# Patient Record
Sex: MALE | Race: WHITE | NOT HISPANIC OR LATINO | URBAN - METROPOLITAN AREA
[De-identification: names, ages, dates, MRNs, and addresses within clinical notes are randomized per-mention and may not be internally consistent; named-entity substitution may affect disease eponyms.]

---

## 2017-01-05 ENCOUNTER — INPATIENT (INPATIENT)
Facility: HOSPITAL | Age: 67
LOS: 7 days | Discharge: HOME | DRG: 246 | End: 2017-01-13
Attending: HOSPITALIST | Admitting: STUDENT IN AN ORGANIZED HEALTH CARE EDUCATION/TRAINING PROGRAM
Payer: COMMERCIAL

## 2017-01-05 VITALS
OXYGEN SATURATION: 94 % | HEART RATE: 88 BPM | RESPIRATION RATE: 22 BRPM | DIASTOLIC BLOOD PRESSURE: 88 MMHG | TEMPERATURE: 98 F | SYSTOLIC BLOOD PRESSURE: 134 MMHG

## 2017-01-05 DIAGNOSIS — I50.23 ACUTE ON CHRONIC SYSTOLIC (CONGESTIVE) HEART FAILURE: ICD-10-CM

## 2017-01-05 DIAGNOSIS — Z41.8 ENCOUNTER FOR OTHER PROCEDURES FOR PURPOSES OTHER THAN REMEDYING HEALTH STATE: ICD-10-CM

## 2017-01-05 DIAGNOSIS — E11.9 TYPE 2 DIABETES MELLITUS WITHOUT COMPLICATIONS: ICD-10-CM

## 2017-01-05 DIAGNOSIS — I21.4 NON-ST ELEVATION (NSTEMI) MYOCARDIAL INFARCTION: ICD-10-CM

## 2017-01-05 DIAGNOSIS — I63.9 CEREBRAL INFARCTION, UNSPECIFIED: ICD-10-CM

## 2017-01-05 DIAGNOSIS — Z98.89 OTHER SPECIFIED POSTPROCEDURAL STATES: Chronic | ICD-10-CM

## 2017-01-05 DIAGNOSIS — I50.21 ACUTE SYSTOLIC (CONGESTIVE) HEART FAILURE: ICD-10-CM

## 2017-01-05 LAB
ALBUMIN SERPL ELPH-MCNC: 2.7 G/DL — LOW (ref 3.3–5)
ALBUMIN SERPL ELPH-MCNC: 3.4 G/DL — SIGNIFICANT CHANGE UP (ref 3.3–5)
ALP SERPL-CCNC: 65 U/L — SIGNIFICANT CHANGE UP (ref 40–120)
ALP SERPL-CCNC: 76 U/L — SIGNIFICANT CHANGE UP (ref 40–120)
ALT FLD-CCNC: 51 U/L RC — HIGH (ref 10–45)
ALT FLD-CCNC: 52 U/L RC — HIGH (ref 10–45)
ANION GAP SERPL CALC-SCNC: 17 MMOL/L — SIGNIFICANT CHANGE UP (ref 5–17)
ANION GAP SERPL CALC-SCNC: 19 MMOL/L — HIGH (ref 5–17)
APPEARANCE UR: CLEAR — SIGNIFICANT CHANGE UP
APTT BLD: 26.9 SEC — LOW (ref 27.5–37.4)
AST SERPL-CCNC: 82 U/L — HIGH (ref 10–40)
AST SERPL-CCNC: 95 U/L — HIGH (ref 10–40)
BASOPHILS # BLD AUTO: 0 K/UL — SIGNIFICANT CHANGE UP (ref 0–0.2)
BASOPHILS # BLD AUTO: 0 K/UL — SIGNIFICANT CHANGE UP (ref 0–0.2)
BASOPHILS NFR BLD AUTO: 0.1 % — SIGNIFICANT CHANGE UP (ref 0–2)
BASOPHILS NFR BLD AUTO: 0.1 % — SIGNIFICANT CHANGE UP (ref 0–2)
BILIRUB SERPL-MCNC: 0.9 MG/DL — SIGNIFICANT CHANGE UP (ref 0.2–1.2)
BILIRUB SERPL-MCNC: 1 MG/DL — SIGNIFICANT CHANGE UP (ref 0.2–1.2)
BILIRUB UR-MCNC: NEGATIVE — SIGNIFICANT CHANGE UP
BLD GP AB SCN SERPL QL: NEGATIVE — SIGNIFICANT CHANGE UP
BUN SERPL-MCNC: 20 MG/DL — SIGNIFICANT CHANGE UP (ref 7–23)
BUN SERPL-MCNC: 20 MG/DL — SIGNIFICANT CHANGE UP (ref 7–23)
CALCIUM SERPL-MCNC: 8.2 MG/DL — LOW (ref 8.4–10.5)
CALCIUM SERPL-MCNC: 8.8 MG/DL — SIGNIFICANT CHANGE UP (ref 8.4–10.5)
CHLORIDE SERPL-SCNC: 102 MMOL/L — SIGNIFICANT CHANGE UP (ref 96–108)
CHLORIDE SERPL-SCNC: 102 MMOL/L — SIGNIFICANT CHANGE UP (ref 96–108)
CK MB BLD-MCNC: 3.5 % — SIGNIFICANT CHANGE UP (ref 0–3.5)
CK MB BLD-MCNC: 4.7 % — HIGH (ref 0–3.5)
CK MB CFR SERPL CALC: 30.2 NG/ML — HIGH (ref 0–6.7)
CK MB CFR SERPL CALC: 48.3 NG/ML — HIGH (ref 0–6.7)
CK SERPL-CCNC: 1025 U/L — HIGH (ref 30–200)
CK SERPL-CCNC: 853 U/L — HIGH (ref 30–200)
CO2 SERPL-SCNC: 21 MMOL/L — LOW (ref 22–31)
CO2 SERPL-SCNC: 23 MMOL/L — SIGNIFICANT CHANGE UP (ref 22–31)
COLOR SPEC: SIGNIFICANT CHANGE UP
CREAT SERPL-MCNC: 1.07 MG/DL — SIGNIFICANT CHANGE UP (ref 0.5–1.3)
CREAT SERPL-MCNC: 1.2 MG/DL — SIGNIFICANT CHANGE UP (ref 0.5–1.3)
DIFF PNL FLD: NEGATIVE — SIGNIFICANT CHANGE UP
EOSINOPHIL # BLD AUTO: 0 K/UL — SIGNIFICANT CHANGE UP (ref 0–0.5)
EOSINOPHIL # BLD AUTO: 0 K/UL — SIGNIFICANT CHANGE UP (ref 0–0.5)
EOSINOPHIL NFR BLD AUTO: 0.1 % — SIGNIFICANT CHANGE UP (ref 0–6)
EOSINOPHIL NFR BLD AUTO: 0.2 % — SIGNIFICANT CHANGE UP (ref 0–6)
GAS PNL BLDV: SIGNIFICANT CHANGE UP
GLUCOSE SERPL-MCNC: 184 MG/DL — HIGH (ref 70–99)
GLUCOSE SERPL-MCNC: 189 MG/DL — HIGH (ref 70–99)
GLUCOSE UR QL: NEGATIVE — SIGNIFICANT CHANGE UP
HCT VFR BLD CALC: 31 % — LOW (ref 39–50)
HCT VFR BLD CALC: 34 % — LOW (ref 39–50)
HGB BLD-MCNC: 10.5 G/DL — LOW (ref 13–17)
HGB BLD-MCNC: 11.7 G/DL — LOW (ref 13–17)
INR BLD: 1.48 RATIO — HIGH (ref 0.88–1.16)
KETONES UR-MCNC: NEGATIVE — SIGNIFICANT CHANGE UP
LEUKOCYTE ESTERASE UR-ACNC: NEGATIVE — SIGNIFICANT CHANGE UP
LYMPHOCYTES # BLD AUTO: 1.2 K/UL — SIGNIFICANT CHANGE UP (ref 1–3.3)
LYMPHOCYTES # BLD AUTO: 1.5 K/UL — SIGNIFICANT CHANGE UP (ref 1–3.3)
LYMPHOCYTES # BLD AUTO: 10.4 % — LOW (ref 13–44)
LYMPHOCYTES # BLD AUTO: 9.1 % — LOW (ref 13–44)
MAGNESIUM SERPL-MCNC: 1.7 MG/DL — SIGNIFICANT CHANGE UP (ref 1.6–2.6)
MCHC RBC-ENTMCNC: 29 PG — SIGNIFICANT CHANGE UP (ref 27–34)
MCHC RBC-ENTMCNC: 29.3 PG — SIGNIFICANT CHANGE UP (ref 27–34)
MCHC RBC-ENTMCNC: 33.9 GM/DL — SIGNIFICANT CHANGE UP (ref 32–36)
MCHC RBC-ENTMCNC: 34.3 GM/DL — SIGNIFICANT CHANGE UP (ref 32–36)
MCV RBC AUTO: 85.5 FL — SIGNIFICANT CHANGE UP (ref 80–100)
MCV RBC AUTO: 85.6 FL — SIGNIFICANT CHANGE UP (ref 80–100)
MONOCYTES # BLD AUTO: 1.3 K/UL — HIGH (ref 0–0.9)
MONOCYTES # BLD AUTO: 1.4 K/UL — HIGH (ref 0–0.9)
MONOCYTES NFR BLD AUTO: 10.8 % — SIGNIFICANT CHANGE UP (ref 2–14)
MONOCYTES NFR BLD AUTO: 8.4 % — SIGNIFICANT CHANGE UP (ref 2–14)
NEUTROPHILS # BLD AUTO: 13.4 K/UL — HIGH (ref 1.8–7.4)
NEUTROPHILS # BLD AUTO: 9.4 K/UL — HIGH (ref 1.8–7.4)
NEUTROPHILS NFR BLD AUTO: 78.4 % — HIGH (ref 43–77)
NEUTROPHILS NFR BLD AUTO: 82.3 % — HIGH (ref 43–77)
NITRITE UR-MCNC: NEGATIVE — SIGNIFICANT CHANGE UP
NT-PROBNP SERPL-SCNC: HIGH PG/ML (ref 0–300)
NT-PROBNP SERPL-SCNC: HIGH PG/ML (ref 0–300)
PH UR: 5 — SIGNIFICANT CHANGE UP (ref 4.8–8)
PHOSPHATE SERPL-MCNC: 3 MG/DL — SIGNIFICANT CHANGE UP (ref 2.5–4.5)
PLATELET # BLD AUTO: 396 K/UL — SIGNIFICANT CHANGE UP (ref 150–400)
PLATELET # BLD AUTO: 461 K/UL — HIGH (ref 150–400)
POTASSIUM SERPL-MCNC: 3.4 MMOL/L — LOW (ref 3.5–5.3)
POTASSIUM SERPL-MCNC: 3.7 MMOL/L — SIGNIFICANT CHANGE UP (ref 3.5–5.3)
POTASSIUM SERPL-SCNC: 3.4 MMOL/L — LOW (ref 3.5–5.3)
POTASSIUM SERPL-SCNC: 3.7 MMOL/L — SIGNIFICANT CHANGE UP (ref 3.5–5.3)
PROT SERPL-MCNC: 5.7 G/DL — LOW (ref 6–8.3)
PROT SERPL-MCNC: 6.5 G/DL — SIGNIFICANT CHANGE UP (ref 6–8.3)
PROT UR-MCNC: NEGATIVE — SIGNIFICANT CHANGE UP
PROTHROM AB SERPL-ACNC: 16.1 SEC — HIGH (ref 10–13.1)
RAPID RVP RESULT: SIGNIFICANT CHANGE UP
RBC # BLD: 3.62 M/UL — LOW (ref 4.2–5.8)
RBC # BLD: 3.98 M/UL — LOW (ref 4.2–5.8)
RBC # FLD: 13.2 % — SIGNIFICANT CHANGE UP (ref 10.3–14.5)
RBC # FLD: 13.2 % — SIGNIFICANT CHANGE UP (ref 10.3–14.5)
RH IG SCN BLD-IMP: NEGATIVE — SIGNIFICANT CHANGE UP
SODIUM SERPL-SCNC: 142 MMOL/L — SIGNIFICANT CHANGE UP (ref 135–145)
SODIUM SERPL-SCNC: 142 MMOL/L — SIGNIFICANT CHANGE UP (ref 135–145)
SP GR SPEC: 1.01 — LOW (ref 1.01–1.02)
TROPONIN T SERPL-MCNC: 0.81 NG/ML — HIGH (ref 0–0.06)
TROPONIN T SERPL-MCNC: 1.91 NG/ML — HIGH (ref 0–0.06)
UROBILINOGEN FLD QL: NEGATIVE — SIGNIFICANT CHANGE UP
WBC # BLD: 11.9 K/UL — HIGH (ref 3.8–10.5)
WBC # BLD: 16.2 K/UL — HIGH (ref 3.8–10.5)
WBC # FLD AUTO: 11.9 K/UL — HIGH (ref 3.8–10.5)
WBC # FLD AUTO: 16.2 K/UL — HIGH (ref 3.8–10.5)

## 2017-01-05 PROCEDURE — 93010 ELECTROCARDIOGRAM REPORT: CPT

## 2017-01-05 PROCEDURE — 93308 TTE F-UP OR LMTD: CPT | Mod: 26

## 2017-01-05 PROCEDURE — 99291 CRITICAL CARE FIRST HOUR: CPT | Mod: 25

## 2017-01-05 PROCEDURE — 71010: CPT | Mod: 26

## 2017-01-05 PROCEDURE — 70450 CT HEAD/BRAIN W/O DYE: CPT | Mod: 26

## 2017-01-05 RX ORDER — HEPARIN SODIUM 5000 [USP'U]/ML
INJECTION INTRAVENOUS; SUBCUTANEOUS
Qty: 25000 | Refills: 0 | Status: DISCONTINUED | OUTPATIENT
Start: 2017-01-05 | End: 2017-01-06

## 2017-01-05 RX ORDER — FUROSEMIDE 40 MG
40 TABLET ORAL ONCE
Qty: 0 | Refills: 0 | Status: COMPLETED | OUTPATIENT
Start: 2017-01-05 | End: 2017-01-05

## 2017-01-05 RX ORDER — PIPERACILLIN AND TAZOBACTAM 4; .5 G/20ML; G/20ML
3.38 INJECTION, POWDER, LYOPHILIZED, FOR SOLUTION INTRAVENOUS ONCE
Qty: 0 | Refills: 0 | Status: COMPLETED | OUTPATIENT
Start: 2017-01-05 | End: 2017-01-05

## 2017-01-05 RX ORDER — METOPROLOL TARTRATE 50 MG
12.5 TABLET ORAL
Qty: 0 | Refills: 0 | Status: DISCONTINUED | OUTPATIENT
Start: 2017-01-05 | End: 2017-01-10

## 2017-01-05 RX ORDER — HEPARIN SODIUM 5000 [USP'U]/ML
5000 INJECTION INTRAVENOUS; SUBCUTANEOUS EVERY 6 HOURS
Qty: 0 | Refills: 0 | Status: DISCONTINUED | OUTPATIENT
Start: 2017-01-05 | End: 2017-01-06

## 2017-01-05 RX ORDER — NITROGLYCERIN 6.5 MG
50 CAPSULE, EXTENDED RELEASE ORAL
Qty: 50 | Refills: 0 | Status: DISCONTINUED | OUTPATIENT
Start: 2017-01-05 | End: 2017-01-05

## 2017-01-05 RX ORDER — CLOPIDOGREL BISULFATE 75 MG/1
75 TABLET, FILM COATED ORAL DAILY
Qty: 0 | Refills: 0 | Status: DISCONTINUED | OUTPATIENT
Start: 2017-01-05 | End: 2017-01-13

## 2017-01-05 RX ORDER — ATORVASTATIN CALCIUM 80 MG/1
80 TABLET, FILM COATED ORAL AT BEDTIME
Qty: 0 | Refills: 0 | Status: DISCONTINUED | OUTPATIENT
Start: 2017-01-05 | End: 2017-01-13

## 2017-01-05 RX ORDER — QUETIAPINE FUMARATE 200 MG/1
25 TABLET, FILM COATED ORAL ONCE
Qty: 0 | Refills: 0 | Status: COMPLETED | OUTPATIENT
Start: 2017-01-05 | End: 2017-01-05

## 2017-01-05 RX ORDER — AZITHROMYCIN 500 MG/1
500 TABLET, FILM COATED ORAL ONCE
Qty: 0 | Refills: 0 | Status: DISCONTINUED | OUTPATIENT
Start: 2017-01-05 | End: 2017-01-05

## 2017-01-05 RX ORDER — FUROSEMIDE 40 MG
40 TABLET ORAL ONCE
Qty: 0 | Refills: 0 | Status: DISCONTINUED | OUTPATIENT
Start: 2017-01-05 | End: 2017-01-05

## 2017-01-05 RX ORDER — VANCOMYCIN HCL 1 G
1000 VIAL (EA) INTRAVENOUS ONCE
Qty: 0 | Refills: 0 | Status: DISCONTINUED | OUTPATIENT
Start: 2017-01-05 | End: 2017-01-05

## 2017-01-05 RX ORDER — INSULIN LISPRO 100/ML
VIAL (ML) SUBCUTANEOUS EVERY 6 HOURS
Qty: 0 | Refills: 0 | Status: DISCONTINUED | OUTPATIENT
Start: 2017-01-05 | End: 2017-01-11

## 2017-01-05 RX ORDER — CLOPIDOGREL BISULFATE 75 MG/1
600 TABLET, FILM COATED ORAL ONCE
Qty: 0 | Refills: 0 | Status: COMPLETED | OUTPATIENT
Start: 2017-01-05 | End: 2017-01-05

## 2017-01-05 RX ORDER — ASPIRIN/CALCIUM CARB/MAGNESIUM 324 MG
324 TABLET ORAL ONCE
Qty: 0 | Refills: 0 | Status: COMPLETED | OUTPATIENT
Start: 2017-01-05 | End: 2017-01-05

## 2017-01-05 RX ORDER — ASPIRIN/CALCIUM CARB/MAGNESIUM 324 MG
81 TABLET ORAL DAILY
Qty: 0 | Refills: 0 | Status: DISCONTINUED | OUTPATIENT
Start: 2017-01-05 | End: 2017-01-13

## 2017-01-05 RX ADMIN — Medication 12.5 MILLIGRAM(S): at 22:49

## 2017-01-05 RX ADMIN — Medication 40 MILLIGRAM(S): at 16:33

## 2017-01-05 RX ADMIN — PIPERACILLIN AND TAZOBACTAM 200 GRAM(S): 4; .5 INJECTION, POWDER, LYOPHILIZED, FOR SOLUTION INTRAVENOUS at 18:11

## 2017-01-05 RX ADMIN — HEPARIN SODIUM 1000 UNIT(S)/HR: 5000 INJECTION INTRAVENOUS; SUBCUTANEOUS at 22:50

## 2017-01-05 RX ADMIN — Medication 324 MILLIGRAM(S): at 16:26

## 2017-01-05 RX ADMIN — ATORVASTATIN CALCIUM 80 MILLIGRAM(S): 80 TABLET, FILM COATED ORAL at 22:49

## 2017-01-05 RX ADMIN — CLOPIDOGREL BISULFATE 600 MILLIGRAM(S): 75 TABLET, FILM COATED ORAL at 22:49

## 2017-01-05 RX ADMIN — Medication 15 MICROGRAM(S)/MIN: at 18:11

## 2017-01-05 RX ADMIN — Medication 2: at 23:57

## 2017-01-05 RX ADMIN — Medication 15 MICROGRAM(S)/MIN: at 14:27

## 2017-01-05 NOTE — H&P ADULT. - PROBLEM SELECTOR PLAN 2
- s/p ASA load in ED  - Give Plavix load and start heparin gtt pending CT head to r/o hemorraghic conversion given recent CVA  - Increase home dose of Lipitor to 80 mg  - Consider restarting home B blocker  - Trend Carmella  - Daily EKG, monitor for CP  - Check TTE, will need eventual cardiac cath

## 2017-01-05 NOTE — H&P ADULT. - LYMPHATIC
anterior cervical L/supraclavicular R/supraclavicular L/anterior cervical R/posterior cervical L/posterior cervical R

## 2017-01-05 NOTE — ED PROVIDER NOTE - CRITICAL CARE PROVIDED
consultation with other physicians/documentation/additional history taking/direct patient care (not related to procedure)/consult w/ pt's family directly relating to pts condition/interpretation of diagnostic studies

## 2017-01-05 NOTE — H&P ADULT. - LAB RESULTS AND INTERPRETATION
Labs reviewed by me: Leukocytosis 162 (neutrophil-predominant), normocytic anemia at baseline, thrombocytosis likely reactive, elevated troponin 0.81 (uptrended from 0.13 in 12/29), CKMB 48.3, CK 1025 (improved from 1182 in 12/30), BNP 51110, lactate 3.0

## 2017-01-05 NOTE — H&P ADULT. - FAMILY HISTORY
<<-----Click on this checkbox to enter Family History No pertinent family history in first degree relatives     Family history of diabetes mellitus     Family history of stroke

## 2017-01-05 NOTE — ED PROCEDURE NOTE - PROCEDURE ADDITIONAL DETAILS
Emergency Department Focused Ultrasound performed at patient's bedside for educational purposes. The study will have a follow up study performed or was performed in the direct supervision of an ultrasound trained attending.  Impression: lung sliding, b/l base effusions R>L.
POCUS: diffuse b lines, b/l pleural effusions, no sig pericardial effusion. moderate hypokinesis lvef

## 2017-01-05 NOTE — H&P ADULT. - ASSESSMENT
66 M PMH HTN, HLD, uncontrolled DM2 (HbA1c 8.6%) recently admitted to [facility] for elevated troponins presumed to be demand ischemia 2/2 sepsis 2/2 multilobar PNA, found to have HFrEF (moderate segmental LV systolic dysfunction in 12/2016) and acute small left MCA infarct, now p/w worsening SOB, cough, and LE edema x 2 days, likely ADHF in the setting of NSTEMI. 66 M PMH HTN, HLD, uncontrolled DM2 (HbA1c 8.6%) recently admitted to [facility] for elevated troponins presumed to be demand ischemia 2/2 sepsis 2/2 multilobar PNA, found to have HFrEF (moderate segmental LV systolic dysfunction in 12/2016) and acute small left MCA infarct, now p/w worsening SOB, cough, and LE edema x 2 days, likely ADHF in the setting of NSTEMI.

## 2017-01-05 NOTE — ED PROVIDER NOTE - CARE PLAN
Principal Discharge DX:	Acute systolic congestive heart failure  Secondary Diagnosis:	NSTEMI (non-ST elevated myocardial infarction)

## 2017-01-05 NOTE — H&P ADULT. - PROBLEM SELECTOR PLAN 1
- In setting of NSTEMI  - s/p 40 mg IV Lasix in ED  - c/w BiPAP  - d/c nitro gtt as BP soft  - Respiratory status improved  - Monitor strict I/O, daily weights

## 2017-01-05 NOTE — ED PROVIDER NOTE - OBJECTIVE STATEMENT
66yM with recent admission for MI/stroke/PNA, sent in for admission by his PMD for "worsening pna". Since hospital discharge several days ago developed worsening cough, sob and orthopnea. Not currently on abx. New increased LE edema bilaterally. No h/o heart failure. Cough with pinkish sputum. No fever or chills.

## 2017-01-05 NOTE — ED PROVIDER NOTE - ATTENDING CONTRIBUTION TO CARE
I performed a history and physical exam of the patient and discussed their management with the resident. I reviewed the resident's note and agree with the documented findings and plan of care.     66yoM with recent MI, came for "worsening pna" which  was sx of cough, sob that have not improved since discharge. He has increasing LE edema.   P02 at triage 90% on RA  Liang Heath DO:   Gen: Mod respiratory distress  HEENT: PERRL, EOMI, atraumatic  Neck: supple  Heart: RRR, S1S2  Lungs: Diminished on R with bilateral rales.   Abd: soft, nontender, nondistended  Ext: No deformity. No erythema. No calf tenderness or edema.   Neuro: Grossly intact. Normal gait.     A: Dyspnea - likely Acute CHF exacerbation, r/o new ACS  EKG with lateral ischemia, no CORTEZ  P:  labs/Carmella  Bipap/nitro gtt  ASA  cardiac monitoring.   admit

## 2017-01-05 NOTE — ED ADULT NURSE NOTE - OBJECTIVE STATEMENT
Per pt and his wife's report, pt has not improved since being released from the hospital two days ago due to pneumonia.  Pt went to his PCP today who did an x-ray and found he still had a pneumonia.  Pt has been coughing and unable to sleep.  Per the pt's wife he also had a CVA prior to this past stay in the hospital as well but is not having symptoms today.  Pt A&Ox4, skin is warm and dry, appears tachypnic and uncomfortable.  Cough is pink tinged per pt report.

## 2017-01-05 NOTE — ED PROVIDER NOTE - PROGRESS NOTE DETAILS
PMD [facility] paged  [place]iles PGY-3 Patient's work of breathing and cough significantly improved after nitro and bipap. CCU consult pending. PMD [facility] pagememe Kwan PGY-3

## 2017-01-05 NOTE — H&P ADULT. - PROBLEM SELECTOR PLAN 4
- Recent MCA infarct  - c/w ASA and statin  - Repeat CT head 24 hr after starting a/c and neuro checks Q4H

## 2017-01-06 LAB
ALBUMIN SERPL ELPH-MCNC: 2.6 G/DL — LOW (ref 3.3–5)
ALP SERPL-CCNC: 61 U/L — SIGNIFICANT CHANGE UP (ref 40–120)
ALT FLD-CCNC: 49 U/L RC — HIGH (ref 10–45)
ANION GAP SERPL CALC-SCNC: 14 MMOL/L — SIGNIFICANT CHANGE UP (ref 5–17)
ANION GAP SERPL CALC-SCNC: 16 MMOL/L — SIGNIFICANT CHANGE UP (ref 5–17)
APTT BLD: 31.2 SEC — SIGNIFICANT CHANGE UP (ref 27.5–37.4)
AST SERPL-CCNC: 78 U/L — HIGH (ref 10–40)
BASOPHILS # BLD AUTO: 0 K/UL — SIGNIFICANT CHANGE UP (ref 0–0.2)
BASOPHILS NFR BLD AUTO: 0.2 % — SIGNIFICANT CHANGE UP (ref 0–2)
BILIRUB SERPL-MCNC: 0.9 MG/DL — SIGNIFICANT CHANGE UP (ref 0.2–1.2)
BUN SERPL-MCNC: 18 MG/DL — SIGNIFICANT CHANGE UP (ref 7–23)
BUN SERPL-MCNC: 19 MG/DL — SIGNIFICANT CHANGE UP (ref 7–23)
CALCIUM SERPL-MCNC: 7.8 MG/DL — LOW (ref 8.4–10.5)
CALCIUM SERPL-MCNC: 7.9 MG/DL — LOW (ref 8.4–10.5)
CHLORIDE SERPL-SCNC: 102 MMOL/L — SIGNIFICANT CHANGE UP (ref 96–108)
CHLORIDE SERPL-SCNC: 103 MMOL/L — SIGNIFICANT CHANGE UP (ref 96–108)
CHOLEST SERPL-MCNC: 192 MG/DL — SIGNIFICANT CHANGE UP (ref 10–199)
CK MB BLD-MCNC: 2.6 % — SIGNIFICANT CHANGE UP (ref 0–3.5)
CK MB CFR SERPL CALC: 15 NG/ML — HIGH (ref 0–6.7)
CK SERPL-CCNC: 575 U/L — HIGH (ref 30–200)
CO2 SERPL-SCNC: 21 MMOL/L — LOW (ref 22–31)
CO2 SERPL-SCNC: 25 MMOL/L — SIGNIFICANT CHANGE UP (ref 22–31)
CREAT SERPL-MCNC: 0.92 MG/DL — SIGNIFICANT CHANGE UP (ref 0.5–1.3)
CREAT SERPL-MCNC: 0.96 MG/DL — SIGNIFICANT CHANGE UP (ref 0.5–1.3)
EOSINOPHIL # BLD AUTO: 0 K/UL — SIGNIFICANT CHANGE UP (ref 0–0.5)
EOSINOPHIL NFR BLD AUTO: 0.2 % — SIGNIFICANT CHANGE UP (ref 0–6)
GAS PNL BLDA: SIGNIFICANT CHANGE UP
GLUCOSE SERPL-MCNC: 153 MG/DL — HIGH (ref 70–99)
GLUCOSE SERPL-MCNC: 171 MG/DL — HIGH (ref 70–99)
HBA1C BLD-MCNC: 8.5 % — HIGH (ref 4–5.6)
HCT VFR BLD CALC: 30.5 % — LOW (ref 39–50)
HDLC SERPL-MCNC: 32 MG/DL — LOW (ref 40–125)
HGB BLD-MCNC: 10.4 G/DL — LOW (ref 13–17)
INR BLD: 1.5 RATIO — HIGH (ref 0.88–1.16)
LIPID PNL WITH DIRECT LDL SERPL: 138 MG/DL — HIGH
LYMPHOCYTES # BLD AUTO: 1.3 K/UL — SIGNIFICANT CHANGE UP (ref 1–3.3)
LYMPHOCYTES # BLD AUTO: 11.5 % — LOW (ref 13–44)
MAGNESIUM SERPL-MCNC: 2.1 MG/DL — SIGNIFICANT CHANGE UP (ref 1.6–2.6)
MAGNESIUM SERPL-MCNC: 2.2 MG/DL — SIGNIFICANT CHANGE UP (ref 1.6–2.6)
MCHC RBC-ENTMCNC: 29.4 PG — SIGNIFICANT CHANGE UP (ref 27–34)
MCHC RBC-ENTMCNC: 34.3 GM/DL — SIGNIFICANT CHANGE UP (ref 32–36)
MCV RBC AUTO: 85.7 FL — SIGNIFICANT CHANGE UP (ref 80–100)
MONOCYTES # BLD AUTO: 1 K/UL — HIGH (ref 0–0.9)
MONOCYTES NFR BLD AUTO: 9.1 % — SIGNIFICANT CHANGE UP (ref 2–14)
NEUTROPHILS # BLD AUTO: 8.8 K/UL — HIGH (ref 1.8–7.4)
NEUTROPHILS NFR BLD AUTO: 79 % — HIGH (ref 43–77)
PHOSPHATE SERPL-MCNC: 2.6 MG/DL — SIGNIFICANT CHANGE UP (ref 2.5–4.5)
PHOSPHATE SERPL-MCNC: 2.8 MG/DL — SIGNIFICANT CHANGE UP (ref 2.5–4.5)
PLATELET # BLD AUTO: 369 K/UL — SIGNIFICANT CHANGE UP (ref 150–400)
POTASSIUM SERPL-MCNC: 3.4 MMOL/L — LOW (ref 3.5–5.3)
POTASSIUM SERPL-MCNC: 5.5 MMOL/L — HIGH (ref 3.5–5.3)
POTASSIUM SERPL-SCNC: 3.4 MMOL/L — LOW (ref 3.5–5.3)
POTASSIUM SERPL-SCNC: 5.5 MMOL/L — HIGH (ref 3.5–5.3)
PROT SERPL-MCNC: 5.4 G/DL — LOW (ref 6–8.3)
PROTHROM AB SERPL-ACNC: 16.3 SEC — HIGH (ref 10–13.1)
RBC # BLD: 3.55 M/UL — LOW (ref 4.2–5.8)
RBC # FLD: 13.2 % — SIGNIFICANT CHANGE UP (ref 10.3–14.5)
SODIUM SERPL-SCNC: 140 MMOL/L — SIGNIFICANT CHANGE UP (ref 135–145)
SODIUM SERPL-SCNC: 141 MMOL/L — SIGNIFICANT CHANGE UP (ref 135–145)
TOTAL CHOLESTEROL/HDL RATIO MEASUREMENT: 6 RATIO — SIGNIFICANT CHANGE UP (ref 3.4–9.6)
TRIGL SERPL-MCNC: 110 MG/DL — SIGNIFICANT CHANGE UP (ref 10–149)
TROPONIN T SERPL-MCNC: 1.45 NG/ML — HIGH (ref 0–0.06)
TSH SERPL-MCNC: 1.02 UU/ML — SIGNIFICANT CHANGE UP (ref 0.27–4.2)
WBC # BLD: 11.2 K/UL — HIGH (ref 3.8–10.5)
WBC # FLD AUTO: 11.2 K/UL — HIGH (ref 3.8–10.5)

## 2017-01-06 PROCEDURE — 71010: CPT | Mod: 26

## 2017-01-06 PROCEDURE — 99223 1ST HOSP IP/OBS HIGH 75: CPT | Mod: GC

## 2017-01-06 PROCEDURE — 93010 ELECTROCARDIOGRAM REPORT: CPT

## 2017-01-06 RX ORDER — QUETIAPINE FUMARATE 200 MG/1
25 TABLET, FILM COATED ORAL DAILY
Qty: 0 | Refills: 0 | Status: DISCONTINUED | OUTPATIENT
Start: 2017-01-06 | End: 2017-01-13

## 2017-01-06 RX ORDER — POTASSIUM CHLORIDE 20 MEQ
10 PACKET (EA) ORAL
Qty: 0 | Refills: 0 | Status: COMPLETED | OUTPATIENT
Start: 2017-01-06 | End: 2017-01-06

## 2017-01-06 RX ORDER — ENOXAPARIN SODIUM 100 MG/ML
40 INJECTION SUBCUTANEOUS DAILY
Qty: 0 | Refills: 0 | Status: DISCONTINUED | OUTPATIENT
Start: 2017-01-06 | End: 2017-01-08

## 2017-01-06 RX ORDER — CAPTOPRIL 12.5 MG/1
12.5 TABLET ORAL THREE TIMES A DAY
Qty: 0 | Refills: 0 | Status: DISCONTINUED | OUTPATIENT
Start: 2017-01-06 | End: 2017-01-11

## 2017-01-06 RX ORDER — MAGNESIUM SULFATE 500 MG/ML
2 VIAL (ML) INJECTION ONCE
Qty: 0 | Refills: 0 | Status: COMPLETED | OUTPATIENT
Start: 2017-01-06 | End: 2017-01-06

## 2017-01-06 RX ORDER — FUROSEMIDE 40 MG
40 TABLET ORAL ONCE
Qty: 0 | Refills: 0 | Status: COMPLETED | OUTPATIENT
Start: 2017-01-06 | End: 2017-01-06

## 2017-01-06 RX ORDER — POTASSIUM CHLORIDE 20 MEQ
40 PACKET (EA) ORAL ONCE
Qty: 0 | Refills: 0 | Status: COMPLETED | OUTPATIENT
Start: 2017-01-06 | End: 2017-01-06

## 2017-01-06 RX ADMIN — CAPTOPRIL 12.5 MILLIGRAM(S): 12.5 TABLET ORAL at 11:56

## 2017-01-06 RX ADMIN — Medication 40 MILLIEQUIVALENT(S): at 09:30

## 2017-01-06 RX ADMIN — ATORVASTATIN CALCIUM 80 MILLIGRAM(S): 80 TABLET, FILM COATED ORAL at 23:34

## 2017-01-06 RX ADMIN — Medication 40 MILLIGRAM(S): at 12:41

## 2017-01-06 RX ADMIN — Medication 100 MILLIGRAM(S): at 23:39

## 2017-01-06 RX ADMIN — CAPTOPRIL 12.5 MILLIGRAM(S): 12.5 TABLET ORAL at 23:34

## 2017-01-06 RX ADMIN — Medication 100 MILLIEQUIVALENT(S): at 00:49

## 2017-01-06 RX ADMIN — Medication 2: at 23:37

## 2017-01-06 RX ADMIN — Medication 100 MILLIEQUIVALENT(S): at 03:11

## 2017-01-06 RX ADMIN — Medication 12.5 MILLIGRAM(S): at 05:12

## 2017-01-06 RX ADMIN — Medication 100 MILLIEQUIVALENT(S): at 05:11

## 2017-01-06 RX ADMIN — Medication 81 MILLIGRAM(S): at 11:56

## 2017-01-06 RX ADMIN — HEPARIN SODIUM 1250 UNIT(S)/HR: 5000 INJECTION INTRAVENOUS; SUBCUTANEOUS at 05:20

## 2017-01-06 RX ADMIN — CLOPIDOGREL BISULFATE 75 MILLIGRAM(S): 75 TABLET, FILM COATED ORAL at 11:56

## 2017-01-06 RX ADMIN — ENOXAPARIN SODIUM 40 MILLIGRAM(S): 100 INJECTION SUBCUTANEOUS at 11:58

## 2017-01-06 RX ADMIN — Medication 50 GRAM(S): at 00:49

## 2017-01-06 RX ADMIN — QUETIAPINE FUMARATE 25 MILLIGRAM(S): 200 TABLET, FILM COATED ORAL at 23:34

## 2017-01-06 RX ADMIN — Medication 12.5 MILLIGRAM(S): at 18:17

## 2017-01-07 LAB
ALBUMIN SERPL ELPH-MCNC: 3 G/DL — LOW (ref 3.3–5)
ALP SERPL-CCNC: 66 U/L — SIGNIFICANT CHANGE UP (ref 40–120)
ALT FLD-CCNC: 54 U/L RC — HIGH (ref 10–45)
ANION GAP SERPL CALC-SCNC: 14 MMOL/L — SIGNIFICANT CHANGE UP (ref 5–17)
APTT BLD: 27.7 SEC — SIGNIFICANT CHANGE UP (ref 27.5–37.4)
AST SERPL-CCNC: 47 U/L — HIGH (ref 10–40)
BASOPHILS # BLD AUTO: 0 K/UL — SIGNIFICANT CHANGE UP (ref 0–0.2)
BASOPHILS NFR BLD AUTO: 0.2 % — SIGNIFICANT CHANGE UP (ref 0–2)
BILIRUB SERPL-MCNC: 0.8 MG/DL — SIGNIFICANT CHANGE UP (ref 0.2–1.2)
BUN SERPL-MCNC: 20 MG/DL — SIGNIFICANT CHANGE UP (ref 7–23)
CALCIUM SERPL-MCNC: 8.5 MG/DL — SIGNIFICANT CHANGE UP (ref 8.4–10.5)
CHLORIDE SERPL-SCNC: 102 MMOL/L — SIGNIFICANT CHANGE UP (ref 96–108)
CK MB BLD-MCNC: 1.9 % — SIGNIFICANT CHANGE UP (ref 0–3.5)
CK MB CFR SERPL CALC: 4.4 NG/ML — SIGNIFICANT CHANGE UP (ref 0–6.7)
CK SERPL-CCNC: 235 U/L — HIGH (ref 30–200)
CO2 SERPL-SCNC: 27 MMOL/L — SIGNIFICANT CHANGE UP (ref 22–31)
CREAT SERPL-MCNC: 1.04 MG/DL — SIGNIFICANT CHANGE UP (ref 0.5–1.3)
EOSINOPHIL # BLD AUTO: 0.1 K/UL — SIGNIFICANT CHANGE UP (ref 0–0.5)
EOSINOPHIL NFR BLD AUTO: 0.9 % — SIGNIFICANT CHANGE UP (ref 0–6)
GLUCOSE SERPL-MCNC: 164 MG/DL — HIGH (ref 70–99)
HCT VFR BLD CALC: 33.3 % — LOW (ref 39–50)
HGB BLD-MCNC: 11.1 G/DL — LOW (ref 13–17)
INR BLD: 1.39 RATIO — HIGH (ref 0.88–1.16)
LYMPHOCYTES # BLD AUTO: 1.8 K/UL — SIGNIFICANT CHANGE UP (ref 1–3.3)
LYMPHOCYTES # BLD AUTO: 20.2 % — SIGNIFICANT CHANGE UP (ref 13–44)
MAGNESIUM SERPL-MCNC: 2.1 MG/DL — SIGNIFICANT CHANGE UP (ref 1.6–2.6)
MCHC RBC-ENTMCNC: 28.8 PG — SIGNIFICANT CHANGE UP (ref 27–34)
MCHC RBC-ENTMCNC: 33.4 GM/DL — SIGNIFICANT CHANGE UP (ref 32–36)
MCV RBC AUTO: 86.2 FL — SIGNIFICANT CHANGE UP (ref 80–100)
MONOCYTES # BLD AUTO: 0.8 K/UL — SIGNIFICANT CHANGE UP (ref 0–0.9)
MONOCYTES NFR BLD AUTO: 8.7 % — SIGNIFICANT CHANGE UP (ref 2–14)
NEUTROPHILS # BLD AUTO: 6 K/UL — SIGNIFICANT CHANGE UP (ref 1.8–7.4)
NEUTROPHILS NFR BLD AUTO: 70 % — SIGNIFICANT CHANGE UP (ref 43–77)
PHOSPHATE SERPL-MCNC: 2.6 MG/DL — SIGNIFICANT CHANGE UP (ref 2.5–4.5)
PLATELET # BLD AUTO: 383 K/UL — SIGNIFICANT CHANGE UP (ref 150–400)
POTASSIUM SERPL-MCNC: 3.1 MMOL/L — LOW (ref 3.5–5.3)
POTASSIUM SERPL-SCNC: 3.1 MMOL/L — LOW (ref 3.5–5.3)
PROT SERPL-MCNC: 5.8 G/DL — LOW (ref 6–8.3)
PROTHROM AB SERPL-ACNC: 15.2 SEC — HIGH (ref 10–13.1)
RBC # BLD: 3.87 M/UL — LOW (ref 4.2–5.8)
RBC # FLD: 13.1 % — SIGNIFICANT CHANGE UP (ref 10.3–14.5)
SODIUM SERPL-SCNC: 143 MMOL/L — SIGNIFICANT CHANGE UP (ref 135–145)
TROPONIN T SERPL-MCNC: 1.96 NG/ML — HIGH (ref 0–0.06)
WBC # BLD: 8.6 K/UL — SIGNIFICANT CHANGE UP (ref 3.8–10.5)
WBC # FLD AUTO: 8.6 K/UL — SIGNIFICANT CHANGE UP (ref 3.8–10.5)

## 2017-01-07 PROCEDURE — 99223 1ST HOSP IP/OBS HIGH 75: CPT | Mod: GC

## 2017-01-07 PROCEDURE — 93010 ELECTROCARDIOGRAM REPORT: CPT

## 2017-01-07 PROCEDURE — 99233 SBSQ HOSP IP/OBS HIGH 50: CPT | Mod: GC

## 2017-01-07 RX ORDER — POTASSIUM CHLORIDE 20 MEQ
20 PACKET (EA) ORAL
Qty: 0 | Refills: 0 | Status: COMPLETED | OUTPATIENT
Start: 2017-01-07 | End: 2017-01-07

## 2017-01-07 RX ORDER — FUROSEMIDE 40 MG
40 TABLET ORAL EVERY 12 HOURS
Qty: 0 | Refills: 0 | Status: DISCONTINUED | OUTPATIENT
Start: 2017-01-07 | End: 2017-01-09

## 2017-01-07 RX ADMIN — Medication 12.5 MILLIGRAM(S): at 05:47

## 2017-01-07 RX ADMIN — Medication 2: at 18:45

## 2017-01-07 RX ADMIN — Medication 20 MILLIEQUIVALENT(S): at 12:39

## 2017-01-07 RX ADMIN — Medication 12.5 MILLIGRAM(S): at 18:46

## 2017-01-07 RX ADMIN — Medication 20 MILLIEQUIVALENT(S): at 10:06

## 2017-01-07 RX ADMIN — Medication 81 MILLIGRAM(S): at 12:39

## 2017-01-07 RX ADMIN — ATORVASTATIN CALCIUM 80 MILLIGRAM(S): 80 TABLET, FILM COATED ORAL at 22:24

## 2017-01-07 RX ADMIN — Medication 40 MILLIGRAM(S): at 05:46

## 2017-01-07 RX ADMIN — QUETIAPINE FUMARATE 25 MILLIGRAM(S): 200 TABLET, FILM COATED ORAL at 22:25

## 2017-01-07 RX ADMIN — Medication 4: at 12:40

## 2017-01-07 RX ADMIN — Medication 2: at 06:46

## 2017-01-07 RX ADMIN — Medication 40 MILLIGRAM(S): at 17:14

## 2017-01-07 RX ADMIN — CLOPIDOGREL BISULFATE 75 MILLIGRAM(S): 75 TABLET, FILM COATED ORAL at 12:39

## 2017-01-07 RX ADMIN — CAPTOPRIL 12.5 MILLIGRAM(S): 12.5 TABLET ORAL at 22:25

## 2017-01-07 RX ADMIN — Medication 20 MILLIEQUIVALENT(S): at 08:18

## 2017-01-07 RX ADMIN — ENOXAPARIN SODIUM 40 MILLIGRAM(S): 100 INJECTION SUBCUTANEOUS at 12:39

## 2017-01-07 RX ADMIN — CAPTOPRIL 12.5 MILLIGRAM(S): 12.5 TABLET ORAL at 18:46

## 2017-01-07 RX ADMIN — CAPTOPRIL 12.5 MILLIGRAM(S): 12.5 TABLET ORAL at 05:46

## 2017-01-07 NOTE — DIETITIAN INITIAL EVALUATION ADULT. - NS AS NUTRI INTERV ED CONTENT
Discussed heart healthy/general healthy and consistent carbohydrate diet education with pt and wife. Discussed importance of monitoring blood glucose levels, consuming carbohydrates with protein, avoiding concentrated sweets, portion control, discussed which foods contain carbohydrates, importance of limiting sodium in the diet, lean meats, whole grains, low fat dairy products, daily weights. Discussed foods naturally high in sodium and what to look for on a nutrition food label. Provided written materials, made pt and family aware RD available for further diet education upon pt/family request. Pt and wife verbalized understanding.

## 2017-01-07 NOTE — DIETITIAN INITIAL EVALUATION ADULT. - ORAL INTAKE PTA
Pt states that his appetite was decreased for 1 week PTA due to feeling sick. As per wife pt states his appetite was decreased because the doctor told him he had to change his diet which made him upset.

## 2017-01-07 NOTE — DIETITIAN INITIAL EVALUATION ADULT. - ADHERENCE
Pt states that he typically has turkey rashid, 1 egg, some brown rice and corn tortilla for breakfast, sandwich at [employer] or Chinese food for lunch and vegetables with steak or salmon or chicken with brown rice for dinner. Snacks on salted nuts, cookies and sweets. Per wife pt was not checking his fingersticks as he recently got a machine and was not feeling up to it. Discussed importance of monitoring blood glucose levels. Per wife pt had previously met with an RD 2-3 years ago and had success at changing his diet however states once he stopped seeing her, he stopped his "diet."

## 2017-01-07 NOTE — DIETITIAN INITIAL EVALUATION ADULT. - SOURCE
other (specify)/patient/family/significant other/wife at bedside, medical records, previous RD note, team

## 2017-01-07 NOTE — DIETITIAN INITIAL EVALUATION ADULT. - OTHER INFO
Consult received for hgbA1c: 8.5. Noted per previous RD note pt newly diagnosed with DM, pt seemed unaware of this diagnosis however per wife pt was told to start checking fingersticks and was told to purchase a glucometer. Discussed with team who will review diagnosis with pt and wife. Pt states that his weight has been stable, denies changes in the way his clothes fit. Pt denies taking vitamins PTA. Currently pt states that his appetite is improving. Per RN pt ate breakfast well. Noted per RN documentation pt eating 50-90% of meals. Pt denies nausea/vomiting/diarrhea/constipation. Denies difficulty chewing/swallowing. NKFA. Pt and wife receptive to heart failure and general healthy, consistent carbohydrate diet education. Made pt and wife aware RD available for further diet education.

## 2017-01-07 NOTE — DIETITIAN INITIAL EVALUATION ADULT. - PERTINENT LABORATORY DATA
(1/7) K: 3.1, glucose: 164, AST: 47, Alt: 54, (1/6) hgbA1c: 8.5, fingersticks: (1/7) venous puncture-164, (1/6) fingersticks: 126-184, venous: 142-162

## 2017-01-07 NOTE — DIETITIAN INITIAL EVALUATION ADULT. - ENERGY NEEDS
Height: 5'7", weight: 178.5 pounds, BMI: 28, ideal body weight: 148 pounds (+/-10%), %IBW: 121%  Pertinent information: Per chart pt recently admitted to [facility] for elevated troponins presumed to be demand ischemia secondary to sepsis secondary to multilobar PNA found to have mod seg LV systolic dysfunction, acute small left MCA infarct appears with ADHF likely secondary to NSTEMI. No edema or pressure ulcers noted. Team to discuss diabetes diagnosis with pt and family. Defer fluids to team.

## 2017-01-08 LAB
ANION GAP SERPL CALC-SCNC: 13 MMOL/L — SIGNIFICANT CHANGE UP (ref 5–17)
BUN SERPL-MCNC: 22 MG/DL — SIGNIFICANT CHANGE UP (ref 7–23)
CALCIUM SERPL-MCNC: 8.9 MG/DL — SIGNIFICANT CHANGE UP (ref 8.4–10.5)
CHLORIDE SERPL-SCNC: 104 MMOL/L — SIGNIFICANT CHANGE UP (ref 96–108)
CO2 SERPL-SCNC: 26 MMOL/L — SIGNIFICANT CHANGE UP (ref 22–31)
CREAT SERPL-MCNC: 1.06 MG/DL — SIGNIFICANT CHANGE UP (ref 0.5–1.3)
GLUCOSE SERPL-MCNC: 177 MG/DL — HIGH (ref 70–99)
HCT VFR BLD CALC: 33.6 % — LOW (ref 39–50)
HGB BLD-MCNC: 10.5 G/DL — LOW (ref 13–17)
MAGNESIUM SERPL-MCNC: 1.9 MG/DL — SIGNIFICANT CHANGE UP (ref 1.6–2.6)
MCHC RBC-ENTMCNC: 26.6 PG — LOW (ref 27–34)
MCHC RBC-ENTMCNC: 31.3 GM/DL — LOW (ref 32–36)
MCV RBC AUTO: 85.3 FL — SIGNIFICANT CHANGE UP (ref 80–100)
PHOSPHATE SERPL-MCNC: 3.1 MG/DL — SIGNIFICANT CHANGE UP (ref 2.5–4.5)
PLATELET # BLD AUTO: 435 K/UL — HIGH (ref 150–400)
POTASSIUM SERPL-MCNC: 3.3 MMOL/L — LOW (ref 3.5–5.3)
POTASSIUM SERPL-SCNC: 3.3 MMOL/L — LOW (ref 3.5–5.3)
RBC # BLD: 3.94 M/UL — LOW (ref 4.2–5.8)
RBC # FLD: 14.3 % — SIGNIFICANT CHANGE UP (ref 10.3–14.5)
SODIUM SERPL-SCNC: 143 MMOL/L — SIGNIFICANT CHANGE UP (ref 135–145)
WBC # BLD: 8.2 K/UL — SIGNIFICANT CHANGE UP (ref 3.8–10.5)
WBC # FLD AUTO: 8.2 K/UL — SIGNIFICANT CHANGE UP (ref 3.8–10.5)

## 2017-01-08 PROCEDURE — 99232 SBSQ HOSP IP/OBS MODERATE 35: CPT | Mod: GC

## 2017-01-08 PROCEDURE — 93010 ELECTROCARDIOGRAM REPORT: CPT

## 2017-01-08 PROCEDURE — 99233 SBSQ HOSP IP/OBS HIGH 50: CPT

## 2017-01-08 RX ORDER — POTASSIUM CHLORIDE 20 MEQ
40 PACKET (EA) ORAL ONCE
Qty: 0 | Refills: 0 | Status: COMPLETED | OUTPATIENT
Start: 2017-01-08 | End: 2017-01-08

## 2017-01-08 RX ORDER — POTASSIUM CHLORIDE 20 MEQ
40 PACKET (EA) ORAL ONCE
Qty: 0 | Refills: 0 | Status: DISCONTINUED | OUTPATIENT
Start: 2017-01-08 | End: 2017-01-08

## 2017-01-08 RX ADMIN — CAPTOPRIL 12.5 MILLIGRAM(S): 12.5 TABLET ORAL at 21:50

## 2017-01-08 RX ADMIN — Medication 2: at 12:39

## 2017-01-08 RX ADMIN — Medication 40 MILLIGRAM(S): at 19:10

## 2017-01-08 RX ADMIN — CAPTOPRIL 12.5 MILLIGRAM(S): 12.5 TABLET ORAL at 15:51

## 2017-01-08 RX ADMIN — Medication 40 MILLIEQUIVALENT(S): at 12:40

## 2017-01-08 RX ADMIN — Medication 12.5 MILLIGRAM(S): at 19:09

## 2017-01-08 RX ADMIN — Medication 100 MILLIGRAM(S): at 22:38

## 2017-01-08 RX ADMIN — Medication 12.5 MILLIGRAM(S): at 05:57

## 2017-01-08 RX ADMIN — CLOPIDOGREL BISULFATE 75 MILLIGRAM(S): 75 TABLET, FILM COATED ORAL at 12:40

## 2017-01-08 RX ADMIN — CAPTOPRIL 12.5 MILLIGRAM(S): 12.5 TABLET ORAL at 05:57

## 2017-01-08 RX ADMIN — QUETIAPINE FUMARATE 25 MILLIGRAM(S): 200 TABLET, FILM COATED ORAL at 21:50

## 2017-01-08 RX ADMIN — Medication 100 MILLIGRAM(S): at 21:50

## 2017-01-08 RX ADMIN — Medication 81 MILLIGRAM(S): at 12:40

## 2017-01-08 RX ADMIN — ATORVASTATIN CALCIUM 80 MILLIGRAM(S): 80 TABLET, FILM COATED ORAL at 21:50

## 2017-01-08 RX ADMIN — Medication 40 MILLIGRAM(S): at 05:57

## 2017-01-08 RX ADMIN — Medication 2: at 07:09

## 2017-01-09 LAB
ANION GAP SERPL CALC-SCNC: 14 MMOL/L — SIGNIFICANT CHANGE UP (ref 5–17)
APTT BLD: 29.2 SEC — SIGNIFICANT CHANGE UP (ref 27.5–37.4)
BLD GP AB SCN SERPL QL: NEGATIVE — SIGNIFICANT CHANGE UP
BUN SERPL-MCNC: 20 MG/DL — SIGNIFICANT CHANGE UP (ref 7–23)
CALCIUM SERPL-MCNC: 9.1 MG/DL — SIGNIFICANT CHANGE UP (ref 8.4–10.5)
CHLORIDE SERPL-SCNC: 103 MMOL/L — SIGNIFICANT CHANGE UP (ref 96–108)
CO2 SERPL-SCNC: 27 MMOL/L — SIGNIFICANT CHANGE UP (ref 22–31)
CREAT SERPL-MCNC: 1.1 MG/DL — SIGNIFICANT CHANGE UP (ref 0.5–1.3)
GLUCOSE SERPL-MCNC: 171 MG/DL — HIGH (ref 70–99)
HCT VFR BLD CALC: 36.7 % — LOW (ref 39–50)
HGB BLD-MCNC: 11.7 G/DL — LOW (ref 13–17)
INR BLD: 1.29 RATIO — HIGH (ref 0.88–1.16)
MAGNESIUM SERPL-MCNC: 1.8 MG/DL — SIGNIFICANT CHANGE UP (ref 1.6–2.6)
MCHC RBC-ENTMCNC: 26.8 PG — LOW (ref 27–34)
MCHC RBC-ENTMCNC: 31.9 GM/DL — LOW (ref 32–36)
MCV RBC AUTO: 84 FL — SIGNIFICANT CHANGE UP (ref 80–100)
PLATELET # BLD AUTO: 479 K/UL — HIGH (ref 150–400)
POTASSIUM SERPL-MCNC: 3.6 MMOL/L — SIGNIFICANT CHANGE UP (ref 3.5–5.3)
POTASSIUM SERPL-SCNC: 3.6 MMOL/L — SIGNIFICANT CHANGE UP (ref 3.5–5.3)
PROTHROM AB SERPL-ACNC: 14.6 SEC — HIGH (ref 10–13.1)
RBC # BLD: 4.37 M/UL — SIGNIFICANT CHANGE UP (ref 4.2–5.8)
RBC # FLD: 14.4 % — SIGNIFICANT CHANGE UP (ref 10.3–14.5)
RH IG SCN BLD-IMP: NEGATIVE — SIGNIFICANT CHANGE UP
SODIUM SERPL-SCNC: 144 MMOL/L — SIGNIFICANT CHANGE UP (ref 135–145)
WBC # BLD: 9.52 K/UL — SIGNIFICANT CHANGE UP (ref 3.8–10.5)
WBC # FLD AUTO: 9.52 K/UL — SIGNIFICANT CHANGE UP (ref 3.8–10.5)

## 2017-01-09 PROCEDURE — 93458 L HRT ARTERY/VENTRICLE ANGIO: CPT | Mod: 26

## 2017-01-09 PROCEDURE — 99233 SBSQ HOSP IP/OBS HIGH 50: CPT | Mod: GC

## 2017-01-09 RX ORDER — HEPARIN SODIUM 5000 [USP'U]/ML
INJECTION INTRAVENOUS; SUBCUTANEOUS
Qty: 25000 | Refills: 0 | Status: DISCONTINUED | OUTPATIENT
Start: 2017-01-10 | End: 2017-01-11

## 2017-01-09 RX ORDER — HEPARIN SODIUM 5000 [USP'U]/ML
5000 INJECTION INTRAVENOUS; SUBCUTANEOUS EVERY 6 HOURS
Qty: 0 | Refills: 0 | Status: DISCONTINUED | OUTPATIENT
Start: 2017-01-10 | End: 2017-01-11

## 2017-01-09 RX ORDER — FUROSEMIDE 40 MG
20 TABLET ORAL DAILY
Qty: 0 | Refills: 0 | Status: DISCONTINUED | OUTPATIENT
Start: 2017-01-09 | End: 2017-01-09

## 2017-01-09 RX ORDER — FUROSEMIDE 40 MG
40 TABLET ORAL DAILY
Qty: 0 | Refills: 0 | Status: DISCONTINUED | OUTPATIENT
Start: 2017-01-09 | End: 2017-01-13

## 2017-01-09 RX ADMIN — Medication 81 MILLIGRAM(S): at 12:38

## 2017-01-09 RX ADMIN — Medication 100 MILLIGRAM(S): at 06:39

## 2017-01-09 RX ADMIN — ATORVASTATIN CALCIUM 80 MILLIGRAM(S): 80 TABLET, FILM COATED ORAL at 20:57

## 2017-01-09 RX ADMIN — Medication: at 20:03

## 2017-01-09 RX ADMIN — Medication 100 MILLIGRAM(S): at 20:57

## 2017-01-09 RX ADMIN — Medication 12.5 MILLIGRAM(S): at 20:56

## 2017-01-09 RX ADMIN — CAPTOPRIL 12.5 MILLIGRAM(S): 12.5 TABLET ORAL at 20:57

## 2017-01-09 RX ADMIN — CAPTOPRIL 12.5 MILLIGRAM(S): 12.5 TABLET ORAL at 06:39

## 2017-01-09 RX ADMIN — CLOPIDOGREL BISULFATE 75 MILLIGRAM(S): 75 TABLET, FILM COATED ORAL at 12:38

## 2017-01-09 RX ADMIN — QUETIAPINE FUMARATE 25 MILLIGRAM(S): 200 TABLET, FILM COATED ORAL at 20:57

## 2017-01-09 RX ADMIN — Medication 12.5 MILLIGRAM(S): at 06:39

## 2017-01-09 RX ADMIN — Medication 40 MILLIGRAM(S): at 06:39

## 2017-01-09 RX ADMIN — Medication 2: at 00:28

## 2017-01-10 LAB
ANION GAP SERPL CALC-SCNC: 12 MMOL/L — SIGNIFICANT CHANGE UP (ref 5–17)
APPEARANCE UR: CLEAR — SIGNIFICANT CHANGE UP
APTT BLD: 37.5 SEC — HIGH (ref 27.5–37.4)
APTT BLD: 63.8 SEC — HIGH (ref 27.5–37.4)
BILIRUB UR-MCNC: NEGATIVE — SIGNIFICANT CHANGE UP
BUN SERPL-MCNC: 18 MG/DL — SIGNIFICANT CHANGE UP (ref 7–23)
CALCIUM SERPL-MCNC: 9 MG/DL — SIGNIFICANT CHANGE UP (ref 8.4–10.5)
CHLORIDE SERPL-SCNC: 99 MMOL/L — SIGNIFICANT CHANGE UP (ref 96–108)
CO2 SERPL-SCNC: 26 MMOL/L — SIGNIFICANT CHANGE UP (ref 22–31)
COLOR SPEC: YELLOW — SIGNIFICANT CHANGE UP
CREAT SERPL-MCNC: 1.06 MG/DL — SIGNIFICANT CHANGE UP (ref 0.5–1.3)
CULTURE RESULTS: SIGNIFICANT CHANGE UP
CULTURE RESULTS: SIGNIFICANT CHANGE UP
DIFF PNL FLD: ABNORMAL
GLUCOSE SERPL-MCNC: 180 MG/DL — HIGH (ref 70–99)
GLUCOSE UR QL: NEGATIVE MG/DL — SIGNIFICANT CHANGE UP
HCT VFR BLD CALC: 35.2 % — LOW (ref 39–50)
HGB BLD-MCNC: 12 G/DL — LOW (ref 13–17)
KETONES UR-MCNC: NEGATIVE — SIGNIFICANT CHANGE UP
LEUKOCYTE ESTERASE UR-ACNC: ABNORMAL
MAGNESIUM SERPL-MCNC: 2.1 MG/DL — SIGNIFICANT CHANGE UP (ref 1.6–2.6)
MCHC RBC-ENTMCNC: 29.2 PG — SIGNIFICANT CHANGE UP (ref 27–34)
MCHC RBC-ENTMCNC: 34.1 GM/DL — SIGNIFICANT CHANGE UP (ref 32–36)
MCV RBC AUTO: 85.8 FL — SIGNIFICANT CHANGE UP (ref 80–100)
NITRITE UR-MCNC: NEGATIVE — SIGNIFICANT CHANGE UP
PH UR: 7.5 — SIGNIFICANT CHANGE UP (ref 5–8)
PHOSPHATE SERPL-MCNC: 3.1 MG/DL — SIGNIFICANT CHANGE UP (ref 2.5–4.5)
PLATELET # BLD AUTO: 379 K/UL — SIGNIFICANT CHANGE UP (ref 150–400)
POTASSIUM SERPL-MCNC: 3.7 MMOL/L — SIGNIFICANT CHANGE UP (ref 3.5–5.3)
POTASSIUM SERPL-SCNC: 3.7 MMOL/L — SIGNIFICANT CHANGE UP (ref 3.5–5.3)
PROT UR-MCNC: NEGATIVE MG/DL — SIGNIFICANT CHANGE UP
RBC # BLD: 4.11 M/UL — LOW (ref 4.2–5.8)
RBC # FLD: 12.9 % — SIGNIFICANT CHANGE UP (ref 10.3–14.5)
SODIUM SERPL-SCNC: 137 MMOL/L — SIGNIFICANT CHANGE UP (ref 135–145)
SP GR SPEC: 1.02 — SIGNIFICANT CHANGE UP (ref 1.01–1.02)
SPECIMEN SOURCE: SIGNIFICANT CHANGE UP
SPECIMEN SOURCE: SIGNIFICANT CHANGE UP
UROBILINOGEN FLD QL: 2 MG/DL
WBC # BLD: 8.8 K/UL — SIGNIFICANT CHANGE UP (ref 3.8–10.5)
WBC # FLD AUTO: 8.8 K/UL — SIGNIFICANT CHANGE UP (ref 3.8–10.5)

## 2017-01-10 PROCEDURE — 92928 PRQ TCAT PLMT NTRAC ST 1 LES: CPT | Mod: RC,GC

## 2017-01-10 PROCEDURE — 99233 SBSQ HOSP IP/OBS HIGH 50: CPT | Mod: GC

## 2017-01-10 PROCEDURE — 93010 ELECTROCARDIOGRAM REPORT: CPT

## 2017-01-10 RX ORDER — CIPROFLOXACIN LACTATE 400MG/40ML
500 VIAL (ML) INTRAVENOUS EVERY 12 HOURS
Qty: 0 | Refills: 0 | Status: DISCONTINUED | OUTPATIENT
Start: 2017-01-10 | End: 2017-01-12

## 2017-01-10 RX ORDER — METOPROLOL TARTRATE 50 MG
25 TABLET ORAL DAILY
Qty: 0 | Refills: 0 | Status: DISCONTINUED | OUTPATIENT
Start: 2017-01-10 | End: 2017-01-13

## 2017-01-10 RX ADMIN — Medication 81 MILLIGRAM(S): at 12:45

## 2017-01-10 RX ADMIN — HEPARIN SODIUM 5000 UNIT(S): 5000 INJECTION INTRAVENOUS; SUBCUTANEOUS at 10:23

## 2017-01-10 RX ADMIN — Medication 6: at 00:33

## 2017-01-10 RX ADMIN — Medication 2: at 12:45

## 2017-01-10 RX ADMIN — HEPARIN SODIUM 1250 UNIT(S)/HR: 5000 INJECTION INTRAVENOUS; SUBCUTANEOUS at 18:21

## 2017-01-10 RX ADMIN — HEPARIN SODIUM 1250 UNIT(S)/HR: 5000 INJECTION INTRAVENOUS; SUBCUTANEOUS at 10:21

## 2017-01-10 RX ADMIN — CAPTOPRIL 12.5 MILLIGRAM(S): 12.5 TABLET ORAL at 05:30

## 2017-01-10 RX ADMIN — Medication 12.5 MILLIGRAM(S): at 05:30

## 2017-01-10 RX ADMIN — CAPTOPRIL 12.5 MILLIGRAM(S): 12.5 TABLET ORAL at 14:53

## 2017-01-10 RX ADMIN — Medication 100 MILLIGRAM(S): at 18:09

## 2017-01-10 RX ADMIN — QUETIAPINE FUMARATE 25 MILLIGRAM(S): 200 TABLET, FILM COATED ORAL at 22:21

## 2017-01-10 RX ADMIN — Medication 40 MILLIGRAM(S): at 05:30

## 2017-01-10 RX ADMIN — ATORVASTATIN CALCIUM 80 MILLIGRAM(S): 80 TABLET, FILM COATED ORAL at 22:21

## 2017-01-10 RX ADMIN — HEPARIN SODIUM 1000 UNIT(S)/HR: 5000 INJECTION INTRAVENOUS; SUBCUTANEOUS at 01:25

## 2017-01-10 RX ADMIN — Medication 2: at 06:10

## 2017-01-10 RX ADMIN — Medication 12.5 MILLIGRAM(S): at 18:09

## 2017-01-10 RX ADMIN — CLOPIDOGREL BISULFATE 75 MILLIGRAM(S): 75 TABLET, FILM COATED ORAL at 12:45

## 2017-01-10 RX ADMIN — Medication 100 MILLIGRAM(S): at 02:33

## 2017-01-10 RX ADMIN — CAPTOPRIL 12.5 MILLIGRAM(S): 12.5 TABLET ORAL at 22:21

## 2017-01-11 LAB
ANION GAP SERPL CALC-SCNC: 16 MMOL/L — SIGNIFICANT CHANGE UP (ref 5–17)
BASOPHILS # BLD AUTO: 0.1 K/UL — SIGNIFICANT CHANGE UP (ref 0–0.2)
BASOPHILS NFR BLD AUTO: 0.7 % — SIGNIFICANT CHANGE UP (ref 0–2)
BUN SERPL-MCNC: 22 MG/DL — SIGNIFICANT CHANGE UP (ref 7–23)
CALCIUM SERPL-MCNC: 8.7 MG/DL — SIGNIFICANT CHANGE UP (ref 8.4–10.5)
CHLORIDE SERPL-SCNC: 100 MMOL/L — SIGNIFICANT CHANGE UP (ref 96–108)
CO2 SERPL-SCNC: 25 MMOL/L — SIGNIFICANT CHANGE UP (ref 22–31)
CREAT SERPL-MCNC: 1.12 MG/DL — SIGNIFICANT CHANGE UP (ref 0.5–1.3)
CULTURE RESULTS: NO GROWTH — SIGNIFICANT CHANGE UP
EOSINOPHIL # BLD AUTO: 0.2 K/UL — SIGNIFICANT CHANGE UP (ref 0–0.5)
EOSINOPHIL NFR BLD AUTO: 2 % — SIGNIFICANT CHANGE UP (ref 0–6)
GLUCOSE SERPL-MCNC: 244 MG/DL — HIGH (ref 70–99)
HCT VFR BLD CALC: 36.4 % — LOW (ref 39–50)
HGB BLD-MCNC: 12.5 G/DL — LOW (ref 13–17)
LYMPHOCYTES # BLD AUTO: 1.7 K/UL — SIGNIFICANT CHANGE UP (ref 1–3.3)
LYMPHOCYTES # BLD AUTO: 18.6 % — SIGNIFICANT CHANGE UP (ref 13–44)
MCHC RBC-ENTMCNC: 29.5 PG — SIGNIFICANT CHANGE UP (ref 27–34)
MCHC RBC-ENTMCNC: 34.4 GM/DL — SIGNIFICANT CHANGE UP (ref 32–36)
MCV RBC AUTO: 85.7 FL — SIGNIFICANT CHANGE UP (ref 80–100)
MONOCYTES # BLD AUTO: 0.9 K/UL — SIGNIFICANT CHANGE UP (ref 0–0.9)
MONOCYTES NFR BLD AUTO: 9.3 % — SIGNIFICANT CHANGE UP (ref 2–14)
NEUTROPHILS # BLD AUTO: 6.4 K/UL — SIGNIFICANT CHANGE UP (ref 1.8–7.4)
NEUTROPHILS NFR BLD AUTO: 69.4 % — SIGNIFICANT CHANGE UP (ref 43–77)
PLATELET # BLD AUTO: 363 K/UL — SIGNIFICANT CHANGE UP (ref 150–400)
POTASSIUM SERPL-MCNC: 3.5 MMOL/L — SIGNIFICANT CHANGE UP (ref 3.5–5.3)
POTASSIUM SERPL-SCNC: 3.5 MMOL/L — SIGNIFICANT CHANGE UP (ref 3.5–5.3)
RBC # BLD: 4.25 M/UL — SIGNIFICANT CHANGE UP (ref 4.2–5.8)
RBC # FLD: 12.9 % — SIGNIFICANT CHANGE UP (ref 10.3–14.5)
SODIUM SERPL-SCNC: 141 MMOL/L — SIGNIFICANT CHANGE UP (ref 135–145)
SPECIMEN SOURCE: SIGNIFICANT CHANGE UP
WBC # BLD: 9.2 K/UL — SIGNIFICANT CHANGE UP (ref 3.8–10.5)
WBC # FLD AUTO: 9.2 K/UL — SIGNIFICANT CHANGE UP (ref 3.8–10.5)

## 2017-01-11 PROCEDURE — 99232 SBSQ HOSP IP/OBS MODERATE 35: CPT | Mod: GC

## 2017-01-11 PROCEDURE — 93010 ELECTROCARDIOGRAM REPORT: CPT | Mod: 77

## 2017-01-11 PROCEDURE — 93010 ELECTROCARDIOGRAM REPORT: CPT

## 2017-01-11 PROCEDURE — 99233 SBSQ HOSP IP/OBS HIGH 50: CPT | Mod: GC

## 2017-01-11 PROCEDURE — 92928 PRQ TCAT PLMT NTRAC ST 1 LES: CPT | Mod: LD,GC

## 2017-01-11 RX ORDER — SODIUM CHLORIDE 9 MG/ML
1000 INJECTION, SOLUTION INTRAVENOUS
Qty: 0 | Refills: 0 | Status: DISCONTINUED | OUTPATIENT
Start: 2017-01-11 | End: 2017-01-13

## 2017-01-11 RX ORDER — VALSARTAN 80 MG/1
40 TABLET ORAL DAILY
Qty: 0 | Refills: 0 | Status: DISCONTINUED | OUTPATIENT
Start: 2017-01-11 | End: 2017-01-13

## 2017-01-11 RX ORDER — INSULIN LISPRO 100/ML
VIAL (ML) SUBCUTANEOUS AT BEDTIME
Qty: 0 | Refills: 0 | Status: DISCONTINUED | OUTPATIENT
Start: 2017-01-11 | End: 2017-01-13

## 2017-01-11 RX ORDER — DEXTROSE 50 % IN WATER 50 %
12.5 SYRINGE (ML) INTRAVENOUS ONCE
Qty: 0 | Refills: 0 | Status: DISCONTINUED | OUTPATIENT
Start: 2017-01-11 | End: 2017-01-13

## 2017-01-11 RX ORDER — DEXTROSE 50 % IN WATER 50 %
1 SYRINGE (ML) INTRAVENOUS ONCE
Qty: 0 | Refills: 0 | Status: DISCONTINUED | OUTPATIENT
Start: 2017-01-11 | End: 2017-01-13

## 2017-01-11 RX ORDER — HEPARIN SODIUM 5000 [USP'U]/ML
5000 INJECTION INTRAVENOUS; SUBCUTANEOUS EVERY 6 HOURS
Qty: 0 | Refills: 0 | Status: DISCONTINUED | OUTPATIENT
Start: 2017-01-11 | End: 2017-01-11

## 2017-01-11 RX ORDER — GLUCAGON INJECTION, SOLUTION 0.5 MG/.1ML
1 INJECTION, SOLUTION SUBCUTANEOUS ONCE
Qty: 0 | Refills: 0 | Status: DISCONTINUED | OUTPATIENT
Start: 2017-01-11 | End: 2017-01-13

## 2017-01-11 RX ORDER — HEPARIN SODIUM 5000 [USP'U]/ML
INJECTION INTRAVENOUS; SUBCUTANEOUS
Qty: 25000 | Refills: 0 | Status: DISCONTINUED | OUTPATIENT
Start: 2017-01-11 | End: 2017-01-11

## 2017-01-11 RX ORDER — INSULIN LISPRO 100/ML
VIAL (ML) SUBCUTANEOUS
Qty: 0 | Refills: 0 | Status: DISCONTINUED | OUTPATIENT
Start: 2017-01-11 | End: 2017-01-13

## 2017-01-11 RX ORDER — ENOXAPARIN SODIUM 100 MG/ML
40 INJECTION SUBCUTANEOUS DAILY
Qty: 0 | Refills: 0 | Status: DISCONTINUED | OUTPATIENT
Start: 2017-01-11 | End: 2017-01-11

## 2017-01-11 RX ORDER — DEXTROSE 50 % IN WATER 50 %
25 SYRINGE (ML) INTRAVENOUS ONCE
Qty: 0 | Refills: 0 | Status: DISCONTINUED | OUTPATIENT
Start: 2017-01-11 | End: 2017-01-13

## 2017-01-11 RX ORDER — INSULIN LISPRO 100/ML
VIAL (ML) SUBCUTANEOUS
Qty: 0 | Refills: 0 | Status: DISCONTINUED | OUTPATIENT
Start: 2017-01-11 | End: 2017-01-11

## 2017-01-11 RX ADMIN — Medication 25 MILLIGRAM(S): at 05:11

## 2017-01-11 RX ADMIN — CAPTOPRIL 12.5 MILLIGRAM(S): 12.5 TABLET ORAL at 05:07

## 2017-01-11 RX ADMIN — Medication 40 MILLIGRAM(S): at 05:07

## 2017-01-11 RX ADMIN — HEPARIN SODIUM 1000 UNIT(S)/HR: 5000 INJECTION INTRAVENOUS; SUBCUTANEOUS at 06:36

## 2017-01-11 RX ADMIN — Medication 500 MILLIGRAM(S): at 05:07

## 2017-01-11 RX ADMIN — CAPTOPRIL 12.5 MILLIGRAM(S): 12.5 TABLET ORAL at 13:09

## 2017-01-11 RX ADMIN — QUETIAPINE FUMARATE 25 MILLIGRAM(S): 200 TABLET, FILM COATED ORAL at 22:02

## 2017-01-11 RX ADMIN — VALSARTAN 40 MILLIGRAM(S): 80 TABLET ORAL at 18:18

## 2017-01-11 RX ADMIN — ATORVASTATIN CALCIUM 80 MILLIGRAM(S): 80 TABLET, FILM COATED ORAL at 22:02

## 2017-01-11 RX ADMIN — Medication 500 MILLIGRAM(S): at 18:18

## 2017-01-11 RX ADMIN — Medication 81 MILLIGRAM(S): at 12:05

## 2017-01-12 ENCOUNTER — TRANSCRIPTION ENCOUNTER (OUTPATIENT)
Age: 67
End: 2017-01-12

## 2017-01-12 LAB
ANION GAP SERPL CALC-SCNC: 15 MMOL/L — SIGNIFICANT CHANGE UP (ref 5–17)
BASOPHILS # BLD AUTO: 0.1 K/UL — SIGNIFICANT CHANGE UP (ref 0–0.2)
BASOPHILS NFR BLD AUTO: 0.6 % — SIGNIFICANT CHANGE UP (ref 0–2)
BUN SERPL-MCNC: 23 MG/DL — SIGNIFICANT CHANGE UP (ref 7–23)
CALCIUM SERPL-MCNC: 8.9 MG/DL — SIGNIFICANT CHANGE UP (ref 8.4–10.5)
CHLORIDE SERPL-SCNC: 97 MMOL/L — SIGNIFICANT CHANGE UP (ref 96–108)
CO2 SERPL-SCNC: 24 MMOL/L — SIGNIFICANT CHANGE UP (ref 22–31)
CREAT SERPL-MCNC: 1.08 MG/DL — SIGNIFICANT CHANGE UP (ref 0.5–1.3)
EOSINOPHIL # BLD AUTO: 0.2 K/UL — SIGNIFICANT CHANGE UP (ref 0–0.5)
EOSINOPHIL NFR BLD AUTO: 2 % — SIGNIFICANT CHANGE UP (ref 0–6)
GLUCOSE SERPL-MCNC: 188 MG/DL — HIGH (ref 70–99)
HCT VFR BLD CALC: 38.7 % — LOW (ref 39–50)
HGB BLD-MCNC: 13.1 G/DL — SIGNIFICANT CHANGE UP (ref 13–17)
LYMPHOCYTES # BLD AUTO: 2.4 K/UL — SIGNIFICANT CHANGE UP (ref 1–3.3)
LYMPHOCYTES # BLD AUTO: 23.3 % — SIGNIFICANT CHANGE UP (ref 13–44)
MCHC RBC-ENTMCNC: 28.9 PG — SIGNIFICANT CHANGE UP (ref 27–34)
MCHC RBC-ENTMCNC: 33.8 GM/DL — SIGNIFICANT CHANGE UP (ref 32–36)
MCV RBC AUTO: 85.6 FL — SIGNIFICANT CHANGE UP (ref 80–100)
MONOCYTES # BLD AUTO: 0.9 K/UL — SIGNIFICANT CHANGE UP (ref 0–0.9)
MONOCYTES NFR BLD AUTO: 9.2 % — SIGNIFICANT CHANGE UP (ref 2–14)
NEUTROPHILS # BLD AUTO: 6.6 K/UL — SIGNIFICANT CHANGE UP (ref 1.8–7.4)
NEUTROPHILS NFR BLD AUTO: 64.9 % — SIGNIFICANT CHANGE UP (ref 43–77)
PLATELET # BLD AUTO: 387 K/UL — SIGNIFICANT CHANGE UP (ref 150–400)
POTASSIUM SERPL-MCNC: 4.1 MMOL/L — SIGNIFICANT CHANGE UP (ref 3.5–5.3)
POTASSIUM SERPL-SCNC: 4.1 MMOL/L — SIGNIFICANT CHANGE UP (ref 3.5–5.3)
RBC # BLD: 4.52 M/UL — SIGNIFICANT CHANGE UP (ref 4.2–5.8)
RBC # FLD: 13.2 % — SIGNIFICANT CHANGE UP (ref 10.3–14.5)
SODIUM SERPL-SCNC: 136 MMOL/L — SIGNIFICANT CHANGE UP (ref 135–145)
WBC # BLD: 10.2 K/UL — SIGNIFICANT CHANGE UP (ref 3.8–10.5)
WBC # FLD AUTO: 10.2 K/UL — SIGNIFICANT CHANGE UP (ref 3.8–10.5)

## 2017-01-12 PROCEDURE — 71010: CPT | Mod: 26

## 2017-01-12 PROCEDURE — 99233 SBSQ HOSP IP/OBS HIGH 50: CPT | Mod: GC

## 2017-01-12 RX ORDER — TAMSULOSIN HYDROCHLORIDE 0.4 MG/1
0.4 CAPSULE ORAL AT BEDTIME
Qty: 0 | Refills: 0 | Status: DISCONTINUED | OUTPATIENT
Start: 2017-01-12 | End: 2017-01-13

## 2017-01-12 RX ADMIN — TAMSULOSIN HYDROCHLORIDE 0.4 MILLIGRAM(S): 0.4 CAPSULE ORAL at 22:23

## 2017-01-12 RX ADMIN — CLOPIDOGREL BISULFATE 75 MILLIGRAM(S): 75 TABLET, FILM COATED ORAL at 11:32

## 2017-01-12 RX ADMIN — Medication 2: at 08:00

## 2017-01-12 RX ADMIN — Medication 81 MILLIGRAM(S): at 11:32

## 2017-01-12 RX ADMIN — Medication 25 MILLIGRAM(S): at 04:56

## 2017-01-12 RX ADMIN — Medication 100 MILLIGRAM(S): at 04:56

## 2017-01-12 RX ADMIN — ATORVASTATIN CALCIUM 80 MILLIGRAM(S): 80 TABLET, FILM COATED ORAL at 22:24

## 2017-01-12 RX ADMIN — QUETIAPINE FUMARATE 25 MILLIGRAM(S): 200 TABLET, FILM COATED ORAL at 22:24

## 2017-01-12 RX ADMIN — Medication 40 MILLIGRAM(S): at 04:55

## 2017-01-12 RX ADMIN — Medication 4: at 12:56

## 2017-01-12 RX ADMIN — Medication 2: at 17:57

## 2017-01-12 RX ADMIN — Medication 100 MILLIGRAM(S): at 22:23

## 2017-01-12 RX ADMIN — Medication 500 MILLIGRAM(S): at 04:55

## 2017-01-12 RX ADMIN — VALSARTAN 40 MILLIGRAM(S): 80 TABLET ORAL at 04:56

## 2017-01-12 NOTE — DISCHARGE NOTE ADULT - MEDICATION SUMMARY - MEDICATIONS TO TAKE
I will START or STAY ON the medications listed below when I get home from the hospital:    aspirin 81 mg oral delayed release tablet  -- 1 tab(s) by mouth once a day  -- Indication: For NSTEMI (non-ST elevated myocardial infarction)    valsartan 40 mg oral tablet  -- 1 tab(s) by mouth once a day  -- Indication: For Acute systolic heart failure    tamsulosin 0.4 mg oral capsule  -- 1 cap(s) by mouth once a day (at bedtime)  -- Indication: For BPH    Glucophage 500 mg oral tablet  -- 1 tab(s) by mouth 2 times a day  -- Check with your doctor before becoming pregnant.  Do not drink alcoholic beverages when taking this medication.  It is very important that you take or use this exactly as directed.  Do not skip doses or discontinue unless directed by your doctor.  Obtain medical advice before taking any non-prescription drugs as some may affect the action of this medication.  Take with food or milk.    -- Indication: For Type 2 diabetes mellitus without complication, without long-term current use of insulin    atorvastatin 80 mg oral tablet  -- 1 tab(s) by mouth once a day (at bedtime)  -- Indication: For NSTEMI (non-ST elevated myocardial infarction)    clopidogrel 75 mg oral tablet  -- 1 tab(s) by mouth once a day  -- Indication: For NSTEMI (non-ST elevated myocardial infarction)    QUEtiapine 25 mg oral tablet  -- 1 tab(s) by mouth once a day  -- Indication: For Anxiety    metoprolol succinate 25 mg oral tablet, extended release  -- 1 tab(s) by mouth once a day  -- Indication: For NSTEMI (non-ST elevated myocardial infarction)    ProAir HFA 90 mcg/inh inhalation aerosol  -- 2 puff(s) inhaled 4 times a day PRN Shortness of breath  -- For inhalation only.  It is very important that you take or use this exactly as directed.  Do not skip doses or discontinue unless directed by your doctor.  Obtain medical advice before taking any non-prescription drugs as some may affect the action of this medication.  Shake well before use.    -- Indication: For Need for prophylactic measure    furosemide 40 mg oral tablet  -- 1 tab(s) by mouth once a day  -- Indication: For Acute systolic congestive heart failure    guaiFENesin 100 mg/5 mL oral liquid  -- 10 milliliter(s) by mouth every 6 hours, As needed, Cough  -- Indication: For Cough    famotidine 20 mg oral tablet  -- 1 tab(s) by mouth once a day  -- Indication: For GERD I will START or STAY ON the medications listed below when I get home from the hospital:    aspirin 81 mg oral delayed release tablet  -- 1 tab(s) by mouth once a day  -- Indication: For NSTEMI (non-ST elevated myocardial infarction)    valsartan 40 mg oral tablet  -- 1 tab(s) by mouth once a day  -- Indication: For Acute systolic heart failure    tamsulosin 0.4 mg oral capsule  -- 1 cap(s) by mouth once a day (at bedtime)  -- Indication: For BPH    Glucophage 500 mg oral tablet  -- 1 tab(s) by mouth 2 times a day  -- Check with your doctor before becoming pregnant.  Do not drink alcoholic beverages when taking this medication.  It is very important that you take or use this exactly as directed.  Do not skip doses or discontinue unless directed by your doctor.  Obtain medical advice before taking any non-prescription drugs as some may affect the action of this medication.  Take with food or milk.    -- Indication: For Type 2 diabetes mellitus without complication, without long-term current use of insulin    atorvastatin 80 mg oral tablet  -- 1 tab(s) by mouth once a day (at bedtime)  -- Indication: For NSTEMI (non-ST elevated myocardial infarction)    clopidogrel 75 mg oral tablet  -- 1 tab(s) by mouth once a day  -- Indication: For NSTEMI (non-ST elevated myocardial infarction)    metoprolol succinate 25 mg oral tablet, extended release  -- 1 tab(s) by mouth once a day  -- Indication: For NSTEMI (non-ST elevated myocardial infarction)    ProAir HFA 90 mcg/inh inhalation aerosol  -- 2 puff(s) inhaled 4 times a day PRN Shortness of breath  -- For inhalation only.  It is very important that you take or use this exactly as directed.  Do not skip doses or discontinue unless directed by your doctor.  Obtain medical advice before taking any non-prescription drugs as some may affect the action of this medication.  Shake well before use.    -- Indication: For Need for prophylactic measure    furosemide 40 mg oral tablet  -- 1 tab(s) by mouth once a day  -- Indication: For Acute systolic congestive heart failure    guaiFENesin 100 mg/5 mL oral liquid  -- 10 milliliter(s) by mouth every 6 hours, As needed, Cough  -- Indication: For Cough    famotidine 20 mg oral tablet  -- 1 tab(s) by mouth once a day  -- Indication: For GERD

## 2017-01-12 NOTE — PHYSICAL THERAPY INITIAL EVALUATION ADULT - PRECAUTIONS/LIMITATIONS, REHAB EVAL
EKG showed new 1mm ST depressions in lateral leads, a/w r/o ACS. Likely ADHF in the setting of NSTEMI. S/p diagnostic cath via RRA 1/9 LAD ( 90%), RCA (95%), S/p staged PCI, stents on 1/10 and 1/11. CTH 1/5 chronic microvascular ischemic changes with old infarcts in the right corona radiata and thalamus as described, noted on 12/22/2016. No acute intracranial hemorrhage. Brief previous hospital course:  Pt initially went to [facility] in 12/2016 after wife found pt in bed, AMS surrounded in vomit. CTH at [facility] showed remote CVA and elevated troponins to 9, which increased to 29, CKMB 57, CK 2460, EKG from [facility] ED not sent with records. He was transferred to [facility] CCU, where cardiac enzymes downtrended and presumed to be demand ischemia 2/2 sepsis 2/2 multifocal PNA. During that hospitalization, pt developed AMS again. MRI brain showed small acute left MCA infarcts and MRA head/neck showing L M1 stenosis- seen on previous CTA in 2015. He was transferred to stroke unit. [place] negative for cardioembolic source, showed mild-moderate segmental LV systolic dysfunction, akinesis of basal inferior and inferolateral wall. Pt was discharged to home with home PT upon clinical improvement./cardiac precautions

## 2017-01-12 NOTE — DISCHARGE NOTE ADULT - PATIENT PORTAL LINK FT
“You can access the Follow[employer] Patient Portal, offered by [facility], by registering with the following website: [email or web address]”

## 2017-01-12 NOTE — DISCHARGE NOTE ADULT - PLAN OF CARE
Stent placement to open the arterial blockages in your heart You have undergone catheterization to place 2 stents in the right side and one stent in the left side of the heart. You also have a aortic aneurysm. You have been discharged on aspirin, plavix, and a statin. Please follow up with cardiology within 2 weeks, to monitor your progress and follow up on the aneurysm medically managed You have been started on lasix 40mg daily, valsartan, and metoprolol. Please continue with these medications and follow up with cardiology before making any changes. Please continue with metformin and follow up with Dr. Pacheco on 1/16 at 10am for a diabetes follow up. Please continue with ciprofloxacin twice daily through 1/14 for a urinary tract infection. Please follow up with Dr. Suarez should your symptoms not resolve.

## 2017-01-12 NOTE — DISCHARGE NOTE ADULT - HOME CARE AGENCY
[facility]  [phone]  A visiting nurse will visit you in your home the day after discharge.  Physical therapy services are also requested.

## 2017-01-12 NOTE — DISCHARGE NOTE ADULT - PROVIDER TOKENS
TOKEN:'[id number]:[email or web address]',FREE:[LAST:[john],FIRST:[Bird],PHONE:[(   )    -],FAX:[(   )    -],ADDRESS:[Address: [address] [place] [address]  Phone: [phone]  Clinic: [facility]]]

## 2017-01-12 NOTE — DISCHARGE NOTE ADULT - SECONDARY DIAGNOSIS.
Acute systolic congestive heart failure Type 2 diabetes mellitus without complication, without long-term current use of insulin Urinary tract infection, site unspecified

## 2017-01-12 NOTE — PHYSICAL THERAPY INITIAL EVALUATION ADULT - ADDITIONAL COMMENTS
Lives in a , +3 CORTEZ and no handrails. PTA, pt with independent in all ADLs and did not use an assistive device to amb. 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 Pt lives with wife who works during the day. After last admission, pt was ambulating without AD, and was receiving home PT services.

## 2017-01-12 NOTE — PHYSICAL THERAPY INITIAL EVALUATION ADULT - PERTINENT HX OF CURRENT PROBLEM, REHAB EVAL
67 yo M recently admitted to [facility] from 12/22/16-1/3/17 for elevated troponins presumed to be demand ischemia 2/2 sepsis 2/2 multilobar PNA, found to have acute small left MCA infarct, now p/w worsening SOB, orthopnea, cough, and LE edema since discharge (2 days PTA). In ED noted to be in moderate respiratory distress with bilateral lung rales, CXR with multifocal opacities, bedside echo with bilateral B lines concerning for pulmonary edema. Cont'd...

## 2017-01-12 NOTE — DISCHARGE NOTE ADULT - MEDICATION SUMMARY - MEDICATIONS TO CHANGE
I will SWITCH the dose or number of times a day I take the medications listed below when I get home from the hospital:    metoprolol tartrate 25 mg oral tablet  -- 1 tab(s) by mouth 2 times a day    atorvastatin 20 mg oral tablet  -- 1 tab(s) by mouth once a day (at bedtime)

## 2017-01-12 NOTE — DISCHARGE NOTE ADULT - HOSPITAL COURSE
66 M PMH HTN, HLD, uncontrolled DM2 (HbA1c 8.6%) recently admitted to [facility] from 12/22/16-1/3/17 for elevated troponins presumed to be demand ischemia 2/2 sepsis 2/2 multilobar PNA, found to have acute small left MCA infarct, now p/w worsening SOB, orthopnea, cough,  and LE edema.     Pt initially went to [facility] in 12/2016 after wife found pt in bed, AMS surrounded in vomit. CTH at [facility] showed remote CVA and elevated troponins to 9, which increased to 29, CKMB 57, CK 2460, EKG from [facility] ED not sent with records. He was transferred to [facility] CCU, where cardiac enzymes downtrended and presumed to be demand ischemia 2/2 sepsis 2/2 multifocal PNA. During that hospitalization, pt developed AMS again. MRI brain showed small acute left MCA infarcts and MRA head/neck showing L M1 stenosis- seen on previous CTA in 2015. He was transferred to stroke unit. KINGSTON negative for cardioembolic source, showed mild-moderate segmental LV systolic dysfunction, akinesis of basal inferior and inferolateral wall. Pt was discharged to home with home PT upon clinical improvement.    Hospital Course: Pt initially admitted to CCU for Demand ischemia in setting of CHF. Noted to be in moderate respiratory distress with bilateral lung rales, CXR with multifocal opacities, bedside echo with bilateral B lines concerning for pulmonary edema. EKG showed new 1mm ST depressions in lateral leads Weaned off Bipap the morning after admission. Started on heparin gtt, ace-i, and plavix, and lasix 40mg bid. Pt had urinary retention, lasix changed to 40mg daily, and navarro inserted for retention of >400cc. Underwent diagnostic cath on 1/9 via RRA LAD ( 90%), RCA (95%). Staged pci to RCA on 1/10 MRCA 90% ENRIQUE x 2.  Staged pci to LAD on 1/11 ENRIQUE x 1. He was also treated for uti with cipro for 5 days as he had increased urinary frequency, dysuria. Pt discharged in stable condition with follow up with cardiology and PMD. 66 M PMH HTN, HLD, uncontrolled DM2 (HbA1c 8.6%) recently admitted to [facility] from 12/22/16-1/3/17 for elevated troponins presumed to be demand ischemia 2/2 sepsis 2/2 multilobar PNA, found to have acute small left MCA infarct, now p/w worsening SOB, orthopnea, cough,  and LE edema.     Pt initially went to [facility] in 12/2016 after wife found pt in bed, AMS surrounded in vomit. CTH at [facility] showed remote CVA and elevated troponins to 9, which increased to 29, CKMB 57, CK 2460, EKG from [facility] ED not sent with records. He was transferred to [facility] CCU, where cardiac enzymes downtrended and presumed to be demand ischemia 2/2 sepsis 2/2 multifocal PNA. During that hospitalization, pt developed AMS again. MRI brain showed small acute left MCA infarcts and MRA head/neck showing L M1 stenosis- seen on previous CTA in 2015. He was transferred to stroke unit. KINGSTON negative for cardioembolic source, showed mild-moderate segmental LV systolic dysfunction, akinesis of basal inferior and inferolateral wall. Pt was discharged to home with home PT upon clinical improvement.    Hospital Course: Pt initially admitted to CCU for Demand ischemia in setting of CHF. Noted to be in moderate respiratory distress with bilateral lung rales, CXR with multifocal opacities, bedside echo with bilateral B lines concerning for pulmonary edema. EKG showed new 1mm ST depressions in lateral leads Weaned off Bipap the morning after admission. Started on heparin gtt, ace-i, and plavix, and lasix 40mg bid. Pt had urinary retention, lasix changed to 40mg daily, and navarro inserted for retention of >400cc, navarro discontinued after success void. Underwent diagnostic cath on 1/9 via RRA LAD ( 90%), RCA (95%). Staged pci to RCA on 1/10 MRCA 90% ENRIQUE x 2.  Staged pci to LAD on 1/11 ENRIQUE x 1. Pt discharged in stable condition with follow up with cardiology and PMD. 66 M PMH HTN, HLD, uncontrolled DM2 (HbA1c 8.6%) recently admitted to [facility] from 12/22/16-1/3/17 for elevated troponins presumed to be demand ischemia 2/2 sepsis 2/2 multilobar PNA, found to have acute small left MCA infarct, now p/w worsening SOB, orthopnea, cough,  and LE edema.     Pt initially went to [facility] in 12/2016 after wife found pt in bed, AMS surrounded in vomit. CTH at [facility] showed remote CVA and elevated troponins to 9, which increased to 29, CKMB 57, CK 2460, EKG from [facility] ED not sent with records. He was transferred to [facility] CCU, where cardiac enzymes downtrended and presumed to be demand ischemia 2/2 sepsis 2/2 multifocal PNA. During that hospitalization, pt developed AMS again. MRI brain showed small acute left MCA infarcts and MRA head/neck showing L M1 stenosis- seen on previous CTA in 2015. He was transferred to stroke unit. KINGSTON negative for cardioembolic source, showed mild-moderate segmental LV systolic dysfunction, akinesis of basal inferior and inferolateral wall. Pt was discharged to home with home PT upon clinical improvement.    Hospital Course: Pt initially admitted to CCU for Demand ischemia in setting of CHF. Noted to be in moderate respiratory distress with bilateral lung rales, CXR with multifocal opacities, bedside echo with bilateral B lines concerning for pulmonary edema. EKG showed new 1mm ST depressions in lateral leads Weaned off Bipap the morning after admission. Started on heparin gtt, ace-i, and plavix, and lasix 40mg bid. Pt had urinary retention, lasix changed to 40mg daily, and navarro inserted for retention of >400cc, navarro discontinued after success void. Underwent diagnostic cath on 1/9 via RRA LAD ( 90%), RCA (95%). Staged pci to RCA on 1/10 MRCA 90% ENRIQUE x 2.  Staged pci to LAD on 1/11 ENRIQUE x 1.     Patient also found to have an aortic aneurysm that will need close monitoring outpatient. Imaging showed " Ascending aortic aneurysm 5.2 cm. Aneurysmal dilatation of the aortic root 4.7 cm. Normal caliber descending and abdominal aorta. No evidence of dissection." Please follow up with Dr. Hernandez regarding this.    Please take your metformin twice a day and recheck your hgba1c in 3 months with your PMD.     Pt discharged in stable condition with follow up with cardiology and PMD.

## 2017-01-12 NOTE — DISCHARGE NOTE ADULT - CARE PROVIDERS DIRECT ADDRESSES
[email or web address],DirectAddress_Unknown,DirectAddress_Unknown

## 2017-01-12 NOTE — DISCHARGE NOTE ADULT - CARE PLAN
Principal Discharge DX:	NSTEMI (non-ST elevated myocardial infarction)  Goal:	Stent placement to open the arterial blockages in your heart  Instructions for follow-up, activity and diet:	You have undergone catheterization to place 2 stents in the right side and one stent in the left side of the heart. You also have a aortic aneurysm. You have been discharged on aspirin, plavix, and a statin. Please follow up with cardiology within 2 weeks, to monitor your progress and follow up on the aneurysm  Secondary Diagnosis:	Acute systolic congestive heart failure  Goal:	medically managed  Instructions for follow-up, activity and diet:	You have been started on lasix 40mg daily, valsartan, and metoprolol. Please continue with these medications and follow up with cardiology before making any changes.  Secondary Diagnosis:	Type 2 diabetes mellitus without complication, without long-term current use of insulin  Goal:	medically managed  Instructions for follow-up, activity and diet:	Please continue with metformin and follow up with Dr. Pacheco on 1/16 at 10am for a diabetes follow up.  Secondary Diagnosis:	Urinary tract infection, site unspecified  Instructions for follow-up, activity and diet:	Please continue with ciprofloxacin twice daily through 1/14 for a urinary tract infection. Please follow up with Dr. Suarez should your symptoms not resolve. Principal Discharge DX:	NSTEMI (non-ST elevated myocardial infarction)  Goal:	Stent placement to open the arterial blockages in your heart  Instructions for follow-up, activity and diet:	You have undergone catheterization to place 2 stents in the right side and one stent in the left side of the heart. You also have a aortic aneurysm. You have been discharged on aspirin, plavix, and a statin. Please follow up with cardiology within 2 weeks, to monitor your progress and follow up on the aneurysm  Secondary Diagnosis:	Acute systolic congestive heart failure  Goal:	medically managed  Instructions for follow-up, activity and diet:	You have been started on lasix 40mg daily, valsartan, and metoprolol. Please continue with these medications and follow up with cardiology before making any changes.  Secondary Diagnosis:	Type 2 diabetes mellitus without complication, without long-term current use of insulin  Goal:	medically managed  Instructions for follow-up, activity and diet:	Please continue with metformin and follow up with Dr. Pacheco on 1/16 at 10am for a diabetes follow up.

## 2017-01-13 VITALS
SYSTOLIC BLOOD PRESSURE: 98 MMHG | DIASTOLIC BLOOD PRESSURE: 65 MMHG | OXYGEN SATURATION: 95 % | RESPIRATION RATE: 18 BRPM | TEMPERATURE: 99 F | HEART RATE: 92 BPM

## 2017-01-13 PROCEDURE — 83036 HEMOGLOBIN GLYCOSYLATED A1C: CPT

## 2017-01-13 PROCEDURE — 85610 PROTHROMBIN TIME: CPT

## 2017-01-13 PROCEDURE — 83605 ASSAY OF LACTIC ACID: CPT

## 2017-01-13 PROCEDURE — 82550 ASSAY OF CK (CPK): CPT

## 2017-01-13 PROCEDURE — 94660 CPAP INITIATION&MGMT: CPT

## 2017-01-13 PROCEDURE — 87633 RESP VIRUS 12-25 TARGETS: CPT

## 2017-01-13 PROCEDURE — 86901 BLOOD TYPING SEROLOGIC RH(D): CPT

## 2017-01-13 PROCEDURE — 87040 BLOOD CULTURE FOR BACTERIA: CPT

## 2017-01-13 PROCEDURE — C1874: CPT

## 2017-01-13 PROCEDURE — 81001 URINALYSIS AUTO W/SCOPE: CPT

## 2017-01-13 PROCEDURE — C9600: CPT | Mod: LD

## 2017-01-13 PROCEDURE — 80061 LIPID PANEL: CPT

## 2017-01-13 PROCEDURE — 84443 ASSAY THYROID STIM HORMONE: CPT

## 2017-01-13 PROCEDURE — 86850 RBC ANTIBODY SCREEN: CPT

## 2017-01-13 PROCEDURE — 84484 ASSAY OF TROPONIN QUANT: CPT

## 2017-01-13 PROCEDURE — 83735 ASSAY OF MAGNESIUM: CPT

## 2017-01-13 PROCEDURE — 87486 CHLMYD PNEUM DNA AMP PROBE: CPT

## 2017-01-13 PROCEDURE — 99291 CRITICAL CARE FIRST HOUR: CPT | Mod: 25

## 2017-01-13 PROCEDURE — 82553 CREATINE MB FRACTION: CPT

## 2017-01-13 PROCEDURE — 86900 BLOOD TYPING SEROLOGIC ABO: CPT

## 2017-01-13 PROCEDURE — 93308 TTE F-UP OR LMTD: CPT

## 2017-01-13 PROCEDURE — C1894: CPT

## 2017-01-13 PROCEDURE — C1887: CPT

## 2017-01-13 PROCEDURE — 70450 CT HEAD/BRAIN W/O DYE: CPT

## 2017-01-13 PROCEDURE — 93458 L HRT ARTERY/VENTRICLE ANGIO: CPT

## 2017-01-13 PROCEDURE — 87086 URINE CULTURE/COLONY COUNT: CPT

## 2017-01-13 PROCEDURE — 85730 THROMBOPLASTIN TIME PARTIAL: CPT

## 2017-01-13 PROCEDURE — 84100 ASSAY OF PHOSPHORUS: CPT

## 2017-01-13 PROCEDURE — 93010 ELECTROCARDIOGRAM REPORT: CPT

## 2017-01-13 PROCEDURE — 93005 ELECTROCARDIOGRAM TRACING: CPT

## 2017-01-13 PROCEDURE — 82435 ASSAY OF BLOOD CHLORIDE: CPT

## 2017-01-13 PROCEDURE — C1725: CPT

## 2017-01-13 PROCEDURE — 96374 THER/PROPH/DIAG INJ IV PUSH: CPT

## 2017-01-13 PROCEDURE — 71045 X-RAY EXAM CHEST 1 VIEW: CPT

## 2017-01-13 PROCEDURE — 83880 ASSAY OF NATRIURETIC PEPTIDE: CPT

## 2017-01-13 PROCEDURE — 81003 URINALYSIS AUTO W/O SCOPE: CPT

## 2017-01-13 PROCEDURE — 84295 ASSAY OF SERUM SODIUM: CPT

## 2017-01-13 PROCEDURE — 87581 M.PNEUMON DNA AMP PROBE: CPT

## 2017-01-13 PROCEDURE — 82803 BLOOD GASES ANY COMBINATION: CPT

## 2017-01-13 PROCEDURE — 85027 COMPLETE CBC AUTOMATED: CPT

## 2017-01-13 PROCEDURE — 87798 DETECT AGENT NOS DNA AMP: CPT

## 2017-01-13 PROCEDURE — 80053 COMPREHEN METABOLIC PANEL: CPT

## 2017-01-13 PROCEDURE — 80048 BASIC METABOLIC PNL TOTAL CA: CPT

## 2017-01-13 PROCEDURE — C1769: CPT

## 2017-01-13 PROCEDURE — 82947 ASSAY GLUCOSE BLOOD QUANT: CPT

## 2017-01-13 PROCEDURE — 99232 SBSQ HOSP IP/OBS MODERATE 35: CPT

## 2017-01-13 PROCEDURE — 82330 ASSAY OF CALCIUM: CPT

## 2017-01-13 PROCEDURE — 85014 HEMATOCRIT: CPT

## 2017-01-13 PROCEDURE — 97162 PT EVAL MOD COMPLEX 30 MIN: CPT

## 2017-01-13 PROCEDURE — 99239 HOSP IP/OBS DSCHRG MGMT >30: CPT

## 2017-01-13 PROCEDURE — 84132 ASSAY OF SERUM POTASSIUM: CPT

## 2017-01-13 RX ORDER — CLOPIDOGREL BISULFATE 75 MG/1
1 TABLET, FILM COATED ORAL
Qty: 0 | Refills: 0 | DISCHARGE
Start: 2017-01-13

## 2017-01-13 RX ORDER — VALSARTAN 80 MG/1
1 TABLET ORAL
Qty: 30 | Refills: 0 | OUTPATIENT
Start: 2017-01-13 | End: 2017-02-12

## 2017-01-13 RX ORDER — FUROSEMIDE 40 MG
1 TABLET ORAL
Qty: 30 | Refills: 0 | OUTPATIENT
Start: 2017-01-13 | End: 2017-02-12

## 2017-01-13 RX ORDER — SODIUM CHLORIDE 0.65 %
1 AEROSOL, SPRAY (ML) NASAL THREE TIMES A DAY
Qty: 0 | Refills: 0 | Status: DISCONTINUED | OUTPATIENT
Start: 2017-01-13 | End: 2017-01-13

## 2017-01-13 RX ORDER — TAMSULOSIN HYDROCHLORIDE 0.4 MG/1
1 CAPSULE ORAL
Qty: 30 | Refills: 0 | OUTPATIENT
Start: 2017-01-13 | End: 2017-02-12

## 2017-01-13 RX ORDER — METOPROLOL TARTRATE 50 MG
1 TABLET ORAL
Qty: 30 | Refills: 0
Start: 2017-01-13 | End: 2017-02-12

## 2017-01-13 RX ORDER — ATORVASTATIN CALCIUM 80 MG/1
1 TABLET, FILM COATED ORAL
Qty: 30 | Refills: 0
Start: 2017-01-13 | End: 2017-02-12

## 2017-01-13 RX ORDER — QUETIAPINE FUMARATE 200 MG/1
1 TABLET, FILM COATED ORAL
Qty: 30 | Refills: 0 | OUTPATIENT
Start: 2017-01-13 | End: 2017-02-12

## 2017-01-13 RX ADMIN — Medication 2: at 12:24

## 2017-01-13 RX ADMIN — Medication 81 MILLIGRAM(S): at 12:24

## 2017-01-13 RX ADMIN — Medication 25 MILLIGRAM(S): at 05:50

## 2017-01-13 RX ADMIN — VALSARTAN 40 MILLIGRAM(S): 80 TABLET ORAL at 05:49

## 2017-01-13 RX ADMIN — Medication 40 MILLIGRAM(S): at 05:49

## 2017-01-13 RX ADMIN — CLOPIDOGREL BISULFATE 75 MILLIGRAM(S): 75 TABLET, FILM COATED ORAL at 12:24

## 2017-01-13 RX ADMIN — Medication 2: at 07:41

## 2017-02-28 ENCOUNTER — OUTPATIENT (OUTPATIENT)
Dept: OUTPATIENT SERVICES | Facility: HOSPITAL | Age: 67
LOS: 1 days | End: 2017-02-28
Payer: COMMERCIAL

## 2017-02-28 ENCOUNTER — APPOINTMENT (OUTPATIENT)
Dept: CARDIOLOGY | Facility: CLINIC | Age: 67
End: 2017-02-28

## 2017-02-28 VITALS
DIASTOLIC BLOOD PRESSURE: 76 MMHG | BODY MASS INDEX: 25.11 KG/M2 | TEMPERATURE: 97.7 F | SYSTOLIC BLOOD PRESSURE: 112 MMHG | WEIGHT: 160 LBS | HEIGHT: 67 IN | OXYGEN SATURATION: 96 % | HEART RATE: 79 BPM

## 2017-02-28 DIAGNOSIS — I25.10 ATHEROSCLEROTIC HEART DISEASE OF NATIVE CORONARY ARTERY WITHOUT ANGINA PECTORIS: ICD-10-CM

## 2017-02-28 DIAGNOSIS — Z98.89 OTHER SPECIFIED POSTPROCEDURAL STATES: Chronic | ICD-10-CM

## 2017-02-28 DIAGNOSIS — Z00.8 ENCOUNTER FOR OTHER GENERAL EXAMINATION: ICD-10-CM

## 2017-02-28 DIAGNOSIS — I25.10 ATHEROSCLEROTIC HEART DISEASE OF NATIVE CORONARY ARTERY W/OUT ANGINA PECTORIS: ICD-10-CM

## 2017-02-28 DIAGNOSIS — Z86.73 PERSONAL HISTORY OF TRANSIENT ISCHEMIC ATTACK (TIA), AND CEREBRAL INFARCTION W/OUT RESIDUAL DEFICITS: ICD-10-CM

## 2017-02-28 PROCEDURE — 93010 ELECTROCARDIOGRAM REPORT: CPT

## 2017-02-28 PROCEDURE — 93005 ELECTROCARDIOGRAM TRACING: CPT

## 2017-02-28 PROCEDURE — G0463: CPT | Mod: 25

## 2017-03-03 ENCOUNTER — APPOINTMENT (OUTPATIENT)
Dept: NEUROLOGY | Facility: CLINIC | Age: 67
End: 2017-03-03

## 2017-03-06 ENCOUNTER — APPOINTMENT (OUTPATIENT)
Dept: NEUROLOGY | Facility: CLINIC | Age: 67
End: 2017-03-06

## 2017-03-28 ENCOUNTER — APPOINTMENT (OUTPATIENT)
Dept: NEUROLOGY | Facility: CLINIC | Age: 67
End: 2017-03-28

## 2017-04-10 ENCOUNTER — TRANSCRIPTION ENCOUNTER (OUTPATIENT)
Age: 67
End: 2017-04-10

## 2017-04-13 ENCOUNTER — OUTPATIENT (OUTPATIENT)
Dept: OUTPATIENT SERVICES | Facility: HOSPITAL | Age: 67
LOS: 1 days | End: 2017-04-13
Payer: COMMERCIAL

## 2017-04-13 ENCOUNTER — APPOINTMENT (OUTPATIENT)
Dept: CV DIAGNOSITCS | Facility: HOSPITAL | Age: 67
End: 2017-04-13

## 2017-04-13 DIAGNOSIS — Z98.89 OTHER SPECIFIED POSTPROCEDURAL STATES: Chronic | ICD-10-CM

## 2017-04-13 DIAGNOSIS — Z00.00 ENCOUNTER FOR GENERAL ADULT MEDICAL EXAMINATION WITHOUT ABNORMAL FINDINGS: ICD-10-CM

## 2017-04-13 PROCEDURE — 93306 TTE W/DOPPLER COMPLETE: CPT | Mod: 26

## 2017-04-13 PROCEDURE — C8929: CPT

## 2017-04-16 ENCOUNTER — TRANSCRIPTION ENCOUNTER (OUTPATIENT)
Age: 67
End: 2017-04-16

## 2017-04-18 ENCOUNTER — APPOINTMENT (OUTPATIENT)
Dept: CARDIOLOGY | Facility: CLINIC | Age: 67
End: 2017-04-18

## 2017-04-20 ENCOUNTER — EMERGENCY (EMERGENCY)
Facility: HOSPITAL | Age: 67
LOS: 1 days | Discharge: HOME | End: 2017-04-20
Attending: EMERGENCY MEDICINE | Admitting: EMERGENCY MEDICINE
Payer: COMMERCIAL

## 2017-04-20 VITALS
TEMPERATURE: 98 F | DIASTOLIC BLOOD PRESSURE: 79 MMHG | OXYGEN SATURATION: 98 % | RESPIRATION RATE: 17 BRPM | SYSTOLIC BLOOD PRESSURE: 132 MMHG | HEART RATE: 62 BPM

## 2017-04-20 VITALS
RESPIRATION RATE: 18 BRPM | TEMPERATURE: 99 F | DIASTOLIC BLOOD PRESSURE: 94 MMHG | OXYGEN SATURATION: 99 % | HEART RATE: 56 BPM | SYSTOLIC BLOOD PRESSURE: 165 MMHG

## 2017-04-20 DIAGNOSIS — N23 UNSPECIFIED RENAL COLIC: ICD-10-CM

## 2017-04-20 DIAGNOSIS — R10.31 RIGHT LOWER QUADRANT PAIN: ICD-10-CM

## 2017-04-20 DIAGNOSIS — R31.9 HEMATURIA, UNSPECIFIED: ICD-10-CM

## 2017-04-20 DIAGNOSIS — Z79.82 LONG TERM (CURRENT) USE OF ASPIRIN: ICD-10-CM

## 2017-04-20 DIAGNOSIS — I10 ESSENTIAL (PRIMARY) HYPERTENSION: ICD-10-CM

## 2017-04-20 DIAGNOSIS — Z98.89 OTHER SPECIFIED POSTPROCEDURAL STATES: Chronic | ICD-10-CM

## 2017-04-20 DIAGNOSIS — R11.2 NAUSEA WITH VOMITING, UNSPECIFIED: ICD-10-CM

## 2017-04-20 LAB
ALBUMIN SERPL ELPH-MCNC: 4 G/DL — SIGNIFICANT CHANGE UP (ref 3.3–5)
ALP SERPL-CCNC: 105 U/L — SIGNIFICANT CHANGE UP (ref 40–120)
ALT FLD-CCNC: 15 U/L RC — SIGNIFICANT CHANGE UP (ref 10–45)
ANION GAP SERPL CALC-SCNC: 14 MMOL/L — SIGNIFICANT CHANGE UP (ref 5–17)
APPEARANCE UR: ABNORMAL
APTT BLD: 28.7 SEC — SIGNIFICANT CHANGE UP (ref 27.5–37.4)
AST SERPL-CCNC: 16 U/L — SIGNIFICANT CHANGE UP (ref 10–40)
BASOPHILS # BLD AUTO: 0 K/UL — SIGNIFICANT CHANGE UP (ref 0–0.2)
BASOPHILS NFR BLD AUTO: 0.5 % — SIGNIFICANT CHANGE UP (ref 0–2)
BILIRUB SERPL-MCNC: 0.7 MG/DL — SIGNIFICANT CHANGE UP (ref 0.2–1.2)
BILIRUB UR-MCNC: NEGATIVE — SIGNIFICANT CHANGE UP
BUN SERPL-MCNC: 23 MG/DL — SIGNIFICANT CHANGE UP (ref 7–23)
CALCIUM SERPL-MCNC: 9.4 MG/DL — SIGNIFICANT CHANGE UP (ref 8.4–10.5)
CHLORIDE SERPL-SCNC: 106 MMOL/L — SIGNIFICANT CHANGE UP (ref 96–108)
CO2 SERPL-SCNC: 23 MMOL/L — SIGNIFICANT CHANGE UP (ref 22–31)
COLOR SPEC: ABNORMAL
CREAT SERPL-MCNC: 1.44 MG/DL — HIGH (ref 0.5–1.3)
DIFF PNL FLD: ABNORMAL
EOSINOPHIL # BLD AUTO: 0.1 K/UL — SIGNIFICANT CHANGE UP (ref 0–0.5)
EOSINOPHIL NFR BLD AUTO: 0.8 % — SIGNIFICANT CHANGE UP (ref 0–6)
GAS PNL BLDV: SIGNIFICANT CHANGE UP
GLUCOSE SERPL-MCNC: 200 MG/DL — HIGH (ref 70–99)
GLUCOSE UR QL: NEGATIVE — SIGNIFICANT CHANGE UP
HCT VFR BLD CALC: 41.7 % — SIGNIFICANT CHANGE UP (ref 39–50)
HGB BLD-MCNC: 13.8 G/DL — SIGNIFICANT CHANGE UP (ref 13–17)
INR BLD: 1.09 RATIO — SIGNIFICANT CHANGE UP (ref 0.88–1.16)
KETONES UR-MCNC: ABNORMAL
LEUKOCYTE ESTERASE UR-ACNC: ABNORMAL
LYMPHOCYTES # BLD AUTO: 1.7 K/UL — SIGNIFICANT CHANGE UP (ref 1–3.3)
LYMPHOCYTES # BLD AUTO: 18.3 % — SIGNIFICANT CHANGE UP (ref 13–44)
MCHC RBC-ENTMCNC: 28.2 PG — SIGNIFICANT CHANGE UP (ref 27–34)
MCHC RBC-ENTMCNC: 33.2 GM/DL — SIGNIFICANT CHANGE UP (ref 32–36)
MCV RBC AUTO: 84.9 FL — SIGNIFICANT CHANGE UP (ref 80–100)
MONOCYTES # BLD AUTO: 0.8 K/UL — SIGNIFICANT CHANGE UP (ref 0–0.9)
MONOCYTES NFR BLD AUTO: 9 % — SIGNIFICANT CHANGE UP (ref 2–14)
NEUTROPHILS # BLD AUTO: 6.7 K/UL — SIGNIFICANT CHANGE UP (ref 1.8–7.4)
NEUTROPHILS NFR BLD AUTO: 71.3 % — SIGNIFICANT CHANGE UP (ref 43–77)
NITRITE UR-MCNC: NEGATIVE — SIGNIFICANT CHANGE UP
PH UR: 5.5 — SIGNIFICANT CHANGE UP (ref 5–8)
PLATELET # BLD AUTO: 280 K/UL — SIGNIFICANT CHANGE UP (ref 150–400)
POTASSIUM SERPL-MCNC: 4.9 MMOL/L — SIGNIFICANT CHANGE UP (ref 3.5–5.3)
POTASSIUM SERPL-SCNC: 4.9 MMOL/L — SIGNIFICANT CHANGE UP (ref 3.5–5.3)
PROT SERPL-MCNC: 6.8 G/DL — SIGNIFICANT CHANGE UP (ref 6–8.3)
PROT UR-MCNC: 100 MG/DL
PROTHROM AB SERPL-ACNC: 11.9 SEC — SIGNIFICANT CHANGE UP (ref 9.8–12.7)
RBC # BLD: 4.91 M/UL — SIGNIFICANT CHANGE UP (ref 4.2–5.8)
RBC # FLD: 12.8 % — SIGNIFICANT CHANGE UP (ref 10.3–14.5)
RBC CASTS # UR COMP ASSIST: >50 /HPF (ref 0–2)
SODIUM SERPL-SCNC: 143 MMOL/L — SIGNIFICANT CHANGE UP (ref 135–145)
SP GR SPEC: 1.02 — SIGNIFICANT CHANGE UP (ref 1.01–1.02)
UROBILINOGEN FLD QL: NEGATIVE — SIGNIFICANT CHANGE UP
WBC # BLD: 9.3 K/UL — SIGNIFICANT CHANGE UP (ref 3.8–10.5)
WBC # FLD AUTO: 9.3 K/UL — SIGNIFICANT CHANGE UP (ref 3.8–10.5)

## 2017-04-20 PROCEDURE — 80053 COMPREHEN METABOLIC PANEL: CPT

## 2017-04-20 PROCEDURE — 99284 EMERGENCY DEPT VISIT MOD MDM: CPT | Mod: 25

## 2017-04-20 PROCEDURE — 76775 US EXAM ABDO BACK WALL LIM: CPT

## 2017-04-20 PROCEDURE — 84295 ASSAY OF SERUM SODIUM: CPT

## 2017-04-20 PROCEDURE — 85027 COMPLETE CBC AUTOMATED: CPT

## 2017-04-20 PROCEDURE — 76775 US EXAM ABDO BACK WALL LIM: CPT | Mod: 26

## 2017-04-20 PROCEDURE — 74176 CT ABD & PELVIS W/O CONTRAST: CPT | Mod: 26

## 2017-04-20 PROCEDURE — 83605 ASSAY OF LACTIC ACID: CPT

## 2017-04-20 PROCEDURE — 85730 THROMBOPLASTIN TIME PARTIAL: CPT

## 2017-04-20 PROCEDURE — 74176 CT ABD & PELVIS W/O CONTRAST: CPT

## 2017-04-20 PROCEDURE — 85014 HEMATOCRIT: CPT

## 2017-04-20 PROCEDURE — 96374 THER/PROPH/DIAG INJ IV PUSH: CPT

## 2017-04-20 PROCEDURE — 82330 ASSAY OF CALCIUM: CPT

## 2017-04-20 PROCEDURE — 82947 ASSAY GLUCOSE BLOOD QUANT: CPT

## 2017-04-20 PROCEDURE — 85610 PROTHROMBIN TIME: CPT

## 2017-04-20 PROCEDURE — 96375 TX/PRO/DX INJ NEW DRUG ADDON: CPT

## 2017-04-20 PROCEDURE — 82803 BLOOD GASES ANY COMBINATION: CPT

## 2017-04-20 PROCEDURE — 87086 URINE CULTURE/COLONY COUNT: CPT

## 2017-04-20 PROCEDURE — 99284 EMERGENCY DEPT VISIT MOD MDM: CPT | Mod: GC

## 2017-04-20 PROCEDURE — 81001 URINALYSIS AUTO W/SCOPE: CPT

## 2017-04-20 PROCEDURE — 82435 ASSAY OF BLOOD CHLORIDE: CPT

## 2017-04-20 PROCEDURE — 84132 ASSAY OF SERUM POTASSIUM: CPT

## 2017-04-20 RX ORDER — SODIUM CHLORIDE 9 MG/ML
500 INJECTION INTRAMUSCULAR; INTRAVENOUS; SUBCUTANEOUS ONCE
Qty: 0 | Refills: 0 | Status: COMPLETED | OUTPATIENT
Start: 2017-04-20 | End: 2017-04-20

## 2017-04-20 RX ORDER — ONDANSETRON 8 MG/1
4 TABLET, FILM COATED ORAL ONCE
Qty: 0 | Refills: 0 | Status: COMPLETED | OUTPATIENT
Start: 2017-04-20 | End: 2017-04-20

## 2017-04-20 RX ORDER — OXYCODONE HYDROCHLORIDE 5 MG/1
1 TABLET ORAL
Qty: 9 | Refills: 0 | OUTPATIENT
Start: 2017-04-20 | End: 2017-04-23

## 2017-04-20 RX ORDER — MORPHINE SULFATE 50 MG/1
4 CAPSULE, EXTENDED RELEASE ORAL ONCE
Qty: 0 | Refills: 0 | Status: DISCONTINUED | OUTPATIENT
Start: 2017-04-20 | End: 2017-04-20

## 2017-04-20 RX ADMIN — SODIUM CHLORIDE 500 MILLILITER(S): 9 INJECTION INTRAMUSCULAR; INTRAVENOUS; SUBCUTANEOUS at 08:44

## 2017-04-20 RX ADMIN — ONDANSETRON 4 MILLIGRAM(S): 8 TABLET, FILM COATED ORAL at 09:01

## 2017-04-20 RX ADMIN — MORPHINE SULFATE 4 MILLIGRAM(S): 50 CAPSULE, EXTENDED RELEASE ORAL at 10:03

## 2017-04-20 RX ADMIN — MORPHINE SULFATE 4 MILLIGRAM(S): 50 CAPSULE, EXTENDED RELEASE ORAL at 09:04

## 2017-04-20 NOTE — ED PROVIDER NOTE - MEDICAL DECISION MAKING DETAILS
67 y.o. male hx of MI, CVA, htn, kidney stones pw rlq ab pain and hematuria. Will check labs, ua, analgesia, antiemetics, ct ab/pel, reevaluate. 67 y.o. male hx of MI, CVA, htn, kidney stones pw rlq ab pain and hematuria. Will check labs, ua, analgesia, antiemetics, ct ab/pel, reevaluate.  cassie - atypical rt flk pain w daryn hematuria - on cipro x 1 week no fever - n/v - abd pain -- iv fluids ck cmp, ua , renal us and analgesia -- needs reeval - possible stone - will need outpt cystoscopy -

## 2017-04-20 NOTE — ED PROVIDER NOTE - PHYSICAL EXAMINATION
Gen: Well appearing, NAD, AOx3  Head: NCAT  HEENT: MM, normal conjunctiva  Lung: CTAB, no rales, rhonchi or wheezing  CV: S1/S2, no murmurs, rubs or gallops  Abd: soft, +tenderness RLQ, no rebound or guarding  MSK: No CVA tenderness. No edema, no visible deformities  Neuro: No focal neurologic deficits. CN II-XII intact. Normal strength and sensation x 4  Skin: Warm and dry, no evidence of rash  Psych: normal mood and affect

## 2017-04-20 NOTE — ED PROVIDER NOTE - OBJECTIVE STATEMENT
67 y.o. male hx of CVA and MI in Dec 2016 on Asa/plavix, htn, kidney stones pw hematuria x 2 weeks, intermittent dysuria saw urologist this week and is scheduled for CT tomorrow and cystoscopy to follow, this morning started with nausea, vomiting x 2 nbnb, and right lower abdominal pain. +chills, no fevers.

## 2017-04-20 NOTE — ED ADULT NURSE NOTE - CAS ELECT INFOMATION PROVIDED
Subjective:       Patient ID:  Toya Majano is a 81 y.o. female who presents for   Chief Complaint   Patient presents with    Follow-up     HPI Comments: Much improved with the diprolene cream some erythema remains.        Review of Systems   Constitutional: Negative for fever.   Skin: Positive for rash. Negative for itching.   Hematologic/Lymphatic: Bruises/bleeds easily.        Objective:    Physical Exam   Constitutional: She appears well-developed and well-nourished. No distress.   Neurological: She is alert and oriented to person, place, and time. She is not disoriented.   Psychiatric: She has a normal mood and affect.   Skin:   Areas Examined (abnormalities noted in diagram):   Head / Face Inspection Performed  Neck Inspection Performed  RUE Inspected  LUE Inspection Performed  RLE Inspected  LLE Inspection Performed              Diagram Legend      Erythematous eczematous patches          Assessment / Plan:        Stasis dermatitis of both legs  Leg elevation  Support socks  Topicort spray prn   Shine's disease  reassurance               Return in about 6 months (around 8/15/2017).   DC instructions

## 2017-04-20 NOTE — ED PROVIDER NOTE - PROGRESS NOTE DETAILS
pt already scheduled for ct - - despite hydro on POCUS will proceed w ct Pt feeling better, repeat exam benign, VSS, wants to go home, ok for discharge. Kevin Naqvi DO

## 2017-04-20 NOTE — ED PROVIDER NOTE - NS ED ROS FT
ROS: denies HA, weakness, dizziness, fevers, vomiting, chest pain, SOB, diaphoresis, back/neck pain, or rash  +ab pain, nausea, dysuria, hematuria

## 2017-04-20 NOTE — ED ADULT NURSE NOTE - OBJECTIVE STATEMENT
67y male presents to ED (ambulatory) with wife at bedside complaining of abdominal pain and blood in the urine. Per patient hematuria persisting for 2 weeks "sometimes a lot of blood." Abdominal pain began 4/19/17 right sided, generalized pain, tenderness to palpation, soft with distention, radiation to back. Patient Hx stroke, heart attack, kidney stones, DM, Hyperlipidemia, hernia removal, and recent UTI (treated with Cipro from urgent care center). Patient recently saw urologist had a CT Scan scheduled for 4/21 and cystoscopy to follow. Associated symptoms include nausea, vomiting episodes, polyuria. Patient denies fever, diarrhea, chills, chest pain, SOB. Patient afebrile in ED.

## 2017-04-21 LAB
CULTURE RESULTS: SIGNIFICANT CHANGE UP
SPECIMEN SOURCE: SIGNIFICANT CHANGE UP

## 2017-04-25 ENCOUNTER — APPOINTMENT (OUTPATIENT)
Dept: CARDIOLOGY | Facility: CLINIC | Age: 67
End: 2017-04-25

## 2017-04-25 ENCOUNTER — OUTPATIENT (OUTPATIENT)
Dept: OUTPATIENT SERVICES | Facility: HOSPITAL | Age: 67
LOS: 1 days | End: 2017-04-25
Payer: COMMERCIAL

## 2017-04-25 VITALS
SYSTOLIC BLOOD PRESSURE: 132 MMHG | WEIGHT: 162 LBS | RESPIRATION RATE: 16 BRPM | DIASTOLIC BLOOD PRESSURE: 78 MMHG | OXYGEN SATURATION: 96 % | HEART RATE: 55 BPM | BODY MASS INDEX: 25.43 KG/M2 | HEIGHT: 67 IN

## 2017-04-25 DIAGNOSIS — Z00.8 ENCOUNTER FOR OTHER GENERAL EXAMINATION: ICD-10-CM

## 2017-04-25 DIAGNOSIS — Z98.89 OTHER SPECIFIED POSTPROCEDURAL STATES: Chronic | ICD-10-CM

## 2017-04-25 PROCEDURE — G0463: CPT

## 2017-04-27 ENCOUNTER — APPOINTMENT (OUTPATIENT)
Dept: NEUROLOGY | Facility: CLINIC | Age: 67
End: 2017-04-27

## 2017-04-27 VITALS
BODY MASS INDEX: 25.43 KG/M2 | HEIGHT: 67 IN | DIASTOLIC BLOOD PRESSURE: 80 MMHG | WEIGHT: 162 LBS | HEART RATE: 72 BPM | SYSTOLIC BLOOD PRESSURE: 110 MMHG

## 2017-04-27 DIAGNOSIS — Z83.3 FAMILY HISTORY OF DIABETES MELLITUS: ICD-10-CM

## 2017-04-27 DIAGNOSIS — I25.10 ATHEROSCLEROTIC HEART DISEASE OF NATIVE CORONARY ARTERY WITHOUT ANGINA PECTORIS: ICD-10-CM

## 2017-04-27 DIAGNOSIS — Z82.3 FAMILY HISTORY OF STROKE: ICD-10-CM

## 2017-04-27 DIAGNOSIS — Z82.49 FAMILY HISTORY OF ISCHEMIC HEART DISEASE AND OTHER DISEASES OF THE CIRCULATORY SYSTEM: ICD-10-CM

## 2017-04-27 DIAGNOSIS — Z78.9 OTHER SPECIFIED HEALTH STATUS: ICD-10-CM

## 2017-04-27 DIAGNOSIS — I10 ESSENTIAL (PRIMARY) HYPERTENSION: ICD-10-CM

## 2017-04-27 DIAGNOSIS — Z86.39 PERSONAL HISTORY OF OTHER ENDOCRINE, NUTRITIONAL AND METABOLIC DISEASE: ICD-10-CM

## 2017-04-27 DIAGNOSIS — Z87.891 PERSONAL HISTORY OF NICOTINE DEPENDENCE: ICD-10-CM

## 2017-04-27 DIAGNOSIS — Z86.79 PERSONAL HISTORY OF OTHER DISEASES OF THE CIRCULATORY SYSTEM: ICD-10-CM

## 2017-04-27 RX ORDER — ATORVASTATIN CALCIUM 80 MG/1
80 TABLET, FILM COATED ORAL
Qty: 30 | Refills: 0 | Status: DISCONTINUED | COMMUNITY
Start: 2017-03-15 | End: 2017-04-27

## 2017-04-27 RX ORDER — ATORVASTATIN CALCIUM 80 MG/1
80 TABLET, FILM COATED ORAL
Qty: 30 | Refills: 6 | Status: ACTIVE | COMMUNITY
Start: 2017-04-27 | End: 1900-01-01

## 2017-05-01 ENCOUNTER — APPOINTMENT (OUTPATIENT)
Dept: UROLOGY | Facility: CLINIC | Age: 67
End: 2017-05-01

## 2017-05-05 ENCOUNTER — APPOINTMENT (OUTPATIENT)
Dept: UROLOGY | Facility: CLINIC | Age: 67
End: 2017-05-05

## 2017-06-16 NOTE — ED ADULT NURSE NOTE - BED ASSIGNMENT RECEIVED
Mike Mares), Orthopaedic Surgery  [address] [place] [address]  Phone: [phone]  Fax: [phone]
19:00

## 2017-06-29 ENCOUNTER — APPOINTMENT (OUTPATIENT)
Dept: NEUROLOGY | Facility: CLINIC | Age: 67
End: 2017-06-29

## 2017-08-08 ENCOUNTER — APPOINTMENT (OUTPATIENT)
Dept: CARDIOLOGY | Facility: CLINIC | Age: 67
End: 2017-08-08

## 2017-08-14 ENCOUNTER — NON-APPOINTMENT (OUTPATIENT)
Age: 67
End: 2017-08-14

## 2017-08-14 ENCOUNTER — APPOINTMENT (OUTPATIENT)
Dept: CARDIOLOGY | Facility: CLINIC | Age: 67
End: 2017-08-14
Payer: COMMERCIAL

## 2017-08-14 VITALS — SYSTOLIC BLOOD PRESSURE: 137 MMHG | HEART RATE: 54 BPM | DIASTOLIC BLOOD PRESSURE: 87 MMHG | OXYGEN SATURATION: 96 %

## 2017-08-14 PROCEDURE — 99215 OFFICE O/P EST HI 40 MIN: CPT

## 2017-08-14 PROCEDURE — 93000 ELECTROCARDIOGRAM COMPLETE: CPT

## 2017-10-24 ENCOUNTER — APPOINTMENT (OUTPATIENT)
Dept: CARDIOLOGY | Facility: CLINIC | Age: 67
End: 2017-10-24
Payer: COMMERCIAL

## 2017-10-24 ENCOUNTER — OUTPATIENT (OUTPATIENT)
Dept: OUTPATIENT SERVICES | Facility: HOSPITAL | Age: 67
LOS: 1 days | End: 2017-10-24
Payer: COMMERCIAL

## 2017-10-24 VITALS
WEIGHT: 168 LBS | HEIGHT: 67 IN | HEART RATE: 57 BPM | BODY MASS INDEX: 26.37 KG/M2 | DIASTOLIC BLOOD PRESSURE: 86 MMHG | SYSTOLIC BLOOD PRESSURE: 139 MMHG | OXYGEN SATURATION: 97 % | RESPIRATION RATE: 16 BRPM

## 2017-10-24 DIAGNOSIS — Z98.89 OTHER SPECIFIED POSTPROCEDURAL STATES: Chronic | ICD-10-CM

## 2017-10-24 DIAGNOSIS — Z00.8 ENCOUNTER FOR OTHER GENERAL EXAMINATION: ICD-10-CM

## 2017-10-24 PROCEDURE — 93306 TTE W/DOPPLER COMPLETE: CPT | Mod: 26

## 2017-10-24 PROCEDURE — ZZZZZ: CPT

## 2017-10-24 PROCEDURE — 93306 TTE W/DOPPLER COMPLETE: CPT

## 2017-10-24 PROCEDURE — G0463: CPT | Mod: 25

## 2017-10-26 DIAGNOSIS — I25.10 ATHEROSCLEROTIC HEART DISEASE OF NATIVE CORONARY ARTERY WITHOUT ANGINA PECTORIS: ICD-10-CM

## 2017-10-26 DIAGNOSIS — I10 ESSENTIAL (PRIMARY) HYPERTENSION: ICD-10-CM

## 2018-03-27 ENCOUNTER — APPOINTMENT (OUTPATIENT)
Dept: SURGICAL ONCOLOGY | Facility: CLINIC | Age: 68
End: 2018-03-27
Payer: COMMERCIAL

## 2018-03-27 VITALS
HEIGHT: 67 IN | DIASTOLIC BLOOD PRESSURE: 86 MMHG | WEIGHT: 180 LBS | HEART RATE: 81 BPM | SYSTOLIC BLOOD PRESSURE: 147 MMHG | RESPIRATION RATE: 15 BRPM | BODY MASS INDEX: 28.25 KG/M2 | OXYGEN SATURATION: 95 %

## 2018-03-27 DIAGNOSIS — N20.0 CALCULUS OF KIDNEY: ICD-10-CM

## 2018-03-27 DIAGNOSIS — Z87.19 PERSONAL HISTORY OF OTHER DISEASES OF THE DIGESTIVE SYSTEM: ICD-10-CM

## 2018-03-27 DIAGNOSIS — Z86.2 PERSONAL HISTORY OF DISEASES OF THE BLOOD AND BLOOD-FORMING ORGANS AND CERTAIN DISORDERS INVOLVING THE IMMUNE MECHANISM: ICD-10-CM

## 2018-03-27 PROCEDURE — 99245 OFF/OP CONSLTJ NEW/EST HI 55: CPT

## 2018-03-27 RX ORDER — FUROSEMIDE 40 MG/1
40 TABLET ORAL
Refills: 0 | Status: DISCONTINUED | COMMUNITY
End: 2018-02-28

## 2018-03-27 RX ORDER — METOPROLOL SUCCINATE 25 MG/1
25 TABLET, EXTENDED RELEASE ORAL
Refills: 0 | Status: DISCONTINUED | COMMUNITY
End: 2018-02-28

## 2018-04-06 ENCOUNTER — OUTPATIENT (OUTPATIENT)
Dept: OUTPATIENT SERVICES | Facility: HOSPITAL | Age: 68
LOS: 1 days | End: 2018-04-06
Payer: COMMERCIAL

## 2018-04-06 VITALS
WEIGHT: 177.91 LBS | RESPIRATION RATE: 18 BRPM | HEART RATE: 63 BPM | OXYGEN SATURATION: 97 % | HEIGHT: 67 IN | SYSTOLIC BLOOD PRESSURE: 133 MMHG | TEMPERATURE: 98 F | DIASTOLIC BLOOD PRESSURE: 91 MMHG

## 2018-04-06 DIAGNOSIS — Z98.89 OTHER SPECIFIED POSTPROCEDURAL STATES: Chronic | ICD-10-CM

## 2018-04-06 DIAGNOSIS — Z90.89 ACQUIRED ABSENCE OF OTHER ORGANS: Chronic | ICD-10-CM

## 2018-04-06 DIAGNOSIS — K40.20 BILATERAL INGUINAL HERNIA, WITHOUT OBSTRUCTION OR GANGRENE, NOT SPECIFIED AS RECURRENT: Chronic | ICD-10-CM

## 2018-04-06 DIAGNOSIS — I25.10 ATHEROSCLEROTIC HEART DISEASE OF NATIVE CORONARY ARTERY WITHOUT ANGINA PECTORIS: ICD-10-CM

## 2018-04-06 DIAGNOSIS — C43.9 MALIGNANT MELANOMA OF SKIN, UNSPECIFIED: ICD-10-CM

## 2018-04-06 DIAGNOSIS — Z01.818 ENCOUNTER FOR OTHER PREPROCEDURAL EXAMINATION: ICD-10-CM

## 2018-04-06 PROCEDURE — G0463: CPT

## 2018-04-06 PROCEDURE — 80053 COMPREHEN METABOLIC PANEL: CPT

## 2018-04-06 PROCEDURE — 85027 COMPLETE CBC AUTOMATED: CPT

## 2018-04-06 PROCEDURE — 83036 HEMOGLOBIN GLYCOSYLATED A1C: CPT

## 2018-04-06 NOTE — H&P PST ADULT - PSH
Bilateral inguinal hernia    H/O lithotripsy    S/P medial meniscal repair  and ligament surgery  S/P tonsillectomy

## 2018-04-06 NOTE — H&P PST ADULT - NSANTHOSAYNRD_GEN_A_CORE
No. EJ screening performed.  STOP BANG Legend: 0-2 = LOW Risk; 3-4 = INTERMEDIATE Risk; 5-8 = HIGH Risk

## 2018-04-06 NOTE — H&P PST ADULT - HISTORY OF PRESENT ILLNESS
68yr old male with malignant melanoma coming in for wide excision  right posterior auricular melanoma with sentinel lymph node biopsy.  Pt Hx sig for CVA and MI same time 12/2016

## 2018-04-06 NOTE — H&P PST ADULT - PMH
CHF (congestive heart failure)  1/2017 stents x3  Confusion  from stroke  Coronary artery disease  MI 12/22/2016  CVA (cerebral vascular accident)  12/22/2016  Hearing decreased    HTN (hypertension)    Hyperlipemia    Kidney stones    Type 2 diabetes mellitus  2016

## 2018-04-06 NOTE — H&P PST ADULT - ATTENDING COMMENTS
Risks, benefits, and alternatives discussed with patient, he expressed understanding and agrees to proceed with wide local excision right posterior auricular melanoma, sentinel lymph node biopsy

## 2018-04-09 ENCOUNTER — OUTPATIENT (OUTPATIENT)
Dept: OUTPATIENT SERVICES | Facility: HOSPITAL | Age: 68
LOS: 1 days | End: 2018-04-09
Payer: COMMERCIAL

## 2018-04-09 ENCOUNTER — APPOINTMENT (OUTPATIENT)
Dept: CARDIOLOGY | Facility: CLINIC | Age: 68
End: 2018-04-09
Payer: COMMERCIAL

## 2018-04-09 ENCOUNTER — RESULT REVIEW (OUTPATIENT)
Age: 68
End: 2018-04-09

## 2018-04-09 VITALS
HEIGHT: 67 IN | SYSTOLIC BLOOD PRESSURE: 135 MMHG | HEART RATE: 58 BPM | BODY MASS INDEX: 28.25 KG/M2 | DIASTOLIC BLOOD PRESSURE: 83 MMHG | OXYGEN SATURATION: 97 % | WEIGHT: 180 LBS

## 2018-04-09 DIAGNOSIS — K40.20 BILATERAL INGUINAL HERNIA, WITHOUT OBSTRUCTION OR GANGRENE, NOT SPECIFIED AS RECURRENT: Chronic | ICD-10-CM

## 2018-04-09 DIAGNOSIS — Z90.89 ACQUIRED ABSENCE OF OTHER ORGANS: Chronic | ICD-10-CM

## 2018-04-09 DIAGNOSIS — C43.9 MALIGNANT MELANOMA OF SKIN, UNSPECIFIED: ICD-10-CM

## 2018-04-09 DIAGNOSIS — Z98.89 OTHER SPECIFIED POSTPROCEDURAL STATES: Chronic | ICD-10-CM

## 2018-04-09 PROCEDURE — 99215 OFFICE O/P EST HI 40 MIN: CPT

## 2018-04-09 RX ORDER — SODIUM CHLORIDE 9 MG/ML
3 INJECTION INTRAMUSCULAR; INTRAVENOUS; SUBCUTANEOUS EVERY 8 HOURS
Qty: 0 | Refills: 0 | Status: DISCONTINUED | OUTPATIENT
Start: 2018-04-20 | End: 2018-05-05

## 2018-04-09 RX ORDER — LIDOCAINE HCL 20 MG/ML
0.2 VIAL (ML) INJECTION ONCE
Qty: 0 | Refills: 0 | Status: DISCONTINUED | OUTPATIENT
Start: 2018-04-20 | End: 2018-05-05

## 2018-04-09 RX ORDER — CEFAZOLIN SODIUM 1 G
2000 VIAL (EA) INJECTION ONCE
Qty: 0 | Refills: 0 | Status: DISCONTINUED | OUTPATIENT
Start: 2018-04-20 | End: 2018-05-05

## 2018-04-10 ENCOUNTER — APPOINTMENT (OUTPATIENT)
Dept: PLASTIC SURGERY | Facility: CLINIC | Age: 68
End: 2018-04-10
Payer: COMMERCIAL

## 2018-04-10 VITALS
BODY MASS INDEX: 28.25 KG/M2 | DIASTOLIC BLOOD PRESSURE: 76 MMHG | WEIGHT: 180 LBS | HEART RATE: 50 BPM | OXYGEN SATURATION: 98 % | SYSTOLIC BLOOD PRESSURE: 130 MMHG | RESPIRATION RATE: 16 BRPM | HEIGHT: 67 IN

## 2018-04-10 PROCEDURE — 99243 OFF/OP CNSLTJ NEW/EST LOW 30: CPT

## 2018-04-19 ENCOUNTER — FORM ENCOUNTER (OUTPATIENT)
Age: 68
End: 2018-04-19

## 2018-04-20 ENCOUNTER — RESULT REVIEW (OUTPATIENT)
Age: 68
End: 2018-04-20

## 2018-04-20 ENCOUNTER — APPOINTMENT (OUTPATIENT)
Dept: SURGICAL ONCOLOGY | Facility: HOSPITAL | Age: 68
End: 2018-04-20

## 2018-04-20 ENCOUNTER — OUTPATIENT (OUTPATIENT)
Dept: OUTPATIENT SERVICES | Facility: HOSPITAL | Age: 68
LOS: 1 days | End: 2018-04-20
Payer: COMMERCIAL

## 2018-04-20 ENCOUNTER — APPOINTMENT (OUTPATIENT)
Dept: NUCLEAR MEDICINE | Facility: HOSPITAL | Age: 68
End: 2018-04-20

## 2018-04-20 VITALS
HEART RATE: 58 BPM | RESPIRATION RATE: 18 BRPM | WEIGHT: 177.91 LBS | HEIGHT: 67 IN | SYSTOLIC BLOOD PRESSURE: 139 MMHG | DIASTOLIC BLOOD PRESSURE: 84 MMHG | OXYGEN SATURATION: 99 % | TEMPERATURE: 98 F

## 2018-04-20 VITALS
RESPIRATION RATE: 16 BRPM | OXYGEN SATURATION: 100 % | DIASTOLIC BLOOD PRESSURE: 80 MMHG | SYSTOLIC BLOOD PRESSURE: 127 MMHG | HEART RATE: 93 BPM

## 2018-04-20 DIAGNOSIS — Z98.89 OTHER SPECIFIED POSTPROCEDURAL STATES: Chronic | ICD-10-CM

## 2018-04-20 DIAGNOSIS — C43.9 MALIGNANT MELANOMA OF SKIN, UNSPECIFIED: ICD-10-CM

## 2018-04-20 DIAGNOSIS — Z90.89 ACQUIRED ABSENCE OF OTHER ORGANS: Chronic | ICD-10-CM

## 2018-04-20 DIAGNOSIS — K40.20 BILATERAL INGUINAL HERNIA, WITHOUT OBSTRUCTION OR GANGRENE, NOT SPECIFIED AS RECURRENT: Chronic | ICD-10-CM

## 2018-04-20 PROCEDURE — 88341 IMHCHEM/IMCYTCHM EA ADD ANTB: CPT | Mod: 26

## 2018-04-20 PROCEDURE — 78195 LYMPH SYSTEM IMAGING: CPT | Mod: 26

## 2018-04-20 PROCEDURE — 11623 EXC S/N/H/F/G MAL+MRG 2.1-3: CPT

## 2018-04-20 PROCEDURE — 88342 IMHCHEM/IMCYTCHM 1ST ANTB: CPT

## 2018-04-20 PROCEDURE — 38790 INJECT FOR LYMPHATIC X-RAY: CPT | Mod: 59

## 2018-04-20 PROCEDURE — A9541: CPT

## 2018-04-20 PROCEDURE — 88342 IMHCHEM/IMCYTCHM 1ST ANTB: CPT | Mod: 26

## 2018-04-20 PROCEDURE — 38792 RA TRACER ID OF SENTINL NODE: CPT

## 2018-04-20 PROCEDURE — 82962 GLUCOSE BLOOD TEST: CPT

## 2018-04-20 PROCEDURE — 88341 IMHCHEM/IMCYTCHM EA ADD ANTB: CPT

## 2018-04-20 PROCEDURE — 88307 TISSUE EXAM BY PATHOLOGIST: CPT | Mod: 26

## 2018-04-20 PROCEDURE — 15220 FTH/GFT FR S/A/L 20 SQ CM/<: CPT

## 2018-04-20 PROCEDURE — 38308 INCISION OF LYMPH CHANNELS: CPT

## 2018-04-20 PROCEDURE — 38900 IO MAP OF SENT LYMPH NODE: CPT | Mod: 59

## 2018-04-20 PROCEDURE — 88305 TISSUE EXAM BY PATHOLOGIST: CPT | Mod: 26

## 2018-04-20 PROCEDURE — 38792 RA TRACER ID OF SENTINL NODE: CPT | Mod: RT

## 2018-04-20 PROCEDURE — 88305 TISSUE EXAM BY PATHOLOGIST: CPT

## 2018-04-20 PROCEDURE — 14061 TIS TRNFR E/N/E/L10.1-30SQCM: CPT | Mod: RT

## 2018-04-20 PROCEDURE — 38510 BIOPSY/REMOVAL LYMPH NODES: CPT | Mod: RT

## 2018-04-20 PROCEDURE — 88307 TISSUE EXAM BY PATHOLOGIST: CPT

## 2018-04-20 PROCEDURE — 78195 LYMPH SYSTEM IMAGING: CPT

## 2018-04-20 PROCEDURE — 38500 BIOPSY/REMOVAL LYMPH NODES: CPT | Mod: RT

## 2018-04-20 RX ORDER — FAMOTIDINE 10 MG/ML
0 INJECTION INTRAVENOUS
Qty: 0 | Refills: 0 | COMMUNITY

## 2018-04-20 RX ORDER — OXYCODONE HYDROCHLORIDE 5 MG/1
1 TABLET ORAL
Qty: 30 | Refills: 0 | OUTPATIENT
Start: 2018-04-20

## 2018-04-20 RX ORDER — ONDANSETRON 8 MG/1
4 TABLET, FILM COATED ORAL ONCE
Qty: 0 | Refills: 0 | Status: COMPLETED | OUTPATIENT
Start: 2018-04-20 | End: 2018-04-20

## 2018-04-20 RX ORDER — OXYCODONE HYDROCHLORIDE 5 MG/1
1 TABLET ORAL
Qty: 30 | Refills: 0
Start: 2018-04-20

## 2018-04-20 RX ORDER — HYDROMORPHONE HYDROCHLORIDE 2 MG/ML
0.5 INJECTION INTRAMUSCULAR; INTRAVENOUS; SUBCUTANEOUS
Qty: 0 | Refills: 0 | Status: DISCONTINUED | OUTPATIENT
Start: 2018-04-20 | End: 2018-04-20

## 2018-04-20 RX ADMIN — ONDANSETRON 4 MILLIGRAM(S): 8 TABLET, FILM COATED ORAL at 19:49

## 2018-04-20 RX ADMIN — HYDROMORPHONE HYDROCHLORIDE 0.5 MILLIGRAM(S): 2 INJECTION INTRAMUSCULAR; INTRAVENOUS; SUBCUTANEOUS at 21:00

## 2018-04-20 RX ADMIN — HYDROMORPHONE HYDROCHLORIDE 0.5 MILLIGRAM(S): 2 INJECTION INTRAMUSCULAR; INTRAVENOUS; SUBCUTANEOUS at 20:45

## 2018-04-20 RX ADMIN — SODIUM CHLORIDE 3 MILLILITER(S): 9 INJECTION INTRAMUSCULAR; INTRAVENOUS; SUBCUTANEOUS at 22:39

## 2018-04-20 NOTE — BRIEF OPERATIVE NOTE - PROCEDURE
<<-----Click on this checkbox to enter Procedure Rearrangement of local tissue  04/20/2018    Active  [id number]

## 2018-04-20 NOTE — ASU DISCHARGE PLAN (ADULT/PEDIATRIC). - ASU FOLLOWUP
[facility] ([facility]): 911 or go to the nearest Emergency Room

## 2018-04-20 NOTE — ASU DISCHARGE PLAN (ADULT/PEDIATRIC). - ITEMS TO FOLLOWUP WITH YOUR PHYSICIAN'S
Please follow up with Dr. Junior within 1-2 weeks after discharge from the hospital. You may call [phone] to schedule an appointment. Please follow up with Dr. Junior within 1-2 weeks after discharge from the hospital. You may call [phone] to schedule an appointment.  Please follow up w/ Dr. Barnett in 10 days after discharge from the hospital. Please call for appointment.

## 2018-04-20 NOTE — BRIEF OPERATIVE NOTE - OPERATION/FINDINGS
see op note
right posterior auricular melanoma excised with 1cm margins.  Right intraparotid and supraclavicular nodes identified and excised.

## 2018-04-20 NOTE — BRIEF OPERATIVE NOTE - PROCEDURE
<<-----Click on this checkbox to enter Procedure [place] lymph node biopsy  04/20/2018    Active  [id number]  Wide excision of melanoma of neck  04/20/2018    Active  [id number]

## 2018-04-20 NOTE — ASU DISCHARGE PLAN (ADULT/PEDIATRIC). - MEDICATION SUMMARY - MEDICATIONS TO TAKE
I will START or STAY ON the medications listed below when I get home from the hospital:    aspirin 81 mg oral delayed release tablet  -- 1 tab(s) by mouth once a day  -- Indication: For home med    oxyCODONE 5 mg oral capsule  -- 1 cap(s) by mouth every 4 hours, As Needed MDD:6 tabs   -- Caution federal law prohibits the transfer of this drug to any person other  than the person for whom it was prescribed.  It is very important that you take or use this exactly as directed.  Do not skip doses or discontinue unless directed by your doctor.  May cause drowsiness.  Alcohol may intensify this effect.  Use care when operating dangerous machinery.  This prescription cannot be refilled.  Using more of this medication than prescribed may cause serious breathing problems.    -- Indication: For pain    metFORMIN 500 mg oral tablet  -- orally 2 times a day  -- Indication: For home med    atorvastatin 80 mg oral tablet  -- 1 tab(s) by mouth once a day (at bedtime)  -- Indication: For home med    clopidogrel 75 mg oral tablet  -- 1 tab(s) by mouth once a day  -- Indication: For home med     metoprolol succinate 25 mg oral tablet, extended release  -- 1 tab(s) by mouth once a day  -- Indication: For home med I will START or STAY ON the medications listed below when I get home from the hospital:    aspirin 81 mg oral delayed release tablet  -- 1 tab(s) by mouth once a day  -- Indication: For home med    oxyCODONE 5 mg oral capsule  -- 1 cap(s) by mouth every 4 hours, As Needed MDD:6 tabs   -- Caution federal law prohibits the transfer of this drug to any person other  than the person for whom it was prescribed.  It is very important that you take or use this exactly as directed.  Do not skip doses or discontinue unless directed by your doctor.  May cause drowsiness.  Alcohol may intensify this effect.  Use care when operating dangerous machinery.  This prescription cannot be refilled.  Using more of this medication than prescribed may cause serious breathing problems.    -- Indication: For Post Op pain    metFORMIN 500 mg oral tablet  -- orally 2 times a day  -- Indication: For home med    atorvastatin 80 mg oral tablet  -- 1 tab(s) by mouth once a day (at bedtime)  -- Indication: For home med    clopidogrel 75 mg oral tablet  -- 1 tab(s) by mouth once a day  -- Indication: For home med     metoprolol succinate 25 mg oral tablet, extended release  -- 1 tab(s) by mouth once a day  -- Indication: For home med

## 2018-04-20 NOTE — BRIEF OPERATIVE NOTE - POST-OP DX
Malignant melanoma of neck  04/20/2018    Active  Radha Haskins
Malignant melanoma of neck  04/20/2018    Active  Radha Haskins

## 2018-05-01 ENCOUNTER — APPOINTMENT (OUTPATIENT)
Dept: SURGICAL ONCOLOGY | Facility: CLINIC | Age: 68
End: 2018-05-01
Payer: COMMERCIAL

## 2018-05-01 ENCOUNTER — OUTPATIENT (OUTPATIENT)
Dept: OUTPATIENT SERVICES | Facility: HOSPITAL | Age: 68
LOS: 1 days | End: 2018-05-01
Payer: COMMERCIAL

## 2018-05-01 ENCOUNTER — APPOINTMENT (OUTPATIENT)
Dept: CARDIOLOGY | Facility: CLINIC | Age: 68
End: 2018-05-01
Payer: COMMERCIAL

## 2018-05-01 ENCOUNTER — APPOINTMENT (OUTPATIENT)
Dept: PLASTIC SURGERY | Facility: CLINIC | Age: 68
End: 2018-05-01
Payer: COMMERCIAL

## 2018-05-01 VITALS
SYSTOLIC BLOOD PRESSURE: 120 MMHG | OXYGEN SATURATION: 97 % | HEART RATE: 61 BPM | RESPIRATION RATE: 16 BRPM | WEIGHT: 180 LBS | HEIGHT: 67 IN | BODY MASS INDEX: 28.25 KG/M2 | DIASTOLIC BLOOD PRESSURE: 82 MMHG

## 2018-05-01 VITALS
RESPIRATION RATE: 15 BRPM | HEIGHT: 67 IN | SYSTOLIC BLOOD PRESSURE: 110 MMHG | OXYGEN SATURATION: 98 % | HEART RATE: 70 BPM | BODY MASS INDEX: 28.25 KG/M2 | DIASTOLIC BLOOD PRESSURE: 68 MMHG | WEIGHT: 180 LBS

## 2018-05-01 DIAGNOSIS — Z90.89 ACQUIRED ABSENCE OF OTHER ORGANS: Chronic | ICD-10-CM

## 2018-05-01 DIAGNOSIS — Z00.8 ENCOUNTER FOR OTHER GENERAL EXAMINATION: ICD-10-CM

## 2018-05-01 DIAGNOSIS — C43.9 MALIGNANT MELANOMA OF SKIN, UNSPECIFIED: ICD-10-CM

## 2018-05-01 DIAGNOSIS — Z98.89 OTHER SPECIFIED POSTPROCEDURAL STATES: Chronic | ICD-10-CM

## 2018-05-01 DIAGNOSIS — K40.20 BILATERAL INGUINAL HERNIA, WITHOUT OBSTRUCTION OR GANGRENE, NOT SPECIFIED AS RECURRENT: Chronic | ICD-10-CM

## 2018-05-01 PROCEDURE — ZZZZZ: CPT

## 2018-05-01 PROCEDURE — 93010 ELECTROCARDIOGRAM REPORT: CPT

## 2018-05-01 PROCEDURE — 93005 ELECTROCARDIOGRAM TRACING: CPT

## 2018-05-01 PROCEDURE — 99024 POSTOP FOLLOW-UP VISIT: CPT

## 2018-05-01 PROCEDURE — G0463: CPT | Mod: 25

## 2018-05-03 ENCOUNTER — OUTPATIENT (OUTPATIENT)
Dept: OUTPATIENT SERVICES | Facility: HOSPITAL | Age: 68
LOS: 1 days | End: 2018-05-03
Payer: COMMERCIAL

## 2018-05-03 ENCOUNTER — APPOINTMENT (OUTPATIENT)
Dept: CARDIOLOGY | Facility: CLINIC | Age: 68
End: 2018-05-03

## 2018-05-03 DIAGNOSIS — K40.20 BILATERAL INGUINAL HERNIA, WITHOUT OBSTRUCTION OR GANGRENE, NOT SPECIFIED AS RECURRENT: Chronic | ICD-10-CM

## 2018-05-03 DIAGNOSIS — Z98.89 OTHER SPECIFIED POSTPROCEDURAL STATES: Chronic | ICD-10-CM

## 2018-05-03 DIAGNOSIS — I25.10 ATHEROSCLEROTIC HEART DISEASE OF NATIVE CORONARY ARTERY WITHOUT ANGINA PECTORIS: ICD-10-CM

## 2018-05-03 DIAGNOSIS — R06.09 OTHER FORMS OF DYSPNEA: ICD-10-CM

## 2018-05-03 DIAGNOSIS — Z90.89 ACQUIRED ABSENCE OF OTHER ORGANS: Chronic | ICD-10-CM

## 2018-05-03 DIAGNOSIS — Z00.8 ENCOUNTER FOR OTHER GENERAL EXAMINATION: ICD-10-CM

## 2018-05-03 PROCEDURE — 93306 TTE W/DOPPLER COMPLETE: CPT

## 2018-05-03 PROCEDURE — 93306 TTE W/DOPPLER COMPLETE: CPT | Mod: 26

## 2018-05-07 PROBLEM — C43.9 MELANOMA: Status: ACTIVE | Noted: 2018-03-27

## 2018-07-23 PROBLEM — Z86.73 HISTORY OF STROKE: Status: RESOLVED | Noted: 2017-02-28 | Resolved: 2018-07-23

## 2018-08-07 ENCOUNTER — APPOINTMENT (OUTPATIENT)
Dept: SURGICAL ONCOLOGY | Facility: CLINIC | Age: 68
End: 2018-08-07

## 2018-10-08 ENCOUNTER — APPOINTMENT (OUTPATIENT)
Dept: CARDIOLOGY | Facility: CLINIC | Age: 68
End: 2018-10-08
Payer: COMMERCIAL

## 2018-10-08 ENCOUNTER — NON-APPOINTMENT (OUTPATIENT)
Age: 68
End: 2018-10-08

## 2018-10-08 VITALS — DIASTOLIC BLOOD PRESSURE: 79 MMHG | SYSTOLIC BLOOD PRESSURE: 118 MMHG

## 2018-10-08 PROCEDURE — 99214 OFFICE O/P EST MOD 30 MIN: CPT

## 2018-10-08 PROCEDURE — 93000 ELECTROCARDIOGRAM COMPLETE: CPT

## 2018-10-09 PROBLEM — N20.0 CALCULUS OF KIDNEY: Chronic | Status: ACTIVE | Noted: 2018-04-06

## 2018-10-09 PROBLEM — E78.5 HYPERLIPIDEMIA, UNSPECIFIED: Chronic | Status: ACTIVE | Noted: 2018-04-06

## 2018-10-09 PROBLEM — R41.0 DISORIENTATION, UNSPECIFIED: Chronic | Status: ACTIVE | Noted: 2018-04-06

## 2018-10-09 PROBLEM — E11.9 TYPE 2 DIABETES MELLITUS WITHOUT COMPLICATIONS: Chronic | Status: ACTIVE | Noted: 2018-04-06

## 2018-10-09 PROBLEM — I50.9 HEART FAILURE, UNSPECIFIED: Chronic | Status: ACTIVE | Noted: 2018-04-06

## 2018-10-09 PROBLEM — I25.10 ATHEROSCLEROTIC HEART DISEASE OF NATIVE CORONARY ARTERY WITHOUT ANGINA PECTORIS: Chronic | Status: ACTIVE | Noted: 2018-04-06

## 2018-11-06 ENCOUNTER — APPOINTMENT (OUTPATIENT)
Dept: CARDIOLOGY | Facility: CLINIC | Age: 68
End: 2018-11-06

## 2019-02-14 NOTE — ED ADULT NURSE NOTE - NS ED NOTE ABUSE RESPONSE YN
[de-identified] : Patient is a 18 year old female consult for ?von willebrand disease \par \par Started having easy bruising in March 2018- saw PCP\par Went away for few weeks\par In April - she drove for ~2 hours wearing jeans- had bruising bilateral thighs- she did not have similar symptoms using same jeans before\par \par She saw her PCP who did labs - which showed vWF Ab of 53%\par \par LMP - on birth control - skipped in march- had menses all April. \par Usually normal and does not have heavy bleeding\par Bleeding all of april- she attributes to stopping her birth control pills\par \par No nose bleeds or gum bleeds\par Does not play sports or do weight lifting\par Bruises when she hits against somethings\par Usually resolves in 1-2 weeks\par Also had bruising in bilateral breast- resolves in a few days\par \par Has migraines - takes advil PRN. Last taken many months back\par \par No family history of bleeding\par \par Has sister - who may be bruising easily but she is clumsy\par \par Labs reviewed\par vWd panel - negative\par Coag panel - negative\par Fibrinogen: normal\par \par Platelet function glycoprotein by flow cytometry:\par platelet surface glycoprotein (GP) profiles are\par essentially normal. There are no significant deficiencies\par of CD41 (GPIIb), CD42a (GPIX), CD42b (GPIb-alpha), CD49b\par (GPIa), CD61 (GPIIIa) or GPVI.\par The slight reduction of GP expression CD42b (GPIb-alpha) is\par of uncertain clinical significance, and likely represents\par laboratory artifact due to specimen processing and\par transportation.\par    [de-identified] : She is seen today for follow up visit\par \par She received IV injectafer x 1 - Unsure if she had any improvement in her energy levels\par Overall remains very active - between her job and school\par  no

## 2019-04-08 ENCOUNTER — NON-APPOINTMENT (OUTPATIENT)
Age: 69
End: 2019-04-08

## 2019-04-08 ENCOUNTER — APPOINTMENT (OUTPATIENT)
Dept: CARDIOLOGY | Facility: CLINIC | Age: 69
End: 2019-04-08
Payer: COMMERCIAL

## 2019-04-08 VITALS
HEART RATE: 63 BPM | DIASTOLIC BLOOD PRESSURE: 76 MMHG | SYSTOLIC BLOOD PRESSURE: 125 MMHG | OXYGEN SATURATION: 98 % | WEIGHT: 178 LBS | BODY MASS INDEX: 27.88 KG/M2

## 2019-04-08 PROCEDURE — 99214 OFFICE O/P EST MOD 30 MIN: CPT

## 2019-04-08 PROCEDURE — 93000 ELECTROCARDIOGRAM COMPLETE: CPT

## 2019-04-08 NOTE — PHYSICAL EXAM
[General Appearance - Well Developed] : well developed [Normal Appearance] : normal appearance [Well Groomed] : well groomed [General Appearance - Well Nourished] : well nourished [No Deformities] : no deformities [General Appearance - In No Acute Distress] : no acute distress [Normal Conjunctiva] : the conjunctiva exhibited no abnormalities [Eyelids - No Xanthelasma] : the eyelids demonstrated no xanthelasmas [Normal Oral Mucosa] : normal oral mucosa [No Oral Pallor] : no oral pallor [No Oral Cyanosis] : no oral cyanosis [Normal Jugular Venous A Waves Present] : normal jugular venous A waves present [Normal Jugular Venous V Waves Present] : normal jugular venous V waves present [No Jugular Venous Gibbs A Waves] : no jugular venous gibbs A waves [Respiration, Rhythm And Depth] : normal respiratory rhythm and effort [Exaggerated Use Of Accessory Muscles For Inspiration] : no accessory muscle use [Auscultation Breath Sounds / Voice Sounds] : lungs were clear to auscultation bilaterally [Heart Rate And Rhythm] : heart rate and rhythm were normal [Heart Sounds] : normal S1 and S2 [Murmurs] : no murmurs present [Abdomen Soft] : soft [Abdomen Tenderness] : non-tender [Abdomen Mass (___ Cm)] : no abdominal mass palpated [Abnormal Walk] : normal gait [Gait - Sufficient For Exercise Testing] : the gait was sufficient for exercise testing [Nail Clubbing] : no clubbing of the fingernails [Cyanosis, Localized] : no localized cyanosis [Petechial Hemorrhages (___cm)] : no petechial hemorrhages [Skin Color & Pigmentation] : normal skin color and pigmentation [] : no rash [No Venous Stasis] : no venous stasis [Skin Lesions] : no skin lesions [No Skin Ulcers] : no skin ulcer [No Xanthoma] : no  xanthoma was observed [Oriented To Time, Place, And Person] : oriented to person, place, and time [Affect] : the affect was normal [Mood] : the mood was normal [No Anxiety] : not feeling anxious

## 2019-04-08 NOTE — HISTORY OF PRESENT ILLNESS
[FreeTextEntry1] : Patient is a 69 M PMH HTN, HLD, uncontrolled DM2 (HbA1c 8.6%) recently admitted to [facility] from \par 12/22/16-1/3/17 for elevated troponins presumed to be demand ischemia 2/2 \par sepsis 2/2 multilobar PNA, found to have acute small left MCA infarct, now presented for clearance prior to melanoma excision with LN exploration. \par  \par Pt initially went to [facility] in 12/2016 after wife found pt in bed, AMS surrounded \par in vomit. CTH at [facility] showed remote CVA and elevated troponins to 9, which \par increased to 29, CKMB 57, CK 2460, EKG from [facility] ED not sent with records. He \par was transferred to [facility] CCU, where cardiac enzymes downtrended and presumed to \par be demand ischemia 2/2 sepsis 2/2 multifocal PNA. During that hospitalization, \par pt developed AMS again. MRI brain showed small acute left MCA infarcts and MRA \par head/neck showing L M1 stenosis- seen on previous CTA in 2015. He was \par transferred to stroke unit. KINGSTON negative for cardioembolic source, showed \par mild-moderate segmental LV systolic dysfunction, akinesis of basal inferior and \par inferolateral wall. Pt was discharged to home with home PT upon clinical \par improvement. \par  \par  \par Hospital Course: Pt initially admitted to CCU for Demand ischemia in setting of \par CHF. Noted to be in moderate respiratory distress with bilateral lung rales, \par CXR with multifocal opacities, bedside echo with bilateral B lines concerning \par for pulmonary edema. EKG showed new 1mm ST depressions in lateral leads Weaned \par off Bipap the morning after admission. Started on heparin gtt, ace-i, and \par plavix, and lasix 40mg bid. Pt had urinary retention, lasix changed to 40mg \par daily, and navarro inserted for retention of >400cc, navarro discontinued after \par success void. Underwent diagnostic cath on 1/9 via RRA LAD ( 90%), RCA (95%). \par Staged pci to RCA on 1/10 MRCA 90% ENRIQUE x 2.  Staged pci to LAD on 1/11 ENRIQUE x 1. \par  \par  \par Patient also found to have an aortic aneurysm that will need close monitoring \par outpatient. Imaging showed " Ascending aortic aneurysm 5.2 cm. Aneurysmal \par dilatation of the aortic root 4.7 cm. Normal caliber descending and abdominal \par aorta. No evidence of dissection.

## 2019-04-08 NOTE — DISCUSSION/SUMMARY
[FreeTextEntry1] : Patient is a 70 yo man with h/o HTN, HLD, DM presented to follow up after prolonged several hospitalizations - initially for NSTEMI c/b MCA CVA and than subsequently ADHF requiring BIPAP therapy with eventual revascularization with ENRIQUE to LAD and RCA. Now presented for clearance prior to melanoma excision with LN exploration s- s/p excision 4/20/18 - with subsequent new development of dyspnea. Feels well - presently asymptomatic with no evidence of ADHF, unstable angina or arrhythmias.\par - the results of the last echo were d/w the wife and patient - especially regarding the aortic dilatations  - will refer for a repeat TTE to re-assess his L:V function and his aortic root\par - patient gardens daily with no symptoms\par - patient will c/w ASA throughout the entire procedure. Plavix may be on hold for 5 days prior to the procedure and resumed within 24 hrs\par - patient was advised to return to see his neurologist for possible MRI/MRA to rule out new CVA/TIA\par - presently asymptomatic\par - the results of the repeat echo was d/w the patient and his wife: mild to mod LV function, aortic aneurysm at 4.6 cm - will repeat another echo in 6 months - patient educated on symptoms to monitor for\par - presently asymptomatic with no evidence of ADHF\par - presently euvolemic - c/w current dose of diuretics; patient discontinued the Valsartan due to episodes of symptomatic hypotension\par - dr coley willl be consulted for episodes of anxiety, insomnia and depression\par - importance of DAPT was stressed with the patient and his wife\par - encouraged to stay as active as possible\par - once patient has returned from his vacation, will contact us to place a cardiac patch to rule out arrhtymias as a cause of stroke

## 2019-04-08 NOTE — REASON FOR VISIT
[Follow-Up - Clinic] : a clinic follow-up of [Coronary Artery Disease] : coronary artery disease [Prior Myocardial Infarction] : a prior myocardial infarction

## 2019-10-14 ENCOUNTER — APPOINTMENT (OUTPATIENT)
Dept: CARDIOLOGY | Facility: CLINIC | Age: 69
End: 2019-10-14

## 2020-03-04 ENCOUNTER — APPOINTMENT (OUTPATIENT)
Dept: CARDIOLOGY | Facility: CLINIC | Age: 70
End: 2020-03-04
Payer: COMMERCIAL

## 2020-03-04 ENCOUNTER — NON-APPOINTMENT (OUTPATIENT)
Age: 70
End: 2020-03-04

## 2020-03-04 VITALS
DIASTOLIC BLOOD PRESSURE: 97 MMHG | WEIGHT: 178 LBS | HEART RATE: 62 BPM | SYSTOLIC BLOOD PRESSURE: 147 MMHG | OXYGEN SATURATION: 97 % | HEIGHT: 67 IN | BODY MASS INDEX: 27.94 KG/M2

## 2020-03-04 PROCEDURE — 93000 ELECTROCARDIOGRAM COMPLETE: CPT

## 2020-03-04 PROCEDURE — 99214 OFFICE O/P EST MOD 30 MIN: CPT

## 2020-03-04 RX ORDER — METOPROLOL TARTRATE 25 MG/1
25 TABLET, FILM COATED ORAL
Qty: 60 | Refills: 0 | Status: DISCONTINUED | COMMUNITY
Start: 2017-01-03 | End: 2020-03-04

## 2020-03-04 RX ORDER — METFORMIN HYDROCHLORIDE 500 MG/1
500 TABLET, COATED ORAL TWICE DAILY
Qty: 180 | Refills: 1 | Status: ACTIVE | COMMUNITY
Start: 2017-01-03

## 2020-03-04 RX ORDER — ACETAMINOPHEN AND CODEINE 300; 30 MG/1; MG/1
300-30 TABLET ORAL
Qty: 12 | Refills: 0 | Status: DISCONTINUED | COMMUNITY
Start: 2017-11-03 | End: 2020-03-04

## 2020-03-04 NOTE — HISTORY OF PRESENT ILLNESS
[FreeTextEntry1] : Patient is a 70 M PMH HTN, HLD, uncontrolled DM2 (HbA1c 8.6%) recently admitted to [facility] from \par 12/22/16-1/3/17 for elevated troponins presumed to be demand ischemia 2/2 \par sepsis 2/2 multilobar PNA, found to have acute small left MCA infarct, now presented for routine follow up - with atypical, vague symptoms\par  \par Pt initially went to [facility] in 12/2016 after wife found pt in bed, AMS surrounded \par in vomit. CTH at [facility] showed remote CVA and elevated troponins to 9, which \par increased to 29, CKMB 57, CK 2460, EKG from [facility] ED not sent with records. He \par was transferred to [facility] CCU, where cardiac enzymes downtrended and presumed to \par be demand ischemia 2/2 sepsis 2/2 multifocal PNA. During that hospitalization, \par pt developed AMS again. MRI brain showed small acute left MCA infarcts and MRA \par head/neck showing L M1 stenosis- seen on previous CTA in 2015. He was \par transferred to stroke unit. [place] negative for cardioembolic source, showed \par mild-moderate segmental LV systolic dysfunction, akinesis of basal inferior and \par inferolateral wall. Pt was discharged to home with home PT upon clinical \par improvement. \par  \par  \par Hospital Course: Pt initially admitted to CCU for Demand ischemia in setting of \par CHF. Noted to be in moderate respiratory distress with bilateral lung rales, \par CXR with multifocal opacities, bedside echo with bilateral B lines concerning \par for pulmonary edema. EKG showed new 1mm ST depressions in lateral leads Weaned \par off Bipap the morning after admission. Started on heparin gtt, ace-i, and \par plavix, and lasix 40mg bid. Pt had urinary retention, lasix changed to 40mg \par daily, and navarro inserted for retention of >400cc, navarro discontinued after \par success void. Underwent diagnostic cath on 1/9 via RRA LAD ( 90%), RCA (95%). \par Staged pci to RCA on 1/10 MRCA 90% ENRIQUE x 2.  Staged pci to LAD on 1/11 ENRIQUE x 1. \par  \par  \par Patient also found to have an aortic aneurysm that will need close monitoring \par outpatient. Imaging showed " Ascending aortic aneurysm 5.2 cm. Aneurysmal \par dilatation of the aortic root 4.7 cm. Normal caliber descending and abdominal \par aorta. No evidence of dissection.

## 2020-03-04 NOTE — DISCUSSION/SUMMARY
[FreeTextEntry1] : Patient is a 70 M PMH HTN, HLD, uncontrolled DM2 (HbA1c 8.6%) recently admitted to [facility] from \par 12/22/16-1/3/17 for elevated troponins presumed to be demand ischemia 2/2 \par sepsis 2/2 multilobar PNA, found to have acute small left MCA infarct, now presented for routine follow up - with atypical, vague symptoms Feels well - presently asymptomatic with no evidence of ADHF, unstable angina or arrhythmias.\par - the results of the last echo were d/w the wife and patient - especially regarding the aortic dilatations  - will refer for a repeat TTE to re-assess his L:V function and his aortic root\par - BP elevated - will monitor at home - will monitor at home - decrease salt\par - patient will c/w ASA throughout the entire procedure. Plavix may be on hold for 5 days prior to the procedure and resumed within 24 hrs\par - patient was advised to return to see his neurologist for possible MRI/MRA to rule out new CVA/TIA\par - presently asymptomatic\par - the results of the repeat echo was d/w the patient and his wife: mild to mod LV function, aortic aneurysm at 4.6 cm - will repeat another echo in 6 months - patient educated on symptoms to monitor for\par - presently asymptomatic with no evidence of ADHF\par - presently euvolemic - c/w current dose of diuretics; patient discontinued the Valsartan due to episodes of symptomatic hypotension\par - dr coley willl be consulted for episodes of anxiety, insomnia and depression\par - importance of DAPT was stressed with the patient and his wife\par - encouraged to stay as active as possible\par - once patient has returned from his vacation, will contact us to place a cardiac patch to rule out arrhtymias as a cause of stroke

## 2020-03-04 NOTE — PHYSICAL EXAM
[General Appearance - Well Developed] : well developed [Normal Appearance] : normal appearance [Well Groomed] : well groomed [General Appearance - Well Nourished] : well nourished [No Deformities] : no deformities [General Appearance - In No Acute Distress] : no acute distress [Normal Conjunctiva] : the conjunctiva exhibited no abnormalities [Eyelids - No Xanthelasma] : the eyelids demonstrated no xanthelasmas [Normal Oral Mucosa] : normal oral mucosa [No Oral Pallor] : no oral pallor [No Oral Cyanosis] : no oral cyanosis [Normal Jugular Venous A Waves Present] : normal jugular venous A waves present [Normal Jugular Venous V Waves Present] : normal jugular venous V waves present [No Jugular Venous Gibbs A Waves] : no jugular venous gibbs A waves [Respiration, Rhythm And Depth] : normal respiratory rhythm and effort [Exaggerated Use Of Accessory Muscles For Inspiration] : no accessory muscle use [Auscultation Breath Sounds / Voice Sounds] : lungs were clear to auscultation bilaterally [Heart Rate And Rhythm] : heart rate and rhythm were normal [Murmurs] : no murmurs present [Heart Sounds] : normal S1 and S2 [Abdomen Tenderness] : non-tender [Abdomen Soft] : soft [Abdomen Mass (___ Cm)] : no abdominal mass palpated [Abnormal Walk] : normal gait [Gait - Sufficient For Exercise Testing] : the gait was sufficient for exercise testing [Nail Clubbing] : no clubbing of the fingernails [Cyanosis, Localized] : no localized cyanosis [Petechial Hemorrhages (___cm)] : no petechial hemorrhages [No Venous Stasis] : no venous stasis [Skin Color & Pigmentation] : normal skin color and pigmentation [] : no rash [Skin Lesions] : no skin lesions [No Skin Ulcers] : no skin ulcer [No Xanthoma] : no  xanthoma was observed [Oriented To Time, Place, And Person] : oriented to person, place, and time [Affect] : the affect was normal [Mood] : the mood was normal [No Anxiety] : not feeling anxious

## 2020-09-10 ENCOUNTER — NON-APPOINTMENT (OUTPATIENT)
Age: 70
End: 2020-09-10

## 2020-09-10 ENCOUNTER — APPOINTMENT (OUTPATIENT)
Dept: CARDIOLOGY | Facility: CLINIC | Age: 70
End: 2020-09-10
Payer: MEDICARE

## 2020-09-10 VITALS — HEART RATE: 62 BPM | DIASTOLIC BLOOD PRESSURE: 78 MMHG | OXYGEN SATURATION: 99 % | SYSTOLIC BLOOD PRESSURE: 122 MMHG

## 2020-09-10 PROCEDURE — 99214 OFFICE O/P EST MOD 30 MIN: CPT

## 2020-09-10 PROCEDURE — 93000 ELECTROCARDIOGRAM COMPLETE: CPT

## 2020-09-10 NOTE — REASON FOR VISIT
No [Follow-Up - Clinic] : a clinic follow-up of [Coronary Artery Disease] : coronary artery disease [Prior Myocardial Infarction] : a prior myocardial infarction

## 2020-09-10 NOTE — PHYSICAL EXAM
[General Appearance - Well Developed] : well developed [Normal Appearance] : normal appearance [Well Groomed] : well groomed [General Appearance - Well Nourished] : well nourished [No Deformities] : no deformities [General Appearance - In No Acute Distress] : no acute distress [Normal Conjunctiva] : the conjunctiva exhibited no abnormalities [Eyelids - No Xanthelasma] : the eyelids demonstrated no xanthelasmas [No Oral Pallor] : no oral pallor [Normal Oral Mucosa] : normal oral mucosa [Normal Jugular Venous A Waves Present] : normal jugular venous A waves present [No Oral Cyanosis] : no oral cyanosis [Normal Jugular Venous V Waves Present] : normal jugular venous V waves present [No Jugular Venous Gibbs A Waves] : no jugular venous gibbs A waves [Respiration, Rhythm And Depth] : normal respiratory rhythm and effort [Exaggerated Use Of Accessory Muscles For Inspiration] : no accessory muscle use [Auscultation Breath Sounds / Voice Sounds] : lungs were clear to auscultation bilaterally [Heart Rate And Rhythm] : heart rate and rhythm were normal [Heart Sounds] : normal S1 and S2 [Murmurs] : no murmurs present [Abdomen Soft] : soft [Abdomen Tenderness] : non-tender [Abdomen Mass (___ Cm)] : no abdominal mass palpated [Gait - Sufficient For Exercise Testing] : the gait was sufficient for exercise testing [Abnormal Walk] : normal gait [Nail Clubbing] : no clubbing of the fingernails [Cyanosis, Localized] : no localized cyanosis [Petechial Hemorrhages (___cm)] : no petechial hemorrhages [Skin Color & Pigmentation] : normal skin color and pigmentation [] : no rash [No Venous Stasis] : no venous stasis [Skin Lesions] : no skin lesions [No Skin Ulcers] : no skin ulcer [No Xanthoma] : no  xanthoma was observed [Oriented To Time, Place, And Person] : oriented to person, place, and time [Affect] : the affect was normal [Mood] : the mood was normal [No Anxiety] : not feeling anxious

## 2020-10-24 NOTE — HISTORY OF PRESENT ILLNESS
[FreeTextEntry1] : Patient is a 70 M PMH HTN, HLD, uncontrolled DM2 (HbA1c 8.6%) recently admitted to [facility] from \par 12/22/16-1/3/17 for elevated troponins presumed to be demand ischemia 2/2 \par sepsis 2/2 multilobar PNA, found to have acute small left MCA infarct, now presented for routine follow up - with atypical, vague symptoms\par  \par Pt initially went to [facility] in 12/2016 after wife found pt in bed, AMS surrounded \par in vomit. CTH at [facility] showed remote CVA and elevated troponins to 9, which \par increased to 29, CKMB 57, CK 2460, EKG from [facility] ED not sent with records. He \par was transferred to [facility] CCU, where cardiac enzymes downtrended and presumed to \par be demand ischemia 2/2 sepsis 2/2 multifocal PNA. During that hospitalization, \par pt developed AMS again. MRI brain showed small acute left MCA infarcts and MRA \par head/neck showing L M1 stenosis- seen on previous CTA in 2015. He was \par transferred to stroke unit. [place] negative for cardioembolic source, showed \par mild-moderate segmental LV systolic dysfunction, akinesis of basal inferior and \par inferolateral wall. Pt was discharged to home with home PT upon clinical \par improvement. \par  \par  \par Hospital Course: Pt initially admitted to CCU for Demand ischemia in setting of \par CHF. Noted to be in moderate respiratory distress with bilateral lung rales, \par CXR with multifocal opacities, bedside echo with bilateral B lines concerning \par for pulmonary edema. EKG showed new 1mm ST depressions in lateral leads Weaned \par off Bipap the morning after admission. Started on heparin gtt, ace-i, and \par plavix, and lasix 40mg bid. Pt had urinary retention, lasix changed to 40mg \par daily, and navarro inserted for retention of >400cc, navarro discontinued after \par success void. Underwent diagnostic cath on 1/9 via RRA LAD ( 90%), RCA (95%). \par Staged pci to RCA on 1/10 MRCA 90% ENRIQUE x 2.  Staged pci to LAD on 1/11 ENRIQUE x 1. \par  \par  \par Patient also found to have an aortic aneurysm that will need close monitoring \par outpatient. Imaging showed " Ascending aortic aneurysm 5.2 cm. Aneurysmal \par dilatation of the aortic root 4.7 cm. Normal caliber descending and abdominal \par aorta. No evidence of dissection.

## 2020-10-24 NOTE — DISCUSSION/SUMMARY
[FreeTextEntry1] : Patient is a 70 M PMH HTN, HLD, uncontrolled DM2 (HbA1c 8.6%) recently admitted to [facility] from \par 12/22/16-1/3/17 for elevated troponins presumed to be demand ischemia 2/2 \par sepsis 2/2 multilobar PNA, found to have acute small left MCA infarct, now presented for routine follow up - with atypical, vague symptoms Feels well - presently asymptomatic with no evidence of ADHF, unstable angina or arrhythmias.\par - the results of the last echo were d/w the wife and patient - especially regarding the aortic dilatations  - will refer for a repeat TTE to re-assess his L:V function and his aortic root\par - BP elevated - will monitor at home - will monitor at home - decrease salt\par - patient will c/w ASA throughout the entire procedure. Plavix may be on hold for 5 days prior to the procedure and resumed within 24 hrs\par - patient was advised to return to see his neurologist for possible MRI/MRA to rule out new CVA/TIA\par - presently asymptomatic\par - the results of the repeat echo was d/w the patient and his wife: mild to mod LV function, aortic aneurysm at 4.6 cm - will repeat another echo in 6 months - patient educated on symptoms to monitor for\par - presently asymptomatic with no evidence of ADHF\par - presently euvolemic - c/w current dose of diuretics; patient discontinued the Valsartan due to episodes of symptomatic hypotension\par - dr coley willl be consulted for episodes of anxiety, insomnia and depression\par - importance of DAPT was stressed with the patient and his wife\par - encouraged to stay as active as possible\par - once patient has returned from his vacation, will contact us to place a cardiac patch to rule out arrhtymias as a cause of stroke

## 2020-11-22 NOTE — H&P ADULT. - HISTORY OF PRESENT ILLNESS
66 M PMH HTN, HLD, uncontrolled DM2 (HbA1c 8.6%) recently admitted to [facility] from 12/22/16-1/3/17 for elevated troponins presumed to be demand ischemia 2/2 sepsis 2/2 multilobar PNA, found to have acute small left MCA infarct, now p/w worsening SOB, orthopnea, cough,  and LE edema since discharge (2 days ago).     Brief previous hospital course:  Pt initially went to [facility] in 12/2016 after wife found pt in bed, AMS surrounded in vomit. CTH at [facility] showed remote CVA and elevated troponins to 9, which increased to 29, CKMB 57, CK 2460, EKG from [facility] ED not sent with records. He was transferred to [facility] CCU, where cardiac enzymes downtrended and presumed to be demand ischemia 2/2 sepsis 2/2 multifocal PNA. During that hospitalization, pt developed AMS again. MRI brain showed small acute left MCA infarcts and MRA head/neck showing L M1 stenosis- seen on previous CTA in 2015. He was transferred to stroke unit. [place] negative for cardioembolic source, showed mild-moderate segmental LV systolic dysfunction, akinesis of basal inferior and inferolateral wall. Pt was discharged to home with home PT upon clinical improvement.     In ED:  Noted to be in moderate respiratory distress with bilateral lung rales, CXR with multifocal opacities, bedside echo with bilateral B lines concerning for pulmonary edema. EKG showed new 1mm ST depressions in lateral leads, a/w r/o ACS. Pt was placed on BiPAP. Received ASA 324mg x1, vanco, zosyn, azithromycin, nitroglycerin gtt @50mcg/min, Lasix 40mg IV x 1, seroquel 25mg x1. PCP for Immediate Care 66 M PMH HTN, HLD, uncontrolled DM2 (HbA1c 8.6%) recently admitted to [facility] from 12/22/16-1/3/17 for elevated troponins presumed to be demand ischemia 2/2 sepsis 2/2 multilobar PNA, found to have acute small left MCA infarct, now p/w worsening SOB, orthopnea, cough,  and LE edema since discharge (2 days ago).     Brief previous hospital course:  [facility] wife found pt in bed, AMS surrounded in vomit. [facility] and elevated troponins to 9, which increased to 29, CKMB 57, CK 2460, [facility] with records. He was transferred to [facility] CCU, where cardiac enzymes downtrended and presumed to be demand ischemia 2/2 sepsis 2/2 multifocal PNA. During that hospitalization, pt developed AMS again. MRI brain showed small acute left MCA infarcts and MRA head/neck showing L M1 stenosis- seen on previous CTA in 2015. He was transferred to stroke unit. K[place] negative for cardioembolic source, showed mild-moderate segmental LV systolic dysfunction, akinesis of basal inferior and inferolateral wall. Pt was discharged to home with home PT upon clinical improvement.     In ED:  Noted to be in moderate respiratory distress with bilateral lung rales, CXR with multifocal opacities, bedside echo with bilateral B lines concerning for pulmonary edema. EKG showed new 1mm ST depressions in lateral leads, a/w r/o ACS. Pt was placed on BiPAP. Received ASA 324mg x1, vanco, zosyn, azithromycin, nitroglycerin gtt @50mcg/min, Lasix 40mg IV x 1, seroquel 25mg x1.

## 2021-11-09 NOTE — ED PROVIDER NOTE - TEMPLATE, MLM
24 yo female with no pmh here with L. anterior rib pain s/p fall yesterday. Pt states she was climbing on a rock when the rock wobbled and she slipped off of it, hitting her left anterior ribs on another rock. denies any other injuries, no headstrike, no loc. Pt able to ambulate afterwards, not on AC. No sob, difficulty breathing, abdominal pain, fevers, chills, has not taken anything for the pain.
Abdominal Pain, N/V/D

## 2022-01-01 NOTE — ED ADULT NURSE NOTE - NS ED NURSE LEVEL OF CONSCIOUSNESS AFFECT
Progress NOTE  Date of Service: 2022  Gini Houston MRN: [id number] [facility]: [id number]     Physical Exam  DOL: 32? GA: 29 wks 1 d? CGA: 33 wks 4 d   BW: 7013? Weight: 1772? Change 24h: -8? Change 7d: 212   Place of Service: NICU? Bed Type: Incubator  Intensive Cardiac and respiratory monitoring, continuous and/or frequent vital sign monitoring  Vitals / Measurements: T: 98.4? HR: 145? RR: 55? BP: 87/39 (56)? SpO2: 99? ? General Exam: Alert and active with exam   Head/Neck: Anterior fontanel is soft and flat. bCPAP and OGT in place. Chest: On bCPAP support at +5 cm, 23-25%. Breath sounds clear and equal bilaterally. Intermittent mild tachypnea noted. Heart: RRR. No murmur. Well perfused. Abdomen: Soft, non distended, non tender with active bowel sounds   Genitalia: Normal external  male   Extremities: No deformities noted. Normal range of motion for all extremities. Neurologic: Normal tone and activity for GA. Skin: Pink, intact with no rashes, vesicles, or other lesions are noted. Medication  Active Medications:  Caffeine Citrate, Start Date: 2022, Duration: 32    Respiratory Support:   Type: Nasal CPAP? FiO2  0.23 CPAP  5  Start Date: 2022? Duration: 18  FEN/Nutrition   Daily Weight (g): 1772? Dry Weight (g): 1772? Weight Gain Over 7 Days (g): 182   Intake   Prior Enteral (Total Enteral: 161.96 mL/kg/d)   Base Feeding: Breast Milk? Subtype Feeding: Breast Milk - Donor? Fortifier: Similac Human Milk fortifier? Cole/Oz: 26?Route: OG   mL/Feed: 12? Feeds/d: 24? mL/hr: 12? Total (mL): 287? Total (mL/kg/d): 161.96  Planned Enteral (Total Enteral: 161.96 mL/kg/d)   Base Feeding: Breast Milk? Subtype Feeding: Breast Milk - Donor? Fortifier: Similac Human Milk fortifier? Cole/Oz: 26?Route: OG   mL/Feed: 12? Feeds/d: 24? mL/hr: 12? Total (mL): 287? Total (mL/kg/d): 161.96  Output   Number of Voids: 6? Output Type: Emesis? Total Output   Hours: 24? Stools: 6? Last Stool Date: 2022  Diagnoses  System: FEN/GI   Diagnosis: Nutritional Support starting 2022           Assessment:  Infant tolerating continuous feeds of EBM/DBM 26 kasia/oz well, now at 34 ml q 3h, feeds over 2h. Voiding and stooling well. On Na supplementation. Last Na 137 on 7/3. Plan: Continue - 160 ml/kg/day w/ fEBM to 26 kcal continuous 2h on and 1 h off, add Vit  D and Fe  Consider consolidating feeds over the next few days slowly   Continue NaCl to 1 meq/kg BID  Nutrition labs due  (RFP/AP)     System: Respiratory   Diagnosis: Respiratory Distress Syndrome (P22.0) starting 2022        Pneumothorax-onset <= 28d age (P25.1) starting 2022       Comment: Right pigtail CT -, left CT 6/8-, right CT 6/10-, right pigtail CT 12-      Pulmonary Hypoplasia (Q33.6) starting 2022       Comment: suspected      Pulmonary hypertension () (P29.30) starting 2022           Assessment: Infant stable on bCPAP support at +5 cm, 21-28%. Breath sounds clear and equal bilaterally. Intermittent tachypnea persists. Plan: Continue bCPAP , Room air trial once consistently on 21% and tolerating cares   Titrate FiO2 to maintain sats 90-96% per GA guidelines   CBG with other labs and as needed     System: Apnea-Bradycardia   Diagnosis: At risk for Apnea starting 2022           Assessment: No documented events. On bCPAP support. On daily caffeine. Plan: Caffeine citrate until 32 to 34 weeks cGA, will allow to outgrow  Continue cardiorespiratory and pulse oximetry monitoring     System: Cardiovascular   Diagnosis: Patent Foramen Ovale (Q21.1) starting 2022        Patent Ductus Arteriosus (Q25.0) starting 2022           Assessment: Hemodynamically stable. Plan: Continue hemodynamic monitoring  Repeat ECHO prior to discharge to evaluate closure of PDA and resolution of pulmonary HTN     System: Neurology   Diagnosis:  At risk for [facility] Disease starting 2022           Assessment: At risk for Intraventricular Hemorrhage. Initial screening cUS at DOL 8 (06/15) normal.     Plan: Follow clinically  Repeat cUS at 30 days of life  ( ) and prior to discharge  Neuroimaging  Date: 2022? Type: Cranial Ultrasound  Grade-L: Normal?Grade-R: Normal?     System: Gestation   Diagnosis: Prematurity 1761-3749 gm (P07.15) starting 2022        Breech Male (P01.7) starting 2022           Assessment: 27 day old now  33 3/7 weeks PMA. He is stable in an isolette, on bCPAP support, and tolerating full volume continuous NGT feeds well. Plan: Continue NICU care of  infant  Encourage parental participation in daily rounds  Hip ultrasound outpatient  Refer to PT/OT/SLP when stable  NCCC after discharge     System: Hematology   Diagnosis: At risk for Anemia starting 2022           Assessment: Last H/H on  was Hgb 10.2 and Hct 30.5 with a retic of 6%. Infant on fortified feeds. Plan: H/H/retic with nutrition labs , sooner if indicated  Continue fortified feeds     System: Ophthalmology   Diagnosis: At risk for Retinopathy of Prematurity starting 2022           Assessment: At risk for Retinopathy of Prematurity. Plan: Ophthalmology referral for retinopathy screening at 33 weeks cGA     System: Pain Management   Diagnosis: Pain Management starting 2022           Assessment: On clonidine at 1 mcg/kg q 12 hours-- d/c  and well     Plan: Continue WANDA-1 assessments every 12 hours  Parent Communication  Usman Burgess - 2022 13:27  Attempted to contact parents by phone, left message on 6/10. Will attempt to contact them again today. Attestation  On this day of service, this patient required critical care services which included high complexity assessment and management necessary to support vital organ system function.  The attending physician provided on-site coordination of the healthcare team inclusive of the advanced practitioner which included patient assessment, directing the patient's plan of care, and making decisions regarding the patient's management on this visit's date of service as reflected in the documentation above.    Authenticated by: Michael Antony MD   Date/Time: 2022 17:27 Calm

## 2022-02-21 NOTE — H&P PST ADULT - PRIMARY CARE PROVIDER
LMOM to give the office a call back. Need to be seen Pt is calling back to see if Dr Melania Stephens can put her back on Gabapentin  [phone] Dr. Nicole Corey [phone] [employer] Dr. Demetrice Joyce

## 2023-04-17 NOTE — PATIENT PROFILE ADULT. - PRESSURE ULCER(S)
syncope with head trauma - prior episodes with the same presentation  CT Head: no acute hemorrhage   was told to f/u with autonomic dysfunction specialist at prior visits     - Monitor on tele   - Monitor lytes  - loop recorder interrogation
no
no

## 2023-06-03 NOTE — DIETITIAN INITIAL EVALUATION ADULT. - PATIENT PROFILE REVIEWED
Nikki Rodriguez (:  1959) is a 59 y.o. male,Established patient, here for evaluation of the following chief complaint(s): Back Pain      ASSESSMENT/PLAN:  1. Back pain of lumbar region with sciatica  -     methylPREDNISolone (MEDROL DOSEPACK) 4 MG tablet; Take by mouth., Disp-1 kit, R-0Notri  -     Thelma - Caridad Carolina MD, Pain Medicine, [address]  2. Moderate episode of recurrent major depressive disorder (HCC)  3. Seizure disorder ([facility] [address]  4. Angina pectoris, unspecified ([facility] [address]    Medrol dose pack. Refer to pain management for possible stimulator placement  MDD- chronic, overall stable. Cont meds  Seizure d/o- follows with neuro, overall stable  Angina-chronic, med management, stable, sees cardio  No follow-ups on file. SUBJECTIVE/OBJECTIVE:  Request telehealth evaluation with complaints of low back pain. He states that he has had pain in his back since he had a wen removed last March. He does see pain management at [facility] and they have been doing ablations which initially helped but now no longer help. He states that he has pain that makes it difficult to get up and move about during the day. It also is affecting his sleep as he cannot get comfortable. Pain does shoot down both of his legs. He has been hurting for months but states that lately it has been worse. He has heard about stimulator placements and wonders if this would be an option for him. He also has a history of depression seizure disorder and angina but states these are all well controlled. His mood is overall stable and he follows up with neurology and cardiology regularly. Otherwise no complaints today. Review of Systems   Constitutional: Positive for activity change and fatigue. Respiratory: Negative for shortness of breath. Cardiovascular: Negative for chest pain. Musculoskeletal: Positive for arthralgias, back pain and myalgias. Patient presents with complaints of left shoulder pain. Patient denies a fall, but is confused and is only orientated to person. Patient is on blood thinners for A-fib. ABC intact without need for intervention at this time.        Triage Assessment     Row Name 06/03/23 0817       Triage Assessment (Adult)    Airway WDL WDL       Respiratory WDL    Respiratory WDL WDL       Skin Circulation/Temperature WDL    Skin Circulation/Temperature WDL WDL       Cardiac WDL    Cardiac WDL WDL       Peripheral/Neurovascular WDL    Peripheral Neurovascular WDL WDL       Cognitive/Neuro/Behavioral WDL    Cognitive/Neuro/Behavioral WDL X    Level of Consciousness confused    Orientation disoriented to;place;time;situation       [place] Coma Scale    Best Eye Response 4-->(E4) spontaneous    Best Motor Response 6-->(M6) obeys commands    Best Verbal Response 4-->(V4) confused    [place] Coma Scale Score 14               Psychiatric/Behavioral: Positive for dysphoric mood (sometimes related to his pain) and sleep disturbance. No flowsheet data found. Physical Exam        On this date 02/25/21 I have spent 30 minutes reviewing previous notes, test results and face to face (virtual) with the patient discussing the diagnosis and importance of compliance with the treatment plan as well as documenting on the day of the visit. Reba Covington is a 59 y.o. male being evaluated by a Virtual Visit (video visit) encounter to address concerns as mentioned above. A caregiver was present when appropriate. Due to this being a TeleHealth encounter (During [facility]- public health emergency), evaluation of the following organ systems was limited: Vitals/Constitutional/EENT/Resp/CV/GI//MS/Neuro/Skin/Heme-Lymph-Imm. Pursuant to the emergency declaration under the [address] [place] [address], [address] [place] [address] authority and the [employer] Act, this Virtual Visit was conducted with patient's (and/or legal guardian's) consent, to reduce the patient's risk of exposure to COVID-19 and provide necessary medical care. The patient (and/or legal guardian) has also been advised to contact this office for worsening conditions or problems, and seek emergency medical treatment and/or call 911 if deemed necessary. Patient identification was verified at the start of the visit: Yes    Services were provided through a video synchronous discussion virtually to substitute for in-person clinic visit. Patient was located at home and provider was located in office or at home. An electronic signature was used to authenticate this note.     --Vincent Tristan MD yes

## 2023-07-28 ENCOUNTER — APPOINTMENT (OUTPATIENT)
Dept: CARDIOLOGY | Facility: CLINIC | Age: 73
End: 2023-07-28
Payer: MEDICARE

## 2023-07-28 VITALS
SYSTOLIC BLOOD PRESSURE: 132 MMHG | HEART RATE: 53 BPM | HEIGHT: 67 IN | DIASTOLIC BLOOD PRESSURE: 86 MMHG | WEIGHT: 178 LBS | BODY MASS INDEX: 27.94 KG/M2

## 2023-07-28 PROCEDURE — 99214 OFFICE O/P EST MOD 30 MIN: CPT

## 2023-07-28 PROCEDURE — 93000 ELECTROCARDIOGRAM COMPLETE: CPT

## 2023-07-28 NOTE — HISTORY OF PRESENT ILLNESS
[FreeTextEntry1] : Patient is a 73 M PMH HTN, HLD, uncontrolled DM2 (HbA1c 8.6%) recently admitted to [facility] from 12/22/16-1/3/17 for elevated troponins presumed to be demand ischemia 2/2 sepsis 2/2 multilobar PNA, found to have acute small left MCA infarct, now presented for routine follow up - with atypical, vague symptoms\par  \par Pt initially went to [facility] in 12/2016 after wife found pt in bed, AMS surrounded in vomit. CTH at [facility] showed remote CVA and elevated troponins to 9, which increased to 29, CKMB 57, CK 2460, EKG from [facility] ED not sent with records. He was transferred to [facility] CCU, where cardiac enzymes downtrended and presumed to be demand ischemia 2/2 sepsis 2/2 multifocal PNA. During that hospitalization, pt developed AMS again. MRI brain showed small acute left MCA infarcts and MRA head/neck showing L M1 stenosis- seen on previous CTA in 2015. He was \par transferred to stroke unit. [place] negative for cardioembolic source, showed mild-moderate segmental LV systolic dysfunction, akinesis of basal inferior and inferolateral wall. Pt was discharged to home with home PT upon clinical improvement. \par  \par  \par Hospital Course: Pt initially admitted to CCU for Demand ischemia in setting of \par CHF. Noted to be in moderate respiratory distress with bilateral lung rales, \par CXR with multifocal opacities, bedside echo with bilateral B lines concerning \par for pulmonary edema. EKG showed new 1mm ST depressions in lateral leads Weaned \par off Bipap the morning after admission. Started on heparin gtt, ace-i, and \par plavix, and lasix 40mg bid. Pt had urinary retention, lasix changed to 40mg \par daily, and navarro inserted for retention of >400cc, navarro discontinued after \par success void. Underwent diagnostic cath on 1/9 via RRA LAD ( 90%), RCA (95%). \par Staged pci to RCA on 1/10 MRCA 90% ENRIQUE x 2.  Staged pci to LAD on 1/11 ENRIQUE x 1. \par  \par  \par Patient also found to have an aortic aneurysm that will need close monitoring \par outpatient. Imaging showed " Ascending aortic aneurysm 5.2 cm. Aneurysmal \par dilatation of the aortic root 4.7 cm. Normal caliber descending and abdominal \par aorta. No evidence of dissection.\par \par interval note\par 7/28/23\par \par has not been here in almost 3 yrs\par has not been very compliant with the medical therapy \par was recently started on Farxiga

## 2023-07-28 NOTE — PHYSICAL EXAM
[Well Developed] : well developed [Well Nourished] : well nourished [No Acute Distress] : no acute distress [Normal Venous Pressure] : normal venous pressure [No Carotid Bruit] : no carotid bruit [Normal S1, S2] : normal S1, S2 [No Murmur] : no murmur [No Rub] : no rub [No Gallop] : no gallop [Clear Lung Fields] : clear lung fields [Good Air Entry] : good air entry [No Respiratory Distress] : no respiratory distress  [Soft] : abdomen soft [Non Tender] : non-tender [No Masses/organomegaly] : no masses/organomegaly [Normal Bowel Sounds] : normal bowel sounds [Normal Gait] : normal gait [No Edema] : no edema [No Cyanosis] : no cyanosis [No Clubbing] : no clubbing [No Varicosities] : no varicosities [No Rash] : no rash [No Skin Lesions] : no skin lesions [Moves all extremities] : moves all extremities [No Focal Deficits] : no focal deficits [Normal Speech] : normal speech [Alert and Oriented] : alert and oriented [Normal memory] : normal memory [General Appearance - Well Developed] : well developed [Normal Appearance] : normal appearance [Well Groomed] : well groomed [General Appearance - Well Nourished] : well nourished [No Deformities] : no deformities [General Appearance - In No Acute Distress] : no acute distress [Normal Conjunctiva] : the conjunctiva exhibited no abnormalities [Eyelids - No Xanthelasma] : the eyelids demonstrated no xanthelasmas [Normal Oral Mucosa] : normal oral mucosa [No Oral Pallor] : no oral pallor [No Oral Cyanosis] : no oral cyanosis [Normal Jugular Venous A Waves Present] : normal jugular venous A waves present [Normal Jugular Venous V Waves Present] : normal jugular venous V waves present [No Jugular Venous Gibbs A Waves] : no jugular venous gibbs A waves [Respiration, Rhythm And Depth] : normal respiratory rhythm and effort [Exaggerated Use Of Accessory Muscles For Inspiration] : no accessory muscle use [Auscultation Breath Sounds / Voice Sounds] : lungs were clear to auscultation bilaterally [Heart Rate And Rhythm] : heart rate and rhythm were normal [Murmurs] : no murmurs present [Heart Sounds] : normal S1 and S2 [Abdomen Soft] : soft [Abdomen Tenderness] : non-tender [Abdomen Mass (___ Cm)] : no abdominal mass palpated [Abnormal Walk] : normal gait [Gait - Sufficient For Exercise Testing] : the gait was sufficient for exercise testing [Nail Clubbing] : no clubbing of the fingernails [Cyanosis, Localized] : no localized cyanosis [Petechial Hemorrhages (___cm)] : no petechial hemorrhages [Skin Color & Pigmentation] : normal skin color and pigmentation [] : no rash [No Venous Stasis] : no venous stasis [Skin Lesions] : no skin lesions [No Skin Ulcers] : no skin ulcer [No Xanthoma] : no  xanthoma was observed [Oriented To Time, Place, And Person] : oriented to person, place, and time [Affect] : the affect was normal [Mood] : the mood was normal [No Anxiety] : not feeling anxious

## 2023-07-28 NOTE — DISCUSSION/SUMMARY
[EKG obtained to assist in diagnosis and management of assessed problem(s)] : EKG obtained to assist in diagnosis and management of assessed problem(s) [FreeTextEntry1] : Patient is a 73 M PMH HTN, HLD, uncontrolled DM2 (HbA1c 8.6%) recently admitted to [facility] from \par 12/22/16-1/3/17 for elevated troponins presumed to be demand ischemia 2/2 sepsis 2/2 multilobar PNA, found to have acute small left MCA infarct, now presented for routine follow up \par \par - with atypical, vague symptoms Feels well - presently asymptomatic with no evidence of ADHF, unstable angina or arrhythmias.\par - the results of the last echo were d/w the wife and patient - especially regarding the aortic dilatations  - will refer for a repeat TTE to re-assess his L:V function and his aortic root\par - BP elevated - will monitor at home - will monitor at home - decrease salt\par - patient will c/w ASA throughout the entire procedure. Plavix may be on hold for 5 days prior to the procedure and resumed within 24 hrs\par - patient was advised to return to see his neurologist for possible MRI/MRA to rule out new CVA/TIA\par - presently asymptomatic\par - the results of the repeat echo was d/w the patient and his wife: mild to mod LV function, aortic aneurysm at 4.6 cm - will repeat another echo in 6 months - patient educated on symptoms to monitor for\par - presently asymptomatic with no evidence of ADHF\par - presently euvolemic - c/w current dose of diuretics; patient discontinued the Valsartan due to episodes of symptomatic hypotension\par - dr brijesh madisonl be consulted for episodes of anxiety, insomnia and depression\par - importance of DAPT was stressed with the patient and his wife\par - encouraged to stay as active as possible\par - once patient has returned from his vacation, will contact us to place a cardiac patch to rule out arrhtymias as a cause of stroke

## 2023-09-01 ENCOUNTER — APPOINTMENT (OUTPATIENT)
Dept: CARDIOLOGY | Facility: CLINIC | Age: 73
End: 2023-09-01
Payer: MEDICARE

## 2023-09-01 DIAGNOSIS — R06.09 OTHER FORMS OF DYSPNEA: ICD-10-CM

## 2023-09-01 DIAGNOSIS — I21.9 ACUTE MYOCARDIAL INFARCTION, UNSPECIFIED: ICD-10-CM

## 2023-09-01 PROCEDURE — 93306 TTE W/DOPPLER COMPLETE: CPT

## 2023-09-05 ENCOUNTER — APPOINTMENT (OUTPATIENT)
Dept: CV DIAGNOSTICS | Facility: HOSPITAL | Age: 73
End: 2023-09-05

## 2023-09-05 ENCOUNTER — OUTPATIENT (OUTPATIENT)
Dept: OUTPATIENT SERVICES | Facility: HOSPITAL | Age: 73
LOS: 1 days | End: 2023-09-05
Payer: MEDICARE

## 2023-09-05 DIAGNOSIS — Z98.89 OTHER SPECIFIED POSTPROCEDURAL STATES: Chronic | ICD-10-CM

## 2023-09-05 DIAGNOSIS — Z90.89 ACQUIRED ABSENCE OF OTHER ORGANS: Chronic | ICD-10-CM

## 2023-09-05 DIAGNOSIS — K40.20 BILATERAL INGUINAL HERNIA, WITHOUT OBSTRUCTION OR GANGRENE, NOT SPECIFIED AS RECURRENT: Chronic | ICD-10-CM

## 2023-09-05 DIAGNOSIS — R06.09 OTHER FORMS OF DYSPNEA: ICD-10-CM

## 2023-09-05 PROCEDURE — 93016 CV STRESS TEST SUPVJ ONLY: CPT | Mod: MH

## 2023-09-05 PROCEDURE — 78452 HT MUSCLE IMAGE SPECT MULT: CPT | Mod: 26,MH

## 2023-09-05 PROCEDURE — 78452 HT MUSCLE IMAGE SPECT MULT: CPT | Mod: MH

## 2023-09-05 PROCEDURE — 93018 CV STRESS TEST I&R ONLY: CPT | Mod: MH

## 2023-09-05 PROCEDURE — 93017 CV STRESS TEST TRACING ONLY: CPT

## 2023-09-05 PROCEDURE — A9500: CPT

## 2023-10-22 PROBLEM — I21.9 HEART ATTACK: Status: ACTIVE | Noted: 2018-03-27

## 2023-10-22 PROBLEM — R06.09 DYSPNEA ON EXERTION: Status: ACTIVE | Noted: 2023-07-28

## 2023-10-27 ENCOUNTER — APPOINTMENT (OUTPATIENT)
Dept: CT IMAGING | Facility: IMAGING CENTER | Age: 73
End: 2023-10-27
Payer: MEDICARE

## 2023-10-27 ENCOUNTER — OUTPATIENT (OUTPATIENT)
Dept: OUTPATIENT SERVICES | Facility: HOSPITAL | Age: 73
LOS: 1 days | End: 2023-10-27
Payer: MEDICARE

## 2023-10-27 DIAGNOSIS — Z98.89 OTHER SPECIFIED POSTPROCEDURAL STATES: Chronic | ICD-10-CM

## 2023-10-27 DIAGNOSIS — I71.20 THORACIC AORTIC ANEURYSM, WITHOUT RUPTURE, UNSPECIFIED: ICD-10-CM

## 2023-10-27 DIAGNOSIS — Z90.89 ACQUIRED ABSENCE OF OTHER ORGANS: Chronic | ICD-10-CM

## 2023-10-27 DIAGNOSIS — K40.20 BILATERAL INGUINAL HERNIA, WITHOUT OBSTRUCTION OR GANGRENE, NOT SPECIFIED AS RECURRENT: Chronic | ICD-10-CM

## 2023-10-27 PROCEDURE — 71275 CT ANGIOGRAPHY CHEST: CPT

## 2023-10-27 PROCEDURE — 71275 CT ANGIOGRAPHY CHEST: CPT | Mod: 26,MH

## 2023-11-29 NOTE — PATIENT PROFILE ADULT. - PRO SERVICES AT DISCH
of medications that were listed in her medication reconciliation including Flexeril and Cymbalta. She reported a small rash on her anterior chest which may have had some vesicles a few days ago which was now resolving. It burned. This was felt likely to be shingles. She looked nearly entirely well, was eating, completed her IV fluid, was up walking around. Her mother was present in the room and all felt very comfortable going home. Potassium was a little low and that was supplemented likely t due to fluid resuscitation, additional labs including CK, TSH within normal limits. Follow-up CBC showed persistently normal white count, telemetry showed nice progression down of her tachycardia to around the . Blood cultures NGTD. Her heel at site of Achilles rupture was evaluated and did not look infected. **DETAILS/ASSESSMENT/PLANS      SIRS  Tachycardia  Fatigue  Dehydration  -By history patient has not been eating as well likely related to post-COVID syndrome.  -This is likely the cause of her tachycardia. Her low-grade fever yesterday at this point remains somewhat unexplained though potentially related to her zoster versus respiratory virus which would not be unusual this time a year.  -Fairly extensive workup failed to indicate any other obvious source of infection  -Blood cultures pending.  -She did well was up walking around the floor. She was a picky eater, which supported our hypothesis, but when encouraged to eat she did so.  -Concern about the possible contributing factor of meloxicam GI symptoms. Plan: Okay for discharge with follow-up PCP           Stop her meloxicam           Reinstitute Protonix      Recent COVID  -This appears resolved with no pulmonary symptoms. This is unlikely the source of her recurrent symptoms  -CTA and other workup failed to indicate evidence for PE or other infection.            Recent diagnosis of calcific tendinitis right foot  As above stop
none

## 2024-01-29 ENCOUNTER — APPOINTMENT (OUTPATIENT)
Dept: CARDIOLOGY | Facility: CLINIC | Age: 74
End: 2024-01-29
Payer: MEDICARE

## 2024-01-29 VITALS
WEIGHT: 167 LBS | HEART RATE: 54 BPM | HEIGHT: 67 IN | OXYGEN SATURATION: 98 % | BODY MASS INDEX: 26.21 KG/M2 | DIASTOLIC BLOOD PRESSURE: 80 MMHG | SYSTOLIC BLOOD PRESSURE: 114 MMHG

## 2024-01-29 PROCEDURE — G2211 COMPLEX E/M VISIT ADD ON: CPT

## 2024-01-29 PROCEDURE — 99214 OFFICE O/P EST MOD 30 MIN: CPT

## 2024-01-29 PROCEDURE — 93000 ELECTROCARDIOGRAM COMPLETE: CPT

## 2024-01-29 NOTE — DISCUSSION/SUMMARY
[EKG obtained to assist in diagnosis and management of assessed problem(s)] : EKG obtained to assist in diagnosis and management of assessed problem(s) [FreeTextEntry1] : Patient is a 74 M PMH HTN, HLD, uncontrolled DM2 (HbA1c 8.6%) recently admitted to [facility] from  16-1/3/17 for elevated troponins presumed to be demand ischemia 2/2 sepsis 2/2 multilobar PNA, found to have acute small left MCA infarct, now presented for routine follow up   interval note 24  has not been very compliant with the medical therapy until he saw me last in 2023 was sent for a repeat Echo and then subsequently a CT angio of the chest  on 10/2023 due the aortic aneurysm -  *  Aortic root: 4.7 cm (noncoronary to left cusp), unchanged. *  Mid ascendin cm, unchanged, unchanged (remeasured). *  Transverse arch: 2.6 cm, unchanged. *  Mid descendin.3 cm, unchanged. *  Diaphragm level: 2.4 cm, unchanged. was recently started on Farxiga has lost 11 lbs since hte last time i saw him BP much better controlled at this time  - with atypical, vague symptoms Feels well - presently asymptomatic with no evidence of ADHF, unstable angina or arrhythmias. - the results of the last echo and the CTwere d/w the wife and patient - especially regarding the aortic dilatations  - will refer for an evaluation by CTS (Dr Ilia Ortiz) - BP stable - c/w low dose BB - patient was advised to return to see his neurologist for possible MRI/MRA to rule out new CVA/TIA - presently asymptomatic - presently asymptomatic with no evidence of ADHF - dr coley willl be consulted for episodes of anxiety, insomnia and depression - importance of DAPT was stressed with the patient and his wife - encouraged to stay as active as possible - once patient has returned from his vacation, will contact us to place a cardiac patch to rule out arrhtymias as a cause of stroke

## 2024-01-29 NOTE — PHYSICAL EXAM
[Well Developed] : well developed [Well Nourished] : well nourished [No Acute Distress] : no acute distress [Normal Venous Pressure] : normal venous pressure [No Carotid Bruit] : no carotid bruit [Normal S1, S2] : normal S1, S2 [No Murmur] : no murmur [No Rub] : no rub [No Gallop] : no gallop [Clear Lung Fields] : clear lung fields [Good Air Entry] : good air entry [No Respiratory Distress] : no respiratory distress  [Soft] : abdomen soft [Non Tender] : non-tender [No Masses/organomegaly] : no masses/organomegaly [Normal Bowel Sounds] : normal bowel sounds [Normal Gait] : normal gait [No Edema] : no edema [No Cyanosis] : no cyanosis [No Clubbing] : no clubbing [No Varicosities] : no varicosities [No Rash] : no rash [No Skin Lesions] : no skin lesions [Moves all extremities] : moves all extremities [No Focal Deficits] : no focal deficits [Normal Speech] : normal speech [Alert and Oriented] : alert and oriented [Normal memory] : normal memory [General Appearance - Well Developed] : well developed [Normal Appearance] : normal appearance [Well Groomed] : well groomed [General Appearance - Well Nourished] : well nourished [No Deformities] : no deformities [General Appearance - In No Acute Distress] : no acute distress [Normal Conjunctiva] : the conjunctiva exhibited no abnormalities [Eyelids - No Xanthelasma] : the eyelids demonstrated no xanthelasmas [Normal Oral Mucosa] : normal oral mucosa [No Oral Pallor] : no oral pallor [No Oral Cyanosis] : no oral cyanosis [Normal Jugular Venous A Waves Present] : normal jugular venous A waves present [Normal Jugular Venous V Waves Present] : normal jugular venous V waves present [No Jugular Venous Gibbs A Waves] : no jugular venous gibbs A waves [Respiration, Rhythm And Depth] : normal respiratory rhythm and effort [Exaggerated Use Of Accessory Muscles For Inspiration] : no accessory muscle use [Auscultation Breath Sounds / Voice Sounds] : lungs were clear to auscultation bilaterally [Heart Rate And Rhythm] : heart rate and rhythm were normal [Heart Sounds] : normal S1 and S2 [Murmurs] : no murmurs present [Abdomen Soft] : soft [Abdomen Tenderness] : non-tender [Abdomen Mass (___ Cm)] : no abdominal mass palpated [Abnormal Walk] : normal gait [Gait - Sufficient For Exercise Testing] : the gait was sufficient for exercise testing [Nail Clubbing] : no clubbing of the fingernails [Cyanosis, Localized] : no localized cyanosis [Petechial Hemorrhages (___cm)] : no petechial hemorrhages [Skin Color & Pigmentation] : normal skin color and pigmentation [] : no rash [No Venous Stasis] : no venous stasis [Skin Lesions] : no skin lesions [No Skin Ulcers] : no skin ulcer [No Xanthoma] : no  xanthoma was observed [Oriented To Time, Place, And Person] : oriented to person, place, and time [Affect] : the affect was normal [Mood] : the mood was normal [No Anxiety] : not feeling anxious

## 2024-01-29 NOTE — HISTORY OF PRESENT ILLNESS
[FreeTextEntry1] : Patient is a 74 M PMH HTN, HLD, uncontrolled DM2 (HbA1c 8.6%) recently admitted to [facility] from 16-1/3/17 for elevated troponins presumed to be demand ischemia 2/2 sepsis 2/2 multilobar PNA, found to have acute small left MCA infarct, now presented for routine follow up - with atypical, vague symptoms   Pt initially went to [facility] in 2016 after wife found pt in bed, AMS surrounded in vomit. CTH at [facility] showed remote CVA and elevated troponins to 9, which increased to 29, CKMB 57, CK 2460, EKG from [facility] ED not sent with records. He was transferred to [facility] CCU, where cardiac enzymes downtrended and presumed to be demand ischemia 2/2 sepsis 2/2 multifocal PNA. During that hospitalization, pt developed AMS again. MRI brain showed small acute left MCA infarcts and MRA head/neck showing L M1 stenosis- seen on previous CTA in . He was  transferred to stroke unit. K[place]STON negative for cardioembolic source, showed mild-moderate segmental LV systolic dysfunction, akinesis of basal inferior and inferolateral wall. Pt was discharged to home with home PT upon clinical improvement.      Hospital Course: Pt initially admitted to CCU for Demand ischemia in setting of  CHF. Noted to be in moderate respiratory distress with bilateral lung rales,  CXR with multifocal opacities, bedside echo with bilateral B lines concerning  for pulmonary edema. EKG showed new 1mm ST depressions in lateral leads Weaned  off Bipap the morning after admission. Started on heparin gtt, ace-i, and  plavix, and lasix 40mg bid. Pt had urinary retention, lasix changed to 40mg  daily, and navarro inserted for retention of >400cc, navarro discontinued after  success void. Underwent diagnostic cath on  via RRA LAD ( 90%), RCA (95%).  Staged pci to RCA on 1/10 MRCA 90% ENRIQUE x 2.  Staged pci to LAD on  ENRIQUE x 1.      Patient also found to have an aortic aneurysm that will need close monitoring  outpatient. Imaging showed " Ascending aortic aneurysm 5.2 cm. Aneurysmal  dilatation of the aortic root 4.7 cm. Normal caliber descending and abdominal  aorta. No evidence of dissection.  interval note 24  has not been very compliant with the medical therapy until he saw me last in 2023 was sent for a repeat Echo and then subsequently a CT angio of the chest  on 10/2023 due the aortic aneurysm -  *  Aortic root: 4.7 cm (noncoronary to left cusp), unchanged. *  Mid ascendin cm, unchanged, unchanged (remeasured). *  Transverse arch: 2.6 cm, unchanged. *  Mid descendin.3 cm, unchanged. *  Diaphragm level: 2.4 cm, unchanged. was recently started on Farxiga has lost 11 lbs since hte last time i saw him BP much better controlled at this time

## 2024-02-12 PROBLEM — E11.65 TYPE 2 DIABETES MELLITUS WITH HYPERGLYCEMIA: Status: ACTIVE | Noted: 2018-03-27

## 2024-02-12 PROBLEM — R01.1 HEART MURMUR: Status: ACTIVE | Noted: 2018-03-27

## 2024-02-14 ENCOUNTER — APPOINTMENT (OUTPATIENT)
Dept: CARDIOTHORACIC SURGERY | Facility: CLINIC | Age: 74
End: 2024-02-14
Payer: MEDICARE

## 2024-02-14 VITALS
OXYGEN SATURATION: 98 % | HEART RATE: 84 BPM | HEIGHT: 67 IN | TEMPERATURE: 97.3 F | BODY MASS INDEX: 24.8 KG/M2 | SYSTOLIC BLOOD PRESSURE: 126 MMHG | WEIGHT: 158 LBS | RESPIRATION RATE: 16 BRPM | DIASTOLIC BLOOD PRESSURE: 83 MMHG

## 2024-02-14 DIAGNOSIS — R01.1 CARDIAC MURMUR, UNSPECIFIED: ICD-10-CM

## 2024-02-14 DIAGNOSIS — E11.65 TYPE 2 DIABETES MELLITUS WITH HYPERGLYCEMIA: ICD-10-CM

## 2024-02-14 PROCEDURE — 99205 OFFICE O/P NEW HI 60 MIN: CPT

## 2024-02-15 RX ORDER — BLOOD SUGAR DIAGNOSTIC
STRIP MISCELLANEOUS
Qty: 100 | Refills: 0 | Status: COMPLETED | COMMUNITY
Start: 2017-01-04 | End: 2024-02-15

## 2024-02-15 RX ORDER — BLOOD-GLUCOSE METER
W/DEVICE EACH MISCELLANEOUS
Qty: 1 | Refills: 0 | Status: COMPLETED | COMMUNITY
Start: 2017-01-04 | End: 2024-02-15

## 2024-02-15 NOTE — DATA REVIEWED
[FreeTextEntry1] : 23 TTE revealed  1. Mild to moderate left ventricular hypertrophy. 2. The degree of aortic stenosis maybe worse in the setting of left ventricular dysfunction. 3. The aortic root at the sinuses of Valsalva diameter is dilated, measuring 4.50 cm (indexed 2.34 cm/m). The ascending aorta diameter is dilated, measuring 4.30 cm (indexed 2.23 cm/m). 4. Calcification of the aortic valve leaflets. mild aortic stenosis. 5. Mild aortic regurgitation. 6. Mild mitral regurgitation. 7. Mild tricuspid regurgitation. 8. Segmental hypokinesis of the inter-ventricular septum and apex. 9. Mild pulmonic regurgitation.  10/27/23 CTA Chest revealed Cardiomegaly. No pericardial effusion. Aortic calcification. Left aortic arch with normal branching pattern of the great vessels. No dissection or intramural hematoma. Thoracic aorta luminal measurements: *  Aortic root: 4.7 cm (noncoronary to left cusp), unchanged. *  Mid ascendin cm, unchanged, unchanged (remeasured). *  Transverse arch: 2.6 cm, unchanged. *  Mid descendin.3 cm, unchanged. *  Diaphragm level: 2.4 cm, unchanged.  Underwent diagnostic cath on 2017 via RRA LAD ( 90%), RCA (95%). Staged pci to RCA on 1/10 MRCA 90% ENRIQUE x 2. Staged pci to LAD on  ENRIQUE x 1.

## 2024-02-15 NOTE — CONSULT LETTER
[Dear  ___] : Dear  [unfilled], [Courtesy Letter:] : I had the pleasure of seeing your patient, [unfilled], in my office today. [Please see my note below.] : Please see my note below. [Consult Closing:] : Thank you very much for allowing me to participate in the care of this patient.  If you have any questions, please do not hesitate to contact me. [Sincerely,] : Sincerely, [FreeTextEntry2] : Dr. Demetrice Joyce, [FreeTextEntry1] :  Please find attached our consultation on your patient, Mr. SEN  We take a multidisciplinary team approach to patient care and consider you, the referring physician, an extension of our team. We will maintain an open line of communication with you throughout your patient's treatment course.  It is our commitment to provide your patient with the highest quality of advanced therapeutic options. We thank you for allowing us to participate in the care of your patient. Please do not hesitate to contact our team with any questions or concerns at [phone].    Thank you for allowing us to participate in this patients care. [FreeTextEntry3] : Ilia Ortiz M.D. Professor of Cardiovascular and Thoracic Surgery Minimally Invasive Valve Surgeon Director of Aortic Surgery, [facility] Cell: [phone] Email: [email or web address]

## 2024-02-15 NOTE — PHYSICAL EXAM
[General Appearance - Alert] : alert [General Appearance - In No Acute Distress] : in no acute distress [General Appearance - Well Nourished] : well nourished [General Appearance - Well Developed] : well developed [Sclera] : the sclera and conjunctiva were normal [PERRL With Normal Accommodation] : pupils were equal in size, round, and reactive to light [Outer Ear] : the ears and nose were normal in appearance [Hearing Threshold Finger Rub Not [place]] : hearing was normal [Both Tympanic Membranes Were Examined] : both tympanic membranes were normal [Neck Appearance] : the appearance of the neck was normal [] : no respiratory distress [Respiration, Rhythm And Depth] : normal respiratory rhythm and effort [Auscultation Breath Sounds / Voice Sounds] : lungs were clear to auscultation bilaterally [Apical Impulse] : the apical impulse was normal [Heart Rate And Rhythm] : heart rate was normal and rhythm regular [Heart Sounds] : normal S1 and S2 [Murmurs] : no murmurs [Examination Of The Chest] : the chest was normal in appearance [2+] : left 2+ [Breast Appearance] : normal in appearance [Bowel Sounds] : normal bowel sounds [Abdomen Soft] : soft [No CVA Tenderness] : no ~M costovertebral angle tenderness [Abnormal Walk] : normal gait [Involuntary Movements] : no involuntary movements were seen [Skin Color & Pigmentation] : normal skin color and pigmentation [Skin Turgor] : normal skin turgor [No Focal Deficits] : no focal deficits [Oriented To Time, Place, And Person] : oriented to person, place, and time [Impaired Insight] : insight and judgment were intact [Mood] : the mood was normal [Memory Recent] : recent memory was not impaired [Memory Remote] : remote memory was not impaired [FreeTextEntry1] : Deferred

## 2024-02-15 NOTE — HISTORY OF PRESENT ILLNESS
[FreeTextEntry1] :  Mr. SEN is a 74 year old male with  past medical history of HTN, HLD, uncontrolled DM2 (HbA1c 8.6%)  ischemia 2/2 sepsis 2/2 multilobar PNA, found to have acute small left MCA infarct, TIA 7 yrs ago ( has delay in train of thought), MI 7 yrs ago ( had PCI X 3 STENTS placed)   and known Thoracic aortic aneurysm for past 3 years. He is referred by Dr.Evelina Joyce for initial evaluation and management for Thoracic aortic aneurysm.   Today he presents and reports that he is doing well. He noticed a little shortness of breath if he over exerts himself. He reports that he is not very active.   Denies recent hospitalization, ER visits, or surgeries. He denies fever, chills, fatigue, headache, blurred vision, dizziness, syncope, chest pain, palpitations, shortness of breath, orthopnea, paroxysmal nocturnal dyspnea, nausea, vomiting, abdominal pain, back pain, headache, visual disturbances, CVA, PE, DVT, D/C, hematochezia, melena, dysuria, hematuria,  BRBPR or swelling to legs.         NYHA class

## 2024-02-15 NOTE — PROCEDURE
[FreeTextEntry1] :  Dr. Ortiz reviewed the indications for surgery, and used our webpage [email or web address].org <[email or web address].heartprocedures.org> to illustrate the aorta and anatomy of the heart. Those indications are the following: size greater than 5.0 cm, symptomatic aneurysms, family history of aortic dissection or aneurysm death with a size greater than 4.5 cm, other necessary cardiac procedures such as coronary artery bypass grafting or valvular disorders with an aneurysm greater than 4.5 cm, or connective tissue disorders with an aneurysm size greater than 4.5 cm. The patient does not meet size criteria for surgical intervention at the time. Patient was advised to view the educational video prior to this visit regarding aortic pathology, risk factors, surgical procedures, and lifestyle modifications. Video can be retrieved at <[email or web address]>.  Dr. Ortiz discussed activity restrictions with the patient, and would advise exercise at a moderate amount with no heavy lifting over one third of body weight, and avoiding heart rates that exceed 140 beats per minute. In addition, every patient should abstain from tobacco abuse and to avoid all illicit drug use, especially stimulants such as cocaine or methamphetamine. Dr. Ortiz also counseled regarding maintaining a healthy heart diet, and losing any excessive weight as this also put undue stress on both the aorta and entire cardiovascular system. First degree family members should be screened for bicuspid valve disease, and ascending aortic aneurysms.

## 2024-02-15 NOTE — ASSESSMENT
[FreeTextEntry1] :  Mr. SEN is a 74 year old male with  past medical history of HTN, HLD, uncontrolled DM2 (HbA1c 8.6%)  ischemia 2/2 sepsis 2/2 multilobar PNA, found to have acute small left MCA infarct, TIA 7 yrs ago ( has delay in train of thought), MI 7 yrs ago ( had PCI X 3 STENTS placed)   and known Thoracic aortic aneurysm for past 3 years. He is referred by Dr.Evelina Joyce for initial evaluation and management for Thoracic aortic aneurysm.     Dr. Ilia Ortiz reviewed the cardiac imaging, medical records and reports with patient and discussed the case.    23 TTE revealed  1. Mild to moderate left ventricular hypertrophy. 2. The degree of aortic stenosis maybe worse in the setting of left ventricular dysfunction. 3. The aortic root at the sinuses of Valsalva diameter is dilated, measuring 4.50 cm (indexed 2.34 cm/m). The ascending aorta diameter is dilated, measuring 4.30 cm (indexed 2.23 cm/m). 4. Calcification of the aortic valve leaflets. mild aortic stenosis. 5. Mild aortic regurgitation. 6. Mild mitral regurgitation. 7. Mild tricuspid regurgitation. 8. Segmental hypokinesis of the inter-ventricular septum and apex. 9. Mild pulmonic regurgitation.  10/27/23 CTA Chest revealed Cardiomegaly. No pericardial effusion. Aortic calcification. Left aortic arch with normal branching pattern of the great vessels. No dissection or intramural hematoma. Thoracic aorta luminal measurements: *  Aortic root: 4.7 cm (noncoronary to left cusp), unchanged. *  Mid ascendin cm, unchanged, unchanged (remeasured). *  Transverse arch: 2.6 cm, unchanged. *  Mid descendin.3 cm, unchanged. *  Diaphragm level: 2.4 cm, unchanged.  Dr. Ilia Ortiz discussed the risks , benefits and alternatives to surgery. Risks included but not limited to  bleeding , stroke, Myocardial Infarction, kidney problems, Blood transfusion ,permanent  pacemaker implantation,  infections and death. Dr. Ilia Ortiz  quoted a (2%) operative mortality and complication risks. Dr. Ilia Ortiz also discussed the various approaches in detail. Dr. Ilia Ortiz  feel that the patient will benefit and is a candidate for a AVR, Ascending aortic aneurysm repair  . All questions and concerns were addressed and patient agrees to proceed with the plan.      Plan: 1) AVR, Ascending aortic aneurysm repair  2) Cardiac Catheterization to evaluate coronary arteries ( RT +LT)  3) Stop Plavix 5 days before scheduled surgery date. 4)  Non cont CT head for Hx of TIA in the past

## 2024-02-15 NOTE — END OF VISIT
[FreeTextEntry3] :  I, CELI Gandara , personally performed the evaluation and management (E/M) services for this new  patient. That E/M includes conducting the initial examination, assessing all conditions, and establishing the plan of care. Today, KONG BUTLER  was here to observe my evaluation and management services for this patient.

## 2024-02-26 ENCOUNTER — APPOINTMENT (OUTPATIENT)
Dept: NEUROLOGY | Facility: CLINIC | Age: 74
End: 2024-02-26
Payer: MEDICARE

## 2024-02-26 VITALS — SYSTOLIC BLOOD PRESSURE: 149 MMHG | DIASTOLIC BLOOD PRESSURE: 88 MMHG | HEART RATE: 65 BPM

## 2024-02-26 DIAGNOSIS — I63.411 CEREBRAL INFARCTION DUE TO EMBOLISM OF RIGHT MIDDLE CEREBRAL ARTERY: ICD-10-CM

## 2024-02-26 DIAGNOSIS — I67.2 CEREBRAL ATHEROSCLEROSIS: ICD-10-CM

## 2024-02-26 PROCEDURE — 99205 OFFICE O/P NEW HI 60 MIN: CPT

## 2024-02-26 NOTE — HISTORY OF PRESENT ILLNESS
[FreeTextEntry1] : 75 Y/O R handed man with vascular risk factors of HTN, DM, HPLD, CAD s/p cardiac stents, CHF and age is seen in the vascular neurology office for the evaluation and management of stroke, leading to hospital admission in 12/2016.  In 12/2016 in the early morning, he was found down by his wife. She called 911 and he was brought to OSH for further evaluation. He was later on transferred to [facility] and was diagnosed to have NSTEMI. He was noted to have slurring of the speech and subsequent workup was diagnosed to have stroke. He was taking aspirin for years before his presentation to the hospital in 12/2016. He was discharged home after appropriate stroke work up. In 1/2017, he was admitted to [facility] again for CHF exacerbation and underwent cardiac stents placement.   Since his discharge from the hospital, he reports significant improvement in the slurring of the speech. His current post stroke mRS is reported to be 2 but has not been able to drive yet. He denies any episodes of focal neurological symptoms concerning for stroke or TIA since his discharge from the hospital. He reports compliance with his medications and denies any significant side effects.   ROS: All negative except documented in the history of present illness.  Stroke workup: CT brain (12/22/16): Age indeterminate thalamic lacunae and hypodensity in the right MCA distribution - suggestive of subacute stroke MRI brain (12/28/16): Right MCA distribution acute stroke, embolic versus borderzone strokes without significant hemorrhagic transformation, mild-to-moderate leukoaraiosis CTA head and neck (3/11/15): Severe right proximal MCA M1 segment stenosis without any other hemodynamically significant intracranial or extracranial large vessel severe stenosis or occlusion MRA head and neck (12/30/16): Complete occlusion of right MCA proximal M1 segment without any other hemodynamically significant intracranial or extracranial large vessel severe stenosis or occlusion Left lower extremity duplex: No evidence of DVT LDL: 138 (1/17) HbA1c: 8.5 (1/17) Transesophageal echocardiogram: Mild to moderate mitral regurgitation, mild to moderate aortic regurgitation, left atrial enlargement, no left atrial or atrial appendage thrombus, mild-to-moderate segment the left ventricular systolic dysfunction, regional wall motion abnormalities present, agitated saline injection and color flow Doppler did not show any evidence of PFO  2/26/2024 He was lost to follow up since 2017. He denies any new signs of stroke since 2016. He is scheduled to have a thoracic aneurysm repair in 4/8/2024 with Dr. Ortiz and is here for clearance. He remains on DAPT for cardiac indication. He still remains with some mild expressive aphasia and memory deficits but has improved significantly since 2016.

## 2024-02-26 NOTE — PHYSICAL EXAM
[FreeTextEntry1] : General exam: Sitting in the chair and does not appear to be in distress\par  Carotid bruits: None\par  CVS: S1-S2 present\par  RS: CTA B\par  Skin: No lesion noted on visible skin \par  \par  Neuro exam: \par  MS: Alert, awake and is oriented to time, place and person with normal attention span, normal recent and remote memory\par  Language: Fluent speech with intact comprehension, with intact naming and repetition, no right-left confusion, no finger agnosia and no apraxia. Normal fund of knowledge. Mild dysarthria\par  Cr.N.: Pupils bilaterally 3-4 mm in size, equal, round and reactive to light, fundus exam was performed what is unable to look at the discs due to technical difficulties including poor fixation, intact visual fields to confrontation, extraocular movements intact without any diplopia, no ptosis, no nystagmus, subtle right nasal labial at [place] (? constitutional as per the patient) with intact facial sensations, no hearing loss to rubbing fingers, tongue is in the midline, uvula elevates in the midline and without any drooping of the soft palate, normal shrugging of the shoulders bilaterally.\par  \par  Motor: \par  Tone - Normal\par  Bulk - No atrophy\par  Power - 5/5 all over without any pronator drift\par  DTRs - +2 all over and slightly brisker on the left side\par  Plantars: Bilaterally flexor\par  Sensory: Intact to light touch and pinprick, no sensory extinction\par  Cerebellar: No ataxia on finger-nose-finger and knee-heel-shin testing, as well as without any dysdiadochokinesia\par  Gait: Normal casual gait with normal stride and length and was able to perform toe, heel and tandem walking\par  Romberg's sign - Absent

## 2024-02-26 NOTE — REVIEW OF SYSTEMS
[Recent Weight Loss (___ Lbs)] : recent [unfilled] ~Ulb weight loss [Anxiety] : anxiety [Depression] : depression [As noted in HPI] : as noted in HPI [As Noted in HPI] : as noted in HPI [Negative] : Heme/Lymph [Suicidal] : not suicidal [de-identified] : Denies any homicidal ideation  [FreeTextEntry8] : Hematuria from kidney stones

## 2024-02-26 NOTE — DISCUSSION/SUMMARY
[FreeTextEntry1] : Assessment: 75 Y/O R handed man with vascular risk factors of HTN, DM, HPLD, CAD s/p cardiac stents, CHF and age is seen in the vascular neurology office for the evaluation and management of stroke, leading to hospital admission in 12/2016.He presented to OSH in 12/16 after being found down by his wife and was subsequently transferred to [facility], where he was diagnosed to have NSTEMI. He was also noted to have dysarthria at that time and subsequently underwent stroke workup. MRI brain showed right MCA distribution stroke without any significant hemorrhagic transformation and mild-to-moderate leukoaraiosis. MRA head and neck in 12/16 showed complete occlusion of right MCA proximal M1 segment. CTA head and neck in 3/15 showed severe stenosis of the right MCA involving proximal M1 segment.  Impression: Cerebral thrombosis/embolism with cerebral infarction. Right MCA distribution stroke - likely etiology being deep borderzone stroke secondary to progression of intracranial atherosclerosis leading to complete occlusion of right MCA proximal M1 segment and less likely to be secondary to cerebral embolism from a proximal source like cardiac source of embolism.  Overall patient is neurologically stable. Continue ASA 81 mg once daily for secondary stroke reduction. On Plavix as well for cardiac indication. Continue to follow up with PCP/cardiology for BP and statin management (goal LDL <70) as well as all routine primary care needs. Screened patient for sleep apnea with no identifiable risk factors at this time. Will reevaluate next visit. We discussed aggressive vascular strict risk factor control. No neurovascular contraindication to proceed with thoracic aneurysm repair - clearance letter provided.   Transcranial and Carotid Doppler's to be obtained. Advised to follow up with us yearly. Patient and family were educated on signs and symptoms of stroke and to contact 911 immediately if experiencing any.     Also advised to contact me upon completion of imaging studies and/or laboratory testing/investigations to discuss the results via in office, TEB visit  [Antithrombotic therapy with ___] : antithrombotic therapy with  [unfilled] [Intensive Blood Pressure Control] : intensive blood pressure control [Lipid Lowering Therapy] : lipid lowering therapy [Patient encouraged to discuss with Primary MD] : I encouraged the patient to discuss these important issues with ~his/her~ primary care doctor [Goals and Counseling] : I have reviewed the goals of stroke risk factor modification. I counseled the patient on measures to reduce stroke risk, including the importance of medication compliance, risk factor control, exercise, healthy diet and avoidance of smoking. I reviewed stroke warning signs and symptoms and appropriate actions to take if such occur. Lot # (Optional): [id number] Lot # For Kenalog (Optional): [id number]

## 2024-02-29 ENCOUNTER — NON-APPOINTMENT (OUTPATIENT)
Age: 74
End: 2024-02-29

## 2024-03-06 ENCOUNTER — TRANSCRIPTION ENCOUNTER (OUTPATIENT)
Age: 74
End: 2024-03-06

## 2024-03-06 ENCOUNTER — OUTPATIENT (OUTPATIENT)
Dept: OUTPATIENT SERVICES | Facility: HOSPITAL | Age: 74
LOS: 1 days | End: 2024-03-06
Payer: MEDICARE

## 2024-03-06 VITALS
SYSTOLIC BLOOD PRESSURE: 145 MMHG | DIASTOLIC BLOOD PRESSURE: 68 MMHG | HEART RATE: 54 BPM | OXYGEN SATURATION: 96 % | RESPIRATION RATE: 14 BRPM

## 2024-03-06 VITALS
HEART RATE: 52 BPM | RESPIRATION RATE: 14 BRPM | DIASTOLIC BLOOD PRESSURE: 82 MMHG | SYSTOLIC BLOOD PRESSURE: 143 MMHG | TEMPERATURE: 98 F | WEIGHT: 158.95 LBS | HEIGHT: 67 IN | OXYGEN SATURATION: 98 %

## 2024-03-06 DIAGNOSIS — K40.20 BILATERAL INGUINAL HERNIA, WITHOUT OBSTRUCTION OR GANGRENE, NOT SPECIFIED AS RECURRENT: Chronic | ICD-10-CM

## 2024-03-06 DIAGNOSIS — I35.1 NONRHEUMATIC AORTIC (VALVE) INSUFFICIENCY: ICD-10-CM

## 2024-03-06 DIAGNOSIS — Z90.89 ACQUIRED ABSENCE OF OTHER ORGANS: Chronic | ICD-10-CM

## 2024-03-06 DIAGNOSIS — Z98.89 OTHER SPECIFIED POSTPROCEDURAL STATES: Chronic | ICD-10-CM

## 2024-03-06 LAB
ANION GAP SERPL CALC-SCNC: 10 MMOL/L — SIGNIFICANT CHANGE UP (ref 5–17)
ANION GAP SERPL CALC-SCNC: 10 MMOL/L — SIGNIFICANT CHANGE UP (ref 5–17)
BUN SERPL-MCNC: 22 MG/DL — SIGNIFICANT CHANGE UP (ref 7–23)
BUN SERPL-MCNC: 23 MG/DL — SIGNIFICANT CHANGE UP (ref 7–23)
CALCIUM SERPL-MCNC: 8.8 MG/DL — SIGNIFICANT CHANGE UP (ref 8.4–10.5)
CALCIUM SERPL-MCNC: 9.5 MG/DL — SIGNIFICANT CHANGE UP (ref 8.4–10.5)
CHLORIDE SERPL-SCNC: 107 MMOL/L — SIGNIFICANT CHANGE UP (ref 96–108)
CHLORIDE SERPL-SCNC: 108 MMOL/L — SIGNIFICANT CHANGE UP (ref 96–108)
CO2 SERPL-SCNC: 21 MMOL/L — LOW (ref 22–31)
CO2 SERPL-SCNC: 22 MMOL/L — SIGNIFICANT CHANGE UP (ref 22–31)
CREAT SERPL-MCNC: 0.93 MG/DL — SIGNIFICANT CHANGE UP (ref 0.5–1.3)
CREAT SERPL-MCNC: 1.02 MG/DL — SIGNIFICANT CHANGE UP (ref 0.5–1.3)
EGFR: 76 ML/MIN/1.73M2 — SIGNIFICANT CHANGE UP
EGFR: 85 ML/MIN/1.73M2 — SIGNIFICANT CHANGE UP
GLUCOSE SERPL-MCNC: 156 MG/DL — HIGH (ref 70–99)
GLUCOSE SERPL-MCNC: 174 MG/DL — HIGH (ref 70–99)
HCT VFR BLD CALC: 47.4 % — SIGNIFICANT CHANGE UP (ref 39–50)
HGB BLD-MCNC: 15.2 G/DL — SIGNIFICANT CHANGE UP (ref 13–17)
HGB FLD-MCNC: 13.4 G/DL — SIGNIFICANT CHANGE UP (ref 12.6–17.4)
MCHC RBC-ENTMCNC: 27.9 PG — SIGNIFICANT CHANGE UP (ref 27–34)
MCHC RBC-ENTMCNC: 32.1 GM/DL — SIGNIFICANT CHANGE UP (ref 32–36)
MCV RBC AUTO: 87.1 FL — SIGNIFICANT CHANGE UP (ref 80–100)
NRBC # BLD: 0 /100 WBCS — SIGNIFICANT CHANGE UP (ref 0–0)
OXYHGB MFR BLDMV: 62.9 % — LOW (ref 90–95)
PLATELET # BLD AUTO: 207 K/UL — SIGNIFICANT CHANGE UP (ref 150–400)
POTASSIUM SERPL-MCNC: 4.2 MMOL/L — SIGNIFICANT CHANGE UP (ref 3.5–5.3)
POTASSIUM SERPL-MCNC: 5.9 MMOL/L — HIGH (ref 3.5–5.3)
POTASSIUM SERPL-SCNC: 4.2 MMOL/L — SIGNIFICANT CHANGE UP (ref 3.5–5.3)
POTASSIUM SERPL-SCNC: 5.9 MMOL/L — HIGH (ref 3.5–5.3)
RBC # BLD: 5.44 M/UL — SIGNIFICANT CHANGE UP (ref 4.2–5.8)
RBC # FLD: 13.9 % — SIGNIFICANT CHANGE UP (ref 10.3–14.5)
SAO2 % BLD: 63.1 % — SIGNIFICANT CHANGE UP (ref 60–90)
SODIUM SERPL-SCNC: 139 MMOL/L — SIGNIFICANT CHANGE UP (ref 135–145)
SODIUM SERPL-SCNC: 139 MMOL/L — SIGNIFICANT CHANGE UP (ref 135–145)
WBC # BLD: 8.32 K/UL — SIGNIFICANT CHANGE UP (ref 3.8–10.5)
WBC # FLD AUTO: 8.32 K/UL — SIGNIFICANT CHANGE UP (ref 3.8–10.5)

## 2024-03-06 PROCEDURE — C1894: CPT

## 2024-03-06 PROCEDURE — 85027 COMPLETE CBC AUTOMATED: CPT

## 2024-03-06 PROCEDURE — 80048 BASIC METABOLIC PNL TOTAL CA: CPT

## 2024-03-06 PROCEDURE — C1887: CPT

## 2024-03-06 PROCEDURE — C1769: CPT

## 2024-03-06 PROCEDURE — 99152 MOD SED SAME PHYS/QHP 5/>YRS: CPT

## 2024-03-06 PROCEDURE — C1889: CPT

## 2024-03-06 PROCEDURE — 36415 COLL VENOUS BLD VENIPUNCTURE: CPT

## 2024-03-06 PROCEDURE — 93456 R HRT CORONARY ARTERY ANGIO: CPT | Mod: 26

## 2024-03-06 PROCEDURE — 93010 ELECTROCARDIOGRAM REPORT: CPT

## 2024-03-06 PROCEDURE — 93005 ELECTROCARDIOGRAM TRACING: CPT

## 2024-03-06 PROCEDURE — 93456 R HRT CORONARY ARTERY ANGIO: CPT

## 2024-03-06 PROCEDURE — 82803 BLOOD GASES ANY COMBINATION: CPT

## 2024-03-06 NOTE — ASU PATIENT PROFILE, ADULT - FALL HARM RISK - DEVICES
None Cartilage Graft Text: The defect edges were debeveled with a #15 scalpel blade.  Given the location of the defect, shape of the defect, the fact the defect involved a full thickness cartilage defect a cartilage graft was deemed most appropriate.  An appropriate donor site was identified, cleansed, and anesthetized. The cartilage graft was then harvested and transferred to the recipient site, oriented appropriately and then sutured into place.  The secondary defect was then repaired using a primary closure.

## 2024-03-06 NOTE — H&P CARDIOLOGY - HISTORY OF PRESENT ILLNESS
74 year old male h/o HTN, HLD, T2DM (uncontrolled A1c 8.6%), small MCA infarct 2017 (residual mild confusion), MI with PCI/stent x 3 2017, thoracic aneurysm diagnosed 2021 who presents for left and right heart cath the setting of pre surgical clearance for aneurysm repair in April with Dr Ortiz. Pt does report GREENE, but states he is not very active.     Cards: Dr Demetrice Joyce 74 year old male h/o HTN, HLD, T2DM (uncontrolled A1c 8.6%), small MCA infarct 2017 (residual mild confusion), NSTEMI with PCI ENRIQUE x 2 (dRCA and pLAD)2017, thoracic aneurysm diagnosed 2021 who presents for left and right heart cath the setting of pre surgical clearance for aneurysm repair in April with Dr Ortiz. Pt does report GREENE, but states he is not very active.     Cards: Dr Demetrice Joyce

## 2024-03-06 NOTE — ASU PATIENT PROFILE, ADULT - FALL HARM RISK - RISK INTERVENTIONS

## 2024-03-06 NOTE — ASU DISCHARGE PLAN (ADULT/PEDIATRIC) - CARE PROVIDER_API CALL
Demetrice Joyce  Cardiovascular Disease  [address] [place] [address], [address] [place] [id number]  Phone: [phone]  Fax: [phone]  Established Patient  Follow Up Time: Routine

## 2024-03-06 NOTE — ASU PREOP CHECKLIST - ALLERGY BAND ON
Called pt to provide her with number to [employer], pt states that she was able to get it taken care of already.  No further questions or concerns.    no known allergies

## 2024-03-06 NOTE — H&P CARDIOLOGY - NSICDXPASTSURGICALHX_GEN_ALL_CORE_FT
PAST SURGICAL HISTORY:  Bilateral inguinal hernia     H/O lithotripsy     S/P medial meniscal repair and ligament surgery    S/P tonsillectomy

## 2024-03-06 NOTE — ASU DISCHARGE PLAN (ADULT/PEDIATRIC) - NS MD DC FALL RISK RISK
For information on Fall & Injury Prevention, visit: [email or web address] OR  [email or web address] OR  https://www.cdc.gov/steadi/patient.html

## 2024-03-06 NOTE — H&P CARDIOLOGY - NSICDXPASTMEDICALHX_GEN_ALL_CORE_FT
PAST MEDICAL HISTORY:  CHF (congestive heart failure) 1/2017 stents x3    Confusion from stroke    Coronary artery disease MI 12/22/2016    CVA (cerebral vascular accident) 12/22/2016    Hearing decreased     HTN (hypertension)     Hyperlipemia     Kidney stones     Type 2 diabetes mellitus 2016

## 2024-03-07 ENCOUNTER — APPOINTMENT (OUTPATIENT)
Dept: NEUROLOGY | Facility: CLINIC | Age: 74
End: 2024-03-07
Payer: MEDICARE

## 2024-03-07 PROCEDURE — 93880 EXTRACRANIAL BILAT STUDY: CPT

## 2024-03-07 PROCEDURE — 93892 TCD EMBOLI DETECT W/O INJ: CPT

## 2024-03-07 PROCEDURE — 93886 INTRACRANIAL COMPLETE STUDY: CPT

## 2024-04-01 ENCOUNTER — OUTPATIENT (OUTPATIENT)
Dept: OUTPATIENT SERVICES | Facility: HOSPITAL | Age: 74
LOS: 1 days | End: 2024-04-01
Payer: MEDICARE

## 2024-04-01 ENCOUNTER — RESULT REVIEW (OUTPATIENT)
Age: 74
End: 2024-04-01

## 2024-04-01 VITALS
RESPIRATION RATE: 14 BRPM | HEART RATE: 77 BPM | DIASTOLIC BLOOD PRESSURE: 80 MMHG | TEMPERATURE: 98 F | HEIGHT: 67 IN | SYSTOLIC BLOOD PRESSURE: 124 MMHG | OXYGEN SATURATION: 98 % | WEIGHT: 162.92 LBS

## 2024-04-01 DIAGNOSIS — K40.20 BILATERAL INGUINAL HERNIA, WITHOUT OBSTRUCTION OR GANGRENE, NOT SPECIFIED AS RECURRENT: Chronic | ICD-10-CM

## 2024-04-01 DIAGNOSIS — Z29.9 ENCOUNTER FOR PROPHYLACTIC MEASURES, UNSPECIFIED: ICD-10-CM

## 2024-04-01 DIAGNOSIS — Z90.89 ACQUIRED ABSENCE OF OTHER ORGANS: Chronic | ICD-10-CM

## 2024-04-01 DIAGNOSIS — I25.10 ATHEROSCLEROTIC HEART DISEASE OF NATIVE CORONARY ARTERY WITHOUT ANGINA PECTORIS: ICD-10-CM

## 2024-04-01 DIAGNOSIS — Z01.818 ENCOUNTER FOR OTHER PREPROCEDURAL EXAMINATION: ICD-10-CM

## 2024-04-01 DIAGNOSIS — E11.9 TYPE 2 DIABETES MELLITUS WITHOUT COMPLICATIONS: ICD-10-CM

## 2024-04-01 DIAGNOSIS — Z98.89 OTHER SPECIFIED POSTPROCEDURAL STATES: Chronic | ICD-10-CM

## 2024-04-01 LAB
A1C WITH ESTIMATED AVERAGE GLUCOSE RESULT: 9.9 % — HIGH (ref 4–5.6)
ANION GAP SERPL CALC-SCNC: 15 MMOL/L — SIGNIFICANT CHANGE UP (ref 5–17)
BLD GP AB SCN SERPL QL: NEGATIVE — SIGNIFICANT CHANGE UP
BUN SERPL-MCNC: 24 MG/DL — HIGH (ref 7–23)
CALCIUM SERPL-MCNC: 9.5 MG/DL — SIGNIFICANT CHANGE UP (ref 8.4–10.5)
CHLORIDE SERPL-SCNC: 104 MMOL/L — SIGNIFICANT CHANGE UP (ref 96–108)
CO2 SERPL-SCNC: 21 MMOL/L — LOW (ref 22–31)
CREAT SERPL-MCNC: 1.13 MG/DL — SIGNIFICANT CHANGE UP (ref 0.5–1.3)
EGFR: 67 ML/MIN/1.73M2 — SIGNIFICANT CHANGE UP
ESTIMATED AVERAGE GLUCOSE: 237 MG/DL — HIGH (ref 68–114)
GLUCOSE SERPL-MCNC: 302 MG/DL — HIGH (ref 70–99)
HCT VFR BLD CALC: 47.9 % — SIGNIFICANT CHANGE UP (ref 39–50)
HGB BLD-MCNC: 15.2 G/DL — SIGNIFICANT CHANGE UP (ref 13–17)
MCHC RBC-ENTMCNC: 27.4 PG — SIGNIFICANT CHANGE UP (ref 27–34)
MCHC RBC-ENTMCNC: 31.7 GM/DL — LOW (ref 32–36)
MCV RBC AUTO: 86.3 FL — SIGNIFICANT CHANGE UP (ref 80–100)
NRBC # BLD: 0 /100 WBCS — SIGNIFICANT CHANGE UP (ref 0–0)
PLATELET # BLD AUTO: 230 K/UL — SIGNIFICANT CHANGE UP (ref 150–400)
POTASSIUM SERPL-MCNC: 4.6 MMOL/L — SIGNIFICANT CHANGE UP (ref 3.5–5.3)
POTASSIUM SERPL-SCNC: 4.6 MMOL/L — SIGNIFICANT CHANGE UP (ref 3.5–5.3)
RBC # BLD: 5.55 M/UL — SIGNIFICANT CHANGE UP (ref 4.2–5.8)
RBC # FLD: 14 % — SIGNIFICANT CHANGE UP (ref 10.3–14.5)
RH IG SCN BLD-IMP: NEGATIVE — SIGNIFICANT CHANGE UP
SODIUM SERPL-SCNC: 140 MMOL/L — SIGNIFICANT CHANGE UP (ref 135–145)
WBC # BLD: 7.87 K/UL — SIGNIFICANT CHANGE UP (ref 3.8–10.5)
WBC # FLD AUTO: 7.87 K/UL — SIGNIFICANT CHANGE UP (ref 3.8–10.5)

## 2024-04-01 PROCEDURE — 71046 X-RAY EXAM CHEST 2 VIEWS: CPT | Mod: 26

## 2024-04-01 PROCEDURE — 87640 STAPH A DNA AMP PROBE: CPT

## 2024-04-01 PROCEDURE — 83036 HEMOGLOBIN GLYCOSYLATED A1C: CPT

## 2024-04-01 PROCEDURE — G0463: CPT

## 2024-04-01 PROCEDURE — 86923 COMPATIBILITY TEST ELECTRIC: CPT

## 2024-04-01 PROCEDURE — 80048 BASIC METABOLIC PNL TOTAL CA: CPT

## 2024-04-01 PROCEDURE — 87641 MR-STAPH DNA AMP PROBE: CPT

## 2024-04-01 PROCEDURE — 85027 COMPLETE CBC AUTOMATED: CPT

## 2024-04-01 PROCEDURE — 86900 BLOOD TYPING SEROLOGIC ABO: CPT

## 2024-04-01 PROCEDURE — 71046 X-RAY EXAM CHEST 2 VIEWS: CPT

## 2024-04-01 PROCEDURE — 86850 RBC ANTIBODY SCREEN: CPT

## 2024-04-01 PROCEDURE — 93880 EXTRACRANIAL BILAT STUDY: CPT | Mod: 26

## 2024-04-01 PROCEDURE — 86901 BLOOD TYPING SEROLOGIC RH(D): CPT

## 2024-04-01 PROCEDURE — 93880 EXTRACRANIAL BILAT STUDY: CPT

## 2024-04-01 RX ORDER — FUROSEMIDE 40 MG
1 TABLET ORAL
Refills: 0 | DISCHARGE

## 2024-04-01 RX ORDER — SODIUM CHLORIDE 9 MG/ML
3 INJECTION INTRAMUSCULAR; INTRAVENOUS; SUBCUTANEOUS EVERY 8 HOURS
Refills: 0 | Status: DISCONTINUED | OUTPATIENT
Start: 2024-04-08 | End: 2024-04-22

## 2024-04-01 NOTE — H&P PST ADULT - HISTORY OF PRESENT ILLNESS
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  Py presents for PST for ascending aorta replacement, aortic valve replacement, coronary artery bypass grafting with Dr. Ilia Ortiz on 4/9/2024.  He will be admitted day before on 4/8/2024.  Pt denies any fever, chills, SOB, CP, palpitations, dizziness, HA, urinary or BM issues.

## 2024-04-01 NOTE — H&P PST ADULT - PROBLEM SELECTOR PLAN 1
PT scheduled for ascending aorta replacement, aortic valve replacement, coronary artery bypass grafting with Dr. Ilia Ortiz on 4/9/2024.  PT getting admitted in 4/8/2024.  -Pre op instructions provided.  -Chlorhexidine wash and instructions provided.  -ERP instructions.  LABS: CBC, BMP, T&S, MRSA nasal swab, HgbA1c done at PST. CXR and Carotid doppler ordered at PST.

## 2024-04-01 NOTE — H&P PST ADULT - PROBLEM SELECTOR PLAN 2
May continue on Aspirin 81mg.  Confirmed with Yohana Jauregui NP (CTS).    Stop Plavix 7 days prior to procedure.  Last dose 4/1/2024

## 2024-04-01 NOTE — H&P PST ADULT - ASSESSMENT
DASI score:  DASI activity:  Loose teeth or denture: Denies any loose teeth, or dentures.    RENAI VTE 2.0 SCORE [CLOT updated 2019]    AGE RELATED RISK FACTORS                                                       MOBILITY RELATED FACTORS  [ ] Age 41-60 years                                            (1 Point)                    [ ] Bed rest                                                        (1 Point)  [ ] Age: 61-74 years                                           (2 Points)                  [ ] Plaster cast                                                   (2 Points)  [X ] Age= 75 years                                              (3 Points)                    [ ] Bed bound for more than 72 hours                 (2 Points)    DISEASE RELATED RISK FACTORS                                               GENDER SPECIFIC FACTORS  [ ] Edema in the lower extremities                       (1 Point)              [ ] Pregnancy                                                     (1 Point)  [ ] Varicose veins                                               (1 Point)                     [ ] Post-partum < 6 weeks                                   (1 Point)             [ X] BMI > 25 Kg/m2                                            (1 Point)                     [ ] Hormonal therapy  or oral contraception          (1 Point)                 [ ] Sepsis (in the previous month)                        (1 Point)               [ ] History of pregnancy complications                 (1 point)  [ ] Pneumonia or serious lung disease                                               [ ] Unexplained or recurrent                     (1 Point)           (in the previous month)                               (1 Point)  [ ] Abnormal pulmonary function test                     (1 Point)                 SURGERY RELATED RISK FACTORS  [ ] Acute myocardial infarction                              (1 Point)               [ ]  Section                                             (1 Point)  [ ] Congestive heart failure (in the previous month)  (1 Point)      [ ] Minor surgery                                                  (1 Point)   [ ] Inflammatory bowel disease                             (1 Point)               [ ] Arthroscopic surgery                                        (2 Points)  [ ] Central venous access                                      (2 Points)                [ X] General surgery lasting more than 45 minutes (2 points)  [ ] Malignancy- Present or previous                   (2 Points)                [ ] Elective arthroplasty                                         (5 points)    [ ] Stroke (in the previous month)                          (5 Points)                                                                                                                                                           HEMATOLOGY RELATED FACTORS                                                 TRAUMA RELATED RISK FACTORS  [ ] Prior episodes of VTE                                     (3 Points)                [ ] Fracture of the hip, pelvis, or leg                       (5 Points)  [ ] Positive family history for VTE                         (3 Points)             [ ] Acute spinal cord injury (in the previous month)  (5 Points)  [ ] Prothrombin 77724 A                                     (3 Points)               [ ] Paralysis  (less than 1 month)                             (5 Points)  [ ] Factor V Leiden                                             (3 Points)                  [ ] Multiple Trauma within 1 month                        (5 Points)  [ ] Lupus anticoagulants                                     (3 Points)                                                           [ ] Anticardiolipin antibodies                               (3 Points)                                                       [ ] High homocysteine in the blood                      (3 Points)                                             [ ] Other congenital or acquired thrombophilia      (3 Points)                                                [ ] Heparin induced thrombocytopenia                  (3 Points)                                     Total Score [       6   ] DASI score: 5.72  DASI activity: able to walk 1-2 blocks, incline/stairs, shopping, self care without SOB, CP.  Loose teeth or denture: Denies any loose teeth, or dentures.    RENAI VTE 2.0 SCORE [CLOT updated 2019]    AGE RELATED RISK FACTORS                                                       MOBILITY RELATED FACTORS  [ ] Age 41-60 years                                            (1 Point)                    [ ] Bed rest                                                        (1 Point)  [ ] Age: 61-74 years                                           (2 Points)                  [ ] Plaster cast                                                   (2 Points)  [X ] Age= 75 years                                              (3 Points)                    [ ] Bed bound for more than 72 hours                 (2 Points)    DISEASE RELATED RISK FACTORS                                               GENDER SPECIFIC FACTORS  [ ] Edema in the lower extremities                       (1 Point)              [ ] Pregnancy                                                     (1 Point)  [ ] Varicose veins                                               (1 Point)                     [ ] Post-partum < 6 weeks                                   (1 Point)             [ X] BMI > 25 Kg/m2                                            (1 Point)                     [ ] Hormonal therapy  or oral contraception          (1 Point)                 [ ] Sepsis (in the previous month)                        (1 Point)               [ ] History of pregnancy complications                 (1 point)  [ ] Pneumonia or serious lung disease                                               [ ] Unexplained or recurrent                     (1 Point)           (in the previous month)                               (1 Point)  [ ] Abnormal pulmonary function test                     (1 Point)                 SURGERY RELATED RISK FACTORS  [ ] Acute myocardial infarction                              (1 Point)               [ ]  Section                                             (1 Point)  [ ] Congestive heart failure (in the previous month)  (1 Point)      [ ] Minor surgery                                                  (1 Point)   [ ] Inflammatory bowel disease                             (1 Point)               [ ] Arthroscopic surgery                                        (2 Points)  [ ] Central venous access                                      (2 Points)                [ X] General surgery lasting more than 45 minutes (2 points)  [ ] Malignancy- Present or previous                   (2 Points)                [ ] Elective arthroplasty                                         (5 points)    [ ] Stroke (in the previous month)                          (5 Points)                                                                                                                                                           HEMATOLOGY RELATED FACTORS                                                 TRAUMA RELATED RISK FACTORS  [ ] Prior episodes of VTE                                     (3 Points)                [ ] Fracture of the hip, pelvis, or leg                       (5 Points)  [ ] Positive family history for VTE                         (3 Points)             [ ] Acute spinal cord injury (in the previous month)  (5 Points)  [ ] Prothrombin 67703 A                                     (3 Points)               [ ] Paralysis  (less than 1 month)                             (5 Points)  [ ] Factor V Leiden                                             (3 Points)                  [ ] Multiple Trauma within 1 month                        (5 Points)  [ ] Lupus anticoagulants                                     (3 Points)                                                           [ ] Anticardiolipin antibodies                               (3 Points)                                                       [ ] High homocysteine in the blood                      (3 Points)                                             [ ] Other congenital or acquired thrombophilia      (3 Points)                                                [ ] Heparin induced thrombocytopenia                  (3 Points)                                     Total Score [       6   ]

## 2024-04-01 NOTE — H&P PST ADULT - PROBLEM SELECTOR PLAN 3
Hold Metformin day of procedure 4/9/2024  -Stop Farxiga 4 days prior to procedure.  Last dose on 4/4/2024.

## 2024-04-02 LAB
MRSA PCR RESULT.: SIGNIFICANT CHANGE UP
S AUREUS DNA NOSE QL NAA+PROBE: SIGNIFICANT CHANGE UP

## 2024-04-07 ENCOUNTER — INPATIENT (INPATIENT)
Facility: HOSPITAL | Age: 74
LOS: 14 days | Discharge: NURSING, REHAB OR LONG-TERM CARE | DRG: 303 | End: 2024-04-22
Attending: THORACIC SURGERY (CARDIOTHORACIC VASCULAR SURGERY) | Admitting: THORACIC SURGERY (CARDIOTHORACIC VASCULAR SURGERY)
Payer: MEDICARE

## 2024-04-07 ENCOUNTER — TRANSCRIPTION ENCOUNTER (OUTPATIENT)
Age: 74
End: 2024-04-07

## 2024-04-07 VITALS
OXYGEN SATURATION: 97 % | RESPIRATION RATE: 18 BRPM | SYSTOLIC BLOOD PRESSURE: 136 MMHG | HEART RATE: 91 BPM | TEMPERATURE: 98 F | WEIGHT: 166.89 LBS | DIASTOLIC BLOOD PRESSURE: 66 MMHG | HEIGHT: 67 IN

## 2024-04-07 DIAGNOSIS — K40.20 BILATERAL INGUINAL HERNIA, WITHOUT OBSTRUCTION OR GANGRENE, NOT SPECIFIED AS RECURRENT: Chronic | ICD-10-CM

## 2024-04-07 DIAGNOSIS — I25.10 ATHEROSCLEROTIC HEART DISEASE OF NATIVE CORONARY ARTERY WITHOUT ANGINA PECTORIS: ICD-10-CM

## 2024-04-07 DIAGNOSIS — Z90.89 ACQUIRED ABSENCE OF OTHER ORGANS: Chronic | ICD-10-CM

## 2024-04-07 DIAGNOSIS — Z98.89 OTHER SPECIFIED POSTPROCEDURAL STATES: Chronic | ICD-10-CM

## 2024-04-07 LAB
A1C WITH ESTIMATED AVERAGE GLUCOSE RESULT: 9.8 % — HIGH (ref 4–5.6)
ALBUMIN SERPL ELPH-MCNC: 3.9 G/DL — SIGNIFICANT CHANGE UP (ref 3.3–5)
ALP SERPL-CCNC: 77 U/L — SIGNIFICANT CHANGE UP (ref 40–120)
ALT FLD-CCNC: 18 U/L — SIGNIFICANT CHANGE UP (ref 10–45)
ANION GAP SERPL CALC-SCNC: 11 MMOL/L — SIGNIFICANT CHANGE UP (ref 5–17)
APPEARANCE UR: CLEAR — SIGNIFICANT CHANGE UP
APTT BLD: 30.6 SEC — SIGNIFICANT CHANGE UP (ref 24.5–35.6)
AST SERPL-CCNC: 30 U/L — SIGNIFICANT CHANGE UP (ref 10–40)
BASOPHILS # BLD AUTO: 0.08 K/UL — SIGNIFICANT CHANGE UP (ref 0–0.2)
BASOPHILS NFR BLD AUTO: 1.1 % — SIGNIFICANT CHANGE UP (ref 0–2)
BILIRUB SERPL-MCNC: 0.7 MG/DL — SIGNIFICANT CHANGE UP (ref 0.2–1.2)
BILIRUB UR-MCNC: NEGATIVE — SIGNIFICANT CHANGE UP
BLD GP AB SCN SERPL QL: NEGATIVE — SIGNIFICANT CHANGE UP
BUN SERPL-MCNC: 19 MG/DL — SIGNIFICANT CHANGE UP (ref 7–23)
CALCIUM SERPL-MCNC: 9.3 MG/DL — SIGNIFICANT CHANGE UP (ref 8.4–10.5)
CHLORIDE SERPL-SCNC: 107 MMOL/L — SIGNIFICANT CHANGE UP (ref 96–108)
CO2 SERPL-SCNC: 20 MMOL/L — LOW (ref 22–31)
COLOR SPEC: YELLOW — SIGNIFICANT CHANGE UP
CREAT SERPL-MCNC: 0.95 MG/DL — SIGNIFICANT CHANGE UP (ref 0.5–1.3)
DIFF PNL FLD: NEGATIVE — SIGNIFICANT CHANGE UP
EGFR: 83 ML/MIN/1.73M2 — SIGNIFICANT CHANGE UP
EOSINOPHIL # BLD AUTO: 0.08 K/UL — SIGNIFICANT CHANGE UP (ref 0–0.5)
EOSINOPHIL NFR BLD AUTO: 1.1 % — SIGNIFICANT CHANGE UP (ref 0–6)
ESTIMATED AVERAGE GLUCOSE: 235 MG/DL — HIGH (ref 68–114)
GLUCOSE BLDC GLUCOMTR-MCNC: 133 MG/DL — HIGH (ref 70–99)
GLUCOSE BLDC GLUCOMTR-MCNC: 157 MG/DL — HIGH (ref 70–99)
GLUCOSE SERPL-MCNC: 177 MG/DL — HIGH (ref 70–99)
GLUCOSE UR QL: >=1000 MG/DL
HCT VFR BLD CALC: 47.3 % — SIGNIFICANT CHANGE UP (ref 39–50)
HGB BLD-MCNC: 15.6 G/DL — SIGNIFICANT CHANGE UP (ref 13–17)
IMM GRANULOCYTES NFR BLD AUTO: 0.4 % — SIGNIFICANT CHANGE UP (ref 0–0.9)
INR BLD: 1.03 RATIO — SIGNIFICANT CHANGE UP (ref 0.85–1.18)
KETONES UR-MCNC: ABNORMAL MG/DL
LEUKOCYTE ESTERASE UR-ACNC: NEGATIVE — SIGNIFICANT CHANGE UP
LYMPHOCYTES # BLD AUTO: 2.36 K/UL — SIGNIFICANT CHANGE UP (ref 1–3.3)
LYMPHOCYTES # BLD AUTO: 33.8 % — SIGNIFICANT CHANGE UP (ref 13–44)
MCHC RBC-ENTMCNC: 27.9 PG — SIGNIFICANT CHANGE UP (ref 27–34)
MCHC RBC-ENTMCNC: 33 GM/DL — SIGNIFICANT CHANGE UP (ref 32–36)
MCV RBC AUTO: 84.6 FL — SIGNIFICANT CHANGE UP (ref 80–100)
MONOCYTES # BLD AUTO: 0.7 K/UL — SIGNIFICANT CHANGE UP (ref 0–0.9)
MONOCYTES NFR BLD AUTO: 10 % — SIGNIFICANT CHANGE UP (ref 2–14)
MRSA PCR RESULT.: SIGNIFICANT CHANGE UP
NEUTROPHILS # BLD AUTO: 3.73 K/UL — SIGNIFICANT CHANGE UP (ref 1.8–7.4)
NEUTROPHILS NFR BLD AUTO: 53.6 % — SIGNIFICANT CHANGE UP (ref 43–77)
NITRITE UR-MCNC: NEGATIVE — SIGNIFICANT CHANGE UP
NRBC # BLD: 0 /100 WBCS — SIGNIFICANT CHANGE UP (ref 0–0)
NT-PROBNP SERPL-SCNC: 3919 PG/ML — HIGH (ref 0–300)
PA ADP PRP-ACNC: 189 PRU — LOW (ref 194–417)
PH UR: 5 — SIGNIFICANT CHANGE UP (ref 5–8)
PLATELET # BLD AUTO: 212 K/UL — SIGNIFICANT CHANGE UP (ref 150–400)
POTASSIUM SERPL-MCNC: 4.1 MMOL/L — SIGNIFICANT CHANGE UP (ref 3.5–5.3)
POTASSIUM SERPL-MCNC: 6.3 MMOL/L — CRITICAL HIGH (ref 3.5–5.3)
POTASSIUM SERPL-SCNC: 4.1 MMOL/L — SIGNIFICANT CHANGE UP (ref 3.5–5.3)
POTASSIUM SERPL-SCNC: 6.3 MMOL/L — CRITICAL HIGH (ref 3.5–5.3)
PROT SERPL-MCNC: 6.8 G/DL — SIGNIFICANT CHANGE UP (ref 6–8.3)
PROT UR-MCNC: SIGNIFICANT CHANGE UP MG/DL
PROTHROM AB SERPL-ACNC: 11.3 SEC — SIGNIFICANT CHANGE UP (ref 9.5–13)
RBC # BLD: 5.59 M/UL — SIGNIFICANT CHANGE UP (ref 4.2–5.8)
RBC # FLD: 13.9 % — SIGNIFICANT CHANGE UP (ref 10.3–14.5)
RH IG SCN BLD-IMP: NEGATIVE — SIGNIFICANT CHANGE UP
S AUREUS DNA NOSE QL NAA+PROBE: SIGNIFICANT CHANGE UP
SODIUM SERPL-SCNC: 138 MMOL/L — SIGNIFICANT CHANGE UP (ref 135–145)
SP GR SPEC: >1.03 — HIGH (ref 1–1.03)
T4 FREE SERPL-MCNC: 1.1 NG/DL — SIGNIFICANT CHANGE UP (ref 0.9–1.8)
TSH SERPL-MCNC: 1.71 UIU/ML — SIGNIFICANT CHANGE UP (ref 0.27–4.2)
UROBILINOGEN FLD QL: 0.2 MG/DL — SIGNIFICANT CHANGE UP (ref 0.2–1)
WBC # BLD: 6.98 K/UL — SIGNIFICANT CHANGE UP (ref 3.8–10.5)
WBC # FLD AUTO: 6.98 K/UL — SIGNIFICANT CHANGE UP (ref 3.8–10.5)

## 2024-04-07 PROCEDURE — 71045 X-RAY EXAM CHEST 1 VIEW: CPT | Mod: 26

## 2024-04-07 PROCEDURE — 70450 CT HEAD/BRAIN W/O DYE: CPT | Mod: 26

## 2024-04-07 PROCEDURE — 93010 ELECTROCARDIOGRAM REPORT: CPT

## 2024-04-07 RX ORDER — GABAPENTIN 400 MG/1
300 CAPSULE ORAL ONCE
Refills: 0 | Status: COMPLETED | OUTPATIENT
Start: 2024-04-07 | End: 2024-04-08

## 2024-04-07 RX ORDER — SODIUM CHLORIDE 9 MG/ML
3 INJECTION INTRAMUSCULAR; INTRAVENOUS; SUBCUTANEOUS EVERY 8 HOURS
Refills: 0 | Status: DISCONTINUED | OUTPATIENT
Start: 2024-04-07 | End: 2024-04-08

## 2024-04-07 RX ORDER — ASPIRIN/CALCIUM CARB/MAGNESIUM 324 MG
81 TABLET ORAL DAILY
Refills: 0 | Status: DISCONTINUED | OUTPATIENT
Start: 2024-04-07 | End: 2024-04-08

## 2024-04-07 RX ORDER — MUPIROCIN 20 MG/G
1 OINTMENT TOPICAL
Refills: 0 | Status: DISCONTINUED | OUTPATIENT
Start: 2024-04-07 | End: 2024-04-08

## 2024-04-07 RX ORDER — INSULIN LISPRO 100/ML
3 VIAL (ML) SUBCUTANEOUS
Refills: 0 | Status: DISCONTINUED | OUTPATIENT
Start: 2024-04-07 | End: 2024-04-08

## 2024-04-07 RX ORDER — INSULIN GLARGINE 100 [IU]/ML
10 INJECTION, SOLUTION SUBCUTANEOUS AT BEDTIME
Refills: 0 | Status: DISCONTINUED | OUTPATIENT
Start: 2024-04-07 | End: 2024-04-08

## 2024-04-07 RX ORDER — METOPROLOL TARTRATE 50 MG
25 TABLET ORAL DAILY
Refills: 0 | Status: DISCONTINUED | OUTPATIENT
Start: 2024-04-07 | End: 2024-04-08

## 2024-04-07 RX ORDER — ALPRAZOLAM 0.25 MG
0.25 TABLET ORAL EVERY 8 HOURS
Refills: 0 | Status: DISCONTINUED | OUTPATIENT
Start: 2024-04-07 | End: 2024-04-08

## 2024-04-07 RX ORDER — CEFUROXIME AXETIL 250 MG
1500 TABLET ORAL ONCE
Refills: 0 | Status: DISCONTINUED | OUTPATIENT
Start: 2024-04-07 | End: 2024-04-08

## 2024-04-07 RX ORDER — INSULIN LISPRO 100/ML
VIAL (ML) SUBCUTANEOUS
Refills: 0 | Status: DISCONTINUED | OUTPATIENT
Start: 2024-04-07 | End: 2024-04-08

## 2024-04-07 RX ORDER — TAMSULOSIN HYDROCHLORIDE 0.4 MG/1
1 CAPSULE ORAL
Refills: 0 | DISCHARGE

## 2024-04-07 RX ORDER — ASCORBIC ACID 60 MG
2000 TABLET,CHEWABLE ORAL ONCE
Refills: 0 | Status: COMPLETED | OUTPATIENT
Start: 2024-04-07 | End: 2024-04-07

## 2024-04-07 RX ORDER — ATORVASTATIN CALCIUM 80 MG/1
80 TABLET, FILM COATED ORAL AT BEDTIME
Refills: 0 | Status: DISCONTINUED | OUTPATIENT
Start: 2024-04-07 | End: 2024-04-08

## 2024-04-07 RX ORDER — TAMSULOSIN HYDROCHLORIDE 0.4 MG/1
0.4 CAPSULE ORAL AT BEDTIME
Refills: 0 | Status: DISCONTINUED | OUTPATIENT
Start: 2024-04-07 | End: 2024-04-08

## 2024-04-07 RX ORDER — CHLORHEXIDINE GLUCONATE 213 G/1000ML
1 SOLUTION TOPICAL ONCE
Refills: 0 | Status: COMPLETED | OUTPATIENT
Start: 2024-04-07 | End: 2024-04-07

## 2024-04-07 RX ORDER — CHLORHEXIDINE GLUCONATE 213 G/1000ML
15 SOLUTION TOPICAL ONCE
Refills: 0 | Status: COMPLETED | OUTPATIENT
Start: 2024-04-07 | End: 2024-04-08

## 2024-04-07 RX ORDER — ACETAMINOPHEN 500 MG
1000 TABLET ORAL ONCE
Refills: 0 | Status: COMPLETED | OUTPATIENT
Start: 2024-04-07 | End: 2024-04-08

## 2024-04-07 RX ORDER — FAMOTIDINE 10 MG/ML
20 INJECTION INTRAVENOUS DAILY
Refills: 0 | Status: DISCONTINUED | OUTPATIENT
Start: 2024-04-07 | End: 2024-04-08

## 2024-04-07 RX ADMIN — CHLORHEXIDINE GLUCONATE 1 APPLICATION(S): 213 SOLUTION TOPICAL at 19:54

## 2024-04-07 RX ADMIN — Medication 81 MILLIGRAM(S): at 17:52

## 2024-04-07 RX ADMIN — MUPIROCIN 1 APPLICATION(S): 20 OINTMENT TOPICAL at 17:55

## 2024-04-07 RX ADMIN — INSULIN GLARGINE 10 UNIT(S): 100 INJECTION, SOLUTION SUBCUTANEOUS at 22:02

## 2024-04-07 RX ADMIN — Medication 2000 MILLIGRAM(S): at 21:57

## 2024-04-07 RX ADMIN — Medication 3 UNIT(S): at 17:51

## 2024-04-07 RX ADMIN — SODIUM CHLORIDE 3 MILLILITER(S): 9 INJECTION INTRAMUSCULAR; INTRAVENOUS; SUBCUTANEOUS at 22:00

## 2024-04-07 RX ADMIN — ATORVASTATIN CALCIUM 80 MILLIGRAM(S): 80 TABLET, FILM COATED ORAL at 21:57

## 2024-04-07 RX ADMIN — FAMOTIDINE 20 MILLIGRAM(S): 10 INJECTION INTRAVENOUS at 17:52

## 2024-04-07 RX ADMIN — Medication 0.25 MILLIGRAM(S): at 15:33

## 2024-04-07 RX ADMIN — Medication 0.25 MILLIGRAM(S): at 23:07

## 2024-04-07 RX ADMIN — TAMSULOSIN HYDROCHLORIDE 0.4 MILLIGRAM(S): 0.4 CAPSULE ORAL at 21:57

## 2024-04-07 RX ADMIN — Medication 2: at 17:51

## 2024-04-07 RX ADMIN — Medication 25 MILLIGRAM(S): at 17:52

## 2024-04-07 NOTE — H&P ADULT - SOCIAL HISTORY
No DDX: pyelonephritis, kidney stones, no abdominal tenderness or guarding to suggest surgical abdomen.   PLAN: CBC, CMP, UA, u culture, pain control, CT renal, reassess.

## 2024-04-07 NOTE — H&P ADULT - ASSESSMENT
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  Py presents for PST for ascending aorta replacement, aortic valve replacement, coronary artery bypass grafting with Dr. Ilia Ortiz.  He has been admitted preop for w/u - CT head, pt has not taken Farxiga since Wednesday as per orders from Dr. Goddard NP.  NPO after midnight for OR

## 2024-04-07 NOTE — PRE PROCEDURE NOTE - PRE PROCEDURE EVALUATION
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  Py presents for PST for ascending aorta replacement, aortic valve replacement, coronary artery bypass grafting with Dr. Ilia Ortiz.  He has been admitted preop for w/u - CT head, pt has not taken Farxiga since Wednesday as per orders from Dr. Goddard NP.  NPO after midnight for OR   (07 Apr 2024 14:43)    PAST MEDICAL & SURGICAL HISTORY:  HTN (hypertension)  Hearing decreased  CVA (cerebral vascular accident)  12/22/2016  Hyperlipemia  Kidney stones  Coronary artery disease  MI 12/22/2016  CHF (congestive heart failure)  1/2017 stents x3  Type 2 diabetes mellitus '16  Confusion from stroke  S/P medial meniscal repair  and ligament surgery  H/O lithotripsy  S/P tonsillectomy  Bilateral inguinal hernia    MEDICATIONS  (STANDING):  acetaminophen     Tablet .. 1000 milliGRAM(s) Oral once  ascorbic acid 2000 milliGRAM(s) Oral once  aspirin enteric coated 81 milliGRAM(s) Oral daily  atorvastatin 80 milliGRAM(s) Oral at bedtime  cefuroxime  IVPB 1500 milliGRAM(s) IV Intermittent once  chlorhexidine 0.12% Liquid 15 milliLiter(s) Swish and Spit once  chlorhexidine 4% Liquid 1 Application(s) Topical once  famotidine    Tablet 20 milliGRAM(s) Oral daily  gabapentin 300 milliGRAM(s) Oral once  insulin glargine Injectable (LANTUS) 10 Unit(s) SubCutaneous at bedtime  insulin lispro (ADMELOG) corrective regimen sliding scale   SubCutaneous three times a day before meals  insulin lispro Injectable (ADMELOG) 3 Unit(s) SubCutaneous before breakfast  insulin lispro Injectable (ADMELOG) 3 Unit(s) SubCutaneous before lunch  insulin lispro Injectable (ADMELOG) 3 Unit(s) SubCutaneous before dinner  metoprolol succinate ER 25 milliGRAM(s) Oral daily  mupirocin 2% Ointment 1 Application(s) Both Nostrils two times a day  sodium chloride 0.9% lock flush 3 milliLiter(s) IV Push every 8 hours  tamsulosin 0.4 milliGRAM(s) Oral at bedtime    MEDICATIONS  (PRN):  ALPRAZolam 0.25 milliGRAM(s) Oral every 8 hours PRN anxiety      Labs:                        15.6   6.98  )-----------( 212      ( 07 Apr 2024 15:27 )             47.3     138  |  107  |  19  ----------------------------<  177<H>  6.3<HH>   |  20<L>  |  0.95    Ca    9.3      07 Apr 2024 15:27    TPro  6.8  /  Alb  3.9  /  TBili  0.7  /  DBili  x   /  AST  30  /  ALT  18  /  AlkPhos  77  04-07    PT/INR - ( 07 Apr 2024 15:28 )   PT: 11.3 sec;   INR: 1.03 ratio       PTT - ( 07 Apr 2024 15:28 )  PTT:30.6 sec    Blood Type: ABO Interpretation: O (04-07 @ 15:28)    HGB A1C: A1C with Estimated Average Glucose (04.01.24 @ 14:49)    A1C with Estimated Average Glucose Result: 9.9: Method: Immunoassay       Reference Range                4.0-5.6%       High risk (prediabetic)        5.7-6.4%       Diabetic, diagnostic             >=6.5%       ADA diabetic treatment goal       <7.0%  The Hemoglobin A1c testing is NGSP-certified.Reference ranges are based  upon the 2010 recommendations of  the American Diabetes Association.  Interpretation may vary for children  and adolescents. %   Estimated Average Glucose: 237: The Estimated Average Glucose (eAG) or Mean Plasma Glucose (MPG) value is  calculated from the hemoglobin A1c value and covers the same time period.   The American Diabetes Association (ADA) and other professional  organizations recommend reporting the eAG with the HgbA1c. mg/dL    Pro-BNP: Pro-Brain Natriuretic Peptide (04.07.24 @ 15:27)    Pro-Brain Natriuretic Peptide: 3919 pg/mL    Thyroid Panel: Thyroid Stimulating Hormone, Serum (01.06.17 @ 09:17)    Thyroid Stimulating Hormone, Serum: 1.02 uU/mL    MRSA:  / MSSA:   Urinalysis Basic - ( 07 Apr 2024 15:27 )    Color: Yellow / Appearance: Clear / SG: >1.030 / pH: x  Gluc: 177 mg/dL / Ketone: Trace mg/dL  / Bili: Negative / Urobili: 0.2 mg/dL   Blood: x / Protein: Trace mg/dL / Nitrite: Negative   Leuk Esterase: Negative / RBC: x / WBC x   Sq Epi: x / Non Sq Epi: x / Bacteria: x    CXR: < from: Xray Chest 2 Views PA/Lat (04.01.24 @ 15:35) >    The heart is normal insize. The aorta is tortuous.  The lungs are clear.  There are no pleural effusions.  The visualized osseous structures are unremarkable for the patient's age.    IMPRESSION: No acute pulmonary disease    EKG: < from: 12 Lead ECG (03.06.24 @ 10:50) >  Diagnosis Line SINUS BRADYCARDIA WITH 1ST DEGREE A-V BLOCK  MINIMAL VOLTAGE CRITERIA FOR LVH, MAY BE NORMAL VARIANT ( R in aVL )  INFERIOR INFARCT , AGE UNDETERMINED  ST & T WAVE ABNORMALITY, CONSIDER LATERAL ISCHEMIA  ABNORMAL ECG    Carotid Duplex:  < from: VA Duplex Carotid, Bilat (04.01.24 @ 15:34) >    Antegrade flow is noted within both vertebral arteries.    IMPRESSION: No significant hemodynamic stenosis of either carotid artery.    PFT's:    Echocardiogram: < from: Transthoracic Echocardiogram (05.03.18 @ 10:44) >  CONCLUSIONS:  1. Mitral annular calcification. Mild to moderate mitral  regurgitation.  2. Calcified trileaflet aortic valve with decreased  opening. Peak transaortic valve gradient equals 16.2 mm Hg,  estimated aortic valve area equals 1.2 sqcm (by continuity  equation), consistent with moderate aortic stenosis. Mild  aortic insufficiency.  3. Aortic Root: 4.4 cm.  Ascending Aorta: 5 cm. Moderate aortic root dilatation.  Effacement of the sinotubular junction.  4. Eccentric left ventricular hypertrophy (dilated left  ventricle with normal relative wall thickness).  5. Moderate segmental left ventricular systolic dysfunctio  with hypokinesis of the inferior and inferolateral walls  6. Grade I diastolic dysfunction (Impaired relaxation).  7. Normal right ventricular size and function.  8. There is mild tricuspid regurgitation.    Cardiac catheterization: < from: Cardiac Catheterization (03.06.24 @ 12:00) >  Coronary Angiography   The coronary circulation is right dominant.      LM   Left main artery: Angiography shows no disease.      Patient: IRA SEN               MRN: [id number]  Study Date: 03/06/2024   12:00 PM      Page 1 of 4          LAD   Distal left anterior descending: There is a 100 % stenosis. First  diagonal: There is a 90 % stenosis.    CX   First obtuse marginal: There is a 100 % stenosis.      RCA   Distal right coronary artery: There is a 70 % stenosis.      Gen: WN/WD NAD  Neuro: AAOx3, nonfocal  Pulm: CTA B/L  CV: RRR, S1S2 +systolic murmur  Abd: Soft, NT, ND +BS  Ext: No edema, + peripheral pulses    Pt has AICD/PPM [ ] Yes  [x ] No             Brand Name:  Pre-op Beta Blocker ordered within 24 hrs of surgery (CABG ONLY)?  [x ] Yes  [ ] No  If not, Why?  Type & Cross  [x ] Yes  [ ] No  NPO after Midnight [x ] Yes  [ ] No  Pre-op ABX ordered, to be taped on chart:  [ x] Yes  [ ] No     Hibiclens/Peridex ordered [x ] Yes  [ ] No  Intraop on Hold: PRBCs, CXR, [place] [x ]   Consent obtained  [x ] Yes  [ ] No

## 2024-04-07 NOTE — H&P ADULT - NSHPADDITIONALINFOADULT_GEN_ALL_CORE
Cardiac surgeon discussed with patient surgical options, pre & post op care expectations & follow up care in the surgeons office.

## 2024-04-08 ENCOUNTER — RESULT REVIEW (OUTPATIENT)
Age: 74
End: 2024-04-08

## 2024-04-08 ENCOUNTER — APPOINTMENT (OUTPATIENT)
Dept: CARDIOTHORACIC SURGERY | Facility: HOSPITAL | Age: 74
End: 2024-04-08

## 2024-04-08 LAB
ALBUMIN SERPL ELPH-MCNC: 3.1 G/DL — LOW (ref 3.3–5)
ALP SERPL-CCNC: 47 U/L — SIGNIFICANT CHANGE UP (ref 40–120)
ALT FLD-CCNC: 17 U/L — SIGNIFICANT CHANGE UP (ref 10–45)
ANION GAP SERPL CALC-SCNC: 13 MMOL/L — SIGNIFICANT CHANGE UP (ref 5–17)
APTT BLD: 35.4 SEC — SIGNIFICANT CHANGE UP (ref 24.5–35.6)
AST SERPL-CCNC: 52 U/L — HIGH (ref 10–40)
BASE EXCESS BLDMV CALC-SCNC: -2.5 MMOL/L — SIGNIFICANT CHANGE UP (ref -3–3)
BASE EXCESS BLDMV CALC-SCNC: -3 MMOL/L — SIGNIFICANT CHANGE UP (ref -3–3)
BASE EXCESS BLDMV CALC-SCNC: -3.3 MMOL/L — LOW (ref -3–3)
BASE EXCESS BLDMV CALC-SCNC: -4.9 MMOL/L — LOW (ref -3–3)
BASE EXCESS BLDV CALC-SCNC: -0.5 MMOL/L — SIGNIFICANT CHANGE UP (ref -2–3)
BASE EXCESS BLDV CALC-SCNC: -0.7 MMOL/L — SIGNIFICANT CHANGE UP (ref -2–3)
BASE EXCESS BLDV CALC-SCNC: -0.7 MMOL/L — SIGNIFICANT CHANGE UP (ref -2–3)
BASE EXCESS BLDV CALC-SCNC: -2.2 MMOL/L — LOW (ref -2–3)
BASE EXCESS BLDV CALC-SCNC: -2.7 MMOL/L — LOW (ref -2–3)
BASE EXCESS BLDV CALC-SCNC: 0.5 MMOL/L — SIGNIFICANT CHANGE UP (ref -2–3)
BASE EXCESS BLDV CALC-SCNC: 1.6 MMOL/L — SIGNIFICANT CHANGE UP (ref -2–3)
BASOPHILS # BLD AUTO: 0.01 K/UL — SIGNIFICANT CHANGE UP (ref 0–0.2)
BASOPHILS NFR BLD AUTO: 0.2 % — SIGNIFICANT CHANGE UP (ref 0–2)
BILIRUB SERPL-MCNC: 1.3 MG/DL — HIGH (ref 0.2–1.2)
BUN SERPL-MCNC: 14 MG/DL — SIGNIFICANT CHANGE UP (ref 7–23)
CA-I SERPL-SCNC: 0.84 MMOL/L — LOW (ref 1.15–1.33)
CA-I SERPL-SCNC: 0.87 MMOL/L — LOW (ref 1.15–1.33)
CA-I SERPL-SCNC: 0.88 MMOL/L — LOW (ref 1.15–1.33)
CA-I SERPL-SCNC: 0.89 MMOL/L — LOW (ref 1.15–1.33)
CA-I SERPL-SCNC: 0.93 MMOL/L — LOW (ref 1.15–1.33)
CA-I SERPL-SCNC: 0.96 MMOL/L — LOW (ref 1.15–1.33)
CA-I SERPL-SCNC: 1.06 MMOL/L — LOW (ref 1.15–1.33)
CALCIUM SERPL-MCNC: 8.4 MG/DL — SIGNIFICANT CHANGE UP (ref 8.4–10.5)
CHLORIDE BLDV-SCNC: 104 MMOL/L — SIGNIFICANT CHANGE UP (ref 96–108)
CHLORIDE BLDV-SCNC: 105 MMOL/L — SIGNIFICANT CHANGE UP (ref 96–108)
CHLORIDE BLDV-SCNC: 106 MMOL/L — SIGNIFICANT CHANGE UP (ref 96–108)
CHLORIDE BLDV-SCNC: 106 MMOL/L — SIGNIFICANT CHANGE UP (ref 96–108)
CHLORIDE BLDV-SCNC: 110 MMOL/L — HIGH (ref 96–108)
CHLORIDE BLDV-SCNC: 110 MMOL/L — HIGH (ref 96–108)
CHLORIDE BLDV-SCNC: 111 MMOL/L — HIGH (ref 96–108)
CHLORIDE SERPL-SCNC: 112 MMOL/L — HIGH (ref 96–108)
CK MB BLD-MCNC: 12.7 % — HIGH (ref 0–3.5)
CK MB CFR SERPL CALC: 54.8 NG/ML — HIGH (ref 0–6.7)
CK SERPL-CCNC: 431 U/L — HIGH (ref 30–200)
CO2 BLDMV-SCNC: 21 MMOL/L — SIGNIFICANT CHANGE UP (ref 21–29)
CO2 BLDMV-SCNC: 23 MMOL/L — SIGNIFICANT CHANGE UP (ref 21–29)
CO2 BLDMV-SCNC: 24 MMOL/L — SIGNIFICANT CHANGE UP (ref 21–29)
CO2 BLDMV-SCNC: 26 MMOL/L — SIGNIFICANT CHANGE UP (ref 21–29)
CO2 BLDV-SCNC: 25 MMOL/L — SIGNIFICANT CHANGE UP (ref 22–26)
CO2 BLDV-SCNC: 25 MMOL/L — SIGNIFICANT CHANGE UP (ref 22–26)
CO2 BLDV-SCNC: 26 MMOL/L — SIGNIFICANT CHANGE UP (ref 22–26)
CO2 BLDV-SCNC: 27 MMOL/L — HIGH (ref 22–26)
CO2 BLDV-SCNC: 28 MMOL/L — HIGH (ref 22–26)
CO2 SERPL-SCNC: 21 MMOL/L — LOW (ref 22–31)
CREAT SERPL-MCNC: 0.83 MG/DL — SIGNIFICANT CHANGE UP (ref 0.5–1.3)
EGFR: 91 ML/MIN/1.73M2 — SIGNIFICANT CHANGE UP
EOSINOPHIL # BLD AUTO: 0.01 K/UL — SIGNIFICANT CHANGE UP (ref 0–0.5)
EOSINOPHIL NFR BLD AUTO: 0.2 % — SIGNIFICANT CHANGE UP (ref 0–6)
FIBRINOGEN PPP-MCNC: 257 MG/DL — SIGNIFICANT CHANGE UP (ref 200–445)
GAS PNL BLDA: SIGNIFICANT CHANGE UP
GAS PNL BLDMV: SIGNIFICANT CHANGE UP
GAS PNL BLDV: 137 MMOL/L — SIGNIFICANT CHANGE UP (ref 136–145)
GAS PNL BLDV: 137 MMOL/L — SIGNIFICANT CHANGE UP (ref 136–145)
GAS PNL BLDV: 138 MMOL/L — SIGNIFICANT CHANGE UP (ref 136–145)
GAS PNL BLDV: 139 MMOL/L — SIGNIFICANT CHANGE UP (ref 136–145)
GAS PNL BLDV: 140 MMOL/L — SIGNIFICANT CHANGE UP (ref 136–145)
GAS PNL BLDV: SIGNIFICANT CHANGE UP
GLUCOSE BLDC GLUCOMTR-MCNC: 112 MG/DL — HIGH (ref 70–99)
GLUCOSE BLDC GLUCOMTR-MCNC: 117 MG/DL — HIGH (ref 70–99)
GLUCOSE BLDC GLUCOMTR-MCNC: 119 MG/DL — HIGH (ref 70–99)
GLUCOSE BLDC GLUCOMTR-MCNC: 119 MG/DL — HIGH (ref 70–99)
GLUCOSE BLDC GLUCOMTR-MCNC: 136 MG/DL — HIGH (ref 70–99)
GLUCOSE BLDC GLUCOMTR-MCNC: 144 MG/DL — HIGH (ref 70–99)
GLUCOSE BLDC GLUCOMTR-MCNC: 191 MG/DL — HIGH (ref 70–99)
GLUCOSE BLDV-MCNC: 106 MG/DL — HIGH (ref 70–99)
GLUCOSE BLDV-MCNC: 111 MG/DL — HIGH (ref 70–99)
GLUCOSE BLDV-MCNC: 113 MG/DL — HIGH (ref 70–99)
GLUCOSE BLDV-MCNC: 128 MG/DL — HIGH (ref 70–99)
GLUCOSE BLDV-MCNC: 133 MG/DL — HIGH (ref 70–99)
GLUCOSE BLDV-MCNC: 136 MG/DL — HIGH (ref 70–99)
GLUCOSE BLDV-MCNC: 145 MG/DL — HIGH (ref 70–99)
GLUCOSE SERPL-MCNC: 179 MG/DL — HIGH (ref 70–99)
HCO3 BLDMV-SCNC: 20 MMOL/L — SIGNIFICANT CHANGE UP (ref 20–28)
HCO3 BLDMV-SCNC: 22 MMOL/L — SIGNIFICANT CHANGE UP (ref 20–28)
HCO3 BLDMV-SCNC: 23 MMOL/L — SIGNIFICANT CHANGE UP (ref 20–28)
HCO3 BLDMV-SCNC: 24 MMOL/L — SIGNIFICANT CHANGE UP (ref 20–28)
HCO3 BLDV-SCNC: 24 MMOL/L — SIGNIFICANT CHANGE UP (ref 22–29)
HCO3 BLDV-SCNC: 24 MMOL/L — SIGNIFICANT CHANGE UP (ref 22–29)
HCO3 BLDV-SCNC: 25 MMOL/L — SIGNIFICANT CHANGE UP (ref 22–29)
HCO3 BLDV-SCNC: 25 MMOL/L — SIGNIFICANT CHANGE UP (ref 22–29)
HCO3 BLDV-SCNC: 26 MMOL/L — SIGNIFICANT CHANGE UP (ref 22–29)
HCO3 BLDV-SCNC: 27 MMOL/L — SIGNIFICANT CHANGE UP (ref 22–29)
HCO3 BLDV-SCNC: 27 MMOL/L — SIGNIFICANT CHANGE UP (ref 22–29)
HCT VFR BLD CALC: 29.9 % — LOW (ref 39–50)
HCT VFR BLDA CALC: 26 % — LOW (ref 39–51)
HCT VFR BLDA CALC: 27 % — LOW (ref 39–51)
HCT VFR BLDA CALC: 27 % — LOW (ref 39–51)
HCT VFR BLDA CALC: 28 % — LOW (ref 39–51)
HCT VFR BLDA CALC: 29 % — LOW (ref 39–51)
HCT VFR BLDA CALC: 31 % — LOW (ref 39–51)
HCT VFR BLDA CALC: 32 % — LOW (ref 39–51)
HEPARINASE TEG R TIME: 5.3 MIN — SIGNIFICANT CHANGE UP (ref 4.3–8.3)
HGB BLD CALC-MCNC: 10.2 G/DL — LOW (ref 12.6–17.4)
HGB BLD CALC-MCNC: 10.7 G/DL — LOW (ref 12.6–17.4)
HGB BLD CALC-MCNC: 8.8 G/DL — LOW (ref 12.6–17.4)
HGB BLD CALC-MCNC: 8.9 G/DL — LOW (ref 12.6–17.4)
HGB BLD CALC-MCNC: 9.1 G/DL — LOW (ref 12.6–17.4)
HGB BLD CALC-MCNC: 9.3 G/DL — LOW (ref 12.6–17.4)
HGB BLD CALC-MCNC: 9.7 G/DL — LOW (ref 12.6–17.4)
HGB BLD-MCNC: 9.8 G/DL — LOW (ref 13–17)
HOROWITZ INDEX BLDMV+IHG-RTO: 100 — SIGNIFICANT CHANGE UP
HOROWITZ INDEX BLDMV+IHG-RTO: 60 — SIGNIFICANT CHANGE UP
IMM GRANULOCYTES NFR BLD AUTO: 0.3 % — SIGNIFICANT CHANGE UP (ref 0–0.9)
INR BLD: 1 RATIO — SIGNIFICANT CHANGE UP (ref 0.85–1.18)
LACTATE BLDV-MCNC: 0.7 MMOL/L — SIGNIFICANT CHANGE UP (ref 0.5–2)
LACTATE BLDV-MCNC: 0.7 MMOL/L — SIGNIFICANT CHANGE UP (ref 0.5–2)
LACTATE BLDV-MCNC: 0.8 MMOL/L — SIGNIFICANT CHANGE UP (ref 0.5–2)
LACTATE BLDV-MCNC: 0.9 MMOL/L — SIGNIFICANT CHANGE UP (ref 0.5–2)
LACTATE BLDV-MCNC: 1.1 MMOL/L — SIGNIFICANT CHANGE UP (ref 0.5–2)
LYMPHOCYTES # BLD AUTO: 0.63 K/UL — LOW (ref 1–3.3)
LYMPHOCYTES # BLD AUTO: 10.2 % — LOW (ref 13–44)
MAGNESIUM SERPL-MCNC: 3.2 MG/DL — HIGH (ref 1.6–2.6)
MCHC RBC-ENTMCNC: 27.7 PG — SIGNIFICANT CHANGE UP (ref 27–34)
MCHC RBC-ENTMCNC: 32.8 GM/DL — SIGNIFICANT CHANGE UP (ref 32–36)
MCV RBC AUTO: 84.5 FL — SIGNIFICANT CHANGE UP (ref 80–100)
MONOCYTES # BLD AUTO: 0.56 K/UL — SIGNIFICANT CHANGE UP (ref 0–0.9)
MONOCYTES NFR BLD AUTO: 9.1 % — SIGNIFICANT CHANGE UP (ref 2–14)
NEUTROPHILS # BLD AUTO: 4.92 K/UL — SIGNIFICANT CHANGE UP (ref 1.8–7.4)
NEUTROPHILS NFR BLD AUTO: 80 % — HIGH (ref 43–77)
NRBC # BLD: 0 /100 WBCS — SIGNIFICANT CHANGE UP (ref 0–0)
O2 CT VFR BLD CALC: 34 MMHG — SIGNIFICANT CHANGE UP (ref 30–65)
O2 CT VFR BLD CALC: 36 MMHG — SIGNIFICANT CHANGE UP (ref 30–65)
O2 CT VFR BLD CALC: 44 MMHG — SIGNIFICANT CHANGE UP (ref 30–65)
O2 CT VFR BLD CALC: 47 MMHG — SIGNIFICANT CHANGE UP (ref 30–65)
PCO2 BLDMV: 35 MMHG — SIGNIFICANT CHANGE UP (ref 30–65)
PCO2 BLDMV: 40 MMHG — SIGNIFICANT CHANGE UP (ref 30–65)
PCO2 BLDMV: 42 MMHG — SIGNIFICANT CHANGE UP (ref 30–65)
PCO2 BLDMV: 48 MMHG — SIGNIFICANT CHANGE UP (ref 30–65)
PCO2 BLDV: 43 MMHG — SIGNIFICANT CHANGE UP (ref 42–55)
PCO2 BLDV: 44 MMHG — SIGNIFICANT CHANGE UP (ref 42–55)
PCO2 BLDV: 45 MMHG — SIGNIFICANT CHANGE UP (ref 42–55)
PCO2 BLDV: 47 MMHG — SIGNIFICANT CHANGE UP (ref 42–55)
PCO2 BLDV: 47 MMHG — SIGNIFICANT CHANGE UP (ref 42–55)
PCO2 BLDV: 48 MMHG — SIGNIFICANT CHANGE UP (ref 42–55)
PCO2 BLDV: 58 MMHG — HIGH (ref 42–55)
PH BLDMV: 7.31 — LOW (ref 7.32–7.45)
PH BLDMV: 7.34 — SIGNIFICANT CHANGE UP (ref 7.32–7.45)
PH BLDMV: 7.35 — SIGNIFICANT CHANGE UP (ref 7.32–7.45)
PH BLDMV: 7.36 — SIGNIFICANT CHANGE UP (ref 7.32–7.45)
PH BLDV: 7.27 — LOW (ref 7.32–7.43)
PH BLDV: 7.31 — LOW (ref 7.32–7.43)
PH BLDV: 7.33 — SIGNIFICANT CHANGE UP (ref 7.32–7.43)
PH BLDV: 7.34 — SIGNIFICANT CHANGE UP (ref 7.32–7.43)
PH BLDV: 7.35 — SIGNIFICANT CHANGE UP (ref 7.32–7.43)
PH BLDV: 7.36 — SIGNIFICANT CHANGE UP (ref 7.32–7.43)
PH BLDV: 7.4 — SIGNIFICANT CHANGE UP (ref 7.32–7.43)
PHOSPHATE SERPL-MCNC: 2.4 MG/DL — LOW (ref 2.5–4.5)
PLATELET # BLD AUTO: 113 K/UL — LOW (ref 150–400)
PO2 BLDV: 154 MMHG — HIGH (ref 25–45)
PO2 BLDV: 203 MMHG — HIGH (ref 25–45)
PO2 BLDV: 363 MMHG — HIGH (ref 25–45)
PO2 BLDV: 57 MMHG — HIGH (ref 25–45)
PO2 BLDV: 62 MMHG — HIGH (ref 25–45)
PO2 BLDV: 79 MMHG — HIGH (ref 25–45)
PO2 BLDV: 92 MMHG — HIGH (ref 25–45)
POTASSIUM BLDV-SCNC: 3.5 MMOL/L — SIGNIFICANT CHANGE UP (ref 3.5–5.1)
POTASSIUM BLDV-SCNC: 4.7 MMOL/L — SIGNIFICANT CHANGE UP (ref 3.5–5.1)
POTASSIUM BLDV-SCNC: 4.7 MMOL/L — SIGNIFICANT CHANGE UP (ref 3.5–5.1)
POTASSIUM BLDV-SCNC: 4.9 MMOL/L — SIGNIFICANT CHANGE UP (ref 3.5–5.1)
POTASSIUM BLDV-SCNC: 5 MMOL/L — SIGNIFICANT CHANGE UP (ref 3.5–5.1)
POTASSIUM BLDV-SCNC: 5.5 MMOL/L — HIGH (ref 3.5–5.1)
POTASSIUM BLDV-SCNC: 5.6 MMOL/L — HIGH (ref 3.5–5.1)
POTASSIUM SERPL-MCNC: 4.6 MMOL/L — SIGNIFICANT CHANGE UP (ref 3.5–5.3)
POTASSIUM SERPL-SCNC: 4.6 MMOL/L — SIGNIFICANT CHANGE UP (ref 3.5–5.3)
PROT SERPL-MCNC: 4.7 G/DL — LOW (ref 6–8.3)
PROTHROM AB SERPL-ACNC: 10.5 SEC — SIGNIFICANT CHANGE UP (ref 9.5–13)
RAPIDTEG MAXIMUM AMPLITUDE: 57 MM — SIGNIFICANT CHANGE UP (ref 52–70)
RBC # BLD: 3.54 M/UL — LOW (ref 4.2–5.8)
RBC # FLD: 14.1 % — SIGNIFICANT CHANGE UP (ref 10.3–14.5)
SAO2 % BLDMV: 63.6 — SIGNIFICANT CHANGE UP (ref 60–90)
SAO2 % BLDMV: 64.2 — SIGNIFICANT CHANGE UP (ref 60–90)
SAO2 % BLDMV: 75.2 — SIGNIFICANT CHANGE UP (ref 60–90)
SAO2 % BLDMV: 78.4 — SIGNIFICANT CHANGE UP (ref 60–90)
SAO2 % BLDV: 88.1 % — HIGH (ref 67–88)
SAO2 % BLDV: 92.6 % — HIGH (ref 67–88)
SAO2 % BLDV: 96.4 % — HIGH (ref 67–88)
SAO2 % BLDV: 96.5 % — HIGH (ref 67–88)
SAO2 % BLDV: 97.5 % — HIGH (ref 67–88)
SAO2 % BLDV: 98.1 % — HIGH (ref 67–88)
SAO2 % BLDV: 98.6 % — HIGH (ref 67–88)
SODIUM SERPL-SCNC: 146 MMOL/L — HIGH (ref 135–145)
TEG FUNCTIONAL FIBRINOGEN: 18.7 MM — SIGNIFICANT CHANGE UP (ref 15–32)
TEG MAXIMUM AMPLITUDE: 60 MM — SIGNIFICANT CHANGE UP (ref 52–69)
TEG REACTION TIME: 7.4 MIN — SIGNIFICANT CHANGE UP (ref 4.6–9.1)
TROPONIN T, HIGH SENSITIVITY RESULT: 1598 NG/L — HIGH (ref 0–51)
WBC # BLD: 6.15 K/UL — SIGNIFICANT CHANGE UP (ref 3.8–10.5)
WBC # FLD AUTO: 6.15 K/UL — SIGNIFICANT CHANGE UP (ref 3.8–10.5)

## 2024-04-08 PROCEDURE — 33511 CABG VEIN TWO: CPT

## 2024-04-08 PROCEDURE — 33859 AS-AORT GRF F/DS OTH/THN DSJ: CPT

## 2024-04-08 PROCEDURE — 71045 X-RAY EXAM CHEST 1 VIEW: CPT | Mod: 26

## 2024-04-08 PROCEDURE — 33405 REPLACEMENT AORTIC VALVE OPN: CPT

## 2024-04-08 PROCEDURE — 33511 CABG VEIN TWO: CPT | Mod: 80

## 2024-04-08 PROCEDURE — 99291 CRITICAL CARE FIRST HOUR: CPT

## 2024-04-08 PROCEDURE — 33405 REPLACEMENT AORTIC VALVE OPN: CPT | Mod: 80

## 2024-04-08 PROCEDURE — 88305 TISSUE EXAM BY PATHOLOGIST: CPT | Mod: 26

## 2024-04-08 PROCEDURE — 33859 AS-AORT GRF F/DS OTH/THN DSJ: CPT | Mod: 80

## 2024-04-08 PROCEDURE — 33866 AORTIC HEMIARCH GRAFT: CPT

## 2024-04-08 PROCEDURE — 33866 AORTIC HEMIARCH GRAFT: CPT | Mod: 80

## 2024-04-08 PROCEDURE — 33967 INSERT I-AORT PERCUT DEVICE: CPT | Mod: 80

## 2024-04-08 PROCEDURE — 33967 INSERT I-AORT PERCUT DEVICE: CPT

## 2024-04-08 DEVICE — INTRO MICROPUNC STIFF 5FRX10CM: Type: IMPLANTABLE DEVICE | Status: FUNCTIONAL

## 2024-04-08 DEVICE — CANNULA STR SELF INFLATING 3.5MM: Type: IMPLANTABLE DEVICE | Status: FUNCTIONAL

## 2024-04-08 DEVICE — FELT PTFE 6 X 6": Type: IMPLANTABLE DEVICE | Status: FUNCTIONAL

## 2024-04-08 DEVICE — LIGATING CLIPS WECK HORIZON SMALL-WIDE (RED) 24: Type: IMPLANTABLE DEVICE | Status: FUNCTIONAL

## 2024-04-08 DEVICE — COR-KNOT MINI DEVICE COMBO KIT: Type: IMPLANTABLE DEVICE | Status: FUNCTIONAL

## 2024-04-08 DEVICE — SURGIFLO MATRIX WITH THROMBIN KIT: Type: IMPLANTABLE DEVICE | Status: FUNCTIONAL

## 2024-04-08 DEVICE — AGENT HEMOSTATIC HEMOBLAST 1.65G 10CM: Type: IMPLANTABLE DEVICE | Status: FUNCTIONAL

## 2024-04-08 DEVICE — INTRODUCER PERCUTANEOUS INSERTION KIT: Type: IMPLANTABLE DEVICE | Status: FUNCTIONAL

## 2024-04-08 DEVICE — SHEATH INTRODUCER TERUMO PINNACLE PERIPHERAL 4FR X 10CM: Type: IMPLANTABLE DEVICE | Status: FUNCTIONAL

## 2024-04-08 DEVICE — CANNULA VENOUS RETURN DUAL WITH OBTURATOR 34 TO 46FR X 37.5C: Type: IMPLANTABLE DEVICE | Status: FUNCTIONAL

## 2024-04-08 DEVICE — PACING WIRE ORANGE M-25 WINGED WIRE 37MM X 89MM: Type: IMPLANTABLE DEVICE | Status: FUNCTIONAL

## 2024-04-08 DEVICE — LIGATING CLIPS WECK HORIZON MEDIUM (BLUE) 24: Type: IMPLANTABLE DEVICE | Status: FUNCTIONAL

## 2024-04-08 DEVICE — CLIP APPLIER COVIDIEN SURGICLIP III 9" SM: Type: IMPLANTABLE DEVICE | Status: FUNCTIONAL

## 2024-04-08 DEVICE — KIT CVC 2LUM MAC 9FR CHG: Type: IMPLANTABLE DEVICE | Status: FUNCTIONAL

## 2024-04-08 DEVICE — CANNULA ANTEGRADE CARDIOPLEGIA 12 GA STRL: Type: IMPLANTABLE DEVICE | Status: FUNCTIONAL

## 2024-04-08 DEVICE — CANNULA RETROGRADE CARDIOPLEGIA SELF-INFLATING 14FR PRE-SHAPED STYLET/HANDLE: Type: IMPLANTABLE DEVICE | Status: FUNCTIONAL

## 2024-04-08 DEVICE — LIGATING CLIPS WECK HORIZON LARGE (ORANGE) 6: Type: IMPLANTABLE DEVICE | Status: FUNCTIONAL

## 2024-04-08 DEVICE — PACING WIRE WHITE M-22 LOOP 89MM: Type: IMPLANTABLE DEVICE | Status: FUNCTIONAL

## 2024-04-08 DEVICE — CANNULA ARTERIAL OPTISITE 20FR X 3/8" VENTED: Type: IMPLANTABLE DEVICE | Status: FUNCTIONAL

## 2024-04-08 DEVICE — BIOGLUE 5ML SYR: Type: IMPLANTABLE DEVICE | Status: FUNCTIONAL

## 2024-04-08 DEVICE — SURGICEL FIBRILLAR 2 X 4": Type: IMPLANTABLE DEVICE | Status: FUNCTIONAL

## 2024-04-08 DEVICE — CHEST DRAIN THORACIC ARGYLE PVC 32FR RIGHT ANGLE: Type: IMPLANTABLE DEVICE | Status: FUNCTIONAL

## 2024-04-08 DEVICE — CANNULA ANTEGRADE CARDIOPLEGIA 14 GA STRL: Type: IMPLANTABLE DEVICE | Status: FUNCTIONAL

## 2024-04-08 DEVICE — CATH VENT LEFT HEART PVC15 18FR NON-VENTED: Type: IMPLANTABLE DEVICE | Status: FUNCTIONAL

## 2024-04-08 DEVICE — CANNULA VESSEL 3MM BLUNT TIP CLEAR 1-WAY VALVE: Type: IMPLANTABLE DEVICE | Status: FUNCTIONAL

## 2024-04-08 DEVICE — KIT A-LINE 1LUM 20G X 12CM SAFE KIT: Type: IMPLANTABLE DEVICE | Status: FUNCTIONAL

## 2024-04-08 DEVICE — VALVE AORTIC INSPIRIS RESILIA 25MM: Type: IMPLANTABLE DEVICE | Status: FUNCTIONAL

## 2024-04-08 DEVICE — PACING WIRE WHITE M-25 WINGED WIRE 37MM X 89MM: Type: IMPLANTABLE DEVICE | Status: FUNCTIONAL

## 2024-04-08 DEVICE — GRAFT VASC GELWEAVE SB THOR 28/8: Type: IMPLANTABLE DEVICE | Status: FUNCTIONAL

## 2024-04-08 DEVICE — CANNULA PERFUSION  BALLOON 6MM: Type: IMPLANTABLE DEVICE | Status: FUNCTIONAL

## 2024-04-08 DEVICE — SURGICEL NU-KNIT 6 X 9": Type: IMPLANTABLE DEVICE | Status: FUNCTIONAL

## 2024-04-08 RX ORDER — ALBUMIN HUMAN 25 %
250 VIAL (ML) INTRAVENOUS ONCE
Refills: 0 | Status: COMPLETED | OUTPATIENT
Start: 2024-04-08 | End: 2024-04-08

## 2024-04-08 RX ORDER — ASPIRIN/CALCIUM CARB/MAGNESIUM 324 MG
81 TABLET ORAL DAILY
Refills: 0 | Status: DISCONTINUED | OUTPATIENT
Start: 2024-04-08 | End: 2024-04-22

## 2024-04-08 RX ORDER — INSULIN HUMAN 100 [IU]/ML
3 INJECTION, SOLUTION SUBCUTANEOUS
Qty: 100 | Refills: 0 | Status: DISCONTINUED | OUTPATIENT
Start: 2024-04-08 | End: 2024-04-11

## 2024-04-08 RX ORDER — CHLORHEXIDINE GLUCONATE 213 G/1000ML
15 SOLUTION TOPICAL EVERY 12 HOURS
Refills: 0 | Status: DISCONTINUED | OUTPATIENT
Start: 2024-04-08 | End: 2024-04-09

## 2024-04-08 RX ORDER — HYDROMORPHONE HYDROCHLORIDE 2 MG/ML
0.5 INJECTION INTRAMUSCULAR; INTRAVENOUS; SUBCUTANEOUS EVERY 6 HOURS
Refills: 0 | Status: DISCONTINUED | OUTPATIENT
Start: 2024-04-08 | End: 2024-04-10

## 2024-04-08 RX ORDER — SODIUM CHLORIDE 9 MG/ML
1000 INJECTION INTRAMUSCULAR; INTRAVENOUS; SUBCUTANEOUS
Refills: 0 | Status: DISCONTINUED | OUTPATIENT
Start: 2024-04-08 | End: 2024-04-21

## 2024-04-08 RX ORDER — MILRINONE LACTATE 1 MG/ML
0.2 INJECTION, SOLUTION INTRAVENOUS
Qty: 20 | Refills: 0 | Status: DISCONTINUED | OUTPATIENT
Start: 2024-04-08 | End: 2024-04-18

## 2024-04-08 RX ORDER — DEXMEDETOMIDINE HYDROCHLORIDE IN 0.9% SODIUM CHLORIDE 4 UG/ML
1 INJECTION INTRAVENOUS
Qty: 200 | Refills: 0 | Status: DISCONTINUED | OUTPATIENT
Start: 2024-04-08 | End: 2024-04-09

## 2024-04-08 RX ORDER — CHLORHEXIDINE GLUCONATE 213 G/1000ML
1 SOLUTION TOPICAL ONCE
Refills: 0 | Status: COMPLETED | OUTPATIENT
Start: 2024-04-08 | End: 2024-04-08

## 2024-04-08 RX ORDER — OXYCODONE HYDROCHLORIDE 5 MG/1
10 TABLET ORAL EVERY 4 HOURS
Refills: 0 | Status: DISCONTINUED | OUTPATIENT
Start: 2024-04-08 | End: 2024-04-08

## 2024-04-08 RX ORDER — GABAPENTIN 400 MG/1
200 CAPSULE ORAL ONCE
Refills: 0 | Status: COMPLETED | OUTPATIENT
Start: 2024-04-08 | End: 2024-04-08

## 2024-04-08 RX ORDER — DEXTROSE 50 % IN WATER 50 %
50 SYRINGE (ML) INTRAVENOUS
Refills: 0 | Status: DISCONTINUED | OUTPATIENT
Start: 2024-04-08 | End: 2024-04-22

## 2024-04-08 RX ORDER — CHLORHEXIDINE GLUCONATE 213 G/1000ML
1 SOLUTION TOPICAL DAILY
Refills: 0 | Status: COMPLETED | OUTPATIENT
Start: 2024-04-08 | End: 2024-04-13

## 2024-04-08 RX ORDER — MEPERIDINE HYDROCHLORIDE 50 MG/ML
25 INJECTION INTRAMUSCULAR; INTRAVENOUS; SUBCUTANEOUS ONCE
Refills: 0 | Status: DISCONTINUED | OUTPATIENT
Start: 2024-04-08 | End: 2024-04-09

## 2024-04-08 RX ORDER — ACETAMINOPHEN 500 MG
650 TABLET ORAL EVERY 6 HOURS
Refills: 0 | Status: COMPLETED | OUTPATIENT
Start: 2024-04-08 | End: 2024-04-11

## 2024-04-08 RX ORDER — ASCORBIC ACID 60 MG
500 TABLET,CHEWABLE ORAL
Refills: 0 | Status: COMPLETED | OUTPATIENT
Start: 2024-04-08 | End: 2024-04-13

## 2024-04-08 RX ORDER — CEFUROXIME AXETIL 250 MG
1500 TABLET ORAL EVERY 8 HOURS
Refills: 0 | Status: COMPLETED | OUTPATIENT
Start: 2024-04-08 | End: 2024-04-10

## 2024-04-08 RX ORDER — ACETAMINOPHEN 500 MG
1000 TABLET ORAL ONCE
Refills: 0 | Status: COMPLETED | OUTPATIENT
Start: 2024-04-08 | End: 2024-04-08

## 2024-04-08 RX ORDER — GABAPENTIN 400 MG/1
100 CAPSULE ORAL EVERY 8 HOURS
Refills: 0 | Status: COMPLETED | OUTPATIENT
Start: 2024-04-08 | End: 2024-04-13

## 2024-04-08 RX ORDER — CEFUROXIME AXETIL 250 MG
1500 TABLET ORAL EVERY 8 HOURS
Refills: 0 | Status: DISCONTINUED | OUTPATIENT
Start: 2024-04-08 | End: 2024-04-08

## 2024-04-08 RX ORDER — LIDOCAINE HCL 20 MG/ML
0.2 VIAL (ML) INJECTION ONCE
Refills: 0 | Status: COMPLETED | OUTPATIENT
Start: 2024-04-08 | End: 2024-04-08

## 2024-04-08 RX ORDER — CEFUROXIME AXETIL 250 MG
1500 TABLET ORAL ONCE
Refills: 0 | Status: COMPLETED | OUTPATIENT
Start: 2024-04-08 | End: 2024-04-08

## 2024-04-08 RX ORDER — DOBUTAMINE HCL 250MG/20ML
1 VIAL (ML) INTRAVENOUS
Qty: 500 | Refills: 0 | Status: DISCONTINUED | OUTPATIENT
Start: 2024-04-08 | End: 2024-04-11

## 2024-04-08 RX ORDER — OXYCODONE HYDROCHLORIDE 5 MG/1
5 TABLET ORAL EVERY 4 HOURS
Refills: 0 | Status: DISCONTINUED | OUTPATIENT
Start: 2024-04-08 | End: 2024-04-14

## 2024-04-08 RX ORDER — POTASSIUM CHLORIDE 20 MEQ
10 PACKET (EA) ORAL
Refills: 0 | Status: DISCONTINUED | OUTPATIENT
Start: 2024-04-08 | End: 2024-04-12

## 2024-04-08 RX ORDER — ASPIRIN/CALCIUM CARB/MAGNESIUM 324 MG
300 TABLET ORAL ONCE
Refills: 0 | Status: COMPLETED | OUTPATIENT
Start: 2024-04-08

## 2024-04-08 RX ORDER — POTASSIUM CHLORIDE 20 MEQ
10 PACKET (EA) ORAL
Refills: 0 | Status: COMPLETED | OUTPATIENT
Start: 2024-04-08 | End: 2024-04-08

## 2024-04-08 RX ORDER — DEXTROSE 50 % IN WATER 50 %
25 SYRINGE (ML) INTRAVENOUS
Refills: 0 | Status: DISCONTINUED | OUTPATIENT
Start: 2024-04-08 | End: 2024-04-13

## 2024-04-08 RX ORDER — AMIODARONE HYDROCHLORIDE 400 MG/1
400 TABLET ORAL
Refills: 0 | Status: COMPLETED | OUTPATIENT
Start: 2024-04-08 | End: 2024-04-11

## 2024-04-08 RX ORDER — ASPIRIN/CALCIUM CARB/MAGNESIUM 324 MG
300 TABLET ORAL ONCE
Refills: 0 | Status: COMPLETED | OUTPATIENT
Start: 2024-04-08 | End: 2024-04-08

## 2024-04-08 RX ORDER — SENNA PLUS 8.6 MG/1
2 TABLET ORAL AT BEDTIME
Refills: 0 | Status: DISCONTINUED | OUTPATIENT
Start: 2024-04-09 | End: 2024-04-22

## 2024-04-08 RX ORDER — NOREPINEPHRINE BITARTRATE/D5W 8 MG/250ML
0.04 PLASTIC BAG, INJECTION (ML) INTRAVENOUS
Qty: 8 | Refills: 0 | Status: DISCONTINUED | OUTPATIENT
Start: 2024-04-08 | End: 2024-04-09

## 2024-04-08 RX ORDER — ACETAMINOPHEN 500 MG
650 TABLET ORAL EVERY 6 HOURS
Refills: 0 | Status: COMPLETED | OUTPATIENT
Start: 2024-04-11 | End: 2025-03-10

## 2024-04-08 RX ORDER — PROPOFOL 10 MG/ML
50 INJECTION, EMULSION INTRAVENOUS ONCE
Refills: 0 | Status: COMPLETED | OUTPATIENT
Start: 2024-04-08 | End: 2024-04-08

## 2024-04-08 RX ORDER — FAMOTIDINE 10 MG/ML
20 INJECTION INTRAVENOUS EVERY 12 HOURS
Refills: 0 | Status: DISCONTINUED | OUTPATIENT
Start: 2024-04-08 | End: 2024-04-10

## 2024-04-08 RX ORDER — POLYETHYLENE GLYCOL 3350 17 G/17G
17 POWDER, FOR SOLUTION ORAL DAILY
Refills: 0 | Status: DISCONTINUED | OUTPATIENT
Start: 2024-04-09 | End: 2024-04-22

## 2024-04-08 RX ADMIN — Medication 400 MILLIGRAM(S): at 20:55

## 2024-04-08 RX ADMIN — CHLORHEXIDINE GLUCONATE 15 MILLILITER(S): 213 SOLUTION TOPICAL at 06:22

## 2024-04-08 RX ADMIN — INSULIN HUMAN 3 UNIT(S)/HR: 100 INJECTION, SOLUTION SUBCUTANEOUS at 17:47

## 2024-04-08 RX ADMIN — MILRINONE LACTATE 8.63 MICROGRAM(S)/KG/MIN: 1 INJECTION, SOLUTION INTRAVENOUS at 17:47

## 2024-04-08 RX ADMIN — Medication 1000 MILLIGRAM(S): at 06:22

## 2024-04-08 RX ADMIN — GABAPENTIN 300 MILLIGRAM(S): 400 CAPSULE ORAL at 06:22

## 2024-04-08 RX ADMIN — MILRINONE LACTATE 8.63 MICROGRAM(S)/KG/MIN: 1 INJECTION, SOLUTION INTRAVENOUS at 20:14

## 2024-04-08 RX ADMIN — Medication 4.31 MICROGRAM(S)/KG/MIN: at 20:15

## 2024-04-08 RX ADMIN — Medication 6.47 MICROGRAM(S)/KG/MIN: at 22:06

## 2024-04-08 RX ADMIN — Medication 100 MILLIEQUIVALENT(S): at 21:38

## 2024-04-08 RX ADMIN — FAMOTIDINE 20 MILLIGRAM(S): 10 INJECTION INTRAVENOUS at 18:00

## 2024-04-08 RX ADMIN — Medication 5.39 MICROGRAM(S)/KG/MIN: at 22:07

## 2024-04-08 RX ADMIN — MUPIROCIN 1 APPLICATION(S): 20 OINTMENT TOPICAL at 06:22

## 2024-04-08 RX ADMIN — Medication 125 MILLILITER(S): at 20:53

## 2024-04-08 RX ADMIN — SODIUM CHLORIDE 3 MILLILITER(S): 9 INJECTION INTRAMUSCULAR; INTRAVENOUS; SUBCUTANEOUS at 21:57

## 2024-04-08 RX ADMIN — DEXMEDETOMIDINE HYDROCHLORIDE IN 0.9% SODIUM CHLORIDE 18 MICROGRAM(S)/KG/HR: 4 INJECTION INTRAVENOUS at 17:49

## 2024-04-08 RX ADMIN — SODIUM CHLORIDE 10 MILLILITER(S): 9 INJECTION INTRAMUSCULAR; INTRAVENOUS; SUBCUTANEOUS at 20:16

## 2024-04-08 RX ADMIN — Medication 100 MILLIEQUIVALENT(S): at 22:25

## 2024-04-08 RX ADMIN — Medication 125 MILLILITER(S): at 21:51

## 2024-04-08 RX ADMIN — Medication 4.31 MICROGRAM(S)/KG/MIN: at 17:50

## 2024-04-08 RX ADMIN — DEXMEDETOMIDINE HYDROCHLORIDE IN 0.9% SODIUM CHLORIDE 18 MICROGRAM(S)/KG/HR: 4 INJECTION INTRAVENOUS at 20:15

## 2024-04-08 RX ADMIN — Medication 100 MILLIGRAM(S): at 21:09

## 2024-04-08 RX ADMIN — PROPOFOL 50 MILLIGRAM(S): 10 INJECTION, EMULSION INTRAVENOUS at 17:05

## 2024-04-08 RX ADMIN — Medication 100 MILLIEQUIVALENT(S): at 22:58

## 2024-04-08 RX ADMIN — SODIUM CHLORIDE 3 MILLILITER(S): 9 INJECTION INTRAMUSCULAR; INTRAVENOUS; SUBCUTANEOUS at 06:20

## 2024-04-08 RX ADMIN — SODIUM CHLORIDE 10 MILLILITER(S): 9 INJECTION INTRAMUSCULAR; INTRAVENOUS; SUBCUTANEOUS at 17:50

## 2024-04-08 RX ADMIN — CHLORHEXIDINE GLUCONATE 15 MILLILITER(S): 213 SOLUTION TOPICAL at 18:35

## 2024-04-08 RX ADMIN — INSULIN HUMAN 3 UNIT(S)/HR: 100 INJECTION, SOLUTION SUBCUTANEOUS at 20:14

## 2024-04-08 RX ADMIN — Medication 25 MILLIGRAM(S): at 06:21

## 2024-04-08 RX ADMIN — Medication 300 MILLIGRAM(S): at 23:04

## 2024-04-08 RX ADMIN — Medication 1000 MILLIGRAM(S): at 06:52

## 2024-04-08 RX ADMIN — Medication 1000 MILLIGRAM(S): at 21:10

## 2024-04-08 NOTE — BRIEF OPERATIVE NOTE - ESTIMATED BLOOD LOSS
Intubation    Date/Time: 10/16/2023 7:52 AM    Performed by: Conchita Mcgrath CRNA  Authorized by: Jc King III, MD    Intubation:     Induction:  Inhalational - ETT/trach    Intubated:  Postinduction    Mask Ventilation:  Not attempted    Attempts:  1    Attempted By:  CRNA    Difficult Airway Encountered?: No      Complications:  None    Airway Device:  Supraglottic airway/LMA    Airway Device Size:  2.5    Style/Cuff Inflation:  Cuffed (inflated to minimal occlusive pressure)    Secured at:  The lips    Placement Verified By:  Fiber optic visualization (bronch per surgeon)    Complicating Factors:  None    Findings Post-Intubation:  BS equal bilateral and atraumatic/condition of teeth unchanged       0

## 2024-04-08 NOTE — PROGRESS NOTE ADULT - SUBJECTIVE AND OBJECTIVE BOX
HPI:  74yr M, PMHx of CVA and MI (12/2016), HTN, DM, HLD, CAD s/p cardiac stents, CHF, hx of malignant melanoma coming in for wide excision 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  Py presents for PST for ascending aorta replacement, aortic valve replacement, coronary artery bypass grafting with Dr. Ilia Ortiz.  He has been admitted preop for w/u - CT head, pt has not taken Farxiga since Wednesday as per orders from Dr. Goddard NP.  NPO after midnight for OR   (07 Apr 2024 14:43)      Patient seen and examined at the bedside.    Remained critically ill on continuous ICU monitoring.    OBJECTIVE:  Vital Signs Last 24 Hrs  T(C): 36.3 (08 Apr 2024 06:45), Max: 36.6 (08 Apr 2024 04:54)  T(F): 97.3 (08 Apr 2024 05:58), Max: 97.8 (08 Apr 2024 04:54)  HR: 96 (08 Apr 2024 16:54) (59 - 96)  BP: 117/84 (08 Apr 2024 06:45) (98/67 - 148/84)  BP(mean): 77 (08 Apr 2024 06:45) (77 - 77)  RR: 18 (08 Apr 2024 06:45) (16 - 18)  SpO2: 97% (08 Apr 2024 16:54) (92% - 97%)    Parameters below as of 08 Apr 2024 05:58  Patient On (Oxygen Delivery Method): room air        Physical Exam:   General: Intubated   Neurology: Sedated   Eyes: bilateral pupils equal and reactive   ENT/Neck: Neck supple, trachea midline, No JVD   Respiratory: Clear bilaterally   CV: S1S2, no murmurs        [x] Sternal dressing, [x] Mediastinal CTx3        [x] Sinus rhythm  Abdominal: Soft, NT, ND +BS  Extremities: 1-2+ pedal edema noted, + peripheral pulses   Skin: No Rashes, Hematoma, Ecchymosis                           Assessment:  74yr M, PMHx of CVA and MI (12/2016), HTN, DM, HLD, CAD s/p cardiac stents, CHF, hx of malignant melanoma coming in for wide excision 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  Py presents for PST for ascending aorta replacement, aortic valve replacement, coronary artery bypass grafting with Dr. Ilia Ortiz.  He has been admitted preop for w/u - CT head, pt has not taken Farxiga since Wednesday as per orders from Dr. Goddard NP.    CAD s/p CABGx3 4/8/24  Post-op blood loss anemia  Hemodynamic Instability   Hypovolemia  Post-op respiratory failure       Plan:   ***Neuro***  Addressed analgesic regimen to optimize function and sedated for ventilatory synchrony.     ***Cardiovascular***  Patient admitted for CAD s/p CABGx3 4/8/24. Inotropic support with IV Dobutamine and Milrinone infusion for acute systolic heart failure/cardiogenic shock/vasogenic shock. Patient is on PO Amiodarone for afib prophylaxis. Invasive hemodynamic monitoring with a central venous catheter & an A-line were required for the continuous central venous and MAP/BP monitoring to ensure adequate cardiovascular support.     ***Pulmonary***  Respiratory status required full ventilatory support, close monitoring of respiratory rate and breathing pattern, the following of ABG’s with A-line monitoring, continuous pulse oximetry monitoring    Mode: AC/ CMV (Assist Control/ Continuous Mandatory Ventilation)  RR (machine): 12  TV (machine): 550  FiO2: 100  PEEP: 5  ITime: 1  MAP: 10  PIP: 18               ***Heme***  Post-op acute blood loss anemia, no thrombocytopenia. Monitor hemoglobin and hematocrit levels. Patient received 2 ffp, 1 plt, 10 cyro, and 1000 feiba.      ***GI***  Patient is NPO for now. Pepcid for stress ulcer prophylaxis.     ***Renal***  Optimize renal perfusion with adequate volume resuscitation and continued monitoring of urine output, fluid balance, electrolytes, and BUN/Creatinine.       ***ID***  Afebrile, white blood count within normal limits. Continue trending white blood count and monitoring fever curve. Perioperative coverage with Cefuroxime.    ***Endocrine***  Metabolic stability, history of diabetes mellitus required an IV regular Insulin drip while following serial glucose levels to help achieve and maintain euglycemia.        Patient requires continuous monitoring with bedside rhythm monitoring, pulse oximetry monitoring, and continuous central venous and arterial pressure monitoring; and intermittent blood gas analysis. Care plan discussed with the ICU care team.   Patient remained critical, at risk for life threatening decompensation.    I have spent 40 minutes providing critical care management to this patient.    By signing my name below, I, Vanna Santana, attest that this documentation has been prepared under the direction and in the presence of Muriel Walker MD   Electronically signed: Richard Mcdonnell, 04-08-24 @ 17:11    I, Muriel Walker, personally performed the services described in this documentation. all medical record entries made by the scribe were at my direction and in my presence. I have reviewed the chart and agree that the record reflects my personal performance and is accurate and complete  Electronically signed: Muriel Walker MD  HPI:  74yr M, PMHx of CVA and MI (12/2016), HTN, DM, HLD, CAD s/p cardiac stents, CHF, hx of malignant melanoma coming in for wide excision 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  Py presents for PST for ascending aorta replacement, aortic valve replacement, coronary artery bypass grafting with Dr. Ilia Ortiz.  He has been admitted preop for w/u - CT head, pt has not taken Farxiga since Wednesday as per orders from Dr. Goddard NP.  NPO after midnight for OR   (07 Apr 2024 14:43)      Patient seen and examined at the bedside.    Remained critically ill on continuous ICU monitoring.    OBJECTIVE:  Vital Signs Last 24 Hrs  T(C): 36.3 (08 Apr 2024 06:45), Max: 36.6 (08 Apr 2024 04:54)  T(F): 97.3 (08 Apr 2024 05:58), Max: 97.8 (08 Apr 2024 04:54)  HR: 96 (08 Apr 2024 16:54) (59 - 96)  BP: 117/84 (08 Apr 2024 06:45) (98/67 - 148/84)  BP(mean): 77 (08 Apr 2024 06:45) (77 - 77)  RR: 18 (08 Apr 2024 06:45) (16 - 18)  SpO2: 97% (08 Apr 2024 16:54) (92% - 97%)    Parameters below as of 08 Apr 2024 05:58  Patient On (Oxygen Delivery Method): room air        Physical Exam:   General: Intubated   Neurology: Sedated   Eyes: bilateral pupils equal and reactive   ENT/Neck: Neck supple, trachea midline, No JVD   Respiratory: Clear bilaterally   CV: S1S2, no murmurs        [x] Sternal dressing, [x] Mediastinal CTx3        [x] Sinus rhythm  Abdominal: Soft, NT, ND +BS  Extremities: 1-2+ pedal edema noted, + peripheral pulses   Skin: No Rashes, Hematoma, Ecchymosis                           Assessment:  74yr M, PMHx of CVA and MI (12/2016), HTN, DM, HLD, CAD s/p cardiac stents, CHF, hx of malignant melanoma coming in for wide excision 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  Py presents for PST for ascending aorta replacement, aortic valve replacement, coronary artery bypass grafting with Dr. Ilia Ortiz.  He has been admitted preop for w/u - CT head, pt has not taken Farxiga since Wednesday as per orders from Dr. Goddard NP.    CAD s/p CABGx3 4/8/24  Post-op blood loss anemia  Hemodynamic Instability   Hypovolemia  Post-op respiratory failure       Plan:   ***Neuro***  Addressed analgesic regimen to optimize function and sedated for ventilatory synchrony.     ***Cardiovascular***  Patient admitted for CAD s/p CABGx3 4/8/24. Inotropic support with IV Dobutamine and Milrinone infusion for acute systolic heart failure/cardiogenic shock/vasogenic shock. Patient is on PO Amiodarone for afib prophylaxis. nvasive hemodynamic monitoring with a PA catheter & an A-line were required for the following of serial CI's/MV02's and continuous central venous, pulmonary artery pressure and MAP/BP monitoring to ensure adequate cardiovascular support.     ***Pulmonary***  Respiratory status required full ventilatory support, close monitoring of respiratory rate and breathing pattern, the following of ABG’s with A-line monitoring, continuous pulse oximetry monitoring    Mode: AC/ CMV (Assist Control/ Continuous Mandatory Ventilation)  RR (machine): 12  TV (machine): 550  FiO2: 100  PEEP: 5  ITime: 1  MAP: 10  PIP: 18               ***Heme***  Post-op acute blood loss anemia, no thrombocytopenia. Monitor hemoglobin and hematocrit levels. Patient received 2 ffp, 1 plt, 10 cyro, and 1000 feiba.      ***GI***  Patient is NPO for now. Pepcid for stress ulcer prophylaxis.     ***Renal***  Optimize renal perfusion with adequate volume resuscitation and continued monitoring of urine output, fluid balance, electrolytes, and BUN/Creatinine.       ***ID***  Afebrile, white blood count within normal limits. Continue trending white blood count and monitoring fever curve. Perioperative coverage with Cefuroxime.    ***Endocrine***  Metabolic stability, history of diabetes mellitus required an IV regular Insulin drip while following serial glucose levels to help achieve and maintain euglycemia.        Patient requires continuous monitoring with bedside rhythm monitoring, pulse oximetry monitoring, and continuous central venous and arterial pressure monitoring; and intermittent blood gas analysis. Care plan discussed with the ICU care team.   Patient remained critical, at risk for life threatening decompensation.    I have spent 40 minutes providing critical care management to this patient.    By signing my name below, I, Vanna Santana, attest that this documentation has been prepared under the direction and in the presence of Muriel Walker MD   Electronically signed: Richard Mcdonnell, 04-08-24 @ 17:11    I, Muriel Walker, personally performed the services described in this documentation. all medical record entries made by the scribe were at my direction and in my presence. I have reviewed the chart and agree that the record reflects my personal performance and is accurate and complete  Electronically signed: Muriel Walker MD  HPI:  74yr M, PMHx of CVA and MI (12/2016), HTN, DM, HLD, CAD s/p cardiac stents, CHF, hx of malignant melanoma coming in for wide excision 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  Py presents for PST for ascending aorta replacement, aortic valve replacement, coronary artery bypass grafting with Dr. Ilia Ortiz.  He has been admitted preop for w/u - CT head, pt has not taken Farxiga since Wednesday as per orders from Dr. Goddard NP.  NPO after midnight for OR   (07 Apr 2024 14:43)      Patient seen and examined at the bedside.    Remained critically ill on continuous ICU monitoring.    OBJECTIVE:  Vital Signs Last 24 Hrs  T(C): 36.3 (08 Apr 2024 06:45), Max: 36.6 (08 Apr 2024 04:54)  T(F): 97.3 (08 Apr 2024 05:58), Max: 97.8 (08 Apr 2024 04:54)  HR: 96 (08 Apr 2024 16:54) (59 - 96)  BP: 117/84 (08 Apr 2024 06:45) (98/67 - 148/84)  BP(mean): 77 (08 Apr 2024 06:45) (77 - 77)  RR: 18 (08 Apr 2024 06:45) (16 - 18)  SpO2: 97% (08 Apr 2024 16:54) (92% - 97%)    Parameters below as of 08 Apr 2024 05:58  Patient On (Oxygen Delivery Method): room air        Physical Exam:   General: Intubated   Neurology: Sedated   Eyes: bilateral pupils equal and reactive   ENT/Neck: Neck supple, trachea midline, No JVD   Respiratory: Clear bilaterally   CV: S1S2, no murmurs        [x] Sternal dressing, [x] Mediastinal CTx3        [x] Sinus rhythm  Abdominal: Soft, NT, ND +BS  Extremities: 1-2+ pedal edema noted, + peripheral pulses   Skin: No Rashes, Hematoma, Ecchymosis                           Assessment:  74yr M, PMHx of CVA and MI (12/2016), HTN, DM, HLD, CAD s/p cardiac stents, CHF, hx of malignant melanoma coming in for wide excision 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  Py presents for PST for ascending aorta replacement, aortic valve replacement, coronary artery bypass grafting with Dr. Ilia Ortiz.  He has been admitted preop for w/u - CT head, pt has not taken Farxiga since Wednesday as per orders from Dr. Goddard NP.    CAD s/p CABGx3 4/8/24  Post-op blood loss anemia  Hemodynamic Instability   Hypovolemia  Post-op respiratory failure       Plan:   ***Neuro***  Addressed analgesic regimen to optimize function and sedated for ventilatory synchrony.     ***Cardiovascular***  Patient admitted for CAD s/p CABGx3 4/8/24. Inotropic support with IV Dobutamine and Milrinone infusion for acute systolic heart failure/cardiogenic shock/vasogenic shock. Patient is on PO Amiodarone for afib prophylaxis. Invasive hemodynamic monitoring with a PA catheter & an A-line were required for the following of serial CI's/MV02's and continuous central venous, pulmonary artery pressure and MAP/BP monitoring to ensure adequate cardiovascular support. IABP with good 1:1 diastolic augmentation.     ***Pulmonary***  Respiratory status required full ventilatory support, close monitoring of respiratory rate and breathing pattern, the following of ABG’s with A-line monitoring, continuous pulse oximetry monitoring    Mode: AC/ CMV (Assist Control/ Continuous Mandatory Ventilation)  RR (machine): 12  TV (machine): 550  FiO2: 100  PEEP: 5  ITime: 1  MAP: 10  PIP: 18               ***Heme***  Post-op acute blood loss anemia, no thrombocytopenia. Monitor hemoglobin and hematocrit levels. Patient received 2 ffp, 1 plt, 10 cyro, and 1000 feiba.      ***GI***  Patient is NPO for now. Pepcid for stress ulcer prophylaxis.     ***Renal***  Optimize renal perfusion with adequate volume resuscitation and continued monitoring of urine output, fluid balance, electrolytes, and BUN/Creatinine.     ***ID***  Afebrile, white blood count within normal limits. Continue trending white blood count and monitoring fever curve. Perioperative coverage with Cefuroxime.    ***Endocrine***  Metabolic stability, history of diabetes mellitus required an IV regular Insulin drip while following serial glucose levels to help achieve and maintain euglycemia.        Patient requires continuous monitoring with bedside rhythm monitoring, pulse oximetry monitoring, and continuous central venous and arterial pressure monitoring; and intermittent blood gas analysis. Care plan discussed with the ICU care team.   Patient remained critical, at risk for life threatening decompensation.    I have spent 40 minutes providing critical care management to this patient.    By signing my name below, I, Vanna Santana, attest that this documentation has been prepared under the direction and in the presence of Muriel Walker MD   Electronically signed: Richard Mcdonnell, 04-08-24 @ 17:11    I, Muriel Walker, personally performed the services described in this documentation. all medical record entries made by the demetriusibe were at my direction and in my presence. I have reviewed the chart and agree that the record reflects my personal performance and is accurate and complete  Electronically signed: Muriel Walker MD  HPI:  74yr M, PMHx of CVA and MI (12/2016), HTN, DM, HLD, CAD s/p cardiac stents, CHF, hx of malignant melanoma coming in for wide excision 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  Py presents for PST for ascending aorta replacement, aortic valve replacement, coronary artery bypass grafting with Dr. Ilia Ortiz.  He has been admitted preop for w/u - CT head, pt has not taken Farxiga since Wednesday as per orders from Dr. Goddard NP.  NPO after midnight for OR   (07 Apr 2024 14:43)      Patient seen and examined at the bedside.    Remained critically ill on continuous ICU monitoring.    OBJECTIVE:  Vital Signs Last 24 Hrs  T(C): 36.3 (08 Apr 2024 06:45), Max: 36.6 (08 Apr 2024 04:54)  T(F): 97.3 (08 Apr 2024 05:58), Max: 97.8 (08 Apr 2024 04:54)  HR: 96 (08 Apr 2024 16:54) (59 - 96)  BP: 117/84 (08 Apr 2024 06:45) (98/67 - 148/84)  BP(mean): 77 (08 Apr 2024 06:45) (77 - 77)  RR: 18 (08 Apr 2024 06:45) (16 - 18)  SpO2: 97% (08 Apr 2024 16:54) (92% - 97%)    Parameters below as of 08 Apr 2024 05:58  Patient On (Oxygen Delivery Method): room air      Physical Exam:   General: Normal body habitus, intubated, breathing in sync with the ventilator   Neurology: Sedated but able to follow simple commands  Eyes: bilateral pupils equal and reactive   ENT/Neck: Neck supple, trachea midline, No JVD   Respiratory: Clear bilaterally   CV: S1S2, no murmurs        [x] Sternal dressing, [x] Mediastinal CTx3        [x] Sinus rhythm  Abdominal: Soft, NT, ND +BS  Extremities: No pedal edema noted, + peripheral pulses   Skin: No Rashes, Hematoma, Ecchymosis                           Assessment:  74yr M, PMHx of CVA and MI (12/2016), HTN, DM, HLD, CAD s/p cardiac stents, CHF, hx of malignant melanoma coming in for wide excision 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  Py presents for PST for ascending aorta replacement, aortic valve replacement, coronary artery bypass grafting with Dr. Ilia Ortiz.  He has been admitted preop for w/u - CT head, pt has not taken Farxiga since Wednesday as per orders from Dr. Goddard NP.    CAD s/p CABGx3 4/8/24  Post-op blood loss anemia  Hemodynamic Instability   Hypovolemia  Post-op respiratory failure       Plan:   ***Neuro***  Addressed analgesic regimen to optimize function and sedated with an IV Dexmedetomidine infusion for ventilatory synchrony.     ***Cardiovascular***  Patient admitted for CAD and ascending aortic aneurysm s/p AVR, Ascending aortic and lucie arch replacement and CABGx2 4/8/24. An IABP was placed intraoperatively prior to surgery. Patient is requiring inotropic support with IV Dobutamine and Milrinone infusions for acute systolic heart failure. IABP counterpulsation continues at 1:1.  Patient is on PO Amiodarone for afib prophylaxis. Invasive hemodynamic monitoring with a PA catheter & an A-line were required for the following of serial CI's/MV02's and continuous central venous, pulmonary artery pressure and MAP/BP monitoring to ensure adequate cardiovascular support.     ***Pulmonary***  Respiratory status required full ventilatory support, close monitoring of respiratory rate and breathing pattern, the following of ABG’s with A-line monitoring, continuous pulse oximetry monitoring. Peep increased due to hypoxia. P02 now improving, Fi02 now being weaned.    Mode: AC/ CMV (Assist Control/ Continuous Mandatory Ventilation)  RR (machine): 12  TV (machine): 550  FiO2: 60  PEEP: 8  ITime: 1  MAP: 10  PIP: 18               ***Heme***  Post-op acute blood loss anemia, no thrombocytopenia. Monitor hemoglobin and hematocrit levels. Patient received 2 ffp, 1 plt, 10 cryo, and 1000 feiba in the OR.     ***GI***  Patient is NPO for now. Pepcid for stress ulcer prophylaxis.     ***Renal***  Optimize renal perfusion with adequate volume resuscitation and continued monitoring of urine output, fluid balance, electrolytes, and BUN/Creatinine.     ***ID***  Afebrile, white blood count within normal limits. Continue trending white blood count and monitoring fever curve. Perioperative coverage with Cefuroxime.    ***Endocrine***  Metabolic stability, history of diabetes mellitus required an IV regular Insulin drip while following serial glucose levels to help achieve and maintain euglycemia.        Patient requires continuous monitoring with bedside rhythm monitoring, pulse oximetry monitoring, and continuous central venous and arterial pressure monitoring; and intermittent blood gas analysis. Care plan discussed with the ICU care team.   Patient remained critical, at risk for life threatening decompensation.    I have spent 40 minutes providing critical care management to this patient.    By signing my name below, I, Vanna Santana, attest that this documentation has been prepared under the direction and in the presence of Murile Walker MD   Electronically signed: Richard Mcdonnell, 04-08-24 @ 17:11    I, Muriel Walker, personally performed the services described in this documentation. all medical record entries made by the demetriusibe were at my direction and in my presence. I have reviewed the chart and agree that the record reflects my personal performance and is accurate and complete  Electronically signed: Muriel Walker MD

## 2024-04-08 NOTE — BRIEF OPERATIVE NOTE - NSICDXBRIEFPOSTOP_GEN_ALL_CORE_FT
POST-OP DIAGNOSIS:  CAD (coronary artery disease) 08-Apr-2024 17:04:31  Ernst Walls  Ascending aortic aneurysm 08-Apr-2024 17:04:45  Ernst Walls  Aortic insufficiency 08-Apr-2024 17:04:57  Ernst Walls

## 2024-04-08 NOTE — BRIEF OPERATIVE NOTE - NSICDXBRIEFPREOP_GEN_ALL_CORE_FT
PRE-OP DIAGNOSIS:  Aortic aneurysm 08-Apr-2024 17:03:19  Ernst Walls  Aortic insufficiency 08-Apr-2024 17:03:32  Ernst Walls  CAD (coronary artery disease) 08-Apr-2024 17:04:19  Ernst Walls

## 2024-04-08 NOTE — PRE-ANESTHESIA EVALUATION ADULT - NSANTHPMHFT_GEN_ALL_CORE
74M with PMH s/f HTN, IDDM2, CAD (c/b MI 2016 s/p cardiac stents 2017), HFrEF (48%), aortic aneurysm, hx CVA (no residual weakness/numbness), hx malignant melanoma who now presents for ascending aorta replacement, aortic valve replacement, coronary artery bypass grafting with Dr. Ilia Ortiz.

## 2024-04-08 NOTE — BRIEF OPERATIVE NOTE - NSICDXBRIEFPROCEDURE_GEN_ALL_CORE_FT
PROCEDURES:  Ascending aortic aneurysm repair 08-Apr-2024 17:00:43 ASCENDING AORTIC REPLACEMENT WITH TRANSVERSE HEMIARCH 28x8 Ernst Walls  CABG, 1-2 vessels, with aortic valve replacement 08-Apr-2024 17:01:22 CABGx2 WITH RSVG TO DIAG AND RCA; AVR WITH 25MM INSPIRUS BIOPROSTHETIC VALVE Ernst Walls

## 2024-04-08 NOTE — PRE-OP CHECKLIST - MUPIRONCIN COMMENTS
HIBICLENS shower done at [facility] 4/7/24 night prior to surgery & HIBICLENS shower done at [facility] 4/7/24 night prior to surgery & 4/8/24 am prior to surgery.

## 2024-04-09 LAB
ALBUMIN SERPL ELPH-MCNC: 3.2 G/DL — LOW (ref 3.3–5)
ALBUMIN SERPL ELPH-MCNC: 3.4 G/DL — SIGNIFICANT CHANGE UP (ref 3.3–5)
ALP SERPL-CCNC: 40 U/L — SIGNIFICANT CHANGE UP (ref 40–120)
ALP SERPL-CCNC: 41 U/L — SIGNIFICANT CHANGE UP (ref 40–120)
ALT FLD-CCNC: 16 U/L — SIGNIFICANT CHANGE UP (ref 10–45)
ALT FLD-CCNC: 16 U/L — SIGNIFICANT CHANGE UP (ref 10–45)
ANION GAP SERPL CALC-SCNC: 11 MMOL/L — SIGNIFICANT CHANGE UP (ref 5–17)
ANION GAP SERPL CALC-SCNC: 11 MMOL/L — SIGNIFICANT CHANGE UP (ref 5–17)
APTT BLD: 29.9 SEC — SIGNIFICANT CHANGE UP (ref 24.5–35.6)
AST SERPL-CCNC: 54 U/L — HIGH (ref 10–40)
AST SERPL-CCNC: 55 U/L — HIGH (ref 10–40)
BASE EXCESS BLDMV CALC-SCNC: -0.2 MMOL/L — SIGNIFICANT CHANGE UP (ref -3–3)
BASE EXCESS BLDMV CALC-SCNC: -0.5 MMOL/L — SIGNIFICANT CHANGE UP (ref -3–3)
BASE EXCESS BLDMV CALC-SCNC: -0.8 MMOL/L — SIGNIFICANT CHANGE UP (ref -3–3)
BASE EXCESS BLDMV CALC-SCNC: -3 MMOL/L — SIGNIFICANT CHANGE UP (ref -3–3)
BASE EXCESS BLDMV CALC-SCNC: -4.3 MMOL/L — LOW (ref -3–3)
BASE EXCESS BLDMV CALC-SCNC: -4.4 MMOL/L — LOW (ref -3–3)
BASE EXCESS BLDMV CALC-SCNC: -4.5 MMOL/L — LOW (ref -3–3)
BASE EXCESS BLDMV CALC-SCNC: -4.7 MMOL/L — LOW (ref -3–3)
BASE EXCESS BLDMV CALC-SCNC: -5.2 MMOL/L — LOW (ref -3–3)
BASE EXCESS BLDMV CALC-SCNC: -7.7 MMOL/L — LOW (ref -3–3)
BASOPHILS # BLD AUTO: 0.01 K/UL — SIGNIFICANT CHANGE UP (ref 0–0.2)
BASOPHILS # BLD AUTO: 0.02 K/UL — SIGNIFICANT CHANGE UP (ref 0–0.2)
BASOPHILS NFR BLD AUTO: 0.1 % — SIGNIFICANT CHANGE UP (ref 0–2)
BASOPHILS NFR BLD AUTO: 0.3 % — SIGNIFICANT CHANGE UP (ref 0–2)
BILIRUB SERPL-MCNC: 0.9 MG/DL — SIGNIFICANT CHANGE UP (ref 0.2–1.2)
BILIRUB SERPL-MCNC: 1.2 MG/DL — SIGNIFICANT CHANGE UP (ref 0.2–1.2)
BUN SERPL-MCNC: 14 MG/DL — SIGNIFICANT CHANGE UP (ref 7–23)
BUN SERPL-MCNC: 15 MG/DL — SIGNIFICANT CHANGE UP (ref 7–23)
CALCIUM SERPL-MCNC: 8.1 MG/DL — LOW (ref 8.4–10.5)
CALCIUM SERPL-MCNC: 8.1 MG/DL — LOW (ref 8.4–10.5)
CHLORIDE SERPL-SCNC: 111 MMOL/L — HIGH (ref 96–108)
CHLORIDE SERPL-SCNC: 112 MMOL/L — HIGH (ref 96–108)
CO2 BLDMV-SCNC: 18 MMOL/L — LOW (ref 21–29)
CO2 BLDMV-SCNC: 21 MMOL/L — SIGNIFICANT CHANGE UP (ref 21–29)
CO2 BLDMV-SCNC: 22 MMOL/L — SIGNIFICANT CHANGE UP (ref 21–29)
CO2 BLDMV-SCNC: 23 MMOL/L — SIGNIFICANT CHANGE UP (ref 21–29)
CO2 BLDMV-SCNC: 25 MMOL/L — SIGNIFICANT CHANGE UP (ref 21–29)
CO2 BLDMV-SCNC: 26 MMOL/L — SIGNIFICANT CHANGE UP (ref 21–29)
CO2 BLDMV-SCNC: 26 MMOL/L — SIGNIFICANT CHANGE UP (ref 21–29)
CO2 SERPL-SCNC: 20 MMOL/L — LOW (ref 22–31)
CO2 SERPL-SCNC: 20 MMOL/L — LOW (ref 22–31)
CREAT SERPL-MCNC: 0.97 MG/DL — SIGNIFICANT CHANGE UP (ref 0.5–1.3)
CREAT SERPL-MCNC: 1.04 MG/DL — SIGNIFICANT CHANGE UP (ref 0.5–1.3)
EGFR: 74 ML/MIN/1.73M2 — SIGNIFICANT CHANGE UP
EGFR: 81 ML/MIN/1.73M2 — SIGNIFICANT CHANGE UP
EOSINOPHIL # BLD AUTO: 0 K/UL — SIGNIFICANT CHANGE UP (ref 0–0.5)
EOSINOPHIL # BLD AUTO: 0.01 K/UL — SIGNIFICANT CHANGE UP (ref 0–0.5)
EOSINOPHIL NFR BLD AUTO: 0 % — SIGNIFICANT CHANGE UP (ref 0–6)
EOSINOPHIL NFR BLD AUTO: 0.1 % — SIGNIFICANT CHANGE UP (ref 0–6)
FIBRINOGEN PPP-MCNC: 328 MG/DL — SIGNIFICANT CHANGE UP (ref 200–445)
GAS PNL BLDA: SIGNIFICANT CHANGE UP
GAS PNL BLDMV: SIGNIFICANT CHANGE UP
GLUCOSE BLDC GLUCOMTR-MCNC: 100 MG/DL — HIGH (ref 70–99)
GLUCOSE BLDC GLUCOMTR-MCNC: 104 MG/DL — HIGH (ref 70–99)
GLUCOSE BLDC GLUCOMTR-MCNC: 107 MG/DL — HIGH (ref 70–99)
GLUCOSE BLDC GLUCOMTR-MCNC: 110 MG/DL — HIGH (ref 70–99)
GLUCOSE BLDC GLUCOMTR-MCNC: 110 MG/DL — HIGH (ref 70–99)
GLUCOSE BLDC GLUCOMTR-MCNC: 114 MG/DL — HIGH (ref 70–99)
GLUCOSE BLDC GLUCOMTR-MCNC: 119 MG/DL — HIGH (ref 70–99)
GLUCOSE BLDC GLUCOMTR-MCNC: 121 MG/DL — HIGH (ref 70–99)
GLUCOSE BLDC GLUCOMTR-MCNC: 123 MG/DL — HIGH (ref 70–99)
GLUCOSE BLDC GLUCOMTR-MCNC: 152 MG/DL — HIGH (ref 70–99)
GLUCOSE BLDC GLUCOMTR-MCNC: 154 MG/DL — HIGH (ref 70–99)
GLUCOSE BLDC GLUCOMTR-MCNC: 167 MG/DL — HIGH (ref 70–99)
GLUCOSE BLDC GLUCOMTR-MCNC: 169 MG/DL — HIGH (ref 70–99)
GLUCOSE BLDC GLUCOMTR-MCNC: 178 MG/DL — HIGH (ref 70–99)
GLUCOSE BLDC GLUCOMTR-MCNC: 204 MG/DL — HIGH (ref 70–99)
GLUCOSE SERPL-MCNC: 101 MG/DL — HIGH (ref 70–99)
GLUCOSE SERPL-MCNC: 125 MG/DL — HIGH (ref 70–99)
HCO3 BLDMV-SCNC: 18 MMOL/L — LOW (ref 20–28)
HCO3 BLDMV-SCNC: 20 MMOL/L — SIGNIFICANT CHANGE UP (ref 20–28)
HCO3 BLDMV-SCNC: 20 MMOL/L — SIGNIFICANT CHANGE UP (ref 20–28)
HCO3 BLDMV-SCNC: 21 MMOL/L — SIGNIFICANT CHANGE UP (ref 20–28)
HCO3 BLDMV-SCNC: 22 MMOL/L — SIGNIFICANT CHANGE UP (ref 20–28)
HCO3 BLDMV-SCNC: 24 MMOL/L — SIGNIFICANT CHANGE UP (ref 20–28)
HCO3 BLDMV-SCNC: 24 MMOL/L — SIGNIFICANT CHANGE UP (ref 20–28)
HCO3 BLDMV-SCNC: 25 MMOL/L — SIGNIFICANT CHANGE UP (ref 20–28)
HCT VFR BLD CALC: 24.2 % — LOW (ref 39–50)
HCT VFR BLD CALC: 26.6 % — LOW (ref 39–50)
HCV AB S/CO SERPL IA: 0.11 S/CO — SIGNIFICANT CHANGE UP (ref 0–0.99)
HCV AB SERPL-IMP: SIGNIFICANT CHANGE UP
HGB BLD-MCNC: 8.1 G/DL — LOW (ref 13–17)
HGB BLD-MCNC: 8.6 G/DL — LOW (ref 13–17)
HGB BLDA-MCNC: 14.1 G/DL — SIGNIFICANT CHANGE UP (ref 12.6–17.4)
HOROWITZ INDEX BLDMV+IHG-RTO: 32 — SIGNIFICANT CHANGE UP
HOROWITZ INDEX BLDMV+IHG-RTO: 40 — SIGNIFICANT CHANGE UP
IMM GRANULOCYTES NFR BLD AUTO: 0.5 % — SIGNIFICANT CHANGE UP (ref 0–0.9)
IMM GRANULOCYTES NFR BLD AUTO: 0.6 % — SIGNIFICANT CHANGE UP (ref 0–0.9)
INR BLD: 1.35 RATIO — HIGH (ref 0.85–1.18)
LACTATE SERPL-SCNC: 1 MMOL/L — SIGNIFICANT CHANGE UP (ref 0.5–2)
LYMPHOCYTES # BLD AUTO: 0.37 K/UL — LOW (ref 1–3.3)
LYMPHOCYTES # BLD AUTO: 0.58 K/UL — LOW (ref 1–3.3)
LYMPHOCYTES # BLD AUTO: 5.2 % — LOW (ref 13–44)
LYMPHOCYTES # BLD AUTO: 7.8 % — LOW (ref 13–44)
MAGNESIUM SERPL-MCNC: 2.5 MG/DL — SIGNIFICANT CHANGE UP (ref 1.6–2.6)
MAGNESIUM SERPL-MCNC: 2.6 MG/DL — SIGNIFICANT CHANGE UP (ref 1.6–2.6)
MCHC RBC-ENTMCNC: 27.7 PG — SIGNIFICANT CHANGE UP (ref 27–34)
MCHC RBC-ENTMCNC: 28.6 PG — SIGNIFICANT CHANGE UP (ref 27–34)
MCHC RBC-ENTMCNC: 32.3 GM/DL — SIGNIFICANT CHANGE UP (ref 32–36)
MCHC RBC-ENTMCNC: 33.5 GM/DL — SIGNIFICANT CHANGE UP (ref 32–36)
MCV RBC AUTO: 85.5 FL — SIGNIFICANT CHANGE UP (ref 80–100)
MCV RBC AUTO: 85.5 FL — SIGNIFICANT CHANGE UP (ref 80–100)
MONOCYTES # BLD AUTO: 0.58 K/UL — SIGNIFICANT CHANGE UP (ref 0–0.9)
MONOCYTES # BLD AUTO: 0.92 K/UL — HIGH (ref 0–0.9)
MONOCYTES NFR BLD AUTO: 12.3 % — SIGNIFICANT CHANGE UP (ref 2–14)
MONOCYTES NFR BLD AUTO: 8.2 % — SIGNIFICANT CHANGE UP (ref 2–14)
NEUTROPHILS # BLD AUTO: 5.92 K/UL — SIGNIFICANT CHANGE UP (ref 1.8–7.4)
NEUTROPHILS # BLD AUTO: 6.06 K/UL — SIGNIFICANT CHANGE UP (ref 1.8–7.4)
NEUTROPHILS NFR BLD AUTO: 79.3 % — HIGH (ref 43–77)
NEUTROPHILS NFR BLD AUTO: 85.6 % — HIGH (ref 43–77)
NRBC # BLD: 0 /100 WBCS — SIGNIFICANT CHANGE UP (ref 0–0)
NRBC # BLD: 0 /100 WBCS — SIGNIFICANT CHANGE UP (ref 0–0)
O2 CT VFR BLD CALC: 31 MMHG — SIGNIFICANT CHANGE UP (ref 30–65)
O2 CT VFR BLD CALC: 31 MMHG — SIGNIFICANT CHANGE UP (ref 30–65)
O2 CT VFR BLD CALC: 32 MMHG — SIGNIFICANT CHANGE UP (ref 30–65)
O2 CT VFR BLD CALC: 34 MMHG — SIGNIFICANT CHANGE UP (ref 30–65)
O2 CT VFR BLD CALC: 35 MMHG — SIGNIFICANT CHANGE UP (ref 30–65)
O2 CT VFR BLD CALC: 40 MMHG — SIGNIFICANT CHANGE UP (ref 30–65)
O2 CT VFR BLD CALC: 45 MMHG — SIGNIFICANT CHANGE UP (ref 30–65)
O2 CT VFR BLD CALC: 47 MMHG — SIGNIFICANT CHANGE UP (ref 30–65)
OXYHGB MFR BLDA: 95.3 % — HIGH (ref 90–95)
PCO2 BLDMV: 34 MMHG — SIGNIFICANT CHANGE UP (ref 30–65)
PCO2 BLDMV: 34 MMHG — SIGNIFICANT CHANGE UP (ref 30–65)
PCO2 BLDMV: 36 MMHG — SIGNIFICANT CHANGE UP (ref 30–65)
PCO2 BLDMV: 37 MMHG — SIGNIFICANT CHANGE UP (ref 30–65)
PCO2 BLDMV: 39 MMHG — SIGNIFICANT CHANGE UP (ref 30–65)
PCO2 BLDMV: 39 MMHG — SIGNIFICANT CHANGE UP (ref 30–65)
PCO2 BLDMV: 40 MMHG — SIGNIFICANT CHANGE UP (ref 30–65)
PCO2 BLDMV: 41 MMHG — SIGNIFICANT CHANGE UP (ref 30–65)
PCO2 BLDMV: 41 MMHG — SIGNIFICANT CHANGE UP (ref 30–65)
PCO2 BLDMV: 42 MMHG — SIGNIFICANT CHANGE UP (ref 30–65)
PH BLDMV: 7.32 — SIGNIFICANT CHANGE UP (ref 7.32–7.45)
PH BLDMV: 7.34 — SIGNIFICANT CHANGE UP (ref 7.32–7.45)
PH BLDMV: 7.35 — SIGNIFICANT CHANGE UP (ref 7.32–7.45)
PH BLDMV: 7.36 — SIGNIFICANT CHANGE UP (ref 7.32–7.45)
PH BLDMV: 7.38 — SIGNIFICANT CHANGE UP (ref 7.32–7.45)
PH BLDMV: 7.39 — SIGNIFICANT CHANGE UP (ref 7.32–7.45)
PH BLDMV: 7.4 — SIGNIFICANT CHANGE UP (ref 7.32–7.45)
PH BLDMV: 7.4 — SIGNIFICANT CHANGE UP (ref 7.32–7.45)
PHOSPHATE SERPL-MCNC: 1.7 MG/DL — LOW (ref 2.5–4.5)
PHOSPHATE SERPL-MCNC: 3.2 MG/DL — SIGNIFICANT CHANGE UP (ref 2.5–4.5)
PLATELET # BLD AUTO: 127 K/UL — LOW (ref 150–400)
PLATELET # BLD AUTO: 91 K/UL — LOW (ref 150–400)
POTASSIUM SERPL-MCNC: 4.7 MMOL/L — SIGNIFICANT CHANGE UP (ref 3.5–5.3)
POTASSIUM SERPL-MCNC: 4.7 MMOL/L — SIGNIFICANT CHANGE UP (ref 3.5–5.3)
POTASSIUM SERPL-SCNC: 4.7 MMOL/L — SIGNIFICANT CHANGE UP (ref 3.5–5.3)
POTASSIUM SERPL-SCNC: 4.7 MMOL/L — SIGNIFICANT CHANGE UP (ref 3.5–5.3)
PROT SERPL-MCNC: 4.8 G/DL — LOW (ref 6–8.3)
PROT SERPL-MCNC: 4.8 G/DL — LOW (ref 6–8.3)
PROTHROM AB SERPL-ACNC: 14.1 SEC — HIGH (ref 9.5–13)
RBC # BLD: 2.83 M/UL — LOW (ref 4.2–5.8)
RBC # BLD: 3.11 M/UL — LOW (ref 4.2–5.8)
RBC # FLD: 14.2 % — SIGNIFICANT CHANGE UP (ref 10.3–14.5)
RBC # FLD: 14.3 % — SIGNIFICANT CHANGE UP (ref 10.3–14.5)
SAO2 % BLDA: 95.3 % — SIGNIFICANT CHANGE UP (ref 94–98)
SAO2 % BLDMV: 51 — LOW (ref 60–90)
SAO2 % BLDMV: 55.2 — LOW (ref 60–90)
SAO2 % BLDMV: 55.7 — LOW (ref 60–90)
SAO2 % BLDMV: 56.1 — LOW (ref 60–90)
SAO2 % BLDMV: 65.7 — SIGNIFICANT CHANGE UP (ref 60–90)
SAO2 % BLDMV: 72.4 — SIGNIFICANT CHANGE UP (ref 60–90)
SAO2 % BLDMV: 72.8 — SIGNIFICANT CHANGE UP (ref 60–90)
SAO2 % BLDMV: 74.2 — SIGNIFICANT CHANGE UP (ref 60–90)
SAO2 % BLDMV: 76.3 — SIGNIFICANT CHANGE UP (ref 60–90)
SAO2 % BLDMV: 79.6 — SIGNIFICANT CHANGE UP (ref 60–90)
SODIUM SERPL-SCNC: 142 MMOL/L — SIGNIFICANT CHANGE UP (ref 135–145)
SODIUM SERPL-SCNC: 143 MMOL/L — SIGNIFICANT CHANGE UP (ref 135–145)
WBC # BLD: 7.08 K/UL — SIGNIFICANT CHANGE UP (ref 3.8–10.5)
WBC # BLD: 7.47 K/UL — SIGNIFICANT CHANGE UP (ref 3.8–10.5)
WBC # FLD AUTO: 7.08 K/UL — SIGNIFICANT CHANGE UP (ref 3.8–10.5)
WBC # FLD AUTO: 7.47 K/UL — SIGNIFICANT CHANGE UP (ref 3.8–10.5)

## 2024-04-09 PROCEDURE — 99292 CRITICAL CARE ADDL 30 MIN: CPT | Mod: FS,25

## 2024-04-09 PROCEDURE — 93010 ELECTROCARDIOGRAM REPORT: CPT

## 2024-04-09 PROCEDURE — 71045 X-RAY EXAM CHEST 1 VIEW: CPT | Mod: 26

## 2024-04-09 PROCEDURE — 99292 CRITICAL CARE ADDL 30 MIN: CPT | Mod: 25

## 2024-04-09 PROCEDURE — 33968 REMOVE AORTIC ASSIST DEVICE: CPT

## 2024-04-09 PROCEDURE — 99291 CRITICAL CARE FIRST HOUR: CPT | Mod: 25

## 2024-04-09 RX ORDER — ALBUMIN HUMAN 25 %
100 VIAL (ML) INTRAVENOUS ONCE
Refills: 0 | Status: COMPLETED | OUTPATIENT
Start: 2024-04-09 | End: 2024-04-09

## 2024-04-09 RX ORDER — HYDROMORPHONE HYDROCHLORIDE 2 MG/ML
0.5 INJECTION INTRAMUSCULAR; INTRAVENOUS; SUBCUTANEOUS ONCE
Refills: 0 | Status: DISCONTINUED | OUTPATIENT
Start: 2024-04-09 | End: 2024-04-09

## 2024-04-09 RX ORDER — ACETAMINOPHEN 500 MG
1000 TABLET ORAL ONCE
Refills: 0 | Status: COMPLETED | OUTPATIENT
Start: 2024-04-09 | End: 2024-04-09

## 2024-04-09 RX ORDER — ONDANSETRON 8 MG/1
4 TABLET, FILM COATED ORAL ONCE
Refills: 0 | Status: COMPLETED | OUTPATIENT
Start: 2024-04-09 | End: 2024-04-09

## 2024-04-09 RX ORDER — FUROSEMIDE 40 MG
20 TABLET ORAL ONCE
Refills: 0 | Status: COMPLETED | OUTPATIENT
Start: 2024-04-09 | End: 2024-04-09

## 2024-04-09 RX ORDER — ATORVASTATIN CALCIUM 80 MG/1
40 TABLET, FILM COATED ORAL AT BEDTIME
Refills: 0 | Status: DISCONTINUED | OUTPATIENT
Start: 2024-04-09 | End: 2024-04-11

## 2024-04-09 RX ORDER — POTASSIUM CHLORIDE 20 MEQ
10 PACKET (EA) ORAL
Refills: 0 | Status: COMPLETED | OUTPATIENT
Start: 2024-04-09 | End: 2024-04-09

## 2024-04-09 RX ORDER — SODIUM BICARBONATE 1 MEQ/ML
50 SYRINGE (ML) INTRAVENOUS ONCE
Refills: 0 | Status: COMPLETED | OUTPATIENT
Start: 2024-04-09 | End: 2024-04-10

## 2024-04-09 RX ORDER — ALBUMIN HUMAN 25 %
100 VIAL (ML) INTRAVENOUS
Refills: 0 | Status: COMPLETED | OUTPATIENT
Start: 2024-04-09 | End: 2024-04-09

## 2024-04-09 RX ORDER — CALCIUM GLUCONATE 100 MG/ML
2 VIAL (ML) INTRAVENOUS ONCE
Refills: 0 | Status: COMPLETED | OUTPATIENT
Start: 2024-04-09 | End: 2024-04-10

## 2024-04-09 RX ADMIN — Medication 1000 MILLIGRAM(S): at 12:45

## 2024-04-09 RX ADMIN — MILRINONE LACTATE 8.63 MICROGRAM(S)/KG/MIN: 1 INJECTION, SOLUTION INTRAVENOUS at 20:14

## 2024-04-09 RX ADMIN — HYDROMORPHONE HYDROCHLORIDE 0.5 MILLIGRAM(S): 2 INJECTION INTRAMUSCULAR; INTRAVENOUS; SUBCUTANEOUS at 14:15

## 2024-04-09 RX ADMIN — CHLORHEXIDINE GLUCONATE 15 MILLILITER(S): 213 SOLUTION TOPICAL at 06:19

## 2024-04-09 RX ADMIN — Medication 50 MILLIEQUIVALENT(S): at 22:20

## 2024-04-09 RX ADMIN — Medication 100 MILLIGRAM(S): at 12:17

## 2024-04-09 RX ADMIN — Medication 50 MILLILITER(S): at 09:45

## 2024-04-09 RX ADMIN — INSULIN HUMAN 3 UNIT(S)/HR: 100 INJECTION, SOLUTION SUBCUTANEOUS at 20:14

## 2024-04-09 RX ADMIN — Medication 81 MILLIGRAM(S): at 12:17

## 2024-04-09 RX ADMIN — Medication 400 MILLIGRAM(S): at 04:17

## 2024-04-09 RX ADMIN — Medication 400 MILLIGRAM(S): at 22:15

## 2024-04-09 RX ADMIN — FAMOTIDINE 20 MILLIGRAM(S): 10 INJECTION INTRAVENOUS at 06:03

## 2024-04-09 RX ADMIN — SODIUM CHLORIDE 3 MILLILITER(S): 9 INJECTION INTRAMUSCULAR; INTRAVENOUS; SUBCUTANEOUS at 22:27

## 2024-04-09 RX ADMIN — Medication 100 MILLIGRAM(S): at 06:04

## 2024-04-09 RX ADMIN — GABAPENTIN 100 MILLIGRAM(S): 400 CAPSULE ORAL at 14:23

## 2024-04-09 RX ADMIN — Medication 500 MILLIGRAM(S): at 18:31

## 2024-04-09 RX ADMIN — FAMOTIDINE 20 MILLIGRAM(S): 10 INJECTION INTRAVENOUS at 18:31

## 2024-04-09 RX ADMIN — HYDROMORPHONE HYDROCHLORIDE 0.5 MILLIGRAM(S): 2 INJECTION INTRAMUSCULAR; INTRAVENOUS; SUBCUTANEOUS at 06:22

## 2024-04-09 RX ADMIN — POLYETHYLENE GLYCOL 3350 17 GRAM(S): 17 POWDER, FOR SOLUTION ORAL at 12:17

## 2024-04-09 RX ADMIN — Medication 650 MILLIGRAM(S): at 18:35

## 2024-04-09 RX ADMIN — Medication 6.47 MICROGRAM(S)/KG/MIN: at 20:14

## 2024-04-09 RX ADMIN — Medication 20 MILLIGRAM(S): at 06:05

## 2024-04-09 RX ADMIN — HYDROMORPHONE HYDROCHLORIDE 0.5 MILLIGRAM(S): 2 INJECTION INTRAMUSCULAR; INTRAVENOUS; SUBCUTANEOUS at 06:07

## 2024-04-09 RX ADMIN — HYDROMORPHONE HYDROCHLORIDE 0.5 MILLIGRAM(S): 2 INJECTION INTRAMUSCULAR; INTRAVENOUS; SUBCUTANEOUS at 14:00

## 2024-04-09 RX ADMIN — Medication 50 MILLIEQUIVALENT(S): at 23:13

## 2024-04-09 RX ADMIN — AMIODARONE HYDROCHLORIDE 400 MILLIGRAM(S): 400 TABLET ORAL at 18:32

## 2024-04-09 RX ADMIN — CHLORHEXIDINE GLUCONATE 1 APPLICATION(S): 213 SOLUTION TOPICAL at 12:31

## 2024-04-09 RX ADMIN — Medication 400 MILLIGRAM(S): at 12:15

## 2024-04-09 RX ADMIN — ONDANSETRON 4 MILLIGRAM(S): 8 TABLET, FILM COATED ORAL at 20:46

## 2024-04-09 RX ADMIN — Medication 100 MILLIGRAM(S): at 21:00

## 2024-04-09 RX ADMIN — Medication 650 MILLIGRAM(S): at 18:32

## 2024-04-09 RX ADMIN — Medication 50 MILLILITER(S): at 12:08

## 2024-04-09 RX ADMIN — HYDROMORPHONE HYDROCHLORIDE 0.5 MILLIGRAM(S): 2 INJECTION INTRAMUSCULAR; INTRAVENOUS; SUBCUTANEOUS at 01:38

## 2024-04-09 RX ADMIN — Medication 20 MILLIGRAM(S): at 20:13

## 2024-04-09 RX ADMIN — Medication 50 MILLILITER(S): at 17:15

## 2024-04-09 RX ADMIN — SODIUM CHLORIDE 3 MILLILITER(S): 9 INJECTION INTRAMUSCULAR; INTRAVENOUS; SUBCUTANEOUS at 06:08

## 2024-04-09 RX ADMIN — Medication 1000 MILLIGRAM(S): at 04:47

## 2024-04-09 RX ADMIN — HYDROMORPHONE HYDROCHLORIDE 0.5 MILLIGRAM(S): 2 INJECTION INTRAMUSCULAR; INTRAVENOUS; SUBCUTANEOUS at 01:33

## 2024-04-09 RX ADMIN — Medication 1000 MILLIGRAM(S): at 22:30

## 2024-04-09 RX ADMIN — Medication 1 APPLICATION(S): at 17:15

## 2024-04-09 RX ADMIN — SODIUM CHLORIDE 3 MILLILITER(S): 9 INJECTION INTRAMUSCULAR; INTRAVENOUS; SUBCUTANEOUS at 13:43

## 2024-04-09 NOTE — PROGRESS NOTE ADULT - SUBJECTIVE AND OBJECTIVE BOX
Patient seen and examined at the bedside.    Remained critically ill on continuous ICU monitoring.    OBJECTIVE:  Vital Signs Last 24 Hrs  T(C): 37.8 (09 Apr 2024 20:00), Max: 37.8 (09 Apr 2024 16:00)  T(F): 100 (09 Apr 2024 20:00), Max: 100 (09 Apr 2024 16:00)  HR: 102 (09 Apr 2024 20:00) (77 - 118)  BP: 101/59 (09 Apr 2024 17:15) (101/59 - 122/58)  BP(mean): 75 (09 Apr 2024 17:15) (75 - 84)  RR: 29 (09 Apr 2024 20:00) (7 - 33)  SpO2: 88% (09 Apr 2024 20:00) (88% - 100%)    Parameters below as of 09 Apr 2024 20:00  Patient On (Oxygen Delivery Method): nasal cannula  O2 Flow (L/min): 6        Physical Exam:   General: NAD   Neurology: nonfocal   Eyes: bilateral pupils equal and reactive   ENT/Neck: Neck supple, trachea midline, No JVD   Respiratory: Clear bilaterally   CV: S1S2, no murmurs        [x] Sternal dressing, [x] Mediastinal CT x3, [x] R Pleural CT x1         [x] Sinus Tachycardia, [x] Temporary pacing VVI 40  Abdominal: Soft, NT, ND +BS   Extremities: 1-2+ pedal edema noted, + peripheral pulses   Skin: No Rashes, Hematoma, Ecchymosis                           Assessment:  74yr M, PMHx of CVA and MI (12/2016), HTN, DM, HLD, CAD s/p cardiac stents, CHF, hx of malignant melanoma coming in for wide excision 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  Py presents for PST for ascending aorta replacement, aortic valve replacement, coronary artery bypass grafting with Dr. Ilia Ortiz.  He has been admitted preop for w/u - CT head, pt has not taken Farxiga since Wednesday as per orders from Dr. Goddard NP.    CAD s/p CABGx3 4/8/24  Post-op blood loss anemia  Hemodynamic Instability   Hypovolemia  Post-op respiratory failure  Leukocytosis  Thrombocytopenia       Plan:   ***Neuro***  [x] Hx of CVA   [x] Nonfocal     [x] Tylenol, Gapapentin, Meperidine, and PRNS for pain management  Continue Zofran  Post operative neuro assessment     ***Cardiovascular***  Invasive hemodynamic monitoring, assess perfusion indices    / CVP 10 / MAP 72 / PAP 21/ CI 2.6 / Hct 24.2 / Lactate 0.8  [x] Primacor 0.4 mcgs/kg/min  [x] Dobutamine decreased from 3 to 2 mcgs/kg/min  [x] Amiodarone for a fib prophylaxis   [x] IABP removed today    Volume: [x] Albumin 200 cc [x] 2 PRBC   Reassessment of hemodynamics post resuscitation    Monitor chest tube outputs    [x] ASA [x] Statin  Serial EKG and cardiac enzymes     ***Pulmonary***  [x] Extubated today to Nasal Cannula 5 L   Titration of FiO2 and PEEP, follow SpO2, CXR, blood gasses   Encourage incentive spirometry, continue pulse ox monitoring, follow ABGs             ***GI***  [x] Tolerating Diet   [x] Pepcid  [x] Miralax and Senna for Bowel Regimen    ***Renal***   Continue to monitor I/Os, BUN/Creatinine.   Replete lytes PRN  Paola present      ***ID***  Cefuroxime for perioperative antibiotic coverage      ***Endocrine***  [x] DM: HbA1c 9.8%             - [x] Insulin gtt               - Need tight glycemic control to prevent wound infection.            Patient requires continuous monitoring with bedside rhythm monitoring, pulse oximetry monitoring, and continuous central venous and arterial pressure monitoring; and intermittent blood gas analysis. Care plan discussed with the ICU care team.   Patient remained critical, at risk for life threatening decompensation.    I have spent 30 minutes providing critical care management to this patient.    By signing my name below, IJayce, attest that this documentation has been prepared under the direction and in the presence of Cheryl Biswas NP   Electronically signed: Richard Pedraza, 04-09-24 @ 20:02    Cheryl HOUSE, personally performed the services described in this documentation. all medical record entries made by the demetriusiblyndon were at my direction and in my presence. I have reviewed the chart and agree that the record reflects my personal performance and is accurate and complete  Electronically signed: Cheryl Biswas NP

## 2024-04-09 NOTE — PROGRESS NOTE ADULT - SUBJECTIVE AND OBJECTIVE BOX
CRITICAL CARE ATTENDING - CTICU    MEDICATIONS  (STANDING):  acetaminophen     Tablet .. 650 milliGRAM(s) Oral every 6 hours  aMIOdarone    Tablet 400 milliGRAM(s) Oral two times a day  ascorbic acid 500 milliGRAM(s) Oral two times a day  aspirin enteric coated 81 milliGRAM(s) Oral daily  cefuroxime  IVPB 1500 milliGRAM(s) IV Intermittent every 8 hours  chlorhexidine 0.12% Liquid 15 milliLiter(s) Oral Mucosa every 12 hours  chlorhexidine 2% Cloths 1 Application(s) Topical daily  dexMEDEtomidine Infusion 1 MICROgram(s)/kG/Hr (18 mL/Hr) IV Continuous <Continuous>  dextrose 50% Injectable 50 milliLiter(s) IV Push every 15 minutes  dextrose 50% Injectable 25 milliLiter(s) IV Push every 15 minutes  DOBUTamine Infusion 3 MICROgram(s)/kG/Min (6.47 mL/Hr) IV Continuous <Continuous>  famotidine Injectable 20 milliGRAM(s) IV Push every 12 hours  gabapentin 100 milliGRAM(s) Oral every 8 hours  insulin regular Infusion 3 Unit(s)/Hr (3 mL/Hr) IV Continuous <Continuous>  meperidine     Injectable 25 milliGRAM(s) IV Push once  milrinone Infusion 0.4 MICROgram(s)/kG/Min (8.63 mL/Hr) IV Continuous <Continuous>  norepinephrine Infusion 0.04 MICROgram(s)/kG/Min (5.39 mL/Hr) IV Continuous <Continuous>  polyethylene glycol 3350 17 Gram(s) Oral daily  potassium chloride  10 mEq/50 mL IVPB 10 milliEquivalent(s) IV Intermittent every 1 hour  potassium chloride  10 mEq/50 mL IVPB 10 milliEquivalent(s) IV Intermittent every 1 hour  potassium chloride  10 mEq/50 mL IVPB 10 milliEquivalent(s) IV Intermittent every 1 hour  senna 2 Tablet(s) Oral at bedtime  sodium chloride 0.9% lock flush 3 milliLiter(s) IV Push every 8 hours  sodium chloride 0.9%. 1000 milliLiter(s) (10 mL/Hr) IV Continuous <Continuous>                                    8.6    7.08  )-----------( 127      ( 2024 00:20 )             26.6       04-09    143  |  112<H>  |  15  ----------------------------<  125<H>  4.7   |  20<L>  |  1.04    Ca    8.1<L>      2024 04:14  Phos  3.2       Mg     2.5         TPro  4.8<L>  /  Alb  3.2<L>  /  TBili  0.9  /  DBili  x   /  AST  54<H>  /  ALT  16  /  AlkPhos  41        PT/INR - ( 2024 17:15 )   PT: 10.5 sec;   INR: 1.00 ratio         PTT - ( 2024 17:15 )  PTT:35.4 sec    Mode: CPAP with PS  FiO2: 40  PEEP: 5  PS: 10  MAP: 7  PIP: 14      Daily     Daily Weight in k.4 (2024 00:00)       @ 07:01  -   @ 07:00  --------------------------------------------------------  IN: 1409.8 mL / OUT: 1566 mL / NET: -156.2 mL        Critically Ill patient  : [ ] preoperative ,   [x ] post operative    Requires :  [x ] Arterial Line   [ x] Central Line  [x ] PA catheter  [ x] IABP  [ ] ECMO  [ ] LVAD  [ x] Ventilator  [x ] pacemaker- TPM [ ] Impella.                      [ x] ABG's     [x ] Pulse Oxymetry Monitoring  Bedside evaluation , monitoring , treatment of hemodynamics , fluids , IVP/ IVCD meds.        Diagnosis:     POD 1- CABG x2/ Ascending aortic replacement with transverse hemiarch/ AVR (Preop IABP)    Hypovolemia     Hypertension    CVA- malignant melanoma     Thrombocytopenia     Respiratory failure- remains intubated post op    Difficult weaning process - multiple organ system involvement in critically ill patient     Ventilator Management:  [x ]AC-rest    [ ]CPAP-PS Wean    [ ]Trach Collar     [ ]Extubate    [ ] T-Piece  [ ]peep>5     Hemodynamic lability,  instability. Requires IVCD [ ] vasopressors [x ] inotropes  [ ] vasodilator  [ ]IVSS fluid  to maintain MAP, perfusion, C.I.     IABP   [x ] management   [ ] wean 1:1 1:2 1:3   [ ] removal and f/u vascular checks     Chest Tube Drainage/ Management     Temporary pacemaker (TPM) interrogation and setting.     Requires chest PT, pulmonary toilet, ambu bagging, suctioning to maintain SaO2,  patent airway and treat atelectasis.     DM- IVCD insulin     Requires bedside physical therapy, mobilization and total shelter care.               I, Eduardo Abreu, personally performed the services described in this documentation. All medical record entries made by the scribe were at my direction and in my presence. I have reviewed the chart and agree that the record reflects my personal performance and is accurate and complete.   Eduardo Abreu MD.       By signing my name below, I, Sly Cline, attest that this documentation has been prepared under the direction and in the presence of Eduardo Abreu MD.   Electronically Signed: Richard Wilson 24 @ 07:04        Discussed with CT surgeon, Physician Assistant - Nurse Practitioner- Critical care medicine team.   Dicussed at  AM / PM rounds.   Chart, labs , films reviewed.    Cumulative Critical Care Time Given Today:  CRITICAL CARE ATTENDING - CTICU    MEDICATIONS  (STANDING):  acetaminophen     Tablet .. 650 milliGRAM(s) Oral every 6 hours  aMIOdarone    Tablet 400 milliGRAM(s) Oral two times a day  ascorbic acid 500 milliGRAM(s) Oral two times a day  aspirin enteric coated 81 milliGRAM(s) Oral daily  cefuroxime  IVPB 1500 milliGRAM(s) IV Intermittent every 8 hours  chlorhexidine 0.12% Liquid 15 milliLiter(s) Oral Mucosa every 12 hours  chlorhexidine 2% Cloths 1 Application(s) Topical daily  dexMEDEtomidine Infusion 1 MICROgram(s)/kG/Hr (18 mL/Hr) IV Continuous <Continuous>  dextrose 50% Injectable 50 milliLiter(s) IV Push every 15 minutes  dextrose 50% Injectable 25 milliLiter(s) IV Push every 15 minutes  DOBUTamine Infusion 3 MICROgram(s)/kG/Min (6.47 mL/Hr) IV Continuous <Continuous>  famotidine Injectable 20 milliGRAM(s) IV Push every 12 hours  gabapentin 100 milliGRAM(s) Oral every 8 hours  insulin regular Infusion 3 Unit(s)/Hr (3 mL/Hr) IV Continuous <Continuous>  meperidine     Injectable 25 milliGRAM(s) IV Push once  milrinone Infusion 0.4 MICROgram(s)/kG/Min (8.63 mL/Hr) IV Continuous <Continuous>  norepinephrine Infusion 0.04 MICROgram(s)/kG/Min (5.39 mL/Hr) IV Continuous <Continuous>  polyethylene glycol 3350 17 Gram(s) Oral daily  potassium chloride  10 mEq/50 mL IVPB 10 milliEquivalent(s) IV Intermittent every 1 hour  potassium chloride  10 mEq/50 mL IVPB 10 milliEquivalent(s) IV Intermittent every 1 hour  potassium chloride  10 mEq/50 mL IVPB 10 milliEquivalent(s) IV Intermittent every 1 hour  senna 2 Tablet(s) Oral at bedtime  sodium chloride 0.9% lock flush 3 milliLiter(s) IV Push every 8 hours  sodium chloride 0.9%. 1000 milliLiter(s) (10 mL/Hr) IV Continuous <Continuous>                                    8.6    7.08  )-----------( 127      ( 2024 00:20 )             26.6       04-09    143  |  112<H>  |  15  ----------------------------<  125<H>  4.7   |  20<L>  |  1.04    Ca    8.1<L>      2024 04:14  Phos  3.2       Mg     2.5         TPro  4.8<L>  /  Alb  3.2<L>  /  TBili  0.9  /  DBili  x   /  AST  54<H>  /  ALT  16  /  AlkPhos  41        PT/INR - ( 2024 17:15 )   PT: 10.5 sec;   INR: 1.00 ratio         PTT - ( 2024 17:15 )  PTT:35.4 sec    Mode: CPAP with PS  FiO2: 40  PEEP: 5  PS: 10  MAP: 7  PIP: 14      Daily     Daily Weight in k.4 (2024 00:00)       @ 07:01  -   @ 07:00  --------------------------------------------------------  IN: 1409.8 mL / OUT: 1566 mL / NET: -156.2 mL        Critically Ill patient  : [ ] preoperative ,   [x ] post operative    Requires :  [x ] Arterial Line   [ x] Central Line  [x ] PA catheter  [ x] IABP  [ ] ECMO  [ ] LVAD  [ x] Ventilator  [x ] pacemaker- TPM [ ] Impella.                      [ x] ABG's     [x ] Pulse Oxymetry Monitoring  Bedside evaluation , monitoring , treatment of hemodynamics , fluids , IVP/ IVCD meds.        Diagnosis:     POD 1- CABG x2/ Ascending aortic replacement with transverse hemiarch/ AVR (Preop IABP)    Hypovolemia     Hypotension     CHF- acute [ x]   chronic [ x]    systolic [ x]   diastolic [ ]  Valvular [ x]          - Echo- EF -   20%          [ ] RV dysfunction          - Cxr-cardiomegally, edema          - Clinical-  [ ]inotropes   [ ]pressors   [ ]diuresis   [x ]IABP   [ ]ECMO   [ ]LVAD   [x ] Respiratory insuffiencey       -     CVA- malignant melanoma - Old     Thrombocytopenia     Respiratory insuffiencey - remains intubated post op    Difficult weaning process - multiple organ system involvement in critically ill patient     Ventilator Management:  [x ]AC-rest    [x ]CPAP-PS Wean    [ ]Trach Collar     [ ]Extubate    [ ] T-Piece  [ ]peep>5     Hemodynamic lability,  instability. Requires IVCD [ x] vasopressors [x ] inotropes  [ ] vasodilator  [ ]IVSS fluid  to maintain MAP, perfusion, C.I.     Metabolic Acidosis    IABP   [x ] management   [ x] wean 1:1 1:2 1:3   [ ] removal and f/u vascular checks     Chest Tube Drainage/ Management     Temporary pacemaker (TPM) interrogation and setting.     Requires chest PT, pulmonary toilet, ambu bagging, suctioning to maintain SaO2,  patent airway and treat atelectasis.     DM- IVCD insulin     Requires bedside physical therapy, mobilization and total half-way care.               I, Eduardo Abreu, personally performed the services described in this documentation. All medical record entries made by the scribe were at my direction and in my presence. I have reviewed the chart and agree that the record reflects my personal performance and is accurate and complete.   Eduardo Abreu MD.       By signing my name below, I, Sly Cline, attest that this documentation has been prepared under the direction and in the presence of Eduardo Abreu MD.   Electronically Signed: Richard Wilson 24 @ 07:04        Discussed with CT surgeon, Physician Assistant - Nurse Practitioner- Critical care medicine team.   Dicussed at  AM / PM rounds.   Chart, labs , films reviewed.    Cumulative Critical Care Time Given Today:  105 min

## 2024-04-09 NOTE — AIRWAY REMOVAL NOTE  ADULT & PEDS - ARTIFICAL AIRWAY REMOVAL COMMENTS
Written order for extubation verified. The patient was identified by full name and birth date compared to the identification band. Present during the procedure was PASCUAL Leo.

## 2024-04-09 NOTE — PROCEDURE NOTE - ADDITIONAL PROCEDURE DETAILS
PROCEDURE NOTE  REMOVAL OF INTRA AORTIC BALLOON PUMP    The IABP (intra-aortic balloon pump) was weaned according to protocol.  Hemodynamics remained stable.  Pulses in the Left  lower extremity are palpable/audible by doppler.  The patient was placed in the supine position.  The insertion site was identified and the sutures were removed.  The IABP was turned off and the balloon deflated.  The IABP was then removed.  Direct pressure was applied for 82 minutes. After first 50 minutes, mild oozing noted from insertion site. Additional pressure applied for remainder of 32 minutes with resolution of oozing. Monitoring of the left groin and both lower extremities including neuro-vascular checks and vital signs every 15 minutes  x4, then every 30 minutes x 2, then every 1 hr x 4 was ordered. Patient tolerated procedure well. No evidence of hematoma and active bleeding noted. Groin is soft.

## 2024-04-10 ENCOUNTER — RESULT REVIEW (OUTPATIENT)
Age: 74
End: 2024-04-10

## 2024-04-10 DIAGNOSIS — I10 ESSENTIAL (PRIMARY) HYPERTENSION: ICD-10-CM

## 2024-04-10 LAB
ALBUMIN SERPL ELPH-MCNC: 4.3 G/DL — SIGNIFICANT CHANGE UP (ref 3.3–5)
ALP SERPL-CCNC: 83 U/L — SIGNIFICANT CHANGE UP (ref 40–120)
ALT FLD-CCNC: 19 U/L — SIGNIFICANT CHANGE UP (ref 10–45)
ANION GAP SERPL CALC-SCNC: 14 MMOL/L — SIGNIFICANT CHANGE UP (ref 5–17)
ANISOCYTOSIS BLD QL: SLIGHT — SIGNIFICANT CHANGE UP
APTT BLD: 29 SEC — SIGNIFICANT CHANGE UP (ref 24.5–35.6)
AST SERPL-CCNC: 54 U/L — HIGH (ref 10–40)
BASE EXCESS BLDMV CALC-SCNC: -1.7 MMOL/L — SIGNIFICANT CHANGE UP (ref -3–3)
BASE EXCESS BLDMV CALC-SCNC: -4.8 MMOL/L — LOW (ref -3–3)
BASE EXCESS BLDMV CALC-SCNC: 1 MMOL/L — SIGNIFICANT CHANGE UP (ref -3–3)
BASE EXCESS BLDV CALC-SCNC: -2 MMOL/L — SIGNIFICANT CHANGE UP (ref -2–3)
BASOPHILS # BLD AUTO: 0 K/UL — SIGNIFICANT CHANGE UP (ref 0–0.2)
BASOPHILS NFR BLD AUTO: 0 % — SIGNIFICANT CHANGE UP (ref 0–2)
BILIRUB SERPL-MCNC: 1.5 MG/DL — HIGH (ref 0.2–1.2)
BUN SERPL-MCNC: 17 MG/DL — SIGNIFICANT CHANGE UP (ref 7–23)
CALCIUM SERPL-MCNC: 9.3 MG/DL — SIGNIFICANT CHANGE UP (ref 8.4–10.5)
CHLORIDE SERPL-SCNC: 110 MMOL/L — HIGH (ref 96–108)
CO2 BLDMV-SCNC: 21 MMOL/L — SIGNIFICANT CHANGE UP (ref 21–29)
CO2 BLDMV-SCNC: 24 MMOL/L — SIGNIFICANT CHANGE UP (ref 21–29)
CO2 BLDMV-SCNC: 27 MMOL/L — SIGNIFICANT CHANGE UP (ref 21–29)
CO2 BLDV-SCNC: 24 MMOL/L — SIGNIFICANT CHANGE UP (ref 22–26)
CO2 SERPL-SCNC: 21 MMOL/L — LOW (ref 22–31)
CREAT SERPL-MCNC: 1.2 MG/DL — SIGNIFICANT CHANGE UP (ref 0.5–1.3)
EGFR: 63 ML/MIN/1.73M2 — SIGNIFICANT CHANGE UP
EOSINOPHIL # BLD AUTO: 0 K/UL — SIGNIFICANT CHANGE UP (ref 0–0.5)
EOSINOPHIL NFR BLD AUTO: 0 % — SIGNIFICANT CHANGE UP (ref 0–6)
FIBRINOGEN PPP-MCNC: 385 MG/DL — SIGNIFICANT CHANGE UP (ref 200–445)
GAS PNL BLDA: SIGNIFICANT CHANGE UP
GAS PNL BLDMV: SIGNIFICANT CHANGE UP
GIANT PLATELETS BLD QL SMEAR: PRESENT — SIGNIFICANT CHANGE UP
GLUCOSE BLDC GLUCOMTR-MCNC: 104 MG/DL — HIGH (ref 70–99)
GLUCOSE BLDC GLUCOMTR-MCNC: 112 MG/DL — HIGH (ref 70–99)
GLUCOSE BLDC GLUCOMTR-MCNC: 114 MG/DL — HIGH (ref 70–99)
GLUCOSE BLDC GLUCOMTR-MCNC: 116 MG/DL — HIGH (ref 70–99)
GLUCOSE BLDC GLUCOMTR-MCNC: 117 MG/DL — HIGH (ref 70–99)
GLUCOSE BLDC GLUCOMTR-MCNC: 125 MG/DL — HIGH (ref 70–99)
GLUCOSE BLDC GLUCOMTR-MCNC: 126 MG/DL — HIGH (ref 70–99)
GLUCOSE BLDC GLUCOMTR-MCNC: 128 MG/DL — HIGH (ref 70–99)
GLUCOSE BLDC GLUCOMTR-MCNC: 130 MG/DL — HIGH (ref 70–99)
GLUCOSE BLDC GLUCOMTR-MCNC: 131 MG/DL — HIGH (ref 70–99)
GLUCOSE BLDC GLUCOMTR-MCNC: 133 MG/DL — HIGH (ref 70–99)
GLUCOSE BLDC GLUCOMTR-MCNC: 140 MG/DL — HIGH (ref 70–99)
GLUCOSE BLDC GLUCOMTR-MCNC: 140 MG/DL — HIGH (ref 70–99)
GLUCOSE BLDC GLUCOMTR-MCNC: 142 MG/DL — HIGH (ref 70–99)
GLUCOSE BLDC GLUCOMTR-MCNC: 142 MG/DL — HIGH (ref 70–99)
GLUCOSE BLDC GLUCOMTR-MCNC: 145 MG/DL — HIGH (ref 70–99)
GLUCOSE BLDC GLUCOMTR-MCNC: 146 MG/DL — HIGH (ref 70–99)
GLUCOSE BLDC GLUCOMTR-MCNC: 151 MG/DL — HIGH (ref 70–99)
GLUCOSE BLDC GLUCOMTR-MCNC: 154 MG/DL — HIGH (ref 70–99)
GLUCOSE BLDC GLUCOMTR-MCNC: 168 MG/DL — HIGH (ref 70–99)
GLUCOSE BLDC GLUCOMTR-MCNC: 180 MG/DL — HIGH (ref 70–99)
GLUCOSE BLDC GLUCOMTR-MCNC: 99 MG/DL — SIGNIFICANT CHANGE UP (ref 70–99)
GLUCOSE SERPL-MCNC: 152 MG/DL — HIGH (ref 70–99)
HCO3 BLDMV-SCNC: 20 MMOL/L — SIGNIFICANT CHANGE UP (ref 20–28)
HCO3 BLDMV-SCNC: 23 MMOL/L — SIGNIFICANT CHANGE UP (ref 20–28)
HCO3 BLDMV-SCNC: 26 MMOL/L — SIGNIFICANT CHANGE UP (ref 20–28)
HCO3 BLDV-SCNC: 23 MMOL/L — SIGNIFICANT CHANGE UP (ref 22–29)
HCT VFR BLD CALC: 26.4 % — LOW (ref 39–50)
HGB BLD-MCNC: 9 G/DL — LOW (ref 13–17)
HOROWITZ INDEX BLDMV+IHG-RTO: 70 — SIGNIFICANT CHANGE UP
HOROWITZ INDEX BLDMV+IHG-RTO: 70 — SIGNIFICANT CHANGE UP
HOROWITZ INDEX BLDV+IHG-RTO: 70 — SIGNIFICANT CHANGE UP
INR BLD: 1.3 RATIO — HIGH (ref 0.85–1.18)
LYMPHOCYTES # BLD AUTO: 0.85 K/UL — LOW (ref 1–3.3)
LYMPHOCYTES # BLD AUTO: 11.3 % — LOW (ref 13–44)
MACROCYTES BLD QL: SLIGHT — SIGNIFICANT CHANGE UP
MAGNESIUM SERPL-MCNC: 2.2 MG/DL — SIGNIFICANT CHANGE UP (ref 1.6–2.6)
MANUAL SMEAR VERIFICATION: SIGNIFICANT CHANGE UP
MCHC RBC-ENTMCNC: 28.9 PG — SIGNIFICANT CHANGE UP (ref 27–34)
MCHC RBC-ENTMCNC: 34.1 GM/DL — SIGNIFICANT CHANGE UP (ref 32–36)
MCV RBC AUTO: 84.9 FL — SIGNIFICANT CHANGE UP (ref 80–100)
MONOCYTES # BLD AUTO: 0.59 K/UL — SIGNIFICANT CHANGE UP (ref 0–0.9)
MONOCYTES NFR BLD AUTO: 7.8 % — SIGNIFICANT CHANGE UP (ref 2–14)
NEUTROPHILS # BLD AUTO: 6.04 K/UL — SIGNIFICANT CHANGE UP (ref 1.8–7.4)
NEUTROPHILS NFR BLD AUTO: 77.4 % — HIGH (ref 43–77)
NEUTS BAND # BLD: 2.6 % — SIGNIFICANT CHANGE UP (ref 0–8)
O2 CT VFR BLD CALC: 36 MMHG — SIGNIFICANT CHANGE UP (ref 30–65)
O2 CT VFR BLD CALC: 38 MMHG — SIGNIFICANT CHANGE UP (ref 30–65)
O2 CT VFR BLD CALC: 42 MMHG — SIGNIFICANT CHANGE UP (ref 30–65)
PCO2 BLDMV: 34 MMHG — SIGNIFICANT CHANGE UP (ref 30–65)
PCO2 BLDMV: 38 MMHG — SIGNIFICANT CHANGE UP (ref 30–65)
PCO2 BLDMV: 42 MMHG — SIGNIFICANT CHANGE UP (ref 30–65)
PCO2 BLDV: 40 MMHG — LOW (ref 42–55)
PH BLDMV: 7.37 — SIGNIFICANT CHANGE UP (ref 7.32–7.45)
PH BLDMV: 7.39 — SIGNIFICANT CHANGE UP (ref 7.32–7.45)
PH BLDMV: 7.4 — SIGNIFICANT CHANGE UP (ref 7.32–7.45)
PH BLDV: 7.37 — SIGNIFICANT CHANGE UP (ref 7.32–7.43)
PHOSPHATE SERPL-MCNC: 3 MG/DL — SIGNIFICANT CHANGE UP (ref 2.5–4.5)
PLAT MORPH BLD: NORMAL — SIGNIFICANT CHANGE UP
PLATELET # BLD AUTO: 80 K/UL — LOW (ref 150–400)
PLATELET COUNT - ESTIMATE: ABNORMAL
PO2 BLDV: 34 MMHG — SIGNIFICANT CHANGE UP (ref 25–45)
POLYCHROMASIA BLD QL SMEAR: SLIGHT — SIGNIFICANT CHANGE UP
POTASSIUM SERPL-MCNC: 3.7 MMOL/L — SIGNIFICANT CHANGE UP (ref 3.5–5.3)
POTASSIUM SERPL-SCNC: 3.7 MMOL/L — SIGNIFICANT CHANGE UP (ref 3.5–5.3)
PROT SERPL-MCNC: 5.8 G/DL — LOW (ref 6–8.3)
PROTHROM AB SERPL-ACNC: 14.2 SEC — HIGH (ref 9.5–13)
RBC # BLD: 3.11 M/UL — LOW (ref 4.2–5.8)
RBC # FLD: 14.6 % — HIGH (ref 10.3–14.5)
RBC BLD AUTO: SIGNIFICANT CHANGE UP
SAO2 % BLDMV: 64.7 — SIGNIFICANT CHANGE UP (ref 60–90)
SAO2 % BLDMV: 66.2 — SIGNIFICANT CHANGE UP (ref 60–90)
SAO2 % BLDMV: 71.1 — SIGNIFICANT CHANGE UP (ref 60–90)
SAO2 % BLDV: 60.1 % — LOW (ref 67–88)
SODIUM SERPL-SCNC: 145 MMOL/L — SIGNIFICANT CHANGE UP (ref 135–145)
VARIANT LYMPHS # BLD: 0.9 % — SIGNIFICANT CHANGE UP (ref 0–6)
WBC # BLD: 7.55 K/UL — SIGNIFICANT CHANGE UP (ref 3.8–10.5)
WBC # FLD AUTO: 7.55 K/UL — SIGNIFICANT CHANGE UP (ref 3.8–10.5)

## 2024-04-10 PROCEDURE — 93306 TTE W/DOPPLER COMPLETE: CPT | Mod: 26

## 2024-04-10 PROCEDURE — 71045 X-RAY EXAM CHEST 1 VIEW: CPT | Mod: 26

## 2024-04-10 RX ORDER — HYDRALAZINE HCL 50 MG
10 TABLET ORAL EVERY 8 HOURS
Refills: 0 | Status: DISCONTINUED | OUTPATIENT
Start: 2024-04-10 | End: 2024-04-10

## 2024-04-10 RX ORDER — NICARDIPINE HYDROCHLORIDE 30 MG/1
2.5 CAPSULE, EXTENDED RELEASE ORAL
Qty: 40 | Refills: 0 | Status: DISCONTINUED | OUTPATIENT
Start: 2024-04-10 | End: 2024-04-11

## 2024-04-10 RX ORDER — SODIUM NITROPRUSSIDE 50 MG/2ML
0.2 INJECTION INTRAVENOUS
Qty: 100 | Refills: 0 | Status: DISCONTINUED | OUTPATIENT
Start: 2024-04-10 | End: 2024-04-12

## 2024-04-10 RX ORDER — INSULIN LISPRO 100/ML
VIAL (ML) SUBCUTANEOUS
Refills: 0 | Status: DISCONTINUED | OUTPATIENT
Start: 2024-04-10 | End: 2024-04-11

## 2024-04-10 RX ORDER — HYDRALAZINE HCL 50 MG
5 TABLET ORAL ONCE
Refills: 0 | Status: COMPLETED | OUTPATIENT
Start: 2024-04-10 | End: 2024-04-10

## 2024-04-10 RX ORDER — DEXMEDETOMIDINE HYDROCHLORIDE IN 0.9% SODIUM CHLORIDE 4 UG/ML
0.3 INJECTION INTRAVENOUS
Qty: 200 | Refills: 0 | Status: DISCONTINUED | OUTPATIENT
Start: 2024-04-10 | End: 2024-04-11

## 2024-04-10 RX ORDER — FUROSEMIDE 40 MG
20 TABLET ORAL ONCE
Refills: 0 | Status: COMPLETED | OUTPATIENT
Start: 2024-04-10 | End: 2024-04-10

## 2024-04-10 RX ORDER — INSULIN GLARGINE 100 [IU]/ML
36 INJECTION, SOLUTION SUBCUTANEOUS AT BEDTIME
Refills: 0 | Status: DISCONTINUED | OUTPATIENT
Start: 2024-04-10 | End: 2024-04-11

## 2024-04-10 RX ORDER — POTASSIUM CHLORIDE 20 MEQ
40 PACKET (EA) ORAL ONCE
Refills: 0 | Status: COMPLETED | OUTPATIENT
Start: 2024-04-10 | End: 2024-04-10

## 2024-04-10 RX ORDER — POTASSIUM CHLORIDE 20 MEQ
10 PACKET (EA) ORAL
Refills: 0 | Status: COMPLETED | OUTPATIENT
Start: 2024-04-10 | End: 2024-04-10

## 2024-04-10 RX ORDER — ENOXAPARIN SODIUM 100 MG/ML
40 INJECTION SUBCUTANEOUS EVERY 24 HOURS
Refills: 0 | Status: DISCONTINUED | OUTPATIENT
Start: 2024-04-10 | End: 2024-04-15

## 2024-04-10 RX ORDER — FAMOTIDINE 10 MG/ML
20 INJECTION INTRAVENOUS DAILY
Refills: 0 | Status: DISCONTINUED | OUTPATIENT
Start: 2024-04-11 | End: 2024-04-22

## 2024-04-10 RX ADMIN — NICARDIPINE HYDROCHLORIDE 12.5 MG/HR: 30 CAPSULE, EXTENDED RELEASE ORAL at 08:00

## 2024-04-10 RX ADMIN — Medication 40 MILLIEQUIVALENT(S): at 15:47

## 2024-04-10 RX ADMIN — MILRINONE LACTATE 8.63 MICROGRAM(S)/KG/MIN: 1 INJECTION, SOLUTION INTRAVENOUS at 08:00

## 2024-04-10 RX ADMIN — ATORVASTATIN CALCIUM 40 MILLIGRAM(S): 80 TABLET, FILM COATED ORAL at 21:57

## 2024-04-10 RX ADMIN — Medication 500 MILLIGRAM(S): at 05:45

## 2024-04-10 RX ADMIN — Medication 500 MILLIGRAM(S): at 18:34

## 2024-04-10 RX ADMIN — FAMOTIDINE 20 MILLIGRAM(S): 10 INJECTION INTRAVENOUS at 05:45

## 2024-04-10 RX ADMIN — GABAPENTIN 100 MILLIGRAM(S): 400 CAPSULE ORAL at 21:57

## 2024-04-10 RX ADMIN — Medication 650 MILLIGRAM(S): at 19:00

## 2024-04-10 RX ADMIN — INSULIN HUMAN 3 UNIT(S)/HR: 100 INJECTION, SOLUTION SUBCUTANEOUS at 07:59

## 2024-04-10 RX ADMIN — SODIUM CHLORIDE 3 MILLILITER(S): 9 INJECTION INTRAMUSCULAR; INTRAVENOUS; SUBCUTANEOUS at 22:59

## 2024-04-10 RX ADMIN — SODIUM CHLORIDE 3 MILLILITER(S): 9 INJECTION INTRAMUSCULAR; INTRAVENOUS; SUBCUTANEOUS at 05:47

## 2024-04-10 RX ADMIN — Medication 20 MILLIGRAM(S): at 10:43

## 2024-04-10 RX ADMIN — Medication 650 MILLIGRAM(S): at 13:26

## 2024-04-10 RX ADMIN — HYDROMORPHONE HYDROCHLORIDE 0.5 MILLIGRAM(S): 2 INJECTION INTRAMUSCULAR; INTRAVENOUS; SUBCUTANEOUS at 00:00

## 2024-04-10 RX ADMIN — Medication 50 MILLIEQUIVALENT(S): at 03:33

## 2024-04-10 RX ADMIN — INSULIN HUMAN 3 UNIT(S)/HR: 100 INJECTION, SOLUTION SUBCUTANEOUS at 21:56

## 2024-04-10 RX ADMIN — HYDROMORPHONE HYDROCHLORIDE 0.5 MILLIGRAM(S): 2 INJECTION INTRAMUSCULAR; INTRAVENOUS; SUBCUTANEOUS at 09:03

## 2024-04-10 RX ADMIN — SENNA PLUS 2 TABLET(S): 8.6 TABLET ORAL at 21:57

## 2024-04-10 RX ADMIN — Medication 200 GRAM(S): at 00:12

## 2024-04-10 RX ADMIN — Medication 5 MILLIGRAM(S): at 13:31

## 2024-04-10 RX ADMIN — Medication 100 MILLIGRAM(S): at 05:45

## 2024-04-10 RX ADMIN — AMIODARONE HYDROCHLORIDE 400 MILLIGRAM(S): 400 TABLET ORAL at 05:45

## 2024-04-10 RX ADMIN — Medication 10 MILLIGRAM(S): at 15:47

## 2024-04-10 RX ADMIN — Medication 81 MILLIGRAM(S): at 13:11

## 2024-04-10 RX ADMIN — DEXMEDETOMIDINE HYDROCHLORIDE IN 0.9% SODIUM CHLORIDE 5.39 MICROGRAM(S)/KG/HR: 4 INJECTION INTRAVENOUS at 18:35

## 2024-04-10 RX ADMIN — ENOXAPARIN SODIUM 40 MILLIGRAM(S): 100 INJECTION SUBCUTANEOUS at 18:33

## 2024-04-10 RX ADMIN — SODIUM CHLORIDE 10 MILLILITER(S): 9 INJECTION INTRAMUSCULAR; INTRAVENOUS; SUBCUTANEOUS at 08:00

## 2024-04-10 RX ADMIN — GABAPENTIN 100 MILLIGRAM(S): 400 CAPSULE ORAL at 13:11

## 2024-04-10 RX ADMIN — Medication 650 MILLIGRAM(S): at 18:34

## 2024-04-10 RX ADMIN — POLYETHYLENE GLYCOL 3350 17 GRAM(S): 17 POWDER, FOR SOLUTION ORAL at 13:07

## 2024-04-10 RX ADMIN — DEXMEDETOMIDINE HYDROCHLORIDE IN 0.9% SODIUM CHLORIDE 5.39 MICROGRAM(S)/KG/HR: 4 INJECTION INTRAVENOUS at 21:56

## 2024-04-10 RX ADMIN — SODIUM CHLORIDE 3 MILLILITER(S): 9 INJECTION INTRAMUSCULAR; INTRAVENOUS; SUBCUTANEOUS at 13:21

## 2024-04-10 RX ADMIN — Medication 50 MILLIEQUIVALENT(S): at 04:41

## 2024-04-10 RX ADMIN — CHLORHEXIDINE GLUCONATE 1 APPLICATION(S): 213 SOLUTION TOPICAL at 13:06

## 2024-04-10 RX ADMIN — HYDROMORPHONE HYDROCHLORIDE 0.5 MILLIGRAM(S): 2 INJECTION INTRAMUSCULAR; INTRAVENOUS; SUBCUTANEOUS at 08:48

## 2024-04-10 RX ADMIN — MILRINONE LACTATE 10.8 MICROGRAM(S)/KG/MIN: 1 INJECTION, SOLUTION INTRAVENOUS at 21:56

## 2024-04-10 RX ADMIN — Medication 4.31 MICROGRAM(S)/KG/MIN: at 07:59

## 2024-04-10 RX ADMIN — Medication 2.16 MICROGRAM(S)/KG/MIN: at 21:56

## 2024-04-10 RX ADMIN — GABAPENTIN 100 MILLIGRAM(S): 400 CAPSULE ORAL at 05:46

## 2024-04-10 RX ADMIN — Medication 650 MILLIGRAM(S): at 13:11

## 2024-04-10 RX ADMIN — INSULIN GLARGINE 36 UNIT(S): 100 INJECTION, SOLUTION SUBCUTANEOUS at 21:57

## 2024-04-10 RX ADMIN — Medication 650 MILLIGRAM(S): at 05:46

## 2024-04-10 RX ADMIN — HYDROMORPHONE HYDROCHLORIDE 0.5 MILLIGRAM(S): 2 INJECTION INTRAMUSCULAR; INTRAVENOUS; SUBCUTANEOUS at 00:15

## 2024-04-10 RX ADMIN — AMIODARONE HYDROCHLORIDE 400 MILLIGRAM(S): 400 TABLET ORAL at 18:34

## 2024-04-10 RX ADMIN — Medication 50 MILLIEQUIVALENT(S): at 00:12

## 2024-04-10 NOTE — PHYSICAL THERAPY INITIAL EVALUATION ADULT - ADDITIONAL COMMENTS
Pt confused per care coordination note, Pt lives in a private home with wife. Pt performed ADL/IADLs independently. Ambulates with no assistive device.

## 2024-04-10 NOTE — CONSULT NOTE ADULT - SUBJECTIVE AND OBJECTIVE BOX
HPI:  74yr M, PMHx of CVA and MI (12/2016), HTN, DM, HLD, CAD s/p cardiac stents, CHF, hx of malignant melanoma coming in for wide excision 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  Py presents for PST for ascending aorta replacement, aortic valve replacement, coronary artery bypass grafting with Dr. Ilia Ortiz.  He has been admitted preop for w/u - CT head, pt has not taken Farxiga since Wednesday as per orders from Dr. Goddard NP.        Patient has history of diabetes, A1C 9.8 % on home PO DM meds   Endo was consulted for glycemic control.      PAST MEDICAL & SURGICAL HISTORY:  HTN (hypertension)      Hearing decreased      CVA (cerebral vascular accident)  12/22/2016      Hyperlipemia      Kidney stones      Coronary artery disease  MI 12/22/2016      CHF (congestive heart failure)  1/2017 stents x3      Type 2 diabetes mellitus  2016      Confusion  from stroke      S/P medial meniscal repair  and ligament surgery      H/O lithotripsy      S/P tonsillectomy      Bilateral inguinal hernia          FAMILY HISTORY:  Family history of diabetes mellitus    Family history of stroke        Social History:  lives with wife  has 4 grown children  denies etoh (07 Apr 2024 14:43)            HOME MEDICATIONS:  Home Medications:  clopidogrel 75 mg oral tablet: 1 tab(s) orally once a day (07 Apr 2024 14:46)  Farxiga 10 mg oral tablet: 1 tab(s) orally once a day (07 Apr 2024 14:46)  Flomax 0.4 mg oral capsule: 1 cap(s) orally once a day (at bedtime) (07 Apr 2024 14:46)  metFORMIN 500 mg oral tablet: orally 2 times a day (07 Apr 2024 14:46)  Pepcid 20 mg oral tablet: 1 tab(s) orally once a day at noon (07 Apr 2024 14:46)            MEDICATIONS  (STANDING):  acetaminophen     Tablet .. 650 milliGRAM(s) Oral every 6 hours  aMIOdarone    Tablet 400 milliGRAM(s) Oral two times a day  ascorbic acid 500 milliGRAM(s) Oral two times a day  aspirin enteric coated 81 milliGRAM(s) Oral daily  atorvastatin 40 milliGRAM(s) Oral at bedtime  bisacodyl Suppository 10 milliGRAM(s) Rectal once  chlorhexidine 2% Cloths 1 Application(s) Topical daily  dextrose 50% Injectable 50 milliLiter(s) IV Push every 15 minutes  dextrose 50% Injectable 25 milliLiter(s) IV Push every 15 minutes  DOBUTamine Infusion 1 MICROgram(s)/kG/Min (2.16 mL/Hr) IV Continuous <Continuous>  enoxaparin Injectable 40 milliGRAM(s) SubCutaneous every 24 hours  gabapentin 100 milliGRAM(s) Oral every 8 hours  hydrALAZINE 10 milliGRAM(s) Oral every 8 hours  hydrALAZINE Injectable 5 milliGRAM(s) IV Push once  insulin glargine Injectable (LANTUS) 36 Unit(s) SubCutaneous at bedtime  insulin regular Infusion 3 Unit(s)/Hr (3 mL/Hr) IV Continuous <Continuous>  milrinone Infusion 0.5 MICROgram(s)/kG/Min (10.8 mL/Hr) IV Continuous <Continuous>  niCARdipine Infusion 2.5 mG/Hr (12.5 mL/Hr) IV Continuous <Continuous>  polyethylene glycol 3350 17 Gram(s) Oral daily  potassium chloride   Solution 40 milliEquivalent(s) Oral once  potassium chloride  10 mEq/50 mL IVPB 10 milliEquivalent(s) IV Intermittent every 1 hour  potassium chloride  10 mEq/50 mL IVPB 10 milliEquivalent(s) IV Intermittent every 1 hour  potassium chloride  10 mEq/50 mL IVPB 10 milliEquivalent(s) IV Intermittent every 1 hour  senna 2 Tablet(s) Oral at bedtime  sodium chloride 0.9% lock flush 3 milliLiter(s) IV Push every 8 hours  sodium chloride 0.9%. 1000 milliLiter(s) (10 mL/Hr) IV Continuous <Continuous>    MEDICATIONS  (PRN):  HYDROmorphone  Injectable 0.5 milliGRAM(s) IV Push every 6 hours PRN Breakthrough Pain  oxyCODONE    IR 10 milliGRAM(s) Oral every 4 hours PRN Severe Pain (7 - 10)  oxyCODONE    IR 5 milliGRAM(s) Oral every 4 hours PRN Moderate Pain (4 - 6)      Allergies    No Known Allergies  Allergy Status Unknown    Intolerances        Review of Systems:  Neuro: No HA, no dizziness  Cardiovascular: No chest pain, no palpitations  Respiratory: no SOB, no cough  GI: No nausea, vomiting, abdominal pain  MSK: Denies joint/muscle pain      ALL OTHER SYSTEMS REVIEWED AND NEGATIVE        PHYSICAL EXAM:  VITALS: T(C): 37.5 (04-10-24 @ 12:00)  T(F): 99.5 (04-10-24 @ 12:00), Max: 100.4 (04-10-24 @ 08:00)  HR: 93 (04-10-24 @ 14:00) (90 - 112)  BP: 101/59 (04-09-24 @ 17:15) (101/59 - 111/63)  RR:  (14 - 33)  SpO2:  (83% - 100%)  Wt(kg): --  GENERAL: NAD, well-groomed, well-developed  NEURO:  alert and oriented  RESPIRATORY: Clear to auscultation bilaterally; No rales, rhonchi, wheezing  CARDIOVASCULAR: Si S2  GI: Soft, non distended, normal bowel sounds  MUSCULOSKELETAL: Moves all extremities equally       POCT Blood Glucose.: 128 mg/dL (04-10-24 @ 14:03)  POCT Blood Glucose.: 131 mg/dL (04-10-24 @ 13:19)  POCT Blood Glucose.: 140 mg/dL (04-10-24 @ 11:58)  POCT Blood Glucose.: 146 mg/dL (04-10-24 @ 10:56)  POCT Blood Glucose.: 154 mg/dL (04-10-24 @ 10:05)  POCT Blood Glucose.: 168 mg/dL (04-10-24 @ 08:54)  POCT Blood Glucose.: 180 mg/dL (04-10-24 @ 08:15)  POCT Blood Glucose.: 142 mg/dL (04-10-24 @ 06:52)  POCT Blood Glucose.: 125 mg/dL (04-10-24 @ 05:58)  POCT Blood Glucose.: 99 mg/dL (04-10-24 @ 04:48)  POCT Blood Glucose.: 112 mg/dL (04-10-24 @ 04:00)  POCT Blood Glucose.: 117 mg/dL (04-10-24 @ 03:08)  POCT Blood Glucose.: 131 mg/dL (04-10-24 @ 01:59)  POCT Blood Glucose.: 130 mg/dL (04-10-24 @ 00:55)  POCT Blood Glucose.: 167 mg/dL (04-09-24 @ 22:59)  POCT Blood Glucose.: 204 mg/dL (04-09-24 @ 20:57)  POCT Blood Glucose.: 121 mg/dL (04-09-24 @ 16:10)  POCT Blood Glucose.: 104 mg/dL (04-09-24 @ 15:02)  POCT Blood Glucose.: 107 mg/dL (04-09-24 @ 14:18)  POCT Blood Glucose.: 110 mg/dL (04-09-24 @ 13:34)  POCT Blood Glucose.: 119 mg/dL (04-09-24 @ 12:23)  POCT Blood Glucose.: 154 mg/dL (04-09-24 @ 11:00)  POCT Blood Glucose.: 152 mg/dL (04-09-24 @ 10:25)  POCT Blood Glucose.: 169 mg/dL (04-09-24 @ 09:21)  POCT Blood Glucose.: 178 mg/dL (04-09-24 @ 08:28)  POCT Blood Glucose.: 123 mg/dL (04-09-24 @ 03:54)  POCT Blood Glucose.: 114 mg/dL (04-09-24 @ 02:46)  POCT Blood Glucose.: 110 mg/dL (04-09-24 @ 01:57)  POCT Blood Glucose.: 100 mg/dL (04-09-24 @ 00:58)  POCT Blood Glucose.: 112 mg/dL (04-08-24 @ 23:55)  POCT Blood Glucose.: 119 mg/dL (04-08-24 @ 22:55)  POCT Blood Glucose.: 117 mg/dL (04-08-24 @ 22:02)  POCT Blood Glucose.: 119 mg/dL (04-08-24 @ 21:02)  POCT Blood Glucose.: 144 mg/dL (04-08-24 @ 20:01)  POCT Blood Glucose.: 191 mg/dL (04-08-24 @ 18:53)  POCT Blood Glucose.: 136 mg/dL (04-08-24 @ 06:18)  POCT Blood Glucose.: 133 mg/dL (04-07-24 @ 21:18)  POCT Blood Glucose.: 157 mg/dL (04-07-24 @ 17:18)                            9.0    7.55  )-----------( 80       ( 10 Apr 2024 00:48 )             26.4       04-10    145  |  110<H>  |  17  ----------------------------<  152<H>  3.7   |  21<L>  |  1.20    eGFR: 63    Ca    9.3      04-10  Mg     2.2     04-10  Phos  3.0     04-10    TPro  5.8<L>  /  Alb  4.3  /  TBili  1.5<H>  /  DBili  x   /  AST  54<H>  /  ALT  19  /  AlkPhos  83  04-10      Thyroid Function Tests:  04-07 @ 15:28 TSH 1.71 FreeT4 1.1 T3 -- Anti TPO -- Anti Thyroglobulin Ab -- TSI --    Diet, Regular:   Consistent Carbohydrate No Snacks (CSTCHO) (04-10-24 @ 13:00) [Active]  Diet, Clear Liquid (04-08-24 @ 06:15) [Available for Activation]          A1C with Estimated Average Glucose Result: 9.8 % (04-07-24 @ 15:28)  A1C with Estimated Average Glucose Result: 9.9 % (04-01-24 @ 14:49)               HPI:  74yr M, PMHx of CVA and MI (12/2016), HTN, DM, HLD, CAD s/p cardiac stents, CHF, hx of malignant melanoma coming in for wide excision 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  Py presents for PST for ascending aorta replacement, aortic valve replacement, coronary artery bypass grafting with Dr. Ilia Ortiz.  He has been admitted preop for  w/u - CT head, pt has not taken Farxiga since Wednesday as per orders from Dr. Goddard NP.        Patient has history of diabetes, A1C 9.8 % on home PO DM meds   Endo was consulted for glycemic control.      PAST MEDICAL & SURGICAL HISTORY:  HTN (hypertension)      Hearing decreased      CVA (cerebral vascular accident)  12/22/2016      Hyperlipemia      Kidney stones      Coronary artery disease  MI 12/22/2016      CHF (congestive heart failure)  1/2017 stents x3      Type 2 diabetes mellitus  2016      Confusion  from stroke      S/P medial meniscal repair  and ligament surgery      H/O lithotripsy      S/P tonsillectomy      Bilateral inguinal hernia          FAMILY HISTORY:  Family history of diabetes mellitus    Family history of stroke        Social History:  lives with wife  has 4 grown children  denies etoh (07 Apr 2024 14:43)            HOME MEDICATIONS:  Home Medications:  clopidogrel 75 mg oral tablet: 1 tab(s) orally once a day (07 Apr 2024 14:46)  Farxiga 10 mg oral tablet: 1 tab(s) orally once a day (07 Apr 2024 14:46)  Flomax 0.4 mg oral capsule: 1 cap(s) orally once a day (at bedtime) (07 Apr 2024 14:46)  metFORMIN 500 mg oral tablet: orally 2 times a day (07 Apr 2024 14:46)  Pepcid 20 mg oral tablet: 1 tab(s) orally once a day at noon (07 Apr 2024 14:46)            MEDICATIONS  (STANDING):  acetaminophen     Tablet .. 650 milliGRAM(s) Oral every 6 hours  aMIOdarone    Tablet 400 milliGRAM(s) Oral two times a day  ascorbic acid 500 milliGRAM(s) Oral two times a day  aspirin enteric coated 81 milliGRAM(s) Oral daily  atorvastatin 40 milliGRAM(s) Oral at bedtime  bisacodyl Suppository 10 milliGRAM(s) Rectal once  chlorhexidine 2% Cloths 1 Application(s) Topical daily  dextrose 50% Injectable 50 milliLiter(s) IV Push every 15 minutes  dextrose 50% Injectable 25 milliLiter(s) IV Push every 15 minutes  DOBUTamine Infusion 1 MICROgram(s)/kG/Min (2.16 mL/Hr) IV Continuous <Continuous>  enoxaparin Injectable 40 milliGRAM(s) SubCutaneous every 24 hours  gabapentin 100 milliGRAM(s) Oral every 8 hours  hydrALAZINE 10 milliGRAM(s) Oral every 8 hours  hydrALAZINE Injectable 5 milliGRAM(s) IV Push once  insulin glargine Injectable (LANTUS) 36 Unit(s) SubCutaneous at bedtime  insulin regular Infusion 3 Unit(s)/Hr (3 mL/Hr) IV Continuous <Continuous>  milrinone Infusion 0.5 MICROgram(s)/kG/Min (10.8 mL/Hr) IV Continuous <Continuous>  niCARdipine Infusion 2.5 mG/Hr (12.5 mL/Hr) IV Continuous <Continuous>  polyethylene glycol 3350 17 Gram(s) Oral daily  potassium chloride   Solution 40 milliEquivalent(s) Oral once  potassium chloride  10 mEq/50 mL IVPB 10 milliEquivalent(s) IV Intermittent every 1 hour  potassium chloride  10 mEq/50 mL IVPB 10 milliEquivalent(s) IV Intermittent every 1 hour  potassium chloride  10 mEq/50 mL IVPB 10 milliEquivalent(s) IV Intermittent every 1 hour  senna 2 Tablet(s) Oral at bedtime  sodium chloride 0.9% lock flush 3 milliLiter(s) IV Push every 8 hours  sodium chloride 0.9%. 1000 milliLiter(s) (10 mL/Hr) IV Continuous <Continuous>    MEDICATIONS  (PRN):  HYDROmorphone  Injectable 0.5 milliGRAM(s) IV Push every 6 hours PRN Breakthrough Pain  oxyCODONE    IR 10 milliGRAM(s) Oral every 4 hours PRN Severe Pain (7 - 10)  oxyCODONE    IR 5 milliGRAM(s) Oral every 4 hours PRN Moderate Pain (4 - 6)      Allergies    No Known Allergies  Allergy Status Unknown    Intolerances        Review of Systems:  Neuro: No HA, no dizziness  Cardiovascular: No chest pain, no palpitations  Respiratory: no SOB, no cough  GI: No nausea, vomiting, abdominal pain  MSK: Denies joint/muscle pain      ALL OTHER SYSTEMS REVIEWED AND NEGATIVE        PHYSICAL EXAM:  VITALS: T(C): 37.5 (04-10-24 @ 12:00)  T(F): 99.5 (04-10-24 @ 12:00), Max: 100.4 (04-10-24 @ 08:00)  HR: 93 (04-10-24 @ 14:00) (90 - 112)  BP: 101/59 (04-09-24 @ 17:15) (101/59 - 111/63)  RR:  (14 - 33)  SpO2:  (83% - 100%)  Wt(kg): --  GENERAL: NAD, well-groomed, well-developed  NEURO:  alert and oriented  RESPIRATORY: Clear to auscultation bilaterally; No rales, rhonchi, wheezing  CARDIOVASCULAR: Si S2  GI: Soft, non distended, normal bowel sounds  MUSCULOSKELETAL: Moves all extremities equally       POCT Blood Glucose.: 128 mg/dL (04-10-24 @ 14:03)  POCT Blood Glucose.: 131 mg/dL (04-10-24 @ 13:19)  POCT Blood Glucose.: 140 mg/dL (04-10-24 @ 11:58)  POCT Blood Glucose.: 146 mg/dL (04-10-24 @ 10:56)  POCT Blood Glucose.: 154 mg/dL (04-10-24 @ 10:05)  POCT Blood Glucose.: 168 mg/dL (04-10-24 @ 08:54)  POCT Blood Glucose.: 180 mg/dL (04-10-24 @ 08:15)  POCT Blood Glucose.: 142 mg/dL (04-10-24 @ 06:52)  POCT Blood Glucose.: 125 mg/dL (04-10-24 @ 05:58)  POCT Blood Glucose.: 99 mg/dL (04-10-24 @ 04:48)  POCT Blood Glucose.: 112 mg/dL (04-10-24 @ 04:00)  POCT Blood Glucose.: 117 mg/dL (04-10-24 @ 03:08)  POCT Blood Glucose.: 131 mg/dL (04-10-24 @ 01:59)  POCT Blood Glucose.: 130 mg/dL (04-10-24 @ 00:55)  POCT Blood Glucose.: 167 mg/dL (04-09-24 @ 22:59)  POCT Blood Glucose.: 204 mg/dL (04-09-24 @ 20:57)  POCT Blood Glucose.: 121 mg/dL (04-09-24 @ 16:10)  POCT Blood Glucose.: 104 mg/dL (04-09-24 @ 15:02)  POCT Blood Glucose.: 107 mg/dL (04-09-24 @ 14:18)  POCT Blood Glucose.: 110 mg/dL (04-09-24 @ 13:34)  POCT Blood Glucose.: 119 mg/dL (04-09-24 @ 12:23)  POCT Blood Glucose.: 154 mg/dL (04-09-24 @ 11:00)  POCT Blood Glucose.: 152 mg/dL (04-09-24 @ 10:25)  POCT Blood Glucose.: 169 mg/dL (04-09-24 @ 09:21)  POCT Blood Glucose.: 178 mg/dL (04-09-24 @ 08:28)  POCT Blood Glucose.: 123 mg/dL (04-09-24 @ 03:54)  POCT Blood Glucose.: 114 mg/dL (04-09-24 @ 02:46)  POCT Blood Glucose.: 110 mg/dL (04-09-24 @ 01:57)  POCT Blood Glucose.: 100 mg/dL (04-09-24 @ 00:58)  POCT Blood Glucose.: 112 mg/dL (04-08-24 @ 23:55)  POCT Blood Glucose.: 119 mg/dL (04-08-24 @ 22:55)  POCT Blood Glucose.: 117 mg/dL (04-08-24 @ 22:02)  POCT Blood Glucose.: 119 mg/dL (04-08-24 @ 21:02)  POCT Blood Glucose.: 144 mg/dL (04-08-24 @ 20:01)  POCT Blood Glucose.: 191 mg/dL (04-08-24 @ 18:53)  POCT Blood Glucose.: 136 mg/dL (04-08-24 @ 06:18)  POCT Blood Glucose.: 133 mg/dL (04-07-24 @ 21:18)  POCT Blood Glucose.: 157 mg/dL (04-07-24 @ 17:18)                            9.0    7.55  )-----------( 80       ( 10 Apr 2024 00:48 )             26.4       04-10    145  |  110<H>  |  17  ----------------------------<  152<H>  3.7   |  21<L>  |  1.20    eGFR: 63    Ca    9.3      04-10  Mg     2.2     04-10  Phos  3.0     04-10    TPro  5.8<L>  /  Alb  4.3  /  TBili  1.5<H>  /  DBili  x   /  AST  54<H>  /  ALT  19  /  AlkPhos  83  04-10      Thyroid Function Tests:  04-07 @ 15:28 TSH 1.71 FreeT4 1.1 T3 -- Anti TPO -- Anti Thyroglobulin Ab -- TSI --    Diet, Regular:   Consistent Carbohydrate No Snacks (CSTCHO) (04-10-24 @ 13:00) [Active]  Diet, Clear Liquid (04-08-24 @ 06:15) [Available for Activation]          A1C with Estimated Average Glucose Result: 9.8 % (04-07-24 @ 15:28)  A1C with Estimated Average Glucose Result: 9.9 % (04-01-24 @ 14:49)

## 2024-04-10 NOTE — DIETITIAN INITIAL EVALUATION ADULT - REASON FOR ADMISSION
"75 y/o Male, PMHx of CVA and MI (12/2016), HTN, DM, HLD, CAD s/p cardiac stents, CHF, hx of malignant melanoma coming in for wide excision 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 Pt presents for PST for ascending aorta replacement, aortic valve replacement, coronary artery bypass grafting with Dr. Ilia Ortiz."

## 2024-04-10 NOTE — CONSULT NOTE ADULT - NS ATTEND AMEND GEN_ALL_CORE FT
Chart, labs, vitals, radiology reviewed. Above H&P reviewed and edited where appropriate. Agree with history and physical exam. Agree with assessment and plan. I reviewed the overnight course of events and discussed the care with the patient/ family.  All the decisions in assessment and plan are made by me.

## 2024-04-10 NOTE — DIETITIAN INITIAL EVALUATION ADULT - ENERGY INTAKE
24 - Noted prior to procedure, pt had 1 meal on 4/7 where he consumed >75% per flow sheets. Has not had a meal thus far post-procedure but diet order has been advanced this morning.

## 2024-04-10 NOTE — DIETITIAN INITIAL EVALUATION ADULT - PHYSCIAL ASSESSMENT
Dosing wt: 158 pounds (4/7/24). Pt's spouse states pt's weight has been ~164 pounds or so and denies known recent wt changes PTA. Noted bed scale wt of 183.8 pounds (4/9) - ? accuracy.     Per Gerardo SÁNCHEZ, wt history as follows: 178 pounds ([id number]). Pt with a 20 lb/11% wt loss x past 4 years or so (not clinically significant). Will monitor trends as available.    IBW: 148 pounds

## 2024-04-10 NOTE — DIETITIAN INITIAL EVALUATION ADULT - OTHER INFO
- s/p CABGx2, Ascending aortic replacement with transverse hemiarch/ AVR on 4/8  - Extubated 4/9 with removal of IABP  - Remains on inotropic support with Dobutamine and Milrinone   - On HiFlo  - Hx of diabetes; HgbA1c of 9.8% (4/7/24) indicative of poor glycemic control; Pt was taking metformin and farxiga PTA. Pt's spouse reports pt not checking BG frequently; Currently on insulin gtt for glycemic control at this time  - IV lasix ordered for diuresis  - Ascorbic Acid ordered to aid in wound healing

## 2024-04-10 NOTE — CONSULT NOTE ADULT - ASSESSMENT
74yr M, PMHx of CVA and MI (12/2016), HTN, DM, HLD, CAD s/p cardiac stents, CHF, hx of malignant melanoma, now s/p ascending aortic arch replacement, CABG AVR.    Assessment  DMT2: 74y Male with DM T2 with hyperglycemia, A1C is 9.8% , was on oral meds at home, now postop on IV insulin, diet advanced.   CAD: on medications, stable, monitored. s/p CABG  Aneursym: s/p hemiarch replacement , AVR-t , CTU monitored.       Discussed plan and management wit Dr Mikala Bach MD  Cell: [phone]  Office: [phone]             74yr M, PMHx of CVA and MI (12/2016), HTN, DM, HLD, CAD s/p cardiac stents, CHF, hx of malignant melanoma, now s/p ascending aortic arch  replacement, CABG AVR.    Assessment  DMT2: 74y Male with DM T2 with hyperglycemia, A1C is 9.8% , was on oral meds at home, now postop on IV insulin, diet advanced.   CAD: on medications, stable, monitored. s/p CABG  Aneursym: s/p hemiarch replacement , AVR-t , CTU monitored.       Discussed plan and management wit Dr Mikala Bach MD  Cell: [phone]  Office: [phone]

## 2024-04-10 NOTE — DIETITIAN INITIAL EVALUATION ADULT - ADD RECOMMEND
- Monitor diet, PO intake, GI status, weight, labs, skin integrity  - Honor pt food preferences to promote adequate oral intake while in-house  - Continue with Vit C daily to aid in wound healing; consider adding multivitamin pending no contraindications  - RD remains available to provide ongoing nutrition education PRN

## 2024-04-10 NOTE — PHYSICAL THERAPY INITIAL EVALUATION ADULT - GENERAL OBSERVATIONS, REHAB EVAL
Pt received OOB in chair +ICU monitoring lines, +RIJ, +prevena, +HFNC (Switched to NRB for ambulation), +external pacer, +ALEXANDRA drain, +CT x3, ok for PT per RN Apolinar.

## 2024-04-10 NOTE — DIETITIAN INITIAL EVALUATION ADULT - SIGNS/SYMPTOMS
Pt called to update you, leave you this message  She is unsure how to ask for help and doesn't know what to do about it  "I have been dissociated a bit, happening for awhile   "[phone] pt status post CABG, AVR, Ascending aortic aneurysm repair with MSI HgbA1c 9.8% indicative of poor glycemic control

## 2024-04-10 NOTE — PROGRESS NOTE ADULT - SUBJECTIVE AND OBJECTIVE BOX
CRITICAL CARE ATTENDING - CTICU    MEDICATIONS  (STANDING):  acetaminophen     Tablet .. 650 milliGRAM(s) Oral every 6 hours  aMIOdarone    Tablet 400 milliGRAM(s) Oral two times a day  ascorbic acid 500 milliGRAM(s) Oral two times a day  aspirin enteric coated 81 milliGRAM(s) Oral daily  atorvastatin 40 milliGRAM(s) Oral at bedtime  bisacodyl Suppository 10 milliGRAM(s) Rectal once  chlorhexidine 2% Cloths 1 Application(s) Topical daily  dextrose 50% Injectable 50 milliLiter(s) IV Push every 15 minutes  dextrose 50% Injectable 25 milliLiter(s) IV Push every 15 minutes  DOBUTamine Infusion 2 MICROgram(s)/kG/Min (4.31 mL/Hr) IV Continuous <Continuous>  famotidine Injectable 20 milliGRAM(s) IV Push every 12 hours  gabapentin 100 milliGRAM(s) Oral every 8 hours  insulin regular Infusion 3 Unit(s)/Hr (3 mL/Hr) IV Continuous <Continuous>  milrinone Infusion 0.4 MICROgram(s)/kG/Min (8.63 mL/Hr) IV Continuous <Continuous>  niCARdipine Infusion 2.5 mG/Hr (12.5 mL/Hr) IV Continuous <Continuous>  polyethylene glycol 3350 17 Gram(s) Oral daily  potassium chloride  10 mEq/50 mL IVPB 10 milliEquivalent(s) IV Intermittent every 1 hour  potassium chloride  10 mEq/50 mL IVPB 10 milliEquivalent(s) IV Intermittent every 1 hour  potassium chloride  10 mEq/50 mL IVPB 10 milliEquivalent(s) IV Intermittent every 1 hour  senna 2 Tablet(s) Oral at bedtime  sodium chloride 0.9% lock flush 3 milliLiter(s) IV Push every 8 hours  sodium chloride 0.9%. 1000 milliLiter(s) (10 mL/Hr) IV Continuous <Continuous>                                    9.0    7.55  )-----------( 80       ( 10 Apr 2024 00:48 )             26.4       04-10    145  |  110<H>  |  17  ----------------------------<  152<H>  3.7   |  21<L>  |  1.20    Ca    9.3      10 Apr 2024 00:48  Phos  3.0     04-10  Mg     2.2     04-10    TPro  5.8<L>  /  Alb  4.3  /  TBili  1.5<H>  /  DBili  x   /  AST  54<H>  /  ALT  19  /  AlkPhos  83  04-10      PT/INR - ( 10 Apr 2024 00:48 )   PT: 14.2 sec;   INR: 1.30 ratio         PTT - ( 10 Apr 2024 00:48 )  PTT:29.0 sec    Mode: CPAP with PS  FiO2: 40  PEEP: 5  PS: 5  MAP: 7  PIP: 11      Daily     Daily       04-09 @ 07:01  -  04-10 @ 07:00  --------------------------------------------------------  IN: 2867.6 mL / OUT: 2685 mL / NET: 182.6 mL    04-10 @ 07:01  -  04-10 @ 08:32  --------------------------------------------------------  IN: 55.9 mL / OUT: 50 mL / NET: 5.9 mL        Critically Ill patient  : [ ] preoperative ,   [x ] post operative    Requires :  [x ] Arterial Line   [ x] Central Line  [x ] PA catheter  [ x] IABP  [ ] ECMO  [ ] LVAD  [ ] Ventilator  [x ] pacemaker- TPM [ ] Impella.  [x] HFNC                      [ x] ABG's     [x ] Pulse Oxymetry Monitoring  Bedside evaluation , monitoring , treatment of hemodynamics , fluids , IVP/ IVCD meds.        Diagnosis:     POD 2 - CABG x2/ Ascending aortic replacement with transverse hemiarch/ AVR (Preop IABP) - 4/8/24    Extubated 4/9 to HFNC    Hypotension     CVA- malignant melanoma - Old     Chest Tube Drainage/ Management     Requires chest PT, pulmonary toilet, suctioning to maintain SaO2,  patent airway and treat atelectasis.     Swanz Earl catheter interpretation and therapeutic management of unstable hemodynamics    IABP   [ ] management   [ ] wean 1:1 1:2 1:3   [x] removal and f/u vascular checks (Removed 4/9)    Respiratory insuffiencey     Hypoxemia - Requires [ ] BIPAP  [x] HF O2 70%/40L    Temporary pacemaker (TPM) interrogation and setting.     CHF- acute [ x]   chronic [ x]    systolic [ x]   diastolic [ ]  Valvular [ x]          - Echo- EF -   20-30%          [ ] RV dysfunction          - Cxr-cardiomegally, edema          - Clinical-  [x]inotropes   [ ]pressors   [ ]diuresis   [ ]IABP   [ ]ECMO   [ ]LVAD   [x ] Respiratory insuffiencey           Hemodynamic lability,  instability. Requires IVCD [ ] vasopressors [x ] inotropes  [x] vasodilator  [ ]IVSS fluid  to maintain MAP, perfusion, C.I.     DM2 - IVCD insulin     Hypovolemia    Thrombocytopenia     Metabolic Acidosis    Requires bedside physical therapy, mobilization and total long term care.                     By signing my name below, I, Harriett Sanchez, attest that this documentation has been prepared under the direction and in the presence of Eduardo Abreu MD.   Electronically Signed: Richard Lowery 04-10-24 @ 08:32    I, Eduardo Abreu, personally performed the services described in this documentation. All medical record entries made by the scribe were at my direction and in my presence. I have reviewed the chart and agree that the record reflects my personal performance and is accurate and complete.   Eduardo Abreu MD.       Discussed with CT surgeon, Physician Assistant - Nurse Practitioner- Critical care medicine team.   Discussed at  AM / PM rounds.   Chart, labs , films reviewed.    Cumulative Critical Care Time Given Today:

## 2024-04-10 NOTE — DIETITIAN INITIAL EVALUATION ADULT - PERTINENT LABORATORY DATA
04-10    145  |  110<H>  |  17  ----------------------------<  152<H>  3.7   |  21<L>  |  1.20    Ca    9.3      10 Apr 2024 00:48  Phos  3.0     04-10  Mg     2.2     04-10    TPro  5.8<L>  /  Alb  4.3  /  TBili  1.5<H>  /  DBili  x   /  AST  54<H>  /  ALT  19  /  AlkPhos  83  04-10  POCT Blood Glucose.: 168 mg/dL (04-10-24 @ 08:54)  A1C with Estimated Average Glucose Result: 9.8 % (04-07-24 @ 15:28)  A1C with Estimated Average Glucose Result: 9.9 % (04-01-24 @ 14:49)

## 2024-04-10 NOTE — PHYSICAL THERAPY INITIAL EVALUATION ADULT - PERTINENT HX OF CURRENT PROBLEM, REHAB EVAL
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  Py presents for PST for ascending aorta replacement, aortic valve replacement, coronary artery bypass grafting with Dr. Ilia Ortiz.  He has been admitted preop for w/u - CT head, pt has not taken Farxiga since Wednesday as per orders from Dr. Goddard NP.    CAD s/p CABGx3 4/8/24

## 2024-04-10 NOTE — DIETITIAN INITIAL EVALUATION ADULT - DIET TYPE
- Consider adding consistent carbohydrate restriction to current diet order to optimize glycemic control

## 2024-04-10 NOTE — DIETITIAN INITIAL EVALUATION ADULT - REASON INDICATOR FOR ASSESSMENT
Initial Nutrition Assessment for pt on CTU  Source: patient, electronic medical record, pt's spouse Nicole at bedside

## 2024-04-10 NOTE — CONSULT NOTE ADULT - PROBLEM SELECTOR RECOMMENDATION 9
Continue IV insulin today  Basal insulin tonight 36 units, transition off IV insulin  Start premeal Humalog 9 units with meals in AM , if off IV insulin   Will continue monitoring FS, log, and glucose trends, will Follow up.  Patient counseled for compliance with consistent low carb diet and exercise as tolerated outpatient.

## 2024-04-10 NOTE — DIETITIAN INITIAL EVALUATION ADULT - PERTINENT MEDS FT
MEDICATIONS  (STANDING):  acetaminophen     Tablet .. 650 milliGRAM(s) Oral every 6 hours  aMIOdarone    Tablet 400 milliGRAM(s) Oral two times a day  ascorbic acid 500 milliGRAM(s) Oral two times a day  aspirin enteric coated 81 milliGRAM(s) Oral daily  atorvastatin 40 milliGRAM(s) Oral at bedtime  bisacodyl Suppository 10 milliGRAM(s) Rectal once  chlorhexidine 2% Cloths 1 Application(s) Topical daily  dextrose 50% Injectable 50 milliLiter(s) IV Push every 15 minutes  dextrose 50% Injectable 25 milliLiter(s) IV Push every 15 minutes  DOBUTamine Infusion 2 MICROgram(s)/kG/Min (4.31 mL/Hr) IV Continuous <Continuous>  famotidine Injectable 20 milliGRAM(s) IV Push every 12 hours  furosemide   Injectable 20 milliGRAM(s) IV Push once  gabapentin 100 milliGRAM(s) Oral every 8 hours  insulin regular Infusion 3 Unit(s)/Hr (3 mL/Hr) IV Continuous <Continuous>  milrinone Infusion 0.4 MICROgram(s)/kG/Min (8.63 mL/Hr) IV Continuous <Continuous>  niCARdipine Infusion 2.5 mG/Hr (12.5 mL/Hr) IV Continuous <Continuous>  polyethylene glycol 3350 17 Gram(s) Oral daily  potassium chloride  10 mEq/50 mL IVPB 10 milliEquivalent(s) IV Intermittent every 1 hour  potassium chloride  10 mEq/50 mL IVPB 10 milliEquivalent(s) IV Intermittent every 1 hour  potassium chloride  10 mEq/50 mL IVPB 10 milliEquivalent(s) IV Intermittent every 1 hour  senna 2 Tablet(s) Oral at bedtime  sodium chloride 0.9% lock flush 3 milliLiter(s) IV Push every 8 hours  sodium chloride 0.9%. 1000 milliLiter(s) (10 mL/Hr) IV Continuous <Continuous>    MEDICATIONS  (PRN):  HYDROmorphone  Injectable 0.5 milliGRAM(s) IV Push every 6 hours PRN Breakthrough Pain  oxyCODONE    IR 5 milliGRAM(s) Oral every 4 hours PRN Moderate Pain (4 - 6)  oxyCODONE    IR 10 milliGRAM(s) Oral every 4 hours PRN Severe Pain (7 - 10)

## 2024-04-10 NOTE — DIETITIAN INITIAL EVALUATION ADULT - NSFNSGIIOFT_GEN_A_CORE
04-09-24 @ 07:01  -  04-10-24 @ 07:00  --------------------------------------------------------  OUT:    Chest Tube (mL): 135 mL    Chest Tube (mL): 100 mL    Chest Tube (mL): 235 mL  Total OUT: 470 mL    Total NET: -470 mL      04-10-24 @ 07:01  -  04-10-24 @ 10:00  --------------------------------------------------------  OUT:    Chest Tube (mL): 20 mL    Chest Tube (mL): 20 mL    Chest Tube (mL): 0 mL  Total OUT: 40 mL    Total NET: -40 mL

## 2024-04-10 NOTE — DIETITIAN INITIAL EVALUATION ADULT - PERSON TAUGHT/METHOD
1) RD discussed continued importance of adequate intake with focus on protein foods first at meals; consuming main entree first and then sides for adequate calorie and protein provision  2) Pt in a lot of pain today during RD visit; pt would benefit from ongoing diabetes diet education and heart healthy diet education as medically feasible/verbal instruction/patient instructed/spouse instructed

## 2024-04-10 NOTE — DIETITIAN INITIAL EVALUATION ADULT - ORAL INTAKE PTA/DIET HISTORY
Per discussion with pt's spouse at bedside, pt was not following specific diet PTA; reports he was eating "junk." No known food allergies reported. No therapeutic diet restrictions reported at this time. Reports pt with intact appetite PTA.

## 2024-04-11 LAB
ALBUMIN SERPL ELPH-MCNC: 3.3 G/DL — SIGNIFICANT CHANGE UP (ref 3.3–5)
ALP SERPL-CCNC: 77 U/L — SIGNIFICANT CHANGE UP (ref 40–120)
ALT FLD-CCNC: 19 U/L — SIGNIFICANT CHANGE UP (ref 10–45)
ANION GAP SERPL CALC-SCNC: 11 MMOL/L — SIGNIFICANT CHANGE UP (ref 5–17)
APPEARANCE UR: ABNORMAL
AST SERPL-CCNC: 53 U/L — HIGH (ref 10–40)
BACTERIA # UR AUTO: NEGATIVE /HPF — SIGNIFICANT CHANGE UP
BASE EXCESS BLDV CALC-SCNC: -0.8 MMOL/L — SIGNIFICANT CHANGE UP (ref -2–3)
BASE EXCESS BLDV CALC-SCNC: -0.8 MMOL/L — SIGNIFICANT CHANGE UP (ref -2–3)
BASE EXCESS BLDV CALC-SCNC: -1.8 MMOL/L — SIGNIFICANT CHANGE UP (ref -2–3)
BASE EXCESS BLDV CALC-SCNC: -2.5 MMOL/L — LOW (ref -2–3)
BASE EXCESS BLDV CALC-SCNC: -2.7 MMOL/L — LOW (ref -2–3)
BASOPHILS # BLD AUTO: 0.01 K/UL — SIGNIFICANT CHANGE UP (ref 0–0.2)
BASOPHILS NFR BLD AUTO: 0.1 % — SIGNIFICANT CHANGE UP (ref 0–2)
BILIRUB SERPL-MCNC: 0.9 MG/DL — SIGNIFICANT CHANGE UP (ref 0.2–1.2)
BILIRUB UR-MCNC: NEGATIVE — SIGNIFICANT CHANGE UP
BUN SERPL-MCNC: 23 MG/DL — SIGNIFICANT CHANGE UP (ref 7–23)
CA-I SERPL-SCNC: 1.26 MMOL/L — SIGNIFICANT CHANGE UP (ref 1.15–1.33)
CALCIUM SERPL-MCNC: 8.8 MG/DL — SIGNIFICANT CHANGE UP (ref 8.4–10.5)
CAST: 26 /LPF — HIGH (ref 0–4)
CHLORIDE BLDV-SCNC: 108 MMOL/L — SIGNIFICANT CHANGE UP (ref 96–108)
CHLORIDE SERPL-SCNC: 109 MMOL/L — HIGH (ref 96–108)
CO2 BLDV-SCNC: 24 MMOL/L — SIGNIFICANT CHANGE UP (ref 22–26)
CO2 BLDV-SCNC: 25 MMOL/L — SIGNIFICANT CHANGE UP (ref 22–26)
CO2 BLDV-SCNC: 26 MMOL/L — SIGNIFICANT CHANGE UP (ref 22–26)
CO2 SERPL-SCNC: 20 MMOL/L — LOW (ref 22–31)
COLOR SPEC: YELLOW — SIGNIFICANT CHANGE UP
CREAT SERPL-MCNC: 1.17 MG/DL — SIGNIFICANT CHANGE UP (ref 0.5–1.3)
DIFF PNL FLD: ABNORMAL
EGFR: 65 ML/MIN/1.73M2 — SIGNIFICANT CHANGE UP
EOSINOPHIL # BLD AUTO: 0 K/UL — SIGNIFICANT CHANGE UP (ref 0–0.5)
EOSINOPHIL NFR BLD AUTO: 0 % — SIGNIFICANT CHANGE UP (ref 0–6)
GAS PNL BLDA: SIGNIFICANT CHANGE UP
GAS PNL BLDV: 136 MMOL/L — SIGNIFICANT CHANGE UP (ref 136–145)
GAS PNL BLDV: SIGNIFICANT CHANGE UP
GLUCOSE BLDC GLUCOMTR-MCNC: 132 MG/DL — HIGH (ref 70–99)
GLUCOSE BLDC GLUCOMTR-MCNC: 149 MG/DL — HIGH (ref 70–99)
GLUCOSE BLDC GLUCOMTR-MCNC: 172 MG/DL — HIGH (ref 70–99)
GLUCOSE BLDC GLUCOMTR-MCNC: 179 MG/DL — HIGH (ref 70–99)
GLUCOSE BLDC GLUCOMTR-MCNC: 185 MG/DL — HIGH (ref 70–99)
GLUCOSE BLDC GLUCOMTR-MCNC: 199 MG/DL — HIGH (ref 70–99)
GLUCOSE BLDC GLUCOMTR-MCNC: 204 MG/DL — HIGH (ref 70–99)
GLUCOSE BLDC GLUCOMTR-MCNC: 211 MG/DL — HIGH (ref 70–99)
GLUCOSE BLDC GLUCOMTR-MCNC: 217 MG/DL — HIGH (ref 70–99)
GLUCOSE BLDC GLUCOMTR-MCNC: 219 MG/DL — HIGH (ref 70–99)
GLUCOSE BLDC GLUCOMTR-MCNC: 221 MG/DL — HIGH (ref 70–99)
GLUCOSE BLDC GLUCOMTR-MCNC: 237 MG/DL — HIGH (ref 70–99)
GLUCOSE BLDV-MCNC: 110 MG/DL — HIGH (ref 70–99)
GLUCOSE SERPL-MCNC: 115 MG/DL — HIGH (ref 70–99)
GLUCOSE UR QL: >=1000 MG/DL
HCO3 BLDV-SCNC: 22 MMOL/L — SIGNIFICANT CHANGE UP (ref 22–29)
HCO3 BLDV-SCNC: 23 MMOL/L — SIGNIFICANT CHANGE UP (ref 22–29)
HCO3 BLDV-SCNC: 23 MMOL/L — SIGNIFICANT CHANGE UP (ref 22–29)
HCO3 BLDV-SCNC: 24 MMOL/L — SIGNIFICANT CHANGE UP (ref 22–29)
HCO3 BLDV-SCNC: 24 MMOL/L — SIGNIFICANT CHANGE UP (ref 22–29)
HCT VFR BLD CALC: 27.6 % — LOW (ref 39–50)
HCT VFR BLDA CALC: 28 % — LOW (ref 39–51)
HGB BLD CALC-MCNC: 9.4 G/DL — LOW (ref 12.6–17.4)
HGB BLD-MCNC: 9.2 G/DL — LOW (ref 13–17)
HOROWITZ INDEX BLDV+IHG-RTO: 60 — SIGNIFICANT CHANGE UP
HYALINE CASTS # UR AUTO: PRESENT
IMM GRANULOCYTES NFR BLD AUTO: 0.8 % — SIGNIFICANT CHANGE UP (ref 0–0.9)
KETONES UR-MCNC: 15 MG/DL
LACTATE BLDV-MCNC: 1.1 MMOL/L — SIGNIFICANT CHANGE UP (ref 0.5–2)
LEUKOCYTE ESTERASE UR-ACNC: NEGATIVE — SIGNIFICANT CHANGE UP
LYMPHOCYTES # BLD AUTO: 1.01 K/UL — SIGNIFICANT CHANGE UP (ref 1–3.3)
LYMPHOCYTES # BLD AUTO: 11.2 % — LOW (ref 13–44)
MAGNESIUM SERPL-MCNC: 1.9 MG/DL — SIGNIFICANT CHANGE UP (ref 1.6–2.6)
MCHC RBC-ENTMCNC: 28.6 PG — SIGNIFICANT CHANGE UP (ref 27–34)
MCHC RBC-ENTMCNC: 33.3 GM/DL — SIGNIFICANT CHANGE UP (ref 32–36)
MCV RBC AUTO: 85.7 FL — SIGNIFICANT CHANGE UP (ref 80–100)
MONOCYTES # BLD AUTO: 1.02 K/UL — HIGH (ref 0–0.9)
MONOCYTES NFR BLD AUTO: 11.3 % — SIGNIFICANT CHANGE UP (ref 2–14)
NEUTROPHILS # BLD AUTO: 6.93 K/UL — SIGNIFICANT CHANGE UP (ref 1.8–7.4)
NEUTROPHILS NFR BLD AUTO: 76.6 % — SIGNIFICANT CHANGE UP (ref 43–77)
NITRITE UR-MCNC: NEGATIVE — SIGNIFICANT CHANGE UP
NRBC # BLD: 0 /100 WBCS — SIGNIFICANT CHANGE UP (ref 0–0)
PCO2 BLDV: 37 MMHG — LOW (ref 42–55)
PCO2 BLDV: 39 MMHG — LOW (ref 42–55)
PCO2 BLDV: 39 MMHG — LOW (ref 42–55)
PCO2 BLDV: 40 MMHG — LOW (ref 42–55)
PCO2 BLDV: 41 MMHG — LOW (ref 42–55)
PH BLDV: 7.36 — SIGNIFICANT CHANGE UP (ref 7.32–7.43)
PH BLDV: 7.37 — SIGNIFICANT CHANGE UP (ref 7.32–7.43)
PH BLDV: 7.38 — SIGNIFICANT CHANGE UP (ref 7.32–7.43)
PH BLDV: 7.38 — SIGNIFICANT CHANGE UP (ref 7.32–7.43)
PH BLDV: 7.41 — SIGNIFICANT CHANGE UP (ref 7.32–7.43)
PH UR: 5 — SIGNIFICANT CHANGE UP (ref 5–8)
PHOSPHATE SERPL-MCNC: 2.1 MG/DL — LOW (ref 2.5–4.5)
PLATELET # BLD AUTO: 96 K/UL — LOW (ref 150–400)
PO2 BLDV: 27 MMHG — SIGNIFICANT CHANGE UP (ref 25–45)
PO2 BLDV: 33 MMHG — SIGNIFICANT CHANGE UP (ref 25–45)
PO2 BLDV: 33 MMHG — SIGNIFICANT CHANGE UP (ref 25–45)
PO2 BLDV: 36 MMHG — SIGNIFICANT CHANGE UP (ref 25–45)
PO2 BLDV: 37 MMHG — SIGNIFICANT CHANGE UP (ref 25–45)
POTASSIUM BLDA-SCNC: 4.4 MMOL/L — SIGNIFICANT CHANGE UP (ref 3.5–5.1)
POTASSIUM BLDV-SCNC: 3.7 MMOL/L — SIGNIFICANT CHANGE UP (ref 3.5–5.1)
POTASSIUM SERPL-MCNC: 3.7 MMOL/L — SIGNIFICANT CHANGE UP (ref 3.5–5.3)
POTASSIUM SERPL-SCNC: 3.7 MMOL/L — SIGNIFICANT CHANGE UP (ref 3.5–5.3)
PROCALCITONIN SERPL-MCNC: 0.27 NG/ML — HIGH (ref 0.02–0.1)
PROT SERPL-MCNC: 5.3 G/DL — LOW (ref 6–8.3)
PROT UR-MCNC: 30 MG/DL
RBC # BLD: 3.22 M/UL — LOW (ref 4.2–5.8)
RBC # FLD: 14.6 % — HIGH (ref 10.3–14.5)
RBC CASTS # UR COMP ASSIST: 28 /HPF — HIGH (ref 0–4)
REVIEW: SIGNIFICANT CHANGE UP
SAO2 % BLDV: 52.5 % — LOW (ref 67–88)
SAO2 % BLDV: 55.2 % — LOW (ref 67–88)
SAO2 % BLDV: 56.3 % — LOW (ref 67–88)
SAO2 % BLDV: 59.6 % — LOW (ref 67–88)
SAO2 % BLDV: 62.9 % — LOW (ref 67–88)
SODIUM SERPL-SCNC: 140 MMOL/L — SIGNIFICANT CHANGE UP (ref 135–145)
SP GR SPEC: 1.02 — SIGNIFICANT CHANGE UP (ref 1–1.03)
SQUAMOUS # UR AUTO: 4 /HPF — SIGNIFICANT CHANGE UP (ref 0–5)
SURGICAL PATHOLOGY STUDY: SIGNIFICANT CHANGE UP
UROBILINOGEN FLD QL: 0.2 MG/DL — SIGNIFICANT CHANGE UP (ref 0.2–1)
WBC # BLD: 9.04 K/UL — SIGNIFICANT CHANGE UP (ref 3.8–10.5)
WBC # FLD AUTO: 9.04 K/UL — SIGNIFICANT CHANGE UP (ref 3.8–10.5)
WBC UR QL: 2 /HPF — SIGNIFICANT CHANGE UP (ref 0–5)

## 2024-04-11 PROCEDURE — 99222 1ST HOSP IP/OBS MODERATE 55: CPT

## 2024-04-11 PROCEDURE — 71045 X-RAY EXAM CHEST 1 VIEW: CPT | Mod: 26

## 2024-04-11 PROCEDURE — 71045 X-RAY EXAM CHEST 1 VIEW: CPT | Mod: 26,77

## 2024-04-11 PROCEDURE — 99292 CRITICAL CARE ADDL 30 MIN: CPT

## 2024-04-11 PROCEDURE — 99291 CRITICAL CARE FIRST HOUR: CPT

## 2024-04-11 RX ORDER — DEXTROSE 50 % IN WATER 50 %
15 SYRINGE (ML) INTRAVENOUS ONCE
Refills: 0 | Status: DISCONTINUED | OUTPATIENT
Start: 2024-04-11 | End: 2024-04-22

## 2024-04-11 RX ORDER — INSULIN HUMAN 100 [IU]/ML
4 INJECTION, SOLUTION SUBCUTANEOUS
Qty: 100 | Refills: 0 | Status: DISCONTINUED | OUTPATIENT
Start: 2024-04-11 | End: 2024-04-14

## 2024-04-11 RX ORDER — INSULIN GLARGINE 100 [IU]/ML
40 INJECTION, SOLUTION SUBCUTANEOUS AT BEDTIME
Refills: 0 | Status: DISCONTINUED | OUTPATIENT
Start: 2024-04-11 | End: 2024-04-13

## 2024-04-11 RX ORDER — POTASSIUM CHLORIDE 20 MEQ
40 PACKET (EA) ORAL ONCE
Refills: 0 | Status: COMPLETED | OUTPATIENT
Start: 2024-04-11 | End: 2024-04-11

## 2024-04-11 RX ORDER — CALCIUM GLUCONATE 100 MG/ML
1 VIAL (ML) INTRAVENOUS ONCE
Refills: 0 | Status: COMPLETED | OUTPATIENT
Start: 2024-04-11 | End: 2024-04-11

## 2024-04-11 RX ORDER — FUROSEMIDE 40 MG
10 TABLET ORAL ONCE
Refills: 0 | Status: COMPLETED | OUTPATIENT
Start: 2024-04-11 | End: 2024-04-11

## 2024-04-11 RX ORDER — INSULIN LISPRO 100/ML
VIAL (ML) SUBCUTANEOUS
Refills: 0 | Status: DISCONTINUED | OUTPATIENT
Start: 2024-04-11 | End: 2024-04-21

## 2024-04-11 RX ORDER — AMIODARONE HYDROCHLORIDE 400 MG/1
150 TABLET ORAL ONCE
Refills: 0 | Status: COMPLETED | OUTPATIENT
Start: 2024-04-11 | End: 2024-04-11

## 2024-04-11 RX ORDER — AMIODARONE HYDROCHLORIDE 400 MG/1
0.5 TABLET ORAL
Qty: 450 | Refills: 0 | Status: DISCONTINUED | OUTPATIENT
Start: 2024-04-11 | End: 2024-04-13

## 2024-04-11 RX ORDER — FUROSEMIDE 40 MG
20 TABLET ORAL ONCE
Refills: 0 | Status: COMPLETED | OUTPATIENT
Start: 2024-04-11 | End: 2024-04-11

## 2024-04-11 RX ORDER — ATORVASTATIN CALCIUM 80 MG/1
80 TABLET, FILM COATED ORAL AT BEDTIME
Refills: 0 | Status: DISCONTINUED | OUTPATIENT
Start: 2024-04-11 | End: 2024-04-22

## 2024-04-11 RX ORDER — GLUCAGON INJECTION, SOLUTION 0.5 MG/.1ML
1 INJECTION, SOLUTION SUBCUTANEOUS ONCE
Refills: 0 | Status: DISCONTINUED | OUTPATIENT
Start: 2024-04-11 | End: 2024-04-22

## 2024-04-11 RX ORDER — INSULIN LISPRO 100/ML
10 VIAL (ML) SUBCUTANEOUS
Refills: 0 | Status: DISCONTINUED | OUTPATIENT
Start: 2024-04-11 | End: 2024-04-12

## 2024-04-11 RX ORDER — MAGNESIUM SULFATE 500 MG/ML
2 VIAL (ML) INJECTION ONCE
Refills: 0 | Status: COMPLETED | OUTPATIENT
Start: 2024-04-11 | End: 2024-04-11

## 2024-04-11 RX ORDER — FUROSEMIDE 40 MG
40 TABLET ORAL
Refills: 0 | Status: DISCONTINUED | OUTPATIENT
Start: 2024-04-11 | End: 2024-04-13

## 2024-04-11 RX ORDER — AMIODARONE HYDROCHLORIDE 400 MG/1
200 TABLET ORAL
Refills: 0 | Status: DISCONTINUED | OUTPATIENT
Start: 2024-04-11 | End: 2024-04-12

## 2024-04-11 RX ADMIN — OXYCODONE HYDROCHLORIDE 5 MILLIGRAM(S): 5 TABLET ORAL at 06:32

## 2024-04-11 RX ADMIN — SODIUM CHLORIDE 3 MILLILITER(S): 9 INJECTION INTRAMUSCULAR; INTRAVENOUS; SUBCUTANEOUS at 13:51

## 2024-04-11 RX ADMIN — Medication 650 MILLIGRAM(S): at 00:48

## 2024-04-11 RX ADMIN — INSULIN GLARGINE 40 UNIT(S): 100 INJECTION, SOLUTION SUBCUTANEOUS at 21:55

## 2024-04-11 RX ADMIN — AMIODARONE HYDROCHLORIDE 200 MILLIGRAM(S): 400 TABLET ORAL at 17:19

## 2024-04-11 RX ADMIN — MILRINONE LACTATE 13.5 MICROGRAM(S)/KG/MIN: 1 INJECTION, SOLUTION INTRAVENOUS at 21:13

## 2024-04-11 RX ADMIN — Medication 40 MILLIGRAM(S): at 17:19

## 2024-04-11 RX ADMIN — GABAPENTIN 100 MILLIGRAM(S): 400 CAPSULE ORAL at 05:25

## 2024-04-11 RX ADMIN — Medication 40 MILLIEQUIVALENT(S): at 09:46

## 2024-04-11 RX ADMIN — Medication 20 MILLIGRAM(S): at 08:19

## 2024-04-11 RX ADMIN — ATORVASTATIN CALCIUM 80 MILLIGRAM(S): 80 TABLET, FILM COATED ORAL at 21:12

## 2024-04-11 RX ADMIN — SODIUM CHLORIDE 3 MILLILITER(S): 9 INJECTION INTRAMUSCULAR; INTRAVENOUS; SUBCUTANEOUS at 06:25

## 2024-04-11 RX ADMIN — AMIODARONE HYDROCHLORIDE 600 MILLIGRAM(S): 400 TABLET ORAL at 19:51

## 2024-04-11 RX ADMIN — Medication 650 MILLIGRAM(S): at 13:33

## 2024-04-11 RX ADMIN — OXYCODONE HYDROCHLORIDE 5 MILLIGRAM(S): 5 TABLET ORAL at 17:49

## 2024-04-11 RX ADMIN — Medication 500 MILLIGRAM(S): at 05:25

## 2024-04-11 RX ADMIN — Medication 25 GRAM(S): at 09:46

## 2024-04-11 RX ADMIN — Medication 10 MILLIGRAM(S): at 01:41

## 2024-04-11 RX ADMIN — ENOXAPARIN SODIUM 40 MILLIGRAM(S): 100 INJECTION SUBCUTANEOUS at 17:18

## 2024-04-11 RX ADMIN — GABAPENTIN 100 MILLIGRAM(S): 400 CAPSULE ORAL at 21:12

## 2024-04-11 RX ADMIN — Medication 650 MILLIGRAM(S): at 05:24

## 2024-04-11 RX ADMIN — SENNA PLUS 2 TABLET(S): 8.6 TABLET ORAL at 21:12

## 2024-04-11 RX ADMIN — SODIUM CHLORIDE 3 MILLILITER(S): 9 INJECTION INTRAMUSCULAR; INTRAVENOUS; SUBCUTANEOUS at 21:27

## 2024-04-11 RX ADMIN — Medication 40 MILLIEQUIVALENT(S): at 00:47

## 2024-04-11 RX ADMIN — CHLORHEXIDINE GLUCONATE 1 APPLICATION(S): 213 SOLUTION TOPICAL at 11:01

## 2024-04-11 RX ADMIN — Medication 25 GRAM(S): at 03:02

## 2024-04-11 RX ADMIN — Medication 40 MILLIEQUIVALENT(S): at 21:55

## 2024-04-11 RX ADMIN — Medication 500 MILLIGRAM(S): at 17:19

## 2024-04-11 RX ADMIN — Medication 1: at 08:18

## 2024-04-11 RX ADMIN — Medication 81 MILLIGRAM(S): at 13:33

## 2024-04-11 RX ADMIN — OXYCODONE HYDROCHLORIDE 5 MILLIGRAM(S): 5 TABLET ORAL at 18:19

## 2024-04-11 RX ADMIN — AMIODARONE HYDROCHLORIDE 33.3 MG/MIN: 400 TABLET ORAL at 20:12

## 2024-04-11 RX ADMIN — GABAPENTIN 100 MILLIGRAM(S): 400 CAPSULE ORAL at 13:33

## 2024-04-11 RX ADMIN — Medication 100 GRAM(S): at 01:02

## 2024-04-11 RX ADMIN — SODIUM NITROPRUSSIDE 2.16 MICROGRAM(S)/KG/MIN: 50 INJECTION INTRAVENOUS at 05:59

## 2024-04-11 RX ADMIN — AMIODARONE HYDROCHLORIDE 400 MILLIGRAM(S): 400 TABLET ORAL at 05:25

## 2024-04-11 RX ADMIN — OXYCODONE HYDROCHLORIDE 5 MILLIGRAM(S): 5 TABLET ORAL at 06:02

## 2024-04-11 RX ADMIN — Medication 85 MILLIMOLE(S): at 03:30

## 2024-04-11 RX ADMIN — Medication 650 MILLIGRAM(S): at 06:43

## 2024-04-11 RX ADMIN — INSULIN HUMAN 4 UNIT(S)/HR: 100 INJECTION, SOLUTION SUBCUTANEOUS at 21:12

## 2024-04-11 RX ADMIN — FAMOTIDINE 20 MILLIGRAM(S): 10 INJECTION INTRAVENOUS at 13:33

## 2024-04-11 RX ADMIN — POLYETHYLENE GLYCOL 3350 17 GRAM(S): 17 POWDER, FOR SOLUTION ORAL at 13:33

## 2024-04-11 RX ADMIN — Medication 40 MILLIEQUIVALENT(S): at 13:57

## 2024-04-11 RX ADMIN — AMIODARONE HYDROCHLORIDE 600 MILLIGRAM(S): 400 TABLET ORAL at 12:34

## 2024-04-11 RX ADMIN — Medication 650 MILLIGRAM(S): at 01:07

## 2024-04-11 RX ADMIN — Medication 2: at 11:26

## 2024-04-11 NOTE — PROGRESS NOTE ADULT - SUBJECTIVE AND OBJECTIVE BOX
CRITICAL CARE ATTENDING - CTICU    MEDICATIONS  (STANDING):  acetaminophen     Tablet .. 650 milliGRAM(s) Oral every 6 hours  ascorbic acid 500 milliGRAM(s) Oral two times a day  aspirin enteric coated 81 milliGRAM(s) Oral daily  atorvastatin 40 milliGRAM(s) Oral at bedtime  bisacodyl Suppository 10 milliGRAM(s) Rectal once  chlorhexidine 2% Cloths 1 Application(s) Topical daily  dexMEDEtomidine Infusion 0.3 MICROgram(s)/kG/Hr (5.39 mL/Hr) IV Continuous <Continuous>  dextrose 50% Injectable 50 milliLiter(s) IV Push every 15 minutes  dextrose 50% Injectable 25 milliLiter(s) IV Push every 15 minutes  enoxaparin Injectable 40 milliGRAM(s) SubCutaneous every 24 hours  famotidine    Tablet 20 milliGRAM(s) Oral daily  furosemide   Injectable 20 milliGRAM(s) IV Push once  gabapentin 100 milliGRAM(s) Oral every 8 hours  insulin glargine Injectable (LANTUS) 36 Unit(s) SubCutaneous at bedtime  insulin lispro (ADMELOG) corrective regimen sliding scale   SubCutaneous three times a day before meals  insulin regular Infusion 3 Unit(s)/Hr (3 mL/Hr) IV Continuous <Continuous>  milrinone Infusion 0.5 MICROgram(s)/kG/Min (10.8 mL/Hr) IV Continuous <Continuous>  nitroprusside Infusion 0.2 MICROgram(s)/kG/Min (2.16 mL/Hr) IV Continuous <Continuous>  polyethylene glycol 3350 17 Gram(s) Oral daily  potassium chloride  10 mEq/50 mL IVPB 10 milliEquivalent(s) IV Intermittent every 1 hour  potassium chloride  10 mEq/50 mL IVPB 10 milliEquivalent(s) IV Intermittent every 1 hour  potassium chloride  10 mEq/50 mL IVPB 10 milliEquivalent(s) IV Intermittent every 1 hour  senna 2 Tablet(s) Oral at bedtime  sodium chloride 0.9% lock flush 3 milliLiter(s) IV Push every 8 hours  sodium chloride 0.9%. 1000 milliLiter(s) (10 mL/Hr) IV Continuous <Continuous>                                    9.2    9.04  )-----------( 96       ( 2024 00:32 )             27.6       04-11    140  |  109<H>  |  23  ----------------------------<  115<H>  3.7   |  20<L>  |  1.17    Ca    8.8      2024 00:26  Phos  2.1       Mg     1.9         TPro  5.3<L>  /  Alb  3.3  /  TBili  0.9  /  DBili  x   /  AST  53<H>  /  ALT  19  /  AlkPhos  77        PT/INR - ( 10 Apr 2024 00:48 )   PT: 14.2 sec;   INR: 1.30 ratio         PTT - ( 10 Apr 2024 00:48 )  PTT:29.0 sec        Daily     Daily Weight in k.2 (2024 00:00)      04-10 @ 07:01  -   @ 07:00  --------------------------------------------------------  IN: 1296.2 mL / OUT: 1620 mL / NET: -323.8 mL        Critically Ill patient  : [ ] preoperative ,   [x ] post operative    Requires :  [x ] Arterial Line   [ x] Central Line  [x ] PA catheter  [ ] IABP  [ ] ECMO  [ ] LVAD  [ ] Ventilator  [x ] pacemaker- TPM [ ] Impella.  [x] HFNC                      [ x] ABG's     [x ] Pulse Oxymetry Monitoring  Bedside evaluation , monitoring , treatment of hemodynamics , fluids , IVP/ IVCD meds.        Diagnosis:     POD 3 - CABG x2/ Ascending aortic replacement with transverse hemiarch/ AVR (Preop IABP) - 24    Extubated  to HFNC    Hypotension     CVA- malignant melanoma - Old     Chest Tube Drainage/ Management     Requires chest PT, pulmonary toilet, suctioning to maintain SaO2,  patent airway and treat atelectasis.     Swanz Earl catheter interpretation and therapeutic management of unstable hemodynamics    IABP   [ ] management   [ ] wean 1:1 1:2 1:3   [x] removal and f/u vascular checks (Removed )    Respiratory insuffiencey     Hypoxemia - Requires [ ] BIPAP  [x] HF O2 60%/40L    Temporary pacemaker (TPM) interrogation and setting.     CHF- acute [ x]   chronic [ x]    systolic [ x]   diastolic [ ]  Valvular [ x]          - Echo- EF -   20-30%          [ ] RV dysfunction          - Cxr-cardiomegally, edema          - Clinical-  [x]inotropes   [ ]pressors   [ ]diuresis   [ ]IABP   [ ]ECMO   [ ]LVAD   [x ] Respiratory insuffiencey           Hemodynamic lability,  instability. Requires IVCD [ ] vasopressors [x ] inotropes  [ ] vasodilator  [ ]IVSS fluid  to maintain MAP, perfusion, C.I.     DM2 - IVCD insulin / Sliding Scale / Lantus     ASA/Statin daily    Fluid Overload     Hypovolemia    Thrombocytopenia     Metabolic Acidosis    Requires bedside physical therapy, mobilization and total [facility] care.             I, Eduardo Abreu, personally performed the services described in this documentation. All medical record entries made by the scribe were at my direction and in my presence. I have reviewed the chart and agree that the record reflects my personal performance and is accurate and complete.   Eduardo Abreu MD.       By signing my name below, I, Bernabe Dong, attest that this documentation has been prepared under the direction and in the presence of Eduardo Abreu MD.   Electronically Signed: Richard Ambriz 24 @ 07:53        Discussed with CT surgeon, Physician Assistant - Nurse Practitioner- Critical care medicine team.   Dicussed at  AM / PM rounds.   Chart, labs , films reviewed.    Cumulative Critical Care Time Given Today:  CRITICAL CARE ATTENDING - CTICU    MEDICATIONS  (STANDING):  acetaminophen     Tablet .. 650 milliGRAM(s) Oral every 6 hours  ascorbic acid 500 milliGRAM(s) Oral two times a day  aspirin enteric coated 81 milliGRAM(s) Oral daily  atorvastatin 40 milliGRAM(s) Oral at bedtime  bisacodyl Suppository 10 milliGRAM(s) Rectal once  chlorhexidine 2% Cloths 1 Application(s) Topical daily  dexMEDEtomidine Infusion 0.3 MICROgram(s)/kG/Hr (5.39 mL/Hr) IV Continuous <Continuous>  dextrose 50% Injectable 50 milliLiter(s) IV Push every 15 minutes  dextrose 50% Injectable 25 milliLiter(s) IV Push every 15 minutes  enoxaparin Injectable 40 milliGRAM(s) SubCutaneous every 24 hours  famotidine    Tablet 20 milliGRAM(s) Oral daily  furosemide   Injectable 20 milliGRAM(s) IV Push once  gabapentin 100 milliGRAM(s) Oral every 8 hours  insulin glargine Injectable (LANTUS) 36 Unit(s) SubCutaneous at bedtime  insulin lispro (ADMELOG) corrective regimen sliding scale   SubCutaneous three times a day before meals  insulin regular Infusion 3 Unit(s)/Hr (3 mL/Hr) IV Continuous <Continuous>  milrinone Infusion 0.5 MICROgram(s)/kG/Min (10.8 mL/Hr) IV Continuous <Continuous>  nitroprusside Infusion 0.2 MICROgram(s)/kG/Min (2.16 mL/Hr) IV Continuous <Continuous>  polyethylene glycol 3350 17 Gram(s) Oral daily  potassium chloride  10 mEq/50 mL IVPB 10 milliEquivalent(s) IV Intermittent every 1 hour  potassium chloride  10 mEq/50 mL IVPB 10 milliEquivalent(s) IV Intermittent every 1 hour  potassium chloride  10 mEq/50 mL IVPB 10 milliEquivalent(s) IV Intermittent every 1 hour  senna 2 Tablet(s) Oral at bedtime  sodium chloride 0.9% lock flush 3 milliLiter(s) IV Push every 8 hours  sodium chloride 0.9%. 1000 milliLiter(s) (10 mL/Hr) IV Continuous <Continuous>                                    9.2    9.04  )-----------( 96       ( 2024 00:32 )             27.6       04-11    140  |  109<H>  |  23  ----------------------------<  115<H>  3.7   |  20<L>  |  1.17    Ca    8.8      2024 00:26  Phos  2.1       Mg     1.9         TPro  5.3<L>  /  Alb  3.3  /  TBili  0.9  /  DBili  x   /  AST  53<H>  /  ALT  19  /  AlkPhos  77        PT/INR - ( 10 Apr 2024 00:48 )   PT: 14.2 sec;   INR: 1.30 ratio         PTT - ( 10 Apr 2024 00:48 )  PTT:29.0 sec        Daily     Daily Weight in k.2 (2024 00:00)      04-10 @ 07:01  -   @ 07:00  --------------------------------------------------------  IN: 1296.2 mL / OUT: 1620 mL / NET: -323.8 mL        Critically Ill patient  : [ ] preoperative ,   [x ] post operative    Requires :  [x ] Arterial Line   [ x] Central Line  [ ] PA catheter  [ ] IABP  [ ] ECMO  [ ] LVAD  [ ] Ventilator  [x ] pacemaker- TPM [ ] Impella.  [x] HFNC                      [ x] ABG's     [x ] Pulse Oxymetry Monitoring  Bedside evaluation , monitoring , treatment of hemodynamics , fluids , IVP/ IVCD meds.        Diagnosis:     POD 3 - CABG x2/ Ascending aortic replacement with transverse hemiarch/ AVR (Preop IABP) - 24    Extubated  to HFNC    hypertension     Chest Tube Drainage/ Management     Requires chest PT, pulmonary toilet, suctioning to maintain SaO2,  patent airway and treat atelectasis.     IABP  removed     Respiratory insuffiencey     Sono Chest - L - Pleural Effusion     Fluid  overload     Hypoxemia - Requires [ ] BIPAP  [x] HF O2 60%/40L    Temporary pacemaker (TPM) interrogation and setting.     CHF- acute [ x]   chronic [ x]    systolic [ x]   diastolic [ ]  Valvular [ x]          - Echo- EF -   20-30%          [x ] RV dysfunction          - Cxr-cardiomegally, edema          - Clinical-  [x]inotropes   [ ]pressors   [ x]diuresis   [ ]IABP   [ ]ECMO   [ ]LVAD   [x ] Respiratory insuffiencey           Hemodynamic lability,  instability. Requires IVCD [ ] vasopressors [x ] inotropes  [ x] vasodilator  [ ]IVSS fluid  to maintain MAP, perfusion, C.I.     DM2 - IVCD insulin / Sliding Scale / Lantus     ASA/Statin daily    Fluid Overload     Thrombocytopenia     Metabolic Acidosis    Requires bedside physical therapy, mobilization and total senior living care.             I, Eduardo bAreu, personally performed the services described in this documentation. All medical record entries made by the demetriusiblyndon were at my direction and in my presence. I have reviewed the chart and agree that the record reflects my personal performance and is accurate and complete.   Eduardo Abreu MD.       By signing my name below, I, Bernabe Dong, attest that this documentation has been prepared under the direction and in the presence of Eduardo Abreu MD.   Electronically Signed: Richard Ambriz 24 @ 07:53        Discussed with CT surgeon, Physician Assistant - Nurse Practitioner- Critical care medicine team.   Dicussed at  AM / PM rounds.   Chart, labs , films reviewed.    Cumulative Critical Care Time Given Today:  105 min
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
## 2024-04-11 NOTE — OCCUPATIONAL THERAPY INITIAL EVALUATION ADULT - ADDITIONAL COMMENTS
Pt lives in a private home with wife. Pt performed ADL/IADLs independently. Ambulates with no assistive device.
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
## 2024-04-11 NOTE — CONSULT NOTE ADULT - SUBJECTIVE AND OBJECTIVE BOX
Patient is a 74y old  Male who presents with a chief complaint of preop as/aortic aneurysm & dvd (2024 07:53)    Admission HPI:  74yr M, PMHx of CVA and MI (2016), HTN, DM, HLD, CAD s/p cardiac stents, CHF, hx of malignant melanoma coming in for wide excision 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  Py presents for PST for ascending aorta replacement, aortic valve replacement, coronary artery bypass grafting with Dr. Ilia Ortiz.  He has been admitted preop for w/u - CT head, pt has not taken Farxiga since Wednesday as per orders from Dr. Goddard NP.  NPO after midnight for OR   (2024 14:43)    Interval History:  Patient s/p CABG x 2 and aortic replacement on 24.  Post-op w functional deficits.    REVIEW OF SYSTEMS: No chest pain, shortness of breath, nausea, vomiting or diarhea; other ROS neg     PAST MEDICAL & SURGICAL HISTORY  HTN (hypertension)    Hearing decreased    CVA (cerebral vascular accident)    Hyperlipemia    Kidney stones    Coronary artery disease    CHF (congestive heart failure)    Type 2 diabetes mellitus    Confusion    S/P medial meniscal repair    H/O lithotripsy    S/P tonsillectomy    Bilateral inguinal hernia    FUNCTIONAL HISTORY:   Lives in a house w his wife.  PTA Independent    CURRENT FUNCTIONAL STATUS:  Mod A transfers    FAMILY HISTORY   Family history of diabetes mellitus  Family history of stroke    MEDICATIONS   acetaminophen     Tablet .. 650 milliGRAM(s) Oral every 6 hours  acetaminophen     Tablet .. 650 milliGRAM(s) Oral every 6 hours PRN  ascorbic acid 500 milliGRAM(s) Oral two times a day  aspirin enteric coated 81 milliGRAM(s) Oral daily  atorvastatin 40 milliGRAM(s) Oral at bedtime  bisacodyl Suppository 10 milliGRAM(s) Rectal once  chlorhexidine 2% Cloths 1 Application(s) Topical daily  dexMEDEtomidine Infusion 0.3 MICROgram(s)/kG/Hr IV Continuous <Continuous>  dextrose 50% Injectable 50 milliLiter(s) IV Push every 15 minutes  dextrose 50% Injectable 25 milliLiter(s) IV Push every 15 minutes  enoxaparin Injectable 40 milliGRAM(s) SubCutaneous every 24 hours  famotidine    Tablet 20 milliGRAM(s) Oral daily  furosemide    Tablet 40 milliGRAM(s) Oral two times a day  gabapentin 100 milliGRAM(s) Oral every 8 hours  insulin glargine Injectable (LANTUS) 36 Unit(s) SubCutaneous at bedtime  insulin lispro (ADMELOG) corrective regimen sliding scale   SubCutaneous three times a day before meals  insulin regular Infusion 3 Unit(s)/Hr IV Continuous <Continuous>  milrinone Infusion 0.5 MICROgram(s)/kG/Min IV Continuous <Continuous>  nitroprusside Infusion 0.2 MICROgram(s)/kG/Min IV Continuous <Continuous>  oxyCODONE    IR 5 milliGRAM(s) Oral every 4 hours PRN  oxyCODONE    IR 10 milliGRAM(s) Oral every 4 hours PRN  polyethylene glycol 3350 17 Gram(s) Oral daily  potassium chloride  10 mEq/50 mL IVPB 10 milliEquivalent(s) IV Intermittent every 1 hour  potassium chloride  10 mEq/50 mL IVPB 10 milliEquivalent(s) IV Intermittent every 1 hour  potassium chloride  10 mEq/50 mL IVPB 10 milliEquivalent(s) IV Intermittent every 1 hour  senna 2 Tablet(s) Oral at bedtime  sodium chloride 0.9% lock flush 3 milliLiter(s) IV Push every 8 hours  sodium chloride 0.9%. 1000 milliLiter(s) IV Continuous <Continuous>    ALLERGIES  No Known Allergies    VITALS  T(C): 37.6 (24 @ 08:00), Max: 38.1 (04-10-24 @ 20:00)  HR: 99 (24 @ 10:00) (82 - 102)  BP: 143/74 (24 @ 08:15) (118/61 - 143/74)  RR: 25 (24 @ 10:00) (16 - 38)  SpO2: 95% (24 @ 10:00) (83% - 100%)  Wt(kg): --    PHYSICAL EXAM  Constitutional - NAD, Comfortable  HEENT - NCAT, EOMI  Neck - Supple  Chest - No distress, no use of accessory muscles for respiration  Cardiovascular -Well perfused  Abdomen - BS+, Soft, NTND  Extremities - No C/C/E, No calf tenderness   Neurologic Exam -                    Cognitive - Awake, Alert, AAO to self, place, date, year, situation     Communication - Fluent, No dysarthria, no aphasia     Cranial Nerves - CN 2-12 intact     Motor - No focal deficits      Sensory - Intact to LT     Reflexes - DTR Intact, No primitive reflexive  Psychiatric - Mood stable, Affect WNL    RECENT LABS/IMAGING  CBC Full  -  ( 2024 00:32 )  WBC Count : 9.04 K/uL  RBC Count : 3.22 M/uL  Hemoglobin : 9.2 g/dL  Hematocrit : 27.6 %  Platelet Count - Automated : 96 K/uL  Mean Cell Volume : 85.7 fl  Mean Cell Hemoglobin : 28.6 pg  Mean Cell Hemoglobin Concentration : 33.3 gm/dL  Auto Neutrophil # : 6.93 K/uL  Auto Lymphocyte # : 1.01 K/uL  Auto Monocyte # : 1.02 K/uL  Auto Eosinophil # : 0.00 K/uL  Auto Basophil # : 0.01 K/uL  Auto Neutrophil % : 76.6 %  Auto Lymphocyte % : 11.2 %  Auto Monocyte % : 11.3 %  Auto Eosinophil % : 0.0 %  Auto Basophil % : 0.1 %    04-11    140  |  109<H>  |  23  ----------------------------<  115<H>  3.7   |  20<L>  |  1.17    Ca    8.8      2024 00:26  Phos  2.1     04-11  Mg     1.9     04-11    TPro  5.3<L>  /  Alb  3.3  /  TBili  0.9  /  DBili  x   /  AST  53<H>  /  ALT  19  /  AlkPhos  77  04-11    Urinalysis Basic - ( 2024 06:42 )    Color: Yellow / Appearance: Cloudy / S.021 / pH: x  Gluc: x / Ketone: 15 mg/dL  / Bili: Negative / Urobili: 0.2 mg/dL   Blood: x / Protein: 30 mg/dL / Nitrite: Negative   Leuk Esterase: Negative / RBC: 28 /HPF / WBC 2 /HPF   Sq Epi: x / Non Sq Epi: 4 /HPF / Bacteria: Negative /HPF    Impression:  73 yo with functional deficits secondary to diagnosis of debility    Plan:  PT- ROM, Bed Mob, Transfers, Amb w AD and bracing as needed  OT- ADLs, bracing  Prec- Falls, Cardiac, Pulm  DVT Prophylaxis- Lovenox  Monitor fever  Skin- Turn q2 h  Dispo-  Patient is a 74y old  Male who presents with a chief complaint of preop as/aortic aneurysm & dvd (2024 07:53)    Admission HPI:  74yr M, PMHx of CVA and MI (2016), HTN, DM, HLD, CAD s/p cardiac stents, CHF, hx of malignant melanoma coming in for wide excision 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  Py presents for PST for ascending aorta replacement, aortic valve replacement, coronary artery bypass grafting with Dr. Ilia Ortiz.  He has been admitted preop for w/u - CT head, pt has not taken Farxiga since Wednesday as per orders from Dr. Goddard NP.  NPO after midnight for OR   (2024 14:43)    Interval History:  Patient s/p CABG x 2 and aortic replacement on 24.  Post-op w functional deficits.    REVIEW OF SYSTEMS: + weakness, + difficulty walking, No chest pain, shortness of breath, nausea, vomiting or diarhea; other ROS neg     PAST MEDICAL & SURGICAL HISTORY  HTN (hypertension)    Hearing decreased    CVA (cerebral vascular accident)    Hyperlipemia    Kidney stones    Coronary artery disease    CHF (congestive heart failure)    Type 2 diabetes mellitus    Confusion    S/P medial meniscal repair    H/O lithotripsy    S/P tonsillectomy    Bilateral inguinal hernia    FUNCTIONAL HISTORY:   Lives in a house w his wife.  PTA Independent    CURRENT FUNCTIONAL STATUS:  Mod A transfers    FAMILY HISTORY   Family history of diabetes mellitus  Family history of stroke    MEDICATIONS   acetaminophen     Tablet .. 650 milliGRAM(s) Oral every 6 hours  acetaminophen     Tablet .. 650 milliGRAM(s) Oral every 6 hours PRN  ascorbic acid 500 milliGRAM(s) Oral two times a day  aspirin enteric coated 81 milliGRAM(s) Oral daily  atorvastatin 40 milliGRAM(s) Oral at bedtime  bisacodyl Suppository 10 milliGRAM(s) Rectal once  chlorhexidine 2% Cloths 1 Application(s) Topical daily  dexMEDEtomidine Infusion 0.3 MICROgram(s)/kG/Hr IV Continuous <Continuous>  dextrose 50% Injectable 50 milliLiter(s) IV Push every 15 minutes  dextrose 50% Injectable 25 milliLiter(s) IV Push every 15 minutes  enoxaparin Injectable 40 milliGRAM(s) SubCutaneous every 24 hours  famotidine    Tablet 20 milliGRAM(s) Oral daily  furosemide    Tablet 40 milliGRAM(s) Oral two times a day  gabapentin 100 milliGRAM(s) Oral every 8 hours  insulin glargine Injectable (LANTUS) 36 Unit(s) SubCutaneous at bedtime  insulin lispro (ADMELOG) corrective regimen sliding scale   SubCutaneous three times a day before meals  insulin regular Infusion 3 Unit(s)/Hr IV Continuous <Continuous>  milrinone Infusion 0.5 MICROgram(s)/kG/Min IV Continuous <Continuous>  nitroprusside Infusion 0.2 MICROgram(s)/kG/Min IV Continuous <Continuous>  oxyCODONE    IR 5 milliGRAM(s) Oral every 4 hours PRN  oxyCODONE    IR 10 milliGRAM(s) Oral every 4 hours PRN  polyethylene glycol 3350 17 Gram(s) Oral daily  potassium chloride  10 mEq/50 mL IVPB 10 milliEquivalent(s) IV Intermittent every 1 hour  potassium chloride  10 mEq/50 mL IVPB 10 milliEquivalent(s) IV Intermittent every 1 hour  potassium chloride  10 mEq/50 mL IVPB 10 milliEquivalent(s) IV Intermittent every 1 hour  senna 2 Tablet(s) Oral at bedtime  sodium chloride 0.9% lock flush 3 milliLiter(s) IV Push every 8 hours  sodium chloride 0.9%. 1000 milliLiter(s) IV Continuous <Continuous>    ALLERGIES  No Known Allergies    VITALS  T(C): 37.6 (24 @ 08:00), Max: 38.1 (04-10-24 @ 20:00)  HR: 99 (24 @ 10:00) (82 - 102)  BP: 143/74 (24 @ 08:15) (118/61 - 143/74)  RR: 25 (24 @ 10:00) (16 - 38)  SpO2: 95% (24 @ 10:00) (83% - 100%)  Wt(kg): --    PHYSICAL EXAM  Constitutional - NAD, Comfortable  HEENT - NCAT, EOMI  Neck - Supple  Chest - CW incision intact, No distress, no use of accessory muscles for respiration  Cardiovascular -Well perfused  Abdomen - BS+, Soft, NTND  Extremities - No C/C/E, No calf tenderness   Neurologic Exam -                 Alert  Cooperative w exam  Motor 3+/5 bl UE and LEs  No clonus    Psychiatric - Mood stable, Affect WNL    RECENT LABS/IMAGING  CBC Full  -  ( 2024 00:32 )  WBC Count : 9.04 K/uL  RBC Count : 3.22 M/uL  Hemoglobin : 9.2 g/dL  Hematocrit : 27.6 %  Platelet Count - Automated : 96 K/uL  Mean Cell Volume : 85.7 fl  Mean Cell Hemoglobin : 28.6 pg  Mean Cell Hemoglobin Concentration : 33.3 gm/dL  Auto Neutrophil # : 6.93 K/uL  Auto Lymphocyte # : 1.01 K/uL  Auto Monocyte # : 1.02 K/uL  Auto Eosinophil # : 0.00 K/uL  Auto Basophil # : 0.01 K/uL  Auto Neutrophil % : 76.6 %  Auto Lymphocyte % : 11.2 %  Auto Monocyte % : 11.3 %  Auto Eosinophil % : 0.0 %  Auto Basophil % : 0.1 %        140  |  109<H>  |  23  ----------------------------<  115<H>  3.7   |  20<L>  |  1.17    Ca    8.8      2024 00:26  Phos  2.1     -  Mg     1.9         TPro  5.3<L>  /  Alb  3.3  /  TBili  0.9  /  DBili  x   /  AST  53<H>  /  ALT  19  /  AlkPhos  77      Urinalysis Basic - ( 2024 06:42 )    Color: Yellow / Appearance: Cloudy / S.021 / pH: x  Gluc: x / Ketone: 15 mg/dL  / Bili: Negative / Urobili: 0.2 mg/dL   Blood: x / Protein: 30 mg/dL / Nitrite: Negative   Leuk Esterase: Negative / RBC: 28 /HPF / WBC 2 /HPF   Sq Epi: x / Non Sq Epi: 4 /HPF / Bacteria: Negative /HPF    Impression:  75 yo with functional deficits secondary to diagnosis of debility    Plan:  PT- ROM, Bed Mob, Transfers, Amb w AD and bracing as needed  OT- ADLs, bracing  Prec- Falls, Cardiac, Pulm  DVT Prophylaxis- Lovenox  Monitor fever  Skin- Turn q2 h  Dispo- Acute Rehab- patient requires active and ongoing therapeutic interventions of multiple disciplines and can tolerate 3 hours of therapies x 2-4wks depending on progress at rehabilitation facility. Can actively participate and benefit from  an intensive rehabilitation program. Requires supervision by a rehabilitation physician and a coordinated interdisciplinary approach to providing rehabilitation.

## 2024-04-11 NOTE — PROGRESS NOTE ADULT - ASSESSMENT
74yr M, PMHx of CVA and MI (12/2016), HTN, DM, HLD, CAD s/p cardiac stents, CHF, hx of malignant melanoma, now s/p ascending aortic arch  replacement, CABG AVR.    Assessment  DMT2: 74y Male with DM T2 with hyperglycemia, A1C is 9.8% , was on oral meds at home, now postop on IV insulin, diet advanced but not eating full meals, intermittent bipap,   CAD: on medications, stable, monitored. s/p CABG  Aneursym: s/p hemiarch replacement , AVR-t , CTU monitored.       Discussed plan and management wit Dr Mikala Bach MD  Cell: [phone]  Office: [phone]             74yr M, PMHx of CVA and MI (12/2016), HTN, DM, HLD, CAD s/p cardiac stents, CHF, hx of malignant melanoma, now s/p ascending aortic arch  replacement, CABG AVR.    Assessment  DMT2: 74y Male with DM T2 with hyperglycemia, A1C is 9.8% , was on oral meds at home, now postop on IV insulin, diet advanced but not eating full meals,  intermittent bipap,   CAD: on medications, stable, monitored. s/p CABG  Aneursym: s/p hemiarch replacement , AVR-t , CTU monitored.       Discussed plan and management wit Dr Mikala Bach MD  Cell: [phone]  Office: [phone]

## 2024-04-11 NOTE — PROGRESS NOTE ADULT - PROBLEM SELECTOR PLAN 1
Will continue current IV insulin regimen for now - still not eating full meals   Can start basal bolus insulin when IV insulin requirements decrease  Will continue monitoring FS, log, and glucose trends, will Follow up.  Patient counseled for compliance with consistent low carb diet and exercise as tolerated outpatient.

## 2024-04-11 NOTE — OCCUPATIONAL THERAPY INITIAL EVALUATION ADULT - ADL RETRAINING, OT EVAL
pt will perform UB dressing with I in 4 weeks using adaptive techniques / pt will perform LB dressing task with I in 4 weeks using adaptive techniques

## 2024-04-11 NOTE — PROGRESS NOTE ADULT - SUBJECTIVE AND OBJECTIVE BOX
Chief complaint  Patient is a 74y old  Male who presents with a chief complaint of preop as/aortic aneurysm & dvd (11 Apr 2024 10:08)         Labs and Fingersticks  CAPILLARY BLOOD GLUCOSE  151 (10 Apr 2024 19:00)  142 (10 Apr 2024 18:00)  133 (10 Apr 2024 17:00)  104 (10 Apr 2024 16:00)      POCT Blood Glucose.: 217 mg/dL (11 Apr 2024 13:30)  POCT Blood Glucose.: 204 mg/dL (11 Apr 2024 10:59)  POCT Blood Glucose.: 185 mg/dL (11 Apr 2024 07:52)  POCT Blood Glucose.: 172 mg/dL (11 Apr 2024 06:48)  POCT Blood Glucose.: 116 mg/dL (10 Apr 2024 23:56)  POCT Blood Glucose.: 126 mg/dL (10 Apr 2024 22:53)  POCT Blood Glucose.: 131 mg/dL (10 Apr 2024 21:55)  POCT Blood Glucose.: 145 mg/dL (10 Apr 2024 21:08)  POCT Blood Glucose.: 140 mg/dL (10 Apr 2024 20:08)  POCT Blood Glucose.: 151 mg/dL (10 Apr 2024 18:50)  POCT Blood Glucose.: 142 mg/dL (10 Apr 2024 18:23)  POCT Blood Glucose.: 133 mg/dL (10 Apr 2024 17:02)  POCT Blood Glucose.: 104 mg/dL (10 Apr 2024 15:50)  POCT Blood Glucose.: 114 mg/dL (10 Apr 2024 15:28)      Anion Gap: 11 (04-11 @ 00:26)  Anion Gap: 14 (04-10 @ 00:48)      Calcium: 8.8 (04-11 @ 00:26)  Calcium: 9.3 (04-10 @ 00:48)  Albumin: 3.3 (04-11 @ 00:26)  Albumin: 4.3 (04-10 @ 00:48)    Alanine Aminotransferase (ALT/SGPT): 19 (04-11 @ 00:26)  Alanine Aminotransferase (ALT/SGPT): 19 (04-10 @ 00:48)  Alkaline Phosphatase: 77 (04-11 @ 00:26)  Alkaline Phosphatase: 83 (04-10 @ 00:48)  Aspartate Aminotransferase (AST/SGOT): 53 *H* (04-11 @ 00:26)  Aspartate Aminotransferase (AST/SGOT): 54 *H* (04-10 @ 00:48)        04-11    140  |  109<H>  |  23  ----------------------------<  115<H>  3.7   |  20<L>  |  1.17    Ca    8.8      11 Apr 2024 00:26  Phos  2.1     04-11  Mg     1.9     04-11    TPro  5.3<L>  /  Alb  3.3  /  TBili  0.9  /  DBili  x   /  AST  53<H>  /  ALT  19  /  AlkPhos  77  04-11                        9.2    9.04  )-----------( 96       ( 11 Apr 2024 00:32 )             27.6     Medications  MEDICATIONS  (STANDING):  ascorbic acid 500 milliGRAM(s) Oral two times a day  aspirin enteric coated 81 milliGRAM(s) Oral daily  atorvastatin 80 milliGRAM(s) Oral at bedtime  bisacodyl Suppository 10 milliGRAM(s) Rectal once  chlorhexidine 2% Cloths 1 Application(s) Topical daily  dextrose 50% Injectable 50 milliLiter(s) IV Push every 15 minutes  dextrose 50% Injectable 25 milliLiter(s) IV Push every 15 minutes  dextrose Oral Gel 15 Gram(s) Oral once  enoxaparin Injectable 40 milliGRAM(s) SubCutaneous every 24 hours  famotidine    Tablet 20 milliGRAM(s) Oral daily  furosemide    Tablet 40 milliGRAM(s) Oral two times a day  gabapentin 100 milliGRAM(s) Oral every 8 hours  glucagon  Injectable 1 milliGRAM(s) IntraMuscular once  insulin glargine Injectable (LANTUS) 40 Unit(s) SubCutaneous at bedtime  insulin lispro (ADMELOG) corrective regimen sliding scale   SubCutaneous three times a day before meals  insulin lispro Injectable (ADMELOG) 10 Unit(s) SubCutaneous three times a day before meals  insulin regular Infusion 4 Unit(s)/Hr (4 mL/Hr) IV Continuous <Continuous>  milrinone Infusion 0.625 MICROgram(s)/kG/Min (13.5 mL/Hr) IV Continuous <Continuous>  nitroprusside Infusion 0.2 MICROgram(s)/kG/Min (2.16 mL/Hr) IV Continuous <Continuous>  polyethylene glycol 3350 17 Gram(s) Oral daily  potassium chloride  10 mEq/50 mL IVPB 10 milliEquivalent(s) IV Intermittent every 1 hour  potassium chloride  10 mEq/50 mL IVPB 10 milliEquivalent(s) IV Intermittent every 1 hour  potassium chloride  10 mEq/50 mL IVPB 10 milliEquivalent(s) IV Intermittent every 1 hour  senna 2 Tablet(s) Oral at bedtime  sodium chloride 0.9% lock flush 3 milliLiter(s) IV Push every 8 hours  sodium chloride 0.9%. 1000 milliLiter(s) (10 mL/Hr) IV Continuous <Continuous>      Physical Exam  General: Patient comfortable in bed   Vital Signs Last 12 Hrs  T(F): 100 (04-11-24 @ 12:00), Max: 100.2 (04-11-24 @ 04:00)  HR: 88 (04-11-24 @ 14:29) (82 - 102)  BP: 93/55 (04-11-24 @ 14:00) (93/55 - 143/74)  BP(mean): 70 (04-11-24 @ 14:00) (70 - 98)  RR: 25 (04-11-24 @ 14:00) (16 - 49)  SpO2: 100% (04-11-24 @ 14:29) (88% - 100%)    CVS: S1S2   Respiratory: No wheezing, no crepitations  GI: Abdomen soft, bowel sounds positive  Musculoskeletal:  moves all extremities       Chief complaint  Patient is a 74y old  Male who presents with a chief complaint of preop as/aortic aneurysm & dvd (11 Apr 2024 10:08)         Labs and Fingersticks  CAPILLARY BLOOD GLUCOSE  151 (10 Apr 2024 19:00)  142 (10 Apr 2024 18:00)  133 (10 Apr 2024 17:00)  104 (10 Apr 2024 16:00)      POCT Blood Glucose.: 217 mg/dL (11 Apr 2024 13:30)  POCT Blood Glucose.: 204 mg/dL (11 Apr 2024 10:59)  POCT Blood Glucose.: 185 mg/dL (11 Apr 2024 07:52)  POCT Blood Glucose.: 172 mg/dL (11 Apr 2024 06:48)  POCT Blood Glucose.: 116 mg/dL (10 Apr 2024 23:56)  POCT Blood Glucose.: 126 mg/dL (10 Apr 2024 22:53)  POCT Blood Glucose.: 131 mg/dL (10 Apr 2024 21:55)  POCT Blood Glucose.: 145 mg/dL (10 Apr 2024 21:08)  POCT Blood Glucose.: 140 mg/dL (10 Apr 2024 20:08)  POCT Blood Glucose.: 151 mg/dL (10 Apr 2024 18:50)  POCT Blood Glucose.: 142 mg/dL (10 Apr 2024 18:23)  POCT Blood Glucose.: 133 mg/dL (10 Apr 2024 17:02)  POCT Blood Glucose.: 104 mg/dL (10 Apr 2024 15:50)  POCT Blood Glucose.: 114 mg/dL (10 Apr 2024 15:28)      Anion Gap: 11 (04-11 @ 00:26)  Anion Gap: 14 (04-10 @ 00:48)      Calcium: 8.8 (04-11 @ 00:26)  Calcium: 9.3 (04-10 @ 00:48)  Albumin: 3.3 (04-11 @ 00:26)  Albumin: 4.3 (04-10 @ 00:48)    Alanine Aminotransferase (ALT/SGPT): 19 (04-11 @ 00:26)  Alanine Aminotransferase (ALT/SGPT): 19 (04-10 @ 00:48)  Alkaline Phosphatase: 77 (04-11 @ 00:26)  Alkaline Phosphatase: 83 (04-10 @ 00:48)  Aspartate Aminotransferase (AST/SGOT): 53 *H* (04-11 @ 00:26)  Aspartate Aminotransferase (AST/SGOT): 54 *H* (04-10 @ 00:48)        04-11    140  |  109<H>  |  23  ----------------------------<  115<H>  3.7   |  20<L>  |  1.17    Ca    8.8      11 Apr 2024 00:26  Phos  2.1     04-11  Mg     1.9     04-11    TPro  5.3<L>  /  Alb  3.3  /  TBili  0.9  /  DBili  x   /  AST  53<H>  /  ALT  19  /  AlkPhos  77  04-11                        9.2    9.04  )-----------( 96       ( 11 Apr 2024 00:32 )             27.6     Medications  MEDICATIONS  (STANDING):  ascorbic acid 500 milliGRAM(s) Oral two times a day  aspirin enteric coated 81 milliGRAM(s) Oral daily  atorvastatin 80 milliGRAM(s) Oral at bedtime  bisacodyl Suppository 10 milliGRAM(s) Rectal once  chlorhexidine 2% Cloths 1 Application(s) Topical daily  dextrose 50% Injectable 50 milliLiter(s) IV Push every 15 minutes  dextrose 50% Injectable 25 milliLiter(s) IV Push every 15 minutes  dextrose Oral Gel 15 Gram(s) Oral once  enoxaparin Injectable 40 milliGRAM(s) SubCutaneous every 24 hours  famotidine    Tablet 20 milliGRAM(s) Oral daily  furosemide    Tablet 40 milliGRAM(s) Oral two times a day  gabapentin 100 milliGRAM(s) Oral every 8 hours  glucagon  Injectable 1 milliGRAM(s) IntraMuscular once  insulin glargine Injectable (LANTUS) 40 Unit(s) SubCutaneous at bedtime  insulin lispro (ADMELOG) corrective regimen sliding scale   SubCutaneous three times a day before meals  insulin lispro Injectable (ADMELOG) 10 Unit(s) SubCutaneous three times a day before meals  insulin regular Infusion 4 Unit(s)/Hr (4 mL/Hr) IV Continuous <Continuous>  milrinone Infusion 0.625 MICROgram(s)/kG/Min (13.5 mL/Hr) IV Continuous <Continuous>  nitroprusside Infusion 0.2 MICROgram(s)/kG/Min (2.16 mL/Hr) IV Continuous <Continuous>  polyethylene glycol 3350 17 Gram(s) Oral daily  potassium chloride  10 mEq/50 mL IVPB 10 milliEquivalent(s) IV Intermittent every 1 hour  potassium chloride  10 mEq/50 mL IVPB 10 milliEquivalent(s) IV Intermittent every 1 hour  potassium chloride  10 mEq/50 mL IVPB 10 milliEquivalent(s) IV Intermittent every 1 hour  senna 2 Tablet(s) Oral at bedtime  sodium chloride 0.9% lock flush 3 milliLiter(s) IV Push every 8 hours  sodium chloride 0.9%. 1000 milliLiter(s) (10 mL/Hr) IV Continuous <Continuous>      Physical Exam  General: Patient comfortable in bed   Vital Signs Last 12 Hrs  T(F): 100 (04-11-24 @ 12:00), Max: 100.2 (04-11-24 @ 04:00)  HR: 88 (04-11-24 @ 14:29) (82 - 102)  BP: 93/55 (04-11-24 @ 14:00) (93/55 - 143/74)  BP(mean): 70 (04-11-24 @ 14:00) (70 - 98)  RR: 25 (04-11-24 @ 14:00) (16 - 49)  SpO2: 100% (04-11-24 @ 14:29) (88% - 100%)    CVS: S1S2   Respiratory: No wheezing, no crepitations  GI: Abdomen soft, bowel sounds positive  Musculoskeletal:  moves all extremities

## 2024-04-11 NOTE — OCCUPATIONAL THERAPY INITIAL EVALUATION ADULT - GENERAL OBSERVATIONS, REHAB EVAL
pt received sitting in chair+ICU monitoring, IVs, a-line, prevena, HFNC, external pacer, CT x3, navarro

## 2024-04-11 NOTE — PROGRESS NOTE ADULT - SUBJECTIVE AND OBJECTIVE BOX
Patient seen and examined at the bedside.    Remained critically ill on continuous ICU monitoring.    OBJECTIVE:  Vital Signs Last 24 Hrs  T(C): 37.7 (11 Apr 2024 21:00), Max: 37.9 (11 Apr 2024 00:00)  T(F): 99.9 (11 Apr 2024 21:00), Max: 100.2 (11 Apr 2024 00:00)  HR: 121 (11 Apr 2024 21:05) (82 - 121)  BP: 94/61 (11 Apr 2024 21:00) (93/55 - 143/74)  BP(mean): 73 (11 Apr 2024 21:00) (70 - 100)  RR: 22 (11 Apr 2024 21:00) (16 - 54)  SpO2: 96% (11 Apr 2024 21:05) (88% - 100%)    Parameters below as of 11 Apr 2024 20:00  Patient On (Oxygen Delivery Method): nasal cannula, high flow  O2 Flow (L/min): 40  O2 Concentration (%): 40      Physical Exam:   General: NAD   Neurology: nonfocal   Eyes: bilateral pupils equal and reactive   ENT/Neck: Neck supple, trachea midline, No JVD   Respiratory: Clear bilaterally   CV: S1S2, no murmurs        [x] Sternal dressing, [x] Mediastinal CT x2        [x] Afib, [x] Temporary pacing VVI 40  Abdominal: Soft, NT, ND +BS   Extremities: 1-2+ pedal edema noted, + peripheral pulses   Skin: No Rashes, Hematoma, Ecchymosis                           Assessment:  74yr M, PMHx of CVA and MI (12/2016), HTN, DM, HLD, CAD s/p cardiac stents, CHF, hx of malignant melanoma coming in for wide excision 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  Py presents for PST for ascending aorta replacement, aortic valve replacement, coronary artery bypass grafting with Dr. Ilia Ortiz.  He has been admitted preop for w/u - CT head, pt has not taken Farxiga since Wednesday as per orders from Dr. Goddard NP.    CAD s/p CABGx3 4/8/24  Post-op blood loss anemia  Hemodynamic Instability   Hypovolemia  Post-op respiratory failure  Leukocytosis  Thrombocytopenia       Plan:   ***Neuro***  [x] Hx of CVA   [x] Nonfocal     [x] Tylenol, Gapapentin, Meperidine, and PRNS for pain management  Continue Zofran  Post operative neuro assessment     ***Cardiovascular***  Invasive hemodynamic monitoring, assess perfusion indices    Afib / CVP 5 / MAP 68/ Hct 27.6 / Lactate 1.5  [x] Primacor 0.625 mcgs/kg/min  [x] Dobutamine decreased from 3 to 2 mcgs/kg/min  [x] Nitro 0.2 mcg/kg/min   [x] Amiodarone for a fib prophylaxis   [x] IABP removed today    [x] Lovenox for VTE prophylaxis   Volume: [x] Albumin 200 cc [x] 2 PRBC   Reassessment of hemodynamics post resuscitation    Monitor chest tube outputs    [x] ASA [x] Statin  Serial EKG and cardiac enzymes     ***Pulmonary***  [x] HFNC 40L/40%  Titration of FiO2 and PEEP, follow SpO2, CXR, blood gasses   Encourage incentive spirometry, continue pulse ox monitoring, follow ABGs             ***GI***  [x] Tolerating Diet   [x] Pepcid  [x] Miralax and Senna for Bowel Regimen    ***Renal***   Continue to monitor I/Os, BUN/Creatinine.   Replete lytes PRN  Diuresis with lasix   Guevara present      ***ID***  Cefuroxime for perioperative antibiotic coverage      ***Endocrine***  [x] DM: HbA1c 9.8%             - [x] Insulin gtt  [x] Lantus              - Need tight glycemic control to prevent wound infection.        Patient requires continuous monitoring with bedside rhythm monitoring, pulse oximetry monitoring, and continuous central venous and arterial pressure monitoring; and intermittent blood gas analysis. Care plan discussed with the ICU care team.   Patient remained critical, at risk for life threatening decompensation.    I have spent 30 minutes providing critical care management to this patient.    By signing my name below, I, Vanna Santana, attest that this documentation has been prepared under the direction and in the presence of JOSÉ MIGUEL Mckeon.   Electronically signed: Richard Mcdonnell, 04-11-24 @ 21:42    I, Nancy Ansari , personally performed the services described in this documentation. all medical record entries made by the demetriusiblyndon were at my direction and in my presence. I have reviewed the chart and agree that the record reflects my personal performance and is accurate and complete  Electronically signed: JOSÉ MIGUEL Mckeon.

## 2024-04-12 LAB
ALBUMIN SERPL ELPH-MCNC: 3.3 G/DL — SIGNIFICANT CHANGE UP (ref 3.3–5)
ALP SERPL-CCNC: 82 U/L — SIGNIFICANT CHANGE UP (ref 40–120)
ALT FLD-CCNC: 25 U/L — SIGNIFICANT CHANGE UP (ref 10–45)
ANION GAP SERPL CALC-SCNC: 12 MMOL/L — SIGNIFICANT CHANGE UP (ref 5–17)
APPEARANCE UR: ABNORMAL
AST SERPL-CCNC: 32 U/L — SIGNIFICANT CHANGE UP (ref 10–40)
BACTERIA # UR AUTO: NEGATIVE /HPF — SIGNIFICANT CHANGE UP
BASE EXCESS BLDV CALC-SCNC: -1.1 MMOL/L — SIGNIFICANT CHANGE UP (ref -2–3)
BASE EXCESS BLDV CALC-SCNC: -1.3 MMOL/L — SIGNIFICANT CHANGE UP (ref -2–3)
BASOPHILS # BLD AUTO: 0.03 K/UL — SIGNIFICANT CHANGE UP (ref 0–0.2)
BASOPHILS NFR BLD AUTO: 0.3 % — SIGNIFICANT CHANGE UP (ref 0–2)
BILIRUB SERPL-MCNC: 1 MG/DL — SIGNIFICANT CHANGE UP (ref 0.2–1.2)
BILIRUB UR-MCNC: NEGATIVE — SIGNIFICANT CHANGE UP
BUN SERPL-MCNC: 27 MG/DL — HIGH (ref 7–23)
CA-I SERPL-SCNC: 1.15 MMOL/L — SIGNIFICANT CHANGE UP (ref 1.15–1.33)
CA-I SERPL-SCNC: 1.17 MMOL/L — SIGNIFICANT CHANGE UP (ref 1.15–1.33)
CALCIUM SERPL-MCNC: 8.5 MG/DL — SIGNIFICANT CHANGE UP (ref 8.4–10.5)
CAST: 61 /LPF — HIGH (ref 0–4)
CHLORIDE BLDV-SCNC: 105 MMOL/L — SIGNIFICANT CHANGE UP (ref 96–108)
CHLORIDE BLDV-SCNC: 106 MMOL/L — SIGNIFICANT CHANGE UP (ref 96–108)
CHLORIDE SERPL-SCNC: 105 MMOL/L — SIGNIFICANT CHANGE UP (ref 96–108)
CK MB BLD-MCNC: 0.8 % — SIGNIFICANT CHANGE UP (ref 0–3.5)
CK MB CFR SERPL CALC: 3.8 NG/ML — SIGNIFICANT CHANGE UP (ref 0–6.7)
CK SERPL-CCNC: 463 U/L — HIGH (ref 30–200)
CO2 BLDV-SCNC: 25 MMOL/L — SIGNIFICANT CHANGE UP (ref 22–26)
CO2 BLDV-SCNC: 25 MMOL/L — SIGNIFICANT CHANGE UP (ref 22–26)
CO2 SERPL-SCNC: 21 MMOL/L — LOW (ref 22–31)
COARSE GRAN CASTS #/AREA URNS AUTO: PRESENT
COLOR SPEC: YELLOW — SIGNIFICANT CHANGE UP
CREAT SERPL-MCNC: 1.3 MG/DL — SIGNIFICANT CHANGE UP (ref 0.5–1.3)
DIFF PNL FLD: ABNORMAL
EGFR: 57 ML/MIN/1.73M2 — LOW
EOSINOPHIL # BLD AUTO: 0.03 K/UL — SIGNIFICANT CHANGE UP (ref 0–0.5)
EOSINOPHIL NFR BLD AUTO: 0.3 % — SIGNIFICANT CHANGE UP (ref 0–6)
GAS PNL BLDA: SIGNIFICANT CHANGE UP
GAS PNL BLDV: 133 MMOL/L — LOW (ref 136–145)
GAS PNL BLDV: 135 MMOL/L — LOW (ref 136–145)
GAS PNL BLDV: SIGNIFICANT CHANGE UP
GLUCOSE BLDC GLUCOMTR-MCNC: 105 MG/DL — HIGH (ref 70–99)
GLUCOSE BLDC GLUCOMTR-MCNC: 117 MG/DL — HIGH (ref 70–99)
GLUCOSE BLDC GLUCOMTR-MCNC: 136 MG/DL — HIGH (ref 70–99)
GLUCOSE BLDC GLUCOMTR-MCNC: 150 MG/DL — HIGH (ref 70–99)
GLUCOSE BLDC GLUCOMTR-MCNC: 166 MG/DL — HIGH (ref 70–99)
GLUCOSE BLDC GLUCOMTR-MCNC: 174 MG/DL — HIGH (ref 70–99)
GLUCOSE BLDV-MCNC: 106 MG/DL — HIGH (ref 70–99)
GLUCOSE BLDV-MCNC: 133 MG/DL — HIGH (ref 70–99)
GLUCOSE SERPL-MCNC: 111 MG/DL — HIGH (ref 70–99)
GLUCOSE UR QL: NEGATIVE MG/DL — SIGNIFICANT CHANGE UP
HCO3 BLDV-SCNC: 24 MMOL/L — SIGNIFICANT CHANGE UP (ref 22–29)
HCO3 BLDV-SCNC: 24 MMOL/L — SIGNIFICANT CHANGE UP (ref 22–29)
HCT VFR BLD CALC: 30.2 % — LOW (ref 39–50)
HCT VFR BLDA CALC: 29 % — LOW (ref 39–51)
HCT VFR BLDA CALC: 30 % — LOW (ref 39–51)
HGB BLD CALC-MCNC: 10 G/DL — LOW (ref 12.6–17.4)
HGB BLD CALC-MCNC: 9.7 G/DL — LOW (ref 12.6–17.4)
HGB BLD-MCNC: 10 G/DL — LOW (ref 13–17)
HOROWITZ INDEX BLDV+IHG-RTO: 40 — SIGNIFICANT CHANGE UP
HOROWITZ INDEX BLDV+IHG-RTO: 40 — SIGNIFICANT CHANGE UP
IMM GRANULOCYTES NFR BLD AUTO: 0.5 % — SIGNIFICANT CHANGE UP (ref 0–0.9)
KETONES UR-MCNC: NEGATIVE MG/DL — SIGNIFICANT CHANGE UP
LACTATE BLDV-MCNC: 0.9 MMOL/L — SIGNIFICANT CHANGE UP (ref 0.5–2)
LACTATE BLDV-MCNC: 1.4 MMOL/L — SIGNIFICANT CHANGE UP (ref 0.5–2)
LEUKOCYTE ESTERASE UR-ACNC: ABNORMAL
LYMPHOCYTES # BLD AUTO: 1.3 K/UL — SIGNIFICANT CHANGE UP (ref 1–3.3)
LYMPHOCYTES # BLD AUTO: 12.5 % — LOW (ref 13–44)
MAGNESIUM SERPL-MCNC: 2.4 MG/DL — SIGNIFICANT CHANGE UP (ref 1.6–2.6)
MCHC RBC-ENTMCNC: 28.3 PG — SIGNIFICANT CHANGE UP (ref 27–34)
MCHC RBC-ENTMCNC: 33.1 GM/DL — SIGNIFICANT CHANGE UP (ref 32–36)
MCV RBC AUTO: 85.6 FL — SIGNIFICANT CHANGE UP (ref 80–100)
MONOCYTES # BLD AUTO: 1.18 K/UL — HIGH (ref 0–0.9)
MONOCYTES NFR BLD AUTO: 11.4 % — SIGNIFICANT CHANGE UP (ref 2–14)
NEUTROPHILS # BLD AUTO: 7.78 K/UL — HIGH (ref 1.8–7.4)
NEUTROPHILS NFR BLD AUTO: 75 % — SIGNIFICANT CHANGE UP (ref 43–77)
NITRITE UR-MCNC: NEGATIVE — SIGNIFICANT CHANGE UP
NRBC # BLD: 0 /100 WBCS — SIGNIFICANT CHANGE UP (ref 0–0)
PCO2 BLDV: 38 MMHG — LOW (ref 42–55)
PCO2 BLDV: 40 MMHG — LOW (ref 42–55)
PH BLDV: 7.38 — SIGNIFICANT CHANGE UP (ref 7.32–7.43)
PH BLDV: 7.4 — SIGNIFICANT CHANGE UP (ref 7.32–7.43)
PH UR: 5 — SIGNIFICANT CHANGE UP (ref 5–8)
PHOSPHATE SERPL-MCNC: 2.1 MG/DL — LOW (ref 2.5–4.5)
PLATELET # BLD AUTO: 150 K/UL — SIGNIFICANT CHANGE UP (ref 150–400)
PO2 BLDV: 37 MMHG — SIGNIFICANT CHANGE UP (ref 25–45)
PO2 BLDV: 42 MMHG — SIGNIFICANT CHANGE UP (ref 25–45)
POTASSIUM BLDA-SCNC: 3.2 MMOL/L — LOW (ref 3.5–5.1)
POTASSIUM BLDV-SCNC: 3.4 MMOL/L — LOW (ref 3.5–5.1)
POTASSIUM BLDV-SCNC: 4 MMOL/L — SIGNIFICANT CHANGE UP (ref 3.5–5.1)
POTASSIUM SERPL-MCNC: 3.6 MMOL/L — SIGNIFICANT CHANGE UP (ref 3.5–5.3)
POTASSIUM SERPL-SCNC: 3.6 MMOL/L — SIGNIFICANT CHANGE UP (ref 3.5–5.3)
PROCALCITONIN SERPL-MCNC: 0.2 NG/ML — HIGH (ref 0.02–0.1)
PROT SERPL-MCNC: 5.5 G/DL — LOW (ref 6–8.3)
PROT UR-MCNC: SIGNIFICANT CHANGE UP MG/DL
RBC # BLD: 3.53 M/UL — LOW (ref 4.2–5.8)
RBC # FLD: 14.4 % — SIGNIFICANT CHANGE UP (ref 10.3–14.5)
RBC CASTS # UR COMP ASSIST: 191 /HPF — HIGH (ref 0–4)
REVIEW: SIGNIFICANT CHANGE UP
SAO2 % BLDV: 65.4 % — LOW (ref 67–88)
SAO2 % BLDV: 70.2 % — SIGNIFICANT CHANGE UP (ref 67–88)
SODIUM SERPL-SCNC: 138 MMOL/L — SIGNIFICANT CHANGE UP (ref 135–145)
SP GR SPEC: 1.01 — SIGNIFICANT CHANGE UP (ref 1–1.03)
SQUAMOUS # UR AUTO: 11 /HPF — HIGH (ref 0–5)
TROPONIN T, HIGH SENSITIVITY RESULT: 2024 NG/L — HIGH (ref 0–51)
UROBILINOGEN FLD QL: 0.2 MG/DL — SIGNIFICANT CHANGE UP (ref 0.2–1)
WBC # BLD: 10.37 K/UL — SIGNIFICANT CHANGE UP (ref 3.8–10.5)
WBC # FLD AUTO: 10.37 K/UL — SIGNIFICANT CHANGE UP (ref 3.8–10.5)
WBC UR QL: 9 /HPF — HIGH (ref 0–5)

## 2024-04-12 PROCEDURE — 99291 CRITICAL CARE FIRST HOUR: CPT

## 2024-04-12 PROCEDURE — 71045 X-RAY EXAM CHEST 1 VIEW: CPT | Mod: 26

## 2024-04-12 PROCEDURE — 99292 CRITICAL CARE ADDL 30 MIN: CPT

## 2024-04-12 RX ORDER — MAGNESIUM SULFATE 500 MG/ML
2 VIAL (ML) INJECTION ONCE
Refills: 0 | Status: COMPLETED | OUTPATIENT
Start: 2024-04-12 | End: 2024-04-12

## 2024-04-12 RX ORDER — INSULIN LISPRO 100/ML
12 VIAL (ML) SUBCUTANEOUS
Refills: 0 | Status: DISCONTINUED | OUTPATIENT
Start: 2024-04-12 | End: 2024-04-13

## 2024-04-12 RX ORDER — ACETAMINOPHEN 500 MG
1000 TABLET ORAL ONCE
Refills: 0 | Status: COMPLETED | OUTPATIENT
Start: 2024-04-12 | End: 2024-04-12

## 2024-04-12 RX ORDER — HYDROMORPHONE HYDROCHLORIDE 2 MG/ML
0.25 INJECTION INTRAMUSCULAR; INTRAVENOUS; SUBCUTANEOUS ONCE
Refills: 0 | Status: DISCONTINUED | OUTPATIENT
Start: 2024-04-12 | End: 2024-04-12

## 2024-04-12 RX ORDER — POTASSIUM CHLORIDE 20 MEQ
10 PACKET (EA) ORAL
Refills: 0 | Status: COMPLETED | OUTPATIENT
Start: 2024-04-12 | End: 2024-04-12

## 2024-04-12 RX ORDER — AMIODARONE HYDROCHLORIDE 400 MG/1
200 TABLET ORAL DAILY
Refills: 0 | Status: DISCONTINUED | OUTPATIENT
Start: 2024-04-15 | End: 2024-04-22

## 2024-04-12 RX ORDER — AMIODARONE HYDROCHLORIDE 400 MG/1
400 TABLET ORAL EVERY 8 HOURS
Refills: 0 | Status: COMPLETED | OUTPATIENT
Start: 2024-04-12 | End: 2024-04-15

## 2024-04-12 RX ORDER — POTASSIUM CHLORIDE 20 MEQ
20 PACKET (EA) ORAL ONCE
Refills: 0 | Status: COMPLETED | OUTPATIENT
Start: 2024-04-12 | End: 2024-04-12

## 2024-04-12 RX ORDER — AMIODARONE HYDROCHLORIDE 400 MG/1
TABLET ORAL
Refills: 0 | Status: DISCONTINUED | OUTPATIENT
Start: 2024-04-15 | End: 2024-04-22

## 2024-04-12 RX ADMIN — ATORVASTATIN CALCIUM 80 MILLIGRAM(S): 80 TABLET, FILM COATED ORAL at 22:00

## 2024-04-12 RX ADMIN — SODIUM CHLORIDE 3 MILLILITER(S): 9 INJECTION INTRAMUSCULAR; INTRAVENOUS; SUBCUTANEOUS at 11:27

## 2024-04-12 RX ADMIN — Medication 50 MILLIEQUIVALENT(S): at 03:05

## 2024-04-12 RX ADMIN — AMIODARONE HYDROCHLORIDE 16.7 MG/MIN: 400 TABLET ORAL at 19:38

## 2024-04-12 RX ADMIN — GABAPENTIN 100 MILLIGRAM(S): 400 CAPSULE ORAL at 05:37

## 2024-04-12 RX ADMIN — Medication 50 MILLIEQUIVALENT(S): at 17:11

## 2024-04-12 RX ADMIN — Medication 20 MILLIEQUIVALENT(S): at 03:50

## 2024-04-12 RX ADMIN — Medication 500 MILLIGRAM(S): at 05:37

## 2024-04-12 RX ADMIN — Medication 50 MILLIEQUIVALENT(S): at 01:58

## 2024-04-12 RX ADMIN — HYDROMORPHONE HYDROCHLORIDE 0.25 MILLIGRAM(S): 2 INJECTION INTRAMUSCULAR; INTRAVENOUS; SUBCUTANEOUS at 03:20

## 2024-04-12 RX ADMIN — Medication 10 UNIT(S): at 08:39

## 2024-04-12 RX ADMIN — Medication 400 MILLIGRAM(S): at 15:22

## 2024-04-12 RX ADMIN — Medication 2: at 08:39

## 2024-04-12 RX ADMIN — SODIUM CHLORIDE 3 MILLILITER(S): 9 INJECTION INTRAMUSCULAR; INTRAVENOUS; SUBCUTANEOUS at 05:22

## 2024-04-12 RX ADMIN — Medication 85 MILLIMOLE(S): at 04:38

## 2024-04-12 RX ADMIN — GABAPENTIN 100 MILLIGRAM(S): 400 CAPSULE ORAL at 13:27

## 2024-04-12 RX ADMIN — AMIODARONE HYDROCHLORIDE 400 MILLIGRAM(S): 400 TABLET ORAL at 22:00

## 2024-04-12 RX ADMIN — SENNA PLUS 2 TABLET(S): 8.6 TABLET ORAL at 22:00

## 2024-04-12 RX ADMIN — Medication 81 MILLIGRAM(S): at 17:10

## 2024-04-12 RX ADMIN — Medication 50 MILLIEQUIVALENT(S): at 15:21

## 2024-04-12 RX ADMIN — MILRINONE LACTATE 10.8 MICROGRAM(S)/KG/MIN: 1 INJECTION, SOLUTION INTRAVENOUS at 19:38

## 2024-04-12 RX ADMIN — Medication 50 MILLIEQUIVALENT(S): at 20:23

## 2024-04-12 RX ADMIN — Medication 50 MILLIEQUIVALENT(S): at 13:27

## 2024-04-12 RX ADMIN — SODIUM CHLORIDE 3 MILLILITER(S): 9 INJECTION INTRAMUSCULAR; INTRAVENOUS; SUBCUTANEOUS at 22:02

## 2024-04-12 RX ADMIN — FAMOTIDINE 20 MILLIGRAM(S): 10 INJECTION INTRAVENOUS at 17:10

## 2024-04-12 RX ADMIN — AMIODARONE HYDROCHLORIDE 16.7 MG/MIN: 400 TABLET ORAL at 08:40

## 2024-04-12 RX ADMIN — CHLORHEXIDINE GLUCONATE 1 APPLICATION(S): 213 SOLUTION TOPICAL at 13:05

## 2024-04-12 RX ADMIN — Medication 50 MILLIEQUIVALENT(S): at 11:08

## 2024-04-12 RX ADMIN — Medication 12 UNIT(S): at 17:11

## 2024-04-12 RX ADMIN — Medication 50 MILLIEQUIVALENT(S): at 16:04

## 2024-04-12 RX ADMIN — INSULIN GLARGINE 40 UNIT(S): 100 INJECTION, SOLUTION SUBCUTANEOUS at 22:01

## 2024-04-12 RX ADMIN — Medication 400 MILLIGRAM(S): at 01:58

## 2024-04-12 RX ADMIN — ENOXAPARIN SODIUM 40 MILLIGRAM(S): 100 INJECTION SUBCUTANEOUS at 18:14

## 2024-04-12 RX ADMIN — Medication 25 GRAM(S): at 15:21

## 2024-04-12 RX ADMIN — Medication 500 MILLIGRAM(S): at 17:10

## 2024-04-12 RX ADMIN — POLYETHYLENE GLYCOL 3350 17 GRAM(S): 17 POWDER, FOR SOLUTION ORAL at 17:11

## 2024-04-12 RX ADMIN — Medication 50 MILLIEQUIVALENT(S): at 19:39

## 2024-04-12 RX ADMIN — Medication 1000 MILLIGRAM(S): at 02:28

## 2024-04-12 RX ADMIN — Medication 50 MILLIEQUIVALENT(S): at 13:02

## 2024-04-12 RX ADMIN — MILRINONE LACTATE 10.8 MICROGRAM(S)/KG/MIN: 1 INJECTION, SOLUTION INTRAVENOUS at 08:40

## 2024-04-12 RX ADMIN — HYDROMORPHONE HYDROCHLORIDE 0.25 MILLIGRAM(S): 2 INJECTION INTRAMUSCULAR; INTRAVENOUS; SUBCUTANEOUS at 02:50

## 2024-04-12 RX ADMIN — MILRINONE LACTATE 10.8 MICROGRAM(S)/KG/MIN: 1 INJECTION, SOLUTION INTRAVENOUS at 18:03

## 2024-04-12 RX ADMIN — GABAPENTIN 100 MILLIGRAM(S): 400 CAPSULE ORAL at 22:00

## 2024-04-12 RX ADMIN — Medication 40 MILLIGRAM(S): at 05:37

## 2024-04-12 RX ADMIN — AMIODARONE HYDROCHLORIDE 400 MILLIGRAM(S): 400 TABLET ORAL at 17:10

## 2024-04-12 RX ADMIN — Medication 50 MILLIEQUIVALENT(S): at 02:30

## 2024-04-12 RX ADMIN — Medication 12 UNIT(S): at 12:21

## 2024-04-12 NOTE — PROGRESS NOTE ADULT - PROBLEM SELECTOR PLAN 1
Will continue current insulin regimen  Will continue monitoring FS, log, and glucose trends, will Follow up.  Patient counseled for compliance with consistent low carb diet and exercise as tolerated outpatient.

## 2024-04-12 NOTE — PROGRESS NOTE ADULT - SUBJECTIVE AND OBJECTIVE BOX
Chief complaint  Patient is a 74y old  Male who presents with a chief complaint of preop as/aortic aneurysm & dvd (12 Apr 2024 08:04)         Labs and Fingersticks  CAPILLARY BLOOD GLUCOSE  105 (12 Apr 2024 03:00)  108 (12 Apr 2024 00:00)  132 (11 Apr 2024 23:00)  149 (11 Apr 2024 22:00)  179 (11 Apr 2024 21:00)  237 (11 Apr 2024 20:00)      POCT Blood Glucose.: 136 mg/dL (12 Apr 2024 12:19)  POCT Blood Glucose.: 174 mg/dL (12 Apr 2024 08:37)  POCT Blood Glucose.: 150 mg/dL (12 Apr 2024 06:53)  POCT Blood Glucose.: 105 mg/dL (12 Apr 2024 02:43)  POCT Blood Glucose.: 132 mg/dL (11 Apr 2024 22:50)  POCT Blood Glucose.: 149 mg/dL (11 Apr 2024 21:53)  POCT Blood Glucose.: 179 mg/dL (11 Apr 2024 21:02)  POCT Blood Glucose.: 237 mg/dL (11 Apr 2024 20:03)  POCT Blood Glucose.: 219 mg/dL (11 Apr 2024 18:49)  POCT Blood Glucose.: 221 mg/dL (11 Apr 2024 18:00)  POCT Blood Glucose.: 199 mg/dL (11 Apr 2024 17:00)  POCT Blood Glucose.: 211 mg/dL (11 Apr 2024 15:42)      Anion Gap: 12 (04-12 @ 00:48)  Anion Gap: 11 (04-11 @ 00:26)      Calcium: 8.5 (04-12 @ 00:48)  Calcium: 8.8 (04-11 @ 00:26)  Albumin: 3.3 (04-12 @ 00:48)  Albumin: 3.3 (04-11 @ 00:26)    Alanine Aminotransferase (ALT/SGPT): 25 (04-12 @ 00:48)  Alanine Aminotransferase (ALT/SGPT): 19 (04-11 @ 00:26)  Alkaline Phosphatase: 82 (04-12 @ 00:48)  Alkaline Phosphatase: 77 (04-11 @ 00:26)  Aspartate Aminotransferase (AST/SGOT): 32 (04-12 @ 00:48)  Aspartate Aminotransferase (AST/SGOT): 53 *H* (04-11 @ 00:26)        04-12    138  |  105  |  27<H>  ----------------------------<  111<H>  3.6   |  21<L>  |  1.30    Ca    8.5      12 Apr 2024 00:48  Phos  2.1     04-12  Mg     2.4     04-12    TPro  5.5<L>  /  Alb  3.3  /  TBili  1.0  /  DBili  x   /  AST  32  /  ALT  25  /  AlkPhos  82  04-12                        10.0   10.37 )-----------( 150      ( 12 Apr 2024 00:48 )             30.2     Medications  MEDICATIONS  (STANDING):  aMIOdarone    Tablet 200 milliGRAM(s) Oral two times a day  aMIOdarone Infusion 0.5 mG/Min (16.7 mL/Hr) IV Continuous <Continuous>  ascorbic acid 500 milliGRAM(s) Oral two times a day  aspirin enteric coated 81 milliGRAM(s) Oral daily  atorvastatin 80 milliGRAM(s) Oral at bedtime  bisacodyl Suppository 10 milliGRAM(s) Rectal once  chlorhexidine 2% Cloths 1 Application(s) Topical daily  dextrose 50% Injectable 50 milliLiter(s) IV Push every 15 minutes  dextrose 50% Injectable 25 milliLiter(s) IV Push every 15 minutes  dextrose Oral Gel 15 Gram(s) Oral once  enoxaparin Injectable 40 milliGRAM(s) SubCutaneous every 24 hours  famotidine    Tablet 20 milliGRAM(s) Oral daily  furosemide    Tablet 40 milliGRAM(s) Oral two times a day  gabapentin 100 milliGRAM(s) Oral every 8 hours  glucagon  Injectable 1 milliGRAM(s) IntraMuscular once  insulin glargine Injectable (LANTUS) 40 Unit(s) SubCutaneous at bedtime  insulin lispro (ADMELOG) corrective regimen sliding scale   SubCutaneous three times a day before meals  insulin lispro Injectable (ADMELOG) 12 Unit(s) SubCutaneous three times a day before meals  insulin regular Infusion 4 Unit(s)/Hr (4 mL/Hr) IV Continuous <Continuous>  milrinone Infusion 0.5 MICROgram(s)/kG/Min (10.8 mL/Hr) IV Continuous <Continuous>  polyethylene glycol 3350 17 Gram(s) Oral daily  senna 2 Tablet(s) Oral at bedtime  sodium chloride 0.9% lock flush 3 milliLiter(s) IV Push every 8 hours  sodium chloride 0.9%. 1000 milliLiter(s) (10 mL/Hr) IV Continuous <Continuous>      Physical Exam  General: Patient comfortable in bed   Vital Signs Last 12 Hrs  T(F): 100.4 (04-12-24 @ 12:00), Max: 100.4 (04-12-24 @ 12:00)  HR: 94 (04-12-24 @ 13:00) (81 - 96)  BP: 100/58 (04-12-24 @ 04:00) (94/51 - 100/58)  BP(mean): 74 (04-12-24 @ 04:00) (66 - 74)  RR: 23 (04-12-24 @ 13:00) (16 - 25)  SpO2: 92% (04-12-24 @ 13:00) (92% - 99%)    CVS: S1S2   Respiratory: No wheezing, no crepitations  GI: Abdomen soft, bowel sounds positive  Musculoskeletal:  moves all extremities       Chief complaint  Patient is a 74y old  Male who presents with a chief complaint of preop as/aortic aneurysm & dvd (12 Apr 2024 08:04)         Labs and Fingersticks  CAPILLARY BLOOD GLUCOSE  105 (12 Apr 2024 03:00)  108 (12 Apr 2024 00:00)  132 (11 Apr 2024 23:00)  149 (11 Apr 2024 22:00)  179 (11 Apr 2024 21:00)  237 (11 Apr 2024 20:00)      POCT Blood Glucose.: 136 mg/dL (12 Apr 2024 12:19)  POCT Blood Glucose.: 174 mg/dL (12 Apr 2024 08:37)  POCT Blood Glucose.: 150 mg/dL (12 Apr 2024 06:53)  POCT Blood Glucose.: 105 mg/dL (12 Apr 2024 02:43)  POCT Blood Glucose.: 132 mg/dL (11 Apr 2024 22:50)  POCT Blood Glucose.: 149 mg/dL (11 Apr 2024 21:53)  POCT Blood Glucose.: 179 mg/dL (11 Apr 2024 21:02)  POCT Blood Glucose.: 237 mg/dL (11 Apr 2024 20:03)  POCT Blood Glucose.: 219 mg/dL (11 Apr 2024 18:49)  POCT Blood Glucose.: 221 mg/dL (11 Apr 2024 18:00)  POCT Blood Glucose.: 199 mg/dL (11 Apr 2024 17:00)  POCT Blood Glucose.: 211 mg/dL (11 Apr 2024 15:42)      Anion Gap: 12 (04-12 @ 00:48)  Anion Gap: 11 (04-11 @ 00:26)      Calcium: 8.5 (04-12 @ 00:48)  Calcium: 8.8 (04-11 @ 00:26)  Albumin: 3.3 (04-12 @ 00:48)  Albumin: 3.3 (04-11 @ 00:26)    Alanine Aminotransferase (ALT/SGPT): 25 (04-12 @ 00:48)  Alanine Aminotransferase (ALT/SGPT): 19 (04-11 @ 00:26)  Alkaline Phosphatase: 82 (04-12 @ 00:48)  Alkaline Phosphatase: 77 (04-11 @ 00:26)  Aspartate Aminotransferase (AST/SGOT): 32 (04-12 @ 00:48)  Aspartate Aminotransferase (AST/SGOT): 53 *H* (04-11 @ 00:26)        04-12    138  |  105  |  27<H>  ----------------------------<  111<H>  3.6   |  21<L>  |  1.30    Ca    8.5      12 Apr 2024 00:48  Phos  2.1     04-12  Mg     2.4     04-12    TPro  5.5<L>  /  Alb  3.3  /  TBili  1.0  /  DBili  x   /  AST  32  /  ALT  25  /  AlkPhos  82  04-12                        10.0   10.37 )-----------( 150      ( 12 Apr 2024 00:48 )             30.2     Medications  MEDICATIONS  (STANDING):  aMIOdarone    Tablet 200 milliGRAM(s) Oral two times a day  aMIOdarone Infusion 0.5 mG/Min (16.7 mL/Hr) IV Continuous <Continuous>  ascorbic acid 500 milliGRAM(s) Oral two times a day  aspirin enteric coated 81 milliGRAM(s) Oral daily  atorvastatin 80 milliGRAM(s) Oral at bedtime  bisacodyl Suppository 10 milliGRAM(s) Rectal once  chlorhexidine 2% Cloths 1 Application(s) Topical daily  dextrose 50% Injectable 50 milliLiter(s) IV Push every 15 minutes  dextrose 50% Injectable 25 milliLiter(s) IV Push every 15 minutes  dextrose Oral Gel 15 Gram(s) Oral once  enoxaparin Injectable 40 milliGRAM(s) SubCutaneous every 24 hours  famotidine    Tablet 20 milliGRAM(s) Oral daily  furosemide    Tablet 40 milliGRAM(s) Oral two times a day  gabapentin 100 milliGRAM(s) Oral every 8 hours  glucagon  Injectable 1 milliGRAM(s) IntraMuscular once  insulin glargine Injectable (LANTUS) 40 Unit(s) SubCutaneous at bedtime  insulin lispro (ADMELOG) corrective regimen sliding scale   SubCutaneous three times a day before meals  insulin lispro Injectable (ADMELOG) 12 Unit(s) SubCutaneous three times a day before meals  insulin regular Infusion 4 Unit(s)/Hr (4 mL/Hr) IV Continuous <Continuous>  milrinone Infusion 0.5 MICROgram(s)/kG/Min (10.8 mL/Hr) IV Continuous <Continuous>  polyethylene glycol 3350 17 Gram(s) Oral daily  senna 2 Tablet(s) Oral at bedtime  sodium chloride 0.9% lock flush 3 milliLiter(s) IV Push every 8 hours  sodium chloride 0.9%. 1000 milliLiter(s) (10 mL/Hr) IV Continuous <Continuous>      Physical Exam  General: Patient comfortable in bed   Vital Signs Last 12 Hrs  T(F): 100.4 (04-12-24 @ 12:00), Max: 100.4 (04-12-24 @ 12:00)  HR: 94 (04-12-24 @ 13:00) (81 - 96)  BP: 100/58 (04-12-24 @ 04:00) (94/51 - 100/58)  BP(mean): 74 (04-12-24 @ 04:00) (66 - 74)  RR: 23 (04-12-24 @ 13:00) (16 - 25)  SpO2: 92% (04-12-24 @ 13:00) (92% - 99%)    CVS: S1S2   Respiratory: No wheezing, no crepitations  GI: Abdomen soft, bowel sounds positive  Musculoskeletal:  moves all extremities

## 2024-04-12 NOTE — PROGRESS NOTE ADULT - ASSESSMENT
74yr M, PMHx of CVA and MI (12/2016), HTN, DM, HLD, CAD s/p cardiac stents, CHF, hx of malignant melanoma, now s/p ascending aortic arch  replacement, CABG AVR.  Assessment  DMT2: 74y Male with DM T2 with hyperglycemia, A1C is 9.8% , was on oral meds at home, now postop on IV insulin, diet advanced but not eating full meals,  intermittent bipap, now on High flow nasal cannula.   CAD: on medications, stable, monitored. s/p CABG  Aneursym: s/p hemiarch replacement , AVR-t , CTU monitored.       Discussed plan and management wit Dr Mikala Bach MD  Cell: [phone]  Office: [phone]             74yr M, PMHx of CVA and MI (12/2016), HTN, DM, HLD, CAD s/p cardiac stents, CHF, hx of malignant melanoma, now s/p ascending aortic arch  replacement, CABG AVR.  Assessment  DMT2: 74y Male with DM T2 with hyperglycemia, A1C is 9.8% , was on oral meds at home, now postop on IV insulin, diet advanced but not  eating full meals,  intermittent bipap, now on High flow nasal cannula.   CAD: on medications, stable, monitored. s/p CABG  Aneursym: s/p hemiarch replacement , AVR-t , CTU monitored.       Discussed plan and management wit Dr Mikala Bach MD  Cell: [phone]  Office: [phone]

## 2024-04-12 NOTE — PROGRESS NOTE ADULT - SUBJECTIVE AND OBJECTIVE BOX
CRITICAL CARE ATTENDING - CTICU    MEDICATIONS  (STANDING):  aMIOdarone    Tablet 200 milliGRAM(s) Oral two times a day  aMIOdarone Infusion 0.5 mG/Min (16.7 mL/Hr) IV Continuous <Continuous>  ascorbic acid 500 milliGRAM(s) Oral two times a day  aspirin enteric coated 81 milliGRAM(s) Oral daily  atorvastatin 80 milliGRAM(s) Oral at bedtime  bisacodyl Suppository 10 milliGRAM(s) Rectal once  chlorhexidine 2% Cloths 1 Application(s) Topical daily  dextrose 50% Injectable 50 milliLiter(s) IV Push every 15 minutes  dextrose 50% Injectable 25 milliLiter(s) IV Push every 15 minutes  dextrose Oral Gel 15 Gram(s) Oral once  enoxaparin Injectable 40 milliGRAM(s) SubCutaneous every 24 hours  famotidine    Tablet 20 milliGRAM(s) Oral daily  furosemide    Tablet 40 milliGRAM(s) Oral two times a day  gabapentin 100 milliGRAM(s) Oral every 8 hours  glucagon  Injectable 1 milliGRAM(s) IntraMuscular once  insulin glargine Injectable (LANTUS) 40 Unit(s) SubCutaneous at bedtime  insulin lispro (ADMELOG) corrective regimen sliding scale   SubCutaneous three times a day before meals  insulin lispro Injectable (ADMELOG) 10 Unit(s) SubCutaneous three times a day before meals  insulin regular Infusion 4 Unit(s)/Hr (4 mL/Hr) IV Continuous <Continuous>  milrinone Infusion 0.5 MICROgram(s)/kG/Min (10.8 mL/Hr) IV Continuous <Continuous>  polyethylene glycol 3350 17 Gram(s) Oral daily  senna 2 Tablet(s) Oral at bedtime  sodium chloride 0.9% lock flush 3 milliLiter(s) IV Push every 8 hours  sodium chloride 0.9%. 1000 milliLiter(s) (10 mL/Hr) IV Continuous <Continuous>                                    10.0   10.37 )-----------( 150      ( 2024 00:48 )             30.2       04-12    138  |  105  |  27<H>  ----------------------------<  111<H>  3.6   |  21<L>  |  1.30    Ca    8.5      2024 00:48  Phos  2.1     04-12  Mg     2.4         TPro  5.5<L>  /  Alb  3.3  /  TBili  1.0  /  DBili  x   /  AST  32  /  ALT  25  /  AlkPhos  82                Daily     Daily Weight in k.6 (2024 00:00)      11 @ 07:01  -   @ 07:00  --------------------------------------------------------  IN: 3172.9 mL / OUT: 2895 mL / NET: 277.9 mL     @ 07:01  -   @ 08:04  --------------------------------------------------------  IN: 37.5 mL / OUT: 200 mL / NET: -162.5 mL        Critically Ill patient  : [ ] preoperative ,   [x ] post operative    Requires :  [x ] Arterial Line   [ x] Central Line  [ ] PA catheter  [ ] IABP  [ ] ECMO  [ ] LVAD  [ ] Ventilator  [x ] pacemaker- TPM [ ] Impella.  [x] HFNC                      [ x] ABG's     [x ] Pulse Oxymetry Monitoring  Bedside evaluation , monitoring , treatment of hemodynamics , fluids , IVP/ IVCD meds.        Diagnosis:     POD 4 - CABG x2/ Ascending aortic replacement with transverse hemiarch/ AVR (Preop IABP) - 24    Extubated  to HFNC    hypertension     Chest Tube Drainage/ Management     Requires chest PT, pulmonary toilet, suctioning to maintain SaO2,  patent airway and treat atelectasis.     IABP  removed     Respiratory insuffiencey     Sono Chest - L - Pleural Effusion     Fluid  overload     Hypoxemia - Requires [ ] BIPAP  [x] HF O2 40%/40L    Temporary pacemaker (TPM) interrogation and setting.     CHF- acute [ x]   chronic [ x]    systolic [ x]   diastolic [ ]  Valvular [ x]          - Echo- EF -   20-30%          [x ] RV dysfunction          - Cxr-cardiomegally, edema          - Clinical-  [x]inotropes   [ ]pressors   [ x]diuresis   [ ]IABP   [ ]ECMO   [ ]LVAD   [x ] Respiratory insuffiencey           Hemodynamic lability,  instability. Requires IVCD [ ] vasopressors [x ] inotropes  [ x] vasodilator  [ ]IVSS fluid  to maintain MAP, perfusion, C.I.     DM2 - IVCD insulin / Sliding Scale / Lantus     ASA/Statin daily    Fluid Overload     Metabolic Acidosis    Requires bedside physical therapy, mobilization and total [facility] care.               I, Eduardo Abreu, personally performed the services described in this documentation. All medical record entries made by the scribe were at my direction and in my presence. I have reviewed the chart and agree that the record reflects my personal performance and is accurate and complete.   Eduardo Abreu MD.       By signing my name below, I, Bernabe Dong, attest that this documentation has been prepared under the direction and in the presence of Eduardo Abreu MD.   Electronically Signed: Richard Ambriz 24 @ 08:04        Discussed with CT surgeon, Physician Assistant - Nurse Practitioner- Critical care medicine team.   Dicussed at  AM / PM rounds.   Chart, labs , films reviewed.    Cumulative Critical Care Time Given Today:  CRITICAL CARE ATTENDING - CTICU    MEDICATIONS  (STANDING):  aMIOdarone    Tablet 200 milliGRAM(s) Oral two times a day  aMIOdarone Infusion 0.5 mG/Min (16.7 mL/Hr) IV Continuous <Continuous>  ascorbic acid 500 milliGRAM(s) Oral two times a day  aspirin enteric coated 81 milliGRAM(s) Oral daily  atorvastatin 80 milliGRAM(s) Oral at bedtime  bisacodyl Suppository 10 milliGRAM(s) Rectal once  chlorhexidine 2% Cloths 1 Application(s) Topical daily  dextrose 50% Injectable 50 milliLiter(s) IV Push every 15 minutes  dextrose 50% Injectable 25 milliLiter(s) IV Push every 15 minutes  dextrose Oral Gel 15 Gram(s) Oral once  enoxaparin Injectable 40 milliGRAM(s) SubCutaneous every 24 hours  famotidine    Tablet 20 milliGRAM(s) Oral daily  furosemide    Tablet 40 milliGRAM(s) Oral two times a day  gabapentin 100 milliGRAM(s) Oral every 8 hours  glucagon  Injectable 1 milliGRAM(s) IntraMuscular once  insulin glargine Injectable (LANTUS) 40 Unit(s) SubCutaneous at bedtime  insulin lispro (ADMELOG) corrective regimen sliding scale   SubCutaneous three times a day before meals  insulin lispro Injectable (ADMELOG) 10 Unit(s) SubCutaneous three times a day before meals  insulin regular Infusion 4 Unit(s)/Hr (4 mL/Hr) IV Continuous <Continuous>  milrinone Infusion 0.5 MICROgram(s)/kG/Min (10.8 mL/Hr) IV Continuous <Continuous>  polyethylene glycol 3350 17 Gram(s) Oral daily  senna 2 Tablet(s) Oral at bedtime  sodium chloride 0.9% lock flush 3 milliLiter(s) IV Push every 8 hours  sodium chloride 0.9%. 1000 milliLiter(s) (10 mL/Hr) IV Continuous <Continuous>                                    10.0   10.37 )-----------( 150      ( 2024 00:48 )             30.2       04-12    138  |  105  |  27<H>  ----------------------------<  111<H>  3.6   |  21<L>  |  1.30    Ca    8.5      2024 00:48  Phos  2.1     04-12  Mg     2.4         TPro  5.5<L>  /  Alb  3.3  /  TBili  1.0  /  DBili  x   /  AST  32  /  ALT  25  /  AlkPhos  82                Daily     Daily Weight in k.6 (2024 00:00)      11 @ 07:01  -   @ 07:00  --------------------------------------------------------  IN: 3172.9 mL / OUT: 2895 mL / NET: 277.9 mL     @ 07:01  -   @ 08:04  --------------------------------------------------------  IN: 37.5 mL / OUT: 200 mL / NET: -162.5 mL        Critically Ill patient  : [ ] preoperative ,   [x ] post operative    Requires :  [x ] Arterial Line   [ x] Central Line  [ ] PA catheter  [ ] IABP  [ ] ECMO  [ ] LVAD  [ ] Ventilator  [x ] pacemaker- TPM [ ] Impella.  [x] HFNC                      [ x] ABG's     [x ] Pulse Oxymetry Monitoring  Bedside evaluation , monitoring , treatment of hemodynamics , fluids , IVP/ IVCD meds.        Diagnosis:     POD 4 - CABG x2/ Ascending aortic replacement with transverse hemiarch/ AVR (Preop IABP) - 24    Extubated  to HFNC    hypertension     Chest Tube Drainage/ Management     Requires chest PT, pulmonary toilet, suctioning to maintain SaO2,  patent airway and treat atelectasis.     IABP  removed     Respiratory insuffiencey     Sono Chest - L - Pleural Effusion / Pulmonary Consolidation     May require L Pleurocentesis    Fluid  overload     Hypoxemia - Requires [ ] BIPAP  [x] HF O2 40%/40L    Temporary pacemaker (TPM) interrogation and setting.     CHF- acute [ x]   chronic [ x]    systolic [ x]   diastolic [ ]  Valvular [ x]          - Echo- EF -   20-30%          [x ] RV dysfunction          - Cxr-cardiomegally, edema          - Clinical-  [x]inotropes   [ ]pressors   [ x]diuresis   [ ]IABP   [ ]ECMO   [ ]LVAD   [x ] Respiratory insuffiencey           Hemodynamic lability,  instability. Requires IVCD [ ] vasopressors [x ] inotropes  [ x] vasodilator  [ ]IVSS fluid  to maintain MAP, perfusion, C.I.     DM2 - IVCD insulin / Sliding Scale / Lantus     ASA/Statin daily    Fluid Overload     Metabolic Acidosis    Requires bedside physical therapy, mobilization and total [facility] care.               I, Eduardo Abreu, personally performed the services described in this documentation. All medical record entries made by the demetriusibe were at my direction and in my presence. I have reviewed the chart and agree that the record reflects my personal performance and is accurate and complete.   Eduardo Abreu MD.       By signing my name below, I, Bernabe Dong, attest that this documentation has been prepared under the direction and in the presence of Eduardo Abreu MD.   Electronically Signed: Richard Ambriz 24 @ 08:04        Discussed with CT surgeon, Physician Assistant - Nurse Practitioner- Critical care medicine team.   Dicussed at  AM / PM rounds.   Chart, labs , films reviewed.    Cumulative Critical Care Time Given Today:  105 min

## 2024-04-12 NOTE — PROGRESS NOTE ADULT - SUBJECTIVE AND OBJECTIVE BOX
Patient seen and examined at the bedside.    Remained critically ill on continuous ICU monitoring.    OBJECTIVE:  Vital Signs Last 24 Hrs  T(C): 38.2 (12 Apr 2024 20:00), Max: 38.4 (12 Apr 2024 16:00)  T(F): 100.8 (12 Apr 2024 20:00), Max: 101.1 (12 Apr 2024 16:00)  HR: 100 (12 Apr 2024 20:00) (51 - 121)  BP: 100/58 (12 Apr 2024 04:00) (92/60 - 118/70)  BP(mean): 74 (12 Apr 2024 04:00) (66 - 89)  RR: 22 (12 Apr 2024 20:00) (14 - 30)  SpO2: 95% (12 Apr 2024 20:00) (92% - 99%)    Parameters below as of 12 Apr 2024 20:00  Patient On (Oxygen Delivery Method): nasal cannula, high flow  O2 Flow (L/min): 30  O2 Concentration (%): 30      Physical Exam:   General: NAD   Neurology: nonfocal   Eyes: bilateral pupils equal and reactive   ENT/Neck: Neck supple, trachea midline, No JVD   Respiratory: Clear bilaterally   CV: S1S2, no murmurs        [x] Sternal dressing, [x] Mediastinal CT x2        [x] SR [x] Temporary pacing VVI 40  Abdominal: Soft, NT, ND +BS   Extremities: 1-2+ pedal edema noted, + peripheral pulses   Skin: No Rashes, Hematoma, Ecchymosis                           Assessment:  74yr M, PMHx of CVA and MI (12/2016), HTN, DM, HLD, CAD s/p cardiac stents, CHF, hx of malignant melanoma coming in for wide excision 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  Py presents for PST for ascending aorta replacement, aortic valve replacement, coronary artery bypass grafting with Dr. Ilia Ortiz.  He has been admitted preop for w/u - CT head, pt has not taken Farxiga since Wednesday as per orders from Dr. Goddard NP.    CAD s/p CABGx3 4/8/24  Post-op blood loss anemia  Hemodynamic Instability   Hypovolemia  Post-op respiratory failure  Leukocytosis  Thrombocytopenia       Plan:   ***Neuro***  [x] Hx of CVA   [x] Nonfocal     [x] Tylenol, Gapapentin, Meperidine, and PRNS for pain management  Continue Zofran  Post operative neuro assessment     ***Cardiovascular***  Invasive hemodynamic monitoring, assess perfusion indices   SR / CVP 5 / MAP 72/ Hct 30.2 / Lactate 1.1  [x] Primacor 0.5 mcgs/kg/min  [x] Dobutamine decreased from 3 to 2 mcgs/kg/min  [x] Nitro 0.2 mcg/kg/min   [x] Amiodarone for a fib prophylaxis   [x] IABP removed today    [x] Lovenox for VTE prophylaxis   Volume: [x] Albumin 200 cc [x] 2 PRBC   Reassessment of hemodynamics post resuscitation    Monitor chest tube outputs    [x] ASA [x] Statin  Serial EKG and cardiac enzymes     ***Pulmonary***  [x] HFNC 30L/30%  Titration of FiO2 and PEEP, follow SpO2, CXR, blood gasses   Encourage incentive spirometry, continue pulse ox monitoring, follow ABGs             ***GI***  [x] Tolerating Diet   [x] Pepcid  [x] Miralax and Senna for Bowel Regimen    ***Renal***   Continue to monitor I/Os, BUN/Creatinine.   Replete lytes PRN  Diuresis with lasix   Guevara present      ***ID***  Cefuroxime for perioperative antibiotic coverage      ***Endocrine***  [x] DM: HbA1c 9.8%             - [x] Insulin gtt  [x] Lantus [x] ISS             - Need tight glycemic control to prevent wound infection.            Patient requires continuous monitoring with bedside rhythm monitoring, pulse oximetry monitoring, and continuous central venous and arterial pressure monitoring; and intermittent blood gas analysis. Care plan discussed with the ICU care team.   Patient remained critical, at risk for life threatening decompensation.    I have spent 30 minutes providing critical care management to this patient.    By signing my name below, I, Vanna Santana, attest that this documentation has been prepared under the direction and in the presence of JOSÉ MIGUEL Alberto.  Electronically signed: Flower Mcdonnell, 04-12-24 @ 20:26    I, Khari Stephens, personally performed the services described in this documentation. all medical record entries made by the flower were at my direction and in my presence. I have reviewed the chart and agree that the record reflects my personal performance and is accurate and complete  Electronically signed:  JOSÉ MIGUEL Alberto.

## 2024-04-13 LAB
ALBUMIN SERPL ELPH-MCNC: 3 G/DL — LOW (ref 3.3–5)
ALP SERPL-CCNC: 74 U/L — SIGNIFICANT CHANGE UP (ref 40–120)
ALT FLD-CCNC: 27 U/L — SIGNIFICANT CHANGE UP (ref 10–45)
ANION GAP SERPL CALC-SCNC: 13 MMOL/L — SIGNIFICANT CHANGE UP (ref 5–17)
AST SERPL-CCNC: 28 U/L — SIGNIFICANT CHANGE UP (ref 10–40)
BASE EXCESS BLDV CALC-SCNC: 2 MMOL/L — SIGNIFICANT CHANGE UP (ref -2–3)
BASE EXCESS BLDV CALC-SCNC: 2.8 MMOL/L — SIGNIFICANT CHANGE UP (ref -2–3)
BASOPHILS # BLD AUTO: 0.03 K/UL — SIGNIFICANT CHANGE UP (ref 0–0.2)
BASOPHILS NFR BLD AUTO: 0.4 % — SIGNIFICANT CHANGE UP (ref 0–2)
BILIRUB SERPL-MCNC: 0.9 MG/DL — SIGNIFICANT CHANGE UP (ref 0.2–1.2)
BUN SERPL-MCNC: 28 MG/DL — HIGH (ref 7–23)
CA-I SERPL-SCNC: 1.14 MMOL/L — LOW (ref 1.15–1.33)
CA-I SERPL-SCNC: 1.15 MMOL/L — SIGNIFICANT CHANGE UP (ref 1.15–1.33)
CALCIUM SERPL-MCNC: 8.3 MG/DL — LOW (ref 8.4–10.5)
CHLORIDE BLDV-SCNC: 101 MMOL/L — SIGNIFICANT CHANGE UP (ref 96–108)
CHLORIDE BLDV-SCNC: 102 MMOL/L — SIGNIFICANT CHANGE UP (ref 96–108)
CHLORIDE SERPL-SCNC: 100 MMOL/L — SIGNIFICANT CHANGE UP (ref 96–108)
CO2 BLDV-SCNC: 25 MMOL/L — SIGNIFICANT CHANGE UP (ref 22–26)
CO2 BLDV-SCNC: 28 MMOL/L — HIGH (ref 22–26)
CO2 SERPL-SCNC: 22 MMOL/L — SIGNIFICANT CHANGE UP (ref 22–31)
CREAT SERPL-MCNC: 1.61 MG/DL — HIGH (ref 0.5–1.3)
EGFR: 44 ML/MIN/1.73M2 — LOW
EOSINOPHIL # BLD AUTO: 0.12 K/UL — SIGNIFICANT CHANGE UP (ref 0–0.5)
EOSINOPHIL NFR BLD AUTO: 1.4 % — SIGNIFICANT CHANGE UP (ref 0–6)
GAS PNL BLDA: SIGNIFICANT CHANGE UP
GAS PNL BLDV: 132 MMOL/L — LOW (ref 136–145)
GAS PNL BLDV: 132 MMOL/L — LOW (ref 136–145)
GAS PNL BLDV: SIGNIFICANT CHANGE UP
GLUCOSE BLDC GLUCOMTR-MCNC: 112 MG/DL — HIGH (ref 70–99)
GLUCOSE BLDC GLUCOMTR-MCNC: 77 MG/DL — SIGNIFICANT CHANGE UP (ref 70–99)
GLUCOSE BLDC GLUCOMTR-MCNC: 89 MG/DL — SIGNIFICANT CHANGE UP (ref 70–99)
GLUCOSE BLDC GLUCOMTR-MCNC: 96 MG/DL — SIGNIFICANT CHANGE UP (ref 70–99)
GLUCOSE BLDV-MCNC: 83 MG/DL — SIGNIFICANT CHANGE UP (ref 70–99)
GLUCOSE BLDV-MCNC: 89 MG/DL — SIGNIFICANT CHANGE UP (ref 70–99)
GLUCOSE SERPL-MCNC: 94 MG/DL — SIGNIFICANT CHANGE UP (ref 70–99)
HCO3 BLDV-SCNC: 24 MMOL/L — SIGNIFICANT CHANGE UP (ref 22–29)
HCO3 BLDV-SCNC: 27 MMOL/L — SIGNIFICANT CHANGE UP (ref 22–29)
HCT VFR BLD CALC: 29.8 % — LOW (ref 39–50)
HCT VFR BLDA CALC: 31 % — LOW (ref 39–51)
HCT VFR BLDA CALC: 31 % — LOW (ref 39–51)
HGB BLD CALC-MCNC: 10.2 G/DL — LOW (ref 12.6–17.4)
HGB BLD CALC-MCNC: 10.3 G/DL — LOW (ref 12.6–17.4)
HGB BLD-MCNC: 10.1 G/DL — LOW (ref 13–17)
HOROWITZ INDEX BLDV+IHG-RTO: 40 — SIGNIFICANT CHANGE UP
IMM GRANULOCYTES NFR BLD AUTO: 0.5 % — SIGNIFICANT CHANGE UP (ref 0–0.9)
LACTATE BLDV-MCNC: 0.7 MMOL/L — SIGNIFICANT CHANGE UP (ref 0.5–2)
LACTATE BLDV-MCNC: 0.9 MMOL/L — SIGNIFICANT CHANGE UP (ref 0.5–2)
LYMPHOCYTES # BLD AUTO: 1.37 K/UL — SIGNIFICANT CHANGE UP (ref 1–3.3)
LYMPHOCYTES # BLD AUTO: 16.4 % — SIGNIFICANT CHANGE UP (ref 13–44)
MAGNESIUM SERPL-MCNC: 2.3 MG/DL — SIGNIFICANT CHANGE UP (ref 1.6–2.6)
MCHC RBC-ENTMCNC: 28.7 PG — SIGNIFICANT CHANGE UP (ref 27–34)
MCHC RBC-ENTMCNC: 33.9 GM/DL — SIGNIFICANT CHANGE UP (ref 32–36)
MCV RBC AUTO: 84.7 FL — SIGNIFICANT CHANGE UP (ref 80–100)
MONOCYTES # BLD AUTO: 1.16 K/UL — HIGH (ref 0–0.9)
MONOCYTES NFR BLD AUTO: 13.9 % — SIGNIFICANT CHANGE UP (ref 2–14)
NEUTROPHILS # BLD AUTO: 5.65 K/UL — SIGNIFICANT CHANGE UP (ref 1.8–7.4)
NEUTROPHILS NFR BLD AUTO: 67.4 % — SIGNIFICANT CHANGE UP (ref 43–77)
NRBC # BLD: 0 /100 WBCS — SIGNIFICANT CHANGE UP (ref 0–0)
PCO2 BLDV: 29 MMHG — LOW (ref 42–55)
PCO2 BLDV: 40 MMHG — LOW (ref 42–55)
PH BLDV: 7.44 — HIGH (ref 7.32–7.43)
PH BLDV: 7.53 — HIGH (ref 7.32–7.43)
PHOSPHATE SERPL-MCNC: 3.2 MG/DL — SIGNIFICANT CHANGE UP (ref 2.5–4.5)
PLATELET # BLD AUTO: 187 K/UL — SIGNIFICANT CHANGE UP (ref 150–400)
PO2 BLDV: 40 MMHG — SIGNIFICANT CHANGE UP (ref 25–45)
PO2 BLDV: 83 MMHG — HIGH (ref 25–45)
POTASSIUM BLDV-SCNC: 3.5 MMOL/L — SIGNIFICANT CHANGE UP (ref 3.5–5.1)
POTASSIUM BLDV-SCNC: 4 MMOL/L — SIGNIFICANT CHANGE UP (ref 3.5–5.1)
POTASSIUM SERPL-MCNC: 3.5 MMOL/L — SIGNIFICANT CHANGE UP (ref 3.5–5.3)
POTASSIUM SERPL-SCNC: 3.5 MMOL/L — SIGNIFICANT CHANGE UP (ref 3.5–5.3)
PROT SERPL-MCNC: 5.3 G/DL — LOW (ref 6–8.3)
RBC # BLD: 3.52 M/UL — LOW (ref 4.2–5.8)
RBC # FLD: 13.8 % — SIGNIFICANT CHANGE UP (ref 10.3–14.5)
SAO2 % BLDV: 68 % — SIGNIFICANT CHANGE UP (ref 67–88)
SAO2 % BLDV: 97.3 % — HIGH (ref 67–88)
SODIUM SERPL-SCNC: 135 MMOL/L — SIGNIFICANT CHANGE UP (ref 135–145)
WBC # BLD: 8.37 K/UL — SIGNIFICANT CHANGE UP (ref 3.8–10.5)
WBC # FLD AUTO: 8.37 K/UL — SIGNIFICANT CHANGE UP (ref 3.8–10.5)

## 2024-04-13 PROCEDURE — 99292 CRITICAL CARE ADDL 30 MIN: CPT

## 2024-04-13 PROCEDURE — 99291 CRITICAL CARE FIRST HOUR: CPT

## 2024-04-13 PROCEDURE — 71045 X-RAY EXAM CHEST 1 VIEW: CPT | Mod: 26

## 2024-04-13 PROCEDURE — 71250 CT THORAX DX C-: CPT | Mod: 26

## 2024-04-13 RX ORDER — VANCOMYCIN HCL 1 G
1000 VIAL (EA) INTRAVENOUS ONCE
Refills: 0 | Status: COMPLETED | OUTPATIENT
Start: 2024-04-13 | End: 2024-04-13

## 2024-04-13 RX ORDER — POTASSIUM CHLORIDE 20 MEQ
40 PACKET (EA) ORAL ONCE
Refills: 0 | Status: COMPLETED | OUTPATIENT
Start: 2024-04-13 | End: 2024-04-13

## 2024-04-13 RX ORDER — CALCIUM GLUCONATE 100 MG/ML
2 VIAL (ML) INTRAVENOUS ONCE
Refills: 0 | Status: COMPLETED | OUTPATIENT
Start: 2024-04-13 | End: 2024-04-13

## 2024-04-13 RX ORDER — INSULIN LISPRO 100/ML
9 VIAL (ML) SUBCUTANEOUS
Refills: 0 | Status: DISCONTINUED | OUTPATIENT
Start: 2024-04-13 | End: 2024-04-14

## 2024-04-13 RX ORDER — POTASSIUM CHLORIDE 20 MEQ
10 PACKET (EA) ORAL
Refills: 0 | Status: COMPLETED | OUTPATIENT
Start: 2024-04-13 | End: 2024-04-13

## 2024-04-13 RX ORDER — ALBUMIN HUMAN 25 %
100 VIAL (ML) INTRAVENOUS ONCE
Refills: 0 | Status: COMPLETED | OUTPATIENT
Start: 2024-04-13 | End: 2024-04-13

## 2024-04-13 RX ORDER — POTASSIUM CHLORIDE 20 MEQ
10 PACKET (EA) ORAL
Refills: 0 | Status: DISCONTINUED | OUTPATIENT
Start: 2024-04-13 | End: 2024-04-13

## 2024-04-13 RX ORDER — PHENYLEPHRINE HYDROCHLORIDE 10 MG/ML
2 INJECTION INTRAVENOUS
Qty: 40 | Refills: 0 | Status: DISCONTINUED | OUTPATIENT
Start: 2024-04-13 | End: 2024-04-14

## 2024-04-13 RX ORDER — INSULIN LISPRO 100/ML
11 VIAL (ML) SUBCUTANEOUS
Refills: 0 | Status: DISCONTINUED | OUTPATIENT
Start: 2024-04-13 | End: 2024-04-13

## 2024-04-13 RX ORDER — INSULIN GLARGINE 100 [IU]/ML
30 INJECTION, SOLUTION SUBCUTANEOUS AT BEDTIME
Refills: 0 | Status: DISCONTINUED | OUTPATIENT
Start: 2024-04-13 | End: 2024-04-16

## 2024-04-13 RX ORDER — PIPERACILLIN AND TAZOBACTAM 4; .5 G/20ML; G/20ML
3.38 INJECTION, POWDER, LYOPHILIZED, FOR SOLUTION INTRAVENOUS ONCE
Refills: 0 | Status: COMPLETED | OUTPATIENT
Start: 2024-04-13 | End: 2024-04-13

## 2024-04-13 RX ORDER — INSULIN LISPRO 100/ML
5 VIAL (ML) SUBCUTANEOUS ONCE
Refills: 0 | Status: COMPLETED | OUTPATIENT
Start: 2024-04-13 | End: 2024-04-13

## 2024-04-13 RX ORDER — FENTANYL CITRATE 50 UG/ML
25 INJECTION INTRAVENOUS ONCE
Refills: 0 | Status: DISCONTINUED | OUTPATIENT
Start: 2024-04-13 | End: 2024-04-13

## 2024-04-13 RX ORDER — PIPERACILLIN AND TAZOBACTAM 4; .5 G/20ML; G/20ML
3.38 INJECTION, POWDER, LYOPHILIZED, FOR SOLUTION INTRAVENOUS EVERY 8 HOURS
Refills: 0 | Status: DISCONTINUED | OUTPATIENT
Start: 2024-04-13 | End: 2024-04-16

## 2024-04-13 RX ORDER — ACETAMINOPHEN 500 MG
1000 TABLET ORAL ONCE
Refills: 0 | Status: COMPLETED | OUTPATIENT
Start: 2024-04-13 | End: 2024-04-13

## 2024-04-13 RX ORDER — INSULIN GLARGINE 100 [IU]/ML
38 INJECTION, SOLUTION SUBCUTANEOUS AT BEDTIME
Refills: 0 | Status: DISCONTINUED | OUTPATIENT
Start: 2024-04-13 | End: 2024-04-13

## 2024-04-13 RX ADMIN — Medication 50 MILLILITER(S): at 17:54

## 2024-04-13 RX ADMIN — SENNA PLUS 2 TABLET(S): 8.6 TABLET ORAL at 22:16

## 2024-04-13 RX ADMIN — PIPERACILLIN AND TAZOBACTAM 25 GRAM(S): 4; .5 INJECTION, POWDER, LYOPHILIZED, FOR SOLUTION INTRAVENOUS at 14:04

## 2024-04-13 RX ADMIN — Medication 11 UNIT(S): at 12:22

## 2024-04-13 RX ADMIN — Medication 50 MILLILITER(S): at 22:19

## 2024-04-13 RX ADMIN — POLYETHYLENE GLYCOL 3350 17 GRAM(S): 17 POWDER, FOR SOLUTION ORAL at 11:05

## 2024-04-13 RX ADMIN — ATORVASTATIN CALCIUM 80 MILLIGRAM(S): 80 TABLET, FILM COATED ORAL at 22:15

## 2024-04-13 RX ADMIN — ENOXAPARIN SODIUM 40 MILLIGRAM(S): 100 INJECTION SUBCUTANEOUS at 17:55

## 2024-04-13 RX ADMIN — Medication 500 MILLIGRAM(S): at 05:17

## 2024-04-13 RX ADMIN — MILRINONE LACTATE 8.63 MICROGRAM(S)/KG/MIN: 1 INJECTION, SOLUTION INTRAVENOUS at 11:00

## 2024-04-13 RX ADMIN — Medication 40 MILLIEQUIVALENT(S): at 19:36

## 2024-04-13 RX ADMIN — Medication 1000 MILLIGRAM(S): at 20:00

## 2024-04-13 RX ADMIN — SODIUM CHLORIDE 3 MILLILITER(S): 9 INJECTION INTRAMUSCULAR; INTRAVENOUS; SUBCUTANEOUS at 22:16

## 2024-04-13 RX ADMIN — Medication 40 MILLIEQUIVALENT(S): at 13:54

## 2024-04-13 RX ADMIN — FENTANYL CITRATE 25 MICROGRAM(S): 50 INJECTION INTRAVENOUS at 23:56

## 2024-04-13 RX ADMIN — PIPERACILLIN AND TAZOBACTAM 200 GRAM(S): 4; .5 INJECTION, POWDER, LYOPHILIZED, FOR SOLUTION INTRAVENOUS at 06:07

## 2024-04-13 RX ADMIN — CHLORHEXIDINE GLUCONATE 1 APPLICATION(S): 213 SOLUTION TOPICAL at 11:05

## 2024-04-13 RX ADMIN — Medication 200 GRAM(S): at 20:51

## 2024-04-13 RX ADMIN — AMIODARONE HYDROCHLORIDE 400 MILLIGRAM(S): 400 TABLET ORAL at 13:08

## 2024-04-13 RX ADMIN — Medication 40 MILLIGRAM(S): at 05:17

## 2024-04-13 RX ADMIN — MILRINONE LACTATE 8.63 MICROGRAM(S)/KG/MIN: 1 INJECTION, SOLUTION INTRAVENOUS at 06:07

## 2024-04-13 RX ADMIN — Medication 81 MILLIGRAM(S): at 11:05

## 2024-04-13 RX ADMIN — Medication 12 UNIT(S): at 08:24

## 2024-04-13 RX ADMIN — GABAPENTIN 100 MILLIGRAM(S): 400 CAPSULE ORAL at 13:08

## 2024-04-13 RX ADMIN — SODIUM CHLORIDE 3 MILLILITER(S): 9 INJECTION INTRAMUSCULAR; INTRAVENOUS; SUBCUTANEOUS at 15:02

## 2024-04-13 RX ADMIN — PIPERACILLIN AND TAZOBACTAM 25 GRAM(S): 4; .5 INJECTION, POWDER, LYOPHILIZED, FOR SOLUTION INTRAVENOUS at 07:35

## 2024-04-13 RX ADMIN — AMIODARONE HYDROCHLORIDE 400 MILLIGRAM(S): 400 TABLET ORAL at 22:15

## 2024-04-13 RX ADMIN — GABAPENTIN 100 MILLIGRAM(S): 400 CAPSULE ORAL at 05:17

## 2024-04-13 RX ADMIN — Medication 50 MILLIEQUIVALENT(S): at 01:43

## 2024-04-13 RX ADMIN — Medication 50 MILLIEQUIVALENT(S): at 03:32

## 2024-04-13 RX ADMIN — SODIUM CHLORIDE 3 MILLILITER(S): 9 INJECTION INTRAMUSCULAR; INTRAVENOUS; SUBCUTANEOUS at 05:57

## 2024-04-13 RX ADMIN — AMIODARONE HYDROCHLORIDE 400 MILLIGRAM(S): 400 TABLET ORAL at 05:17

## 2024-04-13 RX ADMIN — Medication 5 UNIT(S): at 17:05

## 2024-04-13 RX ADMIN — FAMOTIDINE 20 MILLIGRAM(S): 10 INJECTION INTRAVENOUS at 11:05

## 2024-04-13 RX ADMIN — Medication 400 MILLIGRAM(S): at 19:00

## 2024-04-13 RX ADMIN — INSULIN GLARGINE 30 UNIT(S): 100 INJECTION, SOLUTION SUBCUTANEOUS at 22:15

## 2024-04-13 RX ADMIN — Medication 250 MILLIGRAM(S): at 05:45

## 2024-04-13 RX ADMIN — PHENYLEPHRINE HYDROCHLORIDE 53.9 MICROGRAM(S)/KG/MIN: 10 INJECTION INTRAVENOUS at 20:51

## 2024-04-13 RX ADMIN — Medication 40 MILLIGRAM(S): at 13:08

## 2024-04-13 NOTE — PROGRESS NOTE ADULT - PROBLEM SELECTOR PLAN 1
Will decrease Lantus to 30 units at bed time.  Will decrease Admelog to 9 units before each meal in addition to Admelog correction scale coverage.  Patient counseled for compliance with consistent low carb diet.  .

## 2024-04-13 NOTE — PROGRESS NOTE ADULT - ASSESSMENT
74yr M, PMHx of CVA and MI (12/2016), HTN, DM, HLD, CAD s/p cardiac stents, CHF, hx of malignant melanoma, now s/p ascending aortic arch  replacement, CABG AVR.  Assessment  DMT2: 74y Male with DM T2 with hyperglycemia, A1C is 9.8% , was on oral meds at home, now postop on IV insulin, diet advanced but not  eating full meals, intermittent bipap, now on High flow nasal cannula, sugars trending down.  CAD: on medications, stable, monitored. s/p CABG  Aneursym: s/p hemiarch replacement , AVR-t , CTU monitored.     Elodia Bach MD  Cell: [phone]  Office: [phone]

## 2024-04-13 NOTE — PROGRESS NOTE ADULT - SUBJECTIVE AND OBJECTIVE BOX
CRITICAL CARE ATTENDING - CTICU    MEDICATIONS  (STANDING):  aMIOdarone    Tablet   Oral   aMIOdarone    Tablet 400 milliGRAM(s) Oral every 8 hours  aMIOdarone Infusion 0.5 mG/Min (16.7 mL/Hr) IV Continuous <Continuous>  aspirin enteric coated 81 milliGRAM(s) Oral daily  atorvastatin 80 milliGRAM(s) Oral at bedtime  bisacodyl Suppository 10 milliGRAM(s) Rectal once  chlorhexidine 2% Cloths 1 Application(s) Topical daily  dextrose 50% Injectable 50 milliLiter(s) IV Push every 15 minutes  dextrose Oral Gel 15 Gram(s) Oral once  enoxaparin Injectable 40 milliGRAM(s) SubCutaneous every 24 hours  famotidine    Tablet 20 milliGRAM(s) Oral daily  furosemide    Tablet 40 milliGRAM(s) Oral two times a day  gabapentin 100 milliGRAM(s) Oral every 8 hours  glucagon  Injectable 1 milliGRAM(s) IntraMuscular once  insulin glargine Injectable (LANTUS) 40 Unit(s) SubCutaneous at bedtime  insulin lispro (ADMELOG) corrective regimen sliding scale   SubCutaneous three times a day before meals  insulin lispro Injectable (ADMELOG) 12 Unit(s) SubCutaneous three times a day before meals  insulin regular Infusion 4 Unit(s)/Hr (4 mL/Hr) IV Continuous <Continuous>  milrinone Infusion 0.4 MICROgram(s)/kG/Min (8.63 mL/Hr) IV Continuous <Continuous>  piperacillin/tazobactam IVPB.. 3.375 Gram(s) IV Intermittent every 8 hours  polyethylene glycol 3350 17 Gram(s) Oral daily  senna 2 Tablet(s) Oral at bedtime  sodium chloride 0.9% lock flush 3 milliLiter(s) IV Push every 8 hours  sodium chloride 0.9%. 1000 milliLiter(s) (10 mL/Hr) IV Continuous <Continuous>                                    10.1   8.37  )-----------( 187      ( 2024 00:29 )             29.8       04-13    135  |  100  |  28<H>  ----------------------------<  94  3.5   |  22  |  1.61<H>    Ca    8.3<L>      2024 00:30  Phos  3.2     04-13  Mg     2.3         TPro  5.3<L>  /  Alb  3.0<L>  /  TBili  0.9  /  DBili  x   /  AST  28  /  ALT  27  /  AlkPhos  74                Daily     Daily Weight in k.1 (2024 00:00)      04-12 @ 07:01  -   @ 07:00  --------------------------------------------------------  IN: 1889.8 mL / OUT: 3925 mL / NET: -2035.2 mL        Critically Ill patient  : [ ] preoperative ,   [x ] post operative    Requires :  [x ] Arterial Line   [ x] Central Line  [ ] PA catheter  [ ] IABP  [ ] ECMO  [ ] LVAD  [ ] Ventilator  [x ] pacemaker- TPM [ ] Impella. [x] BiPAP [x] HFNC                      [ x] ABG's     [x ] Pulse Oxymetry Monitoring  Bedside evaluation , monitoring , treatment of hemodynamics , fluids , IVP/ IVCD meds.        Diagnosis:     POD 5 - CABG x2/ Ascending aortic replacement with transverse hemiarch/ AVR (Preop IABP) - 24    Extubated  to HFNC     L Fem IABP removed    hypertension     Sono Chest - L - Pleural Effusion / Pulmonary Consolidation     May require L Pleurocentesis    Requires chest PT, pulmonary toilet, suctioning to maintain SaO2,  patent airway and treat atelectasis.     Respiratory insuffiencey     Hypoxemia - Requires [x] BIPAP  [x] HF O2 40%/40L    Temporary pacemaker (TPM) interrogation and setting.     CHF- acute [ x]   chronic [ x]    systolic [ x]   diastolic [x]  Valvular [ ]          - Echo- EF -   27%          [x ] RV dysfunction          - Cxr-cardiomegally, edema          - Clinical-  [x]inotropes   [ ]pressors   [ x]diuresis   [ ]IABP   [ ]ECMO   [ ]LVAD   [x ] Respiratory insuffiencey           Hemodynamic lability,  instability. Requires IVCD [ ] vasopressors [x ] inotropes  [ x] vasodilator  [ ]IVSS fluid  to maintain MAP, perfusion, C.I.     DM2 - IVCD insulin / Sliding Scale / Lantus     Fluid Overload     Hypovolemia     Renal Failure - Acute Kidney Injury     Metabolic Acidosis    Requires bedside physical therapy, mobilization and total group home care.                     By signing my name below, I, Harriett Sanchez, attest that this documentation has been prepared under the direction and in the presence of Eduardo Abreu MD.   Electronically Signed: Richard Lowery 24 @ 08:28    I, Eduardo Abreu, personally performed the services described in this documentation. All medical record entries made by the demetriusibe were at my direction and in my presence. I have reviewed the chart and agree that the record reflects my personal performance and is accurate and complete.   Eduardo Abreu MD.       Discussed with CT surgeon, Physician Assistant - Nurse Practitioner- Critical care medicine team.   Discussed at  AM / PM rounds.   Chart, labs , films reviewed.    Cumulative Critical Care Time Given Today:  CRITICAL CARE ATTENDING - CTICU    MEDICATIONS  (STANDING):  aMIOdarone    Tablet   Oral   aMIOdarone    Tablet 400 milliGRAM(s) Oral every 8 hours  aMIOdarone Infusion 0.5 mG/Min (16.7 mL/Hr) IV Continuous <Continuous>  aspirin enteric coated 81 milliGRAM(s) Oral daily  atorvastatin 80 milliGRAM(s) Oral at bedtime  bisacodyl Suppository 10 milliGRAM(s) Rectal once  chlorhexidine 2% Cloths 1 Application(s) Topical daily  dextrose 50% Injectable 50 milliLiter(s) IV Push every 15 minutes  dextrose Oral Gel 15 Gram(s) Oral once  enoxaparin Injectable 40 milliGRAM(s) SubCutaneous every 24 hours  famotidine    Tablet 20 milliGRAM(s) Oral daily  furosemide    Tablet 40 milliGRAM(s) Oral two times a day  gabapentin 100 milliGRAM(s) Oral every 8 hours  glucagon  Injectable 1 milliGRAM(s) IntraMuscular once  insulin glargine Injectable (LANTUS) 40 Unit(s) SubCutaneous at bedtime  insulin lispro (ADMELOG) corrective regimen sliding scale   SubCutaneous three times a day before meals  insulin lispro Injectable (ADMELOG) 12 Unit(s) SubCutaneous three times a day before meals  insulin regular Infusion 4 Unit(s)/Hr (4 mL/Hr) IV Continuous <Continuous>  milrinone Infusion 0.4 MICROgram(s)/kG/Min (8.63 mL/Hr) IV Continuous <Continuous>  piperacillin/tazobactam IVPB.. 3.375 Gram(s) IV Intermittent every 8 hours  polyethylene glycol 3350 17 Gram(s) Oral daily  senna 2 Tablet(s) Oral at bedtime  sodium chloride 0.9% lock flush 3 milliLiter(s) IV Push every 8 hours  sodium chloride 0.9%. 1000 milliLiter(s) (10 mL/Hr) IV Continuous <Continuous>                                    10.1   8.37  )-----------( 187      ( 2024 00:29 )             29.8       04-13    135  |  100  |  28<H>  ----------------------------<  94  3.5   |  22  |  1.61<H>    Ca    8.3<L>      2024 00:30  Phos  3.2     04-13  Mg     2.3         TPro  5.3<L>  /  Alb  3.0<L>  /  TBili  0.9  /  DBili  x   /  AST  28  /  ALT  27  /  AlkPhos  74                Daily     Daily Weight in k.1 (2024 00:00)      04-12 @ 07:01  -   @ 07:00  --------------------------------------------------------  IN: 1889.8 mL / OUT: 3925 mL / NET: -2035.2 mL        Critically Ill patient  : [ ] preoperative ,   [x ] post operative    Requires :  [x ] Arterial Line   [ x] Central Line  [ ] PA catheter  [ ] IABP  [ ] ECMO  [ ] LVAD  [ ] Ventilator  [x ] pacemaker- TPM [ ] Impella. [x] BiPAP [x] HFNC                      [ x] ABG's     [x ] Pulse Oxymetry Monitoring  Bedside evaluation , monitoring , treatment of hemodynamics , fluids , IVP/ IVCD meds.        Diagnosis:     POD 5 - CABG x2/ Ascending aortic replacement with transverse hemiarch/ AVR (Preop IABP) - 24    Extubated  to HFNC     L Fem IABP removed    hypertension     Sono Chest - L - Pleural Effusion / Pulmonary Consolidation     May require L Pleurocentesis    Requires chest PT, pulmonary toilet, suctioning to maintain SaO2,  patent airway and treat atelectasis.     Respiratory insuffiencey     Hypoxemia - Requires [x] BIPAP  [x] HF O2 40%/40L    Temporary pacemaker (TPM) interrogation and setting.     CHF- acute [ x]   chronic [ x]    systolic [ x]   diastolic [x]  Valvular [ ]          - Echo- EF -   27%          [x ] RV dysfunction          - Cxr-cardiomegally, edema          - Clinical-  [x]inotropes   [ ]pressors   [ x]diuresis   [ ]IABP   [ ]ECMO   [ ]LVAD   [x ] Respiratory insuffiencey           Hemodynamic lability,  instability. Requires IVCD [ ] vasopressors [x ] inotropes  [ x] vasodilator  [ ]IVSS fluid  to maintain MAP, perfusion, C.I.     DM2 - IVCD insulin / Sliding Scale / Lantus     Fluid Overload     Renal Failure - Acute Kidney Injury     Metabolic Acidosis    Requires bedside physical therapy, mobilization and total [facility] care.                     By signing my name below, I, Harriett Sanchez, attest that this documentation has been prepared under the direction and in the presence of Eduardo Abreu MD.   Electronically Signed: Richard Lowery 24 @ 08:28    I, Eduardo Abreu, personally performed the services described in this documentation. All medical record entries made by the scribe were at my direction and in my presence. I have reviewed the chart and agree that the record reflects my personal performance and is accurate and complete.   Eduardo Abreu MD.       Discussed with CT surgeon, Physician Assistant - Nurse Practitioner- Critical care medicine team.   Discussed at  AM / PM rounds.   Chart, labs , films reviewed.    Cumulative Critical Care Time Given Today:  30 min

## 2024-04-13 NOTE — CONSULT NOTE ADULT - ASSESSMENT
74yr M, PMHx of CVA and MI (12/2016), HTN, DM, HLD, CAD s/p cardiac stents, CHF, hx of malignant melanoma  CABG x2/ Ascending aortic replacement with transverse hemiarch/ AVR (Preop IABP) - 4/8/24  with aorta clamp time 185 min. pt on 4/13 noticed to have rising creatinine      1- CLAUDIA   2- CHF   3- CAD s/p cabg   4- DM       CLAUDIA in setting of requiring diuretics In addition   + fevers causing ATN  he is still in chf and requires continuation of diuretics   cont lasix 40 mg po bid   keep O> I   strict I/O  zosyn 3.375 G IVSS TID given fevers   d/w pt wife at bedside when seen earlier   d/w CTU team when seen earlier

## 2024-04-13 NOTE — PROGRESS NOTE ADULT - SUBJECTIVE AND OBJECTIVE BOX
Chief complaint    Patient is a 74y old  Male who presents with a chief complaint of preop as/aortic aneurysm & dvd (13 Apr 2024 08:27)   Review of systems  Patient appears comfortable.    Labs and Fingersticks  CAPILLARY BLOOD GLUCOSE  116 (12 Apr 2024 22:00)  166 (12 Apr 2024 20:00)  125 (12 Apr 2024 17:00)      POCT Blood Glucose.: 96 mg/dL (13 Apr 2024 10:54)  POCT Blood Glucose.: 89 mg/dL (13 Apr 2024 06:41)  POCT Blood Glucose.: 117 mg/dL (12 Apr 2024 21:49)  POCT Blood Glucose.: 166 mg/dL (12 Apr 2024 20:11)      Anion Gap: 13 (04-13 @ 00:30)  Anion Gap: 12 (04-12 @ 00:48)      Calcium: 8.3 *L* (04-13 @ 00:30)  Calcium: 8.5 (04-12 @ 00:48)  Albumin: 3.0 *L* (04-13 @ 00:30)  Albumin: 3.3 (04-12 @ 00:48)    Alanine Aminotransferase (ALT/SGPT): 27 (04-13 @ 00:30)  Alanine Aminotransferase (ALT/SGPT): 25 (04-12 @ 00:48)  Alkaline Phosphatase: 74 (04-13 @ 00:30)  Alkaline Phosphatase: 82 (04-12 @ 00:48)  Aspartate Aminotransferase (AST/SGOT): 28 (04-13 @ 00:30)  Aspartate Aminotransferase (AST/SGOT): 32 (04-12 @ 00:48)        04-13    135  |  100  |  28<H>  ----------------------------<  94  3.5   |  22  |  1.61<H>    Ca    8.3<L>      13 Apr 2024 00:30  Phos  3.2     04-13  Mg     2.3     04-13    TPro  5.3<L>  /  Alb  3.0<L>  /  TBili  0.9  /  DBili  x   /  AST  28  /  ALT  27  /  AlkPhos  74  04-13                        10.1   8.37  )-----------( 187      ( 13 Apr 2024 00:29 )             29.8     Medications  MEDICATIONS  (STANDING):  aMIOdarone    Tablet   Oral   aMIOdarone    Tablet 400 milliGRAM(s) Oral every 8 hours  aMIOdarone Infusion 0.5 mG/Min (16.7 mL/Hr) IV Continuous <Continuous>  aspirin enteric coated 81 milliGRAM(s) Oral daily  atorvastatin 80 milliGRAM(s) Oral at bedtime  bisacodyl Suppository 10 milliGRAM(s) Rectal once  dextrose 50% Injectable 50 milliLiter(s) IV Push every 15 minutes  dextrose Oral Gel 15 Gram(s) Oral once  enoxaparin Injectable 40 milliGRAM(s) SubCutaneous every 24 hours  famotidine    Tablet 20 milliGRAM(s) Oral daily  furosemide    Tablet 40 milliGRAM(s) Oral two times a day  gabapentin 100 milliGRAM(s) Oral every 8 hours  glucagon  Injectable 1 milliGRAM(s) IntraMuscular once  insulin glargine Injectable (LANTUS) 30 Unit(s) SubCutaneous at bedtime  insulin lispro (ADMELOG) corrective regimen sliding scale   SubCutaneous three times a day before meals  insulin lispro Injectable (ADMELOG) 9 Unit(s) SubCutaneous three times a day before meals  insulin regular Infusion 4 Unit(s)/Hr (4 mL/Hr) IV Continuous <Continuous>  milrinone Infusion 0.4 MICROgram(s)/kG/Min (8.63 mL/Hr) IV Continuous <Continuous>  piperacillin/tazobactam IVPB.. 3.375 Gram(s) IV Intermittent every 8 hours  polyethylene glycol 3350 17 Gram(s) Oral daily  senna 2 Tablet(s) Oral at bedtime  sodium chloride 0.9% lock flush 3 milliLiter(s) IV Push every 8 hours  sodium chloride 0.9%. 1000 milliLiter(s) (10 mL/Hr) IV Continuous <Continuous>      Physical Exam  General: Patient appears comfortable.  Vital Signs Last 12 Hrs  T(F): 100.8 (04-13-24 @ 12:00), Max: 100.8 (04-13-24 @ 12:00)  HR: 94 (04-13-24 @ 12:00) (87 - 94)  BP: --  BP(mean): --  RR: 21 (04-13-24 @ 12:00) (12 - 28)  SpO2: 97% (04-13-24 @ 12:00) (91% - 99%)  Neck: No palpable thyroid nodules.  CVS: S1S2, No murmurs  Respiratory: No wheezing, no crepitations  GI: Abdomen soft, non tender.    Diagnostics        Radiology:

## 2024-04-13 NOTE — PROGRESS NOTE ADULT - SUBJECTIVE AND OBJECTIVE BOX
Patient seen and examined at the bedside.    Remained critically ill on continuous ICU monitoring.    OBJECTIVE:  Vital Signs Last 24 Hrs  T(C): 38.5 (13 Apr 2024 16:00), Max: 38.5 (13 Apr 2024 16:00)  T(F): 101.3 (13 Apr 2024 16:00), Max: 101.3 (13 Apr 2024 16:00)  HR: 83 (13 Apr 2024 19:45) (83 - 100)  BP: 94/51 (13 Apr 2024 19:15) (94/51 - 94/51)  BP(mean): 66 (13 Apr 2024 19:15) (66 - 66)  RR: 19 (13 Apr 2024 19:45) (12 - 37)  SpO2: 99% (13 Apr 2024 19:45) (91% - 100%)    Parameters below as of 13 Apr 2024 20:00  Patient On (Oxygen Delivery Method): nasal cannula  O2 Flow (L/min): 6      Physical Exam:  General: NAD  Neurology: Nonfocal  Eyes: bilateral pupils equal and reactive   ENT/Neck: +ETT midline, Neck supple, trachea midline, No JVD   Respiratory: Rales noted bilaterally   CV: RRR, S1S2, no murmurs        [x] Sternal dressing, [x] Mediastinal CT x2        [x] Sinus rhythm, [x] Temporary pacing VVI 40  Abdominal: Soft, NT, ND +BS,   Extremities: 1-2+ pedal edema noted, + peripheral pulses   Skin: No Rashes, Hematoma, Ecchymosis                         Assessment:    Plan:   ***Neuro***  [x] Hx of CVA   [x] Nonfocal   Tylenol and PRNS for pain management.  Post operative neuro assessment     ***Cardiovascular***  Invasive hemodynamic monitoring, assess perfusion indices   SR 86-97/ CVP 4-8/ MAP 57-83 / Hct 29.8% / Lactate 2.5  [x] Primacor 0.4 mcg/kg/min  Volume: [x] 200 cc Albumin   [x] PO Amiodarone for a fib prophylaxis   Reassessment of hemodynamics post resuscitation   Monitor chest tube outputs   [x] ASA [x] Statin   Serial EKG and cardiac enzymes     ***Pulmonary***  [x] Nasal Cannula 6 L  [x] BIPAP           ***GI***  [x] Tolerating Diet  [x] Pepcid  [x] Miralax and Senna for Bowel Regimen  Continue Dulcolax      ***Renal***  GFR 44   Continue to monitor I/Os, BUN/Creatinine.   Replete lytes PRN  Guevara present [x] positive     ***ID***  Zosyn for empiric dosing     ***Endocrine***  [x] DM2: HbA1c 9.8%             - [x] ISS [x] Lantus             - Need tight glycemic control to prevent wound infection.          Patient requires continuous monitoring with bedside rhythm monitoring, pulse oximetry monitoring, and continuous central venous and arterial pressure monitoring; and intermittent blood gas analysis. Care plan discussed with the ICU care team.   Patient remained critical, at risk for life threatening decompensation.    I have spent 75 minutes providing critical care management to this patient.    By signing my name below, I, Jayce Kerr, attest that this documentation has been prepared under the direction and in the presence of Scot Eubanks MD   Electronically signed: Richard Pedraza, 04-13-24 @ 19:59    I, Scot Eubanks, personally performed the services described in this documentation. all medical record entries made by the demetriusiblyndon were at my direction and in my presence. I have reviewed the chart and agree that the record reflects my personal performance and is accurate and complete  Electronically signed: Scot Eubanks MD  Patient seen and examined at the bedside.    Remained critically ill on continuous ICU monitoring.    OBJECTIVE:  Vital Signs Last 24 Hrs  T(C): 38.5 (13 Apr 2024 16:00), Max: 38.5 (13 Apr 2024 16:00)  T(F): 101.3 (13 Apr 2024 16:00), Max: 101.3 (13 Apr 2024 16:00)  HR: 83 (13 Apr 2024 19:45) (83 - 100)  BP: 94/51 (13 Apr 2024 19:15) (94/51 - 94/51)  BP(mean): 66 (13 Apr 2024 19:15) (66 - 66)  RR: 19 (13 Apr 2024 19:45) (12 - 37)  SpO2: 99% (13 Apr 2024 19:45) (91% - 100%)    Parameters below as of 13 Apr 2024 20:00  Patient On (Oxygen Delivery Method): nasal cannula  O2 Flow (L/min): 6      Physical Exam:  General: in bed with multiple lines & gtt   Neurology: Nonfocal  Eyes: bilateral pupils equal and reactive   ENT/Neck:  Neck supple, trachea midline, + JVD   Respiratory: Rales noted bilaterally   CV: RRR, S1S2, no murmurs        [x] Sternal dressing removed incision clean dry          [x] Sinus rhythm, [x] Temporary pacing VVI 40 /10   Abdominal: Soft, NT, ND +BS,   Extremities: 1-2+ pedal edema noted, + peripheral pulses   Skin: No Rashes, Hematoma, Ecchymosis                         Assessment:  74 yr male DM2 / HLD / HTN / CAD / Ischemic cardiomyopathy / CVA / AI / Aortic aneurysm  S/P Cath multivessel CAD > IABP for critical anatomy & severe LV systolic dysfunction  S/P CABG X 2 / AA & hemiarch replacement / Bio AVR D# 5   S/P IABP removal 4/9   Post op inotropic support   Hypotension ^ Lactate related to LL Pneumonia   PAF   Acute Kidney Injury + Fluid overload   Hyperglycemia       Plan:   ***Neuro***  [x] Hx of CVA        Nonfocal        Tylenol for pain management.       Post operative neuro assessment     ***Cardiovascular***  Post op inotrope + pressor   last 24 hr ^ Febrile Tmax 38.5 with hypotension   Invasive hemodynamic monitoring, assess serial perfusion indices   SR 80 / MAP 57-83 / Hct 29.8% / Lactate 2.5   [X] Volume: [x] 25%  Albumin 200 cc   [x] Primacor 0.4 mcg/kg/min  [x] Taran 2 mcg/kg/min to maintain MAP >65       Plan to place CVP line for Vaso infusion, ScVO2 monitoring      DC Lasix given new septic event & hypotension    [x] Amiodarone Load  in SR       Back up Epicardial pacing VVI 40/12   [x] ASA [x] Statin   [X] LVX   [X] K >4 Mg >2     ***Pulmonary***  [x] CT chest LLL consolidation > Nosocomial Pneumonia        Cultures ABx started       NC 6 Lit F/u ABG, Spo2       BIPAP for poor respiratory mechanics             ***GI***  [x] Tolerating Diet  [x] Pepcid  [x] Miralax and Senna for Bowel Regimen  Continue Dulcolax      ***Renal***  CLAUDIA/ ^  Cr maintain MAP >70   GFR 44  Base all medication based on current eGFR  Continue to monitor I/Os, BUN/Creatinine.   Replete lytes PRN  Guevara present [x] positive     ***ID***  Zosyn   f/u cultures     ***Endocrine***  [x] DM2: HbA1c 9.8%             - [x] ISS [x] Lantus             - Need tight glycemic control to prevent wound infection.          Patient requires continuous monitoring with bedside rhythm monitoring, pulse oximetry monitoring, and continuous central venous and arterial pressure monitoring; and intermittent blood gas analysis. Care plan discussed with the ICU care team.   Patient remained critical, at risk for life threatening decompensation.    I have spent 75 minutes providing critical care management to this patient.    By signing my name below, I, Jayce Kerr, attest that this documentation has been prepared under the direction and in the presence of Scot Eubanks MD   Electronically signed: Richard Pedraza, 04-13-24 @ 19:59    I, Scot Eubanks, personally performed the services described in this documentation. all medical record entries made by the demetriusiblyndon were at my direction and in my presence. I have reviewed the chart and agree that the record reflects my personal performance and is accurate and complete  Electronically signed: Scot Eubanks MD  Patient seen and examined at the bedside.    Remained critically ill on continuous ICU monitoring.    OBJECTIVE:  Vital Signs Last 24 Hrs  T(C): 38.5 (13 Apr 2024 16:00), Max: 38.5 (13 Apr 2024 16:00)  T(F): 101.3 (13 Apr 2024 16:00), Max: 101.3 (13 Apr 2024 16:00)  HR: 83 (13 Apr 2024 19:45) (83 - 100)  BP: 94/51 (13 Apr 2024 19:15) (94/51 - 94/51)  BP(mean): 66 (13 Apr 2024 19:15) (66 - 66)  RR: 19 (13 Apr 2024 19:45) (12 - 37)  SpO2: 99% (13 Apr 2024 19:45) (91% - 100%)    Parameters below as of 13 Apr 2024 20:00  Patient On (Oxygen Delivery Method): nasal cannula  O2 Flow (L/min): 6      Physical Exam:  General: in bed with multiple lines & gtt   Neurology: Nonfocal  Eyes: bilateral pupils equal and reactive   ENT/Neck:  Neck supple, trachea midline, + JVD   Respiratory: Rales noted bilaterally   CV: RRR, S1S2, no murmurs        [x] Sternal dressing removed incision clean dry          [x] Sinus rhythm, [x] Temporary pacing VVI 40 /10   Abdominal: Soft, NT, ND +BS,   Extremities: 1-2+ pedal edema noted, + peripheral pulses   Skin: No Rashes, Hematoma, Ecchymosis                         Assessment:  74 yr male DM2 / HLD / HTN / CAD / Ischemic cardiomyopathy / CVA / AI / Aortic aneurysm  S/P Cath multivessel CAD > IABP for critical anatomy & severe LV systolic dysfunction  S/P CABG X 2 / AA & hemiarch replacement / Bio AVR D# 5   S/P IABP removal 4/9   Post op inotropic support   Hypotension ^ Lactate related to LL Pneumonia   PAF   Acute Kidney Injury + Fluid overload   Hyperglycemia       Plan:   ***Neuro***  [x] Hx of CVA        Nonfocal        Tylenol for pain management.       Post operative neuro assessment     ***Cardiovascular***  Post op inotrope + pressor   last 24 hr ^ Febrile Tmax 38.5 with hypotension   Invasive hemodynamic monitoring, assess serial perfusion indices   SR 80 / MAP 57-83 / Hct 29.8% / Lactate 2.5   [X] Volume: [x] 25%  Albumin 200 cc   [x] Primacor 0.4 mcg/kg/min  [x] Atran 2 mcg/kg/min to maintain MAP >65       Plan to place CVP line for Vaso infusion, ScVO2 monitoring      DC Lasix given new septic event & hypotension    [x] Amiodarone Load  in SR       Back up Epicardial pacing VVI 40/12   [x] ASA [x] Statin   [X] LVX   [X] K >4 Mg >2     ***Pulmonary***  [x] CT chest LLL consolidation > Nosocomial Pneumonia        Cultures ABx started       NC 6 Lit F/u ABG, Spo2       BIPAP for poor respiratory mechanics        PRN Diuresis    ***GI***  [x] Tolerating Diet  [x] Pepcid  [x] Miralax and Senna for Bowel Regimen  Continue Dulcolax      ***Renal***  CLAUDIA/ ^  Cr maintain MAP >70   GFR 44  Base all medication based on current eGFR  Continue to monitor I/Os, BUN/Creatinine.   Replete lytes PRN  Guevara present [x] positive     ***ID***  Zosyn   f/u cultures     ***Endocrine***  [x] DM2: HbA1c 9.8%             - [x] ISS [x] Lantus             - Need tight glycemic control to prevent wound infection.          Patient requires continuous monitoring with bedside rhythm monitoring, pulse oximetry monitoring, and continuous central venous and arterial pressure monitoring; and intermittent blood gas analysis. Care plan discussed with the ICU care team.   Patient remained critical, at risk for life threatening decompensation.    I have spent 75 minutes providing critical care management to this patient.    By signing my name below, I, Jayce Kerr, attest that this documentation has been prepared under the direction and in the presence of Scot Eubanks MD   Electronically signed: Richard Pedraza, 04-13-24 @ 19:59    I, Scot Eubanks, personally performed the services described in this documentation. all medical record entries made by the demetriusiblyndon were at my direction and in my presence. I have reviewed the chart and agree that the record reflects my personal performance and is accurate and complete  Electronically signed: Scot Eubanks MD

## 2024-04-13 NOTE — CONSULT NOTE ADULT - SUBJECTIVE AND OBJECTIVE BOX
[facility]  [phone]  NEPHROLOGY      INITIAL CONSULT NOTE  --------------------------------------------------------------------------------  HPI:    74yr M, PMHx of CVA and MI (12/2016), HTN, DM, HLD, CAD s/p cardiac stents, CHF, hx of malignant melanoma  CABG x2/ Ascending aortic replacement with transverse hemiarch/ AVR (Preop IABP) - 4/8/24  with aorta clamp time 185 min. pt on 4/13 noticed to have rising creatinine renal consult called.       PAST HISTORY  --------------------------------------------------------------------------------  PAST MEDICAL & SURGICAL HISTORY:  HTN (hypertension)      Hearing decreased      CVA (cerebral vascular accident)  12/22/2016      Hyperlipemia      Kidney stones      Coronary artery disease  MI 12/22/2016      CHF (congestive heart failure)  1/2017 stents x3      Type 2 diabetes mellitus  2016      Confusion  from stroke      S/P medial meniscal repair  and ligament surgery      H/O lithotripsy      S/P tonsillectomy      Bilateral inguinal hernia        FAMILY HISTORY:  Family history of diabetes mellitus    Family history of stroke      PAST SOCIAL HISTORY:    ALLERGIES & MEDICATIONS  --------------------------------------------------------------------------------  Allergies    No Known Allergies  Allergy Status Unknown    Intolerances      Standing Inpatient Medications  aMIOdarone    Tablet   Oral   aMIOdarone    Tablet 400 milliGRAM(s) Oral every 8 hours  aspirin enteric coated 81 milliGRAM(s) Oral daily  atorvastatin 80 milliGRAM(s) Oral at bedtime  bisacodyl Suppository 10 milliGRAM(s) Rectal once  dextrose 50% Injectable 50 milliLiter(s) IV Push every 15 minutes  dextrose Oral Gel 15 Gram(s) Oral once  enoxaparin Injectable 40 milliGRAM(s) SubCutaneous every 24 hours  famotidine    Tablet 20 milliGRAM(s) Oral daily  furosemide    Tablet 40 milliGRAM(s) Oral two times a day  glucagon  Injectable 1 milliGRAM(s) IntraMuscular once  insulin glargine Injectable (LANTUS) 30 Unit(s) SubCutaneous at bedtime  insulin lispro (ADMELOG) corrective regimen sliding scale   SubCutaneous three times a day before meals  insulin lispro Injectable (ADMELOG) 9 Unit(s) SubCutaneous three times a day before meals  insulin regular Infusion 4 Unit(s)/Hr IV Continuous <Continuous>  milrinone Infusion 0.4 MICROgram(s)/kG/Min IV Continuous <Continuous>  piperacillin/tazobactam IVPB.. 3.375 Gram(s) IV Intermittent every 8 hours  polyethylene glycol 3350 17 Gram(s) Oral daily  potassium chloride   Solution 40 milliEquivalent(s) Oral once  senna 2 Tablet(s) Oral at bedtime  sodium chloride 0.9% lock flush 3 milliLiter(s) IV Push every 8 hours  sodium chloride 0.9%. 1000 milliLiter(s) IV Continuous <Continuous>    PRN Inpatient Medications  acetaminophen     Tablet .. 650 milliGRAM(s) Oral every 6 hours PRN  oxyCODONE    IR 5 milliGRAM(s) Oral every 4 hours PRN  oxyCODONE    IR 10 milliGRAM(s) Oral every 4 hours PRN      REVIEW OF SYSTEMS  --------------------------------------------------------------------------------  Gen: No  fevers/chills   Skin: No rashes  Head/Eyes/Ears/Mouth: No headache; Normal hearing;  No sinus pain/discomfort, sore throat  Respiratory:  +  dyspnea, + cough, - wheezing, hemoptysis -   CV: No chest pain, or palp  does have incision site pain   GI: No abdominal pain, diarrhea, nausea, vomiting, melena  : No dysuria,  hematuria, nocturia  MSK: No joint pain/swelling; no back pain  Neuro: No dizziness/lightheadedness,  also with +  edema     VITALS/PHYSICAL EXAM  --------------------------------------------------------------------------------  T(C): 38.5 (04-13-24 @ 16:00), Max: 38.5 (04-13-24 @ 16:00)  HR: 97 (04-13-24 @ 18:00) (87 - 100)  BP: --  RR: 24 (04-13-24 @ 18:00) (12 - 37)  SpO2: 95% (04-13-24 @ 18:00) (91% - 100%)  Wt(kg): --        04-12-24 @ 07:01  -  04-13-24 @ 07:00  --------------------------------------------------------  IN: 1889.8 mL / OUT: 3925 mL / NET: -2035.2 mL    04-13-24 @ 07:01  -  04-13-24 @ 19:16  --------------------------------------------------------  IN: 1073.8 mL / OUT: 2005 mL / NET: -931.2 mL      Physical Exam:  	Gen: Non toxic comfortable appearing  on O2   	no jvd   	Pulm: decrease bs  no rales or ronchi or wheezing  	CV: RRR, S1S2; no rub  	Back: No CVA tenderness  	Abd: +BS, soft, nontender/ + distended  	: No suprapubic tenderness  	UE: Warm, no cyanosis  no clubbing, +  edema  	LE: Warm, no cyanosis  no clubbing, 2+  edema  	Neuro: alert and oriented. speech coherent   	Psych: Normal affect and mood  	    LABS/STUDIES  --------------------------------------------------------------------------------              10.1   8.37  >-----------<  187      [04-13-24 @ 00:29]              29.8     135  |  100  |  28  ----------------------------<  94      [04-13-24 @ 00:30]  3.5   |  22  |  1.61        Ca     8.3     [04-13-24 @ 00:30]      Mg     2.3     [04-13-24 @ 00:30]      Phos  3.2     [04-13-24 @ 00:30]    TPro  5.3  /  Alb  3.0  /  TBili  0.9  /  DBili  x   /  AST  28  /  ALT  27  /  AlkPhos  74  [04-13-24 @ 00:30]              [04-12-24 @ 02:47]    Creatinine Trend:  SCr 1.61 [04-13 @ 00:30]  SCr 1.30 [04-12 @ 00:48]  SCr 1.17 [04-11 @ 00:26]  SCr 1.20 [04-10 @ 00:48]  SCr 1.04 [04-09 @ 04:14]      Urinalysis (04.12.24 @ 19:52)   pH Urine: 5.0  Glucose Qualitative, Urine: Negative mg/dL  Blood, Urine: Large  Color: Yellow  Urine Appearance: Turbid  Bilirubin: Negative  Ketone - Urine: Negative mg/dL  Specific Gravity: 1.013  Protein, Urine: Trace mg/dL  Urobilinogen: 0.2 mg/dL  Nitrite: Negative  Leukocyte Esterase Concentration: Trace      TSH 1.71      [04-07-24 @ 15:28]    HCV 0.11, Nonreact      [04-09-24 @ 00:20]

## 2024-04-14 ENCOUNTER — RESULT REVIEW (OUTPATIENT)
Age: 74
End: 2024-04-14

## 2024-04-14 LAB
ALBUMIN SERPL ELPH-MCNC: 3.5 G/DL — SIGNIFICANT CHANGE UP (ref 3.3–5)
ALP SERPL-CCNC: 62 U/L — SIGNIFICANT CHANGE UP (ref 40–120)
ALT FLD-CCNC: 22 U/L — SIGNIFICANT CHANGE UP (ref 10–45)
ANION GAP SERPL CALC-SCNC: 14 MMOL/L — SIGNIFICANT CHANGE UP (ref 5–17)
AST SERPL-CCNC: 19 U/L — SIGNIFICANT CHANGE UP (ref 10–40)
BASE EXCESS BLDV CALC-SCNC: 2.9 MMOL/L — SIGNIFICANT CHANGE UP (ref -2–3)
BASOPHILS # BLD AUTO: 0.02 K/UL — SIGNIFICANT CHANGE UP (ref 0–0.2)
BASOPHILS NFR BLD AUTO: 0.3 % — SIGNIFICANT CHANGE UP (ref 0–2)
BILIRUB SERPL-MCNC: 1.2 MG/DL — SIGNIFICANT CHANGE UP (ref 0.2–1.2)
BUN SERPL-MCNC: 28 MG/DL — HIGH (ref 7–23)
CA-I SERPL-SCNC: 1.22 MMOL/L — SIGNIFICANT CHANGE UP (ref 1.15–1.33)
CALCIUM SERPL-MCNC: 8.5 MG/DL — SIGNIFICANT CHANGE UP (ref 8.4–10.5)
CHLORIDE BLDV-SCNC: 100 MMOL/L — SIGNIFICANT CHANGE UP (ref 96–108)
CHLORIDE SERPL-SCNC: 99 MMOL/L — SIGNIFICANT CHANGE UP (ref 96–108)
CO2 BLDV-SCNC: 29 MMOL/L — HIGH (ref 22–26)
CO2 SERPL-SCNC: 23 MMOL/L — SIGNIFICANT CHANGE UP (ref 22–31)
CREAT SERPL-MCNC: 1.59 MG/DL — HIGH (ref 0.5–1.3)
EGFR: 45 ML/MIN/1.73M2 — LOW
EOSINOPHIL # BLD AUTO: 0.11 K/UL — SIGNIFICANT CHANGE UP (ref 0–0.5)
EOSINOPHIL NFR BLD AUTO: 1.6 % — SIGNIFICANT CHANGE UP (ref 0–6)
FLUAV AG NPH QL: SIGNIFICANT CHANGE UP
FLUBV AG NPH QL: SIGNIFICANT CHANGE UP
GAS PNL BLDA: SIGNIFICANT CHANGE UP
GAS PNL BLDV: 135 MMOL/L — LOW (ref 136–145)
GAS PNL BLDV: SIGNIFICANT CHANGE UP
GAS PNL BLDV: SIGNIFICANT CHANGE UP
GLUCOSE BLDC GLUCOMTR-MCNC: 104 MG/DL — HIGH (ref 70–99)
GLUCOSE BLDC GLUCOMTR-MCNC: 151 MG/DL — HIGH (ref 70–99)
GLUCOSE BLDC GLUCOMTR-MCNC: 181 MG/DL — HIGH (ref 70–99)
GLUCOSE BLDC GLUCOMTR-MCNC: 77 MG/DL — SIGNIFICANT CHANGE UP (ref 70–99)
GLUCOSE BLDV-MCNC: 135 MG/DL — HIGH (ref 70–99)
GLUCOSE SERPL-MCNC: 151 MG/DL — HIGH (ref 70–99)
HCO3 BLDV-SCNC: 28 MMOL/L — SIGNIFICANT CHANGE UP (ref 22–29)
HCT VFR BLD CALC: 26.8 % — LOW (ref 39–50)
HCT VFR BLDA CALC: 28 % — LOW (ref 39–51)
HGB BLD CALC-MCNC: 9.2 G/DL — LOW (ref 12.6–17.4)
HGB BLD-MCNC: 9.1 G/DL — LOW (ref 13–17)
HOROWITZ INDEX BLDV+IHG-RTO: 40 — SIGNIFICANT CHANGE UP
IMM GRANULOCYTES NFR BLD AUTO: 0.7 % — SIGNIFICANT CHANGE UP (ref 0–0.9)
LACTATE BLDV-MCNC: 1.1 MMOL/L — SIGNIFICANT CHANGE UP (ref 0.5–2)
LYMPHOCYTES # BLD AUTO: 0.99 K/UL — LOW (ref 1–3.3)
LYMPHOCYTES # BLD AUTO: 14.3 % — SIGNIFICANT CHANGE UP (ref 13–44)
MAGNESIUM SERPL-MCNC: 2 MG/DL — SIGNIFICANT CHANGE UP (ref 1.6–2.6)
MCHC RBC-ENTMCNC: 28.6 PG — SIGNIFICANT CHANGE UP (ref 27–34)
MCHC RBC-ENTMCNC: 34 GM/DL — SIGNIFICANT CHANGE UP (ref 32–36)
MCV RBC AUTO: 84.3 FL — SIGNIFICANT CHANGE UP (ref 80–100)
MONOCYTES # BLD AUTO: 1.16 K/UL — HIGH (ref 0–0.9)
MONOCYTES NFR BLD AUTO: 16.7 % — HIGH (ref 2–14)
NEUTROPHILS # BLD AUTO: 4.61 K/UL — SIGNIFICANT CHANGE UP (ref 1.8–7.4)
NEUTROPHILS NFR BLD AUTO: 66.4 % — SIGNIFICANT CHANGE UP (ref 43–77)
NRBC # BLD: 0 /100 WBCS — SIGNIFICANT CHANGE UP (ref 0–0)
PCO2 BLDV: 44 MMHG — SIGNIFICANT CHANGE UP (ref 42–55)
PH BLDV: 7.41 — SIGNIFICANT CHANGE UP (ref 7.32–7.43)
PHOSPHATE SERPL-MCNC: 3.5 MG/DL — SIGNIFICANT CHANGE UP (ref 2.5–4.5)
PLATELET # BLD AUTO: 211 K/UL — SIGNIFICANT CHANGE UP (ref 150–400)
PO2 BLDV: 41 MMHG — SIGNIFICANT CHANGE UP (ref 25–45)
POTASSIUM BLDV-SCNC: 3.2 MMOL/L — LOW (ref 3.5–5.1)
POTASSIUM SERPL-MCNC: 3.4 MMOL/L — LOW (ref 3.5–5.3)
POTASSIUM SERPL-SCNC: 3.4 MMOL/L — LOW (ref 3.5–5.3)
PROT SERPL-MCNC: 5.6 G/DL — LOW (ref 6–8.3)
RBC # BLD: 3.18 M/UL — LOW (ref 4.2–5.8)
RBC # FLD: 13.9 % — SIGNIFICANT CHANGE UP (ref 10.3–14.5)
RSV RNA NPH QL NAA+NON-PROBE: SIGNIFICANT CHANGE UP
SAO2 % BLDV: 66.7 % — LOW (ref 67–88)
SARS-COV-2 RNA SPEC QL NAA+PROBE: SIGNIFICANT CHANGE UP
SODIUM SERPL-SCNC: 136 MMOL/L — SIGNIFICANT CHANGE UP (ref 135–145)
WBC # BLD: 6.94 K/UL — SIGNIFICANT CHANGE UP (ref 3.8–10.5)
WBC # FLD AUTO: 6.94 K/UL — SIGNIFICANT CHANGE UP (ref 3.8–10.5)

## 2024-04-14 PROCEDURE — 36620 INSERTION CATHETER ARTERY: CPT

## 2024-04-14 PROCEDURE — 99291 CRITICAL CARE FIRST HOUR: CPT | Mod: 25

## 2024-04-14 PROCEDURE — 93312 ECHO TRANSESOPHAGEAL: CPT | Mod: 26

## 2024-04-14 PROCEDURE — 36556 INSERT NON-TUNNEL CV CATH: CPT

## 2024-04-14 PROCEDURE — 99223 1ST HOSP IP/OBS HIGH 75: CPT | Mod: GC

## 2024-04-14 PROCEDURE — 93325 DOPPLER ECHO COLOR FLOW MAPG: CPT | Mod: 26

## 2024-04-14 PROCEDURE — 71045 X-RAY EXAM CHEST 1 VIEW: CPT | Mod: 26

## 2024-04-14 PROCEDURE — 93320 DOPPLER ECHO COMPLETE: CPT | Mod: 26

## 2024-04-14 RX ORDER — POTASSIUM CHLORIDE 20 MEQ
10 PACKET (EA) ORAL ONCE
Refills: 0 | Status: COMPLETED | OUTPATIENT
Start: 2024-04-14 | End: 2024-04-14

## 2024-04-14 RX ORDER — ACETAMINOPHEN 500 MG
1000 TABLET ORAL ONCE
Refills: 0 | Status: COMPLETED | OUTPATIENT
Start: 2024-04-14 | End: 2024-04-14

## 2024-04-14 RX ORDER — LANOLIN ALCOHOL/MO/W.PET/CERES
5 CREAM (GRAM) TOPICAL AT BEDTIME
Refills: 0 | Status: DISCONTINUED | OUTPATIENT
Start: 2024-04-14 | End: 2024-04-22

## 2024-04-14 RX ORDER — CHLORHEXIDINE GLUCONATE 213 G/1000ML
1 SOLUTION TOPICAL DAILY
Refills: 0 | Status: DISCONTINUED | OUTPATIENT
Start: 2024-04-14 | End: 2024-04-22

## 2024-04-14 RX ORDER — INSULIN LISPRO 100/ML
6 VIAL (ML) SUBCUTANEOUS
Refills: 0 | Status: DISCONTINUED | OUTPATIENT
Start: 2024-04-14 | End: 2024-04-16

## 2024-04-14 RX ORDER — POTASSIUM CHLORIDE 20 MEQ
10 PACKET (EA) ORAL
Refills: 0 | Status: COMPLETED | OUTPATIENT
Start: 2024-04-14 | End: 2024-04-14

## 2024-04-14 RX ORDER — FENTANYL CITRATE 50 UG/ML
25 INJECTION INTRAVENOUS ONCE
Refills: 0 | Status: DISCONTINUED | OUTPATIENT
Start: 2024-04-14 | End: 2024-04-14

## 2024-04-14 RX ADMIN — Medication 50 MILLIEQUIVALENT(S): at 06:14

## 2024-04-14 RX ADMIN — SODIUM CHLORIDE 3 MILLILITER(S): 9 INJECTION INTRAMUSCULAR; INTRAVENOUS; SUBCUTANEOUS at 14:00

## 2024-04-14 RX ADMIN — PIPERACILLIN AND TAZOBACTAM 25 GRAM(S): 4; .5 INJECTION, POWDER, LYOPHILIZED, FOR SOLUTION INTRAVENOUS at 02:28

## 2024-04-14 RX ADMIN — AMIODARONE HYDROCHLORIDE 400 MILLIGRAM(S): 400 TABLET ORAL at 06:15

## 2024-04-14 RX ADMIN — AMIODARONE HYDROCHLORIDE 400 MILLIGRAM(S): 400 TABLET ORAL at 13:42

## 2024-04-14 RX ADMIN — Medication 6 UNIT(S): at 11:23

## 2024-04-14 RX ADMIN — POLYETHYLENE GLYCOL 3350 17 GRAM(S): 17 POWDER, FOR SOLUTION ORAL at 12:10

## 2024-04-14 RX ADMIN — OXYCODONE HYDROCHLORIDE 5 MILLIGRAM(S): 5 TABLET ORAL at 23:23

## 2024-04-14 RX ADMIN — Medication 81 MILLIGRAM(S): at 12:10

## 2024-04-14 RX ADMIN — Medication 50 MILLIEQUIVALENT(S): at 05:05

## 2024-04-14 RX ADMIN — Medication 50 MILLIEQUIVALENT(S): at 16:24

## 2024-04-14 RX ADMIN — Medication 2: at 11:22

## 2024-04-14 RX ADMIN — Medication 50 MILLIEQUIVALENT(S): at 14:24

## 2024-04-14 RX ADMIN — INSULIN GLARGINE 30 UNIT(S): 100 INJECTION, SOLUTION SUBCUTANEOUS at 21:24

## 2024-04-14 RX ADMIN — AMIODARONE HYDROCHLORIDE 400 MILLIGRAM(S): 400 TABLET ORAL at 21:24

## 2024-04-14 RX ADMIN — FENTANYL CITRATE 25 MICROGRAM(S): 50 INJECTION INTRAVENOUS at 00:45

## 2024-04-14 RX ADMIN — ENOXAPARIN SODIUM 40 MILLIGRAM(S): 100 INJECTION SUBCUTANEOUS at 17:41

## 2024-04-14 RX ADMIN — FAMOTIDINE 20 MILLIGRAM(S): 10 INJECTION INTRAVENOUS at 12:10

## 2024-04-14 RX ADMIN — Medication 50 MILLIEQUIVALENT(S): at 05:20

## 2024-04-14 RX ADMIN — MILRINONE LACTATE 6.47 MICROGRAM(S)/KG/MIN: 1 INJECTION, SOLUTION INTRAVENOUS at 08:24

## 2024-04-14 RX ADMIN — SENNA PLUS 2 TABLET(S): 8.6 TABLET ORAL at 21:24

## 2024-04-14 RX ADMIN — ATORVASTATIN CALCIUM 80 MILLIGRAM(S): 80 TABLET, FILM COATED ORAL at 21:24

## 2024-04-14 RX ADMIN — Medication 400 MILLIGRAM(S): at 08:53

## 2024-04-14 RX ADMIN — Medication 50 MILLIEQUIVALENT(S): at 15:33

## 2024-04-14 RX ADMIN — Medication 1000 MILLIGRAM(S): at 09:53

## 2024-04-14 RX ADMIN — PIPERACILLIN AND TAZOBACTAM 25 GRAM(S): 4; .5 INJECTION, POWDER, LYOPHILIZED, FOR SOLUTION INTRAVENOUS at 11:11

## 2024-04-14 RX ADMIN — CHLORHEXIDINE GLUCONATE 1 APPLICATION(S): 213 SOLUTION TOPICAL at 12:09

## 2024-04-14 RX ADMIN — PIPERACILLIN AND TAZOBACTAM 25 GRAM(S): 4; .5 INJECTION, POWDER, LYOPHILIZED, FOR SOLUTION INTRAVENOUS at 17:41

## 2024-04-14 RX ADMIN — SODIUM CHLORIDE 3 MILLILITER(S): 9 INJECTION INTRAMUSCULAR; INTRAVENOUS; SUBCUTANEOUS at 05:05

## 2024-04-14 RX ADMIN — Medication 5 MILLIGRAM(S): at 21:41

## 2024-04-14 RX ADMIN — SODIUM CHLORIDE 3 MILLILITER(S): 9 INJECTION INTRAMUSCULAR; INTRAVENOUS; SUBCUTANEOUS at 21:56

## 2024-04-14 RX ADMIN — Medication 50 MILLIEQUIVALENT(S): at 08:18

## 2024-04-14 NOTE — PROCEDURE NOTE - NSPOSTPRCRAD_GEN_A_CORE
central line located in the/central line located in the superior vena cava/depth of insertion/line adjusted to depth of insertion/no pneumothorax/post procedure radiography not performed/post-procedure radiography performed

## 2024-04-14 NOTE — CONSULT NOTE ADULT - REASON FOR ADMISSION
preop as/aortic aneurysm & dvd

## 2024-04-14 NOTE — PROCEDURE NOTE - NSPROCDETAILS_GEN_ALL_CORE
all materials/supplies accounted for at end of procedure
guidewire recovered/lumen(s) aspirated and flushed/sterile dressing applied/sterile technique, catheter placed/ultrasound guidance with use of sterile gel and probe cove

## 2024-04-14 NOTE — CONSULT NOTE ADULT - SUBJECTIVE AND OBJECTIVE BOX
Patient is a 74y old  Male who presents with a chief complaint of preop as/aortic aneurysm & dvd (2024 07:25)    HPI:  74yr M, PMHx of CVA and MI (2016), HTN, DM, HLD, CAD s/p cardiac stents, CHF, hx of malignant melanoma s/p excision, presented for CABG and AVR on . Underwent CABGX RSVG to diag and RCA, ascending aortic replacement with transverse hemiarch, AVR with inspirus bioprosthetic valve. He has been having fevers since 4/10. He received extended cefuroxime prophylaxis until 4/10. Was noted to have worsening fever tmax 101.1 on , started on Vancomycin and Zosyn.     No leucocytosis, rising Cr on labs.    Bcx ,  NGTD    MRSA PCR Neg    UA 9 WBC, 191 RBC    CT Chest  Small left pleural effusion and complete atelectasis/consolidation of   left lower lobe.  Trace right pleural effusion and subsegmental atelectasis of right lower   lobe.       prior hospital charts reviewed [  ]  primary team notes reviewed [x  ]  other consultant notes reviewed [  ]    PAST MEDICAL & SURGICAL HISTORY:  HTN (hypertension)      Hearing decreased      CVA (cerebral vascular accident)  2016      Hyperlipemia      Kidney stones      Coronary artery disease  MI 2016      CHF (congestive heart failure)  2017 stents x3      Type 2 diabetes mellitus        Confusion  from stroke      S/P medial meniscal repair  and ligament surgery      H/O lithotripsy      S/P tonsillectomy      Bilateral inguinal hernia          Allergies  No Known Allergies  Allergy Status Unknown    ANTIMICROBIALS (past 90 days)  MEDICATIONS  (STANDING):    cefuroxime  IVPB   100 mL/Hr IV Intermittent (04-10-24 @ 05:45)   100 mL/Hr IV Intermittent (24 @ 21:00)   100 mL/Hr IV Intermittent (24 @ 12:17)   100 mL/Hr IV Intermittent (24 @ 06:04)   100 mL/Hr IV Intermittent (24 @ 21:09)    piperacillin/tazobactam IVPB.   200 mL/Hr IV Intermittent (24 @ 06:07)    piperacillin/tazobactam IVPB.-   25 mL/Hr IV Intermittent (24 @ 07:35)    piperacillin/tazobactam IVPB..   25 mL/Hr IV Intermittent (24 @ 02:28)   25 mL/Hr IV Intermittent (24 @ 14:04)    vancomycin  IVPB   250 mL/Hr IV Intermittent (24 @ 05:45)        piperacillin/tazobactam IVPB.. 3.375 every 8 hours    MEDICATIONS  (STANDING):  acetaminophen     Tablet .. 650 every 6 hours PRN  aMIOdarone    Tablet    aMIOdarone    Tablet 400 every 8 hours  aspirin enteric coated 81 daily  atorvastatin 80 at bedtime  bisacodyl Suppository 10 once  dextrose 50% Injectable 50 every 15 minutes  dextrose Oral Gel 15 once  enoxaparin Injectable 40 every 24 hours  famotidine    Tablet 20 daily  glucagon  Injectable 1 once  insulin glargine Injectable (LANTUS) 30 at bedtime  insulin lispro (ADMELOG) corrective regimen sliding scale  three times a day before meals  insulin lispro Injectable (ADMELOG) 6 three times a day before meals  milrinone Infusion 0.3 <Continuous>  oxyCODONE    IR 5 every 4 hours PRN  oxyCODONE    IR 10 every 4 hours PRN  polyethylene glycol 3350 17 daily  senna 2 at bedtime    SOCIAL HISTORY:       FAMILY HISTORY:  Family history of diabetes mellitus    Family history of stroke      REVIEW OF SYSTEMS  [  ] ROS unobtainable because:    [  ] All other systems negative except as noted below:	    Constitutional:  [ ] fever [ ] chills  [ ] weight loss  [ ] weakness  Skin:  [ ] rash [ ] phlebitis	  Eyes: [ ] icterus [ ] pain  [ ] discharge	  ENMT: [ ] sore throat  [ ] thrush [ ] ulcers [ ] exudates  Respiratory: [ ] dyspnea [ ] hemoptysis [ ] cough [ ] sputum	  Cardiovascular:  [ ] chest pain [ ] palpitations [ ] edema	  Gastrointestinal:  [ ] nausea [ ] vomiting [ ] diarrhea [ ] constipation [ ] pain	  Genitourinary:  [ ] dysuria [ ] frequency [ ] hematuria [ ] discharge [ ] flank pain  [ ] incontinence  Musculoskeletal:  [ ] myalgias [ ] arthralgias [ ] arthritis  [ ] back pain  Neurological:  [ ] headache [ ] seizures  [ ] confusion/altered mental status  Psychiatric:  [ ] anxiety [ ] depression	  Hematology/Lymphatics:  [ ] lymphadenopathy  Endocrine:  [ ] adrenal [ ] thyroid  Allergic/Immunologic:	 [ ] transplant [ ] seasonal    Vital Signs Last 24 Hrs  T(F): 100.6 (24 @ 05:00), Max: 101.3 (24 @ 16:00)  Vital Signs Last 24 Hrs  HR: 82 (24 @ 07:00) (75 - 97)  BP: 112/57 (24 @ 23:00) (94/51 - 117/58)  RR: 16 (24 @ 07:00)  SpO2: 96% (24 @ 07:00) (89% - 100%)  Wt(kg): --    PHYSICAL EXAM:                              9.1    6.94  )-----------( 211      ( 2024 02:01 )             26.8   -    136  |  99  |  28<H>  ----------------------------<  151<H>  3.4<L>   |  23  |  1.59<H>    Ca    8.5      2024 02:01  Phos  3.5     -  Mg     2.0         TPro  5.6<L>  /  Alb  3.5  /  TBili  1.2  /  DBili  x   /  AST  19  /  ALT  22  /  AlkPhos  62  04-14    Urinalysis Basic - ( 2024 02:01 )    Color: x / Appearance: x / SG: x / pH: x  Gluc: 151 mg/dL / Ketone: x  / Bili: x / Urobili: x   Blood: x / Protein: x / Nitrite: x   Leuk Esterase: x / RBC: x / WBC x   Sq Epi: x / Non Sq Epi: x / Bacteria: x    MICROBIOLOGY:  Culture - Blood (collected 2024 19:11)  Source: .Blood Blood-Peripheral  Preliminary Report (2024 23:06):    No growth at 24 hours    Culture - Blood (collected 2024 19:11)  Source: .Blood Blood-Peripheral  Preliminary Report (2024 23:06):    No growth at 24 hours    Culture - Blood (collected 2024 05:15)  Source: .Blood Blood-Peripheral  Preliminary Report (2024 13:01):    No growth at 48 Hours    Culture - Blood (collected 2024 05:00)  Source: .Blood Blood-Peripheral  Preliminary Report (2024 13:01):    No growth at 48 Hours                  RADIOLOGY:  imaging below personally reviewed and agree with findings    < from: CT Chest No Cont (24 @ 14:40) >    ACC: [id number] EXAM:  CT CHEST   ORDERED BY: ALVINA SMIS     PROCEDURE DATE:  2024          INTERPRETATION:  .  Patient: IRA SEN  : 1950  MRN: [id number]  ACC: [id number]  Order Date: 2024 2:40 PM  Exam: CT CHEST    INDICATION: Abnormal chest radiograph  TECHNIQUE: Unenhanced CT of the chest. Coronal, sagittal, and MIP images   were reconstructed and reviewed.  COMPARISON: 10/27/2023 chest CT.    FINDINGS: The quality of the images are degraded by motion.    AIRWAYS, LUNGS, PLEURA: Patent central airways. Small left pleural   effusion and complete atelectasis/consolidation of left lower lobe. Trace   right pleural effusion and subsegmental atelectasis of right lower lobe.   Mild nonspecific peripheral patchy ground-glass opacity within right   upper lobe.    LYMPH NODES, MEDIASTINUM: No lymphadenopathy. Trace hematoma. No   collection.    HEART, VESSELS: Cardiomegaly. No pericardial effusion. Retained   epicardial pacer wires. Status post replacement of the aortic valve and   ascending aorta. Coronary calcifications.    UPPER ABDOMEN: Within normal limits.    CHEST WALL, BONES: No aggressive osseous lesion. Impacted sternotomy.    LOWER NECK: Within normal limits.    IMPRESSION:    Small left pleural effusion and complete atelectasis/consolidation of   left lower lobe.    Trace right pleural effusion and subsegmental atelectasis of right lower   lobe.    --- End of Report ---      < end of copied text >   Patient is a 74y old  Male who presents with a chief complaint of preop as/aortic aneurysm & dvd (2024 07:25)    HPI:  74yr M, PMHx of CVA and MI (2016), HTN, DM, HLD, CAD s/p cardiac stents, CHF, hx of malignant melanoma s/p excision, presented for CABG and AVR on . Underwent CABGX RSVG to diag and RCA, ascending aortic replacement with transverse hemiarch, AVR with inspirus bioprosthetic valve. He has been having fevers since 4/10. He received extended cefuroxime prophylaxis until 4/10. Was noted to have worsening fever tmax 101.1 on , started on Vancomycin and Zosyn.     No leucocytosis, rising Cr on labs.    Bcx ,  NGTD    MRSA PCR Neg    UA 9 WBC, 191 RBC    CT Chest  Small left pleural effusion and complete atelectasis/consolidation of   left lower lobe.  Trace right pleural effusion and subsegmental atelectasis of right lower   lobe.       prior hospital charts reviewed [  ]  primary team notes reviewed [x  ]  other consultant notes reviewed [  ]    PAST MEDICAL & SURGICAL HISTORY:  HTN (hypertension)      Hearing decreased      CVA (cerebral vascular accident)  2016      Hyperlipemia      Kidney stones      Coronary artery disease  MI 2016      CHF (congestive heart failure)  2017 stents x3      Type 2 diabetes mellitus        Confusion  from stroke      S/P medial meniscal repair  and ligament surgery      H/O lithotripsy      S/P tonsillectomy      Bilateral inguinal hernia          Allergies  No Known Allergies  Allergy Status Unknown    ANTIMICROBIALS (past 90 days)  MEDICATIONS  (STANDING):    cefuroxime  IVPB   100 mL/Hr IV Intermittent (04-10-24 @ 05:45)   100 mL/Hr IV Intermittent (24 @ 21:00)   100 mL/Hr IV Intermittent (24 @ 12:17)   100 mL/Hr IV Intermittent (24 @ 06:04)   100 mL/Hr IV Intermittent (24 @ 21:09)    piperacillin/tazobactam IVPB.   200 mL/Hr IV Intermittent (24 @ 06:07)    piperacillin/tazobactam IVPB.-   25 mL/Hr IV Intermittent (24 @ 07:35)    piperacillin/tazobactam IVPB..   25 mL/Hr IV Intermittent (24 @ 02:28)   25 mL/Hr IV Intermittent (24 @ 14:04)    vancomycin  IVPB   250 mL/Hr IV Intermittent (24 @ 05:45)        piperacillin/tazobactam IVPB.. 3.375 every 8 hours    MEDICATIONS  (STANDING):  acetaminophen     Tablet .. 650 every 6 hours PRN  aMIOdarone    Tablet    aMIOdarone    Tablet 400 every 8 hours  aspirin enteric coated 81 daily  atorvastatin 80 at bedtime  bisacodyl Suppository 10 once  dextrose 50% Injectable 50 every 15 minutes  dextrose Oral Gel 15 once  enoxaparin Injectable 40 every 24 hours  famotidine    Tablet 20 daily  glucagon  Injectable 1 once  insulin glargine Injectable (LANTUS) 30 at bedtime  insulin lispro (ADMELOG) corrective regimen sliding scale  three times a day before meals  insulin lispro Injectable (ADMELOG) 6 three times a day before meals  milrinone Infusion 0.3 <Continuous>  oxyCODONE    IR 5 every 4 hours PRN  oxyCODONE    IR 10 every 4 hours PRN  polyethylene glycol 3350 17 daily  senna 2 at bedtime    SOCIAL HISTORY:  Lives with family, no tobacco, drug alcohol use    FAMILY HISTORY:  Family history of diabetes mellitus    Family history of stroke      REVIEW OF SYSTEMS  [  ] ROS unobtainable because:    [ x ] All other systems negative except as noted below:	    Constitutional:  [ ] fever [ ] chills  [ ] weight loss  [ x] weakness  Skin:  [ ] rash [ ] phlebitis	  Eyes: [ ] icterus [ ] pain  [ ] discharge	  ENMT: [ ] sore throat  [ ] thrush [ ] ulcers [ ] exudates  Respiratory: [ ] dyspnea [ ] hemoptysis [x ] cough [x ] sputum	  Cardiovascular:  [ ] chest pain [ ] palpitations [ ] edema	  Gastrointestinal:  [ ] nausea [ ] vomiting [ ] diarrhea [ ] constipation [ ] pain	  Genitourinary:  [ ] dysuria [ ] frequency [ ] hematuria [ ] discharge [ ] flank pain  [ ] incontinence  Musculoskeletal:  [ ] myalgias [ ] arthralgias [ ] arthritis  [ ] back pain  Neurological:  [ ] headache [ ] seizures  [ ] confusion/altered mental status  Psychiatric:  [ ] anxiety [ ] depression	  Hematology/Lymphatics:  [ ] lymphadenopathy  Endocrine:  [ ] adrenal [ ] thyroid  Allergic/Immunologic:	 [ ] transplant [ ] seasonal    Vital Signs Last 24 Hrs  T(F): 100.6 (24 @ 05:00), Max: 101.3 (24 @ 16:00)  Vital Signs Last 24 Hrs  HR: 82 (24 @ 07:00) (75 - 97)  BP: 112/57 (24 @ 23:00) (94/51 - 117/58)  RR: 16 (24 @ 07:00)  SpO2: 96% (24 @ 07:00) (89% - 100%)  Wt(kg): --    PHYSICAL EXAM:    General: Patient in NAD  HEENT: Left Neck CVC clean  CV: S1+S2, no m/r/g appreciated , Surgical dressing intact/clean  Lungs: No respiratory distress, Ds bs b/l bases L>R  Abd: Soft, nontender, no guarding, no rebound tenderness, + bowel sounds   : No suprapubic tenderness, navarro in place  Neuro: Alert and oriented to time, place and person. Moves all extremities against gravity.  Ext: No cyanosis, no edema, multiple peripheral lines, RUE midline, graft sites clean  Skin: No rash, no phlebitis                            9.1    6.94  )-----------( 211      ( 2024 02:01 )             26.8   04-14    136  |  99  |  28<H>  ----------------------------<  151<H>  3.4<L>   |  23  |  1.59<H>    Ca    8.5      2024 02:01  Phos  3.5     04-14  Mg     2.0     -14    TPro  5.6<L>  /  Alb  3.5  /  TBili  1.2  /  DBili  x   /  AST  19  /  ALT  22  /  AlkPhos  62  04-14    Urinalysis Basic - ( 2024 02:01 )    Color: x / Appearance: x / SG: x / pH: x  Gluc: 151 mg/dL / Ketone: x  / Bili: x / Urobili: x   Blood: x / Protein: x / Nitrite: x   Leuk Esterase: x / RBC: x / WBC x   Sq Epi: x / Non Sq Epi: x / Bacteria: x    MICROBIOLOGY:  Culture - Blood (collected 2024 19:11)  Source: .Blood Blood-Peripheral  Preliminary Report (2024 23:06):    No growth at 24 hours    Culture - Blood (collected 2024 19:11)  Source: .Blood Blood-Peripheral  Preliminary Report (2024 23:06):    No growth at 24 hours    Culture - Blood (collected 2024 05:15)  Source: .Blood Blood-Peripheral  Preliminary Report (2024 13:01):    No growth at 48 Hours    Culture - Blood (collected 2024 05:00)  Source: .Blood Blood-Peripheral  Preliminary Report (2024 13:01):    No growth at 48 Hours                  RADIOLOGY:  imaging below personally reviewed and agree with findings    < from: CT Chest No Cont (24 @ 14:40) >    ACC: [id number] EXAM:  CT CHEST   ORDERED BY: ALVINA SIMS     PROCEDURE DATE:  2024          INTERPRETATION:  .  Patient: IRA SEN  : 1950  MRN: [id number]  ACC: [id number]  Order Date: 2024 2:40 PM  Exam: CT CHEST    INDICATION: Abnormal chest radiograph  TECHNIQUE: Unenhanced CT of the chest. Coronal, sagittal, and MIP images   were reconstructed and reviewed.  COMPARISON: 10/27/2023 chest CT.    FINDINGS: The quality of the images are degraded by motion.    AIRWAYS, LUNGS, PLEURA: Patent central airways. Small left pleural   effusion and complete atelectasis/consolidation of left lower lobe. Trace   right pleural effusion and subsegmental atelectasis of right lower lobe.   Mild nonspecific peripheral patchy ground-glass opacity within right   upper lobe.    LYMPH NODES, MEDIASTINUM: No lymphadenopathy. Trace hematoma. No   collection.    HEART, VESSELS: Cardiomegaly. No pericardial effusion. Retained   epicardial pacer wires. Status post replacement of the aortic valve and   ascending aorta. Coronary calcifications.    UPPER ABDOMEN: Within normal limits.    CHEST WALL, BONES: No aggressive osseous lesion. Impacted sternotomy.    LOWER NECK: Within normal limits.    IMPRESSION:    Small left pleural effusion and complete atelectasis/consolidation of   left lower lobe.    Trace right pleural effusion and subsegmental atelectasis of right lower   lobe.    --- End of Report ---      < end of copied text >   Patient is a 74y old  Male who presents with a chief complaint of preop as/aortic aneurysm & dvd (14 Apr 2024 07:25)    HPI:  74yr M, PMHx of CVA and MI (12/2016), HTN, DM, HLD, CAD s/p cardiac stents, CHF, hx of malignant melanoma s/p excision, presented for CABG and AVR on 4/7. Underwent CABGX RSVG to diag and RCA, ascending aortic replacement with transverse hemiarch, AVR with inspirus bioprosthetic valve. He has been having fevers since 4/10. He received extended cefuroxime prophylaxis until 4/10. Was noted to have worsening fever tmax 101.1 on 4/12, started on Vancomycin and Zosyn.     No leucocytosis, rising Cr on labs.    Bcx 4/11, 4/12 NGTD    MRSA PCR Neg    UA 9 WBC, 191 RBC    CT Chest  Small left pleural effusion and complete atelectasis/consolidation of   left lower lobe.  Trace right pleural effusion and subsegmental atelectasis of right lower   lobe.       prior hospital charts reviewed [  ]  primary team notes reviewed [x  ]  other consultant notes reviewed [  ]    PAST MEDICAL & SURGICAL HISTORY:  HTN (hypertension)      Hearing decreased      CVA (cerebral vascular accident)  12/22/2016      Hyperlipemia      Kidney stones      Coronary artery disease  MI 12/22/2016      CHF (congestive heart failure)  1/2017 stents x3      Type 2 diabetes mellitus  2016      Confusion  from stroke      S/P medial meniscal repair  and ligament surgery      H/O lithotripsy      S/P tonsillectomy      Bilateral inguinal hernia          Allergies  No Known Allergies  Allergy Status Unknown    ANTIMICROBIALS (past 90 days)  MEDICATIONS  (STANDING):    cefuroxime  IVPB   100 mL/Hr IV Intermittent (04-10-24 @ 05:45)   100 mL/Hr IV Intermittent (04-09-24 @ 21:00)   100 mL/Hr IV Intermittent (04-09-24 @ 12:17)   100 mL/Hr IV Intermittent (04-09-24 @ 06:04)   100 mL/Hr IV Intermittent (04-08-24 @ 21:09)    piperacillin/tazobactam IVPB.   200 mL/Hr IV Intermittent (04-13-24 @ 06:07)    piperacillin/tazobactam IVPB.-   25 mL/Hr IV Intermittent (04-13-24 @ 07:35)    piperacillin/tazobactam IVPB..   25 mL/Hr IV Intermittent (04-14-24 @ 02:28)   25 mL/Hr IV Intermittent (04-13-24 @ 14:04)    vancomycin  IVPB   250 mL/Hr IV Intermittent (04-13-24 @ 05:45)        piperacillin/tazobactam IVPB.. 3.375 every 8 hours    MEDICATIONS  (STANDING):  acetaminophen     Tablet .. 650 every 6 hours PRN  aMIOdarone    Tablet    aMIOdarone    Tablet 400 every 8 hours  aspirin enteric coated 81 daily  atorvastatin 80 at bedtime  bisacodyl Suppository 10 once  dextrose 50% Injectable 50 every 15 minutes  dextrose Oral Gel 15 once  enoxaparin Injectable 40 every 24 hours  famotidine    Tablet 20 daily  glucagon  Injectable 1 once  insulin glargine Injectable (LANTUS) 30 at bedtime  insulin lispro (ADMELOG) corrective regimen sliding scale  three times a day before meals  insulin lispro Injectable (ADMELOG) 6 three times a day before meals  milrinone Infusion 0.3 <Continuous>  oxyCODONE    IR 5 every 4 hours PRN  oxyCODONE    IR 10 every 4 hours PRN  polyethylene glycol 3350 17 daily  senna 2 at bedtime        SOCIAL HISTORY:  Lives with family, no smoking         FAMILY HISTORY:  Family history of diabetes mellitus    Family history of stroke      REVIEW OF SYSTEMS  [  ] ROS unobtainable because:    [ x ] All other systems negative except as noted below:	    Constitutional:  [x ] fever [ ] chills  [ ] weight loss  [ x] weakness  Skin:  [ ] rash [ ] phlebitis	  Eyes: [ ] icterus [ ] pain  [ ] discharge	  ENMT: [ ] sore throat  [ ] thrush   Respiratory: [ ] dyspnea  [x ] cough [x ] sputum	  Cardiovascular:  [ ] chest pain   Gastrointestinal:  [ ] nausea [ ] vomiting [ ] diarrhea [ ] constipation [ ] pain	  Genitourinary:  [ ] dysuria [ ] frequency  Musculoskeletal:  [ ] myalgias [ ] back pain  Neurological:  [ ] headache [ ] confusion/altered mental status  Psychiatric:  [ ] anxiety [ ] depression	  Extremities: + swelling   Endocrine:  [ ] adrenal [ ] thyroid  Allergic/Immunologic:	 [ ] transplant [ ] seasonal        Vital Signs Last 24 Hrs  T(F): 100.6 (04-14-24 @ 05:00), Max: 101.3 (04-13-24 @ 16:00)  Vital Signs Last 24 Hrs  HR: 82 (04-14-24 @ 07:00) (75 - 97)  BP: 112/57 (04-13-24 @ 23:00) (94/51 - 117/58)  RR: 16 (04-14-24 @ 07:00)  SpO2: 96% (04-14-24 @ 07:00) (89% - 100%)  Wt(kg): --        PHYSICAL EXAM:    General: Patient in NAD  Eyes: No discharge   ENT: Left Neck CVC clean  CV: S1+S2, Surgical dressing intact/clean  Lungs: Ds bs b/l bases L>R  Abd: Soft, nontender,  : navarro in place  Neuro: Alert and oriented, no new focal deficits.   MSK: No joint swelling   Ext: +edema, multiple peripheral lines, RUE midline, graft sites clean  Skin: No rash, no phlebitis                            9.1    6.94  )-----------( 211      ( 14 Apr 2024 02:01 )             26.8   04-14    136  |  99  |  28<H>  ----------------------------<  151<H>  3.4<L>   |  23  |  1.59<H>    Ca    8.5      14 Apr 2024 02:01  Phos  3.5     04-14  Mg     2.0     04-14    TPro  5.6<L>  /  Alb  3.5  /  TBili  1.2  /  DBili  x   /  AST  19  /  ALT  22  /  AlkPhos  62  04-14    Urinalysis Basic - ( 14 Apr 2024 02:01 )    Color: x / Appearance: x / SG: x / pH: x  Gluc: 151 mg/dL / Ketone: x  / Bili: x / Urobili: x   Blood: x / Protein: x / Nitrite: x   Leuk Esterase: x / RBC: x / WBC x   Sq Epi: x / Non Sq Epi: x / Bacteria: x    MICROBIOLOGY:  Culture - Blood (collected 12 Apr 2024 19:11)  Source: .Blood Blood-Peripheral  Preliminary Report (13 Apr 2024 23:06):    No growth at 24 hours    Culture - Blood (collected 12 Apr 2024 19:11)  Source: .Blood Blood-Peripheral  Preliminary Report (13 Apr 2024 23:06):    No growth at 24 hours    Culture - Blood (collected 11 Apr 2024 05:15)  Source: .Blood Blood-Peripheral  Preliminary Report (13 Apr 2024 13:01):    No growth at 48 Hours    Culture - Blood (collected 11 Apr 2024 05:00)  Source: .Blood Blood-Peripheral  Preliminary Report (13 Apr 2024 13:01):    No growth at 48 Hours                  RADIOLOGY:  imaging below personally reviewed and agree with findings    < from: CT Chest No Cont (04.13.24 @ 14:40) >    IMPRESSION:    Small left pleural effusion and complete atelectasis/consolidation of   left lower lobe.    Trace right pleural effusion and subsegmental atelectasis of right lower   lobe.        < from: CT Head No Cont (04.07.24 @ 18:11) >  IMPRESSION:    No evidence of acute intracranial hemorrhage, midline shift or CT   evidence of acute territorial infarct.      < from: Xray Chest 1 View- PORTABLE-Routine (04.08.24 @ 16:28) >  IMPRESSION:    Status post sternotomy.  Right basilar linear atelectasis.  Small left effusion and atelectasis.

## 2024-04-14 NOTE — CONSULT NOTE ADULT - ATTENDING COMMENTS
74yr M, PMHx of CVA and MI (12/2016), HTN, DM, HLD, CAD s/p cardiac stents, CHF, hx of malignant melanoma s/p excision, presented for CABG and AVR on 4/7. Underwent CABGX RSVG to diag and RCA, ascending aortic replacement with transverse hemiarch, AVR with inspirus bioprosthetic valve. He has been having fevers since 4/10. He received extended cefuroxime prophylaxis until 4/10. Was noted to have worsening fever tmax 101.1 on 4/12, started on Vancomycin and Zosyn.     No leucocytosis, rising Cr on labs.    Bcx 4/11, 4/12 NGTD    MRSA PCR Neg    UA 9 WBC, 191 RBC    CT Chest  Small left pleural effusion and complete atelectasis/consolidation of   left lower lobe.  Trace right pleural effusion and subsegmental atelectasis of right lower   lobe.      # Post operative fevers  # Atelectasis/Consolidation of LLL, concern for pneumonia on CT       PLAN:   Agree with zosyn [id number]  Send sputum cultures  can check RVP  Follow up blood cultures  Continue to trend CBC, no leucocytosis   pt with no localizing complains.     Plan discussed with CTU.     Fausto Naranjo  Please contact through MS Teams   If no response or past 5 pm/weekend call [phone].

## 2024-04-14 NOTE — PROGRESS NOTE ADULT - SUBJECTIVE AND OBJECTIVE BOX
CRITICAL CARE ATTENDING - CTICU    MEDICATIONS  (STANDING):  aMIOdarone    Tablet   Oral   aMIOdarone    Tablet 400 milliGRAM(s) Oral every 8 hours  aspirin enteric coated 81 milliGRAM(s) Oral daily  atorvastatin 80 milliGRAM(s) Oral at bedtime  bisacodyl Suppository 10 milliGRAM(s) Rectal once  dextrose 50% Injectable 50 milliLiter(s) IV Push every 15 minutes  dextrose Oral Gel 15 Gram(s) Oral once  enoxaparin Injectable 40 milliGRAM(s) SubCutaneous every 24 hours  famotidine    Tablet 20 milliGRAM(s) Oral daily  glucagon  Injectable 1 milliGRAM(s) IntraMuscular once  insulin glargine Injectable (LANTUS) 30 Unit(s) SubCutaneous at bedtime  insulin lispro (ADMELOG) corrective regimen sliding scale   SubCutaneous three times a day before meals  milrinone Infusion 0.3 MICROgram(s)/kG/Min (6.47 mL/Hr) IV Continuous <Continuous>  piperacillin/tazobactam IVPB.. 3.375 Gram(s) IV Intermittent every 8 hours  polyethylene glycol 3350 17 Gram(s) Oral daily  senna 2 Tablet(s) Oral at bedtime  sodium chloride 0.9% lock flush 3 milliLiter(s) IV Push every 8 hours  sodium chloride 0.9%. 1000 milliLiter(s) (10 mL/Hr) IV Continuous <Continuous>                            9.1    6.94  )-----------( 211      ( 2024 02:01 )             26.8       04-14    136  |  99  |  28<H>  ----------------------------<  151<H>  3.4<L>   |  23  |  1.59<H>    Ca    8.5      2024 02:01  Phos  3.5     04-14  Mg     2.0     04-14    TPro  5.6<L>  /  Alb  3.5  /  TBili  1.2  /  DBili  x   /  AST  19  /  ALT  22  /  AlkPhos  62  04-14              Daily     Daily Weight in k.3 (2024 00:00)      04-13 @ 07:01  -  14 @ 07:00  --------------------------------------------------------  IN: 2585.8 mL / OUT: 4095 mL / NET: -1509.2 mL        Critically Ill patient  : [ ] preoperative ,   [x ] post operative    Requires :  [x ] Arterial Line   [ x] Central Line  [ ] PA catheter  [ ] IABP  [ ] ECMO  [ ] LVAD  [ ] Ventilator  [x ] pacemaker- TPM [ ] Impella. [x] BiPAP [ ] HFNC [x] NC                       [ x] ABG's     [x ] Pulse Oxymetry Monitoring  Bedside evaluation , monitoring , treatment of hemodynamics , fluids , IVP/ IVCD meds.        Diagnosis:     POD 6 - CABG x2/ Ascending aortic replacement with transverse hemiarch/ AVR (Preop IABP) - 24    Extubated  to HFNC     L Fem IABP removed    hypertension     Sono Chest - L - Pleural Effusion / Pulmonary Consolidation     May require L Pleurocentesis    Requires chest PT, pulmonary toilet, suctioning to maintain SaO2,  patent airway and treat atelectasis.     Respiratory insuffiencey - NC 5L     Hypoxemia - Requires [x] BIPAP  [ ] HF O2     Temporary pacemaker (TPM) interrogation and setting.     CHF- acute [ x]   chronic [ x]    systolic [ x]   diastolic [x]  Valvular [ ]          - Echo- EF -   27%          [x ] RV dysfunction          - Cxr-cardiomegally, edema          - Clinical-  [x]inotropes   [ ]pressors   [ ]diuresis   [ ]IABP   [ ]ECMO   [ ]LVAD   [x ] Respiratory insuffiencey           Hemodynamic lability,  instability. Requires IVCD [ ] vasopressors [x ] inotropes  [ x] vasodilator  [ ]IVSS fluid  to maintain MAP, perfusion, C.I.     DM2 - insulin Sliding Scale / Lantus     Anticoagulation with [x ] SQ Lovenox [ ] Heparin  [ ] Argatroban    Renal Failure - Acute Kidney Injury     Requires bedside physical therapy, mobilization and total prison care.             By signing my name below, I, Brie Navarrete, attest that this documentation has been prepared under the direction and in the presence of Eduardo Abreu MD.   Electronically Signed: Richard Camargo 24 @ 07:26      Discussed with CT surgeon, Physician Assistant - Nurse Practitioner- Critical care medicine team.   Discussed at  AM / PM rounds.   Chart, labs , films reviewed.    Cumulative Critical Care Time Given Today:      CRITICAL CARE ATTENDING - CTICU    MEDICATIONS  (STANDING):  aMIOdarone    Tablet   Oral   aMIOdarone    Tablet 400 milliGRAM(s) Oral every 8 hours  aspirin enteric coated 81 milliGRAM(s) Oral daily  atorvastatin 80 milliGRAM(s) Oral at bedtime  bisacodyl Suppository 10 milliGRAM(s) Rectal once  dextrose 50% Injectable 50 milliLiter(s) IV Push every 15 minutes  dextrose Oral Gel 15 Gram(s) Oral once  enoxaparin Injectable 40 milliGRAM(s) SubCutaneous every 24 hours  famotidine    Tablet 20 milliGRAM(s) Oral daily  glucagon  Injectable 1 milliGRAM(s) IntraMuscular once  insulin glargine Injectable (LANTUS) 30 Unit(s) SubCutaneous at bedtime  insulin lispro (ADMELOG) corrective regimen sliding scale   SubCutaneous three times a day before meals  milrinone Infusion 0.3 MICROgram(s)/kG/Min (6.47 mL/Hr) IV Continuous <Continuous>  piperacillin/tazobactam IVPB.. 3.375 Gram(s) IV Intermittent every 8 hours  polyethylene glycol 3350 17 Gram(s) Oral daily  senna 2 Tablet(s) Oral at bedtime  sodium chloride 0.9% lock flush 3 milliLiter(s) IV Push every 8 hours  sodium chloride 0.9%. 1000 milliLiter(s) (10 mL/Hr) IV Continuous <Continuous>                            9.1    6.94  )-----------( 211      ( 2024 02:01 )             26.8       04-14    136  |  99  |  28<H>  ----------------------------<  151<H>  3.4<L>   |  23  |  1.59<H>    Ca    8.5      2024 02:01  Phos  3.5     04-14  Mg     2.0     04-14    TPro  5.6<L>  /  Alb  3.5  /  TBili  1.2  /  DBili  x   /  AST  19  /  ALT  22  /  AlkPhos  62  04-14              Daily     Daily Weight in k.3 (2024 00:00)      04-13 @ 07:01  -  14 @ 07:00  --------------------------------------------------------  IN: 2585.8 mL / OUT: 4095 mL / NET: -1509.2 mL        Critically Ill patient  : [ ] preoperative ,   [x ] post operative    Requires :  [x ] Arterial Line   [ x] Central Line  [ ] PA catheter  [ ] IABP  [ ] ECMO  [ ] LVAD  [ ] Ventilator  [x ] pacemaker- TPM [ ] Impella. [x] BiPAP [x ] HFNC                        [ x] ABG's     [x ] Pulse Oxymetry Monitoring  Bedside evaluation , monitoring , treatment of hemodynamics , fluids , IVP/ IVCD meds.        Diagnosis:     POD 6 - CABG x2/ Ascending aortic replacement with transverse hemiarch/ AVR (Preop IABP) - 24    Extubated  to HFNC     L Fem IABP removed    hypertension     Sono Chest - L - Pleural Effusion / Pulmonary Consolidation     LLLB Consolidation - r/o Pneumonia    May require L Pleurocentesis    Requires chest PT, pulmonary toilet, suctioning to maintain SaO2,  patent airway and treat atelectasis.     Respiratory insuffiencey - HF o2 / NRB / BIPAP    Hypoxemia - Requires [x] BIPAP alt w   [x ] HF O2     Temporary pacemaker (TPM) interrogation and setting.     CHF- acute [ x]   chronic [ x]    systolic [ x]   diastolic [x]  Valvular [ ]          - Echo- EF -   27%          [x ] RV dysfunction          - Cxr-cardiomegally, edema          - Clinical-  [x]inotropes   [ ]pressors   [ x]diuresis   [ ]IABP   [ ]ECMO   [ ]LVAD   [x ] Respiratory insuffiencey           Hemodynamic lability,  instability. Requires IVCD [ ] vasopressors [x ] inotropes  [ x] vasodilator  [ ]IVSS fluid  to maintain MAP, perfusion, C.I.     DM2 - insulin Sliding Scale / Lantus     Renal Failure - Acute Kidney Injury     Hypokalemia    Requires bedside physical therapy, mobilization and total half-way care.             By signing my name below, I, Brie Navarrete, attest that this documentation has been prepared under the direction and in the presence of Eduardo Abreu MD.   Electronically Signed: Richard Camargo 24 @ 07:26      Discussed with CT surgeon, Physician Assistant - Nurse Practitioner- Critical care medicine team.   Discussed at  AM / PM rounds.   Chart, labs , films reviewed.    Cumulative Critical Care Time Given Today:  30 min

## 2024-04-14 NOTE — PROGRESS NOTE ADULT - ASSESSMENT
74yr M, PMHx of CVA and MI (12/2016), HTN, DM, HLD, CAD s/p cardiac stents, CHF, hx of malignant melanoma, now s/p ascending aortic arch  replacement, CABG AVR.  Assessment  DMT2: 74y Male with DM T2 with hyperglycemia, A1C is 9.8% , was on oral meds at home, now postop on basal bolus insulin, blood sugars trending down postprandially, no hypoglycemias.  CAD: on medications, stable, monitored. s/p CABG  Aneursym: s/p hemiarch replacement , AVR-t , CTU monitored.     Elodia Bach MD  Cell: [phone]  Office: [phone]             74yr M, PMHx of CVA and MI (12/2016), HTN, DM, HLD, CAD s/p cardiac stents, CHF, hx of malignant melanoma, now s/p ascending aortic arch  replacement, CABG AVR.  Assessment  DMT2: 74y Male with DM T2 with hyperglycemia, A1C is 9.8% , was on oral meds at home, now postop on basal bolus insulin, blood sugars trending down postprandially,  no hypoglycemias.  CAD: on medications, stable, monitored. s/p CABG  Aneursym: s/p hemiarch replacement , AVR-t , CTU monitored.     Elodia Bach MD  Cell: [phone]  Office: [phone]

## 2024-04-14 NOTE — PROGRESS NOTE ADULT - SUBJECTIVE AND OBJECTIVE BOX
[facility]   [phone]  RENAL FOLLOW UP NOTE  --------------------------------------------------------------------------------  Chief Complaint:    24 hour events/subjective:    seen earlier   states has incision site pain   no worsening sob     PAST HISTORY  --------------------------------------------------------------------------------  No significant changes to PMH, PSH, FHx, SHx, unless otherwise noted    ALLERGIES & MEDICATIONS  --------------------------------------------------------------------------------  Allergies    No Known Allergies  Allergy Status Unknown    Intolerances      Standing Inpatient Medications  aMIOdarone    Tablet   Oral   aMIOdarone    Tablet 400 milliGRAM(s) Oral every 8 hours  aspirin enteric coated 81 milliGRAM(s) Oral daily  atorvastatin 80 milliGRAM(s) Oral at bedtime  bisacodyl Suppository 10 milliGRAM(s) Rectal once  chlorhexidine 2% Cloths 1 Application(s) Topical daily  dextrose 50% Injectable 50 milliLiter(s) IV Push every 15 minutes  dextrose Oral Gel 15 Gram(s) Oral once  enoxaparin Injectable 40 milliGRAM(s) SubCutaneous every 24 hours  famotidine    Tablet 20 milliGRAM(s) Oral daily  glucagon  Injectable 1 milliGRAM(s) IntraMuscular once  insulin glargine Injectable (LANTUS) 30 Unit(s) SubCutaneous at bedtime  insulin lispro (ADMELOG) corrective regimen sliding scale   SubCutaneous three times a day before meals  insulin lispro Injectable (ADMELOG) 6 Unit(s) SubCutaneous three times a day before meals  milrinone Infusion 0.3 MICROgram(s)/kG/Min IV Continuous <Continuous>  piperacillin/tazobactam IVPB.. 3.375 Gram(s) IV Intermittent every 8 hours  polyethylene glycol 3350 17 Gram(s) Oral daily  potassium chloride  10 mEq/50 mL IVPB 10 milliEquivalent(s) IV Intermittent every 1 hour  senna 2 Tablet(s) Oral at bedtime  sodium chloride 0.9% lock flush 3 milliLiter(s) IV Push every 8 hours  sodium chloride 0.9%. 1000 milliLiter(s) IV Continuous <Continuous>    PRN Inpatient Medications  acetaminophen     Tablet .. 650 milliGRAM(s) Oral every 6 hours PRN  oxyCODONE    IR 10 milliGRAM(s) Oral every 4 hours PRN  oxyCODONE    IR 5 milliGRAM(s) Oral every 4 hours PRN      REVIEW OF SYSTEMS  --------------------------------------------------------------------------------    Gen: denies  fevers/chills,  CVS: denies chest pain/palpitations  Resp: denies SOB/Cough  GI: Denies N/V/Abd pain  : Denies dysuria    VITALS/PHYSICAL EXAM  --------------------------------------------------------------------------------  T(C): 37.6 (04-14-24 @ 12:00), Max: 38.5 (04-13-24 @ 16:00)  HR: 79 (04-14-24 @ 15:14) (75 - 97)  BP: 112/57 (04-13-24 @ 23:00) (94/51 - 117/58)  RR: 22 (04-14-24 @ 15:00) (16 - 44)  SpO2: 96% (04-14-24 @ 15:14) (89% - 100%)  Wt(kg): --        04-13-24 @ 07:01  -  04-14-24 @ 07:00  --------------------------------------------------------  IN: 2585.8 mL / OUT: 4095 mL / NET: -1509.2 mL    04-14-24 @ 07:01  -  04-14-24 @ 15:43  --------------------------------------------------------  IN: 442 mL / OUT: 610 mL / NET: -168 mL      Physical Exam:  	    	Gen: Non toxic comfortable appearing  on O2   	no jvd   	Pulm: decrease bs  no rales or ronchi or wheezing  	CV: RRR, S1S2; no rub  	Back: No CVA tenderness  	Abd: +BS, soft, nontender/ + distended  	: No suprapubic tenderness  	UE: Warm, no cyanosis  no clubbing, +  edema  	LE: Warm, no cyanosis  no clubbing, 2 -   edema  	Neuro: alert and oriented. speech coherent   	Psych: Normal affect and mood    LABS/STUDIES  --------------------------------------------------------------------------------              9.1    6.94  >-----------<  211      [04-14-24 @ 02:01]              26.8     136  |  99  |  28  ----------------------------<  151      [04-14-24 @ 02:01]  3.4   |  23  |  1.59        Ca     8.5     [04-14-24 @ 02:01]      Mg     2.0     [04-14-24 @ 02:01]      Phos  3.5     [04-14-24 @ 02:01]    TPro  5.6  /  Alb  3.5  /  TBili  1.2  /  DBili  x   /  AST  19  /  ALT  22  /  AlkPhos  62  [04-14-24 @ 02:01]          Creatinine Trend:  SCr 1.59 [04-14 @ 02:01]  SCr 1.61 [04-13 @ 00:30]  SCr 1.30 [04-12 @ 00:48]  SCr 1.17 [04-11 @ 00:26]  SCr 1.20 [04-10 @ 00:48]        TSH 1.71      [04-07-24 @ 15:28]

## 2024-04-14 NOTE — PROGRESS NOTE ADULT - SUBJECTIVE AND OBJECTIVE BOX
Chief complaint  Patient is a 74y old  Male who presents with a chief complaint of preop as/aortic aneurysm & dvd (14 Apr 2024 08:19)   Review of systems  No hypoglycemic episodes.    Labs and Fingersticks  CAPILLARY BLOOD GLUCOSE  183 (13 Apr 2024 22:00)      POCT Blood Glucose.: 181 mg/dL (14 Apr 2024 11:20)  POCT Blood Glucose.: 104 mg/dL (14 Apr 2024 08:12)  POCT Blood Glucose.: 112 mg/dL (13 Apr 2024 16:56)  POCT Blood Glucose.: 77 mg/dL (13 Apr 2024 16:03)      Anion Gap: 14 (04-14 @ 02:01)  Anion Gap: 13 (04-13 @ 00:30)      Calcium: 8.5 (04-14 @ 02:01)  Calcium: 8.3 *L* (04-13 @ 00:30)  Albumin: 3.5 (04-14 @ 02:01)  Albumin: 3.0 *L* (04-13 @ 00:30)    Alanine Aminotransferase (ALT/SGPT): 22 (04-14 @ 02:01)  Alanine Aminotransferase (ALT/SGPT): 27 (04-13 @ 00:30)  Alkaline Phosphatase: 62 (04-14 @ 02:01)  Alkaline Phosphatase: 74 (04-13 @ 00:30)  Aspartate Aminotransferase (AST/SGOT): 19 (04-14 @ 02:01)  Aspartate Aminotransferase (AST/SGOT): 28 (04-13 @ 00:30)        04-14    136  |  99  |  28<H>  ----------------------------<  151<H>  3.4<L>   |  23  |  1.59<H>    Ca    8.5      14 Apr 2024 02:01  Phos  3.5     04-14  Mg     2.0     04-14    TPro  5.6<L>  /  Alb  3.5  /  TBili  1.2  /  DBili  x   /  AST  19  /  ALT  22  /  AlkPhos  62  04-14                        9.1    6.94  )-----------( 211      ( 14 Apr 2024 02:01 )             26.8     Medications  MEDICATIONS  (STANDING):  aMIOdarone    Tablet   Oral   aMIOdarone    Tablet 400 milliGRAM(s) Oral every 8 hours  aspirin enteric coated 81 milliGRAM(s) Oral daily  atorvastatin 80 milliGRAM(s) Oral at bedtime  bisacodyl Suppository 10 milliGRAM(s) Rectal once  chlorhexidine 2% Cloths 1 Application(s) Topical daily  dextrose 50% Injectable 50 milliLiter(s) IV Push every 15 minutes  dextrose Oral Gel 15 Gram(s) Oral once  enoxaparin Injectable 40 milliGRAM(s) SubCutaneous every 24 hours  famotidine    Tablet 20 milliGRAM(s) Oral daily  glucagon  Injectable 1 milliGRAM(s) IntraMuscular once  insulin glargine Injectable (LANTUS) 30 Unit(s) SubCutaneous at bedtime  insulin lispro (ADMELOG) corrective regimen sliding scale   SubCutaneous three times a day before meals  insulin lispro Injectable (ADMELOG) 6 Unit(s) SubCutaneous three times a day before meals  milrinone Infusion 0.3 MICROgram(s)/kG/Min (6.47 mL/Hr) IV Continuous <Continuous>  piperacillin/tazobactam IVPB.. 3.375 Gram(s) IV Intermittent every 8 hours  polyethylene glycol 3350 17 Gram(s) Oral daily  senna 2 Tablet(s) Oral at bedtime  sodium chloride 0.9% lock flush 3 milliLiter(s) IV Push every 8 hours  sodium chloride 0.9%. 1000 milliLiter(s) (10 mL/Hr) IV Continuous <Continuous>      Physical Exam  Vital Signs Last 12 Hrs  T(F): 100.2 (04-14-24 @ 11:00), Max: 100.8 (04-14-24 @ 08:00)  HR: 80 (04-14-24 @ 11:00) (80 - 87)  BP: --  BP(mean): --  RR: 21 (04-14-24 @ 11:00) (16 - 35)  SpO2: 98% (04-14-24 @ 11:00) (92% - 100%)     Chief complaint  Patient is a 74y old  Male who presents with a chief complaint of preop as/aortic aneurysm & dvd (14 Apr 2024 08:19)   Review of systems  No hypoglycemic episodes.    Labs and Fingersticks  CAPILLARY BLOOD GLUCOSE  183 (13 Apr 2024 22:00)    POCT Blood Glucose.: 181 mg/dL (14 Apr 2024 11:20)  POCT Blood Glucose.: 104 mg/dL (14 Apr 2024 08:12)  POCT Blood Glucose.: 112 mg/dL (13 Apr 2024 16:56)  POCT Blood Glucose.: 77 mg/dL (13 Apr 2024 16:03)      Anion Gap: 14 (04-14 @ 02:01)  Anion Gap: 13 (04-13 @ 00:30)      Calcium: 8.5 (04-14 @ 02:01)  Calcium: 8.3 *L* (04-13 @ 00:30)  Albumin: 3.5 (04-14 @ 02:01)  Albumin: 3.0 *L* (04-13 @ 00:30)    Alanine Aminotransferase (ALT/SGPT): 22 (04-14 @ 02:01)  Alanine Aminotransferase (ALT/SGPT): 27 (04-13 @ 00:30)  Alkaline Phosphatase: 62 (04-14 @ 02:01)  Alkaline Phosphatase: 74 (04-13 @ 00:30)  Aspartate Aminotransferase (AST/SGOT): 19 (04-14 @ 02:01)  Aspartate Aminotransferase (AST/SGOT): 28 (04-13 @ 00:30)        04-14    136  |  99  |  28<H>  ----------------------------<  151<H>  3.4<L>   |  23  |  1.59<H>    Ca    8.5      14 Apr 2024 02:01  Phos  3.5     04-14  Mg     2.0     04-14    TPro  5.6<L>  /  Alb  3.5  /  TBili  1.2  /  DBili  x   /  AST  19  /  ALT  22  /  AlkPhos  62  04-14                        9.1    6.94  )-----------( 211      ( 14 Apr 2024 02:01 )             26.8     Medications  MEDICATIONS  (STANDING):  aMIOdarone    Tablet   Oral   aMIOdarone    Tablet 400 milliGRAM(s) Oral every 8 hours  aspirin enteric coated 81 milliGRAM(s) Oral daily  atorvastatin 80 milliGRAM(s) Oral at bedtime  bisacodyl Suppository 10 milliGRAM(s) Rectal once  chlorhexidine 2% Cloths 1 Application(s) Topical daily  dextrose 50% Injectable 50 milliLiter(s) IV Push every 15 minutes  dextrose Oral Gel 15 Gram(s) Oral once  enoxaparin Injectable 40 milliGRAM(s) SubCutaneous every 24 hours  famotidine    Tablet 20 milliGRAM(s) Oral daily  glucagon  Injectable 1 milliGRAM(s) IntraMuscular once  insulin glargine Injectable (LANTUS) 30 Unit(s) SubCutaneous at bedtime  insulin lispro (ADMELOG) corrective regimen sliding scale   SubCutaneous three times a day before meals  insulin lispro Injectable (ADMELOG) 6 Unit(s) SubCutaneous three times a day before meals  milrinone Infusion 0.3 MICROgram(s)/kG/Min (6.47 mL/Hr) IV Continuous <Continuous>  piperacillin/tazobactam IVPB.. 3.375 Gram(s) IV Intermittent every 8 hours  polyethylene glycol 3350 17 Gram(s) Oral daily  senna 2 Tablet(s) Oral at bedtime  sodium chloride 0.9% lock flush 3 milliLiter(s) IV Push every 8 hours  sodium chloride 0.9%. 1000 milliLiter(s) (10 mL/Hr) IV Continuous <Continuous>      Physical Exam  Vital Signs Last 12 Hrs  T(F): 100.2 (04-14-24 @ 11:00), Max: 100.8 (04-14-24 @ 08:00)  HR: 80 (04-14-24 @ 11:00) (80 - 87)  BP: --  BP(mean): --  RR: 21 (04-14-24 @ 11:00) (16 - 35)  SpO2: 98% (04-14-24 @ 11:00) (92% - 100%)

## 2024-04-14 NOTE — CONSULT NOTE ADULT - ASSESSMENT
74yr M, PMHx of CVA and MI (12/2016), HTN, DM, HLD, CAD s/p cardiac stents, CHF, hx of malignant melanoma s/p excision, presented for CABG and AVR on 4/7. Underwent CABGX RSVG to diag and RCA, ascending aortic replacement with transverse hemiarch, AVR with inspirus bioprosthetic valve. He has been having fevers since 4/10. He received extended cefuroxime prophylaxis until 4/10. Was noted to have worsening fever tmax 101.1 on 4/12, started on Vancomycin and Zosyn.     No leucocytosis, rising Cr on labs.    Bcx 4/11, 4/12 NGTD    MRSA PCR Neg    UA 9 WBC, 191 RBC    CT Chest  Small left pleural effusion and complete atelectasis/consolidation of   left lower lobe.  Trace right pleural effusion and subsegmental atelectasis of right lower   lobe.      On zosyn    Patient to be seen. 74yr M, PMHx of CVA and MI (12/2016), HTN, DM, HLD, CAD s/p cardiac stents, CHF, hx of malignant melanoma s/p excision, presented for CABG and AVR on 4/7. Underwent CABGX RSVG to diag and RCA, ascending aortic replacement with transverse hemiarch, AVR with inspirus bioprosthetic valve. He has been having fevers since 4/10. He received extended cefuroxime prophylaxis until 4/10. Was noted to have worsening fever tmax 101.1 on 4/12, started on Vancomycin and Zosyn.     No leucocytosis, rising Cr on labs.    Bcx 4/11, 4/12 NGTD    MRSA PCR Neg    UA 9 WBC, 191 RBC    CT Chest  Small left pleural effusion and complete atelectasis/consolidation of   left lower lobe.  Trace right pleural effusion and subsegmental atelectasis of right lower   lobe.      # Post operative fevers  # Atelectasis/Consolidation of LLL, concern for pneumonia    Agree with zosyn [id number]  Send sputum cultures  Please check RVP  Follow up blood cultures  Continue to trend CBC, monitor for fever, monitor for alternate sources        All recommendations are tentative pending Attending Attestation.    Jose Griffith MD, PGY-4  ID Fellow  [employer] Teams Preferred  After 5pm/weekends call [phone]       74yr M, PMHx of CVA and MI (12/2016), HTN, DM, HLD, CAD s/p cardiac stents, CHF, hx of malignant melanoma s/p excision, presented for CABG and AVR on 4/7. Underwent CABGX RSVG to diag and RCA, ascending aortic replacement with transverse hemiarch, AVR with inspirus bioprosthetic valve. He has been having fevers since 4/10. He received extended cefuroxime prophylaxis until 4/10. Was noted to have worsening fever tmax 101.1 on 4/12, started on Vancomycin and Zosyn.     No leucocytosis, rising Cr on labs.    Bcx 4/11, 4/12 NGTD    MRSA PCR Neg    UA 9 WBC, 191 RBC    CT Chest  Small left pleural effusion and complete atelectasis/consolidation of   left lower lobe.  Trace right pleural effusion and subsegmental atelectasis of right lower   lobe.      # Post operative fevers  # Atelectasis/Consolidation of LLL, concern for pneumonia    Agree with zosyn [id number]  Send sputum cultures  can check RVP  Follow up blood cultures  Continue to trend CBC, monitor for fever, monitor for alternate sources      Discussed with attending    Jose Griffith MD, PGY-4  ID Fellow  [employer] Teams Preferred  After 5pm/weekends call [phone]

## 2024-04-14 NOTE — PROGRESS NOTE ADULT - ASSESSMENT
74yr M, PMHx of CVA and MI (12/2016), HTN, DM, HLD, CAD s/p cardiac stents, CHF, hx of malignant melanoma  CABG x2/ Ascending aortic replacement with transverse hemiarch/ AVR (Preop IABP) - 4/8/24  with aorta clamp time 185 min. pt on 4/13 noticed to have rising creatinine      1- CLAUDIA   2- CHF   3- CAD s/p cabg   4- DM       CLAUDIA in setting of requiring diuretics In addition   + fevers causing ATN  he is still in chf and requires continuation of diuretics   cont lasix 40 mg po bid   hypokalemia supplement kcl to K > 4   keep O> I   strict I/O  zosyn 3.375 G IVSS TID given fevers   d/w CTU team when seen earlier

## 2024-04-14 NOTE — PROGRESS NOTE ADULT - PROBLEM SELECTOR PLAN 1
Will continue Lantus 30 units at bed time.  Will decrease Admelog to 6 units before each meal in addition to Admelog correction scale coverage.  Will continue monitoring blood sugars and FU.  Patient counseled for compliance with consistent low carb diet.  .

## 2024-04-15 LAB
ALBUMIN SERPL ELPH-MCNC: 3.3 G/DL — SIGNIFICANT CHANGE UP (ref 3.3–5)
ALP SERPL-CCNC: 61 U/L — SIGNIFICANT CHANGE UP (ref 40–120)
ALT FLD-CCNC: 24 U/L — SIGNIFICANT CHANGE UP (ref 10–45)
ANION GAP SERPL CALC-SCNC: 11 MMOL/L — SIGNIFICANT CHANGE UP (ref 5–17)
APTT BLD: 26.8 SEC — SIGNIFICANT CHANGE UP (ref 24.5–35.6)
AST SERPL-CCNC: 22 U/L — SIGNIFICANT CHANGE UP (ref 10–40)
BASE EXCESS BLDV CALC-SCNC: 3.7 MMOL/L — HIGH (ref -2–3)
BASE EXCESS BLDV CALC-SCNC: 5.3 MMOL/L — HIGH (ref -2–3)
BASOPHILS # BLD AUTO: 0.02 K/UL — SIGNIFICANT CHANGE UP (ref 0–0.2)
BASOPHILS NFR BLD AUTO: 0.2 % — SIGNIFICANT CHANGE UP (ref 0–2)
BILIRUB SERPL-MCNC: 1 MG/DL — SIGNIFICANT CHANGE UP (ref 0.2–1.2)
BLD GP AB SCN SERPL QL: NEGATIVE — SIGNIFICANT CHANGE UP
BUN SERPL-MCNC: 29 MG/DL — HIGH (ref 7–23)
CA-I SERPL-SCNC: 1.15 MMOL/L — SIGNIFICANT CHANGE UP (ref 1.15–1.33)
CALCIUM SERPL-MCNC: 8.8 MG/DL — SIGNIFICANT CHANGE UP (ref 8.4–10.5)
CHLORIDE BLDV-SCNC: 100 MMOL/L — SIGNIFICANT CHANGE UP (ref 96–108)
CHLORIDE SERPL-SCNC: 101 MMOL/L — SIGNIFICANT CHANGE UP (ref 96–108)
CO2 BLDV-SCNC: 29 MMOL/L — HIGH (ref 22–26)
CO2 BLDV-SCNC: 31 MMOL/L — HIGH (ref 22–26)
CO2 SERPL-SCNC: 25 MMOL/L — SIGNIFICANT CHANGE UP (ref 22–31)
CREAT SERPL-MCNC: 1.63 MG/DL — HIGH (ref 0.5–1.3)
EGFR: 43 ML/MIN/1.73M2 — LOW
EOSINOPHIL # BLD AUTO: 0.18 K/UL — SIGNIFICANT CHANGE UP (ref 0–0.5)
EOSINOPHIL NFR BLD AUTO: 2.1 % — SIGNIFICANT CHANGE UP (ref 0–6)
GAS PNL BLDA: SIGNIFICANT CHANGE UP
GAS PNL BLDV: 133 MMOL/L — LOW (ref 136–145)
GAS PNL BLDV: SIGNIFICANT CHANGE UP
GAS PNL BLDV: SIGNIFICANT CHANGE UP
GLUCOSE BLDC GLUCOMTR-MCNC: 108 MG/DL — HIGH (ref 70–99)
GLUCOSE BLDC GLUCOMTR-MCNC: 110 MG/DL — HIGH (ref 70–99)
GLUCOSE BLDC GLUCOMTR-MCNC: 123 MG/DL — HIGH (ref 70–99)
GLUCOSE BLDC GLUCOMTR-MCNC: 218 MG/DL — HIGH (ref 70–99)
GLUCOSE BLDV-MCNC: 109 MG/DL — HIGH (ref 70–99)
GLUCOSE SERPL-MCNC: 136 MG/DL — HIGH (ref 70–99)
HCO3 BLDV-SCNC: 28 MMOL/L — SIGNIFICANT CHANGE UP (ref 22–29)
HCO3 BLDV-SCNC: 30 MMOL/L — HIGH (ref 22–29)
HCT VFR BLD CALC: 26.5 % — LOW (ref 39–50)
HCT VFR BLDA CALC: 30 % — LOW (ref 39–51)
HGB BLD CALC-MCNC: 9.9 G/DL — LOW (ref 12.6–17.4)
HGB BLD-MCNC: 9.1 G/DL — LOW (ref 13–17)
HOROWITZ INDEX BLDV+IHG-RTO: 21 — SIGNIFICANT CHANGE UP
HOROWITZ INDEX BLDV+IHG-RTO: 32 — SIGNIFICANT CHANGE UP
IMM GRANULOCYTES NFR BLD AUTO: 0.6 % — SIGNIFICANT CHANGE UP (ref 0–0.9)
INR BLD: 1.09 RATIO — SIGNIFICANT CHANGE UP (ref 0.85–1.18)
LACTATE BLDV-MCNC: 1 MMOL/L — SIGNIFICANT CHANGE UP (ref 0.5–2)
LYMPHOCYTES # BLD AUTO: 1.23 K/UL — SIGNIFICANT CHANGE UP (ref 1–3.3)
LYMPHOCYTES # BLD AUTO: 14.6 % — SIGNIFICANT CHANGE UP (ref 13–44)
MAGNESIUM SERPL-MCNC: 1.9 MG/DL — SIGNIFICANT CHANGE UP (ref 1.6–2.6)
MCHC RBC-ENTMCNC: 28.6 PG — SIGNIFICANT CHANGE UP (ref 27–34)
MCHC RBC-ENTMCNC: 34.3 GM/DL — SIGNIFICANT CHANGE UP (ref 32–36)
MCV RBC AUTO: 83.3 FL — SIGNIFICANT CHANGE UP (ref 80–100)
MONOCYTES # BLD AUTO: 1.21 K/UL — HIGH (ref 0–0.9)
MONOCYTES NFR BLD AUTO: 14.3 % — HIGH (ref 2–14)
NEUTROPHILS # BLD AUTO: 5.76 K/UL — SIGNIFICANT CHANGE UP (ref 1.8–7.4)
NEUTROPHILS NFR BLD AUTO: 68.2 % — SIGNIFICANT CHANGE UP (ref 43–77)
NRBC # BLD: 0 /100 WBCS — SIGNIFICANT CHANGE UP (ref 0–0)
PCO2 BLDV: 39 MMHG — LOW (ref 42–55)
PCO2 BLDV: 43 MMHG — SIGNIFICANT CHANGE UP (ref 42–55)
PH BLDV: 7.45 — HIGH (ref 7.32–7.43)
PH BLDV: 7.46 — HIGH (ref 7.32–7.43)
PHOSPHATE SERPL-MCNC: 2.9 MG/DL — SIGNIFICANT CHANGE UP (ref 2.5–4.5)
PLATELET # BLD AUTO: 257 K/UL — SIGNIFICANT CHANGE UP (ref 150–400)
PO2 BLDV: 32 MMHG — SIGNIFICANT CHANGE UP (ref 25–45)
PO2 BLDV: 36 MMHG — SIGNIFICANT CHANGE UP (ref 25–45)
POTASSIUM BLDV-SCNC: 3.5 MMOL/L — SIGNIFICANT CHANGE UP (ref 3.5–5.1)
POTASSIUM SERPL-MCNC: 3.5 MMOL/L — SIGNIFICANT CHANGE UP (ref 3.5–5.3)
POTASSIUM SERPL-SCNC: 3.5 MMOL/L — SIGNIFICANT CHANGE UP (ref 3.5–5.3)
PROT SERPL-MCNC: 5.7 G/DL — LOW (ref 6–8.3)
PROTHROM AB SERPL-ACNC: 12 SEC — SIGNIFICANT CHANGE UP (ref 9.5–13)
RBC # BLD: 3.18 M/UL — LOW (ref 4.2–5.8)
RBC # FLD: 13.8 % — SIGNIFICANT CHANGE UP (ref 10.3–14.5)
RH IG SCN BLD-IMP: NEGATIVE — SIGNIFICANT CHANGE UP
SAO2 % BLDV: 51.3 % — LOW (ref 67–88)
SAO2 % BLDV: 63.2 % — LOW (ref 67–88)
SODIUM SERPL-SCNC: 137 MMOL/L — SIGNIFICANT CHANGE UP (ref 135–145)
WBC # BLD: 8.45 K/UL — SIGNIFICANT CHANGE UP (ref 3.8–10.5)
WBC # FLD AUTO: 8.45 K/UL — SIGNIFICANT CHANGE UP (ref 3.8–10.5)

## 2024-04-15 PROCEDURE — 99292 CRITICAL CARE ADDL 30 MIN: CPT | Mod: FS

## 2024-04-15 PROCEDURE — 99232 SBSQ HOSP IP/OBS MODERATE 35: CPT

## 2024-04-15 PROCEDURE — 99291 CRITICAL CARE FIRST HOUR: CPT

## 2024-04-15 PROCEDURE — 71045 X-RAY EXAM CHEST 1 VIEW: CPT | Mod: 26

## 2024-04-15 RX ORDER — POTASSIUM CHLORIDE 20 MEQ
10 PACKET (EA) ORAL
Refills: 0 | Status: COMPLETED | OUTPATIENT
Start: 2024-04-15 | End: 2024-04-15

## 2024-04-15 RX ORDER — MAGNESIUM SULFATE 500 MG/ML
2 VIAL (ML) INJECTION ONCE
Refills: 0 | Status: COMPLETED | OUTPATIENT
Start: 2024-04-15 | End: 2024-04-15

## 2024-04-15 RX ORDER — HEPARIN SODIUM 5000 [USP'U]/ML
5000 INJECTION INTRAVENOUS; SUBCUTANEOUS EVERY 8 HOURS
Refills: 0 | Status: DISCONTINUED | OUTPATIENT
Start: 2024-04-15 | End: 2024-04-22

## 2024-04-15 RX ORDER — CALCIUM GLUCONATE 100 MG/ML
2 VIAL (ML) INTRAVENOUS ONCE
Refills: 0 | Status: COMPLETED | OUTPATIENT
Start: 2024-04-15 | End: 2024-04-15

## 2024-04-15 RX ORDER — TAMSULOSIN HYDROCHLORIDE 0.4 MG/1
0.4 CAPSULE ORAL AT BEDTIME
Refills: 0 | Status: DISCONTINUED | OUTPATIENT
Start: 2024-04-15 | End: 2024-04-22

## 2024-04-15 RX ADMIN — SODIUM CHLORIDE 3 MILLILITER(S): 9 INJECTION INTRAMUSCULAR; INTRAVENOUS; SUBCUTANEOUS at 05:27

## 2024-04-15 RX ADMIN — Medication 50 MILLIEQUIVALENT(S): at 02:02

## 2024-04-15 RX ADMIN — FAMOTIDINE 20 MILLIGRAM(S): 10 INJECTION INTRAVENOUS at 12:17

## 2024-04-15 RX ADMIN — AMIODARONE HYDROCHLORIDE 400 MILLIGRAM(S): 400 TABLET ORAL at 13:07

## 2024-04-15 RX ADMIN — HEPARIN SODIUM 5000 UNIT(S): 5000 INJECTION INTRAVENOUS; SUBCUTANEOUS at 22:08

## 2024-04-15 RX ADMIN — TAMSULOSIN HYDROCHLORIDE 0.4 MILLIGRAM(S): 0.4 CAPSULE ORAL at 22:08

## 2024-04-15 RX ADMIN — Medication 25 GRAM(S): at 09:31

## 2024-04-15 RX ADMIN — Medication 6 UNIT(S): at 09:31

## 2024-04-15 RX ADMIN — ATORVASTATIN CALCIUM 80 MILLIGRAM(S): 80 TABLET, FILM COATED ORAL at 22:09

## 2024-04-15 RX ADMIN — OXYCODONE HYDROCHLORIDE 5 MILLIGRAM(S): 5 TABLET ORAL at 00:00

## 2024-04-15 RX ADMIN — PIPERACILLIN AND TAZOBACTAM 25 GRAM(S): 4; .5 INJECTION, POWDER, LYOPHILIZED, FOR SOLUTION INTRAVENOUS at 18:06

## 2024-04-15 RX ADMIN — MILRINONE LACTATE 6.47 MICROGRAM(S)/KG/MIN: 1 INJECTION, SOLUTION INTRAVENOUS at 01:06

## 2024-04-15 RX ADMIN — Medication 5 MILLIGRAM(S): at 22:08

## 2024-04-15 RX ADMIN — SODIUM CHLORIDE 3 MILLILITER(S): 9 INJECTION INTRAMUSCULAR; INTRAVENOUS; SUBCUTANEOUS at 12:56

## 2024-04-15 RX ADMIN — Medication 6 UNIT(S): at 17:00

## 2024-04-15 RX ADMIN — Medication 200 GRAM(S): at 01:06

## 2024-04-15 RX ADMIN — POLYETHYLENE GLYCOL 3350 17 GRAM(S): 17 POWDER, FOR SOLUTION ORAL at 12:17

## 2024-04-15 RX ADMIN — CHLORHEXIDINE GLUCONATE 1 APPLICATION(S): 213 SOLUTION TOPICAL at 12:17

## 2024-04-15 RX ADMIN — HEPARIN SODIUM 5000 UNIT(S): 5000 INJECTION INTRAVENOUS; SUBCUTANEOUS at 13:07

## 2024-04-15 RX ADMIN — Medication 81 MILLIGRAM(S): at 12:17

## 2024-04-15 RX ADMIN — INSULIN GLARGINE 30 UNIT(S): 100 INJECTION, SOLUTION SUBCUTANEOUS at 22:07

## 2024-04-15 RX ADMIN — Medication 50 MILLIEQUIVALENT(S): at 01:05

## 2024-04-15 RX ADMIN — PIPERACILLIN AND TAZOBACTAM 25 GRAM(S): 4; .5 INJECTION, POWDER, LYOPHILIZED, FOR SOLUTION INTRAVENOUS at 01:06

## 2024-04-15 RX ADMIN — PIPERACILLIN AND TAZOBACTAM 25 GRAM(S): 4; .5 INJECTION, POWDER, LYOPHILIZED, FOR SOLUTION INTRAVENOUS at 12:15

## 2024-04-15 RX ADMIN — SODIUM CHLORIDE 3 MILLILITER(S): 9 INJECTION INTRAMUSCULAR; INTRAVENOUS; SUBCUTANEOUS at 21:00

## 2024-04-15 RX ADMIN — Medication 25 GRAM(S): at 03:19

## 2024-04-15 RX ADMIN — Medication 6 UNIT(S): at 12:16

## 2024-04-15 RX ADMIN — SENNA PLUS 2 TABLET(S): 8.6 TABLET ORAL at 22:08

## 2024-04-15 RX ADMIN — AMIODARONE HYDROCHLORIDE 400 MILLIGRAM(S): 400 TABLET ORAL at 06:34

## 2024-04-15 RX ADMIN — Medication 50 MILLIEQUIVALENT(S): at 03:19

## 2024-04-15 NOTE — PROGRESS NOTE ADULT - SUBJECTIVE AND OBJECTIVE BOX
Patient seen and examined at the bedside.    Remained critically ill on continuous ICU monitoring.    OBJECTIVE:  Vital Signs Last 24 Hrs  T(C): 37.9 (15 Apr 2024 16:00), Max: 38 (14 Apr 2024 20:00)  T(F): 100.2 (15 Apr 2024 16:00), Max: 100.4 (14 Apr 2024 20:00)  HR: 83 (15 Apr 2024 18:00) (79 - 89)  BP: --  BP(mean): --  RR: 20 (15 Apr 2024 18:00) (10 - 47)  SpO2: 97% (15 Apr 2024 18:00) (92% - 97%)    Parameters below as of 15 Apr 2024 04:00  Patient On (Oxygen Delivery Method): room air          Physical Exam:   General: NAD   Neurology: nonfocal   Eyes: bilateral pupils equal and reactive   ENT/Neck: Neck supple, trachea midline, No JVD   Respiratory: Clear bilaterally   CV: S1S2, no murmurs        [x] Sternal dressing, [x] Mediastinal CT x2        [x] SR [x] Temporary pacing VVI 40  Abdominal: Soft, NT, ND +BS   Extremities: 1-2+ pedal edema noted, + peripheral pulses   Skin: No Rashes, Hematoma, Ecchymosis                           Assessment:  74yr M, PMHx of CVA and MI (12/2016), HTN, DM, HLD, CAD s/p cardiac stents, CHF, hx of malignant melanoma coming in for wide excision 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  Py presents for PST for ascending aorta replacement, aortic valve replacement, coronary artery bypass grafting with Dr. Ilia Ortiz.  He has been admitted preop for w/u - CT head, pt has not taken Farxiga since Wednesday as per orders from Dr. Goddard NP.    CAD s/p CABGx3 4/8/24  Post-op blood loss anemia  Hemodynamic Instability   Hypovolemia  Post-op respiratory failure  Leukocytosis  Thrombocytopenia       Plan:   ***Neuro***  [x] Hx of CVA   [x] Nonfocal     [x] Tylenol, Gapapentin, Meperidine, and PRNS for pain management  Continue Zofran  Post operative neuro assessment     ***Cardiovascular***  Invasive hemodynamic monitoring, assess perfusion indices   SR / CVP 3 / MAP 73/ Hct 26.5 / Lactate 1.0  [x] Primacor 0.3 mcgs/kg/min  [x] Amiodarone for a fib prophylaxis   [x] Heparin for AC therapy   Volume: [x] Albumin 200 cc [x] 2 PRBC   Reassessment of hemodynamics post resuscitation    Monitor chest tube outputs    [x] ASA [x] Statin  Serial EKG and cardiac enzymes     ***Pulmonary***  [x] Room Air   Titration of FiO2 and PEEP, follow SpO2, CXR, blood gasses   Encourage incentive spirometry, continue pulse ox monitoring, follow ABGs             ***GI***  [x] Tolerating Diet   [x] Pepcid  [x] Miralax and Senna for Bowel Regimen    ***Renal***   Continue to monitor I/Os, BUN/Creatinine.   Replete lytes PRN  Diuresis with lasix and tamsulosin   Guevara present      ***ID***  Zosyn for empiric dosing     ***Endocrine***  [x] DM: HbA1c 9.8%             - [x] Insulin gtt  [x] Lantus [x] ISS             - Need tight glycemic control to prevent wound infection.        Patient requires continuous monitoring with bedside rhythm monitoring, pulse oximetry monitoring, and continuous central venous and arterial pressure monitoring; and intermittent blood gas analysis. Care plan discussed with the ICU care team.   Patient remained critical, at risk for life threatening decompensation.    I have spent 55 minutes providing critical care management to this patient.    By signing my name below, I,Vanna Santana, attest that this documentation has been prepared under the direction and in the presence of Cheryl Biswas NP.   Electronically signed: Flower Mcdonnell, 04-15-24 @ 19:45    I, Cheryl Biswas, personally performed the services described in this documentation. all medical record entries made by the flower were at my direction and in my presence. I have reviewed the chart and agree that the record reflects my personal performance and is accurate and complete  Electronically signed:  Cheryl Biswas NP.

## 2024-04-15 NOTE — PROGRESS NOTE ADULT - ASSESSMENT
74yr M, PMHx of CVA and MI (12/2016), HTN, DM, HLD, CAD s/p cardiac stents, CHF, hx of malignant melanoma, now s/p ascending aortic arch replacement, CABG AVR.  Assessment  DMT2: 74y Male with DM T2 with hyperglycemia, A1C is 9.8% , was on oral meds at home, now postop on basal bolus insulin, decreased dose yesterday, blood sugars improving and in acceptable range, no hypoglycemias.  CAD: on medications, stable, monitored. s/p CABG  Aneursym: s/p hemiarch replacement , AVR-t , CTU monitored.     Elodia Bach MD  Cell: [phone]  Office: [phone]             74yr M, PMHx of CVA and MI (12/2016), HTN, DM, HLD, CAD s/p cardiac stents, CHF, hx of malignant melanoma, now s/p ascending aortic arch replacement, CABG AVR.  Assessment  DMT2: 74y Male with DM T2 with hyperglycemia, A1C is 9.8% , was on oral meds at home, now postop on  basal bolus insulin, decreased dose yesterday, blood sugars improving and in acceptable range, no hypoglycemias.  CAD: on medications, stable, monitored. s/p CABG  Aneursym: s/p hemiarch replacement , AVR-t , CTU monitored.     Elodia Bach MD  Cell: [phone]  Office: [phone]

## 2024-04-15 NOTE — PROGRESS NOTE ADULT - ASSESSMENT
74yr M, PMHx of CVA and MI (12/2016), HTN, DM, HLD, CAD s/p cardiac stents, CHF, hx of malignant melanoma  CABG x2/ Ascending aortic replacement with transverse hemiarch/ AVR (Preop IABP) - 4/8/24  with aorta clamp time 185 min. pt on 4/13 noticed to have rising creatinine      1- CLAUDIA   2- CHF   3- CAD s/p cabg   4- DM       CLAUDIA in setting of requiring diuretics In addition   + fevers causing ATN  fluid status is improving. diurese prn   k is better   keep O> I   strict I/O  zosyn 3.375 G IVSS TID given fevers

## 2024-04-15 NOTE — PROGRESS NOTE ADULT - SUBJECTIVE AND OBJECTIVE BOX
[facility]   [phone]  RENAL FOLLOW UP NOTE  --------------------------------------------------------------------------------  Chief Complaint:    24 hour events/subjective:    seen earlier   + incision site pain  no worsening sob     PAST HISTORY  --------------------------------------------------------------------------------  No significant changes to PMH, PSH, FHx, SHx, unless otherwise noted    ALLERGIES & MEDICATIONS  --------------------------------------------------------------------------------  Allergies    No Known Allergies  Allergy Status Unknown    Intolerances      Standing Inpatient Medications  aMIOdarone    Tablet 200 milliGRAM(s) Oral daily  aMIOdarone    Tablet   Oral   aspirin enteric coated 81 milliGRAM(s) Oral daily  atorvastatin 80 milliGRAM(s) Oral at bedtime  bisacodyl Suppository 10 milliGRAM(s) Rectal once  chlorhexidine 2% Cloths 1 Application(s) Topical daily  dextrose 50% Injectable 50 milliLiter(s) IV Push every 15 minutes  dextrose Oral Gel 15 Gram(s) Oral once  famotidine    Tablet 20 milliGRAM(s) Oral daily  glucagon  Injectable 1 milliGRAM(s) IntraMuscular once  heparin   Injectable 5000 Unit(s) SubCutaneous every 8 hours  insulin glargine Injectable (LANTUS) 30 Unit(s) SubCutaneous at bedtime  insulin lispro (ADMELOG) corrective regimen sliding scale   SubCutaneous three times a day before meals  insulin lispro Injectable (ADMELOG) 6 Unit(s) SubCutaneous three times a day before meals  melatonin 5 milliGRAM(s) Oral at bedtime  milrinone Infusion 0.3 MICROgram(s)/kG/Min IV Continuous <Continuous>  piperacillin/tazobactam IVPB.. 3.375 Gram(s) IV Intermittent every 8 hours  polyethylene glycol 3350 17 Gram(s) Oral daily  senna 2 Tablet(s) Oral at bedtime  sodium chloride 0.9% lock flush 3 milliLiter(s) IV Push every 8 hours  sodium chloride 0.9%. 1000 milliLiter(s) IV Continuous <Continuous>  tamsulosin 0.4 milliGRAM(s) Oral at bedtime    PRN Inpatient Medications  acetaminophen     Tablet .. 650 milliGRAM(s) Oral every 6 hours PRN  oxyCODONE    IR 10 milliGRAM(s) Oral every 4 hours PRN  oxyCODONE    IR 5 milliGRAM(s) Oral every 4 hours PRN      REVIEW OF SYSTEMS  --------------------------------------------------------------------------------    Gen: denies  fevers/chills,  CVS: denies chest pain/palpitations  Resp: denies SOB/Cough  GI: Denies N/V/Abd pain  : Denies dysuria    VITALS/PHYSICAL EXAM  --------------------------------------------------------------------------------  T(C): 37.9 (04-15-24 @ 16:00), Max: 38 (04-14-24 @ 20:00)  HR: 83 (04-15-24 @ 18:00) (79 - 89)  BP: --  RR: 20 (04-15-24 @ 18:00) (10 - 47)  SpO2: 97% (04-15-24 @ 18:00) (92% - 97%)  Wt(kg): --        04-14-24 @ 07:01  -  04-15-24 @ 07:00  --------------------------------------------------------  IN: 1051 mL / OUT: 2330 mL / NET: -1279 mL    04-15-24 @ 07:01  -  04-15-24 @ 19:47  --------------------------------------------------------  IN: 611.4 mL / OUT: 990 mL / NET: -378.6 mL      Physical Exam:  	    	Gen: Non toxic comfortable appearing  on O2   	no jvd   	Pulm: decrease bs  no rales or ronchi or wheezing  	CV: RRR, S1S2; no rub  	Back: No CVA tenderness  	Abd: +BS, soft, nontender/ + distended  	: No suprapubic tenderness  	UE: Warm, no cyanosis  no clubbing, +  edema  	LE: Warm, no cyanosis  no clubbing, 2 -   edema  	Neuro: alert and oriented. speech coherent       LABS/STUDIES  --------------------------------------------------------------------------------              9.1    8.45  >-----------<  257      [04-15-24 @ 00:32]              26.5     137  |  101  |  29  ----------------------------<  136      [04-15-24 @ 00:32]  3.5   |  25  |  1.63        Ca     8.8     [04-15-24 @ 00:32]      Mg     1.9     [04-15-24 @ 00:32]      Phos  2.9     [04-15-24 @ 00:32]    TPro  5.7  /  Alb  3.3  /  TBili  1.0  /  DBili  x   /  AST  22  /  ALT  24  /  AlkPhos  61  [04-15-24 @ 00:32]    PT/INR: PT 12.0 , INR 1.09       [04-15-24 @ 11:45]  PTT: 26.8       [04-15-24 @ 11:45]      Creatinine Trend:  SCr 1.63 [04-15 @ 00:32]  SCr 1.59 [04-14 @ 02:01]  SCr 1.61 [04-13 @ 00:30]  SCr 1.30 [04-12 @ 00:48]  SCr 1.17 [04-11 @ 00:26]        TSH 1.71      [04-07-24 @ 15:28]

## 2024-04-15 NOTE — PROGRESS NOTE ADULT - SUBJECTIVE AND OBJECTIVE BOX
Patient seen and examined at the bedside.    Remains critically ill on continuous ICU monitoring.      Brief Summary:  74yr M with CAD s/p CABGx3 4/8/24      24 Hour events:      Objective:  Vital Signs Last 24 Hrs  T(C): 37.8 (15 Apr 2024 04:00), Max: 38.2 (14 Apr 2024 08:00)  T(F): 100 (15 Apr 2024 04:00), Max: 100.8 (14 Apr 2024 08:00)  HR: 80 (15 Apr 2024 06:00) (77 - 92)  BP: --  BP(mean): --  RR: 32 (15 Apr 2024 06:00) (16 - 47)  SpO2: 95% (15 Apr 2024 06:00) (92% - 100%)    Parameters below as of 15 Apr 2024 04:00  Patient On (Oxygen Delivery Method): room air                    Physical Exam:   General: Alert and interactive   Neurology: Oriented, following commands  Respiratory: Clear bilaterally   CV: Sinus  Abdominal: Soft, Nontender  Extremities: Warm, well-perfused  -------------------------------------------------------------------------------------------------------------------------------    Labs:                        9.1    8.45  )-----------( 257      ( 15 Apr 2024 00:32 )             26.5     04-15    137  |  101  |  29<H>  ----------------------------<  136<H>  3.5   |  25  |  1.63<H>    Ca    8.8      15 Apr 2024 00:32  Phos  2.9     04-15  Mg     1.9     04-15    TPro  5.7<L>  /  Alb  3.3  /  TBili  1.0  /  DBili  x   /  AST  22  /  ALT  24  /  AlkPhos  61  04-15    LIVER FUNCTIONS - ( 15 Apr 2024 00:32 )  Alb: 3.3 g/dL / Pro: 5.7 g/dL / ALK PHOS: 61 U/L / ALT: 24 U/L / AST: 22 U/L / GGT: x             ABG - ( 15 Apr 2024 00:17 )  pH, Arterial: 7.56  pH, Blood: x     /  pCO2: 29    /  pO2: 74    / HCO3: 26    / Base Excess: 4.0   /  SaO2: 96.5              ------------------------------------------------------------------------------------------------------------------------------  Assessment:  74yr M with CAD s/p CABGx3 4/8/24    Post-op blood loss anemia  Hemodynamic Instability   Hypovolemia       Plan:   ***Neuro***  Postoperative acute pain control with Tylenol and prns.  Melatonin at night.    ***Cardiovascular***  At high risk for hemodynamic instability and cardiac arrhythmias.  Continue Milrinone   Amiodarone for rate control  ASA/ Statin daily.      ***Pulmonary***  On room air.      ***GI***  Tolerating diet.   Pepcid for stress ulcer prophylaxis   Bowel regimen.    ***Renal***  Trend Creatinine   Guevara catheter for strict I/O measurements.    Replete electrolytes as indicated.      ***ID***  Empiric Zosyn     ***Endocrine***  Hyperglycemia- Insulin Sliding Scale/ Lantus     ***Hematology***  Acute blood loss anemia and thrombocytopenia - no current transfusion indication    Lovenox for DVT prophylaxis           Patient requires continuous monitoring with bedside rhythm monitoring, pulse oximetry monitoring, and continuous central venous and arterial pressure monitoring; and intermittent blood gas analysis. Care plan discussed with the ICU care team.   Patient remains critical, at risk for life threatening decompensation.    I have spent 30 minutes providing critical care management to this patient.    By signing my name below, I, Sly Cline, attest that this documentation has been prepared under the direction and in the presence of Henrietta Belcher MD  Electronically signed: Sly Son, 04-15-24 @ 06:41    I, Henrietta Belcher MD, personally performed the services described in this documentation. all medical record entries made by the scribe were at my direction and in my presence. I have reviewed the chart and agree that the record reflects my personal performance and is accurate and complete  Electronically signed: Henrietta Belcher MD Patient seen and examined at the bedside.    Remains critically ill on continuous ICU monitoring.      Brief Summary:  74yr M with CAD s/p CABGx3 on 4/8/24      24 Hour events:  Remains on Milrinone - decreased to 0.3 mcg/kg/min this morning.  Declined BIPAP overnight.  OOB to chair this morning.      Objective:  Vital Signs Last 24 Hrs  T(C): 37.8 (15 Apr 2024 04:00), Max: 38.2 (14 Apr 2024 08:00)  T(F): 100 (15 Apr 2024 04:00), Max: 100.8 (14 Apr 2024 08:00)  HR: 80 (15 Apr 2024 06:00) (77 - 92)  BP: --  BP(mean): --  RR: 32 (15 Apr 2024 06:00) (16 - 47)  SpO2: 95% (15 Apr 2024 06:00) (92% - 100%)       Physical Exam:   General: Alert and interactive   Neurology: Oriented, following commands  Respiratory: Clear bilaterally   CV: Sinus  Abdominal: Soft, Nontender  Extremities: Warm, well-perfused  Guevara    -------------------------------------------------------------------------------------------------------------------------------    Labs:                        9.1    8.45  )-----------( 257      ( 15 Apr 2024 00:32 )             26.5     04-15    137  |  101  |  29<H>  ----------------------------<  136<H>  3.5   |  25  |  1.63<H>    Ca    8.8      15 Apr 2024 00:32  Phos  2.9     04-15  Mg     1.9     04-15    TPro  5.7<L>  /  Alb  3.3  /  TBili  1.0  /  DBili  x   /  AST  22  /  ALT  24  /  AlkPhos  61  04-15    LIVER FUNCTIONS - ( 15 Apr 2024 00:32 )  Alb: 3.3 g/dL / Pro: 5.7 g/dL / ALK PHOS: 61 U/L / ALT: 24 U/L / AST: 22 U/L / GGT: x             ABG - ( 15 Apr 2024 00:17 )  pH, Arterial: 7.56  pH, Blood: x     /  pCO2: 29    /  pO2: 74    / HCO3: 26    / Base Excess: 4.0   /  SaO2: 96.5        ------------------------------------------------------------------------------------------------------------------------------  Assessment:  74yr M with CAD s/p CABGx3 on 4/8/24    Hemodynamic instability   Postop A fib, currently in sinus rhythm  Postop pulmonary insufficiency  Acute kidney injury  Hypokalemia  Hypomagnesemia  Hyperglycemia       Plan:   ***Neuro***  Postoperative acute pain control with Tylenol and prns.  Optimize day/night cycles.  Melatonin at night.    ***Cardiovascular***  At high risk for hemodynamic instability and cardiac arrhythmias.  Remains on Milrinone - slow wean.  Amiodarone load in progress, currently in sinus  ASA/ Statin daily.    ***Pulmonary***  BIPAP overnight as tolerated.  Wean oxygen as able.  Deep breathing and coughing exercises.    ***GI***  Tolerating diet.   Pepcid for stress ulcer prophylaxis   Bowel regimen.    ***Renal***  CLAUDIA - Trend Creatinine   Resume home Flomax.  Guevara catheter for strict I/O measurements.    Potassium and Magnesium repleted.    ***ID***  Empiric Zosyn - if cultures remain negative tomorrow, likely can stop.    ***Endocrine***  Hyperglycemia - Insulin Sliding Scale/ Lantus     ***Hematology***  Acute blood loss anemia - no current transfusion indication    Transition to SQ Heparin for VTE prophylaxis in setting of CLAUDIA.      Patient requires continuous monitoring with bedside rhythm monitoring, pulse oximetry monitoring, and continuous central venous and arterial pressure monitoring; and intermittent blood gas analysis. Care plan discussed with the ICU care team.   Patient remains critical, at risk for life threatening decompensation.    I have spent 50 minutes providing critical care management to this patient.    By signing my name below, I, Sly Cline, attest that this documentation has been prepared under the direction and in the presence of Henrietta Belcher MD  Electronically signed: Sly Cline, 04-15-24 @ 06:41    I, Henrietta Belcher MD, personally performed the services described in this documentation. all medical record entries made by the scribe were at my direction and in my presence. I have reviewed the chart and agree that the record reflects my personal performance and is accurate and complete  Electronically signed: Henrietta Belcher MD

## 2024-04-15 NOTE — PROGRESS NOTE ADULT - SUBJECTIVE AND OBJECTIVE BOX
Chief complaint  Patient is a 74y old  Male who presents with a chief complaint of preop as/aortic aneurysm & dvd (14 Apr 2024 15:42)   Review of systems  No hypoglycemic episodes.    Labs and Fingersticks  CAPILLARY BLOOD GLUCOSE      POCT Blood Glucose.: 123 mg/dL (15 Apr 2024 11:27)  POCT Blood Glucose.: 110 mg/dL (15 Apr 2024 06:37)  POCT Blood Glucose.: 151 mg/dL (14 Apr 2024 21:23)  POCT Blood Glucose.: 77 mg/dL (14 Apr 2024 16:09)      Anion Gap: 11 (04-15 @ 00:32)  Anion Gap: 14 (04-14 @ 02:01)      Calcium: 8.8 (04-15 @ 00:32)  Calcium: 8.5 (04-14 @ 02:01)  Albumin: 3.3 (04-15 @ 00:32)  Albumin: 3.5 (04-14 @ 02:01)    Alanine Aminotransferase (ALT/SGPT): 24 (04-15 @ 00:32)  Alanine Aminotransferase (ALT/SGPT): 22 (04-14 @ 02:01)  Alkaline Phosphatase: 61 (04-15 @ 00:32)  Alkaline Phosphatase: 62 (04-14 @ 02:01)  Aspartate Aminotransferase (AST/SGOT): 22 (04-15 @ 00:32)  Aspartate Aminotransferase (AST/SGOT): 19 (04-14 @ 02:01)        04-15    137  |  101  |  29<H>  ----------------------------<  136<H>  3.5   |  25  |  1.63<H>    Ca    8.8      15 Apr 2024 00:32  Phos  2.9     04-15  Mg     1.9     04-15    TPro  5.7<L>  /  Alb  3.3  /  TBili  1.0  /  DBili  x   /  AST  22  /  ALT  24  /  AlkPhos  61  04-15                        9.1    8.45  )-----------( 257      ( 15 Apr 2024 00:32 )             26.5     Medications  MEDICATIONS  (STANDING):  aMIOdarone    Tablet   Oral   aMIOdarone    Tablet 400 milliGRAM(s) Oral every 8 hours  aMIOdarone    Tablet 200 milliGRAM(s) Oral daily  aspirin enteric coated 81 milliGRAM(s) Oral daily  atorvastatin 80 milliGRAM(s) Oral at bedtime  bisacodyl Suppository 10 milliGRAM(s) Rectal once  chlorhexidine 2% Cloths 1 Application(s) Topical daily  dextrose 50% Injectable 50 milliLiter(s) IV Push every 15 minutes  dextrose Oral Gel 15 Gram(s) Oral once  famotidine    Tablet 20 milliGRAM(s) Oral daily  glucagon  Injectable 1 milliGRAM(s) IntraMuscular once  heparin   Injectable 5000 Unit(s) SubCutaneous every 8 hours  insulin glargine Injectable (LANTUS) 30 Unit(s) SubCutaneous at bedtime  insulin lispro (ADMELOG) corrective regimen sliding scale   SubCutaneous three times a day before meals  insulin lispro Injectable (ADMELOG) 6 Unit(s) SubCutaneous three times a day before meals  melatonin 5 milliGRAM(s) Oral at bedtime  milrinone Infusion 0.3 MICROgram(s)/kG/Min (6.47 mL/Hr) IV Continuous <Continuous>  piperacillin/tazobactam IVPB.. 3.375 Gram(s) IV Intermittent every 8 hours  polyethylene glycol 3350 17 Gram(s) Oral daily  senna 2 Tablet(s) Oral at bedtime  sodium chloride 0.9% lock flush 3 milliLiter(s) IV Push every 8 hours  sodium chloride 0.9%. 1000 milliLiter(s) (10 mL/Hr) IV Continuous <Continuous>  tamsulosin 0.4 milliGRAM(s) Oral at bedtime      Physical Exam  Vital Signs Last 12 Hrs  T(F): 99.9 (04-15-24 @ 08:00), Max: 100 (04-15-24 @ 04:00)  HR: 84 (04-15-24 @ 10:00) (79 - 84)  BP: --  BP(mean): --  RR: 23 (04-15-24 @ 10:00) (10 - 47)  SpO2: 97% (04-15-24 @ 10:00) (94% - 97%)         Chief complaint  Patient is a 74y old  Male who presents with a chief complaint of preop as/aortic aneurysm & dvd (14 Apr 2024 15:42)   Review of systems  No hypoglycemic episodes.    Labs and Fingersticks  CAPILLARY BLOOD GLUCOSE      POCT Blood Glucose.: 123 mg/dL (15 Apr 2024 11:27)  POCT Blood Glucose.: 110 mg/dL (15 Apr 2024 06:37)  POCT Blood Glucose.: 151 mg/dL (14 Apr 2024 21:23)  POCT Blood Glucose.: 77 mg/dL (14 Apr 2024 16:09)      Anion Gap: 11 (04-15 @ 00:32)  Anion Gap: 14 (04-14 @ 02:01)      Calcium: 8.8 (04-15 @ 00:32)  Calcium: 8.5 (04-14 @ 02:01)  Albumin: 3.3 (04-15 @ 00:32)  Albumin: 3.5 (04-14 @ 02:01)    Alanine Aminotransferase (ALT/SGPT): 24 (04-15 @ 00:32)  Alanine Aminotransferase (ALT/SGPT): 22 (04-14 @ 02:01)  Alkaline Phosphatase: 61 (04-15 @ 00:32)  Alkaline Phosphatase: 62 (04-14 @ 02:01)  Aspartate Aminotransferase (AST/SGOT): 22 (04-15 @ 00:32)  Aspartate Aminotransferase (AST/SGOT): 19 (04-14 @ 02:01)        04-15    137  |  101  |  29<H>  ----------------------------<  136<H>  3.5   |  25  |  1.63<H>    Ca    8.8      15 Apr 2024 00:32  Phos  2.9     04-15  Mg     1.9     04-15    TPro  5.7<L>  /  Alb  3.3  /  TBili  1.0  /  DBili  x   /  AST  22  /  ALT  24  /  AlkPhos  61  04-15                        9.1    8.45  )-----------( 257      ( 15 Apr 2024 00:32 )             26.5     Medications  MEDICATIONS  (STANDING):  aMIOdarone    Tablet   Oral   aMIOdarone    Tablet 400 milliGRAM(s) Oral every 8 hours  aMIOdarone    Tablet 200 milliGRAM(s) Oral daily  aspirin enteric coated 81 milliGRAM(s) Oral daily  atorvastatin 80 milliGRAM(s) Oral at bedtime  bisacodyl Suppository 10 milliGRAM(s) Rectal once  chlorhexidine 2% Cloths 1 Application(s) Topical daily  dextrose 50% Injectable 50 milliLiter(s) IV Push every 15 minutes  dextrose Oral Gel 15 Gram(s) Oral once  famotidine    Tablet 20 milliGRAM(s) Oral daily  glucagon  Injectable 1 milliGRAM(s) IntraMuscular once  heparin   Injectable 5000 Unit(s) SubCutaneous every 8 hours  insulin glargine Injectable (LANTUS) 30 Unit(s) SubCutaneous at bedtime  insulin lispro (ADMELOG) corrective regimen sliding scale   SubCutaneous three times a day before meals  insulin lispro Injectable (ADMELOG) 6 Unit(s) SubCutaneous three times a day before meals  melatonin 5 milliGRAM(s) Oral at bedtime  milrinone Infusion 0.3 MICROgram(s)/kG/Min (6.47 mL/Hr) IV Continuous <Continuous>  piperacillin/tazobactam IVPB.. 3.375 Gram(s) IV Intermittent every 8 hours  polyethylene glycol 3350 17 Gram(s) Oral daily  senna 2 Tablet(s) Oral at bedtime  sodium chloride 0.9% lock flush 3 milliLiter(s) IV Push every 8 hours  sodium chloride 0.9%. 1000 milliLiter(s) (10 mL/Hr) IV Continuous <Continuous>  tamsulosin 0.4 milliGRAM(s) Oral at bedtime      Physical Exam  Vital Signs Last 12 Hrs  T(F): 99.9 (04-15-24 @ 08:00), Max: 100 (04-15-24 @ 04:00)  HR: 84 (04-15-24 @ 10:00) (79 - 84)  BP: --  BP(mean): --  RR: 23 (04-15-24 @ 10:00) (10 - 47)  SpO2: 97% (04-15-24 @ 10:00) (94% - 97%)

## 2024-04-15 NOTE — PROGRESS NOTE ADULT - ASSESSMENT
74yr M, PMHx of CVA and MI (12/2016), HTN, DM, HLD, CAD s/p cardiac stents, CHF, hx of malignant melanoma s/p excision, presented for CABG and AVR on 4/7. Underwent CABGX RSVG to diag and RCA, ascending aortic replacement with transverse hemiarch, AVR with inspirus bioprosthetic valve. He has been having fevers since 4/10. He received extended cefuroxime prophylaxis until 4/10. Was noted to have worsening fever tmax 101.1 on 4/12, started on Vancomycin and Zosyn.     No leucocytosis, rising Cr on labs.    Bcx 4/11, 4/12 NGTD    MRSA PCR Neg    UA 9 WBC, 191 RBC    CT Chest  Small left pleural effusion and complete atelectasis/consolidation of   left lower lobe.  Trace right pleural effusion and subsegmental atelectasis of right lower   lobe.      # Post operative fevers  # Atelectasis/Consolidation of LLL, concern for pneumonia on CT       PLAN:   c/w zosyn [id number]  Send sputum cultures if feasible.   Follow up blood cultures, NTD   Continue to trend CBC, no leucocytosis   pt with no localizing complains.   plan to complete 5 days total of abx.   day 3/5 of therapy today.       Fausto Naranjo  Please contact through MS Teams   If no response or past 5 pm/weekend call [phone].

## 2024-04-15 NOTE — PROGRESS NOTE ADULT - SUBJECTIVE AND OBJECTIVE BOX
74yPatient is a 74y old  Male who presents with a chief complaint of preop as/aortic aneurysm & dvd (15 Apr 2024 12:00)      Interval history:  Low grade temp, occasional cough.       Allergies:   No Known Allergies  Allergy Status Unknown    Antimicrobials:  piperacillin/tazobactam IVPB.. 3.375 Gram(s) IV Intermittent every 8 hours      REVIEW OF SYSTEMS:  No SOB  No abdominal pain  No new rash.     Vital Signs Last 24 Hrs  T(C): 37.6 (04-15-24 @ 12:00), Max: 38 (04-14-24 @ 20:00)  T(F): 99.7 (04-15-24 @ 12:00), Max: 100.4 (04-14-24 @ 20:00)  HR: 80 (04-15-24 @ 15:06) (79 - 89)  BP: --  BP(mean): --  RR: 22 (04-15-24 @ 15:00) (10 - 47)  SpO2: 96% (04-15-24 @ 15:06) (92% - 97%)      PHYSICAL EXAM:  Pt in no acute distress, alert, awake.   midline dressing   + TLC   non distended abdomen  + edema LE   + navarro   ecchymosis UE                             9.1    8.45  )-----------( 257      ( 15 Apr 2024 00:32 )             26.5   04-15    137  |  101  |  29<H>  ----------------------------<  136<H>  3.5   |  25  |  1.63<H>    Ca    8.8      15 Apr 2024 00:32  Phos  2.9     04-15  Mg     1.9     04-15    TPro  5.7<L>  /  Alb  3.3  /  TBili  1.0  /  DBili  x   /  AST  22  /  ALT  24  /  AlkPhos  61  04-15      LIVER FUNCTIONS - ( 15 Apr 2024 00:32 )  Alb: 3.3 g/dL / Pro: 5.7 g/dL / ALK PHOS: 61 U/L / ALT: 24 U/L / AST: 22 U/L / GGT: x               Culture - Blood (collected 14 Apr 2024 00:51)  Source: .Blood Blood-Peripheral  Preliminary Report (15 Apr 2024 07:02):    No growth at 24 hours    Culture - Blood (collected 14 Apr 2024 00:15)  Source: .Blood Blood-Peripheral  Preliminary Report (15 Apr 2024 07:02):    No growth at 24 hours    Culture - Blood (collected 12 Apr 2024 19:11)  Source: .Blood Blood-Peripheral  Preliminary Report (14 Apr 2024 23:02):    No growth at 48 Hours    Culture - Blood (collected 12 Apr 2024 19:11)  Source: .Blood Blood-Peripheral  Preliminary Report (14 Apr 2024 23:02):    No growth at 48 Hours        Radiology:  < from: Xray Chest 1 View- PORTABLE-Routine (Xray Chest 1 View- PORTABLE-Routine in AM.) (04.15.24 @ 05:06) >  IMPRESSION:    Increasing pulmonary vascular congestion.  Mild decrease in left pleural effusion.

## 2024-04-16 LAB
ALBUMIN SERPL ELPH-MCNC: 3.3 G/DL — SIGNIFICANT CHANGE UP (ref 3.3–5)
ALP SERPL-CCNC: 74 U/L — SIGNIFICANT CHANGE UP (ref 40–120)
ALT FLD-CCNC: 29 U/L — SIGNIFICANT CHANGE UP (ref 10–45)
ANION GAP SERPL CALC-SCNC: 13 MMOL/L — SIGNIFICANT CHANGE UP (ref 5–17)
APPEARANCE UR: ABNORMAL
AST SERPL-CCNC: 25 U/L — SIGNIFICANT CHANGE UP (ref 10–40)
BACTERIA # UR AUTO: NEGATIVE /HPF — SIGNIFICANT CHANGE UP
BASE EXCESS BLDV CALC-SCNC: -8.8 MMOL/L — LOW (ref -2–3)
BASE EXCESS BLDV CALC-SCNC: 2.9 MMOL/L — SIGNIFICANT CHANGE UP (ref -2–3)
BASE EXCESS BLDV CALC-SCNC: 4.6 MMOL/L — HIGH (ref -2–3)
BASOPHILS # BLD AUTO: 0.04 K/UL — SIGNIFICANT CHANGE UP (ref 0–0.2)
BASOPHILS NFR BLD AUTO: 0.5 % — SIGNIFICANT CHANGE UP (ref 0–2)
BILIRUB SERPL-MCNC: 0.9 MG/DL — SIGNIFICANT CHANGE UP (ref 0.2–1.2)
BILIRUB UR-MCNC: NEGATIVE — SIGNIFICANT CHANGE UP
BUN SERPL-MCNC: 25 MG/DL — HIGH (ref 7–23)
CALCIUM SERPL-MCNC: 8.1 MG/DL — LOW (ref 8.4–10.5)
CAST: 2 /LPF — SIGNIFICANT CHANGE UP (ref 0–4)
CHLORIDE SERPL-SCNC: 99 MMOL/L — SIGNIFICANT CHANGE UP (ref 96–108)
CO2 BLDV-SCNC: 14 MMOL/L — LOW (ref 22–26)
CO2 BLDV-SCNC: 29 MMOL/L — HIGH (ref 22–26)
CO2 BLDV-SCNC: 30 MMOL/L — HIGH (ref 22–26)
CO2 SERPL-SCNC: 25 MMOL/L — SIGNIFICANT CHANGE UP (ref 22–31)
COLOR SPEC: ABNORMAL
CREAT SERPL-MCNC: 1.32 MG/DL — HIGH (ref 0.5–1.3)
CULTURE RESULTS: SIGNIFICANT CHANGE UP
CULTURE RESULTS: SIGNIFICANT CHANGE UP
DIFF PNL FLD: ABNORMAL
EGFR: 56 ML/MIN/1.73M2 — LOW
EOSINOPHIL # BLD AUTO: 0.23 K/UL — SIGNIFICANT CHANGE UP (ref 0–0.5)
EOSINOPHIL NFR BLD AUTO: 2.7 % — SIGNIFICANT CHANGE UP (ref 0–6)
GAS PNL BLDA: SIGNIFICANT CHANGE UP
GAS PNL BLDV: SIGNIFICANT CHANGE UP
GLUCOSE BLDC GLUCOMTR-MCNC: 106 MG/DL — HIGH (ref 70–99)
GLUCOSE BLDC GLUCOMTR-MCNC: 122 MG/DL — HIGH (ref 70–99)
GLUCOSE BLDC GLUCOMTR-MCNC: 125 MG/DL — HIGH (ref 70–99)
GLUCOSE BLDC GLUCOMTR-MCNC: 185 MG/DL — HIGH (ref 70–99)
GLUCOSE BLDC GLUCOMTR-MCNC: 187 MG/DL — HIGH (ref 70–99)
GLUCOSE BLDC GLUCOMTR-MCNC: 200 MG/DL — HIGH (ref 70–99)
GLUCOSE SERPL-MCNC: 251 MG/DL — HIGH (ref 70–99)
GLUCOSE UR QL: 100 MG/DL
HCO3 BLDV-SCNC: 13 MMOL/L — LOW (ref 22–29)
HCO3 BLDV-SCNC: 28 MMOL/L — SIGNIFICANT CHANGE UP (ref 22–29)
HCO3 BLDV-SCNC: 28 MMOL/L — SIGNIFICANT CHANGE UP (ref 22–29)
HCT VFR BLD CALC: 26.8 % — LOW (ref 39–50)
HGB BLD-MCNC: 9.1 G/DL — LOW (ref 13–17)
HOROWITZ INDEX BLDV+IHG-RTO: 32 — SIGNIFICANT CHANGE UP
HOROWITZ INDEX BLDV+IHG-RTO: 40 — SIGNIFICANT CHANGE UP
HOROWITZ INDEX BLDV+IHG-RTO: 44 — SIGNIFICANT CHANGE UP
IMM GRANULOCYTES NFR BLD AUTO: 1 % — HIGH (ref 0–0.9)
KETONES UR-MCNC: ABNORMAL MG/DL
LEUKOCYTE ESTERASE UR-ACNC: ABNORMAL
LYMPHOCYTES # BLD AUTO: 1.21 K/UL — SIGNIFICANT CHANGE UP (ref 1–3.3)
LYMPHOCYTES # BLD AUTO: 14 % — SIGNIFICANT CHANGE UP (ref 13–44)
MAGNESIUM SERPL-MCNC: 2.4 MG/DL — SIGNIFICANT CHANGE UP (ref 1.6–2.6)
MCHC RBC-ENTMCNC: 28.4 PG — SIGNIFICANT CHANGE UP (ref 27–34)
MCHC RBC-ENTMCNC: 34 GM/DL — SIGNIFICANT CHANGE UP (ref 32–36)
MCV RBC AUTO: 83.8 FL — SIGNIFICANT CHANGE UP (ref 80–100)
MONOCYTES # BLD AUTO: 1.06 K/UL — HIGH (ref 0–0.9)
MONOCYTES NFR BLD AUTO: 12.3 % — SIGNIFICANT CHANGE UP (ref 2–14)
NEUTROPHILS # BLD AUTO: 5.99 K/UL — SIGNIFICANT CHANGE UP (ref 1.8–7.4)
NEUTROPHILS NFR BLD AUTO: 69.5 % — SIGNIFICANT CHANGE UP (ref 43–77)
NITRITE UR-MCNC: NEGATIVE — SIGNIFICANT CHANGE UP
NRBC # BLD: 0 /100 WBCS — SIGNIFICANT CHANGE UP (ref 0–0)
PCO2 BLDV: 19 MMHG — LOW (ref 42–55)
PCO2 BLDV: 38 MMHG — LOW (ref 42–55)
PCO2 BLDV: 43 MMHG — SIGNIFICANT CHANGE UP (ref 42–55)
PH BLDV: 7.42 — SIGNIFICANT CHANGE UP (ref 7.32–7.43)
PH BLDV: 7.44 — HIGH (ref 7.32–7.43)
PH BLDV: 7.48 — HIGH (ref 7.32–7.43)
PH UR: 5 — SIGNIFICANT CHANGE UP (ref 5–8)
PHOSPHATE SERPL-MCNC: 2.8 MG/DL — SIGNIFICANT CHANGE UP (ref 2.5–4.5)
PLATELET # BLD AUTO: 346 K/UL — SIGNIFICANT CHANGE UP (ref 150–400)
PO2 BLDV: 35 MMHG — SIGNIFICANT CHANGE UP (ref 25–45)
PO2 BLDV: 38 MMHG — SIGNIFICANT CHANGE UP (ref 25–45)
PO2 BLDV: 42 MMHG — SIGNIFICANT CHANGE UP (ref 25–45)
POTASSIUM SERPL-MCNC: 3.7 MMOL/L — SIGNIFICANT CHANGE UP (ref 3.5–5.3)
POTASSIUM SERPL-SCNC: 3.7 MMOL/L — SIGNIFICANT CHANGE UP (ref 3.5–5.3)
PROCALCITONIN SERPL-MCNC: 0.08 NG/ML — SIGNIFICANT CHANGE UP (ref 0.02–0.1)
PROT SERPL-MCNC: 5.6 G/DL — LOW (ref 6–8.3)
PROT UR-MCNC: 100 MG/DL
RBC # BLD: 3.2 M/UL — LOW (ref 4.2–5.8)
RBC # FLD: 13.7 % — SIGNIFICANT CHANGE UP (ref 10.3–14.5)
RBC CASTS # UR COMP ASSIST: 1104 /HPF — HIGH (ref 0–4)
REVIEW: SIGNIFICANT CHANGE UP
SAO2 % BLDV: 63.6 % — LOW (ref 67–88)
SAO2 % BLDV: 64.5 % — LOW (ref 67–88)
SAO2 % BLDV: 71.3 % — SIGNIFICANT CHANGE UP (ref 67–88)
SODIUM SERPL-SCNC: 137 MMOL/L — SIGNIFICANT CHANGE UP (ref 135–145)
SP GR SPEC: 1.02 — SIGNIFICANT CHANGE UP (ref 1–1.03)
SPECIMEN SOURCE: SIGNIFICANT CHANGE UP
SPECIMEN SOURCE: SIGNIFICANT CHANGE UP
SQUAMOUS # UR AUTO: 7 /HPF — HIGH (ref 0–5)
UROBILINOGEN FLD QL: 1 MG/DL — SIGNIFICANT CHANGE UP (ref 0.2–1)
WBC # BLD: 8.62 K/UL — SIGNIFICANT CHANGE UP (ref 3.8–10.5)
WBC # FLD AUTO: 8.62 K/UL — SIGNIFICANT CHANGE UP (ref 3.8–10.5)
WBC UR QL: 7 /HPF — HIGH (ref 0–5)
YEAST-LIKE CELLS: PRESENT

## 2024-04-16 PROCEDURE — 71045 X-RAY EXAM CHEST 1 VIEW: CPT | Mod: 26

## 2024-04-16 PROCEDURE — 99291 CRITICAL CARE FIRST HOUR: CPT

## 2024-04-16 PROCEDURE — 99232 SBSQ HOSP IP/OBS MODERATE 35: CPT

## 2024-04-16 RX ORDER — POTASSIUM CHLORIDE 20 MEQ
10 PACKET (EA) ORAL
Refills: 0 | Status: COMPLETED | OUTPATIENT
Start: 2024-04-16 | End: 2024-04-16

## 2024-04-16 RX ORDER — INSULIN LISPRO 100/ML
9 VIAL (ML) SUBCUTANEOUS
Refills: 0 | Status: DISCONTINUED | OUTPATIENT
Start: 2024-04-16 | End: 2024-04-17

## 2024-04-16 RX ORDER — CLOPIDOGREL BISULFATE 75 MG/1
75 TABLET, FILM COATED ORAL DAILY
Refills: 0 | Status: DISCONTINUED | OUTPATIENT
Start: 2024-04-16 | End: 2024-04-22

## 2024-04-16 RX ORDER — INSULIN GLARGINE 100 [IU]/ML
38 INJECTION, SOLUTION SUBCUTANEOUS AT BEDTIME
Refills: 0 | Status: DISCONTINUED | OUTPATIENT
Start: 2024-04-16 | End: 2024-04-17

## 2024-04-16 RX ORDER — POTASSIUM CHLORIDE 20 MEQ
40 PACKET (EA) ORAL ONCE
Refills: 0 | Status: COMPLETED | OUTPATIENT
Start: 2024-04-16 | End: 2024-04-16

## 2024-04-16 RX ORDER — ACETAMINOPHEN 500 MG
650 TABLET ORAL ONCE
Refills: 0 | Status: COMPLETED | OUTPATIENT
Start: 2024-04-16 | End: 2024-04-16

## 2024-04-16 RX ORDER — CALCIUM GLUCONATE 100 MG/ML
1 VIAL (ML) INTRAVENOUS ONCE
Refills: 0 | Status: COMPLETED | OUTPATIENT
Start: 2024-04-16 | End: 2024-04-16

## 2024-04-16 RX ORDER — POTASSIUM CHLORIDE 20 MEQ
10 PACKET (EA) ORAL ONCE
Refills: 0 | Status: COMPLETED | OUTPATIENT
Start: 2024-04-16 | End: 2024-04-16

## 2024-04-16 RX ADMIN — Medication 50 MILLIEQUIVALENT(S): at 12:30

## 2024-04-16 RX ADMIN — SODIUM CHLORIDE 3 MILLILITER(S): 9 INJECTION INTRAMUSCULAR; INTRAVENOUS; SUBCUTANEOUS at 05:02

## 2024-04-16 RX ADMIN — TAMSULOSIN HYDROCHLORIDE 0.4 MILLIGRAM(S): 0.4 CAPSULE ORAL at 21:01

## 2024-04-16 RX ADMIN — HEPARIN SODIUM 5000 UNIT(S): 5000 INJECTION INTRAVENOUS; SUBCUTANEOUS at 13:55

## 2024-04-16 RX ADMIN — CLOPIDOGREL BISULFATE 75 MILLIGRAM(S): 75 TABLET, FILM COATED ORAL at 10:11

## 2024-04-16 RX ADMIN — AMIODARONE HYDROCHLORIDE 200 MILLIGRAM(S): 400 TABLET ORAL at 06:27

## 2024-04-16 RX ADMIN — MILRINONE LACTATE 6.47 MICROGRAM(S)/KG/MIN: 1 INJECTION, SOLUTION INTRAVENOUS at 07:57

## 2024-04-16 RX ADMIN — Medication 9 UNIT(S): at 12:27

## 2024-04-16 RX ADMIN — Medication 2: at 07:55

## 2024-04-16 RX ADMIN — PIPERACILLIN AND TAZOBACTAM 25 GRAM(S): 4; .5 INJECTION, POWDER, LYOPHILIZED, FOR SOLUTION INTRAVENOUS at 03:22

## 2024-04-16 RX ADMIN — ATORVASTATIN CALCIUM 80 MILLIGRAM(S): 80 TABLET, FILM COATED ORAL at 21:02

## 2024-04-16 RX ADMIN — HEPARIN SODIUM 5000 UNIT(S): 5000 INJECTION INTRAVENOUS; SUBCUTANEOUS at 06:27

## 2024-04-16 RX ADMIN — Medication 650 MILLIGRAM(S): at 16:30

## 2024-04-16 RX ADMIN — SODIUM CHLORIDE 10 MILLILITER(S): 9 INJECTION INTRAMUSCULAR; INTRAVENOUS; SUBCUTANEOUS at 07:57

## 2024-04-16 RX ADMIN — SODIUM CHLORIDE 3 MILLILITER(S): 9 INJECTION INTRAMUSCULAR; INTRAVENOUS; SUBCUTANEOUS at 22:53

## 2024-04-16 RX ADMIN — POLYETHYLENE GLYCOL 3350 17 GRAM(S): 17 POWDER, FOR SOLUTION ORAL at 12:07

## 2024-04-16 RX ADMIN — MILRINONE LACTATE 4.31 MICROGRAM(S)/KG/MIN: 1 INJECTION, SOLUTION INTRAVENOUS at 09:00

## 2024-04-16 RX ADMIN — Medication 50 MILLIEQUIVALENT(S): at 16:31

## 2024-04-16 RX ADMIN — Medication 50 MILLIEQUIVALENT(S): at 12:02

## 2024-04-16 RX ADMIN — MILRINONE LACTATE 4.31 MICROGRAM(S)/KG/MIN: 1 INJECTION, SOLUTION INTRAVENOUS at 21:02

## 2024-04-16 RX ADMIN — INSULIN GLARGINE 38 UNIT(S): 100 INJECTION, SOLUTION SUBCUTANEOUS at 21:48

## 2024-04-16 RX ADMIN — Medication 50 MILLIEQUIVALENT(S): at 15:24

## 2024-04-16 RX ADMIN — Medication 50 MILLIEQUIVALENT(S): at 03:22

## 2024-04-16 RX ADMIN — PIPERACILLIN AND TAZOBACTAM 25 GRAM(S): 4; .5 INJECTION, POWDER, LYOPHILIZED, FOR SOLUTION INTRAVENOUS at 10:11

## 2024-04-16 RX ADMIN — SODIUM CHLORIDE 3 MILLILITER(S): 9 INJECTION INTRAMUSCULAR; INTRAVENOUS; SUBCUTANEOUS at 13:49

## 2024-04-16 RX ADMIN — HEPARIN SODIUM 5000 UNIT(S): 5000 INJECTION INTRAVENOUS; SUBCUTANEOUS at 21:01

## 2024-04-16 RX ADMIN — FAMOTIDINE 20 MILLIGRAM(S): 10 INJECTION INTRAVENOUS at 12:07

## 2024-04-16 RX ADMIN — Medication 5 MILLIGRAM(S): at 21:01

## 2024-04-16 RX ADMIN — CHLORHEXIDINE GLUCONATE 1 APPLICATION(S): 213 SOLUTION TOPICAL at 22:54

## 2024-04-16 RX ADMIN — Medication 81 MILLIGRAM(S): at 12:07

## 2024-04-16 RX ADMIN — Medication 650 MILLIGRAM(S): at 17:30

## 2024-04-16 RX ADMIN — Medication 50 MILLIEQUIVALENT(S): at 14:30

## 2024-04-16 RX ADMIN — SENNA PLUS 2 TABLET(S): 8.6 TABLET ORAL at 21:02

## 2024-04-16 RX ADMIN — Medication 100 GRAM(S): at 12:07

## 2024-04-16 RX ADMIN — Medication 6 UNIT(S): at 07:55

## 2024-04-16 NOTE — PROGRESS NOTE ADULT - PROBLEM SELECTOR PLAN 1
Will increase Lantus to 38u at bedtime.  Will increase Admelog to 9u before each meal and continue Admelog correction scale coverage. Will continue monitoring FS and FU.  Patient counseled for compliance with consistent low carb diet.

## 2024-04-16 NOTE — PROGRESS NOTE ADULT - SUBJECTIVE AND OBJECTIVE BOX
[facility]   [phone]  RENAL FOLLOW UP NOTE  --------------------------------------------------------------------------------  Chief Complaint:    24 hour events/subjective:    seen earlier   c/o fatigue       PAST HISTORY  --------------------------------------------------------------------------------  No significant changes to PMH, PSH, FHx, SHx, unless otherwise noted    ALLERGIES & MEDICATIONS  --------------------------------------------------------------------------------  Allergies    No Known Allergies  Allergy Status Unknown    Intolerances      Standing Inpatient Medications  aMIOdarone    Tablet 200 milliGRAM(s) Oral daily  aMIOdarone    Tablet   Oral   aspirin enteric coated 81 milliGRAM(s) Oral daily  atorvastatin 80 milliGRAM(s) Oral at bedtime  bisacodyl Suppository 10 milliGRAM(s) Rectal once  chlorhexidine 2% Cloths 1 Application(s) Topical daily  clopidogrel Tablet 75 milliGRAM(s) Oral daily  dextrose 50% Injectable 50 milliLiter(s) IV Push every 15 minutes  dextrose Oral Gel 15 Gram(s) Oral once  famotidine    Tablet 20 milliGRAM(s) Oral daily  glucagon  Injectable 1 milliGRAM(s) IntraMuscular once  heparin   Injectable 5000 Unit(s) SubCutaneous every 8 hours  insulin glargine Injectable (LANTUS) 38 Unit(s) SubCutaneous at bedtime  insulin lispro (ADMELOG) corrective regimen sliding scale   SubCutaneous three times a day before meals  insulin lispro Injectable (ADMELOG) 9 Unit(s) SubCutaneous three times a day before meals  melatonin 5 milliGRAM(s) Oral at bedtime  milrinone Infusion 0.2 MICROgram(s)/kG/Min IV Continuous <Continuous>  polyethylene glycol 3350 17 Gram(s) Oral daily  senna 2 Tablet(s) Oral at bedtime  sodium chloride 0.9% lock flush 3 milliLiter(s) IV Push every 8 hours  sodium chloride 0.9%. 1000 milliLiter(s) IV Continuous <Continuous>  tamsulosin 0.4 milliGRAM(s) Oral at bedtime    PRN Inpatient Medications  acetaminophen     Tablet .. 650 milliGRAM(s) Oral every 6 hours PRN      REVIEW OF SYSTEMS  --------------------------------------------------------------------------------    Gen: denies  fevers/chills,  CVS: denies chest pain/palpitations  Resp: +  SOB/Cough  GI: Denies N/V/Abd pain  : Denies dysuria    VITALS/PHYSICAL EXAM  --------------------------------------------------------------------------------  T(C): 36.4 (04-16-24 @ 16:00), Max: 37.9 (04-16-24 @ 00:00)  HR: 84 (04-16-24 @ 20:00) (75 - 85)  BP: --  RR: 31 (04-16-24 @ 20:00) (14 - 85)  SpO2: 95% (04-16-24 @ 20:00) (92% - 100%)  Wt(kg): --        04-15-24 @ 07:01  -  04-16-24 @ 07:00  --------------------------------------------------------  IN: 769.4 mL / OUT: 2040 mL / NET: -1270.6 mL    04-16-24 @ 07:01  -  04-16-24 @ 20:37  --------------------------------------------------------  IN: 775.6 mL / OUT: 270 mL / NET: 505.6 mL      Physical Exam:  	    	Gen: Non toxic comfortable appearing  on O2   	no jvd   	Pulm: decrease bs  no rales or ronchi or wheezing  	CV: RRR, S1S2; no rub  	Back: No CVA tenderness  	Abd: +BS, soft, nontender/ + distended  	: No suprapubic tenderness  	UE: Warm, no cyanosis  no clubbing, +  edema  	LE: Warm, no cyanosis  no clubbing, 2 -   edema  	Neuro: alert and oriented. speech coherent     LABS/STUDIES  --------------------------------------------------------------------------------              9.1    8.62  >-----------<  346      [04-16-24 @ 00:34]              26.8     137  |  99  |  25  ----------------------------<  251      [04-16-24 @ 00:33]  3.7   |  25  |  1.32        Ca     8.1     [04-16-24 @ 00:33]      Mg     2.4     [04-16-24 @ 00:33]      Phos  2.8     [04-16-24 @ 00:33]    TPro  5.6  /  Alb  3.3  /  TBili  0.9  /  DBili  x   /  AST  25  /  ALT  29  /  AlkPhos  74  [04-16-24 @ 00:33]    PT/INR: PT 12.0 , INR 1.09       [04-15-24 @ 11:45]  PTT: 26.8       [04-15-24 @ 11:45]      Creatinine Trend:  SCr 1.32 [04-16 @ 00:33]  SCr 1.63 [04-15 @ 00:32]  SCr 1.59 [04-14 @ 02:01]  SCr 1.61 [04-13 @ 00:30]       TSH 1.71      [04-07-24 @ 15:28]

## 2024-04-16 NOTE — PROGRESS NOTE ADULT - SUBJECTIVE AND OBJECTIVE BOX
Patient seen and examined at the bedside.    Remains critically ill on continuous ICU monitoring.      Brief Summary:  74yr M with CAD s/p CABGx3 on 4/8/24        24 Hour events:      Objective:  Vital Signs Last 24 Hrs  T(C): 37.9 (16 Apr 2024 04:00), Max: 38.1 (15 Apr 2024 20:00)  T(F): 100.2 (16 Apr 2024 04:00), Max: 100.6 (15 Apr 2024 20:00)  HR: 75 (16 Apr 2024 06:00) (75 - 86)  BP: --  BP(mean): --  RR: 48 (16 Apr 2024 06:00) (10 - 85)  SpO2: 96% (16 Apr 2024 06:00) (92% - 99%)    Parameters below as of 16 Apr 2024 04:00  Patient On (Oxygen Delivery Method): nasal cannula  O2 Flow (L/min): 6                  Physical Exam:   General: Alert and interactive   Neurology: Oriented, following commands  Respiratory: Clear bilaterally   CV: Sinus  Abdominal: Soft, Nontender  Extremities: Warm, well-perfused  Guevara    -------------------------------------------------------------------------------------------------------------------------------    Labs:                        9.1    8.62  )-----------( 346      ( 16 Apr 2024 00:34 )             26.8     04-16    137  |  99  |  25<H>  ----------------------------<  251<H>  3.7   |  25  |  1.32<H>    Ca    8.1<L>      16 Apr 2024 00:33  Phos  2.8     04-16  Mg     2.4     04-16    TPro  5.6<L>  /  Alb  3.3  /  TBili  0.9  /  DBili  x   /  AST  25  /  ALT  29  /  AlkPhos  74  04-16    LIVER FUNCTIONS - ( 16 Apr 2024 00:33 )  Alb: 3.3 g/dL / Pro: 5.6 g/dL / ALK PHOS: 74 U/L / ALT: 29 U/L / AST: 25 U/L / GGT: x           PT/INR - ( 15 Apr 2024 11:45 )   PT: 12.0 sec;   INR: 1.09 ratio         PTT - ( 15 Apr 2024 11:45 )  PTT:26.8 sec  ABG - ( 16 Apr 2024 00:13 )  pH, Arterial: 7.45  pH, Blood: x     /  pCO2: 40    /  pO2: 76    / HCO3: 28    / Base Excess: 3.5   /  SaO2: 96.3              ------------------------------------------------------------------------------------------------------------------------------  Assessment:  74yr M with CAD s/p CABGx3 on 4/8/24    Hemodynamic instability   Postop A fib, currently in sinus rhythm  Postop pulmonary insufficiency  Acute kidney injury  Hypokalemia  Hypomagnesemia  Hyperglycemia       Plan:   ***Neuro***  Postoperative acute pain control with Tylenol and prns.  Optimize day/night cycles.  Melatonin at night.    ***Cardiovascular***  At high risk for hemodynamic instability and cardiac arrhythmias.  Remains on Milrinone - slow wean.  Amiodarone load in progress, currently in sinus  ASA/ Statin daily.    ***Pulmonary***  BIPAP overnight as tolerated.  Wean oxygen as able.  Deep breathing and coughing exercises.    ***GI***  Tolerating diet.   Pepcid for stress ulcer prophylaxis   Bowel regimen.    ***Renal***  CLAUDIA - Trend Creatinine   Restarted home Flomax.  Guevara catheter for strict I/O measurements.    Potassium and Magnesium repleted.    ***ID***  Empiric Zosyn - if cultures remain negative today, likely can stop.    ***Endocrine***  Hyperglycemia - Insulin Sliding Scale/ Lantus     ***Hematology***  Acute blood loss anemia - no current transfusion indication    Transitioned to SQ Heparin for VTE prophylaxis in setting of CLAUDIA.          Patient requires continuous monitoring with bedside rhythm monitoring, pulse oximetry monitoring, and continuous central venous and arterial pressure monitoring; and intermittent blood gas analysis. Care plan discussed with the ICU care team.   Patient remains critical, at risk for life threatening decompensation.    I have spent 30 minutes providing critical care management to this patient.    By signing my name below, I, Sly Son, attest that this documentation has been prepared under the direction and in the presence of Henrietta Belcher MD  Electronically signed: Sly Cline, 04-16-24 @ 07:14    I, Hernietta Belcher MD, personally performed the services described in this documentation. all medical record entries made by the scribe were at my direction and in my presence. I have reviewed the chart and agree that the record reflects my personal performance and is accurate and complete  Electronically signed: Henrietta Belcher MD Patient seen and examined at the bedside.    Remains critically ill on continuous ICU monitoring.      Brief Summary:  74yr M with CAD s/p CABG, AVR, ascending aortic replacement with hemiarch on 4/8/24      24 Hour events:  Didn't sleep well - anxious this morning.  Remains on Milrinone      Objective:  Vital Signs Last 24 Hrs  T(C): 37.9 (16 Apr 2024 04:00), Max: 38.1 (15 Apr 2024 20:00)  T(F): 100.2 (16 Apr 2024 04:00), Max: 100.6 (15 Apr 2024 20:00)  HR: 75 (16 Apr 2024 06:00) (75 - 86)  BP: --  BP(mean): --  RR: 48 (16 Apr 2024 06:00) (10 - 85)  SpO2: 96% (16 Apr 2024 06:00) (92% - 99%)    Parameters below as of 16 Apr 2024 04:00  Patient On (Oxygen Delivery Method): nasal cannula  O2 Flow (L/min): 6      Physical Exam:   General: Alert and interactive   Neurology: Oriented, following commands  Respiratory: Clear bilaterally   CV: Sinus  Abdominal: Soft, Nontender  Extremities: Warm, well-perfused  Guevara    -------------------------------------------------------------------------------------------------------------------------------    Labs:                        9.1    8.62  )-----------( 346      ( 16 Apr 2024 00:34 )             26.8     04-16    137  |  99  |  25<H>  ----------------------------<  251<H>  3.7   |  25  |  1.32<H>    Ca    8.1<L>      16 Apr 2024 00:33  Phos  2.8     04-16  Mg     2.4     04-16    TPro  5.6<L>  /  Alb  3.3  /  TBili  0.9  /  DBili  x   /  AST  25  /  ALT  29  /  AlkPhos  74  04-16    LIVER FUNCTIONS - ( 16 Apr 2024 00:33 )  Alb: 3.3 g/dL / Pro: 5.6 g/dL / ALK PHOS: 74 U/L / ALT: 29 U/L / AST: 25 U/L / GGT: x           PT/INR - ( 15 Apr 2024 11:45 )   PT: 12.0 sec;   INR: 1.09 ratio         PTT - ( 15 Apr 2024 11:45 )  PTT:26.8 sec  ABG - ( 16 Apr 2024 00:13 )  pH, Arterial: 7.45  pH, Blood: x     /  pCO2: 40    /  pO2: 76    / HCO3: 28    / Base Excess: 3.5   /  SaO2: 96.3          ------------------------------------------------------------------------------------------------------------------------------  Assessment:  74yr M with CAD s/p CABG, AVR, Ascending aorta and hemiarch replacement on 4/8/24    Hemodynamic instability   Postop A fib, currently in sinus rhythm  Postop pulmonary insufficiency  Acute kidney injury  Hypokalemia  Hyperglycemia       Plan:   ***Neuro***  Postoperative acute pain control with Tylenol and prns.  Optimize day/night cycles.  Melatonin at night.    ***Cardiovascular***  At high risk for hemodynamic instability and cardiac arrhythmias.  Remains on Milrinone - slow wean.  Amiodarone load in progress, currently in sinus  ASA/ Statin daily and will add Plavix.    ***Pulmonary***  BIPAP overnight as tolerated.  Wean oxygen as able.  Deep breathing and coughing exercises.    ***GI***  Tolerating diet.   Pepcid for stress ulcer prophylaxis   Bowel regimen.    ***Renal***  CLAUDIA - Trend Creatinine   Home Flomax  Trial of void.  Potassium repleted.    ***ID***  Empiric Zosyn - procalcitonin pending.  WBC normal.    ***Endocrine***  Hyperglycemia - Insulin Sliding Scale/ Lantus /Premeal    ***Hematology***  Acute blood loss anemia - no current transfusion indication    SQ Heparin for VTE prophylaxis in setting of CLAUDIA.        Patient requires continuous monitoring with bedside rhythm monitoring, pulse oximetry monitoring, and continuous central venous and arterial pressure monitoring; and intermittent blood gas analysis. Care plan discussed with the ICU care team.   Patient remains critical, at risk for life threatening decompensation.    I have spent 45 minutes providing critical care management to this patient.    By signing my name below, I, Sly Son, attest that this documentation has been prepared under the direction and in the presence of Henrietta Belcher MD  Electronically signed: Sly Son, 04-16-24 @ 07:14    I, Henrietta Belcher MD, personally performed the services described in this documentation. all medical record entries made by the scribe were at my direction and in my presence. I have reviewed the chart and agree that the record reflects my personal performance and is accurate and complete  Electronically signed: Henrietta Belcher MD

## 2024-04-16 NOTE — PROGRESS NOTE ADULT - SUBJECTIVE AND OBJECTIVE BOX
74yPatient is a 74y old  Male who presents with a chief complaint of preop as/aortic aneurysm & dvd (16 Apr 2024 20:35)      Interval history:  low grade temp, some cough, no abdominal pain.        Allergies:   No Known Allergies  Allergy Status Unknown        Antimicrobials:      REVIEW OF SYSTEMS:  chest pain controlled   No N/V  No rash.     Vital Signs Last 24 Hrs  T(C): 36.6 (04-16-24 @ 20:00), Max: 37.9 (04-16-24 @ 00:00)  T(F): 97.8 (04-16-24 @ 20:00), Max: 100.2 (04-16-24 @ 00:00)  HR: 82 (04-16-24 @ 22:00) (75 - 84)  BP: --  BP(mean): --  RR: 24 (04-16-24 @ 22:00) (14 - 85)  SpO2: 95% (04-16-24 @ 22:00) (92% - 100%)      PHYSICAL EXAM:  Pt in no acute distress, alert, awake.   midline surgical incision with dressing   + TLC   non distended abdomen  + edema LE   + navarro   ecchymosis UE                           9.1    8.62  )-----------( 346      ( 16 Apr 2024 00:34 )             26.8   04-16    137  |  99  |  25<H>  ----------------------------<  251<H>  3.7   |  25  |  1.32<H>    Ca    8.1<L>      16 Apr 2024 00:33  Phos  2.8     04-16  Mg     2.4     04-16    TPro  5.6<L>  /  Alb  3.3  /  TBili  0.9  /  DBili  x   /  AST  25  /  ALT  29  /  AlkPhos  74  04-16      LIVER FUNCTIONS - ( 16 Apr 2024 00:33 )  Alb: 3.3 g/dL / Pro: 5.6 g/dL / ALK PHOS: 74 U/L / ALT: 29 U/L / AST: 25 U/L / GGT: x               Culture - Blood (collected 14 Apr 2024 00:51)  Source: .Blood Blood-Peripheral  Preliminary Report (16 Apr 2024 07:02):    No growth at 48 Hours    Culture - Blood (collected 14 Apr 2024 00:15)  Source: .Blood Blood-Peripheral  Preliminary Report (16 Apr 2024 07:02):    No growth at 48 Hours        Radiology:    < from: Xray Chest 1 View- PORTABLE-Routine (Xray Chest 1 View- PORTABLE-Routine in AM.) (04.16.24 @ 03:27) >    IMPRESSION:  Partial clearing, left lung.

## 2024-04-16 NOTE — PROGRESS NOTE ADULT - ASSESSMENT
74yr M, PMHx of CVA and MI (12/2016), HTN, DM, HLD, CAD s/p cardiac stents, CHF, hx of malignant melanoma s/p excision, presented for CABG and AVR on 4/7. Underwent CABGX RSVG to diag and RCA, ascending aortic replacement with transverse hemiarch, AVR with inspirus bioprosthetic valve. He has been having fevers since 4/10. He received extended cefuroxime prophylaxis until 4/10. Was noted to have worsening fever tmax 101.1 on 4/12, started on Vancomycin and Zosyn.     No leucocytosis, rising Cr on labs.    Bcx 4/11, 4/12 NGTD    MRSA PCR Neg    UA 9 WBC, 191 RBC    CT Chest  Small left pleural effusion and complete atelectasis/consolidation of   left lower lobe.  Trace right pleural effusion and subsegmental atelectasis of right lower   lobe.      # Post operative fevers  # Atelectasis/Consolidation of LLL, concern for pneumonia on CT       PLAN:   c/w zosyn [id number]  Send sputum cultures if feasible.   Follow up blood cultures, NTD   Continue to trend CBC, no leucocytosis   pt with no localizing complains.   plan to complete 5 days total of abx.         Fausto Naranjo  Please contact through MS Teams   If no response or past 5 pm/weekend call [phone].     My colleague will cover 4/17.

## 2024-04-16 NOTE — PROGRESS NOTE ADULT - SUBJECTIVE AND OBJECTIVE BOX
Chief complaint  Patient is a 74y old  Male who presents with a chief complaint of preop as/aortic aneurysm & dvd (15 Apr 2024 19:43)   Review of systems  No hypoglycemic episodes.    Labs and Fingersticks  CAPILLARY BLOOD GLUCOSE  187 (16 Apr 2024 08:00)      POCT Blood Glucose.: 187 mg/dL (16 Apr 2024 07:53)  POCT Blood Glucose.: 200 mg/dL (16 Apr 2024 06:26)  POCT Blood Glucose.: 218 mg/dL (15 Apr 2024 22:05)  POCT Blood Glucose.: 108 mg/dL (15 Apr 2024 16:33)      Anion Gap: 13 (04-16 @ 00:33)  Anion Gap: 11 (04-15 @ 00:32)      Calcium: 8.1 *L* (04-16 @ 00:33)  Calcium: 8.8 (04-15 @ 00:32)  Albumin: 3.3 (04-16 @ 00:33)  Albumin: 3.3 (04-15 @ 00:32)    Alanine Aminotransferase (ALT/SGPT): 29 (04-16 @ 00:33)  Alanine Aminotransferase (ALT/SGPT): 24 (04-15 @ 00:32)  Alkaline Phosphatase: 74 (04-16 @ 00:33)  Alkaline Phosphatase: 61 (04-15 @ 00:32)  Aspartate Aminotransferase (AST/SGOT): 25 (04-16 @ 00:33)  Aspartate Aminotransferase (AST/SGOT): 22 (04-15 @ 00:32)        04-16    137  |  99  |  25<H>  ----------------------------<  251<H>  3.7   |  25  |  1.32<H>    Ca    8.1<L>      16 Apr 2024 00:33  Phos  2.8     04-16  Mg     2.4     04-16    TPro  5.6<L>  /  Alb  3.3  /  TBili  0.9  /  DBili  x   /  AST  25  /  ALT  29  /  AlkPhos  74  04-16                        9.1    8.62  )-----------( 346      ( 16 Apr 2024 00:34 )             26.8     Medications  MEDICATIONS  (STANDING):  aMIOdarone    Tablet   Oral   aMIOdarone    Tablet 200 milliGRAM(s) Oral daily  aspirin enteric coated 81 milliGRAM(s) Oral daily  atorvastatin 80 milliGRAM(s) Oral at bedtime  bisacodyl Suppository 10 milliGRAM(s) Rectal once  calcium gluconate IVPB 1 Gram(s) IV Intermittent once  chlorhexidine 2% Cloths 1 Application(s) Topical daily  clopidogrel Tablet 75 milliGRAM(s) Oral daily  dextrose 50% Injectable 50 milliLiter(s) IV Push every 15 minutes  dextrose Oral Gel 15 Gram(s) Oral once  famotidine    Tablet 20 milliGRAM(s) Oral daily  glucagon  Injectable 1 milliGRAM(s) IntraMuscular once  heparin   Injectable 5000 Unit(s) SubCutaneous every 8 hours  insulin glargine Injectable (LANTUS) 38 Unit(s) SubCutaneous at bedtime  insulin lispro (ADMELOG) corrective regimen sliding scale   SubCutaneous three times a day before meals  insulin lispro Injectable (ADMELOG) 9 Unit(s) SubCutaneous three times a day before meals  melatonin 5 milliGRAM(s) Oral at bedtime  milrinone Infusion 0.2 MICROgram(s)/kG/Min (4.31 mL/Hr) IV Continuous <Continuous>  piperacillin/tazobactam IVPB.. 3.375 Gram(s) IV Intermittent every 8 hours  polyethylene glycol 3350 17 Gram(s) Oral daily  potassium chloride   Solution 40 milliEquivalent(s) Oral once  potassium chloride  10 mEq/50 mL IVPB 10 milliEquivalent(s) IV Intermittent every 1 hour  senna 2 Tablet(s) Oral at bedtime  sodium chloride 0.9% lock flush 3 milliLiter(s) IV Push every 8 hours  sodium chloride 0.9%. 1000 milliLiter(s) (10 mL/Hr) IV Continuous <Continuous>  tamsulosin 0.4 milliGRAM(s) Oral at bedtime      Physical Exam  General: Patient comfortable in bed  Vital Signs Last 12 Hrs  T(F): 99.7 (04-16-24 @ 08:00), Max: 100.2 (04-16-24 @ 00:00)  HR: 77 (04-16-24 @ 11:00) (75 - 82)  BP: --  BP(mean): --  RR: 14 (04-16-24 @ 11:00) (14 - 85)  SpO2: 97% (04-16-24 @ 11:00) (92% - 100%)           Chief complaint  Patient is a 74y old  Male who presents with a chief complaint of preop as/aortic aneurysm  & dvd (15 Apr 2024 19:43)   Review of systems  No hypoglycemic episodes.    Labs and Fingersticks  CAPILLARY BLOOD GLUCOSE  187 (16 Apr 2024 08:00)      POCT Blood Glucose.: 187 mg/dL (16 Apr 2024 07:53)  POCT Blood Glucose.: 200 mg/dL (16 Apr 2024 06:26)  POCT Blood Glucose.: 218 mg/dL (15 Apr 2024 22:05)  POCT Blood Glucose.: 108 mg/dL (15 Apr 2024 16:33)      Anion Gap: 13 (04-16 @ 00:33)  Anion Gap: 11 (04-15 @ 00:32)      Calcium: 8.1 *L* (04-16 @ 00:33)  Calcium: 8.8 (04-15 @ 00:32)  Albumin: 3.3 (04-16 @ 00:33)  Albumin: 3.3 (04-15 @ 00:32)    Alanine Aminotransferase (ALT/SGPT): 29 (04-16 @ 00:33)  Alanine Aminotransferase (ALT/SGPT): 24 (04-15 @ 00:32)  Alkaline Phosphatase: 74 (04-16 @ 00:33)  Alkaline Phosphatase: 61 (04-15 @ 00:32)  Aspartate Aminotransferase (AST/SGOT): 25 (04-16 @ 00:33)  Aspartate Aminotransferase (AST/SGOT): 22 (04-15 @ 00:32)        04-16    137  |  99  |  25<H>  ----------------------------<  251<H>  3.7   |  25  |  1.32<H>    Ca    8.1<L>      16 Apr 2024 00:33  Phos  2.8     04-16  Mg     2.4     04-16    TPro  5.6<L>  /  Alb  3.3  /  TBili  0.9  /  DBili  x   /  AST  25  /  ALT  29  /  AlkPhos  74  04-16                        9.1    8.62  )-----------( 346      ( 16 Apr 2024 00:34 )             26.8     Medications  MEDICATIONS  (STANDING):  aMIOdarone    Tablet   Oral   aMIOdarone    Tablet 200 milliGRAM(s) Oral daily  aspirin enteric coated 81 milliGRAM(s) Oral daily  atorvastatin 80 milliGRAM(s) Oral at bedtime  bisacodyl Suppository 10 milliGRAM(s) Rectal once  calcium gluconate IVPB 1 Gram(s) IV Intermittent once  chlorhexidine 2% Cloths 1 Application(s) Topical daily  clopidogrel Tablet 75 milliGRAM(s) Oral daily  dextrose 50% Injectable 50 milliLiter(s) IV Push every 15 minutes  dextrose Oral Gel 15 Gram(s) Oral once  famotidine    Tablet 20 milliGRAM(s) Oral daily  glucagon  Injectable 1 milliGRAM(s) IntraMuscular once  heparin   Injectable 5000 Unit(s) SubCutaneous every 8 hours  insulin glargine Injectable (LANTUS) 38 Unit(s) SubCutaneous at bedtime  insulin lispro (ADMELOG) corrective regimen sliding scale   SubCutaneous three times a day before meals  insulin lispro Injectable (ADMELOG) 9 Unit(s) SubCutaneous three times a day before meals  melatonin 5 milliGRAM(s) Oral at bedtime  milrinone Infusion 0.2 MICROgram(s)/kG/Min (4.31 mL/Hr) IV Continuous <Continuous>  piperacillin/tazobactam IVPB.. 3.375 Gram(s) IV Intermittent every 8 hours  polyethylene glycol 3350 17 Gram(s) Oral daily  potassium chloride   Solution 40 milliEquivalent(s) Oral once  potassium chloride  10 mEq/50 mL IVPB 10 milliEquivalent(s) IV Intermittent every 1 hour  senna 2 Tablet(s) Oral at bedtime  sodium chloride 0.9% lock flush 3 milliLiter(s) IV Push every 8 hours  sodium chloride 0.9%. 1000 milliLiter(s) (10 mL/Hr) IV Continuous <Continuous>  tamsulosin 0.4 milliGRAM(s) Oral at bedtime      Physical Exam  General: Patient comfortable in bed  Vital Signs Last 12 Hrs  T(F): 99.7 (04-16-24 @ 08:00), Max: 100.2 (04-16-24 @ 00:00)  HR: 77 (04-16-24 @ 11:00) (75 - 82)  BP: --  BP(mean): --  RR: 14 (04-16-24 @ 11:00) (14 - 85)  SpO2: 97% (04-16-24 @ 11:00) (92% - 100%)

## 2024-04-16 NOTE — PROGRESS NOTE ADULT - ASSESSMENT
74yr M, PMHx of CVA and MI (12/2016), HTN, DM, HLD, CAD s/p cardiac stents, CHF, hx of malignant melanoma  CABG x2/ Ascending aortic replacement with transverse hemiarch/ AVR (Preop IABP) - 4/8/24  with aorta clamp time 185 min. pt on 4/13 noticed to have rising creatinine      1- CLAUDIA   2- CHF   3- CAD s/p cabg   4- DM       CLAUDIA in setting of requiring diuretics In addition   + fevers causing ATN now improving cr to baseline range   fluid status is improving. diurese to keep O>I   k is better   strict I/O  zosyn 3.375 G IVSS TID given fevers

## 2024-04-16 NOTE — PROGRESS NOTE ADULT - SUBJECTIVE AND OBJECTIVE BOX
Patient seen and examined at the bedside.    Remained critically ill on continuous ICU monitoring.    OBJECTIVE:  Vital Signs Last 24 Hrs  T(C): 36.4 (16 Apr 2024 16:00), Max: 38.1 (15 Apr 2024 20:00)  T(F): 97.6 (16 Apr 2024 16:00), Max: 100.6 (15 Apr 2024 20:00)  HR: 78 (16 Apr 2024 19:00) (75 - 86)  BP: --  BP(mean): --  RR: 24 (16 Apr 2024 19:00) (14 - 85)  SpO2: 95% (16 Apr 2024 19:00) (92% - 100%)    Parameters below as of 16 Apr 2024 16:00  Patient On (Oxygen Delivery Method): nasal cannula  O2 Flow (L/min): 5        Physical Exam:   General: NAD   Neurology: nonfocal   Eyes: bilateral pupils equal and reactive   ENT/Neck: Neck supple, trachea midline, No JVD   Respiratory: Clear bilaterally   CV: S1S2, no murmurs        [x] Sternal dressing, [x] Mediastinal CT x2        [x] SR [x] Temporary pacing VVI 40  Abdominal: Soft, NT, ND +BS   Extremities: 1-2+ pedal edema noted, + peripheral pulses   Skin: No Rashes, Hematoma, Ecchymosis                           Assessment:  74yr M, PMHx of CVA and MI (12/2016), HTN, DM, HLD, CAD s/p cardiac stents, CHF, hx of malignant melanoma coming in for wide excision 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  Py presents for PST for ascending aorta replacement, aortic valve replacement, coronary artery bypass grafting with Dr. Ilia Ortiz.  He has been admitted preop for w/u - CT head, pt has not taken Farxiga since Wednesday as per orders from Dr. Goddard NP.    CAD s/p CABGx3 4/8/24  Post-op blood loss anemia  Hemodynamic Instability   Hypovolemia  Post-op respiratory failure  Leukocytosis  Thrombocytopenia       Plan:   ***Neuro***  [x] Hx of CVA   [x] Nonfocal     [x] Tylenol, Gapapentin, Meperidine, and PRNS for pain management  Continue Melatonin   Post operative neuro assessment     ***Cardiovascular***  Invasive hemodynamic monitoring, assess perfusion indices   SR / CVP 9 / MAP 85/ Hct 26.8 / Lactate 0.8  [x] Primacor 0.2 mcgs/kg/min  [x] Amiodarone for a fib prophylaxis   [x] Heparin for AC therapy   Volume: [x] Albumin 200 cc [x] 2 PRBC   Reassessment of hemodynamics post resuscitation    Monitor chest tube outputs    [x] ASA [x] Statin [x] Plavix   Serial EKG and cardiac enzymes     ***Pulmonary***  [x] NC 5L   Titration of FiO2 and PEEP, follow SpO2, CXR, blood gasses   Encourage incentive spirometry, continue pulse ox monitoring, follow ABGs             ***GI***  [x] Tolerating Diet   [x] Pepcid  [x] Miralax and Senna for Bowel Regimen    ***Renal***   Continue to monitor I/Os, BUN/Creatinine.   Replete lytes PRN  Diuresis with lasix and tamsulosin   Guevara present      ***ID***  No antibiotic coverage     ***Endocrine***  [x] DM: HbA1c 9.8%             - [x] Insulin gtt  [x] Lantus [x] ISS             - Need tight glycemic control to prevent wound infection.      Patient requires continuous monitoring with bedside rhythm monitoring, pulse oximetry monitoring, and continuous central venous and arterial pressure monitoring; and intermittent blood gas analysis. Care plan discussed with the ICU care team.   Patient remained critical, at risk for life threatening decompensation.    I have spent 60 minutes providing critical care management to this patient.    By signing my name below, I, Vanna Santana, attest that this documentation has been prepared under the direction and in the presence of Cheryl Biswas NP.   Electronically signed: Flower Mcdonnell, 04-16-24 @ 19:56    I, Cheryl Biswas, personally performed the services described in this documentation. all medical record entries made by the flower were at my direction and in my presence. I have reviewed the chart and agree that the record reflects my personal performance and is accurate and complete  Electronically signed:  Cheryl Biswas NP.

## 2024-04-16 NOTE — PROGRESS NOTE ADULT - ASSESSMENT
74yr M, PMHx of CVA and MI (12/2016), HTN, DM, HLD, CAD s/p cardiac stents, CHF, hx of malignant melanoma, now s/p ascending aortic arch replacement, CABG AVR.  Assessment  DMT2: 74y Male with DM T2 with hyperglycemia, A1C is 9.8% , was on oral meds at home, now postop on basal bolus insulin, blood sugars trending up/running high and not at postop target, no hypoglycemias.  CAD: on medications, stable, monitored. s/p CABG  Aneursym: s/p hemiarch replacement , AVR-t , CTU monitored.     Elodia Bach MD  Cell: [phone]  Office: [phone]

## 2024-04-17 DIAGNOSIS — Z95.1 PRESENCE OF AORTOCORONARY BYPASS GRAFT: ICD-10-CM

## 2024-04-17 DIAGNOSIS — Z98.890 OTHER SPECIFIED POSTPROCEDURAL STATES: ICD-10-CM

## 2024-04-17 LAB
ALBUMIN SERPL ELPH-MCNC: 3.2 G/DL — LOW (ref 3.3–5)
ALP SERPL-CCNC: 69 U/L — SIGNIFICANT CHANGE UP (ref 40–120)
ALT FLD-CCNC: 32 U/L — SIGNIFICANT CHANGE UP (ref 10–45)
ANION GAP SERPL CALC-SCNC: 11 MMOL/L — SIGNIFICANT CHANGE UP (ref 5–17)
APTT BLD: 31.1 SEC — SIGNIFICANT CHANGE UP (ref 24.5–35.6)
AST SERPL-CCNC: 26 U/L — SIGNIFICANT CHANGE UP (ref 10–40)
BASE EXCESS BLDV CALC-SCNC: 4 MMOL/L — HIGH (ref -2–3)
BASE EXCESS BLDV CALC-SCNC: 4.1 MMOL/L — HIGH (ref -2–3)
BASE EXCESS BLDV CALC-SCNC: 5 MMOL/L — HIGH (ref -2–3)
BASOPHILS # BLD AUTO: 0.03 K/UL — SIGNIFICANT CHANGE UP (ref 0–0.2)
BASOPHILS NFR BLD AUTO: 0.4 % — SIGNIFICANT CHANGE UP (ref 0–2)
BILIRUB SERPL-MCNC: 0.8 MG/DL — SIGNIFICANT CHANGE UP (ref 0.2–1.2)
BUN SERPL-MCNC: 25 MG/DL — HIGH (ref 7–23)
CALCIUM SERPL-MCNC: 8.2 MG/DL — LOW (ref 8.4–10.5)
CHLORIDE SERPL-SCNC: 102 MMOL/L — SIGNIFICANT CHANGE UP (ref 96–108)
CO2 BLDV-SCNC: 30 MMOL/L — HIGH (ref 22–26)
CO2 BLDV-SCNC: 30 MMOL/L — HIGH (ref 22–26)
CO2 BLDV-SCNC: 31 MMOL/L — HIGH (ref 22–26)
CO2 SERPL-SCNC: 25 MMOL/L — SIGNIFICANT CHANGE UP (ref 22–31)
CREAT SERPL-MCNC: 1.33 MG/DL — HIGH (ref 0.5–1.3)
CULTURE RESULTS: SIGNIFICANT CHANGE UP
CULTURE RESULTS: SIGNIFICANT CHANGE UP
EGFR: 55 ML/MIN/1.73M2 — LOW
EOSINOPHIL # BLD AUTO: 0.24 K/UL — SIGNIFICANT CHANGE UP (ref 0–0.5)
EOSINOPHIL NFR BLD AUTO: 3.5 % — SIGNIFICANT CHANGE UP (ref 0–6)
FIBRINOGEN PPP-MCNC: 610 MG/DL — HIGH (ref 200–445)
GAS PNL BLDA: SIGNIFICANT CHANGE UP
GAS PNL BLDV: SIGNIFICANT CHANGE UP
GLUCOSE BLDC GLUCOMTR-MCNC: 110 MG/DL — HIGH (ref 70–99)
GLUCOSE BLDC GLUCOMTR-MCNC: 133 MG/DL — HIGH (ref 70–99)
GLUCOSE BLDC GLUCOMTR-MCNC: 153 MG/DL — HIGH (ref 70–99)
GLUCOSE BLDC GLUCOMTR-MCNC: 196 MG/DL — HIGH (ref 70–99)
GLUCOSE BLDC GLUCOMTR-MCNC: 212 MG/DL — HIGH (ref 70–99)
GLUCOSE BLDC GLUCOMTR-MCNC: 74 MG/DL — SIGNIFICANT CHANGE UP (ref 70–99)
GLUCOSE SERPL-MCNC: 229 MG/DL — HIGH (ref 70–99)
HCO3 BLDV-SCNC: 28 MMOL/L — SIGNIFICANT CHANGE UP (ref 22–29)
HCO3 BLDV-SCNC: 28 MMOL/L — SIGNIFICANT CHANGE UP (ref 22–29)
HCO3 BLDV-SCNC: 30 MMOL/L — HIGH (ref 22–29)
HCT VFR BLD CALC: 24.9 % — LOW (ref 39–50)
HGB BLD-MCNC: 8.4 G/DL — LOW (ref 13–17)
HOROWITZ INDEX BLDV+IHG-RTO: 32 — SIGNIFICANT CHANGE UP
HOROWITZ INDEX BLDV+IHG-RTO: 40 — SIGNIFICANT CHANGE UP
HOROWITZ INDEX BLDV+IHG-RTO: 40 — SIGNIFICANT CHANGE UP
IMM GRANULOCYTES NFR BLD AUTO: 0.7 % — SIGNIFICANT CHANGE UP (ref 0–0.9)
INR BLD: 1.19 RATIO — HIGH (ref 0.85–1.18)
LYMPHOCYTES # BLD AUTO: 1.2 K/UL — SIGNIFICANT CHANGE UP (ref 1–3.3)
LYMPHOCYTES # BLD AUTO: 17.3 % — SIGNIFICANT CHANGE UP (ref 13–44)
MAGNESIUM SERPL-MCNC: 2.2 MG/DL — SIGNIFICANT CHANGE UP (ref 1.6–2.6)
MCHC RBC-ENTMCNC: 28.3 PG — SIGNIFICANT CHANGE UP (ref 27–34)
MCHC RBC-ENTMCNC: 33.7 GM/DL — SIGNIFICANT CHANGE UP (ref 32–36)
MCV RBC AUTO: 83.8 FL — SIGNIFICANT CHANGE UP (ref 80–100)
MONOCYTES # BLD AUTO: 0.82 K/UL — SIGNIFICANT CHANGE UP (ref 0–0.9)
MONOCYTES NFR BLD AUTO: 11.8 % — SIGNIFICANT CHANGE UP (ref 2–14)
NEUTROPHILS # BLD AUTO: 4.58 K/UL — SIGNIFICANT CHANGE UP (ref 1.8–7.4)
NEUTROPHILS NFR BLD AUTO: 66.3 % — SIGNIFICANT CHANGE UP (ref 43–77)
NRBC # BLD: 0 /100 WBCS — SIGNIFICANT CHANGE UP (ref 0–0)
PCO2 BLDV: 41 MMHG — LOW (ref 42–55)
PCO2 BLDV: 42 MMHG — SIGNIFICANT CHANGE UP (ref 42–55)
PCO2 BLDV: 44 MMHG — SIGNIFICANT CHANGE UP (ref 42–55)
PH BLDV: 7.44 — HIGH (ref 7.32–7.43)
PH BLDV: 7.44 — HIGH (ref 7.32–7.43)
PH BLDV: 7.45 — HIGH (ref 7.32–7.43)
PHOSPHATE SERPL-MCNC: 2.6 MG/DL — SIGNIFICANT CHANGE UP (ref 2.5–4.5)
PLATELET # BLD AUTO: 368 K/UL — SIGNIFICANT CHANGE UP (ref 150–400)
PO2 BLDV: 33 MMHG — SIGNIFICANT CHANGE UP (ref 25–45)
PO2 BLDV: 36 MMHG — SIGNIFICANT CHANGE UP (ref 25–45)
PO2 BLDV: 42 MMHG — SIGNIFICANT CHANGE UP (ref 25–45)
POTASSIUM SERPL-MCNC: 3.8 MMOL/L — SIGNIFICANT CHANGE UP (ref 3.5–5.3)
POTASSIUM SERPL-SCNC: 3.8 MMOL/L — SIGNIFICANT CHANGE UP (ref 3.5–5.3)
PROT SERPL-MCNC: 5.5 G/DL — LOW (ref 6–8.3)
PROTHROM AB SERPL-ACNC: 12.4 SEC — SIGNIFICANT CHANGE UP (ref 9.5–13)
RBC # BLD: 2.97 M/UL — LOW (ref 4.2–5.8)
RBC # FLD: 13.9 % — SIGNIFICANT CHANGE UP (ref 10.3–14.5)
SAO2 % BLDV: 53 % — LOW (ref 67–88)
SAO2 % BLDV: 63.6 % — LOW (ref 67–88)
SAO2 % BLDV: 65.3 % — LOW (ref 67–88)
SODIUM SERPL-SCNC: 138 MMOL/L — SIGNIFICANT CHANGE UP (ref 135–145)
SPECIMEN SOURCE: SIGNIFICANT CHANGE UP
SPECIMEN SOURCE: SIGNIFICANT CHANGE UP
WBC # BLD: 6.92 K/UL — SIGNIFICANT CHANGE UP (ref 3.8–10.5)
WBC # FLD AUTO: 6.92 K/UL — SIGNIFICANT CHANGE UP (ref 3.8–10.5)

## 2024-04-17 PROCEDURE — 71045 X-RAY EXAM CHEST 1 VIEW: CPT | Mod: 26

## 2024-04-17 PROCEDURE — 99291 CRITICAL CARE FIRST HOUR: CPT

## 2024-04-17 RX ORDER — INSULIN LISPRO 100/ML
7 VIAL (ML) SUBCUTANEOUS
Refills: 0 | Status: DISCONTINUED | OUTPATIENT
Start: 2024-04-17 | End: 2024-04-17

## 2024-04-17 RX ORDER — POTASSIUM CHLORIDE 20 MEQ
10 PACKET (EA) ORAL
Refills: 0 | Status: COMPLETED | OUTPATIENT
Start: 2024-04-17 | End: 2024-04-17

## 2024-04-17 RX ORDER — DEXMEDETOMIDINE HYDROCHLORIDE IN 0.9% SODIUM CHLORIDE 4 UG/ML
1 INJECTION INTRAVENOUS
Qty: 200 | Refills: 0 | Status: DISCONTINUED | OUTPATIENT
Start: 2024-04-17 | End: 2024-04-18

## 2024-04-17 RX ORDER — FUROSEMIDE 40 MG
20 TABLET ORAL ONCE
Refills: 0 | Status: COMPLETED | OUTPATIENT
Start: 2024-04-17 | End: 2024-04-17

## 2024-04-17 RX ORDER — CALCIUM GLUCONATE 100 MG/ML
2 VIAL (ML) INTRAVENOUS ONCE
Refills: 0 | Status: COMPLETED | OUTPATIENT
Start: 2024-04-17 | End: 2024-04-17

## 2024-04-17 RX ORDER — INSULIN GLARGINE 100 [IU]/ML
26 INJECTION, SOLUTION SUBCUTANEOUS AT BEDTIME
Refills: 0 | Status: DISCONTINUED | OUTPATIENT
Start: 2024-04-17 | End: 2024-04-18

## 2024-04-17 RX ORDER — DEXTROSE 50 % IN WATER 50 %
25 SYRINGE (ML) INTRAVENOUS ONCE
Refills: 0 | Status: COMPLETED | OUTPATIENT
Start: 2024-04-17 | End: 2024-04-17

## 2024-04-17 RX ORDER — INSULIN GLARGINE 100 [IU]/ML
35 INJECTION, SOLUTION SUBCUTANEOUS AT BEDTIME
Refills: 0 | Status: DISCONTINUED | OUTPATIENT
Start: 2024-04-17 | End: 2024-04-17

## 2024-04-17 RX ORDER — INSULIN LISPRO 100/ML
5 VIAL (ML) SUBCUTANEOUS
Refills: 0 | Status: DISCONTINUED | OUTPATIENT
Start: 2024-04-17 | End: 2024-04-19

## 2024-04-17 RX ADMIN — SODIUM CHLORIDE 3 MILLILITER(S): 9 INJECTION INTRAMUSCULAR; INTRAVENOUS; SUBCUTANEOUS at 05:12

## 2024-04-17 RX ADMIN — Medication 25 MILLILITER(S): at 16:30

## 2024-04-17 RX ADMIN — Medication 50 MILLIEQUIVALENT(S): at 15:45

## 2024-04-17 RX ADMIN — AMIODARONE HYDROCHLORIDE 200 MILLIGRAM(S): 400 TABLET ORAL at 05:12

## 2024-04-17 RX ADMIN — Medication 81 MILLIGRAM(S): at 12:36

## 2024-04-17 RX ADMIN — SODIUM CHLORIDE 3 MILLILITER(S): 9 INJECTION INTRAMUSCULAR; INTRAVENOUS; SUBCUTANEOUS at 21:47

## 2024-04-17 RX ADMIN — TAMSULOSIN HYDROCHLORIDE 0.4 MILLIGRAM(S): 0.4 CAPSULE ORAL at 22:19

## 2024-04-17 RX ADMIN — Medication 50 MILLIEQUIVALENT(S): at 14:19

## 2024-04-17 RX ADMIN — CLOPIDOGREL BISULFATE 75 MILLIGRAM(S): 75 TABLET, FILM COATED ORAL at 12:36

## 2024-04-17 RX ADMIN — FAMOTIDINE 20 MILLIGRAM(S): 10 INJECTION INTRAVENOUS at 12:36

## 2024-04-17 RX ADMIN — Medication 50 MILLIEQUIVALENT(S): at 01:58

## 2024-04-17 RX ADMIN — HEPARIN SODIUM 5000 UNIT(S): 5000 INJECTION INTRAVENOUS; SUBCUTANEOUS at 14:19

## 2024-04-17 RX ADMIN — ATORVASTATIN CALCIUM 80 MILLIGRAM(S): 80 TABLET, FILM COATED ORAL at 22:19

## 2024-04-17 RX ADMIN — Medication 20 MILLIGRAM(S): at 11:45

## 2024-04-17 RX ADMIN — Medication 50 MILLIEQUIVALENT(S): at 13:45

## 2024-04-17 RX ADMIN — Medication 200 GRAM(S): at 14:18

## 2024-04-17 RX ADMIN — Medication 5 MILLIGRAM(S): at 22:19

## 2024-04-17 RX ADMIN — Medication 20 MILLIGRAM(S): at 14:18

## 2024-04-17 RX ADMIN — MILRINONE LACTATE 4.31 MICROGRAM(S)/KG/MIN: 1 INJECTION, SOLUTION INTRAVENOUS at 08:38

## 2024-04-17 RX ADMIN — Medication 7 UNIT(S): at 12:37

## 2024-04-17 RX ADMIN — DEXMEDETOMIDINE HYDROCHLORIDE IN 0.9% SODIUM CHLORIDE 18 MICROGRAM(S)/KG/HR: 4 INJECTION INTRAVENOUS at 00:34

## 2024-04-17 RX ADMIN — Medication 50 MILLIEQUIVALENT(S): at 00:33

## 2024-04-17 RX ADMIN — SODIUM CHLORIDE 3 MILLILITER(S): 9 INJECTION INTRAMUSCULAR; INTRAVENOUS; SUBCUTANEOUS at 14:19

## 2024-04-17 RX ADMIN — SENNA PLUS 2 TABLET(S): 8.6 TABLET ORAL at 22:49

## 2024-04-17 RX ADMIN — INSULIN GLARGINE 26 UNIT(S): 100 INJECTION, SOLUTION SUBCUTANEOUS at 22:18

## 2024-04-17 RX ADMIN — SODIUM CHLORIDE 10 MILLILITER(S): 9 INJECTION INTRAMUSCULAR; INTRAVENOUS; SUBCUTANEOUS at 08:38

## 2024-04-17 RX ADMIN — HEPARIN SODIUM 5000 UNIT(S): 5000 INJECTION INTRAVENOUS; SUBCUTANEOUS at 22:19

## 2024-04-17 RX ADMIN — Medication 9 UNIT(S): at 08:00

## 2024-04-17 RX ADMIN — HEPARIN SODIUM 5000 UNIT(S): 5000 INJECTION INTRAVENOUS; SUBCUTANEOUS at 05:12

## 2024-04-17 NOTE — PROGRESS NOTE ADULT - SUBJECTIVE AND OBJECTIVE BOX
Chief complaint  Patient is a 74y old  Male who presents with a chief complaint of preop as/aortic aneurysm & dvd (16 Apr 2024 20:35)         Labs and Fingersticks  CAPILLARY BLOOD GLUCOSE  74 (17 Apr 2024 16:00)  110 (17 Apr 2024 12:00)  133 (17 Apr 2024 08:00)      POCT Blood Glucose.: 74 mg/dL (17 Apr 2024 15:52)  POCT Blood Glucose.: 110 mg/dL (17 Apr 2024 12:27)  POCT Blood Glucose.: 133 mg/dL (17 Apr 2024 08:30)  POCT Blood Glucose.: 153 mg/dL (17 Apr 2024 06:59)  POCT Blood Glucose.: 185 mg/dL (16 Apr 2024 21:33)      Anion Gap: 11 (04-16 @ 23:58)  Anion Gap: 13 (04-16 @ 00:33)      Calcium: 8.2 *L* (04-16 @ 23:58)  Calcium: 8.1 *L* (04-16 @ 00:33)  Albumin: 3.2 *L* (04-16 @ 23:58)  Albumin: 3.3 (04-16 @ 00:33)    Alanine Aminotransferase (ALT/SGPT): 32 (04-16 @ 23:58)  Alanine Aminotransferase (ALT/SGPT): 29 (04-16 @ 00:33)  Alkaline Phosphatase: 69 (04-16 @ 23:58)  Alkaline Phosphatase: 74 (04-16 @ 00:33)  Aspartate Aminotransferase (AST/SGOT): 26 (04-16 @ 23:58)  Aspartate Aminotransferase (AST/SGOT): 25 (04-16 @ 00:33)        04-16    138  |  102  |  25<H>  ----------------------------<  229<H>  3.8   |  25  |  1.33<H>    Ca    8.2<L>      16 Apr 2024 23:58  Phos  2.6     04-16  Mg     2.2     04-16    TPro  5.5<L>  /  Alb  3.2<L>  /  TBili  0.8  /  DBili  x   /  AST  26  /  ALT  32  /  AlkPhos  69  04-16                        8.4    6.92  )-----------( 368      ( 16 Apr 2024 23:58 )             24.9     Medications  MEDICATIONS  (STANDING):  aMIOdarone    Tablet 200 milliGRAM(s) Oral daily  aMIOdarone    Tablet   Oral   aspirin enteric coated 81 milliGRAM(s) Oral daily  atorvastatin 80 milliGRAM(s) Oral at bedtime  bisacodyl Suppository 10 milliGRAM(s) Rectal once  chlorhexidine 2% Cloths 1 Application(s) Topical daily  clopidogrel Tablet 75 milliGRAM(s) Oral daily  dexMEDEtomidine Infusion 1 MICROgram(s)/kG/Hr (18 mL/Hr) IV Continuous <Continuous>  dextrose 50% Injectable 25 milliLiter(s) IV Push once  dextrose 50% Injectable 50 milliLiter(s) IV Push every 15 minutes  dextrose Oral Gel 15 Gram(s) Oral once  famotidine    Tablet 20 milliGRAM(s) Oral daily  glucagon  Injectable 1 milliGRAM(s) IntraMuscular once  heparin   Injectable 5000 Unit(s) SubCutaneous every 8 hours  insulin glargine Injectable (LANTUS) 26 Unit(s) SubCutaneous at bedtime  insulin lispro (ADMELOG) corrective regimen sliding scale   SubCutaneous three times a day before meals  insulin lispro Injectable (ADMELOG) 5 Unit(s) SubCutaneous three times a day before meals  melatonin 5 milliGRAM(s) Oral at bedtime  milrinone Infusion 0.2 MICROgram(s)/kG/Min (4.31 mL/Hr) IV Continuous <Continuous>  polyethylene glycol 3350 17 Gram(s) Oral daily  senna 2 Tablet(s) Oral at bedtime  sodium chloride 0.9% lock flush 3 milliLiter(s) IV Push every 8 hours  sodium chloride 0.9%. 1000 milliLiter(s) (10 mL/Hr) IV Continuous <Continuous>  tamsulosin 0.4 milliGRAM(s) Oral at bedtime      Physical Exam  General: Patient comfortable in bed   Vital Signs Last 12 Hrs  T(F): 98 (04-17-24 @ 16:00), Max: 98.3 (04-17-24 @ 12:00)  HR: 88 (04-17-24 @ 16:00) (73 - 88)  BP: --  BP(mean): --  RR: 20 (04-17-24 @ 16:00) (20 - 55)  SpO2: 94% (04-17-24 @ 16:00) (92% - 99%)    CVS: S1S2   Respiratory: No wheezing, no crepitations  GI: Abdomen soft, bowel sounds positive  Musculoskeletal:  moves all extremities       Chief complaint  Patient is a 74y old  Male who presents with a chief complaint of preop as/aortic aneurysm & dvd (16 Apr 2024 20:35)         Labs and Fingersticks  CAPILLARY BLOOD GLUCOSE  74 (17 Apr 2024 16:00)  110 (17 Apr 2024 12:00)  133 (17 Apr 2024 08:00)      POCT Blood Glucose.: 74 mg/dL (17 Apr 2024 15:52)  POCT Blood Glucose.: 110 mg/dL (17 Apr 2024 12:27)  POCT Blood Glucose.: 133 mg/dL (17 Apr 2024 08:30)  POCT Blood Glucose.: 153 mg/dL (17 Apr 2024 06:59)  POCT Blood Glucose.: 185 mg/dL (16 Apr 2024 21:33)      Anion Gap: 11 (04-16 @ 23:58)  Anion Gap: 13 (04-16 @ 00:33)      Calcium: 8.2 *L* (04-16 @ 23:58)  Calcium: 8.1 *L* (04-16 @ 00:33)  Albumin: 3.2 *L* (04-16 @ 23:58)  Albumin: 3.3 (04-16 @ 00:33)    Alanine Aminotransferase (ALT/SGPT): 32 (04-16 @ 23:58)  Alanine Aminotransferase (ALT/SGPT): 29 (04-16 @ 00:33)  Alkaline Phosphatase: 69 (04-16 @ 23:58)  Alkaline Phosphatase: 74 (04-16 @ 00:33)  Aspartate Aminotransferase (AST/SGOT): 26 (04-16 @ 23:58)  Aspartate Aminotransferase (AST/SGOT): 25 (04-16 @ 00:33)        04-16    138  |  102  |  25<H>  ----------------------------<  229<H>  3.8   |  25  |  1.33<H>    Ca    8.2<L>      16 Apr 2024 23:58  Phos  2.6     04-16  Mg     2.2     04-16    TPro  5.5<L>  /  Alb  3.2<L>  /  TBili  0.8  /  DBili  x   /  AST  26  /  ALT  32  /  AlkPhos  69  04-16                        8.4    6.92  )-----------( 368      ( 16 Apr 2024 23:58 )             24.9     Medications  MEDICATIONS  (STANDING):  aMIOdarone    Tablet 200 milliGRAM(s) Oral daily  aMIOdarone    Tablet   Oral   aspirin enteric coated 81 milliGRAM(s) Oral daily  atorvastatin 80 milliGRAM(s) Oral at bedtime  bisacodyl Suppository 10 milliGRAM(s) Rectal once  chlorhexidine 2% Cloths 1 Application(s) Topical daily  clopidogrel Tablet 75 milliGRAM(s) Oral daily  dexMEDEtomidine Infusion 1 MICROgram(s)/kG/Hr (18 mL/Hr) IV Continuous <Continuous>  dextrose 50% Injectable 25 milliLiter(s) IV Push once  dextrose 50% Injectable 50 milliLiter(s) IV Push every 15 minutes  dextrose Oral Gel 15 Gram(s) Oral once  famotidine    Tablet 20 milliGRAM(s) Oral daily  glucagon  Injectable 1 milliGRAM(s) IntraMuscular once  heparin   Injectable 5000 Unit(s) SubCutaneous every 8 hours  insulin glargine Injectable (LANTUS) 26 Unit(s) SubCutaneous at bedtime  insulin lispro (ADMELOG) corrective regimen sliding scale   SubCutaneous three times a day before meals  insulin lispro Injectable (ADMELOG) 5 Unit(s) SubCutaneous three times a day before meals  melatonin 5 milliGRAM(s) Oral at bedtime  milrinone Infusion 0.2 MICROgram(s)/kG/Min (4.31 mL/Hr) IV Continuous <Continuous>  polyethylene glycol 3350 17 Gram(s) Oral daily  senna 2 Tablet(s) Oral at bedtime  sodium chloride 0.9% lock flush 3 milliLiter(s) IV Push every 8 hours  sodium chloride 0.9%. 1000 milliLiter(s) (10 mL/Hr) IV Continuous <Continuous>  tamsulosin 0.4 milliGRAM(s) Oral at bedtime      Physical Exam  General: Patient comfortable in bed   Vital Signs Last 12 Hrs  T(F): 98 (04-17-24 @ 16:00), Max: 98.3 (04-17-24 @ 12:00)  HR: 88 (04-17-24 @ 16:00) (73 - 88)  BP: --  BP(mean): --  RR: 20 (04-17-24 @ 16:00) (20 - 55)  SpO2: 94% (04-17-24 @ 16:00) (92% - 99%)    CVS: S1S2   Respiratory: No wheezing, no crepitations  GI: Abdomen soft, bowel sounds positive  Musculoskeletal:  moves all extremities

## 2024-04-17 NOTE — PROGRESS NOTE ADULT - ASSESSMENT
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  Py presents for PST for ascending aorta replacement, aortic valve replacement, coronary artery bypass grafting with Dr. Ilia Ortiz.  He has been admitted preop for w/u -   4/8/ 24 ASCENDING AORTIC REPLACEMENT WITH TRANSVERSE HEMIARCH 28x8 , preop iabp                CABGx2 WITH RSVG TO DIAG AND RCA; AVR WITH 25MM INSPIRUS BIOPROSTHETIC VALVE    inotropic support with IV Dobutamine and Milrinone infusions for acute systolic heart failure. IABP counterpulsation continues at 1:1.  Patient is on PO Amiodarone for afib prophylaxis.  4/9  iabp d/c  extubated to 5L-->hi flow  insulin gtt, endo consult  4/13 febrile , robert, fluid overload, zosyn /vanco , diuresis  required gilmer  4/14 aj  r axillary,   intro placed.  ID consult  Milrinone  4/15 nocturnal bipap  Slow milrinone wean  4/17 transferred to sdu

## 2024-04-17 NOTE — PROGRESS NOTE ADULT - PROBLEM SELECTOR PLAN 1
Asa, Statin,   B-blocker  when stable off milrinone  Chest PT,  Incentive spirometry, wound care and assessment.  Ambulate .

## 2024-04-17 NOTE — CHART NOTE - NSCHARTNOTEFT_GEN_A_CORE
Nutrition Follow Up Note  Patient seen for: nutrition follow up on CTU. Chart reviewed, events noted.    Source: [x] Patient       [x] Medical Record        [] RN        [] Family at bedside       [] Other:    -If unable to interview patient: [] Trach/Vent/BiPAP  [] Disoriented/confused/inappropriate to interview    Diet Order:   Diet, Regular:   Consistent Carbohydrate {No Snacks} (CSTCHO) (04-10-24)    Is current diet order appropriate/adequate? See recommendations below.    PO intake :   [] >75%  Adequate    [] 50-75%  Fair       [x] <50%  Poor      [] N/A   - Pt states he has poor appetite.     Nutrition-related concerns:   - s/p CABG x 3 24.    - BG management with Lantus and sliding scale insulin    - Remains on Milrinone gtt     GI:  Last BM 4/17 x 2 .   Bowel Regimen? [x] Yes   [] No    Weights:   Daily Weight in k.3 (-17), Weight in k.8 (-16), Weight in k.5 (-15), Weight in k.3 (-14), Weight in k.1 (-13), Weight in k.6 (-12), Weight in k.2 (-11)    Drug Dosing Weight  Height (cm): 170.2 (2024 06:45)  Weight (kg): 71.9 (2024 06:45)  BMI (kg/m2): 24.8 (2024 06:45)  BSA (m2): 1.83 (2024 06:45)    MEDICATIONS  (STANDING):  aMIOdarone    Tablet  aMIOdarone    Tablet  atorvastatin  bisacodyl Suppository  dextrose 50% Injectable  dextrose Oral Gel  famotidine    Tablet  glucagon  Injectable  insulin glargine Injectable (LANTUS)  insulin lispro (ADMELOG) corrective regimen sliding scale  insulin lispro Injectable (ADMELOG)  milrinone Infusion  polyethylene glycol 3350  senna  sodium chloride 0.9% lock flush  sodium chloride 0.9%.    Pertinent Labs:  @ 23:58: Na 138, BUN 25<H>, Cr 1.33<H>, <H>, K+ 3.8, Phos 2.6, Mg 2.2, Alk Phos 69, ALT/SGPT 32, AST/SGOT 26, HbA1c --    A1C with Estimated Average Glucose Result: 9.8 % (24 @ 15:28)  A1C with Estimated Average Glucose Result: 9.9 % (24 @ 14:49)    Finger Sticks:  POCT Blood Glucose.: 133 mg/dL ( @ 08:30)  POCT Blood Glucose.: 153 mg/dL ( @ 06:59)  POCT Blood Glucose.: 185 mg/dL ( @ 21:33)  POCT Blood Glucose.: 106 mg/dL ( @ 17:03)  POCT Blood Glucose.: 122 mg/dL ( @ 12:10)    Skin per nursing documentation: No noted pressure injuries as per documentation. Midline sternal incision   Edema: 1+ left ankle; right ankle; left foot; right foot    Based on: dosing weight 71.6kg   Estimated Energy Needs:  - 2291kcal (28 - 32kcal/kg)  Estimated Protein Needs: 86 - 100g protein (1.2 - 1.4g/kg)   Estimated Fluid Needs: per team.  [facility] Equation: N/A    Previous Nutrition Diagnosis: Increased Nutrient Needs, Altered Nutrition Related Lab Values   Nutrition Diagnosis is: [x] ongoing  [] resolved [] not applicable     New Nutrition Diagnosis: Inadequate Protein-Energy Intake related to poor appetite as evidenced by pt with decreased PO intake.     Nutrition Care Plan:  [] In Progress  [] Achieved  [] Not applicable    Nutrition Interventions:     Education Provided:       [] Yes:  [] No:     Recommendations:        ****IN PROGRESS/INCOMPLETE****     Monitoring and Evaluation:   Continue to monitor nutritional intake, tolerance to diet prescription, weights, labs, skin integrity    RD remains available upon request and will follow up per protocol    Jane Sanford MS, RD, CDN, CNSC; available via MS Teams Nutrition Follow Up Note  Patient seen for: nutrition follow up on CTU. Chart reviewed, events noted.    Source: [x] Patient       [x] Medical Record        [] RN        [x] Family at bedside       [] Other:    -If unable to interview patient: [] Trach/Vent/BiPAP  [] Disoriented/confused/inappropriate to interview    Diet Order:   Diet, Regular:   Consistent Carbohydrate {No Snacks} (CSTCHO) (04-10-24)    Is current diet order appropriate/adequate? See recommendations below.    PO intake :   [] >75%  Adequate    [] 50-75%  Fair       [x] <50%  Poor      [] N/A   - Pt states he has poor appetite. Some food preferences obtained from patient however limited at this time as he is c/o a headache.     Nutrition-related concerns:   - S/p CABG x 3 24.    - BG management with Lantus and sliding scale insulin    - Remains on Milrinone gtt     GI:  Last BM 4/17 x 2 .   Bowel Regimen? [x] Yes   [] No    Weights:   Daily Weight in k.3 (-17), Weight in k.8 (-16), Weight in k.5 (-15), Weight in k.3 (-14), Weight in k.1 (-13), Weight in k.6 (-12), Weight in k.2 (-11)    Drug Dosing Weight  Height (cm): 170.2 (2024 06:45)  Weight (kg): 71.9 (2024 06:45)  BMI (kg/m2): 24.8 (2024 06:45)  BSA (m2): 1.83 (2024 06:45)    MEDICATIONS  (STANDING):  aMIOdarone    Tablet  aMIOdarone    Tablet  atorvastatin  bisacodyl Suppository  dextrose 50% Injectable  dextrose Oral Gel  famotidine    Tablet  glucagon  Injectable  insulin glargine Injectable (LANTUS)  insulin lispro (ADMELOG) corrective regimen sliding scale  insulin lispro Injectable (ADMELOG)  milrinone Infusion  polyethylene glycol 3350  senna  sodium chloride 0.9% lock flush  sodium chloride 0.9%.    Pertinent Labs:  @ 23:58: Na 138, BUN 25<H>, Cr 1.33<H>, <H>, K+ 3.8, Phos 2.6, Mg 2.2, Alk Phos 69, ALT/SGPT 32, AST/SGOT 26, HbA1c --    A1C with Estimated Average Glucose Result: 9.8 % (24 @ 15:28)  A1C with Estimated Average Glucose Result: 9.9 % (24 @ 14:49)    Finger Sticks:  POCT Blood Glucose.: 133 mg/dL ( @ 08:30)  POCT Blood Glucose.: 153 mg/dL ( @ 06:59)  POCT Blood Glucose.: 185 mg/dL ( @ 21:33)  POCT Blood Glucose.: 106 mg/dL ( @ 17:03)  POCT Blood Glucose.: 122 mg/dL ( @ 12:10)    Skin per nursing documentation: No noted pressure injuries as per documentation. Midline sternal incision   Edema: 1+ left ankle; right ankle; left foot; right foot    Based on: dosing weight 71.6kg   Estimated Energy Needs:  - 1kcal (28 - 32kcal/kg)  Estimated Protein Needs: 86 - 100g protein (1.2 - 1.4g/kg)   Estimated Fluid Needs: per team.  [facility] Equation: N/A    Previous Nutrition Diagnosis: Increased Nutrient Needs, Altered Nutrition Related Lab Values   Nutrition Diagnosis is: [x] ongoing  [] resolved [] not applicable     New Nutrition Diagnosis: Inadequate Protein-Energy Intake related to poor appetite as evidenced by pt with decreased PO intake.     Nutrition Care Plan:  [x] In Progress  [] Achieved  [] Not applicable    Nutrition Interventions:     Education Provided:       [x] Yes: RD reviewed nutrition therapy for s/p CABG with midsternal incision. Emphasized importance of adequate lean protein intake and optimal blood glucose levels for wound healing. Advised pt to limit concentrated sweets, portion control, and importance of fiber intake. Pt made aware RD to remain available.     Recommendations:      1) Continue consistent carbohydrate diet as ordered.  2) RD to serve diet Mighty Shakes BID to promote PO intake.  3) Consider multivitamin if not otherwise contraindicated.  4) Provide encouragement with PO intake, menu selections, and assistance with meals as needed.   5) Reinforce nutrition education as needed - RD remains available.     Monitoring and Evaluation:   Continue to monitor nutritional intake, tolerance to diet prescription, weights, labs, skin integrity    RD remains available upon request and will follow up per protocol    Jane Sanford MS, RD, CDN, CNSC; available via MS Teams

## 2024-04-17 NOTE — PROGRESS NOTE ADULT - PROBLEM SELECTOR PLAN 1
Will decrease Lantus to 28u at bedtime.  Will decrease Admelog to 5 u before each meal and continue Admelog correction scale coverage. Will continue monitoring FS and FU.  Patient counseled for compliance with consistent low carb diet. Will decrease Lantus to 26 u at bedtime.  Will decrease Admelog to 5 u before each meal and continue Admelog correction scale coverage. Will continue monitoring FS and FU.  Patient counseled for compliance with consistent low carb diet.

## 2024-04-17 NOTE — PROGRESS NOTE ADULT - SUBJECTIVE AND OBJECTIVE BOX
VITAL SIGNS    Telemetry:  NSR 80    Vital Signs Last 24 Hrs  T(C): 36.8 (24 @ 18:37), Max: 37.2 (24 @ 04:00)  T(F): 98.2 (24 @ 18:37), Max: 98.9 (24 @ 04:00)  HR: 83 (24 @ 18:37) (70 - 91)  BP: 135/67 (24 @ 18:37) (135/67 - 135/67)  RR: 18 (24 @ 18:37) (16 - 55)  SpO2: 98% (24 @ 18:37) (92% - 99%)                    @ 07:01  -   @ 07:00  --------------------------------------------------------  IN: 1013.4 mL / OUT: 795 mL / NET: 218.4 mL     @ 07:01  -   @ 19:15  --------------------------------------------------------  IN: 346.6 mL / OUT: 1100 mL / NET: -753.4 mL          Daily     Daily Weight in k.3 (2024 00:00)            CAPILLARY BLOOD GLUCOSE  212 (2024 18:00)  74 (2024 16:00)  110 (2024 12:00)  133 (2024 08:00)      POCT Blood Glucose.: 212 mg/dL (2024 18:14)  POCT Blood Glucose.: 74 mg/dL (2024 15:52)  POCT Blood Glucose.: 110 mg/dL (2024 12:27)  POCT Blood Glucose.: 133 mg/dL (2024 08:30)  POCT Blood Glucose.: 153 mg/dL (2024 06:59)  POCT Blood Glucose.: 185 mg/dL (2024 21:33)            Drains:         Pacing Wires        [X  ]   Settings:         VVI                         Isolated  [  ]    Coumadin    [ ] YES          [ X ]      NO                                   PHYSICAL EXAM        Neurology: alert and oriented x 3, nonfocal, no gross deficits  CV : s1 s2 RRR  L ij triple lumen  R AX BERNARDO  Sternal Wound :  CDI , Stable  Lungs: cta  Abdomen: soft, nontender, nondistended, positive bowel sounds, last bowel movement + YESTERDAY                        :    voiding CONDOM CATHETER        Extremities:     + edema   /  -   calve tenderness ,    L leg  /  R leg  incisions cdi, SEROUS OOZE BILAT          acetaminophen     Tablet .. 650 milliGRAM(s) Oral every 6 hours PRN  aMIOdarone    Tablet   Oral   aMIOdarone    Tablet 200 milliGRAM(s) Oral daily  aspirin enteric coated 81 milliGRAM(s) Oral daily  atorvastatin 80 milliGRAM(s) Oral at bedtime  bisacodyl Suppository 10 milliGRAM(s) Rectal once  chlorhexidine 2% Cloths 1 Application(s) Topical daily  clopidogrel Tablet 75 milliGRAM(s) Oral daily  dexMEDEtomidine Infusion 1 MICROgram(s)/kG/Hr IV Continuous <Continuous>  dextrose 50% Injectable 50 milliLiter(s) IV Push every 15 minutes  dextrose Oral Gel 15 Gram(s) Oral once  famotidine    Tablet 20 milliGRAM(s) Oral daily  glucagon  Injectable 1 milliGRAM(s) IntraMuscular once  heparin   Injectable 5000 Unit(s) SubCutaneous every 8 hours  insulin glargine Injectable (LANTUS) 26 Unit(s) SubCutaneous at bedtime  insulin lispro (ADMELOG) corrective regimen sliding scale   SubCutaneous three times a day before meals  insulin lispro Injectable (ADMELOG) 5 Unit(s) SubCutaneous three times a day before meals  melatonin 5 milliGRAM(s) Oral at bedtime  milrinone Infusion 0.2 MICROgram(s)/kG/Min IV Continuous <Continuous>  polyethylene glycol 3350 17 Gram(s) Oral daily  senna 2 Tablet(s) Oral at bedtime  sodium chloride 0.9% lock flush 3 milliLiter(s) IV Push every 8 hours  sodium chloride 0.9%. 1000 milliLiter(s) IV Continuous <Continuous>  tamsulosin 0.4 milliGRAM(s) Oral at bedtime                  Discussed with Cardiothoracic Team at AM rounds.

## 2024-04-17 NOTE — PROGRESS NOTE ADULT - SUBJECTIVE AND OBJECTIVE BOX
Patient seen and examined at the bedside.    Remains critically ill on continuous ICU monitoring.      Brief Summary:  74yr M with CAD s/p CABG, AVR, ascending aortic replacement with hemiarch on 4/8/24      24 Hour events:        Objective:  ICU Vital Signs Last 24 Hrs  T(C): 36.6 (17 Apr 2024 08:00), Max: 37.2 (17 Apr 2024 04:00)  T(F): 97.9 (17 Apr 2024 08:00), Max: 98.9 (17 Apr 2024 04:00)  HR: 79 (17 Apr 2024 09:10) (70 - 84)  BP: --  BP(mean): --  ABP: 124/63 (17 Apr 2024 09:00) (98/48 - 129/68)  ABP(mean): 84 (17 Apr 2024 09:00) (66 - 90)  RR: 51 (17 Apr 2024 09:00) (14 - 51)  SpO2: 94% (17 Apr 2024 09:10) (92% - 99%)    O2 Parameters below as of 17 Apr 2024 08:00  Patient On (Oxygen Delivery Method): nasal cannula  O2 Flow (L/min): 5      Physical Exam:   General: Alert and interactive   Neurology: Oriented, following commands  Respiratory: Clear bilaterally   CV: Sinus  Abdominal: Soft, Nontender  Extremities: Warm, well-perfused      -------------------------------------------------------------------------------------------------------------------------------    Labs:                                   8.4    6.92  )-----------( 368      ( 16 Apr 2024 23:58 )             24.9     PT/INR - ( 16 Apr 2024 23:58 )   PT: 12.4 sec;   INR: 1.19 ratio        138    |  102    |  25     ----------------------------<  229        ( 16 Apr 2024 23:58 )  3.8     |  25     |  1.33     Ca    8.2        ( 16 Apr 2024 23:58 )  Phos  2.6       ( 16 Apr 2024 23:58 )  Mg     2.2       ( 16 Apr 2024 23:58 )    TPro  5.5    /  Alb  3.2    /  TBili  0.8    /  DBili  x      /  AST  26     /  ALT  32     /  AlkPhos  69     ( 16 Apr 2024 23:58 )    LIVER FUNCTIONS - ( 16 Apr 2024 23:58 )  Alb: 3.2 g/dL / Pro: 5.5 g/dL / ALK PHOS: 69 U/L / ALT: 32 U/L / AST: 26 U/L / GGT: x           ABG - ( 17 Apr 2024 04:28 )  pH, Arterial: 7.45  pH, Blood: x     /  pCO2: 39    /  pO2: 67    / HCO3: 27    / Base Excess: 2.9   /  SaO2: 94.7          ------------------------------------------------------------------------------------------------------------------------------  Assessment:  74yr M with CAD s/p CABG, AVR, Ascending aorta and hemiarch replacement on 4/8/24    Hemodynamic instability   Postop A fib, currently in sinus rhythm  Postop pulmonary insufficiency  Acute kidney injury  Hypokalemia  Hyperglycemia       Plan:   ***Neuro***  Postoperative acute pain control with Tylenol and prns.  Optimize day/night cycles.  Melatonin at night.    ***Cardiovascular***  At high risk for hemodynamic instability and cardiac arrhythmias.  Remains on Milrinone - slow wean.  Amiodarone load in progress, currently in sinus  ASA/ Statin daily and will add Plavix.    ***Pulmonary***  BIPAP overnight if patient is agreeable  Wean oxygen as able.  Deep breathing and coughing exercises.    ***GI***  Tolerating diet.   Pepcid for stress ulcer prophylaxis   Bowel regimen.    ***Renal***  CLAUDIA - Trend Creatinine   Home Flomax  Potassium repleted.    ***ID***  Procalcitonin low  Cultures normal  WBC normal.  Off antibiotics currently.    ***Endocrine***  Hyperglycemia - Insulin Sliding Scale/ Lantus /Premeal    ***Hematology***  Acute blood loss anemia - no current transfusion indication    SQ Heparin for VTE prophylaxis in setting of CLAUDIA.      Care plan discussed with the ICU care team.   Patient remains critical, at risk for life threatening decompensation.    I have spent 45 minutes providing critical care management to this patient.      Electronically signed: Henrietta Belcher MD Patient seen and examined at the bedside.    Remains critically ill on continuous ICU monitoring.      Brief Summary:  74yr M with CAD s/p CABG, AVR, ascending aortic replacement with hemiarch on 4/8/24      24 Hour events:  Remains on low dose Milrinone.  OOB to chair.      Objective:  ICU Vital Signs Last 24 Hrs  T(C): 36.6 (17 Apr 2024 08:00), Max: 37.2 (17 Apr 2024 04:00)  T(F): 97.9 (17 Apr 2024 08:00), Max: 98.9 (17 Apr 2024 04:00)  HR: 79 (17 Apr 2024 09:10) (70 - 84)  BP: --  BP(mean): --  ABP: 124/63 (17 Apr 2024 09:00) (98/48 - 129/68)  ABP(mean): 84 (17 Apr 2024 09:00) (66 - 90)  RR: 51 (17 Apr 2024 09:00) (14 - 51)  SpO2: 94% (17 Apr 2024 09:10) (92% - 99%)    O2 Parameters below as of 17 Apr 2024 08:00  Patient On (Oxygen Delivery Method): nasal cannula  O2 Flow (L/min): 5      Physical Exam:   General: Alert and interactive   Neurology: Oriented, following commands  Respiratory: Clear bilaterally   CV: Sinus  Abdominal: Soft, Nontender  Extremities: Warm, well-perfused      -------------------------------------------------------------------------------------------------------------------------------    Labs:                                   8.4    6.92  )-----------( 368      ( 16 Apr 2024 23:58 )             24.9     PT/INR - ( 16 Apr 2024 23:58 )   PT: 12.4 sec;   INR: 1.19 ratio        138    |  102    |  25     ----------------------------<  229        ( 16 Apr 2024 23:58 )  3.8     |  25     |  1.33     Ca    8.2        ( 16 Apr 2024 23:58 )  Phos  2.6       ( 16 Apr 2024 23:58 )  Mg     2.2       ( 16 Apr 2024 23:58 )    TPro  5.5    /  Alb  3.2    /  TBili  0.8    /  DBili  x      /  AST  26     /  ALT  32     /  AlkPhos  69     ( 16 Apr 2024 23:58 )    LIVER FUNCTIONS - ( 16 Apr 2024 23:58 )  Alb: 3.2 g/dL / Pro: 5.5 g/dL / ALK PHOS: 69 U/L / ALT: 32 U/L / AST: 26 U/L / GGT: x           ABG - ( 17 Apr 2024 04:28 )  pH, Arterial: 7.45  pH, Blood: x     /  pCO2: 39    /  pO2: 67    / HCO3: 27    / Base Excess: 2.9   /  SaO2: 94.7          ------------------------------------------------------------------------------------------------------------------------------  Assessment:  74yr M with CAD s/p CABG, AVR, Ascending aorta and hemiarch replacement on 4/8/24    Hemodynamic instability   Postop A fib, currently in sinus rhythm  Postop pulmonary insufficiency  Acute kidney injury  Hypokalemia  Hyperglycemia       Plan:   ***Neuro***  Postoperative acute pain control with Tylenol and prns.  Optimize day/night cycles.  Melatonin at night.    ***Cardiovascular***  At high risk for hemodynamic instability and cardiac arrhythmias.  Remains on Milrinone - slow wean.  Amiodarone load in progress, currently in sinus  ASA/ Statin / Plavix daily.    ***Pulmonary***  BIPAP overnight if patient is agreeable  Wean oxygen as able.  Deep breathing and coughing exercises.    ***GI***  Tolerating diet.   Pepcid for stress ulcer prophylaxis   Bowel regimen.    ***Renal***  CLAUDIA - Trend Creatinine   Home Flomax  Lasix for goal negative balance.  Potassium repleted.    ***ID***  Procalcitonin low  Cultures normal  WBC normal.  Off antibiotics currently.    ***Endocrine***  Hyperglycemia - Insulin Sliding Scale/ Lantus /Premeal    ***Hematology***  Acute blood loss anemia - no current transfusion indication    SQ Heparin for VTE prophylaxis in setting of CLAUDIA.      Care plan discussed with the ICU care team.   Patient remains critical, at risk for life threatening decompensation.    I have spent 45 minutes providing critical care management to this patient.      Electronically signed: Henrietta Belcher MD

## 2024-04-17 NOTE — PROGRESS NOTE ADULT - ASSESSMENT
74yr M, PMHx of CVA and MI (12/2016), HTN, DM, HLD, CAD s/p cardiac stents, CHF, hx of malignant melanoma, now s/p ascending aortic arch replacement, CABG AVR.  Assessment  DMT2: 74y Male with DM T2 with hyperglycemia, A1C is 9.8% , was on oral meds at home, now postop on basal bolus insulin, blood sugars trending down, had hypoglycemia.   CAD: on medications, stable, monitored. s/p CABG  Aneursym: s/p hemiarch replacement , AVR-t , CTU monitored.       Discussed plan and management with Dr Mikala Love NP - TEAMS  Elodia Bach MD  Cell: [phone]  Office: [phone]            74yr M, PMHx of CVA and MI (12/2016), HTN, DM, HLD, CAD s/p cardiac stents, CHF, hx of malignant melanoma, now s/p ascending aortic arch replacement, CABG AVR.  Assessment  DMT2: 74y Male with DM T2 with hyperglycemia, A1C is 9.8% , was on oral meds at home, now postop on basal bolus insulin, blood sugars  trending down, had hypoglycemia.   CAD: on medications, stable, monitored. s/p CABG  Aneursym: s/p hemiarch replacement , AVR-t , CTU monitored.       Discussed plan and management with Dr Mikala Love NP - TEAMS  Elodia Bach MD  Cell: [phone]  Office: [phone]

## 2024-04-18 DIAGNOSIS — N17.9 ACUTE KIDNEY FAILURE, UNSPECIFIED: ICD-10-CM

## 2024-04-18 DIAGNOSIS — N40.0 BENIGN PROSTATIC HYPERPLASIA WITHOUT LOWER URINARY TRACT SYMPTOMS: ICD-10-CM

## 2024-04-18 DIAGNOSIS — Z91.89 OTHER SPECIFIED PERSONAL RISK FACTORS, NOT ELSEWHERE CLASSIFIED: ICD-10-CM

## 2024-04-18 DIAGNOSIS — R50.9 FEVER, UNSPECIFIED: ICD-10-CM

## 2024-04-18 LAB
ALBUMIN SERPL ELPH-MCNC: 3.2 G/DL — LOW (ref 3.3–5)
ALP SERPL-CCNC: 62 U/L — SIGNIFICANT CHANGE UP (ref 40–120)
ALT FLD-CCNC: 35 U/L — SIGNIFICANT CHANGE UP (ref 10–45)
ANION GAP SERPL CALC-SCNC: 10 MMOL/L — SIGNIFICANT CHANGE UP (ref 5–17)
AST SERPL-CCNC: 30 U/L — SIGNIFICANT CHANGE UP (ref 10–40)
BASOPHILS # BLD AUTO: 0.03 K/UL — SIGNIFICANT CHANGE UP (ref 0–0.2)
BASOPHILS NFR BLD AUTO: 0.4 % — SIGNIFICANT CHANGE UP (ref 0–2)
BILIRUB SERPL-MCNC: 0.9 MG/DL — SIGNIFICANT CHANGE UP (ref 0.2–1.2)
BUN SERPL-MCNC: 20 MG/DL — SIGNIFICANT CHANGE UP (ref 7–23)
CALCIUM SERPL-MCNC: 8.8 MG/DL — SIGNIFICANT CHANGE UP (ref 8.4–10.5)
CHLORIDE SERPL-SCNC: 103 MMOL/L — SIGNIFICANT CHANGE UP (ref 96–108)
CO2 SERPL-SCNC: 28 MMOL/L — SIGNIFICANT CHANGE UP (ref 22–31)
CREAT SERPL-MCNC: 1.31 MG/DL — HIGH (ref 0.5–1.3)
EGFR: 56 ML/MIN/1.73M2 — LOW
EOSINOPHIL # BLD AUTO: 0.13 K/UL — SIGNIFICANT CHANGE UP (ref 0–0.5)
EOSINOPHIL NFR BLD AUTO: 1.7 % — SIGNIFICANT CHANGE UP (ref 0–6)
GLUCOSE BLDC GLUCOMTR-MCNC: 104 MG/DL — HIGH (ref 70–99)
GLUCOSE BLDC GLUCOMTR-MCNC: 70 MG/DL — SIGNIFICANT CHANGE UP (ref 70–99)
GLUCOSE BLDC GLUCOMTR-MCNC: 88 MG/DL — SIGNIFICANT CHANGE UP (ref 70–99)
GLUCOSE BLDC GLUCOMTR-MCNC: 95 MG/DL — SIGNIFICANT CHANGE UP (ref 70–99)
GLUCOSE BLDC GLUCOMTR-MCNC: 98 MG/DL — SIGNIFICANT CHANGE UP (ref 70–99)
GLUCOSE SERPL-MCNC: 62 MG/DL — LOW (ref 70–99)
HCT VFR BLD CALC: 26.9 % — LOW (ref 39–50)
HGB BLD-MCNC: 8.5 G/DL — LOW (ref 13–17)
IMM GRANULOCYTES NFR BLD AUTO: 0.8 % — SIGNIFICANT CHANGE UP (ref 0–0.9)
LYMPHOCYTES # BLD AUTO: 1.34 K/UL — SIGNIFICANT CHANGE UP (ref 1–3.3)
LYMPHOCYTES # BLD AUTO: 17.5 % — SIGNIFICANT CHANGE UP (ref 13–44)
MAGNESIUM SERPL-MCNC: 2 MG/DL — SIGNIFICANT CHANGE UP (ref 1.6–2.6)
MCHC RBC-ENTMCNC: 27.4 PG — SIGNIFICANT CHANGE UP (ref 27–34)
MCHC RBC-ENTMCNC: 31.6 GM/DL — LOW (ref 32–36)
MCV RBC AUTO: 86.8 FL — SIGNIFICANT CHANGE UP (ref 80–100)
MONOCYTES # BLD AUTO: 0.82 K/UL — SIGNIFICANT CHANGE UP (ref 0–0.9)
MONOCYTES NFR BLD AUTO: 10.7 % — SIGNIFICANT CHANGE UP (ref 2–14)
NEUTROPHILS # BLD AUTO: 5.28 K/UL — SIGNIFICANT CHANGE UP (ref 1.8–7.4)
NEUTROPHILS NFR BLD AUTO: 68.9 % — SIGNIFICANT CHANGE UP (ref 43–77)
NRBC # BLD: 0 /100 WBCS — SIGNIFICANT CHANGE UP (ref 0–0)
PHOSPHATE SERPL-MCNC: 3 MG/DL — SIGNIFICANT CHANGE UP (ref 2.5–4.5)
PLATELET # BLD AUTO: 501 K/UL — HIGH (ref 150–400)
POTASSIUM SERPL-MCNC: 3.5 MMOL/L — SIGNIFICANT CHANGE UP (ref 3.5–5.3)
POTASSIUM SERPL-SCNC: 3.5 MMOL/L — SIGNIFICANT CHANGE UP (ref 3.5–5.3)
PROT SERPL-MCNC: 5.5 G/DL — LOW (ref 6–8.3)
RBC # BLD: 3.1 M/UL — LOW (ref 4.2–5.8)
RBC # FLD: 14.2 % — SIGNIFICANT CHANGE UP (ref 10.3–14.5)
SODIUM SERPL-SCNC: 141 MMOL/L — SIGNIFICANT CHANGE UP (ref 135–145)
WBC # BLD: 7.66 K/UL — SIGNIFICANT CHANGE UP (ref 3.8–10.5)
WBC # FLD AUTO: 7.66 K/UL — SIGNIFICANT CHANGE UP (ref 3.8–10.5)

## 2024-04-18 RX ORDER — POTASSIUM BICARBONATE 978 MG/1
25 TABLET, EFFERVESCENT ORAL
Refills: 0 | Status: COMPLETED | OUTPATIENT
Start: 2024-04-18 | End: 2024-04-18

## 2024-04-18 RX ORDER — INSULIN GLARGINE 100 [IU]/ML
18 INJECTION, SOLUTION SUBCUTANEOUS AT BEDTIME
Refills: 0 | Status: DISCONTINUED | OUTPATIENT
Start: 2024-04-18 | End: 2024-04-18

## 2024-04-18 RX ORDER — INSULIN GLARGINE 100 [IU]/ML
14 INJECTION, SOLUTION SUBCUTANEOUS AT BEDTIME
Refills: 0 | Status: DISCONTINUED | OUTPATIENT
Start: 2024-04-18 | End: 2024-04-19

## 2024-04-18 RX ADMIN — SODIUM CHLORIDE 3 MILLILITER(S): 9 INJECTION INTRAMUSCULAR; INTRAVENOUS; SUBCUTANEOUS at 05:04

## 2024-04-18 RX ADMIN — CLOPIDOGREL BISULFATE 75 MILLIGRAM(S): 75 TABLET, FILM COATED ORAL at 12:04

## 2024-04-18 RX ADMIN — ATORVASTATIN CALCIUM 80 MILLIGRAM(S): 80 TABLET, FILM COATED ORAL at 21:48

## 2024-04-18 RX ADMIN — POTASSIUM BICARBONATE 25 MILLIEQUIVALENT(S): 978 TABLET, EFFERVESCENT ORAL at 10:55

## 2024-04-18 RX ADMIN — POTASSIUM BICARBONATE 25 MILLIEQUIVALENT(S): 978 TABLET, EFFERVESCENT ORAL at 09:14

## 2024-04-18 RX ADMIN — HEPARIN SODIUM 5000 UNIT(S): 5000 INJECTION INTRAVENOUS; SUBCUTANEOUS at 05:00

## 2024-04-18 RX ADMIN — FAMOTIDINE 20 MILLIGRAM(S): 10 INJECTION INTRAVENOUS at 12:04

## 2024-04-18 RX ADMIN — TAMSULOSIN HYDROCHLORIDE 0.4 MILLIGRAM(S): 0.4 CAPSULE ORAL at 21:48

## 2024-04-18 RX ADMIN — AMIODARONE HYDROCHLORIDE 200 MILLIGRAM(S): 400 TABLET ORAL at 05:01

## 2024-04-18 RX ADMIN — HEPARIN SODIUM 5000 UNIT(S): 5000 INJECTION INTRAVENOUS; SUBCUTANEOUS at 21:47

## 2024-04-18 RX ADMIN — HEPARIN SODIUM 5000 UNIT(S): 5000 INJECTION INTRAVENOUS; SUBCUTANEOUS at 13:23

## 2024-04-18 RX ADMIN — SENNA PLUS 2 TABLET(S): 8.6 TABLET ORAL at 21:48

## 2024-04-18 RX ADMIN — SODIUM CHLORIDE 3 MILLILITER(S): 9 INJECTION INTRAMUSCULAR; INTRAVENOUS; SUBCUTANEOUS at 13:22

## 2024-04-18 RX ADMIN — CHLORHEXIDINE GLUCONATE 1 APPLICATION(S): 213 SOLUTION TOPICAL at 11:10

## 2024-04-18 RX ADMIN — SODIUM CHLORIDE 3 MILLILITER(S): 9 INJECTION INTRAMUSCULAR; INTRAVENOUS; SUBCUTANEOUS at 21:45

## 2024-04-18 RX ADMIN — Medication 81 MILLIGRAM(S): at 12:03

## 2024-04-18 RX ADMIN — Medication 5 MILLIGRAM(S): at 21:48

## 2024-04-18 RX ADMIN — INSULIN GLARGINE 14 UNIT(S): 100 INJECTION, SOLUTION SUBCUTANEOUS at 21:56

## 2024-04-18 RX ADMIN — CHLORHEXIDINE GLUCONATE 1 APPLICATION(S): 213 SOLUTION TOPICAL at 05:04

## 2024-04-18 NOTE — PROGRESS NOTE ADULT - SUBJECTIVE AND OBJECTIVE BOX
VITAL SIGNS    Subjective: " I feel okay" * CP    Telemetry:  NSR 70 - 90s      Vital Signs Last 24 Hrs  T(C): 36.7 (24 @ 07:04), Max: 36.8 (24 @ 12:00)  T(F): 98 (24 @ 07:04), Max: 98.3 (24 @ 12:00)  HR: 78 (24 @ 08:53) (76 - 91)  BP: 118/62 (24 @ 07:04) (118/62 - 139/82)  RR: 18 (24 @ 08:53) (18 - 48)  SpO2: 94% (24 @ 08:53) (94% - 99%)                @ 07:01  -   @ 07:00  --------------------------------------------------------  IN: 586.6 mL / OUT: 2100 mL / NET: -1513.4 mL     @ 07:01  -   @ 11:04  --------------------------------------------------------  IN: 240 mL / OUT: 250 mL / NET: -10 mL        LABS      141  |  103  |  20  ----------------------------<  62<L>  3.5   |  28  |  1.31<H>    Ca    8.8      2024 05:49  Phos  3.0       Mg     2.0         TPro  5.5<L>  /  Alb  3.2<L>  /  TBili  0.9  /  DBili  x   /  AST  30  /  ALT  35  /  AlkPhos  62                                   8.5    7.66  )-----------( 501      ( 2024 05:50 )             26.9          PT/INR - ( 2024 23:58 )   PT: 12.4 sec;   INR: 1.19 ratio         PTT - ( 2024 23:58 )  PTT:31.1 sec        Daily     Daily Weight in k.2 (2024 05:06)      CAPILLARY BLOOD GLUCOSE  212 (2024 18:00)  74 (2024 16:00)  110 (2024 12:00)      POCT Blood Glucose.: 95 mg/dL (2024 07:40)  POCT Blood Glucose.: 70 mg/dL (2024 07:18)  POCT Blood Glucose.: 196 mg/dL (2024 21:19)  POCT Blood Glucose.: 212 mg/dL (2024 18:14)  POCT Blood Glucose.: 74 mg/dL (2024 15:52)  POCT Blood Glucose.: 110 mg/dL (2024 12:27)          PHYSICAL EXAM    Neurology: alert and oriented x 3, nonfocal, no gross deficits    CV: +S1, S2. no heart murmurs heard     Sternal Wound: MSI -->CDI, sternum stable    Lungs: b/l lungs diminished at bases     Abdomen: soft, nontender, nondistended, positive bowel sounds, (+) Flatus; (+) BM     :  Voiding --> male pure wick              Extremities:  B/L LE (+) 1 edema   RUE Ax. A. Line dressing c/d/i   LIJ CVC catheter dressing c/d/i  Bilateral SVG sites c/d/i   +A.V. PW 40/15       acetaminophen Tablet .. 650 milliGRAM(s) Oral every 6 hours PRN  aMIOdarone    Tablet   Oral   aMIOdarone    Tablet 200 milliGRAM(s) Oral daily  aspirin enteric coated 81 milliGRAM(s) Oral daily  atorvastatin 80 milliGRAM(s) Oral at bedtime  bisacodyl Suppository 10 milliGRAM(s) Rectal once  chlorhexidine 2% Cloths 1 Application(s) Topical daily  clopidogrel Tablet 75 milliGRAM(s) Oral daily  dexMEDEtomidine Infusion 1 MICROgram(s)/kG/Hr IV Continuous <Continuous>  dextrose 50% Injectable 50 milliLiter(s) IV Push every 15 minutes  dextrose Oral Gel 15 Gram(s) Oral once  famotidine    Tablet 20 milliGRAM(s) Oral daily  glucagon  Injectable 1 milliGRAM(s) IntraMuscular once  heparin   Injectable 5000 Unit(s) SubCutaneous every 8 hours  insulin glargine Injectable (LANTUS) 18 Unit(s) SubCutaneous at bedtime  insulin lispro (ADMELOG) corrective regimen sliding scale   SubCutaneous three times a day before meals  insulin lispro Injectable (ADMELOG) 5 Unit(s) SubCutaneous three times a day before meals  melatonin 5 milliGRAM(s) Oral at bedtime  milrinone Infusion 0.2 MICROgram(s)/kG/Min IV Continuous <Continuous>  polyethylene glycol 3350 17 Gram(s) Oral daily  senna 2 Tablet(s) Oral at bedtime  sodium chloride 0.9% lock flush 3 milliLiter(s) IV Push every 8 hours  sodium chloride 0.9%. 1000 milliLiter(s) IV Continuous <Continuous>  tamsulosin 0.4 milliGRAM(s) Oral at bedtime             Physical Therapy Rec:   Home  [  ]   Home w/ PT  [  ]  Acute Rehab  [ x  ]    Discussed with Cardiothoracic Team at AM rounds.

## 2024-04-18 NOTE — PROGRESS NOTE ADULT - ASSESSMENT
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  Py presents for PST for ascending aorta replacement, aortic valve replacement, coronary artery bypass grafting with Dr. Ilia Ortiz.  He has been admitted preop for w/u.     Post-Op Course:   4/8/ 24 ASCENDING AORTIC REPLACEMENT WITH TRANSVERSE HEMIARCH 28x8 , preop iabp  CABGx2 WITH RSVG TO DIAG AND RCA; AVR WITH 25MM INSPIRUS BIOPROSTHETIC VALVE   inotropic support with IV Dobutamine and Milrinone infusions for acute systolic heart failure. IABP counterpulsation continues at 1:1.  Patient is on PO Amiodarone for afib prophylaxis.  4/9  iabp d/c  extubated to 5L-->hi flow  insulin gtt, endo consult  4/13 febrile , robert, fluid overload, zosyn /vanco , diuresis  required gilmer  4/14 aj  r axillary,   intro placed.  ID consult  Milrinone  4/15 nocturnal bipap  Slow milrinone wean  4/17 transferred to SDU  4/18 VSS +PW. OOB to chair and walked with PT. Increase ambulation. Serum Glucose this AM 62 and Accucheck 70. Pt alert and oriented, OOB to chair at the time, encouraged PO intake. Lantus at HS decreased to 18 units. Serum Potassium 3.5 --> k -lyte x 2 ordered.   Disposition: Acute Rehab    74yr M, PMHx of CVA and MI (12/2016), HTN, DM, HLD, CAD s/p cardiac stents, CHF, hx of malignant melanoma coming in for wide excision 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  Py presents for PST for ascending aorta replacement, aortic valve replacement, coronary artery bypass grafting with Dr. Ilia Ortiz.  He has been admitted preop for w/u.     Post-Op Course:   4/8/ 24 ASCENDING AORTIC REPLACEMENT WITH TRANSVERSE HEMIARCH 28x8 , preop iabp  CABGx2 WITH RSVG TO DIAG AND RCA; AVR WITH 25MM INSPIRUS BIOPROSTHETIC VALVE   inotropic support with IV Dobutamine and Milrinone infusions for acute systolic heart failure. IABP counterpulsation continues at 1:1.  Patient is on PO Amiodarone for afib prophylaxis.  4/9  iabp d/c  extubated to 5L-->hi flow  insulin gtt, endo consult  4/13 febrile , robert, fluid overload, zosyn /vanco , diuresis  required gilmer  4/14 aj  r axillary,   intro placed.  ID consult  Milrinone  4/15 nocturnal bipap  Slow milrinone wean  4/17 transferred to SDU  4/18 VSS +PW. OOB to chair and walked with PT. Increase ambulation. Serum Glucose this AM 62 and Accucheck 70. Pt alert and oriented, OOB to chair at the time, encouraged PO intake. Lantus at HS decreased to 18 units. Serum Potassium 3.5 --> k -lyte x 2 ordered.   D/c aj and trip lumen.  Transfer to floor  Disposition: Acute Rehab

## 2024-04-18 NOTE — PROGRESS NOTE ADULT - SUBJECTIVE AND OBJECTIVE BOX
Chief complaint  Patient is a 74y old  Male who presents with a chief complaint of preop as/aortic aneurysm & dvd (18 Apr 2024 11:04)         Labs and Fingersticks  CAPILLARY BLOOD GLUCOSE  212 (17 Apr 2024 18:00)  74 (17 Apr 2024 16:00)      POCT Blood Glucose.: 98 mg/dL (18 Apr 2024 11:31)  POCT Blood Glucose.: 95 mg/dL (18 Apr 2024 07:40)  POCT Blood Glucose.: 70 mg/dL (18 Apr 2024 07:18)  POCT Blood Glucose.: 196 mg/dL (17 Apr 2024 21:19)  POCT Blood Glucose.: 212 mg/dL (17 Apr 2024 18:14)  POCT Blood Glucose.: 74 mg/dL (17 Apr 2024 15:52)      Anion Gap: 10 (04-18 @ 05:49)  Anion Gap: 11 (04-16 @ 23:58)      Calcium: 8.8 (04-18 @ 05:49)  Calcium: 8.2 *L* (04-16 @ 23:58)  Albumin: 3.2 *L* (04-18 @ 05:49)  Albumin: 3.2 *L* (04-16 @ 23:58)    Alanine Aminotransferase (ALT/SGPT): 35 (04-18 @ 05:49)  Alanine Aminotransferase (ALT/SGPT): 32 (04-16 @ 23:58)  Alkaline Phosphatase: 62 (04-18 @ 05:49)  Alkaline Phosphatase: 69 (04-16 @ 23:58)  Aspartate Aminotransferase (AST/SGOT): 30 (04-18 @ 05:49)  Aspartate Aminotransferase (AST/SGOT): 26 (04-16 @ 23:58)        04-18    141  |  103  |  20  ----------------------------<  62<L>  3.5   |  28  |  1.31<H>    Ca    8.8      18 Apr 2024 05:49  Phos  3.0     04-18  Mg     2.0     04-18    TPro  5.5<L>  /  Alb  3.2<L>  /  TBili  0.9  /  DBili  x   /  AST  30  /  ALT  35  /  AlkPhos  62  04-18                        8.5    7.66  )-----------( 501      ( 18 Apr 2024 05:50 )             26.9     Medications  MEDICATIONS  (STANDING):  aMIOdarone    Tablet   Oral   aMIOdarone    Tablet 200 milliGRAM(s) Oral daily  aspirin enteric coated 81 milliGRAM(s) Oral daily  atorvastatin 80 milliGRAM(s) Oral at bedtime  bisacodyl Suppository 10 milliGRAM(s) Rectal once  chlorhexidine 2% Cloths 1 Application(s) Topical daily  clopidogrel Tablet 75 milliGRAM(s) Oral daily  dextrose 50% Injectable 50 milliLiter(s) IV Push every 15 minutes  dextrose Oral Gel 15 Gram(s) Oral once  famotidine    Tablet 20 milliGRAM(s) Oral daily  glucagon  Injectable 1 milliGRAM(s) IntraMuscular once  heparin   Injectable 5000 Unit(s) SubCutaneous every 8 hours  insulin glargine Injectable (LANTUS) 14 Unit(s) SubCutaneous at bedtime  insulin lispro (ADMELOG) corrective regimen sliding scale   SubCutaneous three times a day before meals  insulin lispro Injectable (ADMELOG) 5 Unit(s) SubCutaneous three times a day before meals  melatonin 5 milliGRAM(s) Oral at bedtime  polyethylene glycol 3350 17 Gram(s) Oral daily  senna 2 Tablet(s) Oral at bedtime  sodium chloride 0.9% lock flush 3 milliLiter(s) IV Push every 8 hours  sodium chloride 0.9%. 1000 milliLiter(s) (10 mL/Hr) IV Continuous <Continuous>  tamsulosin 0.4 milliGRAM(s) Oral at bedtime      Physical Exam  General: Patient comfortable in bed   Vital Signs Last 12 Hrs  T(F): 98.3 (04-18-24 @ 11:32), Max: 98.3 (04-18-24 @ 11:32)  HR: 76 (04-18-24 @ 11:32) (76 - 78)  BP: 119/58 (04-18-24 @ 11:32) (118/62 - 122/68)  BP(mean): 84 (04-18-24 @ 11:32) (83 - 89)  RR: 18 (04-18-24 @ 11:32) (18 - 18)  SpO2: 94% (04-18-24 @ 11:32) (94% - 96%)    CVS: S1S2   Respiratory: No wheezing, no crepitations  GI: Abdomen soft, bowel sounds positive  Musculoskeletal:  moves all extremities       Chief complaint  Patient is a 74y old  Male who presents with a chief complaint of preop as/aortic aneurysm & dvd (18 Apr 2024 11:04)     Labs and Fingersticks  CAPILLARY BLOOD GLUCOSE  212 (17 Apr 2024 18:00)  74 (17 Apr 2024 16:00)      POCT Blood Glucose.: 98 mg/dL (18 Apr 2024 11:31)  POCT Blood Glucose.: 95 mg/dL (18 Apr 2024 07:40)  POCT Blood Glucose.: 70 mg/dL (18 Apr 2024 07:18)  POCT Blood Glucose.: 196 mg/dL (17 Apr 2024 21:19)  POCT Blood Glucose.: 212 mg/dL (17 Apr 2024 18:14)  POCT Blood Glucose.: 74 mg/dL (17 Apr 2024 15:52)      Anion Gap: 10 (04-18 @ 05:49)  Anion Gap: 11 (04-16 @ 23:58)      Calcium: 8.8 (04-18 @ 05:49)  Calcium: 8.2 *L* (04-16 @ 23:58)  Albumin: 3.2 *L* (04-18 @ 05:49)  Albumin: 3.2 *L* (04-16 @ 23:58)    Alanine Aminotransferase (ALT/SGPT): 35 (04-18 @ 05:49)  Alanine Aminotransferase (ALT/SGPT): 32 (04-16 @ 23:58)  Alkaline Phosphatase: 62 (04-18 @ 05:49)  Alkaline Phosphatase: 69 (04-16 @ 23:58)  Aspartate Aminotransferase (AST/SGOT): 30 (04-18 @ 05:49)  Aspartate Aminotransferase (AST/SGOT): 26 (04-16 @ 23:58)        04-18    141  |  103  |  20  ----------------------------<  62<L>  3.5   |  28  |  1.31<H>    Ca    8.8      18 Apr 2024 05:49  Phos  3.0     04-18  Mg     2.0     04-18    TPro  5.5<L>  /  Alb  3.2<L>  /  TBili  0.9  /  DBili  x   /  AST  30  /  ALT  35  /  AlkPhos  62  04-18                        8.5    7.66  )-----------( 501      ( 18 Apr 2024 05:50 )             26.9     Medications  MEDICATIONS  (STANDING):  aMIOdarone    Tablet   Oral   aMIOdarone    Tablet 200 milliGRAM(s) Oral daily  aspirin enteric coated 81 milliGRAM(s) Oral daily  atorvastatin 80 milliGRAM(s) Oral at bedtime  bisacodyl Suppository 10 milliGRAM(s) Rectal once  chlorhexidine 2% Cloths 1 Application(s) Topical daily  clopidogrel Tablet 75 milliGRAM(s) Oral daily  dextrose 50% Injectable 50 milliLiter(s) IV Push every 15 minutes  dextrose Oral Gel 15 Gram(s) Oral once  famotidine    Tablet 20 milliGRAM(s) Oral daily  glucagon  Injectable 1 milliGRAM(s) IntraMuscular once  heparin   Injectable 5000 Unit(s) SubCutaneous every 8 hours  insulin glargine Injectable (LANTUS) 14 Unit(s) SubCutaneous at bedtime  insulin lispro (ADMELOG) corrective regimen sliding scale   SubCutaneous three times a day before meals  insulin lispro Injectable (ADMELOG) 5 Unit(s) SubCutaneous three times a day before meals  melatonin 5 milliGRAM(s) Oral at bedtime  polyethylene glycol 3350 17 Gram(s) Oral daily  senna 2 Tablet(s) Oral at bedtime  sodium chloride 0.9% lock flush 3 milliLiter(s) IV Push every 8 hours  sodium chloride 0.9%. 1000 milliLiter(s) (10 mL/Hr) IV Continuous <Continuous>  tamsulosin 0.4 milliGRAM(s) Oral at bedtime      Physical Exam  General: Patient comfortable in bed   Vital Signs Last 12 Hrs  T(F): 98.3 (04-18-24 @ 11:32), Max: 98.3 (04-18-24 @ 11:32)  HR: 76 (04-18-24 @ 11:32) (76 - 78)  BP: 119/58 (04-18-24 @ 11:32) (118/62 - 122/68)  BP(mean): 84 (04-18-24 @ 11:32) (83 - 89)  RR: 18 (04-18-24 @ 11:32) (18 - 18)  SpO2: 94% (04-18-24 @ 11:32) (94% - 96%)    CVS: S1S2   Respiratory: No wheezing, no crepitations  GI: Abdomen soft, bowel sounds positive  Musculoskeletal:  moves all extremities

## 2024-04-18 NOTE — ADVANCED PRACTICE NURSE CONSULT - RECOMMEDATIONS
Impression   bilateral sacrum/buttock deep tissue injury    Recommendations   1. Bilateral sacrum /coccyx /buttock deep tissue injury      incontinence assocated dermatitis   Topical therapy- sacral/bilateral buttocks- cleanse w/incontinent cleanser, pat dry & apply joyce cream  twice daily & prn soiling . Monitor for changes .  2. Right and left heel  Elevate heels; apply Complete Cair air fluidized boots; ensure that the soles of the feet are not resting on the foot board of the bed.  3  Incontinent management - incontinent cleanser, pads,  africa care  BID  4. Maintain on an alternating air with low air loss surface   5. Turn & reposition every 2 hr; Use positioning pillow to turn and reposition, soft pillow between bony prominences; continue measures to decrease friction/shear/pressure.  6. Nutrition optimization.  7. Place  waffle cushion when out of bed to chair .   plan of care reviewed with covering staff Impression   fecal and urinary incontinence  africa rectal incontinence associated dermatitis    bilateral sacrum/buttock deep tissue injury    Recommendations   1. Bilateral sacrum /buttock deep tissue injury      incontinence assocated dermatitis   Topical therapy- sacral/bilateral buttocks- cleanse w/incontinent cleanser, pat dry & apply joyce cream  twice daily & prn soiling . Monitor for changes .  2. Right and left heel  Elevate heels; apply Complete Cair air fluidized boots; ensure that the soles of the feet are not resting on the foot board of the bed.  3  Incontinent management - incontinent cleanser, pads,  af[place] care  BID  4. Maintain on an alternating air with low air loss surface   5. Turn & reposition every 2 hr; Use positioning pillow to turn and reposition, soft pillow between bony prominences; continue measures to decrease friction/shear/pressure.  6. Nutrition optimization.  7. Place  waffle cushion when out of bed to chair .   plan of care reviewed with covering staff RN AT BEDSIDE

## 2024-04-18 NOTE — ADVANCED PRACTICE NURSE CONSULT - REASON FOR CONSULT
Consult to assess patient skin initiated by RN sacrum/buttock deep tissue injury.     History of Present Illness:  Reason for Admission: preop as/aortic aneurysm & dvd    History of Present Illness:   74yr M, PMHx of CVA and MI (12/2016), HTN, DM, HLD, CAD s/p cardiac stents, CHF, hx of malignant melanoma coming in for wide excision 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  Py presents for PST for ascending aorta replacement, aortic valve replacement, coronary artery bypass grafting with Dr. Ilia Ortiz.  He has been admitted preop for w/u - CT head, pt has not taken Farxiga since Wednesday as per orders from Dr. Goddard NP.  NPO after midnight for OR

## 2024-04-18 NOTE — PROGRESS NOTE ADULT - PROBLEM SELECTOR PLAN 1
Will decrease Lantus to 14 u at bedtime.  Will decrease Admelog to 5 u before each meal and continue Admelog correction scale coverage. Will continue monitoring FS and FU. Hold if not eating.   Patient counseled for compliance with consistent low carb diet.

## 2024-04-18 NOTE — PROGRESS NOTE ADULT - ASSESSMENT
74yr M, PMHx of CVA and MI (12/2016), HTN, DM, HLD, CAD s/p cardiac stents, CHF, hx of malignant melanoma, now s/p ascending aortic arch replacement, CABG AVR.  Assessment  DMT2: 74y Male with DM T2 with hyperglycemia, A1C is 9.8% , was on oral meds at home, now postop on basal bolus insulin, blood sugars  trending down, had hypoglycemia this AM.  Insulin requirements decreasing    CAD: on medications, stable, monitored. s/p CABG  Aneursym: s/p hemiarch replacement , AVR-t , CTU monitored.       Discussed plan and management with Dr Mikala Love NP - TEAMS  Elodia Bach MD  Cell: [phone]  Office: [phone]            74yr M, PMHx of CVA and MI (12/2016), HTN, DM, HLD, CAD s/p cardiac stents, CHF, hx of malignant melanoma, now s/p ascending aortic arch replacement,  CABG AVR.  Assessment  DMT2: 74y Male with DM T2 with hyperglycemia, A1C is 9.8% , was on oral meds at home, now postop on basal bolus insulin, blood sugars  trending down, had hypoglycemia this AM.  Insulin requirements decreasing    CAD: on medications, stable, monitored. s/p CABG  Aneursym: s/p hemiarch replacement , AVR-t , CTU monitored.       Discussed plan and management with Dr Mikala Love NP - TEAMS  Elodia Bach MD  Cell: [phone]  Office: [phone]

## 2024-04-18 NOTE — PROGRESS NOTE ADULT - PROBLEM SELECTOR PLAN 1
Continue with ASA 81 mg PO Daily.   Continue with Lipitor 80 mg PO HS    Continue with Plavix 75 mg PO Daily  Continue with Heparin subu 5000 units q 8 hr for DVT ppx   Continue with pepcid 20 mg PO Daily  Increase activity as tolerated.   Encourage Chest PT / Pulmonary toileting and Incentive spirometry every 1 hour x 10 while awake.   Sternal precautions and wound care  Shower on POD #5.   D/C plan Acute Rehab  Plan of care discussed with attending

## 2024-04-19 LAB
ALBUMIN SERPL ELPH-MCNC: 3.3 G/DL — SIGNIFICANT CHANGE UP (ref 3.3–5)
ALP SERPL-CCNC: 69 U/L — SIGNIFICANT CHANGE UP (ref 40–120)
ALT FLD-CCNC: 40 U/L — SIGNIFICANT CHANGE UP (ref 10–45)
ANION GAP SERPL CALC-SCNC: 10 MMOL/L — SIGNIFICANT CHANGE UP (ref 5–17)
AST SERPL-CCNC: 34 U/L — SIGNIFICANT CHANGE UP (ref 10–40)
BASOPHILS # BLD AUTO: 0.06 K/UL — SIGNIFICANT CHANGE UP (ref 0–0.2)
BASOPHILS NFR BLD AUTO: 0.7 % — SIGNIFICANT CHANGE UP (ref 0–2)
BILIRUB SERPL-MCNC: 1 MG/DL — SIGNIFICANT CHANGE UP (ref 0.2–1.2)
BUN SERPL-MCNC: 20 MG/DL — SIGNIFICANT CHANGE UP (ref 7–23)
CALCIUM SERPL-MCNC: 9 MG/DL — SIGNIFICANT CHANGE UP (ref 8.4–10.5)
CHLORIDE SERPL-SCNC: 104 MMOL/L — SIGNIFICANT CHANGE UP (ref 96–108)
CO2 SERPL-SCNC: 26 MMOL/L — SIGNIFICANT CHANGE UP (ref 22–31)
CREAT SERPL-MCNC: 1.33 MG/DL — HIGH (ref 0.5–1.3)
CULTURE RESULTS: SIGNIFICANT CHANGE UP
CULTURE RESULTS: SIGNIFICANT CHANGE UP
EGFR: 55 ML/MIN/1.73M2 — LOW
EOSINOPHIL # BLD AUTO: 0.15 K/UL — SIGNIFICANT CHANGE UP (ref 0–0.5)
EOSINOPHIL NFR BLD AUTO: 1.7 % — SIGNIFICANT CHANGE UP (ref 0–6)
GLUCOSE BLDC GLUCOMTR-MCNC: 113 MG/DL — HIGH (ref 70–99)
GLUCOSE BLDC GLUCOMTR-MCNC: 148 MG/DL — HIGH (ref 70–99)
GLUCOSE BLDC GLUCOMTR-MCNC: 74 MG/DL — SIGNIFICANT CHANGE UP (ref 70–99)
GLUCOSE BLDC GLUCOMTR-MCNC: 90 MG/DL — SIGNIFICANT CHANGE UP (ref 70–99)
GLUCOSE SERPL-MCNC: 80 MG/DL — SIGNIFICANT CHANGE UP (ref 70–99)
HCT VFR BLD CALC: 28.7 % — LOW (ref 39–50)
HGB BLD-MCNC: 9.4 G/DL — LOW (ref 13–17)
IMM GRANULOCYTES NFR BLD AUTO: 0.7 % — SIGNIFICANT CHANGE UP (ref 0–0.9)
LYMPHOCYTES # BLD AUTO: 1.45 K/UL — SIGNIFICANT CHANGE UP (ref 1–3.3)
LYMPHOCYTES # BLD AUTO: 16.1 % — SIGNIFICANT CHANGE UP (ref 13–44)
MAGNESIUM SERPL-MCNC: 2.1 MG/DL — SIGNIFICANT CHANGE UP (ref 1.6–2.6)
MCHC RBC-ENTMCNC: 28.3 PG — SIGNIFICANT CHANGE UP (ref 27–34)
MCHC RBC-ENTMCNC: 32.8 GM/DL — SIGNIFICANT CHANGE UP (ref 32–36)
MCV RBC AUTO: 86.4 FL — SIGNIFICANT CHANGE UP (ref 80–100)
MONOCYTES # BLD AUTO: 0.9 K/UL — SIGNIFICANT CHANGE UP (ref 0–0.9)
MONOCYTES NFR BLD AUTO: 10 % — SIGNIFICANT CHANGE UP (ref 2–14)
NEUTROPHILS # BLD AUTO: 6.41 K/UL — SIGNIFICANT CHANGE UP (ref 1.8–7.4)
NEUTROPHILS NFR BLD AUTO: 70.8 % — SIGNIFICANT CHANGE UP (ref 43–77)
NRBC # BLD: 0 /100 WBCS — SIGNIFICANT CHANGE UP (ref 0–0)
PHOSPHATE SERPL-MCNC: 3.2 MG/DL — SIGNIFICANT CHANGE UP (ref 2.5–4.5)
PLATELET # BLD AUTO: 569 K/UL — HIGH (ref 150–400)
POTASSIUM SERPL-MCNC: 4 MMOL/L — SIGNIFICANT CHANGE UP (ref 3.5–5.3)
POTASSIUM SERPL-SCNC: 4 MMOL/L — SIGNIFICANT CHANGE UP (ref 3.5–5.3)
PROT SERPL-MCNC: 5.7 G/DL — LOW (ref 6–8.3)
RBC # BLD: 3.32 M/UL — LOW (ref 4.2–5.8)
RBC # FLD: 14.3 % — SIGNIFICANT CHANGE UP (ref 10.3–14.5)
SODIUM SERPL-SCNC: 140 MMOL/L — SIGNIFICANT CHANGE UP (ref 135–145)
SPECIMEN SOURCE: SIGNIFICANT CHANGE UP
SPECIMEN SOURCE: SIGNIFICANT CHANGE UP
WBC # BLD: 9.03 K/UL — SIGNIFICANT CHANGE UP (ref 3.8–10.5)
WBC # FLD AUTO: 9.03 K/UL — SIGNIFICANT CHANGE UP (ref 3.8–10.5)

## 2024-04-19 PROCEDURE — 71045 X-RAY EXAM CHEST 1 VIEW: CPT | Mod: 26

## 2024-04-19 RX ORDER — INSULIN LISPRO 100/ML
4 VIAL (ML) SUBCUTANEOUS
Refills: 0 | Status: DISCONTINUED | OUTPATIENT
Start: 2024-04-19 | End: 2024-04-20

## 2024-04-19 RX ORDER — INSULIN GLARGINE 100 [IU]/ML
8 INJECTION, SOLUTION SUBCUTANEOUS AT BEDTIME
Refills: 0 | Status: DISCONTINUED | OUTPATIENT
Start: 2024-04-19 | End: 2024-04-20

## 2024-04-19 RX ORDER — ACETAMINOPHEN 500 MG
650 TABLET ORAL EVERY 6 HOURS
Refills: 0 | Status: DISCONTINUED | OUTPATIENT
Start: 2024-04-19 | End: 2024-04-22

## 2024-04-19 RX ADMIN — Medication 5 UNIT(S): at 08:05

## 2024-04-19 RX ADMIN — INSULIN GLARGINE 8 UNIT(S): 100 INJECTION, SOLUTION SUBCUTANEOUS at 21:28

## 2024-04-19 RX ADMIN — Medication 5 MILLIGRAM(S): at 21:24

## 2024-04-19 RX ADMIN — CLOPIDOGREL BISULFATE 75 MILLIGRAM(S): 75 TABLET, FILM COATED ORAL at 08:10

## 2024-04-19 RX ADMIN — ATORVASTATIN CALCIUM 80 MILLIGRAM(S): 80 TABLET, FILM COATED ORAL at 21:24

## 2024-04-19 RX ADMIN — AMIODARONE HYDROCHLORIDE 200 MILLIGRAM(S): 400 TABLET ORAL at 05:39

## 2024-04-19 RX ADMIN — Medication 5 UNIT(S): at 11:55

## 2024-04-19 RX ADMIN — CHLORHEXIDINE GLUCONATE 1 APPLICATION(S): 213 SOLUTION TOPICAL at 10:57

## 2024-04-19 RX ADMIN — SODIUM CHLORIDE 3 MILLILITER(S): 9 INJECTION INTRAMUSCULAR; INTRAVENOUS; SUBCUTANEOUS at 10:36

## 2024-04-19 RX ADMIN — HEPARIN SODIUM 5000 UNIT(S): 5000 INJECTION INTRAVENOUS; SUBCUTANEOUS at 05:39

## 2024-04-19 RX ADMIN — SODIUM CHLORIDE 3 MILLILITER(S): 9 INJECTION INTRAMUSCULAR; INTRAVENOUS; SUBCUTANEOUS at 21:04

## 2024-04-19 RX ADMIN — HEPARIN SODIUM 5000 UNIT(S): 5000 INJECTION INTRAVENOUS; SUBCUTANEOUS at 14:11

## 2024-04-19 RX ADMIN — Medication 650 MILLIGRAM(S): at 22:30

## 2024-04-19 RX ADMIN — Medication 650 MILLIGRAM(S): at 21:23

## 2024-04-19 RX ADMIN — Medication 81 MILLIGRAM(S): at 08:12

## 2024-04-19 RX ADMIN — FAMOTIDINE 20 MILLIGRAM(S): 10 INJECTION INTRAVENOUS at 08:10

## 2024-04-19 RX ADMIN — SODIUM CHLORIDE 3 MILLILITER(S): 9 INJECTION INTRAMUSCULAR; INTRAVENOUS; SUBCUTANEOUS at 05:36

## 2024-04-19 RX ADMIN — HEPARIN SODIUM 5000 UNIT(S): 5000 INJECTION INTRAVENOUS; SUBCUTANEOUS at 21:24

## 2024-04-19 RX ADMIN — TAMSULOSIN HYDROCHLORIDE 0.4 MILLIGRAM(S): 0.4 CAPSULE ORAL at 21:24

## 2024-04-19 NOTE — PROGRESS NOTE ADULT - SUBJECTIVE AND OBJECTIVE BOX
VITAL SIGNS    Subjective: "I'm feeling ok" Denies CP, palpitation, SOB, GREENE, HA, dizziness, N/V/D, fever or chills.  No acute event noted overnight.     Telemetry:  NSR 1 st degree 60-80     Vital Signs Last 24 Hrs  T(C): 36.7 (24 @ 13:38), Max: 36.9 (24 @ 08:20)  T(F): 98.1 (24 @ 13:38), Max: 98.4 (24 @ 08:20)  HR: 80 (24 @ 13:38) (77 - 92)  BP: 122/71 (24 @ 13:38) (117/71 - 127/69)  RR: 18 (24 @ 13:38) (18 - 18)  SpO2: 90% (24 @ 13:38) (90% - 94%)            @ 07:01  -   @ 07:00  --------------------------------------------------------  IN: 720 mL / OUT: 1600 mL / NET: -880 mL     @ 07:01  -   @ 15:47  --------------------------------------------------------  IN: 720 mL / OUT: 400 mL / NET: 320 mL    LABS      140  |  104  |  20  ----------------------------<  80  4.0   |  26  |  1.33<H>    Ca    9.0      2024 05:42  Phos  3.2       Mg     2.1         TPro  5.7<L>  /  Alb  3.3  /  TBili  1.0  /  DBili  x   /  AST  34  /  ALT  40  /  AlkPhos  69                                   9.4    9.03  )-----------( 569      ( 2024 05:42 )             28.7             Daily     Daily Weight in k.2 (2024 04:45)    CAPILLARY BLOOD GLUCOSE    POCT Blood Glucose.: 90 mg/dL (2024 11:20)  POCT Blood Glucose.: 113 mg/dL (2024 07:36)  POCT Blood Glucose.: 104 mg/dL (2024 21:44)  POCT Blood Glucose.: 88 mg/dL (2024 16:46)        PHYSICAL EXAM    Neurology: alert and oriented x 3, nonfocal, no gross deficits    CV: (+) S1 and S2, No murmurs, rubs, gallops or clicks     Sternal Wound: MSI -->CDI, sternum stable; PW X 4 --> Isolated     Lungs: CTA B/L     Abdomen: soft, nontender, nondistended, positive bowel sounds, (+) Flatus; (+) BM     :  Voiding               Extremities:  B/L LE (+) 1 edema; negative calf tenderness; (+) 2 DP palpable        acetaminophen Tablet .. 650 milliGRAM(s) Oral every 6 hours PRN  aMIOdarone  Tablet   Oral   aMIOdarone Tablet 200 milliGRAM(s) Oral daily  aspirin enteric coated 81 milliGRAM(s) Oral daily  atorvastatin 80 milliGRAM(s) Oral at bedtime  bisacodyl Suppository 10 milliGRAM(s) Rectal once  chlorhexidine 2% Cloths 1 Application(s) Topical daily  clopidogrel Tablet 75 milliGRAM(s) Oral daily  dextrose 50% Injectable 50 milliLiter(s) IV Push every 15 minutes  dextrose Oral Gel 15 Gram(s) Oral once  famotidine Tablet 20 milliGRAM(s) Oral daily  glucagon Injectable 1 milliGRAM(s) IntraMuscular once  heparin  Injectable 5000 Unit(s) SubCutaneous every 8 hours  insulin glargine Injectable (LANTUS) 14 Unit(s) SubCutaneous at bedtime  insulin lispro (ADMELOG) corrective regimen sliding scale   SubCutaneous three times a day before meals  insulin lispro Injectable (ADMELOG) 5 Unit(s) SubCutaneous three times a day before meals  melatonin 5 milliGRAM(s) Oral at bedtime  polyethylene glycol 3350 17 Gram(s) Oral daily  senna 2 Tablet(s) Oral at bedtime  sodium chloride 0.9% lock flush 3 milliLiter(s) IV Push every 8 hours  sodium chloride 0.9%. 1000 milliLiter(s) IV Continuous <Continuous>  tamsulosin 0.4 milliGRAM(s) Oral at bedtime    Physical Therapy Rec:   Home  [  ]   Home w/ PT  [  ]  Acute Rehab  [ X ]    Discussed with Cardiothoracic Team at AM rounds.

## 2024-04-19 NOTE — PROGRESS NOTE ADULT - SUBJECTIVE AND OBJECTIVE BOX
Chief complaint    Patient is a 74y old  Male who presents with a chief complaint of preop as/aortic aneurysm & dvd (18 Apr 2024 14:04)   Review of systems  Patient appears comfortable.    Labs and Fingersticks  CAPILLARY BLOOD GLUCOSE      POCT Blood Glucose.: 113 mg/dL (19 Apr 2024 07:36)  POCT Blood Glucose.: 104 mg/dL (18 Apr 2024 21:44)  POCT Blood Glucose.: 88 mg/dL (18 Apr 2024 16:46)  POCT Blood Glucose.: 98 mg/dL (18 Apr 2024 11:31)      Anion Gap: 10 (04-19 @ 05:42)  Anion Gap: 10 (04-18 @ 05:49)      Calcium: 9.0 (04-19 @ 05:42)  Calcium: 8.8 (04-18 @ 05:49)  Albumin: 3.3 (04-19 @ 05:42)  Albumin: 3.2 *L* (04-18 @ 05:49)    Alanine Aminotransferase (ALT/SGPT): 40 (04-19 @ 05:42)  Alanine Aminotransferase (ALT/SGPT): 35 (04-18 @ 05:49)  Alkaline Phosphatase: 69 (04-19 @ 05:42)  Alkaline Phosphatase: 62 (04-18 @ 05:49)  Aspartate Aminotransferase (AST/SGOT): 34 (04-19 @ 05:42)  Aspartate Aminotransferase (AST/SGOT): 30 (04-18 @ 05:49)        04-19    140  |  104  |  20  ----------------------------<  80  4.0   |  26  |  1.33<H>    Ca    9.0      19 Apr 2024 05:42  Phos  3.2     04-19  Mg     2.1     04-19    TPro  5.7<L>  /  Alb  3.3  /  TBili  1.0  /  DBili  x   /  AST  34  /  ALT  40  /  AlkPhos  69  04-19                        9.4    9.03  )-----------( 569      ( 19 Apr 2024 05:42 )             28.7     Medications  MEDICATIONS  (STANDING):  aMIOdarone    Tablet   Oral   aMIOdarone    Tablet 200 milliGRAM(s) Oral daily  aspirin enteric coated 81 milliGRAM(s) Oral daily  atorvastatin 80 milliGRAM(s) Oral at bedtime  bisacodyl Suppository 10 milliGRAM(s) Rectal once  chlorhexidine 2% Cloths 1 Application(s) Topical daily  clopidogrel Tablet 75 milliGRAM(s) Oral daily  dextrose 50% Injectable 50 milliLiter(s) IV Push every 15 minutes  dextrose Oral Gel 15 Gram(s) Oral once  famotidine    Tablet 20 milliGRAM(s) Oral daily  glucagon  Injectable 1 milliGRAM(s) IntraMuscular once  heparin   Injectable 5000 Unit(s) SubCutaneous every 8 hours  insulin glargine Injectable (LANTUS) 14 Unit(s) SubCutaneous at bedtime  insulin lispro (ADMELOG) corrective regimen sliding scale   SubCutaneous three times a day before meals  insulin lispro Injectable (ADMELOG) 5 Unit(s) SubCutaneous three times a day before meals  melatonin 5 milliGRAM(s) Oral at bedtime  polyethylene glycol 3350 17 Gram(s) Oral daily  senna 2 Tablet(s) Oral at bedtime  sodium chloride 0.9% lock flush 3 milliLiter(s) IV Push every 8 hours  sodium chloride 0.9%. 1000 milliLiter(s) (10 mL/Hr) IV Continuous <Continuous>  tamsulosin 0.4 milliGRAM(s) Oral at bedtime      Physical Exam  General: Patient appears comfortable.  Vital Signs Last 12 Hrs  T(F): 98.4 (04-19-24 @ 08:20), Max: 98.4 (04-19-24 @ 08:20)  HR: 77 (04-19-24 @ 08:20) (77 - 77)  BP: 117/71 (04-19-24 @ 08:20) (117/71 - 123/76)  BP(mean): 86 (04-19-24 @ 08:20) (86 - 86)  RR: 18 (04-19-24 @ 08:20) (18 - 18)  SpO2: 94% (04-19-24 @ 08:20) (94% - 94%)  Neck: No palpable thyroid nodules.  CVS: S1S2, No murmurs  Respiratory: No wheezing, no crepitations  GI: Abdomen soft, non tender.    Diagnostics        Radiology:

## 2024-04-19 NOTE — PROGRESS NOTE ADULT - ASSESSMENT
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  Py presents for PST for ascending aorta replacement, aortic valve replacement, coronary artery bypass grafting with Dr. Ilia Ortiz.  He has been admitted preop for w/u.     4/8/ 24 ASCENDING AORTIC REPLACEMENT WITH TRANSVERSE HEMIARCH 28x8, CABGx2 WITH RSVG TO DIAG AND RCA; AVR WITH 25MM INSPIRUS BIOPROSTHETIC VALVE. Pre op IABP  Post op Course:   Inotropic support with IV Dobutamine and Milrinone infusions for acute systolic heart failure. IABP counterpulsation continues at 1:1.  Patient is on PO Amiodarone for afib prophylaxis.  Extubated to 5L-->hi flow  Stress Hyperglycemia / h/o DM2 -->Insulin gtt, endo consult  4/13 febrile, robert, fluid overload, zosyn / vanco , diuresis  Hypotension --> required Taran-Synephrine gtt   4/14 aj Right axillary / intro placed.  ID consult. Milrinone gtt.   4/15 nocturnal bipap. Slow milrinone wean  4/17 transferred to SDU  4/18 VSS +PW. OOB to chair and walked with PT. Increase ambulation. Serum Glucose this AM 62 and Accucheck 70. Pt alert and oriented, OOB to chair at the time, encouraged PO intake. Lantus at HS decreased to 18 units. Serum Potassium 3.5 --> k -lyte x 2 ordered.   D/C aj and trip lumen. Transfer to floor  4/19 VSS; Continue with current medication regimen.  PW cut per Dr. Tierney.   Disposition: Acute Rehab once medically cleared.

## 2024-04-19 NOTE — PROGRESS NOTE ADULT - ASSESSMENT
74yr M, PMHx of CVA and MI (12/2016), HTN, DM, HLD, CAD s/p cardiac stents, CHF, hx of malignant melanoma, now s/p ascending aortic arch replacement,  CABG AVR.  Assessment  DMT2: 74y Male with DM T2 with hyperglycemia, A1C is 9.8% , was on oral meds at home, on basal bolus insulin, blood sugars are improving, no new hypoglycemia.  Insulin requirements decreasing    CAD: on medications, stable, monitored. s/p CABG  Aneursym: s/p hemiarch replacement , AVR-t , CTU monitored.     Elodia Bach MD  Cell: [phone]  Office: [phone]

## 2024-04-19 NOTE — PROGRESS NOTE ADULT - PROBLEM SELECTOR PLAN 1
Continue with ASA 81 mg PO Daily.   Continue with Lipitor 80 mg PO HS    Continue with Plavix 75 mg PO Daily   Continue with Heparin subcutaneous 5000 units q 8 hr for DVT ppx   Continue with Pepcid 20 mg PO Daily  PW cut   Increase activity as tolerated.   Encourage Chest PT / Pulmonary toileting and Incentive spirometry every 1 hour x 10 while awake.   Sternal precautions and wound care  Shower on POD #5.   D/C plan Acute Rehab  Plan of care discussed with attending

## 2024-04-20 LAB
ALBUMIN SERPL ELPH-MCNC: 3.4 G/DL — SIGNIFICANT CHANGE UP (ref 3.3–5)
ALP SERPL-CCNC: 84 U/L — SIGNIFICANT CHANGE UP (ref 40–120)
ALT FLD-CCNC: 42 U/L — SIGNIFICANT CHANGE UP (ref 10–45)
ANION GAP SERPL CALC-SCNC: 10 MMOL/L — SIGNIFICANT CHANGE UP (ref 5–17)
AST SERPL-CCNC: 38 U/L — SIGNIFICANT CHANGE UP (ref 10–40)
BILIRUB SERPL-MCNC: 1.1 MG/DL — SIGNIFICANT CHANGE UP (ref 0.2–1.2)
BUN SERPL-MCNC: 24 MG/DL — HIGH (ref 7–23)
CALCIUM SERPL-MCNC: 9.2 MG/DL — SIGNIFICANT CHANGE UP (ref 8.4–10.5)
CHLORIDE SERPL-SCNC: 104 MMOL/L — SIGNIFICANT CHANGE UP (ref 96–108)
CO2 SERPL-SCNC: 25 MMOL/L — SIGNIFICANT CHANGE UP (ref 22–31)
CREAT SERPL-MCNC: 1.4 MG/DL — HIGH (ref 0.5–1.3)
EGFR: 52 ML/MIN/1.73M2 — LOW
GLUCOSE BLDC GLUCOMTR-MCNC: 174 MG/DL — HIGH (ref 70–99)
GLUCOSE BLDC GLUCOMTR-MCNC: 189 MG/DL — HIGH (ref 70–99)
GLUCOSE BLDC GLUCOMTR-MCNC: 197 MG/DL — HIGH (ref 70–99)
GLUCOSE BLDC GLUCOMTR-MCNC: 86 MG/DL — SIGNIFICANT CHANGE UP (ref 70–99)
GLUCOSE SERPL-MCNC: 174 MG/DL — HIGH (ref 70–99)
HCT VFR BLD CALC: 29.7 % — LOW (ref 39–50)
HGB BLD-MCNC: 9.5 G/DL — LOW (ref 13–17)
MAGNESIUM SERPL-MCNC: 2.2 MG/DL — SIGNIFICANT CHANGE UP (ref 1.6–2.6)
MCHC RBC-ENTMCNC: 28 PG — SIGNIFICANT CHANGE UP (ref 27–34)
MCHC RBC-ENTMCNC: 32 GM/DL — SIGNIFICANT CHANGE UP (ref 32–36)
MCV RBC AUTO: 87.6 FL — SIGNIFICANT CHANGE UP (ref 80–100)
NRBC # BLD: 0 /100 WBCS — SIGNIFICANT CHANGE UP (ref 0–0)
PHOSPHATE SERPL-MCNC: 3.5 MG/DL — SIGNIFICANT CHANGE UP (ref 2.5–4.5)
PLATELET # BLD AUTO: 636 K/UL — HIGH (ref 150–400)
POTASSIUM SERPL-MCNC: 4.4 MMOL/L — SIGNIFICANT CHANGE UP (ref 3.5–5.3)
POTASSIUM SERPL-SCNC: 4.4 MMOL/L — SIGNIFICANT CHANGE UP (ref 3.5–5.3)
PROT SERPL-MCNC: 5.9 G/DL — LOW (ref 6–8.3)
RBC # BLD: 3.39 M/UL — LOW (ref 4.2–5.8)
RBC # FLD: 14.5 % — SIGNIFICANT CHANGE UP (ref 10.3–14.5)
SODIUM SERPL-SCNC: 139 MMOL/L — SIGNIFICANT CHANGE UP (ref 135–145)
WBC # BLD: 9.7 K/UL — SIGNIFICANT CHANGE UP (ref 3.8–10.5)
WBC # FLD AUTO: 9.7 K/UL — SIGNIFICANT CHANGE UP (ref 3.8–10.5)

## 2024-04-20 RX ORDER — INSULIN GLARGINE 100 [IU]/ML
10 INJECTION, SOLUTION SUBCUTANEOUS AT BEDTIME
Refills: 0 | Status: DISCONTINUED | OUTPATIENT
Start: 2024-04-20 | End: 2024-04-21

## 2024-04-20 RX ORDER — INSULIN LISPRO 100/ML
5 VIAL (ML) SUBCUTANEOUS
Refills: 0 | Status: DISCONTINUED | OUTPATIENT
Start: 2024-04-20 | End: 2024-04-21

## 2024-04-20 RX ORDER — METOPROLOL TARTRATE 50 MG
25 TABLET ORAL EVERY 12 HOURS
Refills: 0 | Status: DISCONTINUED | OUTPATIENT
Start: 2024-04-20 | End: 2024-04-22

## 2024-04-20 RX ADMIN — ATORVASTATIN CALCIUM 80 MILLIGRAM(S): 80 TABLET, FILM COATED ORAL at 20:53

## 2024-04-20 RX ADMIN — TAMSULOSIN HYDROCHLORIDE 0.4 MILLIGRAM(S): 0.4 CAPSULE ORAL at 20:53

## 2024-04-20 RX ADMIN — SODIUM CHLORIDE 3 MILLILITER(S): 9 INJECTION INTRAMUSCULAR; INTRAVENOUS; SUBCUTANEOUS at 20:15

## 2024-04-20 RX ADMIN — Medication 4 UNIT(S): at 07:56

## 2024-04-20 RX ADMIN — Medication 650 MILLIGRAM(S): at 09:56

## 2024-04-20 RX ADMIN — Medication 2: at 07:55

## 2024-04-20 RX ADMIN — HEPARIN SODIUM 5000 UNIT(S): 5000 INJECTION INTRAVENOUS; SUBCUTANEOUS at 13:33

## 2024-04-20 RX ADMIN — CLOPIDOGREL BISULFATE 75 MILLIGRAM(S): 75 TABLET, FILM COATED ORAL at 12:27

## 2024-04-20 RX ADMIN — HEPARIN SODIUM 5000 UNIT(S): 5000 INJECTION INTRAVENOUS; SUBCUTANEOUS at 05:02

## 2024-04-20 RX ADMIN — SENNA PLUS 2 TABLET(S): 8.6 TABLET ORAL at 20:54

## 2024-04-20 RX ADMIN — SODIUM CHLORIDE 3 MILLILITER(S): 9 INJECTION INTRAMUSCULAR; INTRAVENOUS; SUBCUTANEOUS at 13:28

## 2024-04-20 RX ADMIN — Medication 5 MILLIGRAM(S): at 20:53

## 2024-04-20 RX ADMIN — Medication 25 MILLIGRAM(S): at 20:53

## 2024-04-20 RX ADMIN — INSULIN GLARGINE 10 UNIT(S): 100 INJECTION, SOLUTION SUBCUTANEOUS at 20:53

## 2024-04-20 RX ADMIN — Medication 25 MILLIGRAM(S): at 09:04

## 2024-04-20 RX ADMIN — SODIUM CHLORIDE 3 MILLILITER(S): 9 INJECTION INTRAMUSCULAR; INTRAVENOUS; SUBCUTANEOUS at 05:06

## 2024-04-20 RX ADMIN — Medication 5 UNIT(S): at 17:17

## 2024-04-20 RX ADMIN — Medication 81 MILLIGRAM(S): at 12:07

## 2024-04-20 RX ADMIN — CHLORHEXIDINE GLUCONATE 1 APPLICATION(S): 213 SOLUTION TOPICAL at 12:03

## 2024-04-20 RX ADMIN — Medication 650 MILLIGRAM(S): at 10:30

## 2024-04-20 RX ADMIN — Medication 2: at 12:02

## 2024-04-20 RX ADMIN — FAMOTIDINE 20 MILLIGRAM(S): 10 INJECTION INTRAVENOUS at 12:07

## 2024-04-20 RX ADMIN — HEPARIN SODIUM 5000 UNIT(S): 5000 INJECTION INTRAVENOUS; SUBCUTANEOUS at 20:52

## 2024-04-20 RX ADMIN — AMIODARONE HYDROCHLORIDE 200 MILLIGRAM(S): 400 TABLET ORAL at 05:03

## 2024-04-20 RX ADMIN — Medication 5 UNIT(S): at 12:02

## 2024-04-20 NOTE — PROGRESS NOTE ADULT - SUBJECTIVE AND OBJECTIVE BOX
VITAL SIGNS    Subjective: "I'm feeling ok" Denies CP, palpitation, SOB, GREENE, HA, dizziness, N/V/D, fever or chills.  No acute event noted overnight.     Telemetry: NSR 1st degree 70-90      Vital Signs Last 24 Hrs  T(C): 36.9 (24 @ 12:30), Max: 36.9 (24 @ 12:30)  T(F): 98.5 (24 @ 12:30), Max: 98.5 (24 @ 12:30)  HR: 95 (24 @ 12:30) (82 - 95)  BP: 107/68 (24 @ 12:30) (104/64 - 125/70)  RR: 18 (24 @ 12:30) (18 - 18)  SpO2: 93% (24 @ 12:30) (92% - 95%)           19 @ 07:01  -   @ 07:00  --------------------------------------------------------  IN: 1067 mL / OUT: 800 mL / NET: 267 mL     @ 07:01  -   @ 16:14  --------------------------------------------------------  IN: 720 mL / OUT: 0 mL / NET: 720 mL    LABS      139  |  104  |  24<H>  ----------------------------<  174<H>  4.4   |  25  |  1.40<H>    Ca    9.2      2024 06:15  Phos  3.5     20  Mg     2.2     20    TPro  5.9<L>  /  Alb  3.4  /  TBili  1.1  /  DBili  x   /  AST  38  /  ALT  42  /  AlkPhos  84  04-20                                 9.5    9.70  )-----------( 636      ( 2024 06:15 )             29.7         Daily     Daily Weight in k (2024 09:04)    CAPILLARY BLOOD GLUCOSE    POCT Blood Glucose.: 174 mg/dL (2024 11:36)  POCT Blood Glucose.: 189 mg/dL (2024 07:33)  POCT Blood Glucose.: 148 mg/dL (2024 21:24)  POCT Blood Glucose.: 74 mg/dL (2024 16:37)        PHYSICAL EXAM    Neurology: alert and oriented x 3, nonfocal, no gross deficits    CV: (+) S1 and S2, No murmurs, rubs, gallops or clicks     Sternal Wound: MSI -->CDI, sternum stable    Lungs: CTA B/L     Abdomen: soft, nontender, nondistended, positive bowel sounds, (+) Flatus; (+) BM     :  Voiding  --> Condom cath in place              Extremities:  B/L LE (+) trace edema; negative calf tenderness; (+) 2 DP palpable        acetaminophen Tablet .. 650 milliGRAM(s) Oral every 6 hours PRN  aMIOdarone Tablet   Oral   aMIOdarone Tablet 200 milliGRAM(s) Oral daily  aspirin enteric coated 81 milliGRAM(s) Oral daily  atorvastatin 80 milliGRAM(s) Oral at bedtime  bisacodyl Suppository 10 milliGRAM(s) Rectal once  chlorhexidine 2% Cloths 1 Application(s) Topical daily  clopidogrel Tablet 75 milliGRAM(s) Oral daily  dextrose 50% Injectable 50 milliLiter(s) IV Push every 15 minutes  dextrose Oral Gel 15 Gram(s) Oral once  famotidine Tablet 20 milliGRAM(s) Oral daily  glucagon  Injectable 1 milliGRAM(s) IntraMuscular once  heparin  Injectable 5000 Unit(s) SubCutaneous every 8 hours  insulin glargine Injectable (LANTUS) 10 Unit(s) SubCutaneous at bedtime  insulin lispro (ADMELOG) corrective regimen sliding scale   SubCutaneous three times a day before meals  insulin lispro Injectable (ADMELOG) 5 Unit(s) SubCutaneous three times a day before meals  melatonin 5 milliGRAM(s) Oral at bedtime  metoprolol tartrate 25 milliGRAM(s) Oral every 12 hours  polyethylene glycol 3350 17 Gram(s) Oral daily  senna 2 Tablet(s) Oral at bedtime  sodium chloride 0.9% lock flush 3 milliLiter(s) IV Push every 8 hours  sodium chloride 0.9%. 1000 milliLiter(s) IV Continuous <Continuous>  tamsulosin 0.4 milliGRAM(s) Oral at bedtime    Physical Therapy Rec:   Home  [  ]   Home w/ PT  [  ] Acute Rehab  [ X ]    Discussed with Cardiothoracic Team at AM rounds.

## 2024-04-20 NOTE — PROGRESS NOTE ADULT - PROBLEM SELECTOR PLAN 1
Continue with ASA 81 mg PO Daily.   Continue with Lipitor 80 mg PO HS    Continue with Plavix 75 mg PO Daily  Started on Lopressor 25 mg PO BID   Continue with Heparin subcutaneous 5000 units q 8 hr for DVT ppx   Continue with Pepcid 20 mg PO Daily  Increase activity as tolerated.   Encourage Chest PT / Pulmonary toileting and Incentive spirometry every 1 hour x 10 while awake.   Sternal precautions and wound care  Daily Shower   D/C plan Acute Rehab  Plan of care discussed with attending

## 2024-04-20 NOTE — PROGRESS NOTE ADULT - ASSESSMENT
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  Py presents for PST for ascending aorta replacement, aortic valve replacement, coronary artery bypass grafting with Dr. Ilia Ortiz.  He has been admitted preop for w/u.     4/8/ 24 ASCENDING AORTIC REPLACEMENT WITH TRANSVERSE HEMIARCH 28x8, CABGx2 WITH RSVG TO DIAG AND RCA; AVR WITH 25MM INSPIRUS BIOPROSTHETIC VALVE. Pre op IABP  Post op Course:   Inotropic support with IV Dobutamine and Milrinone infusions for acute systolic heart failure. IABP counterpulsation continues at 1:1.  Patient is on PO Amiodarone for afib prophylaxis.  Extubated to 5L-->hi flow  Stress Hyperglycemia / h/o DM2 -->Insulin gtt, endo consult  4/13 febrile, robert, fluid overload, zosyn / vanco , diuresis  Hypotension --> required Taran-Synephrine gtt   4/14 aj Right axillary / intro placed.  ID consult. Milrinone gtt.   4/15 nocturnal bipap. Slow milrinone wean  4/17 transferred to SDU  4/18 VSS +PW. OOB to chair and walked with PT. Increase ambulation. Serum Glucose this AM 62 and Accucheck 70. Pt alert and oriented, OOB to chair at the time, encouraged PO intake. Lantus at HS decreased to 18 units. Serum Potassium 3.5 --> k -lyte x 2 ordered.   D/C aj and trip lumen. Transfer to floor  4/19 VSS; Continue with current medication regimen.  PW cut per Dr. Tierney.   4/20 VVS; Started on Lopressor 25 mg PO BID. Continue with current medication regimen.  Increase ambulation and activity as tolerated.   Disposition: Acute Rehab once medically cleared.

## 2024-04-20 NOTE — PROGRESS NOTE ADULT - SUBJECTIVE AND OBJECTIVE BOX
Chief complaint  Patient is a 74y old  Male who presents with a chief complaint of preop as/aortic aneurysm & dvd (19 Apr 2024 15:47)         Labs and Fingersticks  CAPILLARY BLOOD GLUCOSE      POCT Blood Glucose.: 174 mg/dL (20 Apr 2024 11:36)  POCT Blood Glucose.: 189 mg/dL (20 Apr 2024 07:33)  POCT Blood Glucose.: 148 mg/dL (19 Apr 2024 21:24)  POCT Blood Glucose.: 74 mg/dL (19 Apr 2024 16:37)      Anion Gap: 10 (04-20 @ 06:15)  Anion Gap: 10 (04-19 @ 05:42)      Calcium: 9.2 (04-20 @ 06:15)  Calcium: 9.0 (04-19 @ 05:42)  Albumin: 3.4 (04-20 @ 06:15)  Albumin: 3.3 (04-19 @ 05:42)    Alanine Aminotransferase (ALT/SGPT): 42 (04-20 @ 06:15)  Alanine Aminotransferase (ALT/SGPT): 40 (04-19 @ 05:42)  Alkaline Phosphatase: 84 (04-20 @ 06:15)  Alkaline Phosphatase: 69 (04-19 @ 05:42)  Aspartate Aminotransferase (AST/SGOT): 38 (04-20 @ 06:15)  Aspartate Aminotransferase (AST/SGOT): 34 (04-19 @ 05:42)        04-20    139  |  104  |  24<H>  ----------------------------<  174<H>  4.4   |  25  |  1.40<H>    Ca    9.2      20 Apr 2024 06:15  Phos  3.5     04-20  Mg     2.2     04-20    TPro  5.9<L>  /  Alb  3.4  /  TBili  1.1  /  DBili  x   /  AST  38  /  ALT  42  /  AlkPhos  84  04-20                        9.5    9.70  )-----------( 636      ( 20 Apr 2024 06:15 )             29.7     Medications  MEDICATIONS  (STANDING):  aMIOdarone    Tablet   Oral   aMIOdarone    Tablet 200 milliGRAM(s) Oral daily  aspirin enteric coated 81 milliGRAM(s) Oral daily  atorvastatin 80 milliGRAM(s) Oral at bedtime  bisacodyl Suppository 10 milliGRAM(s) Rectal once  chlorhexidine 2% Cloths 1 Application(s) Topical daily  clopidogrel Tablet 75 milliGRAM(s) Oral daily  dextrose 50% Injectable 50 milliLiter(s) IV Push every 15 minutes  dextrose Oral Gel 15 Gram(s) Oral once  famotidine    Tablet 20 milliGRAM(s) Oral daily  glucagon  Injectable 1 milliGRAM(s) IntraMuscular once  heparin   Injectable 5000 Unit(s) SubCutaneous every 8 hours  insulin glargine Injectable (LANTUS) 10 Unit(s) SubCutaneous at bedtime  insulin lispro (ADMELOG) corrective regimen sliding scale   SubCutaneous three times a day before meals  insulin lispro Injectable (ADMELOG) 5 Unit(s) SubCutaneous three times a day before meals  melatonin 5 milliGRAM(s) Oral at bedtime  metoprolol tartrate 25 milliGRAM(s) Oral every 12 hours  polyethylene glycol 3350 17 Gram(s) Oral daily  senna 2 Tablet(s) Oral at bedtime  sodium chloride 0.9% lock flush 3 milliLiter(s) IV Push every 8 hours  sodium chloride 0.9%. 1000 milliLiter(s) (10 mL/Hr) IV Continuous <Continuous>  tamsulosin 0.4 milliGRAM(s) Oral at bedtime      Physical Exam  General: Patient comfortable in bed   Vital Signs Last 12 Hrs  T(F): 98.5 (04-20-24 @ 12:30), Max: 98.5 (04-20-24 @ 12:30)  HR: 95 (04-20-24 @ 12:30) (82 - 95)  BP: 107/68 (04-20-24 @ 12:30) (104/64 - 117/70)  BP(mean): 78 (04-20-24 @ 05:43) (78 - 78)  RR: 18 (04-20-24 @ 12:30) (18 - 18)  SpO2: 93% (04-20-24 @ 12:30) (93% - 95%)    CVS: S1S2   Respiratory: No wheezing, no crepitations  GI: Abdomen soft, bowel sounds positive  Musculoskeletal:  moves all extremities  : Voiding

## 2024-04-20 NOTE — PROGRESS NOTE ADULT - ASSESSMENT
74yr M, PMHx of CVA and MI (12/2016), HTN, DM, HLD, CAD s/p cardiac stents, CHF, hx of malignant melanoma, now s/p ascending aortic arch replacement,  CABG AVR.  Assessment  DMT2: 74y Male with DM T2 with hyperglycemia, A1C is 9.8% , was on oral meds at home, on basal bolus insulin, blood sugars are improving, no new hypoglycemias.  Insulin requirements decreasing    CAD: on medications, stable, monitored. s/p CABG  Aneursym: s/p hemiarch replacement , AVR-t , CTU monitored.         Discussed plan and management with Dr Mikala Love NP - TEAMS  Elodia Bach MD  Cell: [phone]  Office: [phone]

## 2024-04-20 NOTE — PROGRESS NOTE ADULT - PROBLEM SELECTOR PLAN 1
Will continue current insulin regimen for now. Will continue monitoring  blood sugars, will Follow up.  Patient counseled for compliance with consistent low carb diet.  Has CKD  Stop Home Metformin  Suggest DC on home Farxiga, add Tradjenta 5 mg daily  FU outpatient

## 2024-04-21 LAB
ALBUMIN SERPL ELPH-MCNC: 3.2 G/DL — LOW (ref 3.3–5)
ALP SERPL-CCNC: 95 U/L — SIGNIFICANT CHANGE UP (ref 40–120)
ALT FLD-CCNC: 33 U/L — SIGNIFICANT CHANGE UP (ref 10–45)
ANION GAP SERPL CALC-SCNC: 12 MMOL/L — SIGNIFICANT CHANGE UP (ref 5–17)
AST SERPL-CCNC: 24 U/L — SIGNIFICANT CHANGE UP (ref 10–40)
BASOPHILS # BLD AUTO: 0.08 K/UL — SIGNIFICANT CHANGE UP (ref 0–0.2)
BASOPHILS NFR BLD AUTO: 0.8 % — SIGNIFICANT CHANGE UP (ref 0–2)
BILIRUB SERPL-MCNC: 0.8 MG/DL — SIGNIFICANT CHANGE UP (ref 0.2–1.2)
BUN SERPL-MCNC: 26 MG/DL — HIGH (ref 7–23)
CALCIUM SERPL-MCNC: 8.5 MG/DL — SIGNIFICANT CHANGE UP (ref 8.4–10.5)
CHLORIDE SERPL-SCNC: 104 MMOL/L — SIGNIFICANT CHANGE UP (ref 96–108)
CO2 SERPL-SCNC: 22 MMOL/L — SIGNIFICANT CHANGE UP (ref 22–31)
CREAT SERPL-MCNC: 1.31 MG/DL — HIGH (ref 0.5–1.3)
EGFR: 56 ML/MIN/1.73M2 — LOW
EOSINOPHIL # BLD AUTO: 0.22 K/UL — SIGNIFICANT CHANGE UP (ref 0–0.5)
EOSINOPHIL NFR BLD AUTO: 2.2 % — SIGNIFICANT CHANGE UP (ref 0–6)
GLUCOSE BLDC GLUCOMTR-MCNC: 198 MG/DL — HIGH (ref 70–99)
GLUCOSE BLDC GLUCOMTR-MCNC: 210 MG/DL — HIGH (ref 70–99)
GLUCOSE BLDC GLUCOMTR-MCNC: 232 MG/DL — HIGH (ref 70–99)
GLUCOSE BLDC GLUCOMTR-MCNC: 242 MG/DL — HIGH (ref 70–99)
GLUCOSE SERPL-MCNC: 198 MG/DL — HIGH (ref 70–99)
HCT VFR BLD CALC: 28.1 % — LOW (ref 39–50)
HGB BLD-MCNC: 9.1 G/DL — LOW (ref 13–17)
IMM GRANULOCYTES NFR BLD AUTO: 0.8 % — SIGNIFICANT CHANGE UP (ref 0–0.9)
LYMPHOCYTES # BLD AUTO: 1.55 K/UL — SIGNIFICANT CHANGE UP (ref 1–3.3)
LYMPHOCYTES # BLD AUTO: 15.2 % — SIGNIFICANT CHANGE UP (ref 13–44)
MAGNESIUM SERPL-MCNC: 2.2 MG/DL — SIGNIFICANT CHANGE UP (ref 1.6–2.6)
MCHC RBC-ENTMCNC: 28.3 PG — SIGNIFICANT CHANGE UP (ref 27–34)
MCHC RBC-ENTMCNC: 32.4 GM/DL — SIGNIFICANT CHANGE UP (ref 32–36)
MCV RBC AUTO: 87.5 FL — SIGNIFICANT CHANGE UP (ref 80–100)
MONOCYTES # BLD AUTO: 0.87 K/UL — SIGNIFICANT CHANGE UP (ref 0–0.9)
MONOCYTES NFR BLD AUTO: 8.5 % — SIGNIFICANT CHANGE UP (ref 2–14)
NEUTROPHILS # BLD AUTO: 7.43 K/UL — HIGH (ref 1.8–7.4)
NEUTROPHILS NFR BLD AUTO: 72.5 % — SIGNIFICANT CHANGE UP (ref 43–77)
NRBC # BLD: 0 /100 WBCS — SIGNIFICANT CHANGE UP (ref 0–0)
PHOSPHATE SERPL-MCNC: 2.9 MG/DL — SIGNIFICANT CHANGE UP (ref 2.5–4.5)
PLATELET # BLD AUTO: 651 K/UL — HIGH (ref 150–400)
POTASSIUM SERPL-MCNC: 4.2 MMOL/L — SIGNIFICANT CHANGE UP (ref 3.5–5.3)
POTASSIUM SERPL-SCNC: 4.2 MMOL/L — SIGNIFICANT CHANGE UP (ref 3.5–5.3)
PROT SERPL-MCNC: 6 G/DL — SIGNIFICANT CHANGE UP (ref 6–8.3)
RBC # BLD: 3.21 M/UL — LOW (ref 4.2–5.8)
RBC # FLD: 14.6 % — HIGH (ref 10.3–14.5)
SODIUM SERPL-SCNC: 138 MMOL/L — SIGNIFICANT CHANGE UP (ref 135–145)
WBC # BLD: 10.23 K/UL — SIGNIFICANT CHANGE UP (ref 3.8–10.5)
WBC # FLD AUTO: 10.23 K/UL — SIGNIFICANT CHANGE UP (ref 3.8–10.5)

## 2024-04-21 RX ORDER — SPIRONOLACTONE 25 MG/1
25 TABLET, FILM COATED ORAL DAILY
Refills: 0 | Status: DISCONTINUED | OUTPATIENT
Start: 2024-04-21 | End: 2024-04-22

## 2024-04-21 RX ORDER — FUROSEMIDE 40 MG
20 TABLET ORAL ONCE
Refills: 0 | Status: COMPLETED | OUTPATIENT
Start: 2024-04-21 | End: 2024-04-21

## 2024-04-21 RX ORDER — INSULIN LISPRO 100/ML
6 VIAL (ML) SUBCUTANEOUS
Refills: 0 | Status: DISCONTINUED | OUTPATIENT
Start: 2024-04-21 | End: 2024-04-22

## 2024-04-21 RX ORDER — INSULIN LISPRO 100/ML
VIAL (ML) SUBCUTANEOUS
Refills: 0 | Status: DISCONTINUED | OUTPATIENT
Start: 2024-04-21 | End: 2024-04-22

## 2024-04-21 RX ORDER — FUROSEMIDE 40 MG
20 TABLET ORAL DAILY
Refills: 0 | Status: DISCONTINUED | OUTPATIENT
Start: 2024-04-22 | End: 2024-04-22

## 2024-04-21 RX ORDER — INSULIN GLARGINE 100 [IU]/ML
14 INJECTION, SOLUTION SUBCUTANEOUS AT BEDTIME
Refills: 0 | Status: DISCONTINUED | OUTPATIENT
Start: 2024-04-21 | End: 2024-04-22

## 2024-04-21 RX ORDER — ALPRAZOLAM 0.25 MG
0.25 TABLET ORAL EVERY 12 HOURS
Refills: 0 | Status: DISCONTINUED | OUTPATIENT
Start: 2024-04-21 | End: 2024-04-22

## 2024-04-21 RX ADMIN — HEPARIN SODIUM 5000 UNIT(S): 5000 INJECTION INTRAVENOUS; SUBCUTANEOUS at 21:36

## 2024-04-21 RX ADMIN — HEPARIN SODIUM 5000 UNIT(S): 5000 INJECTION INTRAVENOUS; SUBCUTANEOUS at 05:21

## 2024-04-21 RX ADMIN — Medication 650 MILLIGRAM(S): at 11:37

## 2024-04-21 RX ADMIN — AMIODARONE HYDROCHLORIDE 200 MILLIGRAM(S): 400 TABLET ORAL at 05:21

## 2024-04-21 RX ADMIN — Medication 5 UNIT(S): at 08:02

## 2024-04-21 RX ADMIN — CHLORHEXIDINE GLUCONATE 1 APPLICATION(S): 213 SOLUTION TOPICAL at 11:45

## 2024-04-21 RX ADMIN — Medication 6 UNIT(S): at 16:33

## 2024-04-21 RX ADMIN — FAMOTIDINE 20 MILLIGRAM(S): 10 INJECTION INTRAVENOUS at 11:38

## 2024-04-21 RX ADMIN — SODIUM CHLORIDE 3 MILLILITER(S): 9 INJECTION INTRAMUSCULAR; INTRAVENOUS; SUBCUTANEOUS at 05:36

## 2024-04-21 RX ADMIN — INSULIN GLARGINE 14 UNIT(S): 100 INJECTION, SOLUTION SUBCUTANEOUS at 21:35

## 2024-04-21 RX ADMIN — SPIRONOLACTONE 25 MILLIGRAM(S): 25 TABLET, FILM COATED ORAL at 14:23

## 2024-04-21 RX ADMIN — Medication 4: at 11:39

## 2024-04-21 RX ADMIN — Medication 2: at 16:32

## 2024-04-21 RX ADMIN — Medication 2: at 08:02

## 2024-04-21 RX ADMIN — Medication 650 MILLIGRAM(S): at 12:07

## 2024-04-21 RX ADMIN — Medication 81 MILLIGRAM(S): at 11:38

## 2024-04-21 RX ADMIN — HEPARIN SODIUM 5000 UNIT(S): 5000 INJECTION INTRAVENOUS; SUBCUTANEOUS at 14:23

## 2024-04-21 RX ADMIN — Medication 25 MILLIGRAM(S): at 17:09

## 2024-04-21 RX ADMIN — Medication 5 MILLIGRAM(S): at 21:36

## 2024-04-21 RX ADMIN — Medication 25 MILLIGRAM(S): at 05:21

## 2024-04-21 RX ADMIN — Medication 5 UNIT(S): at 11:38

## 2024-04-21 RX ADMIN — SENNA PLUS 2 TABLET(S): 8.6 TABLET ORAL at 21:35

## 2024-04-21 RX ADMIN — Medication 20 MILLIGRAM(S): at 14:23

## 2024-04-21 RX ADMIN — TAMSULOSIN HYDROCHLORIDE 0.4 MILLIGRAM(S): 0.4 CAPSULE ORAL at 21:36

## 2024-04-21 RX ADMIN — Medication 0.25 MILLIGRAM(S): at 19:34

## 2024-04-21 RX ADMIN — SODIUM CHLORIDE 3 MILLILITER(S): 9 INJECTION INTRAMUSCULAR; INTRAVENOUS; SUBCUTANEOUS at 11:33

## 2024-04-21 RX ADMIN — CLOPIDOGREL BISULFATE 75 MILLIGRAM(S): 75 TABLET, FILM COATED ORAL at 11:38

## 2024-04-21 RX ADMIN — SODIUM CHLORIDE 3 MILLILITER(S): 9 INJECTION INTRAMUSCULAR; INTRAVENOUS; SUBCUTANEOUS at 21:35

## 2024-04-21 RX ADMIN — ATORVASTATIN CALCIUM 80 MILLIGRAM(S): 80 TABLET, FILM COATED ORAL at 21:35

## 2024-04-21 NOTE — PROGRESS NOTE ADULT - SUBJECTIVE AND OBJECTIVE BOX
Chief complaint  Patient is a 74y old  Male who presents with a chief complaint of preop as/aortic aneurysm & dvd (21 Apr 2024 12:51)         Labs and Fingersticks  CAPILLARY BLOOD GLUCOSE      POCT Blood Glucose.: 232 mg/dL (21 Apr 2024 11:35)  POCT Blood Glucose.: 198 mg/dL (21 Apr 2024 07:38)  POCT Blood Glucose.: 197 mg/dL (20 Apr 2024 20:43)  POCT Blood Glucose.: 86 mg/dL (20 Apr 2024 16:31)      Anion Gap: 12 (04-21 @ 06:42)  Anion Gap: 10 (04-20 @ 06:15)      Calcium: 8.5 (04-21 @ 06:42)  Calcium: 9.2 (04-20 @ 06:15)  Albumin: 3.2 *L* (04-21 @ 06:42)  Albumin: 3.4 (04-20 @ 06:15)    Alanine Aminotransferase (ALT/SGPT): 33 (04-21 @ 06:42)  Alanine Aminotransferase (ALT/SGPT): 42 (04-20 @ 06:15)  Alkaline Phosphatase: 95 (04-21 @ 06:42)  Alkaline Phosphatase: 84 (04-20 @ 06:15)  Aspartate Aminotransferase (AST/SGOT): 24 (04-21 @ 06:42)  Aspartate Aminotransferase (AST/SGOT): 38 (04-20 @ 06:15)        04-21    138  |  104  |  26<H>  ----------------------------<  198<H>  4.2   |  22  |  1.31<H>    Ca    8.5      21 Apr 2024 06:42  Phos  2.9     04-21  Mg     2.2     04-21    TPro  6.0  /  Alb  3.2<L>  /  TBili  0.8  /  DBili  x   /  AST  24  /  ALT  33  /  AlkPhos  95  04-21                        9.1    10.23 )-----------( 651      ( 21 Apr 2024 06:43 )             28.1     Medications  MEDICATIONS  (STANDING):  aMIOdarone    Tablet 200 milliGRAM(s) Oral daily  aMIOdarone    Tablet   Oral   aspirin enteric coated 81 milliGRAM(s) Oral daily  atorvastatin 80 milliGRAM(s) Oral at bedtime  bisacodyl Suppository 10 milliGRAM(s) Rectal once  chlorhexidine 2% Cloths 1 Application(s) Topical daily  clopidogrel Tablet 75 milliGRAM(s) Oral daily  dextrose 50% Injectable 50 milliLiter(s) IV Push every 15 minutes  dextrose Oral Gel 15 Gram(s) Oral once  famotidine    Tablet 20 milliGRAM(s) Oral daily  furosemide    Tablet 20 milliGRAM(s) Oral daily  glucagon  Injectable 1 milliGRAM(s) IntraMuscular once  heparin   Injectable 5000 Unit(s) SubCutaneous every 8 hours  insulin glargine Injectable (LANTUS) 14 Unit(s) SubCutaneous at bedtime  insulin lispro (ADMELOG) corrective regimen sliding scale   SubCutaneous three times a day before meals  insulin lispro Injectable (ADMELOG) 6 Unit(s) SubCutaneous three times a day before meals  melatonin 5 milliGRAM(s) Oral at bedtime  metoprolol tartrate 25 milliGRAM(s) Oral every 12 hours  polyethylene glycol 3350 17 Gram(s) Oral daily  senna 2 Tablet(s) Oral at bedtime  sodium chloride 0.9% lock flush 3 milliLiter(s) IV Push every 8 hours  spironolactone 25 milliGRAM(s) Oral daily  tamsulosin 0.4 milliGRAM(s) Oral at bedtime      Physical Exam  General: Patient comfortable in bed   Vital Signs Last 12 Hrs  T(F): 98.1 (04-21-24 @ 12:36), Max: 98.1 (04-21-24 @ 12:36)  HR: 64 (04-21-24 @ 12:36) (64 - 68)  BP: 126/73 (04-21-24 @ 12:36) (126/73 - 126/82)  BP(mean): --  RR: 18 (04-21-24 @ 12:36) (18 - 18)  SpO2: 96% (04-21-24 @ 12:36) (95% - 96%)    CVS: S1S2   Respiratory: No wheezing, no crepitations  GI: Abdomen soft, bowel sounds positive  Musculoskeletal:  moves all extremities  : Voiding

## 2024-04-21 NOTE — PROGRESS NOTE ADULT - ASSESSMENT
74yr M, PMHx of CVA and MI (12/2016), HTN, DM, HLD, CAD s/p cardiac stents, CHF, hx of malignant melanoma, now s/p ascending aortic arch replacement,  CABG AVR.  Assessment  DMT2: 74y Male with DM T2 with hyperglycemia, A1C is 9.8% , was on oral meds at home, on basal bolus insulin, blood sugars are trending.   Insulin requirements decreasing    CAD: on medications, stable, monitored. s/p CABG  Aneursym: s/p hemiarch replacement , AVR-t , CTU monitored.         Discussed plan and management with Dr Mikala Love NP - TEAMS  Elodia Bach MD  Cell: [phone]  Office: [phone]

## 2024-04-21 NOTE — PROGRESS NOTE ADULT - PROBLEM SELECTOR PLAN 1
ASA 81 mg PO Daily.    Lipitor 80 mg PO HS   Plavix 75 mg PO Daily  Lopressor 25 mg PO BID   Continue with Heparin subcutaneous 5000 units q 8 hr for DVT ppx   Continue with Pepcid 20 mg PO Daily  lasix 20 mg po qd   aladactone 25 qd  Increase activity as tolerated.   Encourage Chest PT / Pulmonary toileting and Incentive spirometry every 1 hour x 10 while awake.   Sternal precautions and wound care  Daily Shower   D/C plan Acute Rehab [facility]  acceptedly needs bed

## 2024-04-21 NOTE — PROGRESS NOTE ADULT - ASSESSMENT
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  Py presents for PST for ascending aorta replacement, aortic valve replacement, coronary artery bypass grafting with Dr. Ilia Ortiz.  He has been admitted preop for w/u.     4/8/ 24 ASCENDING AORTIC REPLACEMENT WITH TRANSVERSE HEMIARCH 28x8, CABGx2 WITH RSVG TO DIAG AND RCA; AVR WITH 25MM INSPIRUS BIOPROSTHETIC VALVE. Pre op IABP  Post op Course:   Inotropic support with IV Dobutamine and Milrinone infusions for acute systolic heart failure. IABP counterpulsation continues at 1:1.  Patient is on PO Amiodarone for afib prophylaxis.  Extubated to 5L-->hi flow  Stress Hyperglycemia / h/o DM2 -->Insulin gtt, endo consult  4/13 febrile, robert, fluid overload, zosyn / vanco , diuresis  Hypotension --> required Taran-Synephrine gtt   4/14 aj Right axillary / intro placed.  ID consult. Milrinone gtt.   4/15 nocturnal bipap. Slow milrinone wean  4/17 transferred to SDU  4/18 VSS +PW. OOB to chair and walked with PT. Increase ambulation. Serum Glucose this AM 62 and Accucheck 70. Pt alert and oriented, OOB to chair at the time, encouraged PO intake. Lantus at HS decreased to 18 units. Serum Potassium 3.5 --> k -lyte x 2 ordered.   D/C aj and trip lumen. Transfer to floor  4/19 VSS; Continue with current medication regimen.  PW cut per Dr. Tierney.   4/20 VVS; Started on Lopressor 25 mg PO BID. Continue with current medication regimen.  Increase ambulation and activity as tolerated.   4/21 VSS  NSR 1degree 60-70   lasix 20 IV x1  lasix 20 daily aldactone 25 qd  - accepted to [facility]  [place] pending bed        4/20 VVS; Started on Lopressor 25 mg PO BID. Continue with current medication regimen.  Increase ambulation and activity as tolerated.   Disposition: Acute Rehab once medically cleared.

## 2024-04-22 ENCOUNTER — INPATIENT (INPATIENT)
Facility: HOSPITAL | Age: 74
LOS: 11 days | Discharge: HOME HEALTH | DRG: 951 | End: 2024-05-04
Attending: PHYSICAL MEDICINE & REHABILITATION | Admitting: PHYSICAL MEDICINE & REHABILITATION
Payer: MEDICARE

## 2024-04-22 ENCOUNTER — TRANSCRIPTION ENCOUNTER (OUTPATIENT)
Age: 74
End: 2024-04-22

## 2024-04-22 VITALS
HEART RATE: 73 BPM | RESPIRATION RATE: 18 BRPM | WEIGHT: 169.98 LBS | DIASTOLIC BLOOD PRESSURE: 78 MMHG | SYSTOLIC BLOOD PRESSURE: 127 MMHG | TEMPERATURE: 98 F | OXYGEN SATURATION: 93 % | HEIGHT: 67 IN

## 2024-04-22 VITALS — WEIGHT: 175.05 LBS

## 2024-04-22 DIAGNOSIS — Z98.89 OTHER SPECIFIED POSTPROCEDURAL STATES: Chronic | ICD-10-CM

## 2024-04-22 DIAGNOSIS — K40.20 BILATERAL INGUINAL HERNIA, WITHOUT OBSTRUCTION OR GANGRENE, NOT SPECIFIED AS RECURRENT: Chronic | ICD-10-CM

## 2024-04-22 DIAGNOSIS — Z90.89 ACQUIRED ABSENCE OF OTHER ORGANS: Chronic | ICD-10-CM

## 2024-04-22 DIAGNOSIS — Z95.1 PRESENCE OF AORTOCORONARY BYPASS GRAFT: ICD-10-CM

## 2024-04-22 LAB
ALBUMIN SERPL ELPH-MCNC: 3.3 G/DL — SIGNIFICANT CHANGE UP (ref 3.3–5)
ALP SERPL-CCNC: 87 U/L — SIGNIFICANT CHANGE UP (ref 40–120)
ALT FLD-CCNC: 32 U/L — SIGNIFICANT CHANGE UP (ref 10–45)
ANION GAP SERPL CALC-SCNC: 12 MMOL/L — SIGNIFICANT CHANGE UP (ref 5–17)
AST SERPL-CCNC: 25 U/L — SIGNIFICANT CHANGE UP (ref 10–40)
BILIRUB SERPL-MCNC: 0.7 MG/DL — SIGNIFICANT CHANGE UP (ref 0.2–1.2)
BUN SERPL-MCNC: 25 MG/DL — HIGH (ref 7–23)
CALCIUM SERPL-MCNC: 8.5 MG/DL — SIGNIFICANT CHANGE UP (ref 8.4–10.5)
CHLORIDE SERPL-SCNC: 104 MMOL/L — SIGNIFICANT CHANGE UP (ref 96–108)
CO2 SERPL-SCNC: 23 MMOL/L — SIGNIFICANT CHANGE UP (ref 22–31)
CREAT SERPL-MCNC: 1.29 MG/DL — SIGNIFICANT CHANGE UP (ref 0.5–1.3)
EGFR: 57 ML/MIN/1.73M2 — LOW
FLUAV AG NPH QL: SIGNIFICANT CHANGE UP
FLUBV AG NPH QL: SIGNIFICANT CHANGE UP
GLUCOSE BLDC GLUCOMTR-MCNC: 104 MG/DL — HIGH (ref 70–99)
GLUCOSE BLDC GLUCOMTR-MCNC: 131 MG/DL — HIGH (ref 70–99)
GLUCOSE BLDC GLUCOMTR-MCNC: 140 MG/DL — HIGH (ref 70–99)
GLUCOSE BLDC GLUCOMTR-MCNC: 151 MG/DL — HIGH (ref 70–99)
GLUCOSE SERPL-MCNC: 127 MG/DL — HIGH (ref 70–99)
HCT VFR BLD CALC: 28.7 % — LOW (ref 39–50)
HGB BLD-MCNC: 9.2 G/DL — LOW (ref 13–17)
MAGNESIUM SERPL-MCNC: 2.1 MG/DL — SIGNIFICANT CHANGE UP (ref 1.6–2.6)
MCHC RBC-ENTMCNC: 28.2 PG — SIGNIFICANT CHANGE UP (ref 27–34)
MCHC RBC-ENTMCNC: 32.1 GM/DL — SIGNIFICANT CHANGE UP (ref 32–36)
MCV RBC AUTO: 88 FL — SIGNIFICANT CHANGE UP (ref 80–100)
NRBC # BLD: 0 /100 WBCS — SIGNIFICANT CHANGE UP (ref 0–0)
PHOSPHATE SERPL-MCNC: 2.8 MG/DL — SIGNIFICANT CHANGE UP (ref 2.5–4.5)
PLATELET # BLD AUTO: 672 K/UL — HIGH (ref 150–400)
POTASSIUM SERPL-MCNC: 3.8 MMOL/L — SIGNIFICANT CHANGE UP (ref 3.5–5.3)
POTASSIUM SERPL-SCNC: 3.8 MMOL/L — SIGNIFICANT CHANGE UP (ref 3.5–5.3)
PROT SERPL-MCNC: 5.8 G/DL — LOW (ref 6–8.3)
RBC # BLD: 3.26 M/UL — LOW (ref 4.2–5.8)
RBC # FLD: 14.6 % — HIGH (ref 10.3–14.5)
RSV RNA NPH QL NAA+NON-PROBE: SIGNIFICANT CHANGE UP
SARS-COV-2 RNA SPEC QL NAA+PROBE: SIGNIFICANT CHANGE UP
SODIUM SERPL-SCNC: 139 MMOL/L — SIGNIFICANT CHANGE UP (ref 135–145)
WBC # BLD: 10.6 K/UL — HIGH (ref 3.8–10.5)
WBC # FLD AUTO: 10.6 K/UL — HIGH (ref 3.8–10.5)

## 2024-04-22 PROCEDURE — 85384 FIBRINOGEN ACTIVITY: CPT

## 2024-04-22 PROCEDURE — 83605 ASSAY OF LACTIC ACID: CPT

## 2024-04-22 PROCEDURE — 97116 GAIT TRAINING THERAPY: CPT

## 2024-04-22 PROCEDURE — 81003 URINALYSIS AUTO W/O SCOPE: CPT

## 2024-04-22 PROCEDURE — P9016: CPT

## 2024-04-22 PROCEDURE — P9059: CPT

## 2024-04-22 PROCEDURE — 83880 ASSAY OF NATRIURETIC PEPTIDE: CPT

## 2024-04-22 PROCEDURE — 97110 THERAPEUTIC EXERCISES: CPT

## 2024-04-22 PROCEDURE — 87637 SARSCOV2&INF A&B&RSV AMP PRB: CPT

## 2024-04-22 PROCEDURE — 84484 ASSAY OF TROPONIN QUANT: CPT

## 2024-04-22 PROCEDURE — 93325 DOPPLER ECHO COLOR FLOW MAPG: CPT

## 2024-04-22 PROCEDURE — 36430 TRANSFUSION BLD/BLD COMPNT: CPT

## 2024-04-22 PROCEDURE — P9047: CPT

## 2024-04-22 PROCEDURE — C1889: CPT

## 2024-04-22 PROCEDURE — 70450 CT HEAD/BRAIN W/O DYE: CPT | Mod: MC

## 2024-04-22 PROCEDURE — C1769: CPT

## 2024-04-22 PROCEDURE — C1894: CPT

## 2024-04-22 PROCEDURE — 85730 THROMBOPLASTIN TIME PARTIAL: CPT

## 2024-04-22 PROCEDURE — 86850 RBC ANTIBODY SCREEN: CPT

## 2024-04-22 PROCEDURE — 88305 TISSUE EXAM BY PATHOLOGIST: CPT

## 2024-04-22 PROCEDURE — C8925: CPT

## 2024-04-22 PROCEDURE — 36415 COLL VENOUS BLD VENIPUNCTURE: CPT

## 2024-04-22 PROCEDURE — 93005 ELECTROCARDIOGRAM TRACING: CPT

## 2024-04-22 PROCEDURE — 84100 ASSAY OF PHOSPHORUS: CPT

## 2024-04-22 PROCEDURE — 71250 CT THORAX DX C-: CPT | Mod: MC

## 2024-04-22 PROCEDURE — 94003 VENT MGMT INPAT SUBQ DAY: CPT

## 2024-04-22 PROCEDURE — 83036 HEMOGLOBIN GLYCOSYLATED A1C: CPT

## 2024-04-22 PROCEDURE — 85014 HEMATOCRIT: CPT

## 2024-04-22 PROCEDURE — 81001 URINALYSIS AUTO W/SCOPE: CPT

## 2024-04-22 PROCEDURE — 87640 STAPH A DNA AMP PROBE: CPT

## 2024-04-22 PROCEDURE — 71045 X-RAY EXAM CHEST 1 VIEW: CPT | Mod: 26

## 2024-04-22 PROCEDURE — 97162 PT EVAL MOD COMPLEX 30 MIN: CPT

## 2024-04-22 PROCEDURE — C9399: CPT

## 2024-04-22 PROCEDURE — 82435 ASSAY OF BLOOD CHLORIDE: CPT

## 2024-04-22 PROCEDURE — C1768: CPT

## 2024-04-22 PROCEDURE — C8929: CPT

## 2024-04-22 PROCEDURE — P9045: CPT

## 2024-04-22 PROCEDURE — 85018 HEMOGLOBIN: CPT

## 2024-04-22 PROCEDURE — 82565 ASSAY OF CREATININE: CPT

## 2024-04-22 PROCEDURE — 94660 CPAP INITIATION&MGMT: CPT

## 2024-04-22 PROCEDURE — 85396 CLOTTING ASSAY WHOLE BLOOD: CPT

## 2024-04-22 PROCEDURE — 82947 ASSAY GLUCOSE BLOOD QUANT: CPT

## 2024-04-22 PROCEDURE — 86891 AUTOLOGOUS BLOOD OP SALVAGE: CPT

## 2024-04-22 PROCEDURE — 97166 OT EVAL MOD COMPLEX 45 MIN: CPT

## 2024-04-22 PROCEDURE — 86901 BLOOD TYPING SEROLOGIC RH(D): CPT

## 2024-04-22 PROCEDURE — 84443 ASSAY THYROID STIM HORMONE: CPT

## 2024-04-22 PROCEDURE — 84295 ASSAY OF SERUM SODIUM: CPT

## 2024-04-22 PROCEDURE — 86965 POOLING BLOOD PLATELETS: CPT

## 2024-04-22 PROCEDURE — 85610 PROTHROMBIN TIME: CPT

## 2024-04-22 PROCEDURE — 94002 VENT MGMT INPAT INIT DAY: CPT

## 2024-04-22 PROCEDURE — 85576 BLOOD PLATELET AGGREGATION: CPT

## 2024-04-22 PROCEDURE — P9012: CPT

## 2024-04-22 PROCEDURE — 86900 BLOOD TYPING SEROLOGIC ABO: CPT

## 2024-04-22 PROCEDURE — C1751: CPT

## 2024-04-22 PROCEDURE — 85025 COMPLETE CBC W/AUTO DIFF WBC: CPT

## 2024-04-22 PROCEDURE — P9037: CPT

## 2024-04-22 PROCEDURE — 84132 ASSAY OF SERUM POTASSIUM: CPT

## 2024-04-22 PROCEDURE — 84145 PROCALCITONIN (PCT): CPT

## 2024-04-22 PROCEDURE — 83735 ASSAY OF MAGNESIUM: CPT

## 2024-04-22 PROCEDURE — 87641 MR-STAPH DNA AMP PROBE: CPT

## 2024-04-22 PROCEDURE — 86923 COMPATIBILITY TEST ELECTRIC: CPT

## 2024-04-22 PROCEDURE — P9100: CPT

## 2024-04-22 PROCEDURE — 71045 X-RAY EXAM CHEST 1 VIEW: CPT

## 2024-04-22 PROCEDURE — 97530 THERAPEUTIC ACTIVITIES: CPT

## 2024-04-22 PROCEDURE — 85027 COMPLETE CBC AUTOMATED: CPT

## 2024-04-22 PROCEDURE — 84439 ASSAY OF FREE THYROXINE: CPT

## 2024-04-22 PROCEDURE — 86803 HEPATITIS C AB TEST: CPT

## 2024-04-22 PROCEDURE — 85520 HEPARIN ASSAY: CPT

## 2024-04-22 PROCEDURE — 93320 DOPPLER ECHO COMPLETE: CPT

## 2024-04-22 PROCEDURE — 82550 ASSAY OF CK (CPK): CPT

## 2024-04-22 PROCEDURE — 97535 SELF CARE MNGMENT TRAINING: CPT

## 2024-04-22 PROCEDURE — 82803 BLOOD GASES ANY COMBINATION: CPT

## 2024-04-22 PROCEDURE — 93010 ELECTROCARDIOGRAM REPORT: CPT

## 2024-04-22 PROCEDURE — 87040 BLOOD CULTURE FOR BACTERIA: CPT

## 2024-04-22 PROCEDURE — 82962 GLUCOSE BLOOD TEST: CPT

## 2024-04-22 PROCEDURE — 82330 ASSAY OF CALCIUM: CPT

## 2024-04-22 PROCEDURE — 82553 CREATINE MB FRACTION: CPT

## 2024-04-22 PROCEDURE — 80053 COMPREHEN METABOLIC PANEL: CPT

## 2024-04-22 RX ORDER — ATORVASTATIN CALCIUM 80 MG/1
80 TABLET, FILM COATED ORAL AT BEDTIME
Refills: 0 | Status: DISCONTINUED | OUTPATIENT
Start: 2024-04-22 | End: 2024-04-24

## 2024-04-22 RX ORDER — METOPROLOL TARTRATE 50 MG
25 TABLET ORAL EVERY 12 HOURS
Refills: 0 | Status: DISCONTINUED | OUTPATIENT
Start: 2024-04-22 | End: 2024-04-23

## 2024-04-22 RX ORDER — DEXTROSE 50 % IN WATER 50 %
15 SYRINGE (ML) INTRAVENOUS ONCE
Refills: 0 | Status: DISCONTINUED | OUTPATIENT
Start: 2024-04-22 | End: 2024-05-04

## 2024-04-22 RX ORDER — CLOPIDOGREL BISULFATE 75 MG/1
75 TABLET, FILM COATED ORAL DAILY
Refills: 0 | Status: DISCONTINUED | OUTPATIENT
Start: 2024-04-23 | End: 2024-05-04

## 2024-04-22 RX ORDER — INSULIN LISPRO 100/ML
VIAL (ML) SUBCUTANEOUS
Refills: 0 | Status: DISCONTINUED | OUTPATIENT
Start: 2024-04-22 | End: 2024-05-02

## 2024-04-22 RX ORDER — AMIODARONE HYDROCHLORIDE 400 MG/1
200 TABLET ORAL DAILY
Refills: 0 | Status: DISCONTINUED | OUTPATIENT
Start: 2024-04-23 | End: 2024-05-04

## 2024-04-22 RX ORDER — SODIUM CHLORIDE 9 MG/ML
1000 INJECTION, SOLUTION INTRAVENOUS
Refills: 0 | Status: DISCONTINUED | OUTPATIENT
Start: 2024-04-22 | End: 2024-05-04

## 2024-04-22 RX ORDER — INSULIN GLARGINE 100 [IU]/ML
14 INJECTION, SOLUTION SUBCUTANEOUS AT BEDTIME
Refills: 0 | Status: DISCONTINUED | OUTPATIENT
Start: 2024-04-22 | End: 2024-04-25

## 2024-04-22 RX ORDER — DEXTROSE 50 % IN WATER 50 %
12.5 SYRINGE (ML) INTRAVENOUS ONCE
Refills: 0 | Status: DISCONTINUED | OUTPATIENT
Start: 2024-04-22 | End: 2024-05-04

## 2024-04-22 RX ORDER — FAMOTIDINE 10 MG/ML
20 INJECTION INTRAVENOUS DAILY
Refills: 0 | Status: DISCONTINUED | OUTPATIENT
Start: 2024-04-23 | End: 2024-05-04

## 2024-04-22 RX ORDER — AMIODARONE HYDROCHLORIDE 400 MG/1
1 TABLET ORAL
Qty: 0 | Refills: 0 | DISCHARGE
Start: 2024-04-22

## 2024-04-22 RX ORDER — INSULIN LISPRO 100/ML
7 VIAL (ML) SUBCUTANEOUS
Refills: 0 | Status: DISCONTINUED | OUTPATIENT
Start: 2024-04-22 | End: 2024-04-23

## 2024-04-22 RX ORDER — ALPRAZOLAM 0.25 MG
1 TABLET ORAL
Qty: 0 | Refills: 0 | DISCHARGE
Start: 2024-04-22

## 2024-04-22 RX ORDER — INSULIN LISPRO 100/ML
7 VIAL (ML) SUBCUTANEOUS
Refills: 0 | Status: DISCONTINUED | OUTPATIENT
Start: 2024-04-22 | End: 2024-04-22

## 2024-04-22 RX ORDER — SENNA PLUS 8.6 MG/1
2 TABLET ORAL AT BEDTIME
Refills: 0 | Status: DISCONTINUED | OUTPATIENT
Start: 2024-04-22 | End: 2024-04-23

## 2024-04-22 RX ORDER — METFORMIN HYDROCHLORIDE 850 MG/1
0 TABLET ORAL
Refills: 0 | DISCHARGE

## 2024-04-22 RX ORDER — TAMSULOSIN HYDROCHLORIDE 0.4 MG/1
0.4 CAPSULE ORAL AT BEDTIME
Refills: 0 | Status: DISCONTINUED | OUTPATIENT
Start: 2024-04-22 | End: 2024-04-23

## 2024-04-22 RX ORDER — SPIRONOLACTONE 25 MG/1
25 TABLET, FILM COATED ORAL DAILY
Refills: 0 | Status: DISCONTINUED | OUTPATIENT
Start: 2024-04-23 | End: 2024-04-23

## 2024-04-22 RX ORDER — GLUCAGON INJECTION, SOLUTION 0.5 MG/.1ML
1 INJECTION, SOLUTION SUBCUTANEOUS ONCE
Refills: 0 | Status: DISCONTINUED | OUTPATIENT
Start: 2024-04-22 | End: 2024-05-04

## 2024-04-22 RX ORDER — ALPRAZOLAM 0.25 MG
0.25 TABLET ORAL EVERY 12 HOURS
Refills: 0 | Status: DISCONTINUED | OUTPATIENT
Start: 2024-04-22 | End: 2024-04-29

## 2024-04-22 RX ORDER — HEPARIN SODIUM 5000 [USP'U]/ML
5000 INJECTION INTRAVENOUS; SUBCUTANEOUS EVERY 8 HOURS
Refills: 0 | Status: DISCONTINUED | OUTPATIENT
Start: 2024-04-22 | End: 2024-05-04

## 2024-04-22 RX ORDER — METOPROLOL TARTRATE 50 MG
1 TABLET ORAL
Qty: 0 | Refills: 0 | DISCHARGE
Start: 2024-04-22

## 2024-04-22 RX ORDER — ASPIRIN/CALCIUM CARB/MAGNESIUM 324 MG
81 TABLET ORAL DAILY
Refills: 0 | Status: DISCONTINUED | OUTPATIENT
Start: 2024-04-23 | End: 2024-05-04

## 2024-04-22 RX ORDER — DEXTROSE 50 % IN WATER 50 %
25 SYRINGE (ML) INTRAVENOUS ONCE
Refills: 0 | Status: DISCONTINUED | OUTPATIENT
Start: 2024-04-22 | End: 2024-05-04

## 2024-04-22 RX ORDER — INSULIN LISPRO 100/ML
VIAL (ML) SUBCUTANEOUS AT BEDTIME
Refills: 0 | Status: DISCONTINUED | OUTPATIENT
Start: 2024-04-22 | End: 2024-05-04

## 2024-04-22 RX ORDER — DEXTROSE 10 % IN WATER 10 %
125 INTRAVENOUS SOLUTION INTRAVENOUS ONCE
Refills: 0 | Status: DISCONTINUED | OUTPATIENT
Start: 2024-04-22 | End: 2024-05-04

## 2024-04-22 RX ORDER — PETROLATUM,WHITE
1 JELLY (GRAM) TOPICAL
Refills: 0 | Status: DISCONTINUED | OUTPATIENT
Start: 2024-04-22 | End: 2024-05-04

## 2024-04-22 RX ORDER — SENNA PLUS 8.6 MG/1
2 TABLET ORAL
Qty: 0 | Refills: 0 | DISCHARGE
Start: 2024-04-22

## 2024-04-22 RX ORDER — ACETAMINOPHEN 500 MG
2 TABLET ORAL
Qty: 0 | Refills: 0 | DISCHARGE
Start: 2024-04-22

## 2024-04-22 RX ORDER — SENNA PLUS 8.6 MG/1
2 TABLET ORAL AT BEDTIME
Refills: 0 | Status: DISCONTINUED | OUTPATIENT
Start: 2024-04-22 | End: 2024-05-01

## 2024-04-22 RX ORDER — DAPAGLIFLOZIN 10 MG/1
10 TABLET, FILM COATED ORAL DAILY
Refills: 0 | Status: DISCONTINUED | OUTPATIENT
Start: 2024-04-23 | End: 2024-04-23

## 2024-04-22 RX ORDER — FUROSEMIDE 40 MG
20 TABLET ORAL DAILY
Refills: 0 | Status: DISCONTINUED | OUTPATIENT
Start: 2024-04-23 | End: 2024-04-23

## 2024-04-22 RX ORDER — ACETAMINOPHEN 500 MG
650 TABLET ORAL EVERY 6 HOURS
Refills: 0 | Status: DISCONTINUED | OUTPATIENT
Start: 2024-04-22 | End: 2024-05-04

## 2024-04-22 RX ADMIN — Medication 0.25 MILLIGRAM(S): at 22:35

## 2024-04-22 RX ADMIN — HEPARIN SODIUM 5000 UNIT(S): 5000 INJECTION INTRAVENOUS; SUBCUTANEOUS at 22:33

## 2024-04-22 RX ADMIN — Medication 25 MILLIGRAM(S): at 05:50

## 2024-04-22 RX ADMIN — FAMOTIDINE 20 MILLIGRAM(S): 10 INJECTION INTRAVENOUS at 11:40

## 2024-04-22 RX ADMIN — HEPARIN SODIUM 5000 UNIT(S): 5000 INJECTION INTRAVENOUS; SUBCUTANEOUS at 14:31

## 2024-04-22 RX ADMIN — Medication 7 UNIT(S): at 11:38

## 2024-04-22 RX ADMIN — Medication 6 UNIT(S): at 07:43

## 2024-04-22 RX ADMIN — INSULIN GLARGINE 14 UNIT(S): 100 INJECTION, SOLUTION SUBCUTANEOUS at 22:42

## 2024-04-22 RX ADMIN — SENNA PLUS 2 TABLET(S): 8.6 TABLET ORAL at 22:33

## 2024-04-22 RX ADMIN — Medication 20 MILLIGRAM(S): at 05:50

## 2024-04-22 RX ADMIN — TAMSULOSIN HYDROCHLORIDE 0.4 MILLIGRAM(S): 0.4 CAPSULE ORAL at 22:33

## 2024-04-22 RX ADMIN — ATORVASTATIN CALCIUM 80 MILLIGRAM(S): 80 TABLET, FILM COATED ORAL at 22:33

## 2024-04-22 RX ADMIN — CLOPIDOGREL BISULFATE 75 MILLIGRAM(S): 75 TABLET, FILM COATED ORAL at 11:39

## 2024-04-22 RX ADMIN — SODIUM CHLORIDE 3 MILLILITER(S): 9 INJECTION INTRAMUSCULAR; INTRAVENOUS; SUBCUTANEOUS at 05:50

## 2024-04-22 RX ADMIN — CHLORHEXIDINE GLUCONATE 1 APPLICATION(S): 213 SOLUTION TOPICAL at 11:41

## 2024-04-22 RX ADMIN — Medication 81 MILLIGRAM(S): at 11:38

## 2024-04-22 RX ADMIN — AMIODARONE HYDROCHLORIDE 200 MILLIGRAM(S): 400 TABLET ORAL at 05:51

## 2024-04-22 RX ADMIN — POLYETHYLENE GLYCOL 3350 17 GRAM(S): 17 POWDER, FOR SOLUTION ORAL at 11:38

## 2024-04-22 RX ADMIN — Medication 1 APPLICATION(S): at 18:48

## 2024-04-22 RX ADMIN — Medication 25 MILLIGRAM(S): at 18:48

## 2024-04-22 RX ADMIN — HEPARIN SODIUM 5000 UNIT(S): 5000 INJECTION INTRAVENOUS; SUBCUTANEOUS at 05:51

## 2024-04-22 RX ADMIN — SPIRONOLACTONE 25 MILLIGRAM(S): 25 TABLET, FILM COATED ORAL at 05:50

## 2024-04-22 NOTE — DISCHARGE NOTE PROVIDER - NSDCFUADDAPPT_GEN_ALL_CORE_FT
see Dr Ortiz within one week of discharge     follow up with your cardiologist after rehab see Dr Ortiz within one week of discharge     to see Dr Ortiz in [place]        call   [phone]     follow up with your cardiologist after rehab    follow up with Dr Bach  endocrinologist after rehab   or your endocrinologist

## 2024-04-22 NOTE — PROGRESS NOTE ADULT - PROBLEM SELECTOR PLAN 5
Tamsulosin 0.4 PO HS
Endo Following (hbg a1c 9.8)  Lantus HS 14  Admelog 5 units B4 meals   Sliding Scale
hbg a1c 9.8  Lantus HS 18   Admelog 5 units B4 meals   Sliding Scale

## 2024-04-22 NOTE — H&P ADULT - NS ATTEND AMEND GEN_ALL_CORE FT
Rehab Attending- Patient seen and examined by me - Case discussed, above note reviewed by me with modifications made    patient seen and evaluated bedside  reports anxiety at night- poor sleep- xanax PRN ordered  will add Melatonin to start for Sleep  reports Confusion since surgery- Not oriented to year or place today  ? anoxic/ metabolic encephalopathy- ? need for Head CT  last BM  4/22 Voiding But Frequent PVR 200cc-  will obtain   Hospitalist concerned about Left sided PLeural Effusion- Meds adjusted- seen By Cardiology- ? start Entresto- On farxiga, tradjenta as well  daily weights ordered    IMP Rehab cardiac debilitation SP CABG/ AVR/ Aortic Arch replacement- good candidate for intensive rehab- will tolerate three hours rehab daily        MEDICATIONS  (STANDING):  aMIOdarone    Tablet 200 milliGRAM(s) Oral daily  AQUAPHOR (petrolatum Ointment) 1 Application(s) Topical two times a day  aspirin enteric coated 81 milliGRAM(s) Oral daily  atorvastatin 80 milliGRAM(s) Oral at bedtime  clopidogrel Tablet 75 milliGRAM(s) Oral daily  dapagliflozin 10 milliGRAM(s) Oral daily  dextrose 10% Bolus 125 milliLiter(s) IV Bolus once  dextrose 5%. 1000 milliLiter(s) (100 mL/Hr) IV Continuous <Continuous>  dextrose 5%. 1000 milliLiter(s) (50 mL/Hr) IV Continuous <Continuous>  dextrose 50% Injectable 25 Gram(s) IV Push once  dextrose 50% Injectable 12.5 Gram(s) IV Push once  famotidine    Tablet 20 milliGRAM(s) Oral daily  furosemide    Tablet 20 milliGRAM(s) Oral two times a day  glucagon  Injectable 1 milliGRAM(s) IntraMuscular once  heparin   Injectable 5000 Unit(s) SubCutaneous every 8 hours  insulin glargine Injectable (LANTUS) 14 Unit(s) SubCutaneous at bedtime  insulin lispro (ADMELOG) corrective regimen sliding scale   SubCutaneous three times a day before meals  insulin lispro (ADMELOG) corrective regimen sliding scale   SubCutaneous at bedtime  insulin lispro Injectable (ADMELOG) 4 Unit(s) SubCutaneous three times a day with meals  linagliptin 5 milliGRAM(s) Oral with breakfast  melatonin 6 milliGRAM(s) Oral at bedtime  metoprolol tartrate 25 milliGRAM(s) Oral at bedtime  senna 2 Tablet(s) Oral at bedtime  spironolactone 25 milliGRAM(s) Oral daily  tamsulosin 0.4 milliGRAM(s) Oral at bedtime    MEDICATIONS  (PRN):  acetaminophen     Tablet .. 650 milliGRAM(s) Oral every 6 hours PRN Moderate Pain (4 - 6)  ALPRAZolam 0.25 milliGRAM(s) Oral every 12 hours PRN anxiety  dextrose Oral Gel 15 Gram(s) Oral once PRN Blood Glucose LESS THAN 70 milliGRAM(s)/deciliter                      9.4    9.00  )-----------( 639      ( 23 Apr 2024 06:25 )             28.3     04-23    141  |  106  |  23  ----------------------------<  127<H>  3.8   |  27  |  1.17    Ca    8.2<L>      23 Apr 2024 06:25  Phos  2.8     04-22  Mg     2.1     04-22    TPro  5.6<L>  /  Alb  2.5<L>  /  TBili  0.9  /  DBili  x   /  AST  23  /  ALT  34  /  AlkPhos  86  04-23    CAPILLARY BLOOD GLUCOSE  POCT Blood Glucose.: 154 mg/dL (23 Apr 2024 12:01)  POCT Blood Glucose.: 136 mg/dL (23 Apr 2024 07:59)  POCT Blood Glucose.: 131 mg/dL (22 Apr 2024 22:35)  POCT Blood Glucose.: 104 mg/dL (22 Apr 2024 17:07)      Vital Signs Last 24 Hrs  T(C): 36.6 (23 Apr 2024 08:12), Max: 36.8 (22 Apr 2024 16:35)  T(F): 97.9 (23 Apr 2024 08:12), Max: 98.3 (22 Apr 2024 16:35)  HR: 69 (23 Apr 2024 08:12) (69 - 80)  BP: 116/71 (23 Apr 2024 08:12) (116/71 - 143/76)  RR: 16 (23 Apr 2024 08:12) (16 - 18)  SpO2: 94% (23 Apr 2024 08:12) (92% - 94%) NC    Gen - NAD, Comfortable  HEENT - NCAT, EOMI, MMM  Neck - Supple, No limited ROM  Pulm - Poor Resp effort - diminished BS Regan some crackles anteriorly  Cardiovascular - RRR, S1S2  Abdomen - Soft, NT/ND, +BS  Extremities - No Cyanosis, no clubbing, 1+ edema to BLE, no calf tenderness= + ecchymoses both thighs  Neuro-     Cognitive - awake, alert, oriented to person, place, not date or year.  Able to follow command     Communication - Fluent, Comprehensible     Attention: Intact     Memory:  Memory impaired     Cranial Nerves - CN 2-12 intact.     Motor -                     LEFT    UE - 5/5                    RIGHT UE - 5/5                    LEFT    LE - 4/5 HF otherwise 5/5                    RIGHT LE - 4+/5  HF otherwise 5/5     Sensory - Intact  to LT     Tone - normal  Skin: Sternal incision LELA + abd lap sites x 4, b/l medial thigh incision, right groin puncture site, Deep tissue injury (DTI) to b/l buttock.

## 2024-04-22 NOTE — H&P ADULT - NSHPPHYSICALEXAM_GEN_ALL_CORE
PHYSICAL EXAM  VITALS  T(C): 36.9 (04-22-24 @ 05:16), Max: 36.9 (04-21-24 @ 20:36)  HR: 68 (04-22-24 @ 05:16) (68 - 71)  BP: 137/86 (04-22-24 @ 05:16) (126/79 - 137/86)  RR: 18 (04-22-24 @ 05:16) (18 - 18)  SpO2: 93% (04-22-24 @ 05:16) (93% - 95%)    Gen - NAD, Comfortable  HEENT - NCAT, EOMI, MMM  Neck - Supple, No limited ROM  Pulm - CTAB, No wheeze, No rhonchi, No crackles  Cardiovascular - RRR, S1S2  Chest - good chest expansion, good respiratory effort  Abdomen - Soft, NT/ND, +BS  Extremities - No Cyanosis, no clubbing, edema to BLE, no calf tenderness  Neuro-     Cognitive - awake, alert, oriented to person, place, date, year, and situation.  Able to follow command     Communication - Fluent, Comprehensible     Attention: Intact     Memory:  Memory intact     Cranial Nerves - CN 2-12 intact.     Motor -                     LEFT    UE - 4/5                    RIGHT UE - 4/5                    LEFT    LE - 3+/5                    RIGHT LE - 3+/5        Sensory - Intact  to LT     Tone - normal  Skin: Sternal incision LELA + abd lap sites x 4, b/l medial thigh incision, right groin puncture site, Deep tissue injury (DTI) to b/l buttock.

## 2024-04-22 NOTE — H&P ADULT - ASSESSMENT
IRA SEN is a 73yo Male s/p CABG, now with decreased functional mobility, gait instability and ADL impairments.    COMORBIDITES/ACTIVE MEDICAL ISSUES     Gait Instability, ADL impairments and Functional impairments: start Comprehensive Rehab Program of PT/OT      #S/P CABG (coronary artery bypass graft).   - ASA 81 mg PO Daily.   - Lipitor 80 mg PO HS   Plavix 75 mg PO Daily  - Lopressor 25 mg PO BID   - Continue with Heparin subcutaneous 5000 units q 8 hr for DVT ppx   - Continue with Pepcid 20 mg PO Daily  - lasix 20 mg po qd   - aladactone 25 qd  - fall and sternal precautions  -IS    # S/P ascending aortic aneurysm repair.   -Continue with Plavix 75 mg PO Daily  Continue with Heparin subcutaneous 5000 units q 8 hr for DVT ppx   Continue with Pepcid 20 mg PO Daily  Continue Amio 200mg PO     #CLAUDIA (acute kidney injury) on CKD   -follow labs, avoid nephrotoxins/hold home Metformin    #DM2 (hbg a1c 9.8)  Lantus HS Admelog units  Sliding Scale.  - consistent low carb diet.  - DC on home Farxiga, add Tradjenta 5 mg daily  - FU outpatient.    #CAD (coronary artery disease).   - ASA/Lipitor    #BPH (benign prostatic hyperplasia).   -Tamsulosin 0.4 PO HS  -monitor voiding, mobilize    Pain Mgmt - Tylenol PRN  GI/Bowel Mgmt - Senna,  Miralax PRN  /Bladder Mgmt - PVR x1      Precautions / PROPHYLAXIS:   - Falls, Cardiac, Sternal  - Lungs: Aspiration, Incentive Spirometer   - Pressure injury/Skin: Turn Q2hrs while in bed, OOB to Chair, PT/OT    - DVT: Hep sq    MEDICAL PROGNOSIS: GOOD            REHAB POTENTIAL: GOOD             ESTIMATED DISPOSITION: HOME WITH HOME CARE            ELOS: 10-14 Days   EXPECTED THERAPY:     P.T. 2hr/day       O.T. 1hr/day      S.L.P. na   EXP FREQUENCY: 5 days per 7 day period     PRESCREEN COMPARISION:   I have reviewed the prescreen information and I have found no relevant changes between the preadmission screening and my post admission evaluation     RATIONALE FOR INPATIENT ADMISSION - Patient demonstrates the following: (check all that apply)  [X] Medically appropriate for rehabilitation admission  [X] Has attainable rehab goals with an appropriate initial discharge plan  [X] Has rehabilitation potential (expected to make a significant improvement within a reasonable period of time)   [X] Requires close medical management by a rehab physician, rehab nursing care, Hospitalist and comprehensive interdisciplinary team (including PT, OT, & or SLP, Prosthetics and Orthotics)     IRA SEN is a 73yo Male s/p CABG, now with decreased functional mobility, gait instability and ADL impairments.    COMORBIDITIES/ACTIVE MEDICAL ISSUES     Gait Instability, ADL impairments and Functional impairments: start Comprehensive Rehab Program of PT/OT      #S/P CABG (coronary artery bypass graft).   - ASA 81 mg PO Daily.   - Lipitor 80 mg PO HS   Plavix 75 mg PO Daily  - Lopressor 25 mg PO BID   - Continue with Heparin subcutaneous 5000 units q 8 hr for DVT ppx   - Continue with Pepcid 20 mg PO Daily  - Lasix 20 mg po qd   - aldactone 25 qd  - fall and sternal precautions  -IS    # S/P ascending aortic aneurysm repair.   -Continue with Plavix 75 mg PO Daily  Continue with Heparin subcutaneous 5000 units q 8 hr for DVT ppx   Continue with Pepcid 20 mg PO Daily  Continue Amio 200mg PO     #CLAUDIA (acute kidney injury) on CKD   -follow labs, avoid nephrotoxins/hold home Metformin    #DM2 (hbg a1c 9.8)  Lantus HS Admelog units  Sliding Scale.  - consistent low carb diet.  - DC on home Farxiga, add Tradjenta 5 mg daily  - FU outpatient.    #CAD (coronary artery disease).   - ASA/Lipitor    #BPH (benign prostatic hyperplasia).   -Tamsulosin 0.4 PO HS  -monitor voiding, mobilize    Pain Mgmt - Tylenol PRN  GI/Bowel Mgmt - Senna,  Miralax PRN  /Bladder Mgmt - PVR x1    #Skin; Sternal incision LELA + abd lap sites x 4, b/l medial thigh incision, right groin puncture site, Deep tissue injury (DTI) to b/l buttock.    Precautions / PROPHYLAXIS:   - Falls, Cardiac, Sternal  - Lungs: Aspiration, Incentive Spirometer   - Pressure injury/Skin: Turn Q2hrs while in bed, OOB to Chair, PT/OT    - DVT: Hep sq    MEDICAL PROGNOSIS: GOOD            REHAB POTENTIAL: GOOD             ESTIMATED DISPOSITION: HOME WITH HOME CARE            ELOS: 10-14 Days   EXPECTED THERAPY:     P.T. 2hr/day       O.T. 1hr/day      S.L.P. na   EXP FREQUENCY: 5 days per 7 day period     PRESCREEN COMPARISION:   I have reviewed the prescreen information and I have found no relevant changes between the preadmission screening and my post admission evaluation     RATIONALE FOR INPATIENT ADMISSION - Patient demonstrates the following: (check all that apply)  [X] Medically appropriate for rehabilitation admission  [X] Has attainable rehab goals with an appropriate initial discharge plan  [X] Has rehabilitation potential (expected to make a significant improvement within a reasonable period of time)   [X] Requires close medical management by a rehab physician, rehab nursing care, Hospitalist and comprehensive interdisciplinary team (including PT, OT, & or SLP, Prosthetics and Orthotics)

## 2024-04-22 NOTE — PROGRESS NOTE ADULT - ASSESSMENT
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  Py presents for PST for ascending aorta replacement, aortic valve replacement, coronary artery bypass grafting with Dr. Ilia Ortiz.  He has been admitted preop for w/u.     4/8/ 24 ASCENDING AORTIC REPLACEMENT WITH TRANSVERSE HEMIARCH 28x8, CABGx2 WITH RSVG TO DIAG AND RCA; AVR WITH 25MM INSPIRUS BIOPROSTHETIC VALVE. Pre op IABP  Post op Course:   Inotropic support with IV Dobutamine and Milrinone infusions for acute systolic heart failure. IABP counterpulsation continues at 1:1.  Patient is on PO Amiodarone for afib prophylaxis.  Extubated to 5L-->hi flow  Stress Hyperglycemia / h/o DM2 -->Insulin gtt, endo consult  4/13 febrile, robert, fluid overload, zosyn / vanco , diuresis  Hypotension --> required Taran-Synephrine gtt   4/14 aj Right axillary / intro placed.  ID consult. Milrinone gtt.   4/15 nocturnal bipap. Slow milrinone wean  4/17 transferred to SDU  4/18 VSS +PW. OOB to chair and walked with PT. Increase ambulation. Serum Glucose this AM 62 and Accucheck 70. Pt alert and oriented, OOB to chair at the time, encouraged PO intake. Lantus at HS decreased to 18 units. Serum Potassium 3.5 --> k -lyte x 2 ordered.   D/C aj and trip lumen. Transfer to floor  4/19 VSS; Continue with current medication regimen.  PW cut per Dr. Tierney.   4/20 VVS; Started on Lopressor 25 mg PO BID. Continue with current medication regimen.  Increase ambulation and activity as tolerated.   4/21 VSS  NSR 1degree 60-70   lasix 20 IV x1  lasix 20 daily aldactone 25 qd  - accepted to [facility] bed        4/20 VVS; Started on Lopressor 25 mg PO BID. Continue with current medication regimen.  Increase ambulation and activity as tolerated.   Disposition: Acute Rehab once medically cleared.   4/22    vss   pedal edema   on diuresis   neg 650

## 2024-04-22 NOTE — DISCHARGE NOTE PROVIDER - CARE PROVIDER_API CALL
Ilia Ortiz  Thoracic and Cardiac Surgery  [address]  [place] [address]  Phone: [phone]  Fax: [phone]  Follow Up Time:    Ilia Ortiz  Thoracic and Cardiac Surgery  [address], [address]  [place] [address]  Phone: [phone]  Fax: [phone]  Follow Up Time:     Elodia Bach)  Endocrinology/Metab/Diabetes  [address] [place] [address]  Phone: [phone]  Fax: [phone]  Follow Up Time:

## 2024-04-22 NOTE — PROGRESS NOTE ADULT - SUBJECTIVE AND OBJECTIVE BOX
Chief complaint  Patient is a 74y old  Male who presents with a chief complaint of preop as/aortic aneurysm & dvd (22 Apr 2024 11:31)         Labs and Fingersticks  CAPILLARY BLOOD GLUCOSE      POCT Blood Glucose.: 151 mg/dL (22 Apr 2024 11:34)  POCT Blood Glucose.: 140 mg/dL (22 Apr 2024 07:39)  POCT Blood Glucose.: 210 mg/dL (21 Apr 2024 21:22)  POCT Blood Glucose.: 242 mg/dL (21 Apr 2024 16:27)      Anion Gap: 12 (04-22 @ 06:24)  Anion Gap: 12 (04-21 @ 06:42)      Calcium: 8.5 (04-22 @ 06:24)  Calcium: 8.5 (04-21 @ 06:42)  Albumin: 3.3 (04-22 @ 06:24)  Albumin: 3.2 *L* (04-21 @ 06:42)    Alanine Aminotransferase (ALT/SGPT): 32 (04-22 @ 06:24)  Alanine Aminotransferase (ALT/SGPT): 33 (04-21 @ 06:42)  Alkaline Phosphatase: 87 (04-22 @ 06:24)  Alkaline Phosphatase: 95 (04-21 @ 06:42)  Aspartate Aminotransferase (AST/SGOT): 25 (04-22 @ 06:24)  Aspartate Aminotransferase (AST/SGOT): 24 (04-21 @ 06:42)        04-22    139  |  104  |  25<H>  ----------------------------<  127<H>  3.8   |  23  |  1.29    Ca    8.5      22 Apr 2024 06:24  Phos  2.8     04-22  Mg     2.1     04-22    TPro  5.8<L>  /  Alb  3.3  /  TBili  0.7  /  DBili  x   /  AST  25  /  ALT  32  /  AlkPhos  87  04-22                        9.2    10.60 )-----------( 672      ( 22 Apr 2024 06:24 )             28.7     Medications  MEDICATIONS  (STANDING):  aMIOdarone    Tablet 200 milliGRAM(s) Oral daily  aMIOdarone    Tablet   Oral   aspirin enteric coated 81 milliGRAM(s) Oral daily  atorvastatin 80 milliGRAM(s) Oral at bedtime  bisacodyl Suppository 10 milliGRAM(s) Rectal once  chlorhexidine 2% Cloths 1 Application(s) Topical daily  clopidogrel Tablet 75 milliGRAM(s) Oral daily  dextrose 50% Injectable 50 milliLiter(s) IV Push every 15 minutes  dextrose Oral Gel 15 Gram(s) Oral once  famotidine    Tablet 20 milliGRAM(s) Oral daily  furosemide    Tablet 20 milliGRAM(s) Oral daily  glucagon  Injectable 1 milliGRAM(s) IntraMuscular once  heparin   Injectable 5000 Unit(s) SubCutaneous every 8 hours  insulin glargine Injectable (LANTUS) 14 Unit(s) SubCutaneous at bedtime  insulin lispro (ADMELOG) corrective regimen sliding scale   SubCutaneous three times a day before meals  insulin lispro Injectable (ADMELOG) 7 Unit(s) SubCutaneous three times a day before meals  melatonin 5 milliGRAM(s) Oral at bedtime  metoprolol tartrate 25 milliGRAM(s) Oral every 12 hours  polyethylene glycol 3350 17 Gram(s) Oral daily  senna 2 Tablet(s) Oral at bedtime  sodium chloride 0.9% lock flush 3 milliLiter(s) IV Push every 8 hours  spironolactone 25 milliGRAM(s) Oral daily  tamsulosin 0.4 milliGRAM(s) Oral at bedtime      Physical Exam  General: Patient comfortable in bed   Vital Signs Last 12 Hrs  T(F): 98.4 (04-22-24 @ 05:16), Max: 98.4 (04-22-24 @ 05:16)  HR: 68 (04-22-24 @ 05:16) (68 - 68)  BP: 137/86 (04-22-24 @ 05:16) (137/86 - 137/86)  BP(mean): 103 (04-22-24 @ 05:16) (103 - 103)  RR: 18 (04-22-24 @ 05:16) (18 - 18)  SpO2: 93% (04-22-24 @ 05:16) (93% - 93%)    CVS: S1S2   Respiratory: No wheezing, no crepitations  GI: Abdomen soft, bowel sounds positive  Musculoskeletal:  moves all extremities

## 2024-04-22 NOTE — PROGRESS NOTE ADULT - ASSESSMENT
74yr M, PMHx of CVA and MI (12/2016), HTN, DM, HLD, CAD s/p cardiac stents, CHF, hx of malignant melanoma, now s/p ascending aortic arch replacement,  CABG AVR.  Assessment  DMT2: 74y Male with DM T2 with hyperglycemia, A1C is 9.8% , was on oral meds at home, on basal bolus insulin, blood sugars are trending stable.   Insulin requirements decreasing    CAD: on medications, stable, monitored. s/p CABG  Aneursym: s/p hemiarch replacement , AVR-t , CTU monitored.         Discussed plan and management with Attending   Moody Love NP - TEAMS  Dr Gianfranco Fox  [phone]

## 2024-04-22 NOTE — CHART NOTE - NSCHARTNOTEFT_GEN_A_CORE
RN called. FSBS 104. patient  is due to for 7 unit pre-meals. advised to hold for now. check FSBS at bedtime. inform care team if any qs or concern. day time to adjust insulin dose further

## 2024-04-22 NOTE — DISCHARGE NOTE PROVIDER - DISCHARGE SERVICE FOR PATIENT
12:45 on the discharge service for the patient. I have reviewed and made amendments to the documentation where necessary.

## 2024-04-22 NOTE — PROGRESS NOTE ADULT - PROVIDER SPECIALTY LIST ADULT
Critical Care
Endocrinology
Nephrology
Nephrology
CT Surgery
Critical Care
Endocrinology
Endocrinology
Critical Care
Infectious Disease
Nephrology
Endocrinology
Infectious Disease
CT Surgery
Endocrinology
CT Surgery
Endocrinology
CT Surgery

## 2024-04-22 NOTE — PROGRESS NOTE ADULT - PROBLEM SELECTOR PLAN 4
Blood cultures from 4/14 with No growth to date   WBC 7   Trend CBC and fevers   Completed course of zosyn
Endo Following (hbg a1c 9.8)  Lantus HS 14  Admelog 5 units B4 meals   Sliding Scale
Endo Following (hbg a1c 9.8)  Lantus HS 14  Admelog 5 units B4 meals   Sliding Scale
Blood cultures from 4/14 with No growth to date   WBC 7   Trend CBC and fevers   Completed course of zosyn
Endo Following (hbg a1c 9.8)  Lantus HS   Admelog units  Sliding Scale

## 2024-04-22 NOTE — DISCHARGE NOTE NURSING/CASE MANAGEMENT/SOCIAL WORK - NSDCFUADDAPPT_GEN_ALL_CORE_FT
see Dr Ortiz within one week of discharge     to see Dr Ortiz in [place]        call   [phone]     follow up with your cardiologist after rehab    follow up with Dr Bach  endocrinologist after rehab   or your endocrinologist

## 2024-04-22 NOTE — PROGRESS NOTE ADULT - PROBLEM SELECTOR PROBLEM 5
BPH (benign prostatic hyperplasia)
At risk for hyperglycemia
BPH (benign prostatic hyperplasia)
BPH (benign prostatic hyperplasia)
At risk for hyperglycemia

## 2024-04-22 NOTE — PROGRESS NOTE ADULT - PROBLEM SELECTOR PROBLEM 1
Diabetes mellitus
S/P CABG (coronary artery bypass graft)
Diabetes mellitus
S/P CABG (coronary artery bypass graft)
Diabetes mellitus
S/P CABG (coronary artery bypass graft)
Diabetes mellitus
S/P CABG (coronary artery bypass graft)

## 2024-04-22 NOTE — PROGRESS NOTE ADULT - PROBLEM SELECTOR PROBLEM 2
CAD (coronary artery disease)
CAD (coronary artery disease)
S/P ascending aortic aneurysm repair
CAD (coronary artery disease)
CAD (coronary artery disease)
S/P ascending aortic aneurysm repair
CAD (coronary artery disease)
S/P ascending aortic aneurysm repair
CAD (coronary artery disease)
S/P ascending aortic aneurysm repair
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
## 2024-04-22 NOTE — DISCHARGE NOTE PROVIDER - NSDCMRMEDTOKEN_GEN_ALL_CORE_FT
aspirin 81 mg oral delayed release tablet: 1 tab(s) orally once a day  atorvastatin 80 mg oral tablet: 1 tab(s) orally once a day (at bedtime)  clopidogrel 75 mg oral tablet: 1 tab(s) orally once a day  Farxiga 10 mg oral tablet: 1 tab(s) orally once a day  Flomax 0.4 mg oral capsule: 1 cap(s) orally once a day (at bedtime)  metFORMIN 500 mg oral tablet: orally 2 times a day  metoprolol succinate 25 mg oral tablet, extended release: 1 tab(s) orally once a day  Pepcid 20 mg oral tablet: 1 tab(s) orally once a day at noon   acetaminophen 325 mg oral tablet: 2 tab(s) orally every 6 hours As needed Mild Pain (1 - 3)  aspirin 81 mg oral delayed release tablet: 1 tab(s) orally once a day  atorvastatin 80 mg oral tablet: 1 tab(s) orally once a day (at bedtime)  clopidogrel 75 mg oral tablet: 1 tab(s) orally once a day  Farxiga 10 mg oral tablet: 1 tab(s) orally once a day  Flomax 0.4 mg oral capsule: 1 cap(s) orally once a day (at bedtime)  metFORMIN 500 mg oral tablet: orally 2 times a day  metoprolol succinate 25 mg oral tablet, extended release: 1 tab(s) orally once a day  Pepcid 20 mg oral tablet: 1 tab(s) orally once a day at noon   acetaminophen 325 mg oral tablet: 2 tab(s) orally every 6 hours As needed Mild Pain (1 - 3)  ALPRAZolam 0.25 mg oral tablet: 1 tab(s) orally every 12 hours As needed anxiety  amiodarone 200 mg oral tablet: 1 tab(s) orally once a day  aspirin 81 mg oral delayed release tablet: 1 tab(s) orally once a day  atorvastatin 80 mg oral tablet: 1 tab(s) orally once a day (at bedtime)  clopidogrel 75 mg oral tablet: 1 tab(s) orally once a day  Farxiga 10 mg oral tablet: 1 tab(s) orally once a day  Flomax 0.4 mg oral capsule: 1 cap(s) orally once a day (at bedtime)  furosemide 20 mg oral tablet: 1 tab(s) orally once a day  metoprolol tartrate 25 mg oral tablet: 1 tab(s) orally every 12 hours  Pepcid 20 mg oral tablet: 1 tab(s) orally once a day at noon  senna leaf extract oral tablet: 2 tab(s) orally once a day (at bedtime)  spironolactone 25 mg oral tablet: 1 tab(s) orally once a day

## 2024-04-22 NOTE — PROGRESS NOTE ADULT - PROBLEM SELECTOR PLAN 3
Suggest to continue medications, monitoring, FU primary team recommendations.
CLAUDIA improving BUN/creat 24/1.40  Strict I/O  Maintain Potassium above 4 and Magnesium above 2
Suggest to continue medications, monitoring, FU primary team recommendations.
CLAUDIA improving BUN/creat 20/1.33   Strict I/O  Maintain Potassium above 4 and Magnesium above 2
Suggest to continue medications, monitoring, FU primary team recommendations.
Suggest to continue medications, monitoring, FU primary team recommendations.
CLAUDIA improving BUN/creat 24/1.40  Strict I/O  Maintain Potassium above 4 and Magnesium above 2
CLAUDIA improving  Strict I/O
Suggest to continue medications, monitoring, FU primary team recommendations.
CLAUDIA improving BUN/creat 20/1.31   Strict I/O  Maintain Potassium above 4 and Magnesium above 2

## 2024-04-22 NOTE — PATIENT PROFILE ADULT - FUNCTIONAL ASSESSMENT - BASIC MOBILITY 6.
2-calculated by average/Not able to assess (calculate score using [facility] averaging method)

## 2024-04-22 NOTE — PROGRESS NOTE ADULT - PROBLEM SELECTOR PLAN 1
ASA 81 mg PO Daily.    Lipitor 80 mg PO HS   Plavix 75 mg PO Daily  Lopressor 25 mg PO BID   Continue with Heparin subcutaneous 5000 units q 8 hr for DVT ppx   Continue with Pepcid 20 mg PO Daily  lasix 20 mg po qd   aladactone 25 qd    Daily Shower   D/C plan Acute Rehab [facility]  acceptedly needs bed

## 2024-04-22 NOTE — H&P ADULT - NSHPLABSRESULTS_GEN_ALL_CORE
RECENT LABS/IMAGING                        9.2    10.60 )-----------( [id number]      ( 22 Apr 2024 06:24 )             28.7     04-22    139  |  104  |  25<H>  ----------------------------<  127<H>  3.8   |  23  |  1.29    Ca    8.5      22 Apr 2024 06:24  Phos  2.8     04-22  Mg     2.1     04-22    TPro  5.8<L>  /  Alb  3.3  /  TBili  0.7  /  DBili  x   /  AST  25  /  ALT  32  /  AlkPhos  87  04-22      Urinalysis Basic - ( 22 Apr 2024 06:24 )    Color: x / Appearance: x / SG: x / pH: x  Gluc: 127 mg/dL / Ketone: x  / Bili: x / Urobili: x   Blood: x / Protein: x / Nitrite: x   Leuk Esterase: x / RBC: x / WBC x   Sq Epi: x / Non Sq Epi: x / Bacteria: x        CT Chest No Cont (04.13.24 @ 14:40)     IMPRESSION:    Small left pleural effusion and complete atelectasis/consolidation of   left lower lobe. Trace right pleural effusion and subsegmental atelectasis of right lower lobe.       CT Head No Cont (04.07.24 @ 18:11)     IMPRESSION:    No evidence of acute intracranial hemorrhage, midline shift or CT evidence of acute territorial infarct.    If the patient's symptoms persist, consider short interval follow-up head. CT or brain MRI if there are no MRI contraindications.

## 2024-04-22 NOTE — H&P ADULT - HISTORY OF PRESENT ILLNESS
74yr Male, PMHx of CVA, MI, HTN, DM2, HLD, CAD s/p cardiac stents, CHF, hx of malignant melanoma/wide excision. To [facility] 4/1 for eval of ascending aorta replacement, aortic valve replacement, coronary artery bypass grafting with Dr. Ilia Ortiz. Admitted preop/NPO after midnight for OR on 4/7. On 4/8 underwent ASCENDING AORTIC REPLACEMENT WITH TRANSVERSE HEMIARCH, CABGx2, AVR-BIOPROSTHETIC VALVE. Inotropic support with IV Dobutamine and Milrinone post op for acute systolic heart failure. IABP, on PO Amiodarone for afib prophylaxis. Extubated to 5L-->hi flow. Endocrine consulted for DM2, Insulin gtt. 4/13 febrile w/CLAUDIA, +fluid overload, zosyn / vanco empirically,+ diuresis. Hypotension- Taran-Synephrine/ Milrinone gtt. ID consulted, BC NGTD. On 4/18 Serum Glucose 62. On 4/20 Started on Lopressor 25 mg PO BID. Therapy started. Increase ambulation and activity as tolerated.   PMR recommends acute rehab. Cleared for dc to [facility] rehab.

## 2024-04-22 NOTE — PROGRESS NOTE ADULT - SUBJECTIVE AND OBJECTIVE BOX
VITAL SIGNS    Telemetry:    sr 80    Vital Signs Last 24 Hrs  T(C): 36.9 (24 @ 05:16), Max: 36.9 (24 @ 20:36)  T(F): 98.4 (24 @ 05:16), Max: 98.4 (24 @ 20:36)  HR: 68 (24 @ 05:16) (64 - 71)  BP: 137/86 (24 @ 05:16) (126/73 - 137/86)  RR: 18 (24 @ 05:16) (18 - 18)  SpO2: 93% (24 @ 05:16) (93% - 96%)                   Daily     Daily Weight in k.4 (2024 05:55)      Bilirubin Total: 0.7 mg/dL ( @ 06:24)    CAPILLARY BLOOD GLUCOSE      POCT Blood Glucose.: 140 mg/dL (2024 07:39)  POCT Blood Glucose.: 210 mg/dL (2024 21:22)  POCT Blood Glucose.: 242 mg/dL (2024 16:27)  POCT Blood Glucose.: 232 mg/dL (2024 11:35)                              PHYSICAL EXAM    Neurology: alert and oriented x 3, moves all extremities with no defecits  CV :  RRR  Sternal Wound :  CDI , Stable  Lungs:   CTA B/L  Abdomen: soft, nontender, nondistended, positive bowel sounds, last bowel movement      Extremities:     plus one pedal edema

## 2024-04-22 NOTE — PROGRESS NOTE ADULT - NS ATTEND AMEND GEN_ALL_CORE FT
Chart, labs, vitals, radiology reviewed. Above H&P reviewed and edited where appropriate. Agree with history and physical exam. Agree with assessment and plan. I reviewed the overnight course of events and discussed the care with the patient/ family.  All the decisions in assessment and plan are made by me.
Chart, labs, vitals, radiology reviewed. Above H&P reviewed and edited where appropriate. Agree with history and physical exam. Agree with assessment and plan. I reviewed the overnight course of events and discussed the care with the patient/ family.  All the decisions in assessment and plan are made by me.
Patient Lab  reviewed, case discussed with the NP.
Chart, labs, vitals, radiology reviewed. Above H&P reviewed and edited where appropriate. Agree with history and physical exam. Agree with assessment and plan. I reviewed the overnight course of events and discussed the care with the patient/ family.  All the decisions in assessment and plan are made by me.

## 2024-04-22 NOTE — DISCHARGE NOTE PROVIDER - NSDCCPCAREPLAN_GEN_ALL_CORE_FT
PRINCIPAL DISCHARGE DIAGNOSIS  Diagnosis: Aortic valve insufficiency  Assessment and Plan of Treatment:

## 2024-04-22 NOTE — PROGRESS NOTE ADULT - PROBLEM SELECTOR PLAN 2
On medications,  no chest pain, stable, monitored and followed up by primary cardiothoracic team/cardiology team.
bp control   ambulation  Wound care  i/o 's off primacor
Continue with ASA 81 mg PO Daily.   Continue with Lipitor 80 mg PO HS    Continue with Plavix 75 mg PO Daily  Continue with Heparin subcutaneous 5000 units q 8 hr for DVT ppx   Continue with Pepcid 20 mg PO Daily  Continue Amio 200mg PO Daily   PW Cut   Monitor BP   Increase activity as tolerated.   Encourage Chest PT / Pulmonary toileting and Incentive spirometry every 1 hour x 10 while awake.   Sternal precautions and wound care  Shower on POD #5.   D/C plan Acute Rehab
On medications,  no chest pain, stable, monitored and followed up by primary cardiothoracic team/cardiology team.
On medications,  no chest pain, stable, monitored and followed up by primary cardiothoracic team/cardiology team.
Continue with ASA 81 mg PO Daily.   Continue with Lipitor 80 mg PO HS    Continue with Plavix 75 mg PO Daily  Continue with Heparin subu 5000 units q 8 hr for DVT ppx   Continue with pepcid 20 mg PO Daily  Continue Amio 200mg PO Daily   Monitor BP   Increase activity as tolerated.   Encourage Chest PT / Pulmonary toileting and Incentive spirometry every 1 hour x 10 while awake.   Sternal precautions and wound care  Shower on POD #5.   D/C plan Acute Rehab
Continue with Plavix 75 mg PO Daily  Continue with Heparin subcutaneous 5000 units q 8 hr for DVT ppx   Continue with Pepcid 20 mg PO Daily  Continue Amio 200mg PO D  Shower on POD #5.   D/C plan Acute Rehab
On medications,  no chest pain, stable, monitored and followed up by primary cardiothoracic team/cardiology team.
Continue with ASA 81 mg PO Daily.   Continue with Lipitor 80 mg PO HS    Continue with Plavix 75 mg PO Daily  Continue with Heparin subcutaneous 5000 units q 8 hr for DVT ppx   Continue with Pepcid 20 mg PO Daily  Continue Amio 200mg PO Daily   PW Cut   Monitor BP   Increase activity as tolerated.   Encourage Chest PT / Pulmonary toileting and Incentive spirometry every 1 hour x 10 while awake.   Sternal precautions and wound care  Shower on POD #5.   D/C plan Acute Rehab
Continue with ASA 81 mg PO Daily.   Continue with Lipitor 80 mg PO HS    Continue with Plavix 75 mg PO Daily  Continue with Heparin subcutaneous 5000 units q 8 hr for DVT ppx   Continue with Pepcid 20 mg PO Daily  Continue Amio 200mg PO Daily   PW Cut   Monitor BP   Increase activity as tolerated.   Encourage Chest PT / Pulmonary toileting and Incentive spirometry every 1 hour x 10 while awake.   Sternal precautions and wound care  Shower on POD #5.   D/C plan Acute Rehab

## 2024-04-22 NOTE — PROGRESS NOTE ADULT - REASON FOR ADMISSION
preop as/aortic aneurysm & dvd
as/aortic aneurysm

## 2024-04-22 NOTE — DISCHARGE NOTE PROVIDER - PROVIDER TOKENS
Quality 111:Pneumonia Vaccination Status For Older Adults: Pneumococcal Vaccination Previously Received Detail Level: Detailed Quality 110: Preventive Care And Screening: Influenza Immunization: Influenza Immunization previously received during influenza season PROVIDER:[TOKEN:[[id number]]] PROVIDER:[TOKEN:[[id number]]],PROVIDER:[TOKEN:[[id number]]]

## 2024-04-22 NOTE — DISCHARGE NOTE PROVIDER - CARE PROVIDERS DIRECT ADDRESSES
,[email or web address],[email or web address]

## 2024-04-22 NOTE — H&P ADULT - NSHPSOCIALHISTORY_GEN_ALL_CORE
[place]/wife [place]- History difficult to obtain secondary to confusion  PT4/22  bed mobility, sit<>stand transfer with Roberto Carlos, amb 50' with rolling walker, with one 2 minute standing rest break  OT 4/22  Min A Bed Mobs Min A Transfers  Min A ADL

## 2024-04-22 NOTE — PATIENT PROFILE ADULT - FALL HARM RISK - HARM RISK INTERVENTIONS

## 2024-04-22 NOTE — DISCHARGE NOTE PROVIDER - HOSPITAL COURSE
4/8/ 24 ASCENDING AORTIC REPLACEMENT WITH TRANSVERSE HEMIARCH 28x8, CABGx2 WITH RSVG TO DIAG AND RCA; AVR WITH 25MM INSPIRUS BIOPROSTHETIC VALVE. Pre op IABP  Post op Course:   Inotropic support with IV Dobutamine and Milrinone infusions for acute systolic heart failure. IABP counterpulsation continues at 1:1.  Patient is on PO Amiodarone for afib prophylaxis.  Extubated to 5L-->hi flow  Stress Hyperglycemia / h/o DM2 -->Insulin gtt, endo consult  4/13 febrile, robert, fluid overload, zosyn / vanco , diuresis  Hypotension --> required Taran-Synephrine gtt   4/14 aj Right axillary / intro placed.  ID consult. Milrinone gtt.   4/15 nocturnal bipap. Slow milrinone wean  4/17 transferred to SDU  4/18 VSS +PW. OOB to chair and walked with PT. Increase ambulation. Serum Glucose this AM 62 and Accucheck 70. Pt alert and oriented, OOB to chair at the time, encouraged PO intake. Lantus at HS decreased to 18 units. Serum Potassium 3.5 --> k -lyte x 2 ordered.   D/C aj and trip lumen. Transfer to floor  4/19 VSS; Continue with current medication regimen.  PW cut per Dr. Tierney.   4/20 VVS; Started on Lopressor 25 mg PO BID. Continue with current medication regimen.  Increase ambulation and activity as tolerated.   4/21 VSS  NSR 1degree 60-70   lasix 20 IV x1  lasix 20 daily aldactone 25 qd  - accepted to [facility] bed        4/20 VVS; Started on Lopressor 25 mg PO BID. Continue with current medication regimen.  Increase ambulation and activity as tolerated.   Disposition: Acute Rehab once medically cleared.   4/22    vss   pedal edema   on diuresis   neg 650

## 2024-04-22 NOTE — H&P ADULT - NSHPREVIEWOFSYSTEMS_GEN_ALL_CORE
REVIEW OF SYSTEMS  Constitutional: No fever, No Chills, +fatigue  HEENT: No eye pain, No visual disturbances, No difficulty hearing  Pulm: No cough,  No shortness of breath  Cardio: No chest pain, No palpitations, + edema  GI:  No abdominal pain, No nausea, No vomiting, No diarrhea, No constipation  : No dysuria, No frequency, No hematuria  Neuro: No headaches, No memory loss, + loss of strength, no numbness, No tremors  Skin: No itching, No rashes, No lesions   Endo: + cold  MSK: No joint pain, No joint swelling, No muscle pain, No Neck pain,  No back pain, + muscle pain  Psych:  No depression, No anxiety REVIEW OF SYSTEMS  Constitutional: No fever, No Chills, +fatigue Poor sleep  HEENT: No eye pain, No visual disturbances, No difficulty hearing  Pulm: No cough,  No shortness of breath  Cardio: No chest pain, No palpitations, + edema  GI:  No abdominal pain, No nausea, No vomiting, No diarrhea, No constipation  : No dysuria, No frequency, No hematuria  Neuro: No headaches, No memory loss, + loss of strength, no numbness, No tremors + confusion  Skin: No itching, No rashes, No lesions   Endo: + cold  MSK: No joint pain, No joint swelling, No muscle pain, No Neck pain,  No back pain, + muscle pain  Psych:  No depression, No anxiety

## 2024-04-22 NOTE — DISCHARGE NOTE NURSING/CASE MANAGEMENT/SOCIAL WORK - PATIENT PORTAL LINK FT
You can access the FollowMyHealth Patient Portal offered by [facility] by registering at the following website: [email or web address]. By joining [employer]’s FollowMyHealth portal, you will also be able to view your health information using other applications (apps) compatible with our system.

## 2024-04-22 NOTE — PROGRESS NOTE ADULT - PROBLEM SELECTOR PROBLEM 3
HTN (hypertension)
HTN (hypertension)
CLAUDIA (acute kidney injury)
HTN (hypertension)
CLAUDIA (acute kidney injury)
HTN (hypertension)
CLAUDIA (acute kidney injury)

## 2024-04-23 ENCOUNTER — TRANSCRIPTION ENCOUNTER (OUTPATIENT)
Age: 74
End: 2024-04-23

## 2024-04-23 ENCOUNTER — RESULT REVIEW (OUTPATIENT)
Age: 74
End: 2024-04-23

## 2024-04-23 LAB
ALBUMIN SERPL ELPH-MCNC: 2.5 G/DL — LOW (ref 3.3–5)
ALP SERPL-CCNC: 86 U/L — SIGNIFICANT CHANGE UP (ref 40–120)
ALT FLD-CCNC: 34 U/L — SIGNIFICANT CHANGE UP (ref 10–45)
ANION GAP SERPL CALC-SCNC: 8 MMOL/L — SIGNIFICANT CHANGE UP (ref 5–17)
AST SERPL-CCNC: 23 U/L — SIGNIFICANT CHANGE UP (ref 10–40)
BASOPHILS # BLD AUTO: 0.07 K/UL — SIGNIFICANT CHANGE UP (ref 0–0.2)
BASOPHILS NFR BLD AUTO: 0.8 % — SIGNIFICANT CHANGE UP (ref 0–2)
BILIRUB SERPL-MCNC: 0.9 MG/DL — SIGNIFICANT CHANGE UP (ref 0.2–1.2)
BUN SERPL-MCNC: 23 MG/DL — SIGNIFICANT CHANGE UP (ref 7–23)
CALCIUM SERPL-MCNC: 8.2 MG/DL — LOW (ref 8.4–10.5)
CHLORIDE SERPL-SCNC: 106 MMOL/L — SIGNIFICANT CHANGE UP (ref 96–108)
CO2 SERPL-SCNC: 27 MMOL/L — SIGNIFICANT CHANGE UP (ref 22–31)
CREAT SERPL-MCNC: 1.17 MG/DL — SIGNIFICANT CHANGE UP (ref 0.5–1.3)
EGFR: 64 ML/MIN/1.73M2 — SIGNIFICANT CHANGE UP
EOSINOPHIL # BLD AUTO: 0.2 K/UL — SIGNIFICANT CHANGE UP (ref 0–0.5)
EOSINOPHIL NFR BLD AUTO: 2.2 % — SIGNIFICANT CHANGE UP (ref 0–6)
GLUCOSE BLDC GLUCOMTR-MCNC: 136 MG/DL — HIGH (ref 70–99)
GLUCOSE BLDC GLUCOMTR-MCNC: 154 MG/DL — HIGH (ref 70–99)
GLUCOSE BLDC GLUCOMTR-MCNC: 161 MG/DL — HIGH (ref 70–99)
GLUCOSE BLDC GLUCOMTR-MCNC: 92 MG/DL — SIGNIFICANT CHANGE UP (ref 70–99)
GLUCOSE SERPL-MCNC: 127 MG/DL — HIGH (ref 70–99)
HCT VFR BLD CALC: 28.3 % — LOW (ref 39–50)
HGB BLD-MCNC: 9.4 G/DL — LOW (ref 13–17)
IMM GRANULOCYTES NFR BLD AUTO: 0.6 % — SIGNIFICANT CHANGE UP (ref 0–0.9)
LYMPHOCYTES # BLD AUTO: 1.6 K/UL — SIGNIFICANT CHANGE UP (ref 1–3.3)
LYMPHOCYTES # BLD AUTO: 17.8 % — SIGNIFICANT CHANGE UP (ref 13–44)
MCHC RBC-ENTMCNC: 28.7 PG — SIGNIFICANT CHANGE UP (ref 27–34)
MCHC RBC-ENTMCNC: 33.2 GM/DL — SIGNIFICANT CHANGE UP (ref 32–36)
MCV RBC AUTO: 86.5 FL — SIGNIFICANT CHANGE UP (ref 80–100)
MONOCYTES # BLD AUTO: 0.94 K/UL — HIGH (ref 0–0.9)
MONOCYTES NFR BLD AUTO: 10.4 % — SIGNIFICANT CHANGE UP (ref 2–14)
NEUTROPHILS # BLD AUTO: 6.14 K/UL — SIGNIFICANT CHANGE UP (ref 1.8–7.4)
NEUTROPHILS NFR BLD AUTO: 68.2 % — SIGNIFICANT CHANGE UP (ref 43–77)
NRBC # BLD: 0 /100 WBCS — SIGNIFICANT CHANGE UP (ref 0–0)
NT-PROBNP SERPL-SCNC: HIGH PG/ML (ref 0–300)
PLATELET # BLD AUTO: 639 K/UL — HIGH (ref 150–400)
POTASSIUM SERPL-MCNC: 3.8 MMOL/L — SIGNIFICANT CHANGE UP (ref 3.5–5.3)
POTASSIUM SERPL-SCNC: 3.8 MMOL/L — SIGNIFICANT CHANGE UP (ref 3.5–5.3)
PROT SERPL-MCNC: 5.6 G/DL — LOW (ref 6–8.3)
RBC # BLD: 3.27 M/UL — LOW (ref 4.2–5.8)
RBC # FLD: 14.9 % — HIGH (ref 10.3–14.5)
SODIUM SERPL-SCNC: 141 MMOL/L — SIGNIFICANT CHANGE UP (ref 135–145)
TROPONIN I, HIGH SENSITIVITY RESULT: 40.6 NG/L — SIGNIFICANT CHANGE UP
WBC # BLD: 9 K/UL — SIGNIFICANT CHANGE UP (ref 3.8–10.5)
WBC # FLD AUTO: 9 K/UL — SIGNIFICANT CHANGE UP (ref 3.8–10.5)

## 2024-04-23 PROCEDURE — 99223 1ST HOSP IP/OBS HIGH 75: CPT

## 2024-04-23 PROCEDURE — 93306 TTE W/DOPPLER COMPLETE: CPT | Mod: 26

## 2024-04-23 PROCEDURE — 93010 ELECTROCARDIOGRAM REPORT: CPT | Mod: 76

## 2024-04-23 PROCEDURE — 99223 1ST HOSP IP/OBS HIGH 75: CPT | Mod: GC

## 2024-04-23 PROCEDURE — 99222 1ST HOSP IP/OBS MODERATE 55: CPT

## 2024-04-23 RX ORDER — AMIODARONE HYDROCHLORIDE 400 MG/1
1 TABLET ORAL
Qty: 0 | Refills: 0 | DISCHARGE
Start: 2024-04-23

## 2024-04-23 RX ORDER — LINAGLIPTIN 5 MG/1
5 TABLET, FILM COATED ORAL
Refills: 0 | Status: DISCONTINUED | OUTPATIENT
Start: 2024-04-23 | End: 2024-05-01

## 2024-04-23 RX ORDER — ALPRAZOLAM 0.25 MG
1 TABLET ORAL
Qty: 0 | Refills: 0 | DISCHARGE
Start: 2024-04-23

## 2024-04-23 RX ORDER — INSULIN GLARGINE 100 [IU]/ML
14 INJECTION, SOLUTION SUBCUTANEOUS
Qty: 0 | Refills: 0 | DISCHARGE
Start: 2024-04-23

## 2024-04-23 RX ORDER — LANOLIN ALCOHOL/MO/W.PET/CERES
6 CREAM (GRAM) TOPICAL AT BEDTIME
Refills: 0 | Status: DISCONTINUED | OUTPATIENT
Start: 2024-04-23 | End: 2024-04-27

## 2024-04-23 RX ORDER — FAMOTIDINE 10 MG/ML
1 INJECTION INTRAVENOUS
Refills: 0 | DISCHARGE

## 2024-04-23 RX ORDER — ACETAMINOPHEN 500 MG
2 TABLET ORAL
Qty: 0 | Refills: 0 | DISCHARGE
Start: 2024-04-23

## 2024-04-23 RX ORDER — INSULIN LISPRO 100/ML
4 VIAL (ML) SUBCUTANEOUS
Refills: 0 | Status: DISCONTINUED | OUTPATIENT
Start: 2024-04-23 | End: 2024-04-23

## 2024-04-23 RX ORDER — FUROSEMIDE 40 MG
1 TABLET ORAL
Qty: 0 | Refills: 0 | DISCHARGE
Start: 2024-04-23 | End: 2024-04-29

## 2024-04-23 RX ORDER — ASPIRIN/CALCIUM CARB/MAGNESIUM 324 MG
1 TABLET ORAL
Qty: 0 | Refills: 0 | DISCHARGE
Start: 2024-04-23

## 2024-04-23 RX ORDER — SENNA PLUS 8.6 MG/1
2 TABLET ORAL
Qty: 0 | Refills: 0 | DISCHARGE
Start: 2024-04-23

## 2024-04-23 RX ORDER — CLOPIDOGREL BISULFATE 75 MG/1
1 TABLET, FILM COATED ORAL
Qty: 0 | Refills: 0 | DISCHARGE
Start: 2024-04-23

## 2024-04-23 RX ORDER — POLYETHYLENE GLYCOL 3350 17 G/17G
17 POWDER, FOR SOLUTION ORAL DAILY
Refills: 0 | Status: DISCONTINUED | OUTPATIENT
Start: 2024-04-23 | End: 2024-05-04

## 2024-04-23 RX ORDER — METOPROLOL TARTRATE 50 MG
25 TABLET ORAL AT BEDTIME
Refills: 0 | Status: DISCONTINUED | OUTPATIENT
Start: 2024-04-23 | End: 2024-04-23

## 2024-04-23 RX ORDER — LINAGLIPTIN 5 MG/1
1 TABLET, FILM COATED ORAL
Qty: 0 | Refills: 0 | DISCHARGE
Start: 2024-04-23

## 2024-04-23 RX ORDER — FUROSEMIDE 40 MG
20 TABLET ORAL
Refills: 0 | Status: DISCONTINUED | OUTPATIENT
Start: 2024-04-23 | End: 2024-04-23

## 2024-04-23 RX ORDER — LANOLIN ALCOHOL/MO/W.PET/CERES
2 CREAM (GRAM) TOPICAL
Qty: 0 | Refills: 0 | DISCHARGE
Start: 2024-04-23

## 2024-04-23 RX ORDER — ATORVASTATIN CALCIUM 80 MG/1
1 TABLET, FILM COATED ORAL
Qty: 0 | Refills: 0 | DISCHARGE
Start: 2024-04-23

## 2024-04-23 RX ORDER — FAMOTIDINE 10 MG/ML
1 INJECTION INTRAVENOUS
Qty: 0 | Refills: 0 | DISCHARGE
Start: 2024-04-23

## 2024-04-23 RX ORDER — SPIRONOLACTONE 25 MG/1
1 TABLET, FILM COATED ORAL
Qty: 0 | Refills: 0 | DISCHARGE
Start: 2024-04-23 | End: 2024-04-29

## 2024-04-23 RX ADMIN — Medication 650 MILLIGRAM(S): at 13:49

## 2024-04-23 RX ADMIN — Medication 25 MILLIGRAM(S): at 06:13

## 2024-04-23 RX ADMIN — LINAGLIPTIN 5 MILLIGRAM(S): 5 TABLET, FILM COATED ORAL at 12:09

## 2024-04-23 RX ADMIN — Medication 4 UNIT(S): at 12:53

## 2024-04-23 RX ADMIN — SPIRONOLACTONE 25 MILLIGRAM(S): 25 TABLET, FILM COATED ORAL at 06:13

## 2024-04-23 RX ADMIN — HEPARIN SODIUM 5000 UNIT(S): 5000 INJECTION INTRAVENOUS; SUBCUTANEOUS at 22:32

## 2024-04-23 RX ADMIN — Medication 6 MILLIGRAM(S): at 22:32

## 2024-04-23 RX ADMIN — HEPARIN SODIUM 5000 UNIT(S): 5000 INJECTION INTRAVENOUS; SUBCUTANEOUS at 06:12

## 2024-04-23 RX ADMIN — INSULIN GLARGINE 14 UNIT(S): 100 INJECTION, SOLUTION SUBCUTANEOUS at 22:32

## 2024-04-23 RX ADMIN — FAMOTIDINE 20 MILLIGRAM(S): 10 INJECTION INTRAVENOUS at 12:09

## 2024-04-23 RX ADMIN — Medication 20 MILLIGRAM(S): at 06:13

## 2024-04-23 RX ADMIN — HEPARIN SODIUM 5000 UNIT(S): 5000 INJECTION INTRAVENOUS; SUBCUTANEOUS at 13:56

## 2024-04-23 RX ADMIN — Medication 1: at 12:52

## 2024-04-23 RX ADMIN — AMIODARONE HYDROCHLORIDE 200 MILLIGRAM(S): 400 TABLET ORAL at 06:13

## 2024-04-23 RX ADMIN — Medication 0.25 MILLIGRAM(S): at 22:32

## 2024-04-23 RX ADMIN — SENNA PLUS 2 TABLET(S): 8.6 TABLET ORAL at 22:31

## 2024-04-23 RX ADMIN — Medication 81 MILLIGRAM(S): at 12:09

## 2024-04-23 RX ADMIN — CLOPIDOGREL BISULFATE 75 MILLIGRAM(S): 75 TABLET, FILM COATED ORAL at 12:10

## 2024-04-23 RX ADMIN — ATORVASTATIN CALCIUM 80 MILLIGRAM(S): 80 TABLET, FILM COATED ORAL at 22:31

## 2024-04-23 RX ADMIN — Medication 7 UNIT(S): at 08:42

## 2024-04-23 RX ADMIN — Medication 1 APPLICATION(S): at 06:13

## 2024-04-23 RX ADMIN — DAPAGLIFLOZIN 10 MILLIGRAM(S): 10 TABLET, FILM COATED ORAL at 12:09

## 2024-04-23 NOTE — DIETITIAN INITIAL EVALUATION ADULT - NSFNSNUTRCHEWSWALLOWFT_GEN_A_CORE
Tolerates Diet Consistency Well  Tolerates Fluid Consistency Well   No Chewing/Swallowing Difficulties (Per Patient)

## 2024-04-23 NOTE — DISCHARGE NOTE PROVIDER - HOSPITAL COURSE
74yr Male, PMHx of CVA, MI, HTN, DM2, HLD, CAD s/p cardiac stents, CHF, hx of malignant melanoma/wide excision. To [facility] 4/1 for eval of ascending aorta replacement, aortic valve replacement, coronary artery bypass grafting with Dr. Ilia Ortiz. Admitted preop/NPO after midnight for OR on 4/7. On 4/8 underwent ASCENDING AORTIC REPLACEMENT WITH TRANSVERSE HEMIARCH, CABGx2, AVR-BIOPROSTHETIC VALVE. Inotropic support with IV Dobutamine and Milrinone post op for acute systolic heart failure. IABP, on PO Amiodarone for afib prophylaxis. Extubated to 5L-->hi flow. Endocrine consulted for DM2, Insulin gtt. 4/13 febrile w/CLAUDIA, +fluid overload, zosyn / vanco empirically,+ diuresis. Hypotension- Taran-Synephrine/ Milrinone gtt. ID consulted, BC NGTD. On 4/18 Serum Glucose 62. On 4/20 Started on Lopressor 25 mg PO BID. Therapy started. Increase ambulation and activity as tolerated.   PMR recommends acute rehab. Cleared for dc to [facility] rehab.      74yr Male, PMHx of CVA, MI, HTN, DM2, HLD, CAD s/p cardiac stents, CHF, hx of malignant melanoma/wide excision. To [facility] 4/1 for eval of ascending aorta replacement, aortic valve replacement, coronary artery bypass grafting with Dr. Ilia Ortiz. Admitted preop/NPO after midnight for OR on 4/7. On 4/8 underwent ASCENDING AORTIC REPLACEMENT WITH TRANSVERSE HEMIARCH, CABGx2, AVR-BIOPROSTHETIC VALVE. Inotropic support with IV Dobutamine and Milrinone post op for acute systolic heart failure. IABP, on PO Amiodarone for afib prophylaxis. Extubated to 5L-->hi flow. Endocrine consulted for DM2, Insulin gtt. 4/13 febrile w/CLAUDIA, +fluid overload, zosyn / vanco empirically,+ diuresis. Hypotension- Taran-Synephrine/ Milrinone gtt. ID consulted, BC NGTD. On 4/18 Serum Glucose 62. On 4/20 Started on Lopressor 25 mg PO BID. Therapy started. Increase ambulation and activity as tolerated. PMR recommends acute rehab. Cleared for dc to [facility] rehab.   At [facility] rehab patient completed comprehensive PT OT SLP program. While at rehab patient evaluated by medicine, cardiology and pulmonary. medications adjusted. Cleared for dc to home on 5/4.

## 2024-04-23 NOTE — CONSULT NOTE ADULT - NS ATTEND AMEND GEN_ALL_CORE FT
I have seen and examined the patient. I have discussed case with SAM Fong.    Doroteo Corey is a 74 year old man with past medical history of Coronary artery disease (history of myocardial infarction, s/p PCIs), Hypertension, Hyperlipidemia, HFrEF (LVEF 40% in 2018), CVA, Type II Diabetes mellitus and history of malignant melanoma s/p wide excision, with recent elective bioprosthetic aortic valve replacement and ascending aortic aneurysm repair with transverse hemiarch and CABG x2 (4/8/24) with post op course complicated by cardiogenic shock requiring inotropic support with IV dobutamine and milrinone, IABP and also on amiodarone for atrial fibrillation prophylaxis, now admitted to [facility].      Cardiology consulted due to concern for CHF. ECG today consistent with sinus rhythm, first degree AV block, IVCD, old inferior infarct, old anterior infarct, which is similar to ECG upon discharge from [facility] yesterday. Echo this month consistent with LVEF 24%, probably normal RV systolic function, bio-AVR. Overall, the patient appears intravascularly depleted on physical exam today with SBP in 90s and dizziness at bedside, recommend to hold all CHF medications today. Encourage PO intake, will try and avoid IV fluids due to severely reduced LVEF. Will need re-evaluation of LVEF when on GDMT as outpatient to evaluate if ICD will be needed in the near future. Continue antiplatelets and high intensity statin for CABG.    Updated primary team. We will continue to follow along.    Alexey Woods MD  Cardiology

## 2024-04-23 NOTE — DIETITIAN INITIAL EVALUATION ADULT - EDUCATION DIETARY MODIFICATIONS
Education Provided on Need for Supplementation/(2) meets goals/outcomes/verbalization Education Provided on Need for Supplementation/(1) partially meets; needs review/practice/verbalization

## 2024-04-23 NOTE — DISCHARGE NOTE PROVIDER - NSDCFUSCHEDAPPT_GEN_ALL_CORE_FT
Demetrice Joyce  [facility]  CARDIOLOGY [address]  Scheduled Appointment: 05/14/2024    Demetrice Joyce  [facility]  CARDIOLOGY [address]  Scheduled Appointment: 07/17/2024

## 2024-04-23 NOTE — DIETITIAN INITIAL EVALUATION ADULT - PERTINENT LABORATORY DATA
04-23    141  |  106  |  23  ----------------------------<  127<H>  3.8   |  27  |  1.17    Ca    8.2<L>      23 Apr 2024 06:25  Phos  2.8     04-22  Mg     2.1     04-22    TPro  5.6<L>  /  Alb  2.5<L>  /  TBili  0.9  /  DBili  x   /  AST  23  /  ALT  34  /  AlkPhos  86  04-23  POCT Blood Glucose.: 154 mg/dL (04-23-24 @ 12:01)  A1C with Estimated Average Glucose Result: 9.8 % (04-07-24 @ 15:28)  A1C with Estimated Average Glucose Result: 9.9 % (04-01-24 @ 14:49)

## 2024-04-23 NOTE — DIETITIAN NUTRITION RISK NOTIFICATION - TREATMENT: THE FOLLOWING DIET HAS BEEN RECOMMENDED
Recommend Change Diet to Consistent Carbohydrate Low Sodium Diet  Recommend Initiate Glucerna 8oz PO BID (Provides 440kcal-20grams of Protein)   Nutrition Education Provided

## 2024-04-23 NOTE — CONSULT NOTE ADULT - SUBJECTIVE AND OBJECTIVE BOX
IRA SEN  [id number]    HPI:  74yr Male, PMHx of CVA, MI, HTN, DM2, HLD, CAD s/p cardiac stents, CHF, hx of malignant melanoma/wide excision. To [facility] 4/1 for eval of ascending aorta replacement, aortic valve replacement, coronary artery bypass grafting with Dr. Ilia Ortiz. Admitted preop/NPO after midnight for OR on 4/7. On 4/8 underwent ASCENDING AORTIC REPLACEMENT WITH TRANSVERSE HEMIARCH, CABGx2, AVR-BIOPROSTHETIC VALVE. Inotropic support with IV Dobutamine and Milrinone post op for acute systolic heart failure. IABP, on PO Amiodarone for afib prophylaxis. Extubated to 5L-->hi flow. Endocrine consulted for DM2, Insulin gtt. 4/13 febrile w/CLAUDIA, +fluid overload, zosyn / vanco empirically,+ diuresis. Hypotension- Taran-Synephrine/ Milrinone gtt. ID consulted, BC NGTD. On 4/18 Serum Glucose 62. On 4/20 Started on Lopressor 25 mg PO BID. Therapy started. Increase ambulation and activity as tolerated.   PMR recommends acute rehab. Cleared for dc to [facility].  (22 Apr 2024 15:43)      ALLERGIES:  No Known Allergies      PAST MEDICAL & SURGICAL HISTORY:  HTN (hypertension)      Hearing decreased      CVA (cerebral vascular accident)  12/22/2016      Hyperlipemia      Kidney stones      Coronary artery disease  MI 12/22/2016      CHF (congestive heart failure)  1/2017 stents x3      Type 2 diabetes mellitus  2016      Confusion  from stroke      S/P medial meniscal repair  and ligament surgery      H/O lithotripsy      S/P tonsillectomy      Bilateral inguinal hernia            CURRENT MEDICATIONS:  MEDICATIONS  (STANDING):  aMIOdarone    Tablet 200 milliGRAM(s) Oral daily  AQUAPHOR (petrolatum Ointment) 1 Application(s) Topical two times a day  aspirin enteric coated 81 milliGRAM(s) Oral daily  atorvastatin 80 milliGRAM(s) Oral at bedtime  clopidogrel Tablet 75 milliGRAM(s) Oral daily  dapagliflozin 10 milliGRAM(s) Oral daily  dextrose 10% Bolus 125 milliLiter(s) IV Bolus once  dextrose 5%. 1000 milliLiter(s) (100 mL/Hr) IV Continuous <Continuous>  dextrose 5%. 1000 milliLiter(s) (50 mL/Hr) IV Continuous <Continuous>  dextrose 50% Injectable 25 Gram(s) IV Push once  dextrose 50% Injectable 12.5 Gram(s) IV Push once  famotidine    Tablet 20 milliGRAM(s) Oral daily  furosemide    Tablet 20 milliGRAM(s) Oral two times a day  glucagon  Injectable 1 milliGRAM(s) IntraMuscular once  heparin   Injectable 5000 Unit(s) SubCutaneous every 8 hours  insulin glargine Injectable (LANTUS) 14 Unit(s) SubCutaneous at bedtime  insulin lispro (ADMELOG) corrective regimen sliding scale   SubCutaneous three times a day before meals  insulin lispro (ADMELOG) corrective regimen sliding scale   SubCutaneous at bedtime  insulin lispro Injectable (ADMELOG) 4 Unit(s) SubCutaneous three times a day with meals  linagliptin 5 milliGRAM(s) Oral with breakfast  metoprolol tartrate 25 milliGRAM(s) Oral every 12 hours  senna 2 Tablet(s) Oral at bedtime  spironolactone 25 milliGRAM(s) Oral daily  tamsulosin 0.4 milliGRAM(s) Oral at bedtime    MEDICATIONS  (PRN):  acetaminophen     Tablet .. 650 milliGRAM(s) Oral every 6 hours PRN Moderate Pain (4 - 6)  ALPRAZolam 0.25 milliGRAM(s) Oral every 12 hours PRN anxiety  dextrose Oral Gel 15 Gram(s) Oral once PRN Blood Glucose LESS THAN 70 milliGRAM(s)/deciliter  senna 2 Tablet(s) Oral at bedtime PRN Constipation      SOCIAL HISTORY:  denies    FAMILY HISTORY:  Family history of diabetes mellitus    Family history of stroke        ROS:  All 10 systems reviewed and positives noted in HPI    OBJECTIVE:    VITAL SIGNS:  Vital Signs Last 24 Hrs  T(C): 36.6 (23 Apr 2024 08:12), Max: 36.8 (22 Apr 2024 16:35)  T(F): 97.9 (23 Apr 2024 08:12), Max: 98.3 (22 Apr 2024 16:35)  HR: 69 (23 Apr 2024 08:12) (69 - 80)  BP: 116/71 (23 Apr 2024 08:12) (116/71 - 143/76)  BP(mean): --  RR: 16 (23 Apr 2024 08:12) (16 - 18)  SpO2: 94% (23 Apr 2024 08:12) (92% - 94%)    Parameters below as of 23 Apr 2024 08:12  Patient On (Oxygen Delivery Method): nasal cannula        PHYSICAL EXAM:  General:  no distress  HEENT: sclera anicteric  Neck: supple, no carotid bruits b/l  CVS: JVP ~ 7 cm H20, RRR, s1, s2, no murmurs/rubs/gallops  Chest: unlabored respirations, decreased breath sounds  Abdomen: non-distended  Extremities: mild b/l l/e edema  Neuro: awake, alert & oriented x 3      LABS:                        9.4    9.00  )-----------( 639      ( 23 Apr 2024 06:25 )             28.3     04-23    141  |  106  |  23  ----------------------------<  127<H>  3.8   |  27  |  1.17    Ca    8.2<L>      23 Apr 2024 06:25  Phos  2.8     04-22  Mg     2.1     04-22    TPro  5.6<L>  /  Alb  2.5<L>  /  TBili  0.9  /  DBili  x   /  AST  23  /  ALT  34  /  AlkPhos  86  04-23          ECG:      TTE:     IRA SEN  [id number]    HPI:  74yr Male, PMHx of CVA, MI, HTN, DM2, HLD, CAD s/p cardiac stents, CHF, hx of malignant melanoma/wide excision. To [facility] 4/1 for eval of ascending aorta replacement, aortic valve replacement, coronary artery bypass grafting with Dr. Ilia Ortiz. Admitted preop/NPO after midnight for OR on 4/7. On 4/8 underwent ASCENDING AORTIC REPLACEMENT WITH TRANSVERSE HEMIARCH, CABGx2, AVR-BIOPROSTHETIC VALVE. Inotropic support with IV Dobutamine and Milrinone post op for acute systolic heart failure. IABP, on PO Amiodarone for afib prophylaxis. Extubated to 5L-->hi flow. Endocrine consulted for DM2, Insulin gtt. 4/13 febrile w/CLAUDIA, +fluid overload, zosyn / vanco empirically,+ diuresis. Hypotension- Taran-Synephrine/ Milrinone gtt. ID consulted, BC NGTD. On 4/18 Serum Glucose 62. On 4/20 Started on Lopressor 25 mg PO BID. Therapy started. Increase ambulation and activity as tolerated.   PMR recommends acute rehab. [facility]  (22 Apr 2024 15:43)      ALLERGIES:  No Known Allergies      PAST MEDICAL & SURGICAL HISTORY:  HTN (hypertension)      Hearing decreased      CVA (cerebral vascular accident)  12/22/2016      Hyperlipemia      Kidney stones      Coronary artery disease  MI 12/22/2016      CHF (congestive heart failure)  1/2017 stents x3      Type 2 diabetes mellitus  2016      Confusion  from stroke      S/P medial meniscal repair  and ligament surgery      H/O lithotripsy      S/P tonsillectomy      Bilateral inguinal hernia            CURRENT MEDICATIONS:  MEDICATIONS  (STANDING):  aMIOdarone    Tablet 200 milliGRAM(s) Oral daily  AQUAPHOR (petrolatum Ointment) 1 Application(s) Topical two times a day  aspirin enteric coated 81 milliGRAM(s) Oral daily  atorvastatin 80 milliGRAM(s) Oral at bedtime  clopidogrel Tablet 75 milliGRAM(s) Oral daily  dapagliflozin 10 milliGRAM(s) Oral daily  dextrose 10% Bolus 125 milliLiter(s) IV Bolus once  dextrose 5%. 1000 milliLiter(s) (100 mL/Hr) IV Continuous <Continuous>  dextrose 5%. 1000 milliLiter(s) (50 mL/Hr) IV Continuous <Continuous>  dextrose 50% Injectable 25 Gram(s) IV Push once  dextrose 50% Injectable 12.5 Gram(s) IV Push once  famotidine    Tablet 20 milliGRAM(s) Oral daily  furosemide    Tablet 20 milliGRAM(s) Oral two times a day  glucagon  Injectable 1 milliGRAM(s) IntraMuscular once  heparin   Injectable 5000 Unit(s) SubCutaneous every 8 hours  insulin glargine Injectable (LANTUS) 14 Unit(s) SubCutaneous at bedtime  insulin lispro (ADMELOG) corrective regimen sliding scale   SubCutaneous three times a day before meals  insulin lispro (ADMELOG) corrective regimen sliding scale   SubCutaneous at bedtime  insulin lispro Injectable (ADMELOG) 4 Unit(s) SubCutaneous three times a day with meals  linagliptin 5 milliGRAM(s) Oral with breakfast  metoprolol tartrate 25 milliGRAM(s) Oral every 12 hours  senna 2 Tablet(s) Oral at bedtime  spironolactone 25 milliGRAM(s) Oral daily  tamsulosin 0.4 milliGRAM(s) Oral at bedtime    MEDICATIONS  (PRN):  acetaminophen     Tablet .. 650 milliGRAM(s) Oral every 6 hours PRN Moderate Pain (4 - 6)  ALPRAZolam 0.25 milliGRAM(s) Oral every 12 hours PRN anxiety  dextrose Oral Gel 15 Gram(s) Oral once PRN Blood Glucose LESS THAN 70 milliGRAM(s)/deciliter  senna 2 Tablet(s) Oral at bedtime PRN Constipation      SOCIAL HISTORY:  denies    FAMILY HISTORY:  Family history of diabetes mellitus    Family history of stroke        ROS:  All 10 systems reviewed and positives noted in HPI    OBJECTIVE:    VITAL SIGNS:  Vital Signs Last 24 Hrs  T(C): 36.6 (23 Apr 2024 08:12), Max: 36.8 (22 Apr 2024 16:35)  T(F): 97.9 (23 Apr 2024 08:12), Max: 98.3 (22 Apr 2024 16:35)  HR: 69 (23 Apr 2024 08:12) (69 - 80)  BP: 116/71 (23 Apr 2024 08:12) (116/71 - 143/76)  BP(mean): --  RR: 16 (23 Apr 2024 08:12) (16 - 18)  SpO2: 94% (23 Apr 2024 08:12) (92% - 94%)    Parameters below as of 23 Apr 2024 08:12  Patient On (Oxygen Delivery Method): nasal cannula        PHYSICAL EXAM:  General:  no distress  HEENT: sclera anicteric  Neck: supple, no carotid bruits b/l  CVS: JVP ~ 7 cm H20, RRR, s1, s2, no murmurs/rubs/gallops  Chest: unlabored respirations, decreased breath sounds  Abdomen: non-distended  Extremities: mild b/l l/e edema  Neuro: awake, alert & oriented x 3      LABS:                        9.4    9.00  )-----------( 639      ( 23 Apr 2024 06:25 )             28.3     04-23    141  |  106  |  23  ----------------------------<  127<H>  3.8   |  27  |  1.17    Ca    8.2<L>      23 Apr 2024 06:25  Phos  2.8     04-22  Mg     2.1     04-22    TPro  5.6<L>  /  Alb  2.5<L>  /  TBili  0.9  /  DBili  x   /  AST  23  /  ALT  34  /  AlkPhos  86  04-23          ECG:      TTE: < from: TTE Limited W or WO Ultrasound Enhancing Agent (04.14.24 @ 14:46) >  Left Ventricle:  The left ventricular cavity is normal in size. Left ventricular wall thickness is normal. Left ventricular systolic function is severely decreased with a calculated ejection fraction of 24 % by the Cash's biplane method of disks. There is global left ventricular hypokinesis. There is no evidence of a left ventricular thrombus.     Right Ventricle:  The right ventricular cavity is normal in size and probably normal systolic function.     Left Atrium:  The left atrium is normal in size.    Right Atrium:  The right atrium is normal in size with an indexed volume of 14.07 ml/m².     Aortic Valve:  A bioprosthetic valve replacement is present in the aortic position. There is no intravalvular regurgitation. The peak transaortic velocity is 1.26 m/s, peak transaortic gradient is 6.4 mmHg and mean transaortic gradient is 3.0 mmHg with an LVOT/aortic valve VTI ratio of 0.75. The aortic valve acceleration time is 71 msec.     Mitral Valve:  Structurally normal mitral valve with normal leaflet excursion. There is calcification of the mitral valve annulus. There is mild mitral regurgitation.     Tricuspid Valve:  Structurally normal tricuspid valve with normal leaflet excursion. There is trace tricuspid regurgitation.     Pulmonic Valve:  Structurally normal pulmonic valve with normal leaflet excursion. There is trace pulmonic regurgitation.     Aorta:  The aortic root at the sinuses of Valsalva is dilated, measuring 4.40 cm (indexed 2.40 cm/m²).     Pericardium:  There is a trace pericardial effusion.     Systemic Veins:  The inferior vena cava was not well visualized.         DOROTEO SEN  [id number]    HPI:    Doroteo Sen is a 74 year old man with past medical history of Coronary artery disease (s/p PCIs, history of myocardial infarction), Hypertension, Hyperlipidemia, Cardiomyopathy, CVA, Type II Diabetes mellitus and history of malignant melanoma s/p wide excision, with recent elective bioprosthetic aortic valve replacement and ascending aortic aneurysm repair with transverse hemiarch and CABG x2 (4/8/24) with post op course complicated by cardiogenic shock requiring inotropic support with IV dobutamine and milrinone, IABP and also on amiodarone for atrial fibrillation prophylaxis, now admitted to [facility].      ALLERGIES:  No Known Allergies      CURRENT MEDICATIONS:  MEDICATIONS  (STANDING):  aMIOdarone    Tablet 200 milliGRAM(s) Oral daily  AQUAPHOR (petrolatum Ointment) 1 Application(s) Topical two times a day  aspirin enteric coated 81 milliGRAM(s) Oral daily  atorvastatin 80 milliGRAM(s) Oral at bedtime  clopidogrel Tablet 75 milliGRAM(s) Oral daily  dapagliflozin 10 milliGRAM(s) Oral daily  dextrose 10% Bolus 125 milliLiter(s) IV Bolus once  dextrose 5%. 1000 milliLiter(s) (100 mL/Hr) IV Continuous <Continuous>  dextrose 5%. 1000 milliLiter(s) (50 mL/Hr) IV Continuous <Continuous>  dextrose 50% Injectable 25 Gram(s) IV Push once  dextrose 50% Injectable 12.5 Gram(s) IV Push once  famotidine    Tablet 20 milliGRAM(s) Oral daily  furosemide    Tablet 20 milliGRAM(s) Oral two times a day  glucagon  Injectable 1 milliGRAM(s) IntraMuscular once  heparin   Injectable 5000 Unit(s) SubCutaneous every 8 hours  insulin glargine Injectable (LANTUS) 14 Unit(s) SubCutaneous at bedtime  insulin lispro (ADMELOG) corrective regimen sliding scale   SubCutaneous three times a day before meals  insulin lispro (ADMELOG) corrective regimen sliding scale   SubCutaneous at bedtime  insulin lispro Injectable (ADMELOG) 4 Unit(s) SubCutaneous three times a day with meals  linagliptin 5 milliGRAM(s) Oral with breakfast  metoprolol tartrate 25 milliGRAM(s) Oral every 12 hours  senna 2 Tablet(s) Oral at bedtime  spironolactone 25 milliGRAM(s) Oral daily  tamsulosin 0.4 milliGRAM(s) Oral at bedtime    ROS:  All 10 systems reviewed and positives noted in HPI    OBJECTIVE:    VITAL SIGNS:  Vital Signs Last 24 Hrs  T(C): 36.6 (23 Apr 2024 08:12), Max: 36.8 (22 Apr 2024 16:35)  T(F): 97.9 (23 Apr 2024 08:12), Max: 98.3 (22 Apr 2024 16:35)  HR: 69 (23 Apr 2024 08:12) (69 - 80)  BP: 116/71 (23 Apr 2024 08:12) (116/71 - 143/76)  BP(mean): --  RR: 16 (23 Apr 2024 08:12) (16 - 18)  SpO2: 94% (23 Apr 2024 08:12) (92% - 94%)    Parameters below as of 23 Apr 2024 08:12  Patient On (Oxygen Delivery Method): nasal cannula        PHYSICAL EXAM:  General:  ill appearing  HEENT: sclera anicteric  Neck: supple  CVS: JVP ~ 7 cm H20, RRR, s1, s2  Chest: unlabored respirations, decreased breath sounds  Abdomen: non-distended  Extremities: mild b/l l/e edema  Neuro: awake, alert & oriented       LABS:                        9.4    9.00  )-----------( 639      ( 23 Apr 2024 06:25 )             28.3     04-23    141  |  106  |  23  ----------------------------<  127<H>  3.8   |  27  |  1.17    Ca    8.2<L>      23 Apr 2024 06:25  Phos  2.8     04-22  Mg     2.1     04-22    TPro  5.6<L>  /  Alb  2.5<L>  /  TBili  0.9  /  DBili  x   /  AST  23  /  ALT  34  /  AlkPhos  86  04-23            TTE: < from: TTE Limited W or WO Ultrasound Enhancing Agent (04.14.24 @ 14:46) >  LVEF 24%  Normal RV size and probably normal RV systolic function  Bio-AVR  Mild MR  Dilated aortic root (4.4 cm)   Trace pericardial effusion

## 2024-04-23 NOTE — DIETITIAN INITIAL EVALUATION ADULT - NSFNSPHYEXAMSKINFT_GEN_A_CORE
Pressure Injury 1: Right:, buttock, Suspected deep tissue injury  Pressure Injury 2: Left:, buttock, Suspected deep tissue injury  Pressure Injury 3: none, none  Pressure Injury 4: none, none  Pressure Injury 5: none, none  Pressure Injury 6: none, none  Pressure Injury 7: none, none  Pressure Injury 8: none, none  Pressure Injury 9: none, none  Pressure Injury 10: none, none  Pressure Injury 11: none, none

## 2024-04-23 NOTE — DISCHARGE NOTE PROVIDER - NSDCMRMEDTOKEN_GEN_ALL_CORE_FT
acetaminophen 325 mg oral tablet: 2 tab(s) orally every 6 hours As needed Moderate Pain (4 - 6)  ALPRAZolam 0.25 mg oral tablet: 1 tab(s) orally every 12 hours As needed anxiety  amiodarone 200 mg oral tablet: 1 tab(s) orally once a day  aspirin 81 mg oral delayed release tablet: 1 tab(s) orally once a day  atorvastatin 80 mg oral tablet: 1 tab(s) orally once a day (at bedtime)  clopidogrel 75 mg oral tablet: 1 tab(s) orally once a day  famotidine 20 mg oral tablet: 1 tab(s) orally once a day  Farxiga 10 mg oral tablet: 1 tab(s) orally once a day  Flomax 0.4 mg oral capsule: 1 cap(s) orally once a day (at bedtime)  furosemide 20 mg oral tablet: 1 tab(s) orally once a day  insulin glargine 100 units/mL subcutaneous solution: 14 unit(s) subcutaneous once a day (at bedtime)  linagliptin 5 mg oral tablet: 1 tab(s) orally once a day (before a meal)  melatonin 3 mg oral tablet: 2 tab(s) orally once a day (at bedtime)  metoprolol tartrate 25 mg oral tablet: 1 tab(s) orally every 12 hours  Multiple Vitamins oral tablet: 1 tab(s) orally once a day  senna leaf extract oral tablet: 2 tab(s) orally once a day (at bedtime)  spironolactone 25 mg oral tablet: 1 tab(s) orally once a day   acetaminophen 325 mg oral tablet: 2 tab(s) orally every 6 hours As needed Moderate Pain (4 - 6)  amiodarone 200 mg oral tablet: 1 tab(s) orally once a day  aspirin 81 mg oral delayed release tablet: 1 tab(s) orally once a day  atorvastatin 80 mg oral tablet: 1 tab(s) orally once a day (at bedtime)  clopidogrel 75 mg oral tablet: 1 tab(s) orally once a day  famotidine 20 mg oral tablet: 1 tab(s) orally once a day  Farxiga 10 mg oral tablet: 1 tab(s) orally once a day  Flomax 0.4 mg oral capsule: 1 cap(s) orally once a day (at bedtime)  furosemide 20 mg oral tablet: 1 tab(s) orally once a day  linagliptin 5 mg oral tablet: 1 tab(s) orally once a day (before a meal)  melatonin 3 mg oral tablet: 2 tab(s) orally once a day (at bedtime)  metFORMIN 500 mg oral tablet: 1 tab(s) orally 2 times a day  metoprolol succinate 25 mg oral tablet, extended release: 0.5 tab(s) orally once a day  Multiple Vitamins oral tablet: 1 tab(s) orally once a day  sacubitril-valsartan 24 mg-26 mg oral tablet: 1 tab(s) orally 2 times a day  senna leaf extract oral tablet: 2 tab(s) orally once a day (at bedtime)  spironolactone 25 mg oral tablet: 1 tab(s) orally once a day   acetaminophen 325 mg oral tablet: 2 tab(s) orally every 6 hours As needed Moderate Pain (4 - 6)  amiodarone 200 mg oral tablet: 1 tab(s) orally once a day  aspirin 81 mg oral delayed release tablet: 1 tab(s) orally once a day  atorvastatin 80 mg oral tablet: 1 tab(s) orally once a day (at bedtime)  clopidogrel 75 mg oral tablet: 1 tab(s) orally once a day  famotidine 20 mg oral tablet: 1 tab(s) orally once a day  Farxiga 10 mg oral tablet: 1 tab(s) orally once a day  Flomax 0.4 mg oral capsule: 1 cap(s) orally once a day (at bedtime)  furosemide 20 mg oral tablet: 1 tab(s) orally once a day  linagliptin 5 mg oral tablet: 1 tab(s) orally once a day (before a meal)  melatonin 3 mg oral tablet: 2 tab(s) orally once a day (at bedtime)  metoprolol succinate 25 mg oral tablet, extended release: 0.5 tab(s) orally once a day  Multiple Vitamins oral tablet: 1 tab(s) orally once a day  sacubitril-valsartan 24 mg-26 mg oral tablet: 1 tab(s) orally 2 times a day  senna leaf extract oral tablet: 2 tab(s) orally once a day (at bedtime)  spironolactone 25 mg oral tablet: 1 tab(s) orally once a day

## 2024-04-23 NOTE — DISCHARGE NOTE PROVIDER - CARE PROVIDER_API CALL
Demetrice Joyce  Cardiovascular Disease  [address] [facility], [address] [place] [address]  Phone: [phone]  Fax: [phone]  Follow Up Time:     Ilia Ortiz  Thoracic and Cardiac Surgery  [address]  [place] [address]  Phone: [phone]  Fax: [phone]  Follow Up Time:     Elodia Bach)  Endocrinology/Metab/Diabetes  [address] [place] [address]  Phone: [phone]  Fax: [phone]  Follow Up Time:     Spenser Gibson  Physical/Rehab Medicine  [address] [place] [address]  Phone: [phone]  Fax: [phone]  Follow Up Time:

## 2024-04-23 NOTE — DISCHARGE NOTE PROVIDER - PROVIDER TOKENS
PROVIDER:[TOKEN:[[id number]]],PROVIDER:[TOKEN:[[id number]]],PROVIDER:[TOKEN:[[id number]]],PROVIDER:[TOKEN:[[id number]]]

## 2024-04-23 NOTE — DISCHARGE NOTE PROVIDER - NSDCCPCAREPLAN_GEN_ALL_CORE_FT
PRINCIPAL DISCHARGE DIAGNOSIS  Diagnosis: Coronary artery disease with hx of myocardial infarct w/o hx of CABG  Assessment and Plan of Treatment:      PRINCIPAL DISCHARGE DIAGNOSIS  Diagnosis: Coronary artery disease with hx of myocardial infarct w/o hx of CABG  Assessment and Plan of Treatment: Continue medications as prescribed. Follow up cardiology and PCP in 1 week. Check daily weights and BP daily.      SECONDARY DISCHARGE DIAGNOSES  Diagnosis: DM2 (diabetes mellitus, type 2)  Assessment and Plan of Treatment: Take Tradjenta  daily and check blood glucose daily. Follow up PCP in 1 week.     PRINCIPAL DISCHARGE DIAGNOSIS  Diagnosis: Coronary artery disease with hx of myocardial infarct w/o hx of CABG  Assessment and Plan of Treatment: Continue medications as prescribed. Follow up cardiology and PCP in 1 week. Check daily weights and BP daily.      SECONDARY DISCHARGE DIAGNOSES  Diagnosis: DM2 (diabetes mellitus, type 2)  Assessment and Plan of Treatment: Take Tradjenta  daily and check blood glucose daily. Follow up PCP in 1 week.    Diagnosis: Large pleural effusion  Assessment and Plan of Treatment: follow up your surgeon in 1 week, continue Lasix

## 2024-04-23 NOTE — CONSULT NOTE ADULT - ASSESSMENT
Assessment: 74yr Male, PMHx of CVA, MI, HTN, DM2, HLD, CAD s/p cardiac stents, CHF, hx of malignant melanoma/wide excision admitted to [facility] from [facility] s/p ascending aortic replacement, cabg x2 and avr bioprosthetic valve. course complicated acute systolic failure requiring Inotropic support with IV Dobutamine and Milrinone and intubation. cardiology consulted for sob. pt states he feels sob at night when he feels anxious. denies cp, currently requiring 4l nc    Recommendations:  - pt currently on  Assessment: 74yr Male, PMHx of CVA, MI, HTN, DM2, HLD, CAD s/p cardiac stents, CHF, hx of malignant melanoma/wide excision admitted to [facility] from [facility] s/p ascending aortic replacement, cabg x2 and avr bioprosthetic valve. course complicated acute systolic failure requiring Inotropic support with IV Dobutamine and Milrinone and intubation. cardiology consulted for sob. pt states he feels sob at night when he feels anxious. denies cp, currently requiring 4l nc    Recommendations:  - xr with persistent moderate left pleural effusion  - TTE with EF 24%, global LV hypokinesis with no severe valvular disease  - Patient currently on Lopressor , spironolactone, Farxiga and Lasix  - Lasix increased as per primary, patient does not overtly fluid overloaded  - Initiate Entresto as hemodynamics and renal indices allow. currently appears stable  - Continue aspirin, Plavix, and amiodarone Assessment: 74yr Male, PMHx of CVA, HTN, HLD, CAD s/p cardiac stents, MI, CHF, DM, hx of malignant melanoma/wide excision admitted to [facility] from [facility] s/p ascending aortic replacement, cabg x2 and avr bioprosthetic valve, course complicated by acute systolic failure requiring inotropic support with IV Dobutamine and Milrinone and intubation. Cardiology consulted for shortness of breath, patient states he feels shortness of breath at night when he feels anxious. Denies chest pain, currently requiring 4l NC.    Recommendations:  - xr with persistent moderate left pleural effusion  - TTE with EF 24%, global LV hypokinesis with no severe valvular disease  - Patient currently on Lopressor , spironolactone, Farxiga and Lasix  - Lasix increased as per primary, patient does not appear overtly fluid overloaded  - Continue aspirin, Plavix, and amiodarone

## 2024-04-23 NOTE — DIETITIAN INITIAL EVALUATION ADULT - ORAL INTAKE PTA/DIET HISTORY
Patient Does Not Follow Diet @Home  Consumes 3 Meals a Day   Usually Orders Takeout/Delivery  Doesn't Take Vitamin/Supplements @Home

## 2024-04-23 NOTE — DIETITIAN INITIAL EVALUATION ADULT - OTHER INFO
Initial Nutrition Assessment   74yr Old Male   Denies Food Allergy/Intolerance  Tolerates Diet Well - No Chewing/Swallowing Complications (Per Patient)  Consumed % of 1st Meal (as Per Documentation)  Surgical Incision on Sternum, Left Knee, Right Knee, Groin, Abdomen & DTI Pressure Ulcers on Bilateral Buttocks (as Per Nursing Flow Sheets)  +3 Edema Noted to Bilateral Lower Extremities (as Per Nursing Flow Sheets)  No Recent Nausea/Vomiting/Diarrhea/Constipation (as Per Patient)

## 2024-04-23 NOTE — DIETITIAN INITIAL EVALUATION ADULT - ADD RECOMMEND
1) Monitor Weights, Intake, Tolerance, Skin, POCT & Labwork  2) Education Provided on Need for Supplementation   3) Recommend Change Diet to Consistent Carbohydrate Low Sodium Diet   4) Unruly 1 Packet BID   5) Glucerna 8oz PO BID  6) Multivitamin Daily  7) Continue Nutrition Plan of Care

## 2024-04-23 NOTE — DISCHARGE NOTE PROVIDER - DETAILS OF MALNUTRITION DIAGNOSIS/DIAGNOSES
This patient has been assessed with a concern for Malnutrition and was treated during this hospitalization for the following Nutrition diagnosis/diagnoses:     -  04/23/2024: Severe protein-calorie malnutrition

## 2024-04-23 NOTE — DIETITIAN INITIAL EVALUATION ADULT - FACTORS AFF FOOD INTAKE
Poor PO Intake over Last 4 Weeks  Consuming Far Less than Prior to Admission to Hospital (Per Patient)/persistent lack of appetite

## 2024-04-23 NOTE — CONSULT NOTE ADULT - SUBJECTIVE AND OBJECTIVE BOX
Dr. Dong Hospitalist Progress Note  IRA SEN [id number]    Patient is a 74y old  Male who presents with a chief complaint of s/p CABG (22 Apr 2024 15:43)    HPI:  74yr Male, PMHx of CVA, MI, HTN, DM2, HLD, CAD s/p cardiac stents, CHF, hx of malignant melanoma/wide excision. To [facility] 4/1 for eval of ascending aorta replacement, aortic valve replacement, coronary artery bypass grafting with Dr. Ilia Ortiz. Admitted preop/NPO after midnight for OR on 4/7. On 4/8 underwent ASCENDING AORTIC REPLACEMENT WITH TRANSVERSE HEMIARCH, CABGx2, AVR-BIOPROSTHETIC VALVE. Inotropic support with IV Dobutamine and Milrinone post op for acute systolic heart failure. IABP, on PO Amiodarone for afib prophylaxis. Extubated to 5L-->hi flow. Endocrine consulted for DM2, Insulin gtt. 4/13 febrile w/CLAUDIA, +fluid overload, zosyn / vanco empirically,+ diuresis. Hypotension- Taran-Synephrine/ Milrinone gtt. ID consulted, BC NGTD. On 4/18 Serum Glucose 62. On 4/20 Started on Lopressor 25 mg PO BID. Therapy started. Increase ambulation and activity as tolerated. PMR recommends acute rehab. [facility]  (22 Apr 2024 15:43)    Interval  seen and examined  Chart reviewed  No overnight events. pre-dinner admelog 7 unit held yesterday for   BM+  No pain  No complaints      ROS:  denied fever/chills/CP/SOB/cough/palpitation/dizziness/abdominal pian/nausea/vomiting/diarrhoea/constipation/dysuria/leg or calf pain/headaches.all other ROS neg      allergy: NKDA    MEDICATIONS  (STANDING):  aMIOdarone    Tablet 200 milliGRAM(s) Oral daily  AQUAPHOR (petrolatum Ointment) 1 Application(s) Topical two times a day  aspirin enteric coated 81 milliGRAM(s) Oral daily  atorvastatin 80 milliGRAM(s) Oral at bedtime  clopidogrel Tablet 75 milliGRAM(s) Oral daily  dapagliflozin 10 milliGRAM(s) Oral daily  dextrose 10% Bolus 125 milliLiter(s) IV Bolus once  dextrose 5%. 1000 milliLiter(s) (100 mL/Hr) IV Continuous <Continuous>  dextrose 5%. 1000 milliLiter(s) (50 mL/Hr) IV Continuous <Continuous>  dextrose 50% Injectable 25 Gram(s) IV Push once  dextrose 50% Injectable 12.5 Gram(s) IV Push once  famotidine    Tablet 20 milliGRAM(s) Oral daily  furosemide    Tablet 20 milliGRAM(s) Oral daily  glucagon  Injectable 1 milliGRAM(s) IntraMuscular once  heparin   Injectable 5000 Unit(s) SubCutaneous every 8 hours  insulin glargine Injectable (LANTUS) 14 Unit(s) SubCutaneous at bedtime  insulin lispro (ADMELOG) corrective regimen sliding scale   SubCutaneous three times a day before meals  insulin lispro (ADMELOG) corrective regimen sliding scale   SubCutaneous at bedtime  insulin lispro Injectable (ADMELOG) 4 Unit(s) SubCutaneous three times a day with meals  linagliptin 5 milliGRAM(s) Oral with breakfast  metoprolol tartrate 25 milliGRAM(s) Oral every 12 hours  senna 2 Tablet(s) Oral at bedtime  spironolactone 25 milliGRAM(s) Oral daily  tamsulosin 0.4 milliGRAM(s) Oral at bedtime    MEDICATIONS  (PRN):  acetaminophen     Tablet .. 650 milliGRAM(s) Oral every 6 hours PRN Moderate Pain (4 - 6)  ALPRAZolam 0.25 milliGRAM(s) Oral every 12 hours PRN anxiety  dextrose Oral Gel 15 Gram(s) Oral once PRN Blood Glucose LESS THAN 70 milliGRAM(s)/deciliter  senna 2 Tablet(s) Oral at bedtime PRN Constipation      PAST MEDICAL & SURGICAL HISTORY:  HTN (hypertension)  Hearing decreased  CVA (cerebral vascular accident) 12/22/2016  Hyperlipemia  Kidney stones  Coronary artery disease MI 12/22/2016, 1/2017 stents x3  CHF (congestive heart failure)   Type 2 diabetes mellitus  2016  Confusion  from stroke  S/P medial meniscal repair  and ligament surgery  H/O lithotripsy  S/P tonsillectomy  Bilateral inguinal hernia    Social:  Denied smoking/ETOH  lives with wife    T(C): 36.6 (04-23-24 @ 08:12), Max: 36.8 (04-22-24 @ 16:35)  HR: 69 (04-23-24 @ 08:12) (69 - 80)  BP: 116/71 (04-23-24 @ 08:12) (116/71 - 143/76)  RR: 16 (04-23-24 @ 08:12) (16 - 18)  SpO2: 94% (04-23-24 @ 08:12) (92% - 94%)    04-22-24 @ 07:01  -  04-23-24 @ 07:00  --------------------------------------------------------  IN: 0 mL / OUT: 350 mL / NET: -350 mL    CAPILLARY BLOOD GLUCOSE      POCT Blood Glucose.: 136 mg/dL (23 Apr 2024 07:59)  POCT Blood Glucose.: 131 mg/dL (22 Apr 2024 22:35)  POCT Blood Glucose.: 104 mg/dL (22 Apr 2024 17:07)  POCT Blood Glucose.: 151 mg/dL (22 Apr 2024 11:34)      Physical Exam:  GENERAL: Not in distress. Alert    HEENT:  Normocephalic and atraumatic. PEARLA,EOMI  NECK: Supple.  No JVD.    CARDIOVASCULAR: RRR S1, S2. No murmur/rubs/gallop  LUNGS: BLAE+, no rales, no wheezing, no rhonchi.    ABDOMEN: ND. Soft,  NT, no guarding / rebound / rigidity. BS normoactive. No CVA tenderness.    BACK: No spine tenderness.  EXTREMITIES: no cyanosis, no clubbing, no edema.   SKIN: no rash. No skin break or ulcer. No cellulitis.  NEUROLOGIC: AAO*3.strength is symmetric, sensation intact, speech fluent.    PSYCHIATRIC: Calm.  No agitation.    Labs                        9.4    9.00  )-----------( 639      ( 23 Apr 2024 06:25 )             28.3     04-23    141  |  106  |  23  ----------------------------<  127<H>  3.8   |  27  |  1.17    Ca    8.2<L>      23 Apr 2024 06:25  Phos  2.8     04-22  Mg     2.1     04-22    TPro  5.6<L>  /  Alb  2.5<L>  /  TBili  0.9  /  DBili  x   /  AST  23  /  ALT  34  /  AlkPhos  86  04-23   Urinalysis Basic - ( 23 Apr 2024 06:25 )    Color: x / Appearance: x / SG: x / pH: x  Gluc: 127 mg/dL / Ketone: x  / Bili: x / Urobili: x   Blood: x / Protein: x / Nitrite: x   Leuk Esterase: x / RBC: x / WBC x   Sq Epi: x / Non Sq Epi: x / Bacteria: x      MEDICATIONS  (STANDING):  aMIOdarone    Tablet 200 milliGRAM(s) Oral daily  AQUAPHOR (petrolatum Ointment) 1 Application(s) Topical two times a day  aspirin enteric coated 81 milliGRAM(s) Oral daily  atorvastatin 80 milliGRAM(s) Oral at bedtime  clopidogrel Tablet 75 milliGRAM(s) Oral daily  dapagliflozin 10 milliGRAM(s) Oral daily  dextrose 10% Bolus 125 milliLiter(s) IV Bolus once  dextrose 5%. 1000 milliLiter(s) (100 mL/Hr) IV Continuous <Continuous>  dextrose 5%. 1000 milliLiter(s) (50 mL/Hr) IV Continuous <Continuous>  dextrose 50% Injectable 25 Gram(s) IV Push once  dextrose 50% Injectable 12.5 Gram(s) IV Push once  famotidine    Tablet 20 milliGRAM(s) Oral daily  furosemide    Tablet 20 milliGRAM(s) Oral daily  glucagon  Injectable 1 milliGRAM(s) IntraMuscular once  heparin   Injectable 5000 Unit(s) SubCutaneous every 8 hours  insulin glargine Injectable (LANTUS) 14 Unit(s) SubCutaneous at bedtime  insulin lispro (ADMELOG) corrective regimen sliding scale   SubCutaneous three times a day before meals  insulin lispro (ADMELOG) corrective regimen sliding scale   SubCutaneous at bedtime  insulin lispro Injectable (ADMELOG) 4 Unit(s) SubCutaneous three times a day with meals  linagliptin 5 milliGRAM(s) Oral with breakfast  metoprolol tartrate 25 milliGRAM(s) Oral every 12 hours  senna 2 Tablet(s) Oral at bedtime  spironolactone 25 milliGRAM(s) Oral daily  tamsulosin 0.4 milliGRAM(s) Oral at bedtime    MEDICATIONS  (PRN):  acetaminophen     Tablet .. 650 milliGRAM(s) Oral every 6 hours PRN Moderate Pain (4 - 6)  ALPRAZolam 0.25 milliGRAM(s) Oral every 12 hours PRN anxiety  dextrose Oral Gel 15 Gram(s) Oral once PRN Blood Glucose LESS THAN 70 milliGRAM(s)/deciliter  senna 2 Tablet(s) Oral at bedtime PRN Constipation          Dr. Dong Hospitalist Progress Note  IRA SEN [id number]    Patient is a 74y old  Male who presents with a chief complaint of s/p CABG (22 Apr 2024 15:43)    HPI:  74yr Male, PMHx of CVA, MI, HTN, DM2, HLD, CAD s/p cardiac stents, CHF, hx of malignant melanoma/wide excision. To [facility] 4/1 for eval of ascending aorta replacement, aortic valve replacement, coronary artery bypass grafting with Dr. Ilia Ortiz. Admitted preop/NPO after midnight for OR on 4/7. On 4/8 underwent ASCENDING AORTIC REPLACEMENT WITH TRANSVERSE HEMIARCH, CABGx2, AVR-BIOPROSTHETIC VALVE. Inotropic support with IV Dobutamine and Milrinone post op for acute systolic heart failure. IABP, on PO Amiodarone for afib prophylaxis. Extubated to 5L-->hi flow. Endocrine consulted for DM2, Insulin gtt. 4/13 febrile w/CLAUDIA, +fluid overload, zosyn / vanco empirically,+ diuresis. Hypotension- Taran-Synephrine/ Milrinone gtt. ID consulted, BC NGTD. On 4/18 Serum Glucose 62. On 4/20 Started on Lopressor 25 mg PO BID. Therapy started. Increase ambulation and activity as tolerated. PMR recommends acute rehab. Cleared for dc to [facility] rehab.  (22 Apr 2024 15:43)    Interval  seen and examined  Chart reviewed  Overnight SOB once, better afterward. on 1L NC.   BM+  No pain  No complaints      ROS:  denied fever/chills/CP/SOB/cough/palpitation/dizziness/abdominal pian/nausea/vomiting/diarrhoea/constipation/dysuria/leg or calf pain/headaches.all other ROS neg      allergy: NKDA    MEDICATIONS  (STANDING):  aMIOdarone    Tablet 200 milliGRAM(s) Oral daily  AQUAPHOR (petrolatum Ointment) 1 Application(s) Topical two times a day  aspirin enteric coated 81 milliGRAM(s) Oral daily  atorvastatin 80 milliGRAM(s) Oral at bedtime  clopidogrel Tablet 75 milliGRAM(s) Oral daily  dapagliflozin 10 milliGRAM(s) Oral daily  dextrose 10% Bolus 125 milliLiter(s) IV Bolus once  dextrose 5%. 1000 milliLiter(s) (100 mL/Hr) IV Continuous <Continuous>  dextrose 5%. 1000 milliLiter(s) (50 mL/Hr) IV Continuous <Continuous>  dextrose 50% Injectable 25 Gram(s) IV Push once  dextrose 50% Injectable 12.5 Gram(s) IV Push once  famotidine    Tablet 20 milliGRAM(s) Oral daily  furosemide    Tablet 20 milliGRAM(s) Oral daily  glucagon  Injectable 1 milliGRAM(s) IntraMuscular once  heparin   Injectable 5000 Unit(s) SubCutaneous every 8 hours  insulin glargine Injectable (LANTUS) 14 Unit(s) SubCutaneous at bedtime  insulin lispro (ADMELOG) corrective regimen sliding scale   SubCutaneous three times a day before meals  insulin lispro (ADMELOG) corrective regimen sliding scale   SubCutaneous at bedtime  insulin lispro Injectable (ADMELOG) 4 Unit(s) SubCutaneous three times a day with meals  linagliptin 5 milliGRAM(s) Oral with breakfast  metoprolol tartrate 25 milliGRAM(s) Oral every 12 hours  senna 2 Tablet(s) Oral at bedtime  spironolactone 25 milliGRAM(s) Oral daily  tamsulosin 0.4 milliGRAM(s) Oral at bedtime    MEDICATIONS  (PRN):  acetaminophen     Tablet .. 650 milliGRAM(s) Oral every 6 hours PRN Moderate Pain (4 - 6)  ALPRAZolam 0.25 milliGRAM(s) Oral every 12 hours PRN anxiety  dextrose Oral Gel 15 Gram(s) Oral once PRN Blood Glucose LESS THAN 70 milliGRAM(s)/deciliter  senna 2 Tablet(s) Oral at bedtime PRN Constipation      PAST MEDICAL & SURGICAL HISTORY:  HTN (hypertension)  Hearing decreased  CVA (cerebral vascular accident) 12/22/2016  Hyperlipemia  Kidney stones  Coronary artery disease MI 12/22/2016, 1/2017 stents x3  CHF (congestive heart failure)   Type 2 diabetes mellitus  2016  Confusion  from stroke  S/P medial meniscal repair  and ligament surgery  H/O lithotripsy  S/P tonsillectomy  Bilateral inguinal hernia    Social:  Denied smoking/ETOH  lives with wife    T(C): 36.6 (04-23-24 @ 08:12), Max: 36.8 (04-22-24 @ 16:35)  HR: 69 (04-23-24 @ 08:12) (69 - 80)  BP: 116/71 (04-23-24 @ 08:12) (116/71 - 143/76)  RR: 16 (04-23-24 @ 08:12) (16 - 18)  SpO2: 94% (04-23-24 @ 08:12) (92% - 94%)    04-22-24 @ 07:01  -  04-23-24 @ 07:00  --------------------------------------------------------  IN: 0 mL / OUT: 350 mL / NET: -350 mL    CAPILLARY BLOOD GLUCOSE      POCT Blood Glucose.: 136 mg/dL (23 Apr 2024 07:59)  POCT Blood Glucose.: 131 mg/dL (22 Apr 2024 22:35)  POCT Blood Glucose.: 104 mg/dL (22 Apr 2024 17:07)  POCT Blood Glucose.: 151 mg/dL (22 Apr 2024 11:34)      Physical Exam:  GENERAL: Not in distress. Alert    HEENT:  Normocephalic and atraumatic. PEARLA,EOMI  NECK: Supple.  No JVD.    CARDIOVASCULAR: RRR S1, S2. No murmur/rubs/gallop  LUNGS: air entry reduced on Left side, no rales, no wheezing, no rhonchi.    ABDOMEN: ND. Soft,  NT, no guarding / rebound / rigidity. BS normoactive. No CVA tenderness.    EXTREMITIES: no cyanosis, no clubbing, 1+ b/l edema.   SKIN: no rash. sternal wound c/d/i  NEUROLOGIC: AAO*3.strength is symmetric, sensation intact, speech fluent.    PSYCHIATRIC: mild agitation.    Labs                        9.4    9.00  )-----------( 639      ( 23 Apr 2024 06:25 )             28.3     04-23    141  |  106  |  23  ----------------------------<  127<H>  3.8   |  27  |  1.17    Ca    8.2<L>      23 Apr 2024 06:25  Phos  2.8     04-22  Mg     2.1     04-22    TPro  5.6<L>  /  Alb  2.5<L>  /  TBili  0.9  /  DBili  x   /  AST  23  /  ALT  34  /  AlkPhos  86  04-23   Urinalysis Basic - ( 23 Apr 2024 06:25 )    Color: x / Appearance: x / SG: x / pH: x  Gluc: 127 mg/dL / Ketone: x  / Bili: x / Urobili: x   Blood: x / Protein: x / Nitrite: x   Leuk Esterase: x / RBC: x / WBC x   Sq Epi: x / Non Sq Epi: x / Bacteria: x        < from: Xray Chest 1 View- PORTABLE-Routine (Xray Chest 1 View- PORTABLE-Routine in AM.) (04.22.24 @ 07:45) >  Heart/Vascular: The mediastinum, hilum and aorta are within normal limits   for projection. Status post median sternotomy and aortic valve   replacement. Cardiomegaly.  Pulmonary: Midline trachea. There is persistent moderate left pleural   effusion and consolidation as well as hazy opacity right midlung field.   No pneumothorax.  Bones: There is no fracture.  Lines and catheter: None    Impression:    There is persistent moderate left pleural effusion and consolidation as   well as hazy opacity right midlung field.    < end of copied text >        Dr. Dong Hospitalist Progress Note  IRA SEN [id number]    Patient is a 74y old  Male who presents with a chief complaint of s/p CABG (22 Apr 2024 15:43)    HPI:  74yr Male, PMHx of CVA, MI, HTN, DM2, HLD, CAD s/p cardiac stents, CHF, hx of malignant melanoma/wide excision. To [facility] 4/1 for eval of ascending aorta replacement, aortic valve replacement, coronary artery bypass grafting with Dr. Ilia Ortiz. Admitted preop/NPO after midnight for OR on 4/7. On 4/8 underwent ASCENDING AORTIC REPLACEMENT WITH TRANSVERSE HEMIARCH, CABGx2, AVR-BIOPROSTHETIC VALVE. Inotropic support with IV Dobutamine and Milrinone post op for acute systolic heart failure. IABP, on PO Amiodarone for afib prophylaxis. Extubated to 5L-->hi flow. Endocrine consulted for DM2, Insulin gtt. 4/13 febrile w/CLAUDIA, +fluid overload, zosyn / vanco empirically,+ diuresis. Hypotension- Taran-Synephrine/ Milrinone gtt. ID consulted, BC NGTD. On 4/18 Serum Glucose 62. On 4/20 Started on Lopressor 25 mg PO BID. Therapy started. Increase ambulation and activity as tolerated. PMR recommends acute rehab. Cleared for dc to [facility] rehab.  (22 Apr 2024 15:43)    Interval  seen and examined  Chart reviewed  Overnight SOB once, better afterward. on 1L NC.   BM+  No pain  No complaints      ROS:  denied fever/chills/CP/SOB/cough/palpitation/dizziness/abdominal pian/nausea/vomiting/diarrhoea/constipation/dysuria/leg or calf pain/headaches.all other ROS neg      allergy: NKDA    MEDICATIONS  (STANDING):  aMIOdarone    Tablet 200 milliGRAM(s) Oral daily  AQUAPHOR (petrolatum Ointment) 1 Application(s) Topical two times a day  aspirin enteric coated 81 milliGRAM(s) Oral daily  atorvastatin 80 milliGRAM(s) Oral at bedtime  clopidogrel Tablet 75 milliGRAM(s) Oral daily  dapagliflozin 10 milliGRAM(s) Oral daily  dextrose 10% Bolus 125 milliLiter(s) IV Bolus once  dextrose 5%. 1000 milliLiter(s) (100 mL/Hr) IV Continuous <Continuous>  dextrose 5%. 1000 milliLiter(s) (50 mL/Hr) IV Continuous <Continuous>  dextrose 50% Injectable 25 Gram(s) IV Push once  dextrose 50% Injectable 12.5 Gram(s) IV Push once  famotidine    Tablet 20 milliGRAM(s) Oral daily  furosemide    Tablet 20 milliGRAM(s) Oral daily  glucagon  Injectable 1 milliGRAM(s) IntraMuscular once  heparin   Injectable 5000 Unit(s) SubCutaneous every 8 hours  insulin glargine Injectable (LANTUS) 14 Unit(s) SubCutaneous at bedtime  insulin lispro (ADMELOG) corrective regimen sliding scale   SubCutaneous three times a day before meals  insulin lispro (ADMELOG) corrective regimen sliding scale   SubCutaneous at bedtime  insulin lispro Injectable (ADMELOG) 4 Unit(s) SubCutaneous three times a day with meals  linagliptin 5 milliGRAM(s) Oral with breakfast  metoprolol tartrate 25 milliGRAM(s) Oral every 12 hours  senna 2 Tablet(s) Oral at bedtime  spironolactone 25 milliGRAM(s) Oral daily  tamsulosin 0.4 milliGRAM(s) Oral at bedtime    MEDICATIONS  (PRN):  acetaminophen     Tablet .. 650 milliGRAM(s) Oral every 6 hours PRN Moderate Pain (4 - 6)  ALPRAZolam 0.25 milliGRAM(s) Oral every 12 hours PRN anxiety  dextrose Oral Gel 15 Gram(s) Oral once PRN Blood Glucose LESS THAN 70 milliGRAM(s)/deciliter  senna 2 Tablet(s) Oral at bedtime PRN Constipation      PAST MEDICAL & SURGICAL HISTORY:  HTN (hypertension)  Hearing decreased  CVA (cerebral vascular accident) 12/22/2016  Hyperlipemia  Kidney stones  Coronary artery disease MI 12/22/2016, 1/2017 stents x3  CHF (congestive heart failure)   Type 2 diabetes mellitus  2016  Confusion  from stroke  S/P medial meniscal repair  and ligament surgery  H/O lithotripsy  S/P tonsillectomy  Bilateral inguinal hernia    Social:  Denied smoking/ETOH  lives with wife    T(C): 36.6 (04-23-24 @ 08:12), Max: 36.8 (04-22-24 @ 16:35)  HR: 69 (04-23-24 @ 08:12) (69 - 80)  BP: 116/71 (04-23-24 @ 08:12) (116/71 - 143/76)  RR: 16 (04-23-24 @ 08:12) (16 - 18)  SpO2: 94% (04-23-24 @ 08:12) (92% - 94%)    04-22-24 @ 07:01  -  04-23-24 @ 07:00  --------------------------------------------------------  IN: 0 mL / OUT: 350 mL / NET: -350 mL    CAPILLARY BLOOD GLUCOSE      POCT Blood Glucose.: 136 mg/dL (23 Apr 2024 07:59)  POCT Blood Glucose.: 131 mg/dL (22 Apr 2024 22:35)  POCT Blood Glucose.: 104 mg/dL (22 Apr 2024 17:07)  POCT Blood Glucose.: 151 mg/dL (22 Apr 2024 11:34)      Physical Exam:  GENERAL: Not in distress. Alert    HEENT:  Normocephalic and atraumatic. PEARLA,EOMI  NECK: Supple.  No JVD.    CARDIOVASCULAR: RRR S1, S2. No murmur/rubs/gallop  LUNGS: air entry reduced on Left side, + rales, no wheezing, no rhonchi.    ABDOMEN: ND. Soft,  NT, no guarding / rebound / rigidity. BS normoactive. No CVA tenderness.    EXTREMITIES: no cyanosis, no clubbing, 1+ b/l edema.   SKIN: no rash. sternal wound c/d/i  NEUROLOGIC: AAO*3.strength is symmetric, sensation intact, speech fluent.    PSYCHIATRIC: mild agitation.    Labs                        9.4    9.00  )-----------( 639      ( 23 Apr 2024 06:25 )             28.3     04-23    141  |  106  |  23  ----------------------------<  127<H>  3.8   |  27  |  1.17    Ca    8.2<L>      23 Apr 2024 06:25  Phos  2.8     04-22  Mg     2.1     04-22    TPro  5.6<L>  /  Alb  2.5<L>  /  TBili  0.9  /  DBili  x   /  AST  23  /  ALT  34  /  AlkPhos  86  04-23   Urinalysis Basic - ( 23 Apr 2024 06:25 )    Color: x / Appearance: x / SG: x / pH: x  Gluc: 127 mg/dL / Ketone: x  / Bili: x / Urobili: x   Blood: x / Protein: x / Nitrite: x   Leuk Esterase: x / RBC: x / WBC x   Sq Epi: x / Non Sq Epi: x / Bacteria: x        < from: Xray Chest 1 View- PORTABLE-Routine (Xray Chest 1 View- PORTABLE-Routine in AM.) (04.22.24 @ 07:45) >  Heart/Vascular: The mediastinum, hilum and aorta are within normal limits   for projection. Status post median sternotomy and aortic valve   replacement. Cardiomegaly.  Pulmonary: Midline trachea. There is persistent moderate left pleural   effusion and consolidation as well as hazy opacity right midlung field.   No pneumothorax.  Bones: There is no fracture.  Lines and catheter: None    Impression:    There is persistent moderate left pleural effusion and consolidation as   well as hazy opacity right midlung field.    < end of copied text >

## 2024-04-23 NOTE — CONSULT NOTE ADULT - SUBJECTIVE AND OBJECTIVE BOX
Time of visit:    CHIEF COMPLAINT: Patient is a 74y old  Male who presents with a chief complaint of cardiac debilitation s/p CABG (23 Apr 2024 15:34)      HPI:  74yr Male, PMHx of CVA, MI, HTN, DM2, HLD, CAD s/p cardiac stents, CHF, hx of malignant melanoma/wide excision. To [facility] 4/1 for eval of ascending aorta replacement, aortic valve replacement, coronary artery bypass grafting with Dr. Ilia Ortiz. Admitted preop/NPO after midnight for OR on 4/7. On 4/8 underwent ASCENDING AORTIC REPLACEMENT WITH TRANSVERSE HEMIARCH, CABGx2, AVR-BIOPROSTHETIC VALVE. Inotropic support with IV Dobutamine and Milrinone post op for acute systolic heart failure. IABP, on PO Amiodarone for afib prophylaxis. Extubated to 5L-->hi flow. Endocrine consulted for DM2, Insulin gtt. 4/13 febrile w/CLAUDIA, +fluid overload, zosyn / vanco empirically,+ diuresis. Hypotension- Taran-Synephrine/ Milrinone gtt. ID consulted, BC NGTD. On 4/18 Serum Glucose 62. On 4/20 Started on Lopressor 25 mg PO BID. Therapy started. Increase ambulation and activity as tolerated.   PMR recommends acute rehab. Cleared for dc to [facility] rehab.  (22 Apr 2024 15:43)   Patient seen and examined.     PAST MEDICAL & SURGICAL HISTORY:  HTN (hypertension)      Hearing decreased      CVA (cerebral vascular accident)  12/22/2016      Hyperlipemia      Kidney stones      Coronary artery disease  MI 12/22/2016      CHF (congestive heart failure)  1/2017 stents x3      Type 2 diabetes mellitus  2016      Confusion  from stroke      S/P medial meniscal repair  and ligament surgery      H/O lithotripsy      S/P tonsillectomy      Bilateral inguinal hernia          Allergies    No Known Allergies    Intolerances        MEDICATIONS  (STANDING):  aMIOdarone    Tablet 200 milliGRAM(s) Oral daily  AQUAPHOR (petrolatum Ointment) 1 Application(s) Topical two times a day  aspirin enteric coated 81 milliGRAM(s) Oral daily  atorvastatin 80 milliGRAM(s) Oral at bedtime  clopidogrel Tablet 75 milliGRAM(s) Oral daily  dextrose 10% Bolus 125 milliLiter(s) IV Bolus once  dextrose 5%. 1000 milliLiter(s) (50 mL/Hr) IV Continuous <Continuous>  dextrose 5%. 1000 milliLiter(s) (100 mL/Hr) IV Continuous <Continuous>  dextrose 50% Injectable 25 Gram(s) IV Push once  dextrose 50% Injectable 12.5 Gram(s) IV Push once  famotidine    Tablet 20 milliGRAM(s) Oral daily  glucagon  Injectable 1 milliGRAM(s) IntraMuscular once  heparin   Injectable 5000 Unit(s) SubCutaneous every 8 hours  insulin glargine Injectable (LANTUS) 14 Unit(s) SubCutaneous at bedtime  insulin lispro (ADMELOG) corrective regimen sliding scale   SubCutaneous three times a day before meals  insulin lispro (ADMELOG) corrective regimen sliding scale   SubCutaneous at bedtime  insulin lispro Injectable (ADMELOG) 4 Unit(s) SubCutaneous three times a day with meals  linagliptin 5 milliGRAM(s) Oral with breakfast  melatonin 6 milliGRAM(s) Oral at bedtime  senna 2 Tablet(s) Oral at bedtime      MEDICATIONS  (PRN):  acetaminophen     Tablet .. 650 milliGRAM(s) Oral every 6 hours PRN Moderate Pain (4 - 6)  ALPRAZolam 0.25 milliGRAM(s) Oral every 12 hours PRN anxiety  dextrose Oral Gel 15 Gram(s) Oral once PRN Blood Glucose LESS THAN 70 milliGRAM(s)/deciliter   Medications up to date at time of exam.    Medications up to date at time of exam.    FAMILY HISTORY:  Family history of diabetes mellitus    Family history of stroke        SOCIAL HISTORY  Smoking History: [   ] smoking/smoke exposure, [   ] former smoker  Living Condition: [   ] apartment, [   ] private house  Work History:   Travel History: denies recent travel  Illicit Substance Use: denies  Alcohol Use: denies    REVIEW OF SYSTEMS:    CONSTITUTIONAL:  denies fevers, chills, sweats, weight loss    HEENT:  denies diplopia or blurred vision, sore throat or runny nose.    CARDIOVASCULAR:  denies pressure, squeezing, tightness, or heaviness about the chest; no palpitations.    RESPIRATORY:  denies SOB, cough, GREENE, wheezing.    GASTROINTESTINAL:  denies abdominal pain, nausea, vomiting or diarrhea.    GENITOURINARY: denies dysuria, frequency or urgency.    NEUROLOGIC:  denies numbness, tingling, seizures or weakness.    PSYCHIATRIC:  denies disorder of thought or mood.    MSK: denies swelling, redness      PHYSICAL EXAMINATION:    GENERAL: The patient  in no apparent distress.     Vital Signs Last 24 Hrs  T(C): 36.6 (23 Apr 2024 08:12), Max: 36.8 (22 Apr 2024 16:35)  T(F): 97.9 (23 Apr 2024 08:12), Max: 98.3 (22 Apr 2024 16:35)  HR: 69 (23 Apr 2024 08:12) (69 - 80)  BP: 116/71 (23 Apr 2024 08:12) (116/71 - 143/76)  BP(mean): --  RR: 16 (23 Apr 2024 08:12) (16 - 18)  SpO2: 94% (23 Apr 2024 08:12) (92% - 94%)    Parameters below as of 23 Apr 2024 08:12  Patient On (Oxygen Delivery Method): nasal cannula       (if applicable)    Chest Tube (if applicable)    HEENT: Head is normocephalic and atraumatic. Extraocular muscles are intact. Mucous membranes are moist.     NECK: Supple, no palpable adenopathy.    LUNGS: Fair b/l breath sounds, no wheezing, rales, or rhonchi.    HEART: Regular rate and rhythm without murmur.    ABDOMEN: Soft, nontender, and nondistended.  No hepatosplenomegaly is noted.    RENAL: No difficulty voiding, no pelvic pain    EXTREMITIES: Without any cyanosis, clubbing, rash, lesions or edema.    NEUROLOGIC: Awake, alert, oriented, grossly intact    SKIN: Warm, dry, good turgor.      LABS:                        9.4    9.00  )-----------( 639      ( 23 Apr 2024 06:25 )             28.3     04-23    141  |  106  |  23  ----------------------------<  127<H>  3.8   |  27  |  1.17    Ca    8.2<L>      23 Apr 2024 06:25  Phos  2.8     04-22  Mg     2.1     04-22    TPro  5.6<L>  /  Alb  2.5<L>  /  TBili  0.9  /  DBili  x   /  AST  23  /  ALT  34  /  AlkPhos  86  04-23      Urinalysis Basic - ( 23 Apr 2024 06:25 )    Color: x / Appearance: x / SG: x / pH: x  Gluc: 127 mg/dL / Ketone: x  / Bili: x / Urobili: x   Blood: x / Protein: x / Nitrite: x   Leuk Esterase: x / RBC: x / WBC x   Sq Epi: x / Non Sq Epi: x / Bacteria: x    MICROBIOLOGY: (if applicable)    RADIOLOGY & ADDITIONAL STUDIES:  EKG:   CXR:  < from: Xray Chest 1 View- PORTABLE-Routine (Xray Chest 1 View- PORTABLE-Routine in AM.) (04.22.24 @ 07:45) >    ACC: [id number] EXAM:  XR CHEST PORTABLE ROUTINE 1V   ORDERED BY: MATT SORIANO     PROCEDURE DATE:  04/22/2024          INTERPRETATION:  INDICATION: Aortic valve replacement    Portable chest 7:23 AM    COMPARISON: 4/19/2024    FINDINGS:  Heart/Vascular: The mediastinum, hilum and aorta are within normal limits   for projection. Status post median sternotomy and aortic valve   replacement. Cardiomegaly.  Pulmonary: Midline trachea. There is persistent moderate left pleural   effusion and consolidation as well as hazy opacity right midlung field.   No pneumothorax.  Bones: There is no fracture.  Lines and catheter: None    Impression:    There is persistent moderate left pleural effusion and consolidation as   well as hazy opacity right midlung field.    --- End of Report ---             DAREK DENG DO; Attending Radiologist  This document has been electronically signed. Apr 22 2024 11:15AM    < end of copied text >  ECHO:  < from: TTE Limited W or WO Ultrasound Enhancing Agent (04.14.24 @ 14:46) >    TRANSTHORACIC ECHOCARDIOGRAM REPORT  ________________________________________________________________________________                                      _______       Pt. Name:       IRA SEN       Study Date:    4/14/2024  MRN:            [id number]         YOB: 1950  Accession #:    [id number]          Age:           74 years  Account#:       [id number]       Gender:        M  Heart Rate:     80 bpm             Height:        67.00 in (170.18 cm)  Rhythm:         artificially paced Weight:        159.00 lb (72.12 kg)  Blood Pressure: 126/58 mmHg        BSA/BMI:       1.83 m² / 24.90 kg/m²  ________________________________________________________________________________________  Referring Physician:    [id number] Ilia Ortiz  Interpreting Physician: Luis Avery M.D.  Primary Sonographer:    Bessy Guzman RDCS    CPT:                2D [place] W W/O FOL W/CO INT - .m;DOPPLER ECHO COMP W                      SPECT - 01047.m;DOPPL ECHO COLOR FLOW - 22988.m;DEFINITY                      ECHO CONTRAST PER ML - .m;DEFINITY ECHO CONTRAST PER ML                      WASTED - .m  Indication(s):      Presence of prosthetic heart valve - Z95.2  Procedure:          Transesophageal echocardiogram performed with 2D, M-mode and                      complete spectral and color flow Doppler.  Ordering Location:  [facility]  Admission Status:   Inpatient  Contrast Injection: Verbal consent was obtained for injection of Ultrasonic                      Enhancing Agent following a discussion of risks and                      benefits.                      Endocardial visualization enhanced with 3 ml of Definity                      Ultrasound enhancing agent (Lot#:[id number] Exp.Date:04/2025                      DiscardedDose:7ml).  UEA Reaction:       Patient had no adverse reaction after injection of                      Ultrasound Enhancing Agent.  Study Information:  Image quality for this study is technically difficult.    _______________________________________________________________________________________     CONCLUSIONS:      1. Technically difficult image quality.   2. The left ventricular cavity is normal in size. Left ventricular wall thickness is normal. Left ventricular systolic function is severely decreased with a calculated ejection fraction of 24 % by the Cash's biplane method of disks. There is global left ventricular hypokinesis. There is no evidence of a left ventricular thrombus.   3. Normal right ventricular cavity size and probablynormal systolic function.   4. Structurally normal mitral valve with normal leaflet excursion. There is calcification of the mitral valve annulus. There is mild mitral regurgitation.   5. A bioprosthetic valve replacement is present in the aortic position. There is no intravalvular regurgitation. The peak transaortic velocity is 1.26 m/s, peak transaortic gradient is 6.4 mmHg and mean transaortic gradient is 3.0 mmHg with an LVOT/aortic valve VTI ratio of 0.75. The aortic valve acceleration time is 71 msec.   6. Compared to the transthoracic echocardiogram performed on 4/10/2024, there have been no significant interval changes.    ________________________________________________________________________________________  FINDINGS:     Left Ventricle:  The left ventricular cavity is normal in size. Left ventricular wall thickness is normal. Left ventricular systolic function is severely decreased with a calculated ejection fraction of 24 % by the Cash's biplane method of disks. There is global left ventricular hypokinesis. There is no evidence of a left ventricular thrombus.     Right Ventricle:  The right ventricular cavity is normal in size and probably normal systolic function.     Left Atrium:  The left atrium is normal in size.    Right Atrium:  The right atrium is normal in size with an indexed volume of 14.07 ml/m².     Aortic Valve:  A bioprosthetic valve replacement is present in the aortic position. There is no intravalvular regurgitation. The peak transaortic velocity is 1.26 m/s, peak transaortic gradient is 6.4 mmHg and mean transaortic gradient is 3.0 mmHg with an LVOT/aortic valve VTI ratio of 0.75. The aortic valve acceleration time is 71 msec.     Mitral Valve:  Structurally normal mitral valve with normal leaflet excursion. There is calcification of the mitral valve annulus. There is mild mitral regurgitation.     Tricuspid Valve:  Structurally normal tricuspid valve with normal leaflet excursion. There is trace tricuspid regurgitation.     Pulmonic Valve:  Structurally normal pulmonic valve with normal leaflet excursion. There is trace pulmonic regurgitation.     Aorta:  The aortic root at the sinuses of Valsalva is dilated, measuring 4.40 cm (indexed 2.40 cm/m²).     Pericardium:  There is a trace pericardial effusion.     Systemic Veins:  The inferior vena cava was not well visualized.  ____________________________________________________________________  QUANTITATIVE DATA:  Left Ventricle Measurements: (Indexed to BSA)     IVSd (2D):   0.9 cm  LVPWd (2D):  0.9 cm  LVIDd (2D):  5.1 cm  LVIDs (2D):  4.5 cm  LV Mass:     164 g  89.2 g/m²  BiPlane LV EF%: 24 %     MV E Vmax:    1.02 m/s  MV A Vmax:    0.82 m/s  MV E/A:       1.24  e' lateral:   8.11 cm/s  e' medial:    5.68 cm/s  E/e' lateral: 12.58  E/e' medial:  17.96  E/e' Average: 14.79  MV DT:        124 msec    Aorta Measurements: (Normal range) (Indexed to BSA)     Sinuses of Valsalva: 4.40 cm (3.1 - 3.7 cm)       Left Atrium Measurements: (Indexed to BSA)  LA Diam 2D: 3.90 cm    Right Ventricle Measurements: Right Atrial Measurements:     RV S' Vmax:      7.14 cm/s    RA Vol:       25.80 ml  RV Base (RVID1): 2.7 cm       RA Vol Index: 14.07 ml/m²  RV Mid (RVID2):  2.8 cm       LVOT / RVOT/ Qp/Qs Data: (Indexed to BSA)  LVOT Vmax:      1.02 m/s  LVOT Vmn:       0.655 m/s  LVOT VTI:       16.20 cm  LVOT peak grad: 4 mmHg  LVOT mean grad: 1.5 mmHg    Aortic Valve Measurements:  AV Vmax:                1.3 m/s  AV Peak Gradient:       6.4 mmHg  AV Mean Gradient:       3.0 mmHg  AV VTI:                 21.7 cm  AV VTI Ratio:           0.75  AoV Dimensionless Index 0.75    Mitral Valve Measurements:     MV E Vmax: 1.0 m/s  MV A Vmax: 0.8 m/s  MV E/A:    1.2    ________________________________________________________________________________________  Electronically signed on 4/14/2024 at 4:18:21 PM by Luis Avery M.D.         *** Final ***    < end of copied text >    IMPRESSION: 74y Male PAST MEDICAL & SURGICAL HISTORY:  HTN (hypertension)      Hearing decreased      CVA (cerebral vascular accident)  12/22/2016      Hyperlipemia      Kidney stones      Coronary artery disease  MI 12/22/2016      CHF (congestive heart failure)  1/2017 stents x3      Type 2 diabetes mellitus  2016      Confusion  from stroke      S/P medial meniscal repair  and ligament surgery      H/O lithotripsy      S/P tonsillectomy      Bilateral inguinal hernia       p/w                  Time of visit:    CHIEF COMPLAINT: Patient is a 74y old  Male who presents with a chief complaint of cardiac debilitation s/p CABG (23 Apr 2024 15:34)      HPI: This is a 74 yr old man with  CVA, MI, HTN, DM2, HLD, CAD s/p cardiac stents, CHFr EF , hx of malignant melanoma/wide excision. To [facility] 4/1 for eval of ascending aorta replacement, aortic valve replacement, coronary artery bypass grafting with Dr. Ilia Ortiz. Admitted preop/NPO after midnight for OR on 4/7. On 4/8 underwent ASCENDING AORTIC REPLACEMENT WITH TRANSVERSE HEMIARCH, CABGx2, AVR-BIOPROSTHETIC VALVE. Inotropic support with IV Dobutamine and Milrinone post op for acute systolic heart failure. IABP, on PO Amiodarone for afib prophylaxis. Extubated to 5L-->hi flow. Endocrine consulted for DM2, Insulin gtt. 4/13 febrile w/CLAUDIA, +fluid overload, zosyn / vanco empirically,+ diuresis. Hypotension- Taran-Synephrine/ Milrinone gtt. ID consulted, BC NGTD. On 4/18 Serum Glucose 62. On 4/20 Started on Lopressor 25 mg PO BID. Therapy started. Increase ambulation and activity as tolerated.   PMR recommends acute rehab. Cleared for dc to [facility] rehab.  (22 Apr 2024 15:43)   Patient seen and examined.     PAST MEDICAL & SURGICAL HISTORY:  HTN (hypertension)      Hearing decreased      CVA (cerebral vascular accident)  12/22/2016      Hyperlipemia      Kidney stones      Coronary artery disease  MI 12/22/2016      CHF (congestive heart failure)  1/2017 stents x3      Type 2 diabetes mellitus  2016      Confusion  from stroke      S/P medial meniscal repair  and ligament surgery      H/O lithotripsy      S/P tonsillectomy      Bilateral inguinal hernia          Allergies    No Known Allergies    Intolerances        MEDICATIONS  (STANDING):  aMIOdarone    Tablet 200 milliGRAM(s) Oral daily  AQUAPHOR (petrolatum Ointment) 1 Application(s) Topical two times a day  aspirin enteric coated 81 milliGRAM(s) Oral daily  atorvastatin 80 milliGRAM(s) Oral at bedtime  clopidogrel Tablet 75 milliGRAM(s) Oral daily  dextrose 10% Bolus 125 milliLiter(s) IV Bolus once  dextrose 5%. 1000 milliLiter(s) (50 mL/Hr) IV Continuous <Continuous>  dextrose 5%. 1000 milliLiter(s) (100 mL/Hr) IV Continuous <Continuous>  dextrose 50% Injectable 25 Gram(s) IV Push once  dextrose 50% Injectable 12.5 Gram(s) IV Push once  famotidine    Tablet 20 milliGRAM(s) Oral daily  glucagon  Injectable 1 milliGRAM(s) IntraMuscular once  heparin   Injectable 5000 Unit(s) SubCutaneous every 8 hours  insulin glargine Injectable (LANTUS) 14 Unit(s) SubCutaneous at bedtime  insulin lispro (ADMELOG) corrective regimen sliding scale   SubCutaneous three times a day before meals  insulin lispro (ADMELOG) corrective regimen sliding scale   SubCutaneous at bedtime  insulin lispro Injectable (ADMELOG) 4 Unit(s) SubCutaneous three times a day with meals  linagliptin 5 milliGRAM(s) Oral with breakfast  melatonin 6 milliGRAM(s) Oral at bedtime  senna 2 Tablet(s) Oral at bedtime      MEDICATIONS  (PRN):  acetaminophen     Tablet .. 650 milliGRAM(s) Oral every 6 hours PRN Moderate Pain (4 - 6)  ALPRAZolam 0.25 milliGRAM(s) Oral every 12 hours PRN anxiety  dextrose Oral Gel 15 Gram(s) Oral once PRN Blood Glucose LESS THAN 70 milliGRAM(s)/deciliter   Medications up to date at time of exam.    Medications up to date at time of exam.    FAMILY HISTORY:  Family history of diabetes mellitus    Family history of stroke        SOCIAL HISTORY  Smoking History: [  x ] none smoking/smoke exposure, [   ] former smoker  Living Condition: [   ] apartment, [   ] private house  Work History: retired    Travel History: denies recent travel  Illicit Substance Use: denies  Alcohol Use: denies    REVIEW OF SYSTEMS:    CONSTITUTIONAL:  denies fevers, chills, sweats, weight loss    HEENT:  denies diplopia or blurred vision, sore throat or runny nose.    CARDIOVASCULAR:  denies pressure, squeezing, tightness, or heaviness about the chest; no palpitations.    RESPIRATORY:  denies SOB, cough, GREENE, wheezing.    GASTROINTESTINAL:  denies abdominal pain, nausea, vomiting or diarrhea.    GENITOURINARY: denies dysuria, frequency or urgency.    NEUROLOGIC:  denies numbness, tingling, seizures or weakness.    PSYCHIATRIC:  denies disorder of thought or mood.    MSK: denies swelling, redness      PHYSICAL EXAMINATION:    GENERAL: The patient  in no apparent distress.     Vital Signs Last 24 Hrs  T(C): 36.6 (23 Apr 2024 08:12), Max: 36.8 (22 Apr 2024 16:35)  T(F): 97.9 (23 Apr 2024 08:12), Max: 98.3 (22 Apr 2024 16:35)  HR: 69 (23 Apr 2024 08:12) (69 - 80)  BP: 116/71 (23 Apr 2024 08:12) (116/71 - 143/76)  BP(mean): --  RR: 16 (23 Apr 2024 08:12) (16 - 18)  SpO2: 94% (23 Apr 2024 08:12) (92% - 94%)    Parameters below as of 23 Apr 2024 08:12  Patient On (Oxygen Delivery Method): nasal cannula       (if applicable)    Chest Tube (if applicable)    HEENT: Head is normocephalic and atraumatic. Extraocular muscles are intact. Mucous membranes are moist.     NECK: Supple, no palpable adenopathy.    LUNGS: Fair b/l breath sounds, left lower 1/2 dullness     HEART: Regular rate and rhythm without murmur. + healing midsternal surgical site     ABDOMEN: Soft, nontender, and nondistended.  No hepatosplenomegaly is noted.    RENAL: No difficulty voiding, no pelvic pain    EXTREMITIES: Without any cyanosis, clubbing, rash, lesions or edema.    NEUROLOGIC: Awake, alert, oriented, grossly intact    SKIN: Warm, dry, good turgor.      LABS:                        9.4    9.00  )-----------( 639      ( 23 Apr 2024 06:25 )             28.3     04-23    141  |  106  |  23  ----------------------------<  127<H>  3.8   |  27  |  1.17    Ca    8.2<L>      23 Apr 2024 06:25  Phos  2.8     04-22  Mg     2.1     04-22    TPro  5.6<L>  /  Alb  2.5<L>  /  TBili  0.9  /  DBili  x   /  AST  23  /  ALT  34  /  AlkPhos  86  04-23      Urinalysis Basic - ( 23 Apr 2024 06:25 )    Color: x / Appearance: x / SG: x / pH: x  Gluc: 127 mg/dL / Ketone: x  / Bili: x / Urobili: x   Blood: x / Protein: x / Nitrite: x   Leuk Esterase: x / RBC: x / WBC x   Sq Epi: x / Non Sq Epi: x / Bacteria: x    MICROBIOLOGY: (if applicable)    RADIOLOGY & ADDITIONAL STUDIES:  EKG:   CXR:  < from: Xray Chest 1 View- PORTABLE-Routine (Xray Chest 1 View- PORTABLE-Routine in AM.) (04.22.24 @ 07:45) >    ACC: [id number] EXAM:  XR CHEST PORTABLE ROUTINE 1V   ORDERED BY: MATT SORIANO     PROCEDURE DATE:  04/22/2024          INTERPRETATION:  INDICATION: Aortic valve replacement    Portable chest 7:23 AM    COMPARISON: 4/19/2024    FINDINGS:  Heart/Vascular: The mediastinum, hilum and aorta are within normal limits   for projection. Status post median sternotomy and aortic valve   replacement. Cardiomegaly.  Pulmonary: Midline trachea. There is persistent moderate left pleural   effusion and consolidation as well as hazy opacity right midlung field.   No pneumothorax.  Bones: There is no fracture.  Lines and catheter: None    Impression:    There is persistent moderate left pleural effusion and consolidation as   well as hazy opacity right midlung field.    --- End of Report ---             DAREK DENG DO; Attending Radiologist  This document has been electronically signed. Apr 22 2024 11:15AM    < end of copied text >  ECHO:  < from: TTE Limited W or WO Ultrasound Enhancing Agent (04.14.24 @ 14:46) >    TRANSTHORACIC ECHOCARDIOGRAM REPORT  ________________________________________________________________________________                                      _______       Pt. Name:       IRA SEN       Study Date:    4/14/2024  MRN:            [id number]         YOB: 1950  Accession #:    [id number]          Age:           74 years  Account#:       [id number]       Gender:        M  Heart Rate:     80 bpm             Height:        67.00 in (170.18 cm)  Rhythm:         artificially paced Weight:        159.00 lb (72.12 kg)  Blood Pressure: 126/58 mmHg        BSA/BMI:       1.83 m² / 24.90 kg/m²  ________________________________________________________________________________________  Referring Physician:    [id number] Ilia Ortiz  Interpreting Physician: Luis Avery M.D.  Primary Sonographer:    Bessy Guzman KODY    CPT:                2D [place] W W/O FOL W/CO INT - .m;DOPPLER ECHO COMP W                      SPECT - 32419.m;DOPPL ECHO COLOR FLOW - 33230.m;DEFINITY                      ECHO CONTRAST PER ML - .m;DEFINITY ECHO CONTRAST PER ML                      WASTED - .m  Indication(s):      Presence of prosthetic heart valve - Z95.2  Procedure:          Transesophageal echocardiogram performed with 2D, M-mode and                      complete spectral and color flow Doppler.  Ordering Location:  [facility]  Admission Status:   Inpatient  Contrast Injection: Verbal consent was obtained for injection of Ultrasonic                      Enhancing Agent following a discussion of risks and                      benefits.                      Endocardial visualization enhanced with 3 ml of Definity                      Ultrasound enhancing agent (Lot#:[id number] Exp.Date:04/2025                      DiscardedDose:7ml).  UEA Reaction:       Patient had no adverse reaction after injection of                      Ultrasound Enhancing Agent.  Study Information:  Image quality for this study is technically difficult.    _______________________________________________________________________________________     CONCLUSIONS:      1. Technically difficult image quality.   2. The left ventricular cavity is normal in size. Left ventricular wall thickness is normal. Left ventricular systolic function is severely decreased with a calculated ejection fraction of 24 % by the Cash's biplane method of disks. There is global left ventricular hypokinesis. There is no evidence of a left ventricular thrombus.   3. Normal right ventricular cavity size and probablynormal systolic function.   4. Structurally normal mitral valve with normal leaflet excursion. There is calcification of the mitral valve annulus. There is mild mitral regurgitation.   5. A bioprosthetic valve replacement is present in the aortic position. There is no intravalvular regurgitation. The peak transaortic velocity is 1.26 m/s, peak transaortic gradient is 6.4 mmHg and mean transaortic gradient is 3.0 mmHg with an LVOT/aortic valve VTI ratio of 0.75. The aortic valve acceleration time is 71 msec.   6. Compared to the transthoracic echocardiogram performed on 4/10/2024, there have been no significant interval changes.    ________________________________________________________________________________________  FINDINGS:     Left Ventricle:  The left ventricular cavity is normal in size. Left ventricular wall thickness is normal. Left ventricular systolic function is severely decreased with a calculated ejection fraction of 24 % by the Cash's biplane method of disks. There is global left ventricular hypokinesis. There is no evidence of a left ventricular thrombus.     Right Ventricle:  The right ventricular cavity is normal in size and probably normal systolic function.     Left Atrium:  The left atrium is normal in size.    Right Atrium:  The right atrium is normal in size with an indexed volume of 14.07 ml/m².     Aortic Valve:  A bioprosthetic valve replacement is present in the aortic position. There is no intravalvular regurgitation. The peak transaortic velocity is 1.26 m/s, peak transaortic gradient is 6.4 mmHg and mean transaortic gradient is 3.0 mmHg with an LVOT/aortic valve VTI ratio of 0.75. The aortic valve acceleration time is 71 msec.     Mitral Valve:  Structurally normal mitral valve with normal leaflet excursion. There is calcification of the mitral valve annulus. There is mild mitral regurgitation.     Tricuspid Valve:  Structurally normal tricuspid valve with normal leaflet excursion. There is trace tricuspid regurgitation.     Pulmonic Valve:  Structurally normal pulmonic valve with normal leaflet excursion. There is trace pulmonic regurgitation.     Aorta:  The aortic root at the sinuses of Valsalva is dilated, measuring 4.40 cm (indexed 2.40 cm/m²).     Pericardium:  There is a trace pericardial effusion.     Systemic Veins:  The inferior vena cava was not well visualized.  ____________________________________________________________________  QUANTITATIVE DATA:  Left Ventricle Measurements: (Indexed to BSA)     IVSd (2D):   0.9 cm  LVPWd (2D):  0.9 cm  LVIDd (2D):  5.1 cm  LVIDs (2D):  4.5 cm  LV Mass:     164 g  89.2 g/m²  BiPlane LV EF%: 24 %     MV E Vmax:    1.02 m/s  MV A Vmax:    0.82 m/s  MV E/A:       1.24  e' lateral:   8.11 cm/s  e' medial:    5.68 cm/s  E/e' lateral: 12.58  E/e' medial:  17.96  E/e' Average: 14.79  MV DT:        124 msec    Aorta Measurements: (Normal range) (Indexed to BSA)     Sinuses of Valsalva: 4.40 cm (3.1 - 3.7 cm)       Left Atrium Measurements: (Indexed to BSA)  LA Diam 2D: 3.90 cm    Right Ventricle Measurements: Right Atrial Measurements:     RV S' Vmax:      7.14 cm/s    RA Vol:       25.80 ml  RV Base (RVID1): 2.7 cm       RA Vol Index: 14.07 ml/m²  RV Mid (RVID2):  2.8 cm       LVOT / RVOT/ Qp/Qs Data: (Indexed to BSA)  LVOT Vmax:      1.02 m/s  LVOT Vmn:       0.655 m/s  LVOT VTI:       16.20 cm  LVOT peak grad: 4 mmHg  LVOT mean grad: 1.5 mmHg    Aortic Valve Measurements:  AV Vmax:                1.3 m/s  AV Peak Gradient:       6.4 mmHg  AV Mean Gradient:       3.0 mmHg  AV VTI:                 21.7 cm  AV VTI Ratio:           0.75  AoV Dimensionless Index 0.75    Mitral Valve Measurements:     MV E Vmax: 1.0 m/s  MV A Vmax: 0.8 m/s  MV E/A:    1.2    ________________________________________________________________________________________  Electronically signed on 4/14/2024 at 4:18:21 PM by Luis Avery M.D.         *** Final ***    < end of copied text >    IMPRESSION: 74y Male PAST MEDICAL & SURGICAL HISTORY:  HTN (hypertension)      Hearing decreased      CVA (cerebral vascular accident)  12/22/2016      Hyperlipemia      Kidney stones      Coronary artery disease  MI 12/22/2016      CHF (congestive heart failure)  1/2017 stents x3      Type 2 diabetes mellitus  2016      Confusion  from stroke      S/P medial meniscal repair  and ligament surgery      H/O lithotripsy      S/P tonsillectomy      Bilateral inguinal hernia       p/w         IMP: This is a 74 yr old man with  CVA, MI, HTN, DM2, HLD, CAD s/p cardiac stents, CHFr EF , hx of malignant melanoma/wide excision. To [facility] 4/1 for eval of ascending aorta replacement, aortic valve replacement, coronary artery bypass grafting with Dr. Ilia Ortiz. Admitted preop/NPO after midnight for OR on 4/7. On 4/8 underwent ASCENDING AORTIC REPLACEMENT WITH TRANSVERSE HEMIARCH, CABGx2, AVR-BIOPROSTHETIC VALVE.      Ask to evaluate for moderate left pleural effusion and relatively asymptomatic from a pulmonary point of view. Pleural effusion most likely related to CABG or post cardiac instrumentation syndrome ,unlikely HF although pat has reduce EF and elevated proBNP. No physical finding of fluid over load or HF.  My Prelim echo review at bedside  + pericardial effusion not having tamponade  effect and pat is not clinically reflecting tamponade . Reduced EF ( ~EF 25%)  Pat is on plavix with recent cardiac surgery . Will no hold plavix for 7 days before thoracentesis ( non emergent )     Sugg    - Cards f/u   - F/u official 2 DECHO report   - Consider holding plavix when appropriate as per cards   - Non emergent thoracentesis then off plavix x 7 days   - Optimize HF meds   - Wound care   - No antibx at this time     discussed with Dr Dong

## 2024-04-23 NOTE — DISCHARGE NOTE PROVIDER - CARE PROVIDERS DIRECT ADDRESSES
[email or web address]

## 2024-04-23 NOTE — CONSULT NOTE ADULT - ASSESSMENT
74yr Male, PMHx of CVA, MI, HTN, DM2, HLD, CAD s/p cardiac stents, CHF, hx of malignant melanoma/wide excision. To [facility] 4/1 for eval of ascending aorta replacement, aortic valve replacement, coronary artery bypass grafting with Dr. Ilia Ortiz. On  4/8 underwent ASCENDING AORTIC REPLACEMENT WITH TRANSVERSE HEMIARCH, CABGx2, AVR-BIOPROSTHETIC VALVE. Inotropic support with IV Dobutamine and Milrinone post op for acute systolic heart failure. IABP, on PO Amiodarone for afib prophylaxis. Extubated to 5L-->hi flow. Endocrine consulted for DM2, Insulin gtt. 4/13 febrile w/CLAUDIA, +fluid overload, zosyn / vanco empirically,+ diuresis. Hypotension- Taran-Synephrine/ Milrinone gtt. ID consulted, BC NGTD. On 4/18 Serum Glucose 62. On 4/20 Started on Lopressor 25 mg PO BID. Therapy started. Increase ambulation and activity as tolerated. PMR recommends acute rehab. Cleared for dc to [facility] rehab.  (22 Apr 2024 15:43)    # Debility  - start comprehensive rehab  - fall, aspiration cardiac precautions    # CAD s/p CABG*2  # s/p ASCENDING AORTIC REPLACEMENT WITH TRANSVERSE HEMIARCH  # HFrEF, chronic, stable   - c/w ASA, Plavix, Lipitor 80  - GDMT: currently on Lopressor 25 mg PO BID . change to Toprol XL 50 daily from tomorrow am if BP and HR allows and OK with [facility] team.. c/w aldactone 25 mg PO daily.  c/w  Farxiga 10 mg daily. will add entresto in future if BP and renal function are stable and OK with [facility] team.    - Continue Amio 200mg PO   - Lasix 20 mg po qd   - Strict I and Os  - daily standing weight    #CLAUDIA (acute kidney injury) on CKD   - creatinine now normalized  - monitor BMP  - Avoid nephrotoxins.  - continue to hold home Metformin    #DM2   - A1c 9.8  - Home meds: metformin 500 mg BID. Farxiga 10 mg daily  - hold metformin for now  - on Lantus 14 unit and admelog 7 unit TIDAC. pre-dinner admelog was held last night 4/22 for . reduced Admelog to 4 unit TIDAC this am with holding if BS<150 or NPO  - moderate ISS TIDAC, low ISS at h/s  - added linagliptin 5 mg daily on 4/23.  - c/w Farxiga 10 mg daily  - watch for hypoglycemia  - consistent low carb diet.  - Endo f/u OP  - please ensure all  new meds are covered by insurance      #BPH (benign prostatic hyperplasia).   -Tamsulosin 0.4 PO HS  -monitor UO    # DVT-P: Heparin  # GI: PPI    Thanks for allowing us to see this patient. Hospitalist service will continue to follow patient progress with you. please call with any question    d/w rehab team and FYH team 74yr Male, PMHx of CVA, MI, HTN, DM2, HLD, CAD s/p cardiac stents, CHF, hx of malignant melanoma/wide excision. To [facility] 4/1 for eval of ascending aorta replacement, aortic valve replacement, coronary artery bypass grafting with Dr. Ilia Ortiz. On  4/8 underwent ASCENDING AORTIC REPLACEMENT WITH TRANSVERSE HEMIARCH, CABGx2, AVR-BIOPROSTHETIC VALVE. Inotropic support with IV Dobutamine and Milrinone post op for acute systolic heart failure. IABP, on PO Amiodarone for afib prophylaxis. Extubated to 5L-->hi flow. Endocrine consulted for DM2, Insulin gtt. 4/13 febrile w/CLAUDIA, +fluid overload, zosyn / vanco empirically,+ diuresis. Hypotension- Taran-Synephrine/ Milrinone gtt. ID consulted, BC NGTD. On 4/18 Serum Glucose 62. On 4/20 Started on Lopressor 25 mg PO BID. Therapy started. Increase ambulation and activity as tolerated. PMR recommends acute rehab. Cleared for dc to [facility] rehab.  (22 Apr 2024 15:43)    # SOB last night, likely orthopnea.   # CAD s/p CABG*2  # s/p ASCENDING AORTIC REPLACEMENT WITH TRANSVERSE HEMIARCH  # HFrEF,  - CXR last night 4/22: There is persistent moderate left pleural effusion and consolidation as well as hazy opacity right midlung field.  - c/w ASA, Plavix, Lipitor 80  - GDMT: currently on Lopressor 25 mg PO BID . change to Toprol XL 50 daily from tomorrow am if BP and HR allows and OK with [facility] team.. c/w aldactone 25 mg PO daily.  c/w  Farxiga 10 mg daily. will add entresto in future if BP and renal function are stable and OK with [facility] team.    - Continue Amio 200mg PO   - on Lasix 20 mg po qd . will increase to LAsix 40 mg daily.  - monitor renal function and BP while on lasix  - cardio eval  - Strict I and Os  - daily standing weight  - informed FY team. f/u rec    # Debility  - start comprehensive rehab  - fall, aspiration cardiac precautions    #CLAUDIA  - improved  - monitor BMP  - Avoid nephrotoxins.  - continue to hold home Metformin    #DM2   - A1c 9.8  - Home meds: metformin 500 mg BID. Farxiga 10 mg daily  - hold metformin for now  - on Lantus 14 unit and admelog 7 unit TIDAC. pre-dinner admelog was held last night 4/22 for . reduced Admelog to 4 unit TIDAC this am with holding if BS<150 or NPO  - moderate ISS TIDAC, low ISS at h/s  - added linagliptin 5 mg daily on 4/23.  - c/w Farxiga 10 mg daily  - watch for hypoglycemia  - consistent low carb diet.  - Endo f/u OP  - please ensure all  new meds are covered by insurance      #BPH (benign prostatic hyperplasia).   -Tamsulosin 0.4 PO HS  -monitor UO    # DVT-P: Heparin  # GI: PPI    Thanks for allowing us to see this patient. Hospitalist service will continue to follow patient progress with you. please call with any question    d/w rehab team and FYH team 74yr Male, PMHx of CVA, MI, HTN, DM2, HLD, CAD s/p cardiac stents, CHF, hx of malignant melanoma/wide excision. To [facility] 4/1 for eval of ascending aorta replacement, aortic valve replacement, coronary artery bypass grafting with Dr. Ilia Ortiz. On  4/8 underwent ASCENDING AORTIC REPLACEMENT WITH TRANSVERSE HEMIARCH, CABGx2, AVR-BIOPROSTHETIC VALVE. Inotropic support with IV Dobutamine and Milrinone post op for acute systolic heart failure. IABP, on PO Amiodarone for afib prophylaxis. Extubated to 5L-->hi flow. Endocrine consulted for DM2, Insulin gtt. 4/13 febrile w/CLAUDIA, +fluid overload, zosyn / vanco empirically,+ diuresis. Hypotension- Taran-Synephrine/ Milrinone gtt. ID consulted, BC NGTD. On 4/18 Serum Glucose 62. On 4/20 Started on Lopressor 25 mg PO BID. Therapy started. Increase ambulation and activity as tolerated. PMR recommends acute rehab. Cleared for dc to [facility] rehab.  (22 Apr 2024 15:43)    # SOB last night, likely orthopnea.   # CAD s/p CABG*2  # s/p ASCENDING AORTIC REPLACEMENT WITH TRANSVERSE HEMIARCH  # HFrEF,  - CXR last night 4/22: There is persistent moderate left pleural effusion and consolidation as well as hazy opacity right midlung field.  - check EKG  - c/w ASA, Plavix, Lipitor 80  - GDMT: currently on Lopressor 25 mg PO BID . change to Toprol XL 50 daily from tomorrow am if BP and HR allows and OK with FYH team.. c/w aldactone 25 mg PO daily.  c/w  Farxiga 10 mg daily. will add entresto in future if BP and renal function are stable and OK with FY team.    - Continue Amio 200mg PO   - on Lasix 20 mg po qd . will increase to LAsix 40 mg daily.  - monitor renal function and BP while on lasix  - cardio eval  - Strict I and Os  - daily standing weight  - informed FY team. f/u rec    # Debility  - start comprehensive rehab  - fall, aspiration cardiac precautions    #CLAUDIA  - improved  - monitor BMP  - Avoid nephrotoxins.  - continue to hold home Metformin    #DM2   - A1c 9.8  - Home meds: metformin 500 mg BID. Farxiga 10 mg daily  - hold metformin for now  - on Lantus 14 unit and admelog 7 unit TIDAC. pre-dinner admelog was held last night 4/22 for . reduced Admelog to 4 unit TIDAC this am with holding if BS<150 or NPO  - moderate ISS TIDAC, low ISS at h/s  - added linagliptin 5 mg daily on 4/23.  - c/w Farxiga 10 mg daily  - watch for hypoglycemia  - consistent low carb diet.  - Endo f/u OP  - please ensure all  new meds are covered by insurance      #BPH (benign prostatic hyperplasia).   -Tamsulosin 0.4 PO HS  -monitor UO    # DVT-P: Heparin  # GI: PPI    Thanks for allowing us to see this patient. Hospitalist service will continue to follow patient progress with you. please call with any question    d/w rehab team and FYH team 74yr Male, PMHx of CVA, MI, HTN, DM2, HLD, CAD s/p cardiac stents, CHF, hx of malignant melanoma/wide excision. To [facility] 4/1 for eval of ascending aorta replacement, aortic valve replacement, coronary artery bypass grafting with Dr. Ilia Ortiz. On  4/8 underwent ASCENDING AORTIC REPLACEMENT WITH TRANSVERSE HEMIARCH, CABGx2, AVR-BIOPROSTHETIC VALVE. Inotropic support with IV Dobutamine and Milrinone post op for acute systolic heart failure. IABP, on PO Amiodarone for afib prophylaxis. Extubated to 5L-->hi flow. Endocrine consulted for DM2, Insulin gtt. 4/13 febrile w/CLAUDIA, +fluid overload, zosyn / vanco empirically,+ diuresis. Hypotension- Taran-Synephrine/ Milrinone gtt. ID consulted, BC NGTD. On 4/18 Serum Glucose 62. On 4/20 Started on Lopressor 25 mg PO BID. Therapy started. Increase ambulation and activity as tolerated. PMR recommends acute rehab. Cleared for dc to [facility] rehab.  (22 Apr 2024 15:43)    # SOB last night, likely orthopnea.   # CAD s/p CABG*2  # s/p ASCENDING AORTIC REPLACEMENT WITH TRANSVERSE HEMIARCH  # HFrEF,  - CXR last night 4/22: There is persistent moderate left pleural effusion and consolidation as well as hazy opacity right midlung field.  - check EKG  - c/w ASA, Plavix, Lipitor 80  - GDMT: currently on Lopressor 25 mg PO BID . change to Toprol XL 50 daily from tomorrow am if BP and HR allows and OK with [facility] team.. c/w aldactone 25 mg PO daily.  c/w  Farxiga 10 mg daily. will add entresto in future if BP and renal function are stable and OK with [facility] team.    - Continue Amio 200mg PO   - on Lasix 20 mg po qd . will increase to LAsix 40 mg daily.  - monitor renal function and BP while on lasix  - cardio eval [ informed Manasa Woods]  - Strict I and Os  - daily standing weight  - informed [facility] team. f/u rec    # Debility  - start comprehensive rehab  - fall, aspiration cardiac precautions    #CLAUDIA  - improved  - monitor BMP  - Avoid nephrotoxins.  - continue to hold home Metformin    #DM2   - A1c 9.8  - Home meds: metformin 500 mg BID. Farxiga 10 mg daily  - hold metformin for now  - on Lantus 14 unit and admelog 7 unit TIDAC. pre-dinner admelog was held last night 4/22 for . reduced Admelog to 4 unit TIDAC this am with holding if BS<150 or NPO  - moderate ISS TIDAC, low ISS at h/s  - added linagliptin 5 mg daily on 4/23.  - c/w Farxiga 10 mg daily  - watch for hypoglycemia  - consistent low carb diet.  - Endo f/u OP  - please ensure all  new meds are covered by insurance      #BPH (benign prostatic hyperplasia).   -Tamsulosin 0.4 PO HS  -monitor UO    # DVT-P: Heparin  # GI: PPI    Thanks for allowing us to see this patient. Hospitalist service will continue to follow patient progress with you. please call with any question    d/w rehab team and FYH team

## 2024-04-23 NOTE — DIETITIAN INITIAL EVALUATION ADULT - PERTINENT MEDS FT
MEDICATIONS  (STANDING):  aMIOdarone    Tablet 200 milliGRAM(s) Oral daily  AQUAPHOR (petrolatum Ointment) 1 Application(s) Topical two times a day  aspirin enteric coated 81 milliGRAM(s) Oral daily  atorvastatin 80 milliGRAM(s) Oral at bedtime  clopidogrel Tablet 75 milliGRAM(s) Oral daily  dapagliflozin 10 milliGRAM(s) Oral daily  dextrose 10% Bolus 125 milliLiter(s) IV Bolus once  dextrose 5%. 1000 milliLiter(s) (50 mL/Hr) IV Continuous <Continuous>  dextrose 5%. 1000 milliLiter(s) (100 mL/Hr) IV Continuous <Continuous>  dextrose 50% Injectable 25 Gram(s) IV Push once  dextrose 50% Injectable 12.5 Gram(s) IV Push once  famotidine    Tablet 20 milliGRAM(s) Oral daily  furosemide    Tablet 20 milliGRAM(s) Oral two times a day  glucagon  Injectable 1 milliGRAM(s) IntraMuscular once  heparin   Injectable 5000 Unit(s) SubCutaneous every 8 hours  insulin glargine Injectable (LANTUS) 14 Unit(s) SubCutaneous at bedtime  insulin lispro (ADMELOG) corrective regimen sliding scale   SubCutaneous three times a day before meals  insulin lispro (ADMELOG) corrective regimen sliding scale   SubCutaneous at bedtime  insulin lispro Injectable (ADMELOG) 4 Unit(s) SubCutaneous three times a day with meals  linagliptin 5 milliGRAM(s) Oral with breakfast  melatonin 6 milliGRAM(s) Oral at bedtime  metoprolol tartrate 25 milliGRAM(s) Oral every 12 hours  senna 2 Tablet(s) Oral at bedtime  spironolactone 25 milliGRAM(s) Oral daily  tamsulosin 0.4 milliGRAM(s) Oral at bedtime    MEDICATIONS  (PRN):  acetaminophen     Tablet .. 650 milliGRAM(s) Oral every 6 hours PRN Moderate Pain (4 - 6)  ALPRAZolam 0.25 milliGRAM(s) Oral every 12 hours PRN anxiety  dextrose Oral Gel 15 Gram(s) Oral once PRN Blood Glucose LESS THAN 70 milliGRAM(s)/deciliter

## 2024-04-23 NOTE — DIETITIAN INITIAL EVALUATION ADULT - ORAL NUTRITION SUPPLEMENTS
Recommend Initiate Glucerna 8oz PO BID (Provides 440kcal-20grams of Protein)   Recommend Initiate Unruly 1 Packet BID (Provides 180kcal & 28grams of Protein)

## 2024-04-23 NOTE — DIETITIAN INITIAL EVALUATION ADULT - NS FNS DIET ORDER
on Consistent Carbohydrate DASH-TLC Diet w/ Thin Liquids (IDDSI Level 0)  Recommend Change Diet to Consistent Carbohydrate Low Sodium Diet   Recommend Initiate  Glucerna 8oz PO BID (Provides 440kcal-20grams of Protein)   Recommend Initiate Unruly 1 Packet BID (Provides 180kcal & 28grams of Protein)  Recommend Initiate Multivitamin Daily  Education Provided on Need for Supplementation

## 2024-04-24 LAB
GLUCOSE BLDC GLUCOMTR-MCNC: 117 MG/DL — HIGH (ref 70–99)
GLUCOSE BLDC GLUCOMTR-MCNC: 118 MG/DL — HIGH (ref 70–99)
GLUCOSE BLDC GLUCOMTR-MCNC: 124 MG/DL — HIGH (ref 70–99)
GLUCOSE BLDC GLUCOMTR-MCNC: 210 MG/DL — HIGH (ref 70–99)

## 2024-04-24 PROCEDURE — 99233 SBSQ HOSP IP/OBS HIGH 50: CPT

## 2024-04-24 PROCEDURE — 99232 SBSQ HOSP IP/OBS MODERATE 35: CPT | Mod: GC

## 2024-04-24 PROCEDURE — 99232 SBSQ HOSP IP/OBS MODERATE 35: CPT

## 2024-04-24 RX ORDER — METOPROLOL TARTRATE 50 MG
25 TABLET ORAL
Refills: 0 | Status: DISCONTINUED | OUTPATIENT
Start: 2024-04-24 | End: 2024-04-24

## 2024-04-24 RX ORDER — METOPROLOL TARTRATE 50 MG
12.5 TABLET ORAL DAILY
Refills: 0 | Status: DISCONTINUED | OUTPATIENT
Start: 2024-04-24 | End: 2024-05-04

## 2024-04-24 RX ORDER — ATORVASTATIN CALCIUM 80 MG/1
80 TABLET, FILM COATED ORAL
Refills: 0 | Status: DISCONTINUED | OUTPATIENT
Start: 2024-04-24 | End: 2024-05-04

## 2024-04-24 RX ORDER — FUROSEMIDE 40 MG
20 TABLET ORAL DAILY
Refills: 0 | Status: DISCONTINUED | OUTPATIENT
Start: 2024-04-24 | End: 2024-04-25

## 2024-04-24 RX ORDER — METOPROLOL TARTRATE 50 MG
25 TABLET ORAL DAILY
Refills: 0 | Status: DISCONTINUED | OUTPATIENT
Start: 2024-04-24 | End: 2024-04-24

## 2024-04-24 RX ADMIN — ATORVASTATIN CALCIUM 80 MILLIGRAM(S): 80 TABLET, FILM COATED ORAL at 17:43

## 2024-04-24 RX ADMIN — Medication 6 MILLIGRAM(S): at 21:16

## 2024-04-24 RX ADMIN — HEPARIN SODIUM 5000 UNIT(S): 5000 INJECTION INTRAVENOUS; SUBCUTANEOUS at 13:12

## 2024-04-24 RX ADMIN — AMIODARONE HYDROCHLORIDE 200 MILLIGRAM(S): 400 TABLET ORAL at 06:08

## 2024-04-24 RX ADMIN — LINAGLIPTIN 5 MILLIGRAM(S): 5 TABLET, FILM COATED ORAL at 08:06

## 2024-04-24 RX ADMIN — FAMOTIDINE 20 MILLIGRAM(S): 10 INJECTION INTRAVENOUS at 17:43

## 2024-04-24 RX ADMIN — CLOPIDOGREL BISULFATE 75 MILLIGRAM(S): 75 TABLET, FILM COATED ORAL at 12:45

## 2024-04-24 RX ADMIN — Medication 0.25 MILLIGRAM(S): at 21:16

## 2024-04-24 RX ADMIN — Medication 12.5 MILLIGRAM(S): at 13:11

## 2024-04-24 RX ADMIN — Medication 81 MILLIGRAM(S): at 12:43

## 2024-04-24 RX ADMIN — SENNA PLUS 2 TABLET(S): 8.6 TABLET ORAL at 21:16

## 2024-04-24 RX ADMIN — INSULIN GLARGINE 14 UNIT(S): 100 INJECTION, SOLUTION SUBCUTANEOUS at 21:17

## 2024-04-24 RX ADMIN — HEPARIN SODIUM 5000 UNIT(S): 5000 INJECTION INTRAVENOUS; SUBCUTANEOUS at 21:20

## 2024-04-24 RX ADMIN — HEPARIN SODIUM 5000 UNIT(S): 5000 INJECTION INTRAVENOUS; SUBCUTANEOUS at 06:08

## 2024-04-24 RX ADMIN — Medication 20 MILLIGRAM(S): at 10:27

## 2024-04-24 NOTE — PROGRESS NOTE ADULT - ASSESSMENT
Assessment: 74yr Male, PMHx of CVA, HTN, HLD, CAD s/p cardiac stents, MI, CHF, DM, hx of malignant melanoma/wide excision admitted to [facility] from [facility] s/p ascending aortic replacement, cabg x2 and avr bioprosthetic valve, course complicated by acute systolic failure requiring inotropic support with IV Dobutamine and Milrinone and intubation. Cardiology consulted for shortness of breath, patient states he feels shortness of breath at night when he feels anxious. Denies chest pain, currently requiring 4l NC.    Recommendations:  - xr with persistent moderate left pleural effusion  - TTE with EF 24%, global LV hypokinesis with no severe valvular disease  - Patient home regimen Lopressor , spironolactone, Farxiga and Lasix  - Continue aspirin, Plavix, and amiodarone  - initiate low dose entresto and toprol 12.5. further gdmt as renal indices and hemodynamics allow

## 2024-04-24 NOTE — CHART NOTE - NSCHARTNOTEFT_GEN_A_CORE
Met with patient alone at bedside for brief (15 minute) initial psychology session. Neuropsychologist is introduced as a member of the rehabilitation team and the purpose of the visit is explained. Patient agrees to participate.     Medical records are reviewed and clinical interview is initiated.     He is alert, oriented to person, place, time, situation. Appears tired. Speech fluent, comprehensible, volume reduced. Processing speed appears slowed.     Patient reports feelings of depression and anxiety since recent heart surgery. He indicates being under anesthesia for many hours "knocked me way beyond left field, it's terrible." He indicates having poor sleep for the past 20 nights. He had therapy earlier this morning and notes feeling tired.     After 15-minutes, patient begins falling asleep. Session is ended and nursing is called to assist with moving patient to bed.     Plan: Neuropsychology will continue with the assessment process and remains available through discharge.

## 2024-04-24 NOTE — PROGRESS NOTE ADULT - SUBJECTIVE AND OBJECTIVE BOX
HPI:  74yr Male, PMHx of CVA, MI, HTN, DM2, HLD, CAD s/p cardiac stents, CHF, hx of malignant melanoma/wide excision. To [facility] 4/1 for eval of ascending aorta replacement, aortic valve replacement, coronary artery bypass grafting with Dr. Ilia Ortiz. Admitted preop/NPO after midnight for OR on 4/7. On 4/8 underwent ASCENDING AORTIC REPLACEMENT WITH TRANSVERSE HEMIARCH, CABGx2, AVR-BIOPROSTHETIC VALVE. Inotropic support with IV Dobutamine and Milrinone post op for acute systolic heart failure. IABP, on PO Amiodarone for afib prophylaxis. Extubated to 5L-->hi flow. Endocrine consulted for DM2, Insulin gtt. 4/13 febrile w/CLAUDIA, +fluid overload, zosyn / vanco empirically,+ diuresis. Hypotension- [place]-Synephrine/ Milrinone gtt. ID consulted, BC NGTD. On 4/18 Serum Glucose 62. On 4/20 Started on Lopressor 25 mg PO BID. Therapy started. Increase ambulation and activity as tolerated.   PMR recommends acute rehab. Cleared for dc to [facility] rehab.       Patient seen and evaluated in chair this am, alert and awake, follows commands well.   reports anxiety at night- interrupted sleep sleep- xanax PRN, melatonin, protected night hours discussed w/staff  reports Confusion since surgery- still 'feels foggy'  PVRs to continue daily, now off Flomax 2/2 low BP  TTE yesterday, BNP up, pulm in for pl effusion, lasix restarted per hospitalist Toprol low dose  seen By Cardiology- will start Entresto if BP permits  daily weights      MEDICATIONS  (STANDING):  aMIOdarone    Tablet 200 milliGRAM(s) Oral daily  AQUAPHOR (petrolatum Ointment) 1 Application(s) Topical two times a day  aspirin enteric coated 81 milliGRAM(s) Oral daily  atorvastatin 80 milliGRAM(s) Oral <User Schedule>  clopidogrel Tablet 75 milliGRAM(s) Oral daily  dextrose 10% Bolus 125 milliLiter(s) IV Bolus once  dextrose 5%. 1000 milliLiter(s) (50 mL/Hr) IV Continuous <Continuous>  dextrose 5%. 1000 milliLiter(s) (100 mL/Hr) IV Continuous <Continuous>  dextrose 50% Injectable 25 Gram(s) IV Push once  dextrose 50% Injectable 12.5 Gram(s) IV Push once  famotidine    Tablet 20 milliGRAM(s) Oral daily  furosemide    Tablet 20 milliGRAM(s) Oral daily  glucagon  Injectable 1 milliGRAM(s) IntraMuscular once  heparin   Injectable 5000 Unit(s) SubCutaneous every 8 hours  insulin glargine Injectable (LANTUS) 14 Unit(s) SubCutaneous at bedtime  insulin lispro (ADMELOG) corrective regimen sliding scale   SubCutaneous three times a day before meals  insulin lispro (ADMELOG) corrective regimen sliding scale   SubCutaneous at bedtime  linagliptin 5 milliGRAM(s) Oral with breakfast  melatonin 6 milliGRAM(s) Oral at bedtime  metoprolol succinate ER 12.5 milliGRAM(s) Oral daily  senna 2 Tablet(s) Oral at bedtime    MEDICATIONS  (PRN):  acetaminophen     Tablet .. 650 milliGRAM(s) Oral every 6 hours PRN Moderate Pain (4 - 6)  ALPRAZolam 0.25 milliGRAM(s) Oral every 12 hours PRN anxiety  dextrose Oral Gel 15 Gram(s) Oral once PRN Blood Glucose LESS THAN 70 milliGRAM(s)/deciliter  polyethylene glycol 3350 17 Gram(s) Oral daily PRN Constipation                            9.4    9.00  )-----------( 639      ( 23 Apr 2024 06:25 )             28.3     04-23    141  |  106  |  23  ----------------------------<  127<H>  3.8   |  27  |  1.17    Ca    8.2<L>      23 Apr 2024 06:25    TPro  5.6<L>  /  Alb  2.5<L>  /  TBili  0.9  /  DBili  x   /  AST  23  /  ALT  34  /  AlkPhos  86  04-23    Urinalysis Basic - ( 23 Apr 2024 06:25 )    Color: x / Appearance: x / SG: x / pH: x  Gluc: 127 mg/dL / Ketone: x  / Bili: x / Urobili: x   Blood: x / Protein: x / Nitrite: x   Leuk Esterase: x / RBC: x / WBC x   Sq Epi: x / Non Sq Epi: x / Bacteria: x        CAPILLARY BLOOD GLUCOSE      POCT Blood Glucose.: 210 mg/dL (24 Apr 2024 11:29)  POCT Blood Glucose.: 124 mg/dL (24 Apr 2024 08:01)  POCT Blood Glucose.: 161 mg/dL (23 Apr 2024 22:30)  POCT Blood Glucose.: 92 mg/dL (23 Apr 2024 16:12)      Vital Signs Last 24 Hrs  T(C): 36.8 (24 Apr 2024 07:45), Max: 36.9 (23 Apr 2024 19:31)  T(F): 98.2 (24 Apr 2024 07:45), Max: 98.4 (23 Apr 2024 19:31)  HR: 79 (24 Apr 2024 07:45) (77 - 79)  BP: 115/67 (24 Apr 2024 07:45) (115/67 - 165/83)  BP(mean): --  RR: 16 (24 Apr 2024 07:45) (16 - 16)  SpO2: 96% (24 Apr 2024 07:45) (94% - 96%)    Parameters below as of 24 Apr 2024 07:45  Patient On (Oxygen Delivery Method): nasal cannula  O2 Flow (L/min): 3      Gen - NAD, Comfortable  HEENT - NCAT, EOMI, MMM  Neck - Supple, No limited ROM  Pulm - CTAB, No wheeze, No rhonchi, No crackles  Cardiovascular - RRR, S1S2  Chest - good chest expansion, good respiratory effort  Abdomen - Soft, NT/ND, +BS  Extremities - No Cyanosis, no clubbing, edema to BLE, no calf tenderness  Neuro-     Cognitive - awake, alert, oriented to person, place, situation, month, follows simple commands ok     Communication - Fluent     Memory: distant Memory intact     Cranial Nerves - CN 2-12 intact.     Motor -                     LEFT    UE - 4/5                    RIGHT UE - 4/5                    LEFT    LE - 3+/5                    RIGHT LE - 3+/5        Sensory - Intact  to LT     Tone - normal  Skin: Sternal incision LELA + abd lap sites x 4, b/l medial thigh incision, right groin puncture site, Deep tissue injury (DTI) to b/l buttock.      ACC: [id number] EXAM:  XR CHEST PORTABLE ROUTINE 1V   ORDERED BY: MATT SORIANO     PROCEDURE DATE:  04/22/2024          INTERPRETATION:  INDICATION: Aortic valve replacement    Portable chest 7:23 AM    COMPARISON: 4/19/2024    FINDINGS:  Heart/Vascular: The mediastinum, hilum and aorta are within normal limits   for projection. Status post median sternotomy and aortic valve   replacement. Cardiomegaly.  Pulmonary: Midline trachea. There is persistent moderate left pleural   effusion and consolidation as well as hazy opacity right midlung field.   No pneumothorax.  Bones: There is no fracture.  Lines and catheter: None    Impression:    There is persistent moderate left pleural effusion and consolidation as   well as hazy opacity right midlung field.    --- End of Report ---             DAREK DENG DO; Attending Radiologist  This document has been electronically signed. Apr 22 2024 11:15AM      IDT 4/24, [facility] 5/7 c [facility]  Continue comprehensive acute rehab program consisting of 3hrs/day of OT/PT and SLP. HPI:  74yr Male, PMHx of CVA, MI, HTN, DM2, HLD, CAD s/p cardiac stents, CHF, hx of malignant melanoma/wide excision. To [facility] 4/1 for eval of ascending aorta replacement, aortic valve replacement, coronary artery bypass grafting with Dr. Ilia Ortiz. Admitted preop/NPO after midnight for OR on 4/7. On 4/8 underwent ASCENDING AORTIC REPLACEMENT WITH TRANSVERSE HEMIARCH, CABGx2, AVR-BIOPROSTHETIC VALVE. Inotropic support with IV Dobutamine and Milrinone post op for acute systolic heart failure. IABP, on PO Amiodarone for afib prophylaxis. Extubated to 5L-->hi flow. Endocrine consulted for DM2, Insulin gtt. 4/13 febrile w/CLAUDIA, +fluid overload, zosyn / vanco empirically,+ diuresis. Hypotension- [place]-Synephrine/ Milrinone gtt. ID consulted, BC NGTD. On 4/18 Serum Glucose 62. On 4/20 Started on Lopressor 25 mg PO BID. Therapy started. Increase ambulation and activity as tolerated.   PMR recommends acute rehab. Cleared for dc to [facility] rehab.       Patient seen and evaluated in chair this am, alert and awake, follows commands well.   reports anxiety at night- interrupted sleep sleep- xanax PRN, melatonin, protected night hours discussed w/staff  reports Confusion since surgery- still 'feels foggy'  PVRs to continue daily, now off Flomax 2/2 low BP  TTE yesterday, BNP up, pulm in for pl effusion, lasix restarted per hospitalist Toprol low dose  seen By Cardiology- will start Entresto if BP permits  daily weights      MEDICATIONS  (STANDING):  aMIOdarone    Tablet 200 milliGRAM(s) Oral daily  AQUAPHOR (petrolatum Ointment) 1 Application(s) Topical two times a day  aspirin enteric coated 81 milliGRAM(s) Oral daily  atorvastatin 80 milliGRAM(s) Oral <User Schedule>  clopidogrel Tablet 75 milliGRAM(s) Oral daily  dextrose 10% Bolus 125 milliLiter(s) IV Bolus once  dextrose 5%. 1000 milliLiter(s) (50 mL/Hr) IV Continuous <Continuous>  dextrose 5%. 1000 milliLiter(s) (100 mL/Hr) IV Continuous <Continuous>  dextrose 50% Injectable 25 Gram(s) IV Push once  dextrose 50% Injectable 12.5 Gram(s) IV Push once  famotidine    Tablet 20 milliGRAM(s) Oral daily  furosemide    Tablet 20 milliGRAM(s) Oral daily  glucagon  Injectable 1 milliGRAM(s) IntraMuscular once  heparin   Injectable 5000 Unit(s) SubCutaneous every 8 hours  insulin glargine Injectable (LANTUS) 14 Unit(s) SubCutaneous at bedtime  insulin lispro (ADMELOG) corrective regimen sliding scale   SubCutaneous three times a day before meals  insulin lispro (ADMELOG) corrective regimen sliding scale   SubCutaneous at bedtime  linagliptin 5 milliGRAM(s) Oral with breakfast  melatonin 6 milliGRAM(s) Oral at bedtime  metoprolol succinate ER 12.5 milliGRAM(s) Oral daily  senna 2 Tablet(s) Oral at bedtime    MEDICATIONS  (PRN):  acetaminophen     Tablet .. 650 milliGRAM(s) Oral every 6 hours PRN Moderate Pain (4 - 6)  ALPRAZolam 0.25 milliGRAM(s) Oral every 12 hours PRN anxiety  dextrose Oral Gel 15 Gram(s) Oral once PRN Blood Glucose LESS THAN 70 milliGRAM(s)/deciliter  polyethylene glycol 3350 17 Gram(s) Oral daily PRN Constipation                            9.4    9.00  )-----------( 639      ( 23 Apr 2024 06:25 )             28.3     04-23    141  |  106  |  23  ----------------------------<  127<H>  3.8   |  27  |  1.17    Ca    8.2<L>      23 Apr 2024 06:25    TPro  5.6<L>  /  Alb  2.5<L>  /  TBili  0.9  /  DBili  x   /  AST  23  /  ALT  34  /  AlkPhos  86  04-23    Urinalysis Basic - ( 23 Apr 2024 06:25 )    Color: x / Appearance: x / SG: x / pH: x  Gluc: 127 mg/dL / Ketone: x  / Bili: x / Urobili: x   Blood: x / Protein: x / Nitrite: x   Leuk Esterase: x / RBC: x / WBC x   Sq Epi: x / Non Sq Epi: x / Bacteria: x        CAPILLARY BLOOD GLUCOSE      POCT Blood Glucose.: 210 mg/dL (24 Apr 2024 11:29)  POCT Blood Glucose.: 124 mg/dL (24 Apr 2024 08:01)  POCT Blood Glucose.: 161 mg/dL (23 Apr 2024 22:30)  POCT Blood Glucose.: 92 mg/dL (23 Apr 2024 16:12)      Vital Signs Last 24 Hrs  T(C): 36.8 (24 Apr 2024 07:45), Max: 36.9 (23 Apr 2024 19:31)  T(F): 98.2 (24 Apr 2024 07:45), Max: 98.4 (23 Apr 2024 19:31)  HR: 79 (24 Apr 2024 07:45) (77 - 79)  BP: 115/67 (24 Apr 2024 07:45) (115/67 - 165/83)  BP(mean): --  RR: 16 (24 Apr 2024 07:45) (16 - 16)  SpO2: 96% (24 Apr 2024 07:45) (94% - 96%)    Parameters below as of 24 Apr 2024 07:45  Patient On (Oxygen Delivery Method): nasal cannula  O2 Flow (L/min): 3      Gen - NAD, Comfortable  HEENT - NCAT, EOMI, MMM  Neck - Supple, No limited ROM  Pulm - CTAB, No wheeze, No rhonchi, No crackles  Cardiovascular - RRR, S1S2  Chest - good chest expansion, good respiratory effort  Abdomen - Soft, NT/ND, +BS  Extremities - No Cyanosis, no clubbing, edema to BLE, no calf tenderness  Neuro-     Cognitive - awake, alert, oriented to person, place, situation, month, follows simple commands ok     Communication - Fluent     Memory: distant Memory intact     Cranial Nerves - CN 2-12 intact.     Motor -                     LEFT    UE - 4/5                    RIGHT UE - 4/5                    LEFT    LE - 3+/5                    RIGHT LE - 3+/5        Sensory - Intact  to LT     Tone - normal  Skin: Sternal incision LELA + abd lap sites x 4, b/l medial thigh incision, right groin puncture site, Deep tissue injury (DTI) to b/l buttock-pressure related      ACC: [id number] EXAM:  XR CHEST PORTABLE ROUTINE 1V   ORDERED BY: MATT SORIANO     PROCEDURE DATE:  04/22/2024          INTERPRETATION:  INDICATION: Aortic valve replacement    Portable chest 7:23 AM    COMPARISON: 4/19/2024    FINDINGS:  Heart/Vascular: The mediastinum, hilum and aorta are within normal limits   for projection. Status post median sternotomy and aortic valve   replacement. Cardiomegaly.  Pulmonary: Midline trachea. There is persistent moderate left pleural   effusion and consolidation as well as hazy opacity right midlung field.   No pneumothorax.  Bones: There is no fracture.  Lines and catheter: None    Impression:    There is persistent moderate left pleural effusion and consolidation as   well as hazy opacity right midlung field.    --- End of Report ---             DAREK DENG DO; Attending Radiologist  This document has been electronically signed. Apr 22 2024 11:15AM      IDT 4/24, [facility] 5/7 c [facility]  Continue comprehensive acute rehab program consisting of 3hrs/day of OT/PT and SLP.

## 2024-04-24 NOTE — PROGRESS NOTE ADULT - ASSESSMENT
74yr Male, PMHx of CVA, MI, HTN, DM2, HLD, CAD s/p cardiac stents, CHF, hx of malignant melanoma/wide excision. To [facility] 4/1 for eval of ascending aorta replacement, aortic valve replacement, coronary artery bypass grafting with Dr. Ilia Ortiz. On  4/8 underwent ASCENDING AORTIC REPLACEMENT WITH TRANSVERSE HEMIARCH, CABGx2, AVR-BIOPROSTHETIC VALVE. Inotropic support with IV Dobutamine and Milrinone post op for acute systolic heart failure. IABP, on PO Amiodarone for afib prophylaxis. Extubated to 5L-->hi flow. Endocrine consulted for DM2, Insulin gtt. 4/13 febrile w/CLAUDIA, +fluid overload, zosyn / vanco empirically,+ diuresis. Hypotension- Taran-Synephrine/ Milrinone gtt. ID consulted, BC NGTD. On 4/18 Serum Glucose 62. On 4/20 Started on Lopressor 25 mg PO BID. Therapy started. Increase ambulation and activity as tolerated. PMR recommends acute rehab. Cleared for dc to [facility] rehab.     # s/p ASCENDING AORTIC REPLACEMENT WITH TRANSVERSE HEMIARCH  -c/w comprehensive rehab as tolerated     # HFrEF, EF 30-35%  - CXR with persistent moderate left pleural effusion and consolidation as well as hazy opacity right midlung field.  - c/w ASA, Plavix, Lipitor 80, amioderone   - GDMT goal is Lopressor 25 mg PO BID, aldactone 25 mg PO daily, Farxiga 10 mg daily, and entresto   -Due to hypotension on 4/23 - GDMT was held. BP improved today, restart lopressor 25BID  -If BP remains stable, can slowly re-introduce other agents  - Restart Lasix 20 mg po qd  - monitor renal function and BP while on lasix  - cardio eval appreciated   - Strict I and Os  - daily standing weight    #Pleural effusion  -CXR with persistent moderate left pleural effusion and consolidation as well as hazy opacity right midlung field.  -seen by pulm, can consider thora however will need to hold plavix x 7 days which is not feasible at this time.  -no clinical signs of infection at this time  -c/w diuretics as BP allows    #CLAUDIA  - improved  - monitor BMP  - Avoid nephrotoxins.  - continue to hold home Metformin    #DM2   - A1c 9.8  - Home meds: metformin 500 mg BID. Farxiga 10 mg daily  - hold metformin for now  - on Lantus 14 unit   - moderate ISS TIDAC, low ISS at h/s  - added linagliptin 5 mg daily on 4/23.  - consistent low carb diet.  - Endo f/u OP    #BPH (benign prostatic hyperplasia).   -Tamsulosin 0.4 PO HS  -monitor UO    # DVT-P: Heparin

## 2024-04-24 NOTE — PROGRESS NOTE ADULT - SUBJECTIVE AND OBJECTIVE BOX
Patient is a 74y old  Male who presents with a chief complaint of cardiac debilitation s/p CABG (24 Apr 2024 09:01)    SUBJECTIVE / OVERNIGHT EVENTS: Patient seen and examined. Feels much better today. No further chest pain. Denies SOB when sitting up, reports sometimes he feels it when laying down. Has LE edema.     MEDICATIONS  (STANDING):  aMIOdarone    Tablet 200 milliGRAM(s) Oral daily  AQUAPHOR (petrolatum Ointment) 1 Application(s) Topical two times a day  aspirin enteric coated 81 milliGRAM(s) Oral daily  atorvastatin 80 milliGRAM(s) Oral at bedtime  clopidogrel Tablet 75 milliGRAM(s) Oral daily  dextrose 10% Bolus 125 milliLiter(s) IV Bolus once  dextrose 5%. 1000 milliLiter(s) (100 mL/Hr) IV Continuous <Continuous>  dextrose 5%. 1000 milliLiter(s) (50 mL/Hr) IV Continuous <Continuous>  dextrose 50% Injectable 25 Gram(s) IV Push once  dextrose 50% Injectable 12.5 Gram(s) IV Push once  famotidine    Tablet 20 milliGRAM(s) Oral daily  furosemide    Tablet 20 milliGRAM(s) Oral daily  glucagon  Injectable 1 milliGRAM(s) IntraMuscular once  heparin   Injectable 5000 Unit(s) SubCutaneous every 8 hours  insulin glargine Injectable (LANTUS) 14 Unit(s) SubCutaneous at bedtime  insulin lispro (ADMELOG) corrective regimen sliding scale   SubCutaneous three times a day before meals  insulin lispro (ADMELOG) corrective regimen sliding scale   SubCutaneous at bedtime  linagliptin 5 milliGRAM(s) Oral with breakfast  melatonin 6 milliGRAM(s) Oral at bedtime  metoprolol succinate ER 25 milliGRAM(s) Oral daily  senna 2 Tablet(s) Oral at bedtime    MEDICATIONS  (PRN):  acetaminophen     Tablet .. 650 milliGRAM(s) Oral every 6 hours PRN Moderate Pain (4 - 6)  ALPRAZolam 0.25 milliGRAM(s) Oral every 12 hours PRN anxiety  dextrose Oral Gel 15 Gram(s) Oral once PRN Blood Glucose LESS THAN 70 milliGRAM(s)/deciliter  polyethylene glycol 3350 17 Gram(s) Oral daily PRN Constipation    CAPILLARY BLOOD GLUCOSE  POCT Blood Glucose.: 210 mg/dL (24 Apr 2024 11:29)  POCT Blood Glucose.: 124 mg/dL (24 Apr 2024 08:01)  POCT Blood Glucose.: 161 mg/dL (23 Apr 2024 22:30)  POCT Blood Glucose.: 92 mg/dL (23 Apr 2024 16:12)  POCT Blood Glucose.: 154 mg/dL (23 Apr 2024 12:01)    Vital Signs Last 24 Hrs  T(C): 36.8 (24 Apr 2024 07:45), Max: 36.9 (23 Apr 2024 19:31)  T(F): 98.2 (24 Apr 2024 07:45), Max: 98.4 (23 Apr 2024 19:31)  HR: 79 (24 Apr 2024 07:45) (77 - 79)  BP: 115/67 (24 Apr 2024 07:45) (115/67 - 165/83)  BP(mean): --  RR: 16 (24 Apr 2024 07:45) (16 - 16)  SpO2: 96% (24 Apr 2024 07:45) (94% - 96%)    Parameters below as of 24 Apr 2024 07:45  Patient On (Oxygen Delivery Method): nasal cannula  O2 Flow (L/min): 3    PHYSICAL EXAM:  GENERAL: NAD, well-developed  HEAD:  Atraumatic, Normocephalic  EYES: EOMI, PERRLA, conjunctiva and sclera clear  NECK: Supple, No JVD  CHEST/LUNG: decreased BS at bases L>R  HEART: Regular rate and rhythm; No murmurs, rubs, or gallops  ABDOMEN: Soft, Nontender, Nondistended; Bowel sounds present  EXTREMITIES:  LE edema b/l  PSYCH: AAOx3  NEUROLOGY: non-focal  SKIN: No rashes or lesions    LABS:                        9.4    9.00  )-----------( 639      ( 23 Apr 2024 06:25 )             28.3     04-23    141  |  106  |  23  ----------------------------<  127<H>  3.8   |  27  |  1.17    Ca    8.2<L>      23 Apr 2024 06:25    TPro  5.6<L>  /  Alb  2.5<L>  /  TBili  0.9  /  DBili  x   /  AST  23  /  ALT  34  /  AlkPhos  86  04-23          Urinalysis Basic - ( 23 Apr 2024 06:25 )    Color: x / Appearance: x / SG: x / pH: x  Gluc: 127 mg/dL / Ketone: x  / Bili: x / Urobili: x   Blood: x / Protein: x / Nitrite: x   Leuk Esterase: x / RBC: x / WBC x   Sq Epi: x / Non Sq Epi: x / Bacteria: x      RADIOLOGY & ADDITIONAL TESTS:    Imaging Personally Reviewed:    Consultant(s) Notes Reviewed:  cards    Care Discussed with Consultants/Other Providers:rehab providers     Assessment and Plan:

## 2024-04-24 NOTE — PROGRESS NOTE ADULT - ASSESSMENT
IRA SEN is a 75yo Male s/p CABG, now with decreased functional mobility, gait instability and ADL impairments.    COMORBIDITIES/ACTIVE MEDICAL ISSUES     Gait Instability, ADL impairments and Functional impairments: Comprehensive Rehab Program of PT/OT      #S/P CABG (coronary artery bypass graft).   - ASA 81 mg PO Daily.   - Lipitor 80 mg PO HS   Plavix 75 mg PO Daily  - Lopressor 25 mg PO BID to Toprol 12.5mg-per Cardio   - Continue with Heparin subcutaneous 5000 units q 8 hr for DVT ppx   - Continue with Pepcid 20 mg PO Daily  - Lasix 20 mg po qd -resumed  - aldactone 25 qd-on hold  - fall and sternal precautions  -IS  -will add Entresto if BP permits    # S/P ascending aortic aneurysm repair.   -Continue with Plavix 75 mg PO Daily  Continue Amio 200mg PO     #CLAUDIA (acute kidney injury) on CKD   -follow labs, avoid nephrotoxins/hold home Metformin    #DM2 (hbg a1c 9.8)  Lantus HS Admelog units  Sliding Scale.  - consistent low carb diet.  - DC on home Farxiga, add Tradjenta 5 mg daily  - FU outpatient.    #CAD (coronary artery disease).   - ASA/Lipitor, trop neg yesterday  - TTE at baseline 4/23     #BPH (benign prostatic hyperplasia).   -Tamsulosin 0.4 PO HS-on hold for low BP  -monitor voiding, mobilize    Pain Mgmt - Tylenol PRN  GI/Bowel Mgmt - Senna,  Miralax PRN  /Bladder Mgmt - PVR x1    #Skin; Sternal incision LELA + abd lap sites x 4, b/l medial thigh incision, right groin puncture site, Deep tissue injury (DTI) to b/l buttock.    Precautions / PROPHYLAXIS:   - Falls, Cardiac, Sternal  - Lungs: Aspiration, Incentive Spirometer   - Pressure injury/Skin: Turn Q2hrs while in bed, OOB to Chair, PT/OT    - DVT: Hep sq   IRA SEN is a 75yo Male s/p CABG, now with decreased functional mobility, gait instability and ADL impairments.    COMORBIDITIES/ACTIVE MEDICAL ISSUES     Gait Instability, ADL impairments and Functional impairments: Comprehensive Rehab Program of PT/OT      #S/P CABG (coronary artery bypass graft).   - ASA 81 mg PO Daily.   - Lipitor 80 mg PO HS   Plavix 75 mg PO Daily  - Lopressor 25 mg PO BID to Toprol 12.5mg-per Cardio   - Continue with Heparin subcutaneous 5000 units q 8 hr for DVT ppx   - Continue with Pepcid 20 mg PO Daily  - Lasix 20 mg po qd -resumed  - aldactone 25 qd-on hold  - fall and sternal precautions  -IS  -will add Entresto if BP permits    # S/P ascending aortic aneurysm repair.   -Continue with Plavix 75 mg PO Daily  Continue Amio 200mg PO     #CLAUDIA (acute kidney injury) on CKD   -follow labs, avoid nephrotoxins/hold home Metformin    #DM2 (hbg a1c 9.8)  Lantus HS Admelog units  Sliding Scale.  - consistent low carb diet.  - DC on home Farxiga, add Tradjenta 5 mg daily  - FU outpatient.    #CAD (coronary artery disease).   - ASA/Lipitor, trop neg yesterday  - TTE at baseline 4/23     #BPH (benign prostatic hyperplasia).   -Tamsulosin 0.4 PO HS-on hold for low BP  -monitor voiding, mobilize    Pain Mgmt - Tylenol PRN  GI/Bowel Mgmt - Senna,  Miralax PRN  /Bladder Mgmt - PVR x1    #Skin; Sternal incision LELA + abd lap sites x 4, b/l medial thigh incision, right groin puncture site, Deep tissue injury (DTI) to b/l buttock. pressure related    Precautions / PROPHYLAXIS:   - Falls, Cardiac, Sternal  - Lungs: Aspiration, Incentive Spirometer   - Pressure injury/Skin: Turn Q2hrs while in bed, OOB to Chair, PT/OT    - DVT: Hep sq

## 2024-04-24 NOTE — PROGRESS NOTE ADULT - SUBJECTIVE AND OBJECTIVE BOX
IRA SEN  [id number]    Chief Complaint: SOB   Interval events: pt seen and examined, labs and chart reviewed. reports improvement in breathing and no cp. no tele in rehab    ALLERGIES:  No Known Allergies      CURRENT MEDICATIONS:  MEDICATIONS  (STANDING):  aMIOdarone    Tablet 200 milliGRAM(s) Oral daily  AQUAPHOR (petrolatum Ointment) 1 Application(s) Topical two times a day  aspirin enteric coated 81 milliGRAM(s) Oral daily  atorvastatin 80 milliGRAM(s) Oral at bedtime  clopidogrel Tablet 75 milliGRAM(s) Oral daily  dapagliflozin 10 milliGRAM(s) Oral daily  dextrose 10% Bolus 125 milliLiter(s) IV Bolus once  dextrose 5%. 1000 milliLiter(s) (100 mL/Hr) IV Continuous <Continuous>  dextrose 5%. 1000 milliLiter(s) (50 mL/Hr) IV Continuous <Continuous>  dextrose 50% Injectable 25 Gram(s) IV Push once  dextrose 50% Injectable 12.5 Gram(s) IV Push once  famotidine    Tablet 20 milliGRAM(s) Oral daily  furosemide    Tablet 20 milliGRAM(s) Oral two times a day  glucagon  Injectable 1 milliGRAM(s) IntraMuscular once  heparin   Injectable 5000 Unit(s) SubCutaneous every 8 hours  insulin glargine Injectable (LANTUS) 14 Unit(s) SubCutaneous at bedtime  insulin lispro (ADMELOG) corrective regimen sliding scale   SubCutaneous three times a day before meals  insulin lispro (ADMELOG) corrective regimen sliding scale   SubCutaneous at bedtime  insulin lispro Injectable (ADMELOG) 4 Unit(s) SubCutaneous three times a day with meals  linagliptin 5 milliGRAM(s) Oral with breakfast  metoprolol tartrate 25 milliGRAM(s) Oral every 12 hours  senna 2 Tablet(s) Oral at bedtime  spironolactone 25 milliGRAM(s) Oral daily  tamsulosin 0.4 milliGRAM(s) Oral at bedtime    ROS:  All 10 systems reviewed and positives noted in HPI    OBJECTIVE:    VITAL SIGNS:  Vital Signs Last 24 Hrs  T(C): 36.6 (23 Apr 2024 08:12), Max: 36.8 (22 Apr 2024 16:35)  T(F): 97.9 (23 Apr 2024 08:12), Max: 98.3 (22 Apr 2024 16:35)  HR: 69 (23 Apr 2024 08:12) (69 - 80)  BP: 116/71 (23 Apr 2024 08:12) (116/71 - 143/76)  BP(mean): --  RR: 16 (23 Apr 2024 08:12) (16 - 18)  SpO2: 94% (23 Apr 2024 08:12) (92% - 94%)    Parameters below as of 23 Apr 2024 08:12  Patient On (Oxygen Delivery Method): nasal cannula        PHYSICAL EXAM:  General:  ill appearing  HEENT: sclera anicteric  Neck: supple  CVS: JVP ~ 7 cm H20, RRR, s1, s2  Chest: unlabored respirations, decreased breath sounds  Abdomen: non-distended  Extremities: mild b/l l/e edema  Neuro: awake, alert & oriented       LABS:                        9.4    9.00  )-----------( 639      ( 23 Apr 2024 06:25 )             28.3     04-23    141  |  106  |  23  ----------------------------<  127<H>  3.8   |  27  |  1.17    Ca    8.2<L>      23 Apr 2024 06:25  Phos  2.8     04-22  Mg     2.1     04-22    TPro  5.6<L>  /  Alb  2.5<L>  /  TBili  0.9  /  DBili  x   /  AST  23  /  ALT  34  /  AlkPhos  86  04-23            TTE: < from: TTE Limited W or WO Ultrasound Enhancing Agent (04.14.24 @ 14:46) >  LVEF 24%  Normal RV size and probably normal RV systolic function  Bio-AVR  Mild MR  Dilated aortic root (4.4 cm)   Trace pericardial effusion       < from: TTE Echo Complete w/o Contrast w/ Doppler (04.23.24 @ 15:28) >   1. Moderately decreased globalleft ventricular systolic function.   2. Left ventricular ejection fraction, by visual estimation, is 30 to   35%.   3. Moderately increased posterior wall thickness. Severely increased   septal wall thickness. Consider use of IV ultrasonic enhancingagent to   better evaluate regional wall motion, if clinically indicated.   4. The right ventricle is not well visualized, appears to have reduced   systolic function.   5. Mild thickening of the anterior and posterior mitral valve leaflets.   6. Mild mitral valve regurgitation.   7. There is a bioprosthetic valve in the aortic position, appears well   seated with peak velocity within normal limits.   8. S/p aorta graft repair (not well visualized).   9. Trivial pericardial effusion.  10. Moderate pleural effusion in the left lateral region.         IRA SEN  [id number]    Chief Complaint: Shortness of breath    Interval events: patient seen and examined, labs and chart reviewed. Reports improvement in breathing and no chest pain. No tele in rehab    ALLERGIES:  No Known Allergies      CURRENT MEDICATIONS:  MEDICATIONS  (STANDING):  aMIOdarone    Tablet 200 milliGRAM(s) Oral daily  AQUAPHOR (petrolatum Ointment) 1 Application(s) Topical two times a day  aspirin enteric coated 81 milliGRAM(s) Oral daily  atorvastatin 80 milliGRAM(s) Oral at bedtime  clopidogrel Tablet 75 milliGRAM(s) Oral daily  dapagliflozin 10 milliGRAM(s) Oral daily  dextrose 10% Bolus 125 milliLiter(s) IV Bolus once  dextrose 5%. 1000 milliLiter(s) (100 mL/Hr) IV Continuous <Continuous>  dextrose 5%. 1000 milliLiter(s) (50 mL/Hr) IV Continuous <Continuous>  dextrose 50% Injectable 25 Gram(s) IV Push once  dextrose 50% Injectable 12.5 Gram(s) IV Push once  famotidine    Tablet 20 milliGRAM(s) Oral daily  furosemide    Tablet 20 milliGRAM(s) Oral two times a day  glucagon  Injectable 1 milliGRAM(s) IntraMuscular once  heparin   Injectable 5000 Unit(s) SubCutaneous every 8 hours  insulin glargine Injectable (LANTUS) 14 Unit(s) SubCutaneous at bedtime  insulin lispro (ADMELOG) corrective regimen sliding scale   SubCutaneous three times a day before meals  insulin lispro (ADMELOG) corrective regimen sliding scale   SubCutaneous at bedtime  insulin lispro Injectable (ADMELOG) 4 Unit(s) SubCutaneous three times a day with meals  linagliptin 5 milliGRAM(s) Oral with breakfast  metoprolol tartrate 25 milliGRAM(s) Oral every 12 hours  senna 2 Tablet(s) Oral at bedtime  spironolactone 25 milliGRAM(s) Oral daily  tamsulosin 0.4 milliGRAM(s) Oral at bedtime    ROS:  All 10 systems reviewed and positives noted in HPI    OBJECTIVE:    VITAL SIGNS:  Vital Signs Last 24 Hrs  T(C): 36.6 (23 Apr 2024 08:12), Max: 36.8 (22 Apr 2024 16:35)  T(F): 97.9 (23 Apr 2024 08:12), Max: 98.3 (22 Apr 2024 16:35)  HR: 69 (23 Apr 2024 08:12) (69 - 80)  BP: 116/71 (23 Apr 2024 08:12) (116/71 - 143/76)  BP(mean): --  RR: 16 (23 Apr 2024 08:12) (16 - 18)  SpO2: 94% (23 Apr 2024 08:12) (92% - 94%)    Parameters below as of 23 Apr 2024 08:12  Patient On (Oxygen Delivery Method): nasal cannula        PHYSICAL EXAM:  General:  ill appearing  HEENT: sclera anicteric  Neck: supple  CVS: JVP ~ 7 cm H20, RRR, s1, s2  Chest: unlabored respirations, decreased breath sounds  Abdomen: non-distended  Extremities: mild b/l l/e edema  Neuro: awake, alert & oriented       LABS:                        9.4    9.00  )-----------( 639      ( 23 Apr 2024 06:25 )             28.3     04-23    141  |  106  |  23  ----------------------------<  127<H>  3.8   |  27  |  1.17    Ca    8.2<L>      23 Apr 2024 06:25  Phos  2.8     04-22  Mg     2.1     04-22    TPro  5.6<L>  /  Alb  2.5<L>  /  TBili  0.9  /  DBili  x   /  AST  23  /  ALT  34  /  AlkPhos  86  04-23            TTE: < from: TTE Limited W or WO Ultrasound Enhancing Agent (04.14.24 @ 14:46) >  LVEF 24%  Normal RV size and probably normal RV systolic function  Bio-AVR  Mild MR  Dilated aortic root (4.4 cm)   Trace pericardial effusion       < from: TTE Echo Complete w/o Contrast w/ Doppler (04.23.24 @ 15:28) >   1. Moderately decreased globalleft ventricular systolic function.   2. Left ventricular ejection fraction, by visual estimation, is 30 to 35%.   3. Moderately increased posterior wall thickness. Severely increased   septal wall thickness. Consider use of IV ultrasonic enhancing agent to   better evaluate regional wall motion, if clinically indicated.   4. The right ventricle is not well visualized, appears to have reduced   systolic function.   5. Mild thickening of the anterior and posterior mitral valve leaflets.   6. Mild mitral valve regurgitation.   7. There is a bioprosthetic valve in the aortic position, appears well   seated with peak velocity within normal limits.   8. S/p aorta graft repair (not well visualized).   9. Trivial pericardial effusion.  10. Moderate pleural effusion in the left lateral region.

## 2024-04-24 NOTE — CHART NOTE - NSCHARTNOTEFT_GEN_A_CORE
Patient seen at bedside around 10pm. He denied bedtime medications and told the RN he would like to sleep. Describes poor sleep since the start of his hospitlization. Overnight PA went to assess patient and he was asleep. Since VSS and patient with no complaints with no positive ROS, he was not transferred to [facility]. Will assess AM Vitals. Patient seen at bedside around midnight. He denied bedtime medications and told the RN he would like to sleep. Describes poor sleep since the start of his hospitlization. Overnight PA went to assess patient and he was asleep. Since VSS and patient with no complaints with no positive ROS, he was not transferred to [facility]. Will assess AM Vitals. Patient seen at bedside by covering team around 7pm and then midnight. He denied bedtime medications and told the RN he would like to sleep. Describes poor sleep since the start of his orginal hospitalization.   Overnight PA went to assess patient and he was asleep. Since VSS and patient with no complaints with no positive ROS, he was not transferred to [facility].   Will assess AM Vitals. RN made aware.

## 2024-04-25 LAB
ALBUMIN SERPL ELPH-MCNC: 2.6 G/DL — LOW (ref 3.3–5)
ALP SERPL-CCNC: 84 U/L — SIGNIFICANT CHANGE UP (ref 40–120)
ALT FLD-CCNC: 27 U/L — SIGNIFICANT CHANGE UP (ref 10–45)
ANION GAP SERPL CALC-SCNC: 9 MMOL/L — SIGNIFICANT CHANGE UP (ref 5–17)
AST SERPL-CCNC: 19 U/L — SIGNIFICANT CHANGE UP (ref 10–40)
BILIRUB SERPL-MCNC: 0.8 MG/DL — SIGNIFICANT CHANGE UP (ref 0.2–1.2)
BUN SERPL-MCNC: 25 MG/DL — HIGH (ref 7–23)
CALCIUM SERPL-MCNC: 8.4 MG/DL — SIGNIFICANT CHANGE UP (ref 8.4–10.5)
CHLORIDE SERPL-SCNC: 106 MMOL/L — SIGNIFICANT CHANGE UP (ref 96–108)
CO2 SERPL-SCNC: 27 MMOL/L — SIGNIFICANT CHANGE UP (ref 22–31)
CREAT SERPL-MCNC: 1.39 MG/DL — HIGH (ref 0.5–1.3)
EGFR: 52 ML/MIN/1.73M2 — LOW
GLUCOSE BLDC GLUCOMTR-MCNC: 110 MG/DL — HIGH (ref 70–99)
GLUCOSE BLDC GLUCOMTR-MCNC: 124 MG/DL — HIGH (ref 70–99)
GLUCOSE BLDC GLUCOMTR-MCNC: 131 MG/DL — HIGH (ref 70–99)
GLUCOSE BLDC GLUCOMTR-MCNC: 173 MG/DL — HIGH (ref 70–99)
GLUCOSE SERPL-MCNC: 89 MG/DL — SIGNIFICANT CHANGE UP (ref 70–99)
HCT VFR BLD CALC: 30.3 % — LOW (ref 39–50)
HGB BLD-MCNC: 9.7 G/DL — LOW (ref 13–17)
MCHC RBC-ENTMCNC: 28 PG — SIGNIFICANT CHANGE UP (ref 27–34)
MCHC RBC-ENTMCNC: 32 GM/DL — SIGNIFICANT CHANGE UP (ref 32–36)
MCV RBC AUTO: 87.6 FL — SIGNIFICANT CHANGE UP (ref 80–100)
NRBC # BLD: 0 /100 WBCS — SIGNIFICANT CHANGE UP (ref 0–0)
PLATELET # BLD AUTO: 586 K/UL — HIGH (ref 150–400)
POTASSIUM SERPL-MCNC: 3.9 MMOL/L — SIGNIFICANT CHANGE UP (ref 3.5–5.3)
POTASSIUM SERPL-SCNC: 3.9 MMOL/L — SIGNIFICANT CHANGE UP (ref 3.5–5.3)
PROT SERPL-MCNC: 5.9 G/DL — LOW (ref 6–8.3)
RBC # BLD: 3.46 M/UL — LOW (ref 4.2–5.8)
RBC # FLD: 15.1 % — HIGH (ref 10.3–14.5)
SODIUM SERPL-SCNC: 142 MMOL/L — SIGNIFICANT CHANGE UP (ref 135–145)
WBC # BLD: 7.54 K/UL — SIGNIFICANT CHANGE UP (ref 3.8–10.5)
WBC # FLD AUTO: 7.54 K/UL — SIGNIFICANT CHANGE UP (ref 3.8–10.5)

## 2024-04-25 PROCEDURE — 99232 SBSQ HOSP IP/OBS MODERATE 35: CPT | Mod: GC

## 2024-04-25 PROCEDURE — 99233 SBSQ HOSP IP/OBS HIGH 50: CPT

## 2024-04-25 RX ORDER — FUROSEMIDE 40 MG
20 TABLET ORAL
Refills: 0 | Status: ACTIVE | OUTPATIENT
Start: 2024-04-25 | End: 2025-03-24

## 2024-04-25 RX ORDER — INSULIN GLARGINE 100 [IU]/ML
14 INJECTION, SOLUTION SUBCUTANEOUS
Refills: 0 | Status: ACTIVE | OUTPATIENT
Start: 2024-04-25 | End: 2025-03-24

## 2024-04-25 RX ADMIN — CLOPIDOGREL BISULFATE 75 MILLIGRAM(S): 75 TABLET, FILM COATED ORAL at 12:22

## 2024-04-25 RX ADMIN — Medication 12.5 MILLIGRAM(S): at 05:53

## 2024-04-25 RX ADMIN — AMIODARONE HYDROCHLORIDE 200 MILLIGRAM(S): 400 TABLET ORAL at 05:53

## 2024-04-25 RX ADMIN — Medication 6 MILLIGRAM(S): at 21:04

## 2024-04-25 RX ADMIN — ATORVASTATIN CALCIUM 80 MILLIGRAM(S): 80 TABLET, FILM COATED ORAL at 17:45

## 2024-04-25 RX ADMIN — INSULIN GLARGINE 14 UNIT(S): 100 INJECTION, SOLUTION SUBCUTANEOUS at 21:03

## 2024-04-25 RX ADMIN — FAMOTIDINE 20 MILLIGRAM(S): 10 INJECTION INTRAVENOUS at 12:22

## 2024-04-25 RX ADMIN — Medication 20 MILLIGRAM(S): at 17:45

## 2024-04-25 RX ADMIN — HEPARIN SODIUM 5000 UNIT(S): 5000 INJECTION INTRAVENOUS; SUBCUTANEOUS at 05:51

## 2024-04-25 RX ADMIN — Medication 1: at 17:45

## 2024-04-25 RX ADMIN — HEPARIN SODIUM 5000 UNIT(S): 5000 INJECTION INTRAVENOUS; SUBCUTANEOUS at 14:53

## 2024-04-25 RX ADMIN — HEPARIN SODIUM 5000 UNIT(S): 5000 INJECTION INTRAVENOUS; SUBCUTANEOUS at 21:02

## 2024-04-25 RX ADMIN — Medication 0.25 MILLIGRAM(S): at 21:03

## 2024-04-25 RX ADMIN — Medication 1 APPLICATION(S): at 05:53

## 2024-04-25 RX ADMIN — Medication 81 MILLIGRAM(S): at 12:22

## 2024-04-25 RX ADMIN — LINAGLIPTIN 5 MILLIGRAM(S): 5 TABLET, FILM COATED ORAL at 08:01

## 2024-04-25 RX ADMIN — Medication 20 MILLIGRAM(S): at 05:53

## 2024-04-25 NOTE — PROGRESS NOTE ADULT - SUBJECTIVE AND OBJECTIVE BOX
Time of Visit:  Patient seen and examined. asleep in bed     MEDICATIONS  (STANDING):  aMIOdarone    Tablet 200 milliGRAM(s) Oral daily  AQUAPHOR (petrolatum Ointment) 1 Application(s) Topical two times a day  aspirin enteric coated 81 milliGRAM(s) Oral daily  atorvastatin 80 milliGRAM(s) Oral <User Schedule>  clopidogrel Tablet 75 milliGRAM(s) Oral daily  dextrose 10% Bolus 125 milliLiter(s) IV Bolus once  dextrose 5%. 1000 milliLiter(s) (50 mL/Hr) IV Continuous <Continuous>  dextrose 5%. 1000 milliLiter(s) (100 mL/Hr) IV Continuous <Continuous>  dextrose 50% Injectable 25 Gram(s) IV Push once  dextrose 50% Injectable 12.5 Gram(s) IV Push once  famotidine    Tablet 20 milliGRAM(s) Oral daily  furosemide    Tablet 20 milliGRAM(s) Oral <User Schedule>  glucagon  Injectable 1 milliGRAM(s) IntraMuscular once  heparin   Injectable 5000 Unit(s) SubCutaneous every 8 hours  insulin glargine Injectable (LANTUS) 14 Unit(s) SubCutaneous <User Schedule>  insulin lispro (ADMELOG) corrective regimen sliding scale   SubCutaneous three times a day before meals  insulin lispro (ADMELOG) corrective regimen sliding scale   SubCutaneous at bedtime  linagliptin 5 milliGRAM(s) Oral with breakfast  melatonin 6 milliGRAM(s) Oral at bedtime  metoprolol succinate ER 12.5 milliGRAM(s) Oral daily  senna 2 Tablet(s) Oral at bedtime      MEDICATIONS  (PRN):  acetaminophen     Tablet .. 650 milliGRAM(s) Oral every 6 hours PRN Moderate Pain (4 - 6)  ALPRAZolam 0.25 milliGRAM(s) Oral every 12 hours PRN anxiety  dextrose Oral Gel 15 Gram(s) Oral once PRN Blood Glucose LESS THAN 70 milliGRAM(s)/deciliter  polyethylene glycol 3350 17 Gram(s) Oral daily PRN Constipation       Medications up to date at time of exam.      PHYSICAL EXAMINATION:  Patient has no new complaints.  GENERAL: The patient  in no apparent distress.     Vital Signs Last 24 Hrs  T(C): 37.4 (25 Apr 2024 08:03), Max: 37.4 (25 Apr 2024 08:03)  T(F): 99.3 (25 Apr 2024 08:03), Max: 99.3 (25 Apr 2024 08:03)  HR: 76 (25 Apr 2024 17:51) (76 - 79)  BP: 128/83 (25 Apr 2024 17:51) (127/72 - 156/85)  BP(mean): --  RR: 16 (25 Apr 2024 17:51) (16 - 16)  SpO2: 95% (25 Apr 2024 17:51) (94% - 95%)    Parameters below as of 25 Apr 2024 17:51  Patient On (Oxygen Delivery Method): room air       (if applicable)    Chest Tube (if applicable)    HEENT: Head is normocephalic and atraumatic. Extraocular muscles are intact. Mucous membranes are moist.     NECK: Supple, no palpable adenopathy.    LUNGS: Fair bilateral berath sounds  no wheezing, rales, or rhonchi.    HEART: Regular rate and rhythm without murmur.    ABDOMEN: Soft, nontender, and nondistended.  No hepatosplenomegaly is noted.    : No painful voiding, no pelvic pain    EXTREMITIES: Without any cyanosis, clubbing, rash, lesions or edema.    NEUROLOGIC: Asleep     SKIN: Warm, dry, good turgor.      LABS:                        9.7    7.54  )-----------( 586      ( 25 Apr 2024 05:18 )             30.3     04-25    142  |  106  |  25<H>  ----------------------------<  89  3.9   |  27  |  1.39<H>    Ca    8.4      25 Apr 2024 05:18    TPro  5.9<L>  /  Alb  2.6<L>  /  TBili  0.8  /  DBili  x   /  AST  19  /  ALT  27  /  AlkPhos  84  04-25      Urinalysis Basic - ( 25 Apr 2024 05:18 )    Color: x / Appearance: x / SG: x / pH: x  Gluc: 89 mg/dL / Ketone: x  / Bili: x / Urobili: x   Blood: x / Protein: x / Nitrite: x   Leuk Esterase: x / RBC: x / WBC x   Sq Epi: x / Non Sq Epi: x / Bacteria: x      MICROBIOLOGY: (if applicable)    RADIOLOGY & ADDITIONAL STUDIES:  EKG:   CXR:  ECHO:    IMPRESSION: 74y Male PAST MEDICAL & SURGICAL HISTORY:  HTN (hypertension)      Hearing decreased      CVA (cerebral vascular accident)  12/22/2016      Hyperlipemia      Kidney stones      Coronary artery disease  MI 12/22/2016      CHF (congestive heart failure)  1/2017 stents x3      Type 2 diabetes mellitus  2016      Confusion  from stroke      S/P medial meniscal repair  and ligament surgery      H/O lithotripsy      S/P tonsillectomy      Bilateral inguinal hernia       p/w         IMP: This is a 74 yr old man with  CVA, MI, HTN, DM2, HLD, CAD s/p cardiac stents, CHFr EF , hx of malignant melanoma/wide excision. To [facility] 4/1 for eval of ascending aorta replacement, aortic valve replacement, coronary artery bypass grafting with Dr. Ilia Ortiz. Admitted preop/NPO after midnight for OR on 4/7. On 4/8 underwent ASCENDING AORTIC REPLACEMENT WITH TRANSVERSE HEMIARCH, CABGx2, AVR-BIOPROSTHETIC VALVE.      Asked  to evaluate for moderate left pleural effusion and relatively asymptomatic from a pulmonary point of view. Pleural effusion most likely related to CABG or post cardiac instrumentation syndrome ,unlikely HF although pat has reduce EF and elevated proBNP. No physical finding of fluid over load or HF.  My Prelim echo review at bedside  + pericardial effusion not having tamponade  effect and pat is not clinically reflecting tamponade . Reduced EF ( ~EF 25%)  Pat is on plavix with recent cardiac surgery . Will no hold plavix for 7 days before thoracentesis ( non emergent )     Sugg    - Cards f/u   - Consider holding plavix when appropriate as per cards , not feasible at present   - CXR 4/26  - Optimize HF meds   - Wound care   - No antibx at this time

## 2024-04-25 NOTE — CHART NOTE - NSCHARTNOTEFT_GEN_A_CORE
Approached patient at bedside to resume psychology consultation from yesterday. Neuropsychologist is reintroduced as a member of the treatment team and the purpose reviewed.     Patient indicates he had heart surgery, lengthy anesthesia, and is tired and confused. At this time, indicates he does not wish to discuss anything further and declines consultation.     Per patient preference, session is discontinued. Neuropsychology is signing off case and can be reconsulted as needed.

## 2024-04-25 NOTE — PROGRESS NOTE ADULT - SUBJECTIVE AND OBJECTIVE BOX
HPI:  74yr Male, PMHx of CVA, MI, HTN, DM2, HLD, CAD s/p cardiac stents, CHF, hx of malignant melanoma/wide excision. To [facility] 4/1 for eval of ascending aorta replacement, aortic valve replacement, coronary artery bypass grafting with Dr. Ilia Ortiz. Admitted preop/NPO after midnight for OR on 4/7. On 4/8 underwent ASCENDING AORTIC REPLACEMENT WITH TRANSVERSE HEMIARCH, CABGx2, AVR-BIOPROSTHETIC VALVE. Inotropic support with IV Dobutamine and Milrinone post op for acute systolic heart failure. IABP, on PO Amiodarone for afib prophylaxis. Extubated to 5L-->hi flow. Endocrine consulted for DM2, Insulin gtt. 4/13 febrile w/CLAUDIA, +fluid overload, zosyn / vanco empirically,+ diuresis. Hypotension- Taran-Synephrine/ Milrinone gtt. ID consulted, BC NGTD. On 4/18 Serum Glucose 62. On 4/20 Started on Lopressor 25 mg PO BID. Therapy started. Increase ambulation and activity as tolerated.   PMR recommends acute rehab. Cleared for dc to [facility] rehab.       Patient seen and evaluated in chair this am, alert and awake, follows commands well. - on way to PT  reports anxiety at night- interrupted sleep- xanax PRN, melatonin, protected night hours discussed w/staff  reports Confusion since surgery- still 'feels foggy'  PVRs to continue daily, now off Flomax 2/2 low BP  last Bm 4/24  TTE yesterday- EF 35%, BNP up, pulm in for pl effusion, lasix restarted per hospitalist Toprol low dose  seen By Cardiology- will start Entresto if BP permits  daily weights    MEDICATIONS  (STANDING):  aMIOdarone    Tablet 200 milliGRAM(s) Oral daily  AQUAPHOR (petrolatum Ointment) 1 Application(s) Topical two times a day  aspirin enteric coated 81 milliGRAM(s) Oral daily  atorvastatin 80 milliGRAM(s) Oral <User Schedule>  clopidogrel Tablet 75 milliGRAM(s) Oral daily  dextrose 10% Bolus 125 milliLiter(s) IV Bolus once  dextrose 5%. 1000 milliLiter(s) (50 mL/Hr) IV Continuous <Continuous>  dextrose 5%. 1000 milliLiter(s) (100 mL/Hr) IV Continuous <Continuous>  dextrose 50% Injectable 25 Gram(s) IV Push once  dextrose 50% Injectable 12.5 Gram(s) IV Push once  famotidine    Tablet 20 milliGRAM(s) Oral daily  furosemide    Tablet 20 milliGRAM(s) Oral <User Schedule>  glucagon  Injectable 1 milliGRAM(s) IntraMuscular once  heparin   Injectable 5000 Unit(s) SubCutaneous every 8 hours  insulin glargine Injectable (LANTUS) 14 Unit(s) SubCutaneous <User Schedule>  insulin lispro (ADMELOG) corrective regimen sliding scale   SubCutaneous three times a day before meals  insulin lispro (ADMELOG) corrective regimen sliding scale   SubCutaneous at bedtime  linagliptin 5 milliGRAM(s) Oral with breakfast  melatonin 6 milliGRAM(s) Oral at bedtime  metoprolol succinate ER 12.5 milliGRAM(s) Oral daily  senna 2 Tablet(s) Oral at bedtime    MEDICATIONS  (PRN):  acetaminophen     Tablet .. 650 milliGRAM(s) Oral every 6 hours PRN Moderate Pain (4 - 6)  ALPRAZolam 0.25 milliGRAM(s) Oral every 12 hours PRN anxiety  dextrose Oral Gel 15 Gram(s) Oral once PRN Blood Glucose LESS THAN 70 milliGRAM(s)/deciliter  polyethylene glycol 3350 17 Gram(s) Oral daily PRN Constipation                            9.7    7.54  )-----------( 586      ( 25 Apr 2024 05:18 )             30.3     04-25    142  |  106  |  25<H>  ----------------------------<  89  3.9   |  27  |  1.39<H>    Ca    8.4      25 Apr 2024 05:18    TPro  5.9<L>  /  Alb  2.6<L>  /  TBili  0.8  /  DBili  x   /  AST  19  /  ALT  27  /  AlkPhos  84  04-25    Urinalysis Basic - ( 25 Apr 2024 05:18 )    Color: x / Appearance: x / SG: x / pH: x  Gluc: 89 mg/dL / Ketone: x  / Bili: x / Urobili: x   Blood: x / Protein: x / Nitrite: x   Leuk Esterase: x / RBC: x / WBC x   Sq Epi: x / Non Sq Epi: x / Bacteria: x        CAPILLARY BLOOD GLUCOSE      POCT Blood Glucose.: 131 mg/dL (25 Apr 2024 12:20)  POCT Blood Glucose.: 110 mg/dL (25 Apr 2024 07:57)  POCT Blood Glucose.: 118 mg/dL (24 Apr 2024 22:35)  POCT Blood Glucose.: 117 mg/dL (24 Apr 2024 17:15)      Vital Signs Last 24 Hrs  T(C): 37.4 (25 Apr 2024 08:03), Max: 37.4 (25 Apr 2024 08:03)  T(F): 99.3 (25 Apr 2024 08:03), Max: 99.3 (25 Apr 2024 08:03)  HR: 79 (25 Apr 2024 08:03) (77 - 79)  BP: 134/85 (25 Apr 2024 08:03) (127/72 - 156/85)  BP(mean): --  RR: 16 (25 Apr 2024 08:03) (16 - 16)  SpO2: 94% (25 Apr 2024 08:03) (94% - 94%)    Parameters below as of 25 Apr 2024 08:03  Patient On (Oxygen Delivery Method): room air      Gen - NAD, Comfortable  HEENT - NCAT, EOMI, MMM  Neck - Supple, No limited ROM  Pulm - CTAB, No wheeze, No rhonchi, No crackles  Cardiovascular - RRR, S1S2  Chest - good chest expansion, good respiratory effort  Abdomen - Soft, NT/ND, +BS  Extremities - No Cyanosis, no clubbing, edema to BLE, no calf tenderness  Neuro-     Cognitive - awake, alert, oriented to person, place, situation, month, follows simple commands ok     Communication - Fluent     Memory: distant Memory intact     Cranial Nerves - CN 2-12 intact.     Motor -                     LEFT    UE - 4/5                    RIGHT UE - 4/5                    LEFT    LE - 3+/5                    RIGHT LE - 3+/5        Sensory - Intact  to LT     Tone - normal  Skin: Sternal incision LELA + abd lap sites x 4, b/l medial thigh incision, right groin puncture site, Deep tissue injury (DTI) to b/l buttock-pressure related      ACC: [id number] EXAM:  XR CHEST PORTABLE ROUTINE 1V   ORDERED BY: MATT SORIANO     PROCEDURE DATE:  04/22/2024          INTERPRETATION:  INDICATION: Aortic valve replacement    Portable chest 7:23 AM    COMPARISON: 4/19/2024    FINDINGS:  Heart/Vascular: The mediastinum, hilum and aorta are within normal limits   for projection. Status post median sternotomy and aortic valve   replacement. Cardiomegaly.  Pulmonary: Midline trachea. There is persistent moderate left pleural   effusion and consolidation as well as hazy opacity right midlung field.   No pneumothorax.  Bones: There is no fracture.  Lines and catheter: None    Impression:    There is persistent moderate left pleural effusion and consolidation as   well as hazy opacity right midlung field.    --- End of Report ---             DAREK DENG DO; Attending Radiologist  This document has been electronically signed. Apr 22 2024 11:15AM      IDT 4/24, TAHIRA 5/7 c HC  Continue comprehensive acute rehab program consisting of 3hrs/day of OT/PT and SLP.

## 2024-04-25 NOTE — PROGRESS NOTE ADULT - ASSESSMENT
74yr Male, PMHx of CVA, MI, HTN, DM2, HLD, CAD s/p cardiac stents, CHF, hx of malignant melanoma/wide excision. To [facility] 4/1 for eval of ascending aorta replacement, aortic valve replacement, coronary artery bypass grafting with Dr. Ilia Ortiz. On  4/8 underwent ASCENDING AORTIC REPLACEMENT WITH TRANSVERSE HEMIARCH, CABGx2, AVR-BIOPROSTHETIC VALVE. Inotropic support with IV Dobutamine and Milrinone post op for acute systolic heart failure. IABP, on PO Amiodarone for afib prophylaxis. Extubated to 5L-->hi flow. Endocrine consulted for DM2, Insulin gtt. 4/13 febrile w/CLAUDIA, +fluid overload, zosyn / vanco empirically,+ diuresis. Hypotension- Taran-Synephrine/ Milrinone gtt. ID consulted, BC NGTD. On 4/18 Serum Glucose 62. On 4/20 Started on Lopressor 25 mg PO BID. Therapy started. Increase ambulation and activity as tolerated. PMR recommends acute rehab. Cleared for dc to [facility] rehab.     # s/p ASCENDING AORTIC REPLACEMENT WITH TRANSVERSE HEMIARCH  -c/w comprehensive rehab as tolerated     # HFrEF, EF 30-35%  - CXR with persistent moderate left pleural effusion and consolidation as well as hazy opacity right midlung field.  - c/w ASA, Plavix, Lipitor 80, amiodarone   - GDMT goal is Lopressor 25 mg PO BID, aldactone 25 mg PO daily, Farxiga 10 mg daily, and entresto   - Due to hypotension on 4/23 - GDMT was held. BP improved today, restart lopressor  - If BP remains stable, can slowly re-introduce other agents  - Continue Lasix 20 mg daily  - monitor renal function and BP while on lasix  - cardio eval appreciated   - Strict I and Os  - daily standing weight    #Pleural effusion  -CXR with persistent moderate left pleural effusion and consolidation as well as hazy opacity right midlung field.  -seen by pulm, can consider thora however will need to hold plavix x 7 days which is not feasible at this time.  -no clinical signs of infection at this time  -c/w diuretics as BP allows    #CLAUDIA possible ATN  - monitor BMP  - Avoid nephrotoxins  - continue to hold home Metformin    #DM2   - A1c 9.8  - Home meds: metformin 500 mg BID. Farxiga 10 mg daily  - hold metformin for now  - on Lantus 14 unit   - moderate ISS TIDAC, low ISS at h/s  - added linagliptin 5 mg daily on 4/23.  - Endo f/u OP    #BPH (benign prostatic hyperplasia).   -Tamsulosin 0.4 PO HS    # DVT-P: Heparin

## 2024-04-25 NOTE — PROGRESS NOTE ADULT - ASSESSMENT
IRA SEN is a 75yo Male s/p CABG, now with decreased functional mobility, gait instability and ADL impairments.    COMORBIDITIES/ACTIVE MEDICAL ISSUES     Gait Instability, ADL impairments and Functional impairments: Comprehensive Rehab Program of PT/OT      #S/P CABG (coronary artery bypass graft).   - ASA 81 mg PO Daily.   - Lipitor 80 mg PO HS   Plavix 75 mg PO Daily  - Lopressor 25 mg PO BID to Toprol 12.5mg-per Cardio   - Continue with Heparin subcutaneous 5000 units q 8 hr for DVT ppx   - Continue with Pepcid 20 mg PO Daily  - Lasix 20 mg po qd -resumed> increased to 20mg 2x/day  - aldactone 25 qd-on hold  - fall and sternal precautions  -IS  -will add Entresto if BP permits    # S/P ascending aortic aneurysm repair.   -Continue with Plavix 75 mg PO Daily  Continue Amio 200mg PO     #CLAUDIA (acute kidney injury) on CKD   -follow labs, avoid nephrotoxins/hold home Metformin  Cr 1.39-check BMP in AM    #DM2 (hbg a1c 9.8)  Lantus HS Admelog units  Sliding Scale.  - consistent low carb diet.  - DC on home Farxiga, add Tradjenta 5 mg daily  - FU outpatient.    #CAD (coronary artery disease).   - ASA/Lipitor, trop neg yesterday  - TTE at baseline 4/23     #BPH (benign prostatic hyperplasia).   -Tamsulosin 0.4 PO HS-on hold for low BP  -monitor voiding, mobilize    Pain Mgmt - Tylenol PRN  GI/Bowel Mgmt - Senna,  Miralax PRN  /Bladder Mgmt - PVR x1    #Skin; Sternal incision LELA + abd lap sites x 4, b/l medial thigh incision, right groin puncture site, Deep tissue injury (DTI) to b/l buttock. pressure related    Precautions / PROPHYLAXIS:   - Falls, Cardiac, Sternal  - Lungs: Aspiration, Incentive Spirometer   - Pressure injury/Skin: Turn Q2hrs while in bed, OOB to Chair, PT/OT    - DVT: Hep sq

## 2024-04-25 NOTE — PROGRESS NOTE ADULT - SUBJECTIVE AND OBJECTIVE BOX
CC: Patient is a 74y old  Male who presents with a chief complaint of cardiac debilitation s/p CABG (24 Apr 2024 12:01)      Interval History:  Patient seen and examined at bedside.  No overnight events  He didn't sleep well and feel weak    ALLERGIES:  No Known Allergies    MEDICATIONS  (STANDING):  aMIOdarone    Tablet 200 milliGRAM(s) Oral daily  AQUAPHOR (petrolatum Ointment) 1 Application(s) Topical two times a day  aspirin enteric coated 81 milliGRAM(s) Oral daily  atorvastatin 80 milliGRAM(s) Oral <User Schedule>  clopidogrel Tablet 75 milliGRAM(s) Oral daily  dextrose 10% Bolus 125 milliLiter(s) IV Bolus once  dextrose 5%. 1000 milliLiter(s) (100 mL/Hr) IV Continuous <Continuous>  dextrose 5%. 1000 milliLiter(s) (50 mL/Hr) IV Continuous <Continuous>  dextrose 50% Injectable 25 Gram(s) IV Push once  dextrose 50% Injectable 12.5 Gram(s) IV Push once  famotidine    Tablet 20 milliGRAM(s) Oral daily  furosemide    Tablet 20 milliGRAM(s) Oral <User Schedule>  glucagon  Injectable 1 milliGRAM(s) IntraMuscular once  heparin   Injectable 5000 Unit(s) SubCutaneous every 8 hours  insulin glargine Injectable (LANTUS) 14 Unit(s) SubCutaneous <User Schedule>  insulin lispro (ADMELOG) corrective regimen sliding scale   SubCutaneous three times a day before meals  insulin lispro (ADMELOG) corrective regimen sliding scale   SubCutaneous at bedtime  linagliptin 5 milliGRAM(s) Oral with breakfast  melatonin 6 milliGRAM(s) Oral at bedtime  metoprolol succinate ER 12.5 milliGRAM(s) Oral daily  senna 2 Tablet(s) Oral at bedtime    MEDICATIONS  (PRN):  acetaminophen     Tablet .. 650 milliGRAM(s) Oral every 6 hours PRN Moderate Pain (4 - 6)  ALPRAZolam 0.25 milliGRAM(s) Oral every 12 hours PRN anxiety  dextrose Oral Gel 15 Gram(s) Oral once PRN Blood Glucose LESS THAN 70 milliGRAM(s)/deciliter  polyethylene glycol 3350 17 Gram(s) Oral daily PRN Constipation    Vital Signs Last 24 Hrs  T(F): 99.3 (25 Apr 2024 08:03), Max: 99.3 (25 Apr 2024 08:03)  HR: 79 (25 Apr 2024 08:03) (77 - 79)  BP: 134/85 (25 Apr 2024 08:03) (127/72 - 156/85)  RR: 16 (25 Apr 2024 08:03) (16 - 16)  SpO2: 94% (25 Apr 2024 08:03) (94% - 94%)  I&O's Summary    BMI (kg/m2): 26.6 (04-22-24 @ 16:35)    PHYSICAL EXAM:  GENERAL: NAD  CHEST/LUNG: Clear to percussion bilaterally; No rales, rhonchi, wheezing, or rubs; normal respiratory effort, no intercostal retractions  HEART: Regular rate and rhythm; No murmurs, rubs, or gallops; No pitting edema  ABDOMEN: Soft, Nontender, Nondistended; Bowel sounds present; No HSM or masses  PSYCH: Appropriate affect, Alert & Oriented x 3, Good Memory; Good insight    LABS:                        9.7    7.54  )-----------( 586      ( 25 Apr 2024 05:18 )             30.3       04-25    142  |  106  |  25  ----------------------------<  89  3.9   |  27  |  1.39    Ca    8.4      25 Apr 2024 05:18    TPro  5.9  /  Alb  2.6  /  TBili  0.8  /  DBili  x   /  AST  19  /  ALT  27  /  AlkPhos  84  04-25     CARDIAC MARKERS ( 23 Apr 2024 16:01 )  x     / 40.6 ng/L / x     / x     / x        POCT Blood Glucose.: 131 mg/dL (25 Apr 2024 12:20)  POCT Blood Glucose.: 110 mg/dL (25 Apr 2024 07:57)  POCT Blood Glucose.: 118 mg/dL (24 Apr 2024 22:35)  POCT Blood Glucose.: 117 mg/dL (24 Apr 2024 17:15)    Urinalysis Basic - ( 25 Apr 2024 05:18 )  Color: x / Appearance: x / SG: x / pH: x  Gluc: 89 mg/dL / Ketone: x  / Bili: x / Urobili: x   Blood: x / Protein: x / Nitrite: x   Leuk Esterase: x / RBC: x / WBC x   Sq Epi: x / Non Sq Epi: x / Bacteria: x    Care Discussed with Consultants/Other Providers: Yes

## 2024-04-25 NOTE — CHART NOTE - NSCHARTNOTEFT_GEN_A_CORE
Nutrition Follow Up Note  Hospital Course   (Per Electronic Medical Record)    Source:  Patient [X]  Medical Record [X]      Diet:   Consistent Carbohydrate Low Sodium Diet w/ Thin Liquids (IDDSI Level 0)  Tolerates Diet Consistency Well  No Chewing/Swallowing Difficulties  No Recent Nausea, Vomiting, Diarrhea or Constipation (as Per Patient)  Varied Intake @Meals (as Per Documentation) - States Good PO Intake/Appetite (Per Patient)   on Glucerna 8oz PO BID (Provides 440kcal-20grams of Protein)  on Unruly 1 Packet BID (Provides 180kcal & 28grams of Protein)  Patient Takes Nutrition Supplement Well  Education Provided on Need for Supplementation     Enteral/Parenteral Nutrition: Not Applicable    Current Weight: 157.6lb on 4/24  Obtain Weights Daily  Obtain New Weight to Confirm     Pertinent Medications: MEDICATIONS  (STANDING):  aMIOdarone    Tablet 200 milliGRAM(s) Oral daily  AQUAPHOR (petrolatum Ointment) 1 Application(s) Topical two times a day  aspirin enteric coated 81 milliGRAM(s) Oral daily  atorvastatin 80 milliGRAM(s) Oral <User Schedule>  clopidogrel Tablet 75 milliGRAM(s) Oral daily  dextrose 10% Bolus 125 milliLiter(s) IV Bolus once  dextrose 5%. 1000 milliLiter(s) (100 mL/Hr) IV Continuous <Continuous>  dextrose 5%. 1000 milliLiter(s) (50 mL/Hr) IV Continuous <Continuous>  dextrose 50% Injectable 25 Gram(s) IV Push once  dextrose 50% Injectable 12.5 Gram(s) IV Push once  famotidine    Tablet 20 milliGRAM(s) Oral daily  furosemide    Tablet 20 milliGRAM(s) Oral daily  glucagon  Injectable 1 milliGRAM(s) IntraMuscular once  heparin   Injectable 5000 Unit(s) SubCutaneous every 8 hours  insulin glargine Injectable (LANTUS) 14 Unit(s) SubCutaneous at bedtime  insulin lispro (ADMELOG) corrective regimen sliding scale   SubCutaneous three times a day before meals  insulin lispro (ADMELOG) corrective regimen sliding scale   SubCutaneous at bedtime  linagliptin 5 milliGRAM(s) Oral with breakfast  melatonin 6 milliGRAM(s) Oral at bedtime  metoprolol succinate ER 12.5 milliGRAM(s) Oral daily  senna 2 Tablet(s) Oral at bedtime    MEDICATIONS  (PRN):  acetaminophen     Tablet .. 650 milliGRAM(s) Oral every 6 hours PRN Moderate Pain (4 - 6)  ALPRAZolam 0.25 milliGRAM(s) Oral every 12 hours PRN anxiety  dextrose Oral Gel 15 Gram(s) Oral once PRN Blood Glucose LESS THAN 70 milliGRAM(s)/deciliter  polyethylene glycol 3350 17 Gram(s) Oral daily PRN Constipation    Pertinent Labs:  04-25 Na142 mmol/L Glu 89 mg/dL K+ 3.9 mmol/L Cr  1.39 mg/dL<H> BUN 25 mg/dL<H> 04-22 Phos 2.8 mg/dL 04-25 Alb 2.6 g/dL<L>    POCT (over Last 3 Days) - Ranging from     Skin: Multiple Pressure Ulcers (DTI's)  Multiple Surgical Incisions  (as Per Nursing Flow Sheet)     Edema: None Noted Recently (as Per Documentation)     Last Bowel Movement: on 4/24    Estimated Needs:   [X] No Change Since Previous Assessment    Previous Nutrition Diagnosis:   Severe Malnutrition  Increased Nutrient Needs - Calories & Protein     Nutrition Diagnosis is [X] Ongoing - Patient Continues on Nutrition Supplement, Patient Takes Nutrition Supplement & Nutrition Education Provided on Need for Supplementation     New Nutrition Diagnosis: [X] Not Applicable    Interventions:   1. Education Provided on Need for Supplementation   2. Recommend Continue Nutrition Plan of Care     Monitoring & Evaluation:   [X] Weights   [X] PO Intake   [X] Skin Integrity   [X] Follow Up (Per Protocol)  [X] Tolerance to Diet Prescription   [X] Other: Labs & POCT    Registered Dietitian/Nutritionist Remains Available.  Everton Aguiar RDN, CDN    Phone# [phone]

## 2024-04-26 LAB
ANION GAP SERPL CALC-SCNC: 7 MMOL/L — SIGNIFICANT CHANGE UP (ref 5–17)
BUN SERPL-MCNC: 25 MG/DL — HIGH (ref 7–23)
CALCIUM SERPL-MCNC: 8.6 MG/DL — SIGNIFICANT CHANGE UP (ref 8.4–10.5)
CHLORIDE SERPL-SCNC: 106 MMOL/L — SIGNIFICANT CHANGE UP (ref 96–108)
CO2 SERPL-SCNC: 29 MMOL/L — SIGNIFICANT CHANGE UP (ref 22–31)
CREAT SERPL-MCNC: 1.41 MG/DL — HIGH (ref 0.5–1.3)
EGFR: 52 ML/MIN/1.73M2 — LOW
GLUCOSE BLDC GLUCOMTR-MCNC: 102 MG/DL — HIGH (ref 70–99)
GLUCOSE BLDC GLUCOMTR-MCNC: 127 MG/DL — HIGH (ref 70–99)
GLUCOSE BLDC GLUCOMTR-MCNC: 148 MG/DL — HIGH (ref 70–99)
GLUCOSE BLDC GLUCOMTR-MCNC: 256 MG/DL — HIGH (ref 70–99)
GLUCOSE SERPL-MCNC: 91 MG/DL — SIGNIFICANT CHANGE UP (ref 70–99)
POTASSIUM SERPL-MCNC: 3.8 MMOL/L — SIGNIFICANT CHANGE UP (ref 3.5–5.3)
POTASSIUM SERPL-SCNC: 3.8 MMOL/L — SIGNIFICANT CHANGE UP (ref 3.5–5.3)
SODIUM SERPL-SCNC: 142 MMOL/L — SIGNIFICANT CHANGE UP (ref 135–145)

## 2024-04-26 PROCEDURE — 71045 X-RAY EXAM CHEST 1 VIEW: CPT | Mod: 26

## 2024-04-26 PROCEDURE — 99232 SBSQ HOSP IP/OBS MODERATE 35: CPT | Mod: GC

## 2024-04-26 RX ADMIN — HEPARIN SODIUM 5000 UNIT(S): 5000 INJECTION INTRAVENOUS; SUBCUTANEOUS at 05:45

## 2024-04-26 RX ADMIN — Medication 0.25 MILLIGRAM(S): at 21:15

## 2024-04-26 RX ADMIN — Medication 12.5 MILLIGRAM(S): at 05:43

## 2024-04-26 RX ADMIN — Medication 81 MILLIGRAM(S): at 11:55

## 2024-04-26 RX ADMIN — Medication 1: at 21:28

## 2024-04-26 RX ADMIN — ATORVASTATIN CALCIUM 80 MILLIGRAM(S): 80 TABLET, FILM COATED ORAL at 17:32

## 2024-04-26 RX ADMIN — LINAGLIPTIN 5 MILLIGRAM(S): 5 TABLET, FILM COATED ORAL at 08:03

## 2024-04-26 RX ADMIN — AMIODARONE HYDROCHLORIDE 200 MILLIGRAM(S): 400 TABLET ORAL at 05:45

## 2024-04-26 RX ADMIN — FAMOTIDINE 20 MILLIGRAM(S): 10 INJECTION INTRAVENOUS at 11:55

## 2024-04-26 RX ADMIN — HEPARIN SODIUM 5000 UNIT(S): 5000 INJECTION INTRAVENOUS; SUBCUTANEOUS at 21:15

## 2024-04-26 RX ADMIN — Medication 6 MILLIGRAM(S): at 21:15

## 2024-04-26 RX ADMIN — Medication 20 MILLIGRAM(S): at 06:08

## 2024-04-26 RX ADMIN — INSULIN GLARGINE 14 UNIT(S): 100 INJECTION, SOLUTION SUBCUTANEOUS at 21:21

## 2024-04-26 RX ADMIN — Medication 20 MILLIGRAM(S): at 17:33

## 2024-04-26 RX ADMIN — Medication 1 APPLICATION(S): at 05:46

## 2024-04-26 RX ADMIN — HEPARIN SODIUM 5000 UNIT(S): 5000 INJECTION INTRAVENOUS; SUBCUTANEOUS at 13:35

## 2024-04-26 RX ADMIN — CLOPIDOGREL BISULFATE 75 MILLIGRAM(S): 75 TABLET, FILM COATED ORAL at 11:55

## 2024-04-26 NOTE — PROGRESS NOTE ADULT - ASSESSMENT
IRA SEN is a 73yo Male s/p CABG, now with decreased functional mobility, gait instability and ADL impairments.    COMORBIDITIES/ACTIVE MEDICAL ISSUES     Gait Instability, ADL impairments and Functional impairments: Comprehensive Rehab Program of PT/OT      #S/P CABG (coronary artery bypass graft).   - ASA 81 mg PO Daily.   - Lipitor 80 mg PO HS   Plavix 75 mg PO Daily  - Lopressor 25 mg PO BID to Toprol 12.5mg-per Cardio   - Continue with Heparin subcutaneous 5000 units q 8 hr for DVT ppx   - Continue with Pepcid 20 mg PO Daily  - Lasix 20 mg po qd -resumed> increased to 20mg 2x/day.   - aldactone 25 qd-on hold  - fall and sternal precautions  - IS  - will add Entresto if BP permits    #Pleural effusion  -CXR today per pulm  - lasix 20mg BID  - sats 94% RA    # S/P ascending aortic aneurysm repair.   -Continue with Plavix 75 mg PO Daily  Continue Amio 200mg PO     #CLAUDIA (acute kidney injury) on CKD   -follow labs, avoid nephrotoxins/hold home Metformin  Cr 1.4    #DM2 (hbg a1c 9.8)  Lantus HS Admelog units  Sliding Scale.  - consistent low carb diet.  - DC on home Farxiga, add Tradjenta 5 mg daily  - FU outpatient.    #CAD (coronary artery disease).   - ASA/Lipitor, trop neg yesterday  - TTE at baseline 4/23     #BPH (benign prostatic hyperplasia).   -Tamsulosin 0.4 PO HS-on hold for low BP  -monitor voiding, mobilize    Pain Mgmt - Tylenol PRN  GI/Bowel Mgmt - Senna,  Miralax PRN  /Bladder Mgmt - PVR x1    #Skin; Sternal incision LELA + abd lap sites x 4, b/l medial thigh incision, right groin puncture site, Deep tissue injury (DTI) to b/l buttock. pressure related    Precautions / PROPHYLAXIS:   - Falls, Cardiac, Sternal  - Lungs: Aspiration, Incentive Spirometer   - Pressure injury/Skin: Turn Q2hrs while in bed, OOB to Chair, PT/OT    - DVT: Hep sq

## 2024-04-26 NOTE — PROGRESS NOTE ADULT - SUBJECTIVE AND OBJECTIVE BOX
HPI:  74yr Male, PMHx of CVA, MI, HTN, DM2, HLD, CAD s/p cardiac stents, CHF, hx of malignant melanoma/wide excision. To [facility] 4/1 for eval of ascending aorta replacement, aortic valve replacement, coronary artery bypass grafting with Dr. Ilia Ortiz. Admitted preop/NPO after midnight for OR on 4/7. On 4/8 underwent ASCENDING AORTIC REPLACEMENT WITH TRANSVERSE HEMIARCH, CABGx2, AVR-BIOPROSTHETIC VALVE. Inotropic support with IV Dobutamine and Milrinone post op for acute systolic heart failure. IABP, on PO Amiodarone for afib prophylaxis. Extubated to 5L-->hi flow. Endocrine consulted for DM2, Insulin gtt. 4/13 febrile w/CLAUDIA, +fluid overload, zosyn / vanco empirically,+ diuresis. Hypotension- Taran-Synephrine/ Milrinone gtt. ID consulted, BC NGTD. On 4/18 Serum Glucose 62. On 4/20 Started on Lopressor 25 mg PO BID. Therapy started. Increase ambulation and activity as tolerated.   PMR recommends acute rehab. Cleared for dc to [facility] rehab.  (22 Apr 2024 15:43)      Patient seen and evaluated in chair this am, alert and awake, follows commands well.   slept well on melatonin, protected night hours discussed w/staff  reports Confusion since surgery- still 'feels foggy'-improved this am  PVRs to continue daily, now off Flomax 2/2 low BP  last Bm 4/26  TTE ok- EF 35%, BNP up, pulm in for pl effusion-CXR today, lasix restarted as BID per hospitalist Toprol low dose  seen By Cardiology- will start Entresto if BP permits  daily weights      MEDICATIONS  (STANDING):  aMIOdarone    Tablet 200 milliGRAM(s) Oral daily  AQUAPHOR (petrolatum Ointment) 1 Application(s) Topical two times a day  aspirin enteric coated 81 milliGRAM(s) Oral daily  atorvastatin 80 milliGRAM(s) Oral <User Schedule>  clopidogrel Tablet 75 milliGRAM(s) Oral daily  dextrose 10% Bolus 125 milliLiter(s) IV Bolus once  dextrose 5%. 1000 milliLiter(s) (100 mL/Hr) IV Continuous <Continuous>  dextrose 5%. 1000 milliLiter(s) (50 mL/Hr) IV Continuous <Continuous>  dextrose 50% Injectable 25 Gram(s) IV Push once  dextrose 50% Injectable 12.5 Gram(s) IV Push once  famotidine    Tablet 20 milliGRAM(s) Oral daily  furosemide    Tablet 20 milliGRAM(s) Oral <User Schedule>  glucagon  Injectable 1 milliGRAM(s) IntraMuscular once  heparin   Injectable 5000 Unit(s) SubCutaneous every 8 hours  insulin glargine Injectable (LANTUS) 14 Unit(s) SubCutaneous <User Schedule>  insulin lispro (ADMELOG) corrective regimen sliding scale   SubCutaneous three times a day before meals  insulin lispro (ADMELOG) corrective regimen sliding scale   SubCutaneous at bedtime  linagliptin 5 milliGRAM(s) Oral with breakfast  melatonin 6 milliGRAM(s) Oral at bedtime  metoprolol succinate ER 12.5 milliGRAM(s) Oral daily  senna 2 Tablet(s) Oral at bedtime    MEDICATIONS  (PRN):  acetaminophen     Tablet .. 650 milliGRAM(s) Oral every 6 hours PRN Moderate Pain (4 - 6)  ALPRAZolam 0.25 milliGRAM(s) Oral every 12 hours PRN anxiety  dextrose Oral Gel 15 Gram(s) Oral once PRN Blood Glucose LESS THAN 70 milliGRAM(s)/deciliter  polyethylene glycol 3350 17 Gram(s) Oral daily PRN Constipation                            9.7    7.54  )-----------( 586      ( 25 Apr 2024 05:18 )             30.3     04-26    142  |  106  |  25<H>  ----------------------------<  91  3.8   |  29  |  1.41<H>    Ca    8.6      26 Apr 2024 05:00    TPro  5.9<L>  /  Alb  2.6<L>  /  TBili  0.8  /  DBili  x   /  AST  19  /  ALT  27  /  AlkPhos  84  04-25    Urinalysis Basic - ( 26 Apr 2024 05:00 )    Color: x / Appearance: x / SG: x / pH: x  Gluc: 91 mg/dL / Ketone: x  / Bili: x / Urobili: x   Blood: x / Protein: x / Nitrite: x   Leuk Esterase: x / RBC: x / WBC x   Sq Epi: x / Non Sq Epi: x / Bacteria: x        CAPILLARY BLOOD GLUCOSE      POCT Blood Glucose.: 102 mg/dL (26 Apr 2024 07:43)  POCT Blood Glucose.: 124 mg/dL (25 Apr 2024 21:01)  POCT Blood Glucose.: 173 mg/dL (25 Apr 2024 17:15)  POCT Blood Glucose.: 131 mg/dL (25 Apr 2024 12:20)      Vital Signs Last 24 Hrs  T(C): 36.6 (26 Apr 2024 07:45), Max: 36.8 (25 Apr 2024 20:58)  T(F): 97.8 (26 Apr 2024 07:45), Max: 98.3 (25 Apr 2024 20:58)  HR: 70 (26 Apr 2024 07:45) (70 - 77)  BP: 129/78 (26 Apr 2024 07:45) (124/76 - 148/92)  BP(mean): --  RR: 16 (26 Apr 2024 07:45) (16 - 16)  SpO2: 96% (26 Apr 2024 07:45) (95% - 97%)    Parameters below as of 26 Apr 2024 07:45  Patient On (Oxygen Delivery Method): room air      Gen - NAD, Comfortable  HEENT - NCAT, EOMI  Neck - Supple, No limited ROM  Pulm - CTAB, No wheeze, No rhonchi, No crackles  Cardiovascular - RRR, S1S2  Chest - good chest expansion, good respiratory effort  Abdomen - Soft, NT/ND, +BS  Extremities - No Cyanosis, no clubbing, edema to BLE, no calf tenderness  Neuro-     Cognitive - awake, alert, oriented to person, place, situation, month, follows simple commands ok     Communication - Fluent     Memory: distant Memory intact     Cranial Nerves - CN 2-12 intact.     Motor -                     LEFT    UE - 4/5                    RIGHT UE - 4/5                    LEFT    LE - 3+/5                    RIGHT LE - 3+/5        Sensory - Intact  to LT     Tone - normal  Skin: Sternal incision LELA + abd lap sites x 4, b/l medial thigh incision, right groin puncture site, Deep tissue injury (DTI) to b/l buttock-pressure related      Continue comprehensive acute rehab program consisting of 3hrs/day of OT/PT. HPI:  74yr Male, PMHx of CVA, MI, HTN, DM2, HLD, CAD s/p cardiac stents, CHF, hx of malignant melanoma/wide excision. To [facility] 4/1 for eval of ascending aorta replacement, aortic valve replacement, coronary artery bypass grafting with Dr. Ilia Ortiz. Admitted preop/NPO after midnight for OR on 4/7. On 4/8 underwent ASCENDING AORTIC REPLACEMENT WITH TRANSVERSE HEMIARCH, CABGx2, AVR-BIOPROSTHETIC VALVE. Inotropic support with IV Dobutamine and Milrinone post op for acute systolic heart failure. IABP, on PO Amiodarone for afib prophylaxis. Extubated to 5L-->hi flow. Endocrine consulted for DM2, Insulin gtt. 4/13 febrile w/CLAUDIA, +fluid overload, zosyn / vanco empirically,+ diuresis. Hypotension- Taran-Synephrine/ Milrinone gtt. ID consulted, BC NGTD. On 4/18 Serum Glucose 62. On 4/20 Started on Lopressor 25 mg PO BID. Therapy started. Increase ambulation and activity as tolerated.   PMR recommends acute rehab. Cleared for dc to [facility] rehab.  (22 Apr 2024 15:43)    ROS/subjective:  Patient seen and evaluated in chair this am, alert and awake, follows commands well.   slept well on melatonin, protected night hours discussed w/staff  reports Confusion since surgery- still 'feels foggy'-improved this am  PVRs to continue daily, now off Flomax 2/2 low BP  last Bm 4/26  TTE ok- EF 35%, BNP up, pulm in for pl effusion-CXR today, lasix restarted as BID per hospitalist Toprol low dose  seen By Cardiology- will start Entresto if BP permits  daily weights      MEDICATIONS  (STANDING):  aMIOdarone    Tablet 200 milliGRAM(s) Oral daily  AQUAPHOR (petrolatum Ointment) 1 Application(s) Topical two times a day  aspirin enteric coated 81 milliGRAM(s) Oral daily  atorvastatin 80 milliGRAM(s) Oral <User Schedule>  clopidogrel Tablet 75 milliGRAM(s) Oral daily  dextrose 10% Bolus 125 milliLiter(s) IV Bolus once  dextrose 5%. 1000 milliLiter(s) (100 mL/Hr) IV Continuous <Continuous>  dextrose 5%. 1000 milliLiter(s) (50 mL/Hr) IV Continuous <Continuous>  dextrose 50% Injectable 25 Gram(s) IV Push once  dextrose 50% Injectable 12.5 Gram(s) IV Push once  famotidine    Tablet 20 milliGRAM(s) Oral daily  furosemide    Tablet 20 milliGRAM(s) Oral <User Schedule>  glucagon  Injectable 1 milliGRAM(s) IntraMuscular once  heparin   Injectable 5000 Unit(s) SubCutaneous every 8 hours  insulin glargine Injectable (LANTUS) 14 Unit(s) SubCutaneous <User Schedule>  insulin lispro (ADMELOG) corrective regimen sliding scale   SubCutaneous three times a day before meals  insulin lispro (ADMELOG) corrective regimen sliding scale   SubCutaneous at bedtime  linagliptin 5 milliGRAM(s) Oral with breakfast  melatonin 6 milliGRAM(s) Oral at bedtime  metoprolol succinate ER 12.5 milliGRAM(s) Oral daily  senna 2 Tablet(s) Oral at bedtime    MEDICATIONS  (PRN):  acetaminophen     Tablet .. 650 milliGRAM(s) Oral every 6 hours PRN Moderate Pain (4 - 6)  ALPRAZolam 0.25 milliGRAM(s) Oral every 12 hours PRN anxiety  dextrose Oral Gel 15 Gram(s) Oral once PRN Blood Glucose LESS THAN 70 milliGRAM(s)/deciliter  polyethylene glycol 3350 17 Gram(s) Oral daily PRN Constipation                            9.7    7.54  )-----------( 586      ( 25 Apr 2024 05:18 )             30.3     04-26    142  |  106  |  25<H>  ----------------------------<  91  3.8   |  29  |  1.41<H>    Ca    8.6      26 Apr 2024 05:00    TPro  5.9<L>  /  Alb  2.6<L>  /  TBili  0.8  /  DBili  x   /  AST  19  /  ALT  27  /  AlkPhos  84  04-25    Urinalysis Basic - ( 26 Apr 2024 05:00 )    Color: x / Appearance: x / SG: x / pH: x  Gluc: 91 mg/dL / Ketone: x  / Bili: x / Urobili: x   Blood: x / Protein: x / Nitrite: x   Leuk Esterase: x / RBC: x / WBC x   Sq Epi: x / Non Sq Epi: x / Bacteria: x        CAPILLARY BLOOD GLUCOSE      POCT Blood Glucose.: 102 mg/dL (26 Apr 2024 07:43)  POCT Blood Glucose.: 124 mg/dL (25 Apr 2024 21:01)  POCT Blood Glucose.: 173 mg/dL (25 Apr 2024 17:15)  POCT Blood Glucose.: 131 mg/dL (25 Apr 2024 12:20)      Vital Signs Last 24 Hrs  T(C): 36.6 (26 Apr 2024 07:45), Max: 36.8 (25 Apr 2024 20:58)  T(F): 97.8 (26 Apr 2024 07:45), Max: 98.3 (25 Apr 2024 20:58)  HR: 70 (26 Apr 2024 07:45) (70 - 77)  BP: 129/78 (26 Apr 2024 07:45) (124/76 - 148/92)  BP(mean): --  RR: 16 (26 Apr 2024 07:45) (16 - 16)  SpO2: 96% (26 Apr 2024 07:45) (95% - 97%)    Parameters below as of 26 Apr 2024 07:45  Patient On (Oxygen Delivery Method): room air      Gen - NAD, Comfortable  HEENT - NCAT, EOMI  Neck - Supple, No limited ROM  Pulm - CTAB, No wheeze, No rhonchi, No crackles  Cardiovascular - RRR, S1S2  Chest - good chest expansion, good respiratory effort  Abdomen - Soft, NT/ND, +BS  Extremities - No Cyanosis, no clubbing, edema to BLE, no calf tenderness  Neuro-     Cognitive - awake, alert, oriented to person, place, situation, month, follows simple commands ok     Communication - Fluent     Memory: distant Memory intact     Cranial Nerves - CN 2-12 intact.     Motor -                    LEFT    UE - 5/5                    RIGHT UE - 5/5                    LEFT    LE - 4/5 HF otherwise 5/5                    RIGHT LE - 4+/5  HF otherwise 5/5       Sensory - Intact  to LT     Tone - normal  Skin: Sternal incision LELA + abd lap sites x 4, b/l medial thigh incision, right groin puncture site, Deep tissue injury (DTI) to b/l buttock-pressure related      Continue comprehensive acute rehab program consisting of 3hrs/day of OT/PT.

## 2024-04-26 NOTE — PROGRESS NOTE ADULT - SUBJECTIVE AND OBJECTIVE BOX
Time of Visit:  Patient seen and examined. pat sitting in chair .wife at bedside     MEDICATIONS  (STANDING):  aMIOdarone    Tablet 200 milliGRAM(s) Oral daily  AQUAPHOR (petrolatum Ointment) 1 Application(s) Topical two times a day  aspirin enteric coated 81 milliGRAM(s) Oral daily  atorvastatin 80 milliGRAM(s) Oral <User Schedule>  clopidogrel Tablet 75 milliGRAM(s) Oral daily  dextrose 10% Bolus 125 milliLiter(s) IV Bolus once  dextrose 5%. 1000 milliLiter(s) (100 mL/Hr) IV Continuous <Continuous>  dextrose 5%. 1000 milliLiter(s) (50 mL/Hr) IV Continuous <Continuous>  dextrose 50% Injectable 25 Gram(s) IV Push once  dextrose 50% Injectable 12.5 Gram(s) IV Push once  famotidine    Tablet 20 milliGRAM(s) Oral daily  furosemide    Tablet 20 milliGRAM(s) Oral <User Schedule>  glucagon  Injectable 1 milliGRAM(s) IntraMuscular once  heparin   Injectable 5000 Unit(s) SubCutaneous every 8 hours  insulin glargine Injectable (LANTUS) 14 Unit(s) SubCutaneous <User Schedule>  insulin lispro (ADMELOG) corrective regimen sliding scale   SubCutaneous three times a day before meals  insulin lispro (ADMELOG) corrective regimen sliding scale   SubCutaneous at bedtime  linagliptin 5 milliGRAM(s) Oral with breakfast  melatonin 6 milliGRAM(s) Oral at bedtime  metoprolol succinate ER 12.5 milliGRAM(s) Oral daily  senna 2 Tablet(s) Oral at bedtime      MEDICATIONS  (PRN):  acetaminophen     Tablet .. 650 milliGRAM(s) Oral every 6 hours PRN Moderate Pain (4 - 6)  ALPRAZolam 0.25 milliGRAM(s) Oral every 12 hours PRN anxiety  dextrose Oral Gel 15 Gram(s) Oral once PRN Blood Glucose LESS THAN 70 milliGRAM(s)/deciliter  polyethylene glycol 3350 17 Gram(s) Oral daily PRN Constipation       Medications up to date at time of exam.      PHYSICAL EXAMINATION:  Patient has no new complaints.  GENERAL: The patient  in no apparent distress.     Vital Signs Last 24 Hrs  T(C): 36.6 (26 Apr 2024 07:45), Max: 36.8 (25 Apr 2024 20:58)  T(F): 97.8 (26 Apr 2024 07:45), Max: 98.3 (25 Apr 2024 20:58)  HR: 75 (26 Apr 2024 17:35) (70 - 77)  BP: 132/80 (26 Apr 2024 17:35) (124/76 - 148/92)  BP(mean): --  RR: 16 (26 Apr 2024 17:35) (16 - 16)  SpO2: 94% (26 Apr 2024 17:35) (94% - 97%)    Parameters below as of 26 Apr 2024 17:35  Patient On (Oxygen Delivery Method): room air       (if applicable)    Chest Tube (if applicable)    HEENT: Head is normocephalic and atraumatic. Extraocular muscles are intact. Mucous membranes are moist.     NECK: Supple, no palpable adenopathy.    LUNGS: Fair bilateral berath sounds  no wheezing, rales, or rhonchi.    HEART: Regular rate and rhythm without murmur.    ABDOMEN: Soft, nontender, and nondistended.  No hepatosplenomegaly is noted.    : No painful voiding, no pelvic pain    EXTREMITIES: Without any cyanosis, +1 b/l LE edema.    NEUROLOGIC: Awake, alert, oriented, grossly intact    SKIN: Warm, dry, good turgor.      LABS:                        9.7    7.54  )-----------( 586      ( 25 Apr 2024 05:18 )             30.3     04-26    142  |  106  |  25<H>  ----------------------------<  91  3.8   |  29  |  1.41<H>    Ca    8.6      26 Apr 2024 05:00    TPro  5.9<L>  /  Alb  2.6<L>  /  TBili  0.8  /  DBili  x   /  AST  19  /  ALT  27  /  AlkPhos  84  04-25      Urinalysis Basic - ( 26 Apr 2024 05:00 )    Color: x / Appearance: x / SG: x / pH: x  Gluc: 91 mg/dL / Ketone: x  / Bili: x / Urobili: x   Blood: x / Protein: x / Nitrite: x   Leuk Esterase: x / RBC: x / WBC x   Sq Epi: x / Non Sq Epi: x / Bacteria: x      MICROBIOLOGY: (if applicable)    RADIOLOGY & ADDITIONAL STUDIES:  EKG:   CXR:  ECHO:    IMPRESSION: 74y Male PAST MEDICAL & SURGICAL HISTORY:  HTN (hypertension)      Hearing decreased      CVA (cerebral vascular accident)  12/22/2016      Hyperlipemia      Kidney stones      Coronary artery disease  MI 12/22/2016      CHF (congestive heart failure)  1/2017 stents x3      Type 2 diabetes mellitus  2016      Confusion  from stroke      S/P medial meniscal repair  and ligament surgery      H/O lithotripsy      S/P tonsillectomy      Bilateral inguinal hernia       p/w       IMP: This is a 74 yr old man with  CVA, MI, HTN, DM2, HLD, CAD s/p cardiac stents, CHFr EF , hx of malignant melanoma/wide excision. To [facility] 4/1 for eval of ascending aorta replacement, aortic valve replacement, coronary artery bypass grafting with Dr. Ilia Ortiz. Admitted preop/NPO after midnight for OR on 4/7. On 4/8 underwent ASCENDING AORTIC REPLACEMENT WITH TRANSVERSE HEMIARCH, CABGx2, AVR-BIOPROSTHETIC VALVE.      Asked  to evaluate for moderate left pleural effusion and relatively asymptomatic from a pulmonary point of view. Pleural effusion most likely related to CABG or post cardiac instrumentation syndrome ,unlikely HF although pat has reduce EF and elevated proBNP. No physical finding of fluid over load or HF.  My Prelim echo review at bedside  + pericardial effusion not having tamponade  effect and pat is not clinically reflecting tamponade . Reduced EF ( ~EF 25%)  Pat is on plavix with recent cardiac surgery . Will no hold plavix for 7 days before thoracentesis ( non emergent )     Sugg    - IF pat become SOB , repeat 2 DECO and CXR  - Holding plavix when appropriate as per cards , not feasible at present due to recent CABG   - CXR 4/26 no change in left effusion   - Optimize HF meds   - Wound care   - No antibx at this time   spoke with wife at bedside

## 2024-04-26 NOTE — PROGRESS NOTE ADULT - NS ATTEND AMEND GEN_ALL_CORE FT
Rehab Attending- Patient seen and examined by me - Case discussed, above note reviewed by me with modifications made    Patient seen and evaluated Bedside and in PT  BPs Better Syst 140s in PT- will diurese gently- ROBERTA GREENFIELD  Slept better but awakened for vital signs/ meds- changed all meds to before 10 Pm  Toprol restarted for rate control  Standing weight on [id number]  O2 in PT 97% on 3L with ambulation - reduced to 2L  to continue intensive rehab program    Vital Signs Last 24 Hrs  T(C): 36.8 (24 Apr 2024 07:45), Max: 36.9 (23 Apr 2024 19:31)  T(F): 98.2 (24 Apr 2024 07:45), Max: 98.4 (23 Apr 2024 19:31)  HR: 79 (24 Apr 2024 07:45) (77 - 79)  BP: 115/67 (24 Apr 2024 07:45) (115/67 - 165/83)  BP(mean): --  RR: 16 (24 Apr 2024 07:45) (16 - 16)  SpO2: 96% (24 Apr 2024 07:45) (94% - 96%)    Parameters below as of 24 Apr 2024 07:45  Patient On (Oxygen Delivery Method): nasal cannula  O2 Flow (L/min): 3      Gen - NAD, Comfortable  HEENT - NCAT, EOMI, MMM  Neck - Supple, No limited ROM  Pulm - diminished BS Left , crackles right  Cardiovascular - RRR, S1S2  Abdomen - Soft, NT/ND, +BS  Extremities - No Cyanosis, no clubbing, 1+edema to BLE, no calf tenderness  Neuro-     Cognitive - awake, alert, oriented to person, place, situation, month, follows simple commands ok     Communication - Fluent     Memory: distant Memory intact     Cranial Nerves - CN 2-12 intact.     Motor -    LEFT    UE - 5/5                    RIGHT UE - 5/5                    LEFT    LE - 4/5 HF otherwise 5/5                    RIGHT LE - 4+/5  HF otherwise 5/5                        Sensory - Intact  to LT     Tone - normal  Skin: Sternal incision LELA + abd lap sites x 4, b/l medial thigh incision, right groin puncture site, Deep tissue injury (DTI) to b/l buttock-pressure related
I have seen and examined the patient. I have discussed case with SAM Fong.    Doroteo Corey is a 74 year old man with past medical history of Coronary artery disease (history of myocardial infarction, s/p PCIs), Hypertension, Hyperlipidemia, HFrEF (LVEF 40% in 2018), CVA, Type II Diabetes mellitus and history of malignant melanoma s/p wide excision, with recent elective bioprosthetic aortic valve replacement and ascending aortic aneurysm repair with transverse hemiarch and CABG x2 (4/8/24) with post op course complicated by cardiogenic shock requiring inotropic support with IV dobutamine and milrinone, IABP and also on amiodarone for atrial fibrillation prophylaxis, now admitted to [facility].      Cardiology consulted due to concern for CHF. ECG consistent with sinus rhythm, first degree AV block, IVCD, old inferior infarct, old anterior infarct, which is similar to ECG upon discharge from [facility]. Echo this month consistent with LVEF 24%, probably normal RV systolic function, bio-AVR.     Echo repeated yesterday and consistent with known cardiomyopathy, LVEF 30-35%, moderate left pleural effusion and trivial pericardial effusion - no signs of cardiac tamponade. SBP much improved today. Continue to encourage PO intake. In regards to HFrEF, may trial low dose Metoprolol succinate and Entresto today and monitor BP and symptoms closely, patient may not be able to tolerate all of the GDMT it depends on symptoms, BP and renal function. Will need re-evaluation of LVEF when on GDMT as outpatient to evaluate if ICD will be needed in the near future. Continue antiplatelets and high intensity statin for CABG.    We will sign off, please re-consult if needed. Recommend outpatient cardiology and CT surgical follow-up.    Alexey Woods MD  Cardiology
Rehab Attending- Patient seen and examined by me - Case discussed, above note reviewed by me with modifications made    patient seen and evaluated bedside  slept well- oriented x 3 this AM  Wt stable 160.2> 160.6 lbs  moved bowels 4/26, voiding well  will consider adding Entresto If labs OK  and BP OK  Cr however 1.41  No Need for O2 in therapy-   to continue intensive rehab program    Vital Signs Last 24 Hrs  T(C): 36.6 (26 Apr 2024 07:45), Max: 36.8 (25 Apr 2024 20:58)  T(F): 97.8 (26 Apr 2024 07:45), Max: 98.3 (25 Apr 2024 20:58)  HR: 70 (26 Apr 2024 07:45) (70 - 77)  BP: 129/78 (26 Apr 2024 07:45) (124/76 - 148/92)  BP(mean): --  RR: 16 (26 Apr 2024 07:45) (16 - 16)  SpO2: 96% (26 Apr 2024 07:45) (95% - 97%)    Parameters below as of 26 Apr 2024 07:45  Patient On (Oxygen Delivery Method): room air      Gen - NAD, Comfortable  HEENT - NCAT, EOMI  Neck - Supple, No limited ROM  Pulm - CTAB, No wheeze, No rhonchi, No crackles  Cardiovascular - RRR, S1S2  Chest - good chest expansion, good respiratory effort  Abdomen - Soft, NT/ND, +BS  Extremities - No Cyanosis, no clubbing, edema to BLE, no calf tenderness  Neuro-     Cognitive - awake, alert, oriented to person, place, situation, month, follows simple commands ok     Communication - Fluent     Memory: distant Memory intact     Cranial Nerves - CN 2-12 intact.     Motor -                       LEFT    UE - 5/5                    RIGHT UE - 5/5                    LEFT    LE - 4/5 HF otherwise 5/5                    RIGHT LE - 4+/5  HF otherwise 5/5         Sensory - Intact  to LT     Tone - normal  Skin: Sternal incision LELA + abd lap sites x 4, b/l medial thigh incision, right groin puncture site, Deep tissue injury (DTI) to b/l buttock-pressure related

## 2024-04-27 LAB
GLUCOSE BLDC GLUCOMTR-MCNC: 132 MG/DL — HIGH (ref 70–99)
GLUCOSE BLDC GLUCOMTR-MCNC: 144 MG/DL — HIGH (ref 70–99)
GLUCOSE BLDC GLUCOMTR-MCNC: 145 MG/DL — HIGH (ref 70–99)
GLUCOSE BLDC GLUCOMTR-MCNC: 202 MG/DL — HIGH (ref 70–99)

## 2024-04-27 PROCEDURE — 99233 SBSQ HOSP IP/OBS HIGH 50: CPT

## 2024-04-27 PROCEDURE — 99232 SBSQ HOSP IP/OBS MODERATE 35: CPT

## 2024-04-27 RX ORDER — RAMELTEON 8 MG
8 TABLET ORAL AT BEDTIME
Refills: 0 | Status: DISCONTINUED | OUTPATIENT
Start: 2024-04-27 | End: 2024-04-29

## 2024-04-27 RX ORDER — SACUBITRIL AND VALSARTAN 24; 26 MG/1; MG/1
1 TABLET, FILM COATED ORAL
Refills: 0 | Status: ACTIVE | OUTPATIENT
Start: 2024-04-27 | End: 2025-03-26

## 2024-04-27 RX ADMIN — HEPARIN SODIUM 5000 UNIT(S): 5000 INJECTION INTRAVENOUS; SUBCUTANEOUS at 06:39

## 2024-04-27 RX ADMIN — LINAGLIPTIN 5 MILLIGRAM(S): 5 TABLET, FILM COATED ORAL at 08:00

## 2024-04-27 RX ADMIN — Medication 81 MILLIGRAM(S): at 12:03

## 2024-04-27 RX ADMIN — Medication 650 MILLIGRAM(S): at 12:03

## 2024-04-27 RX ADMIN — Medication 20 MILLIGRAM(S): at 17:17

## 2024-04-27 RX ADMIN — AMIODARONE HYDROCHLORIDE 200 MILLIGRAM(S): 400 TABLET ORAL at 06:37

## 2024-04-27 RX ADMIN — FAMOTIDINE 20 MILLIGRAM(S): 10 INJECTION INTRAVENOUS at 12:03

## 2024-04-27 RX ADMIN — Medication 1 APPLICATION(S): at 17:17

## 2024-04-27 RX ADMIN — Medication 650 MILLIGRAM(S): at 12:35

## 2024-04-27 RX ADMIN — Medication 20 MILLIGRAM(S): at 06:38

## 2024-04-27 RX ADMIN — Medication 12.5 MILLIGRAM(S): at 06:38

## 2024-04-27 RX ADMIN — HEPARIN SODIUM 5000 UNIT(S): 5000 INJECTION INTRAVENOUS; SUBCUTANEOUS at 14:40

## 2024-04-27 RX ADMIN — HEPARIN SODIUM 5000 UNIT(S): 5000 INJECTION INTRAVENOUS; SUBCUTANEOUS at 21:33

## 2024-04-27 RX ADMIN — ATORVASTATIN CALCIUM 80 MILLIGRAM(S): 80 TABLET, FILM COATED ORAL at 17:16

## 2024-04-27 RX ADMIN — INSULIN GLARGINE 14 UNIT(S): 100 INJECTION, SOLUTION SUBCUTANEOUS at 21:33

## 2024-04-27 RX ADMIN — Medication 8 MILLIGRAM(S): at 21:33

## 2024-04-27 RX ADMIN — CLOPIDOGREL BISULFATE 75 MILLIGRAM(S): 75 TABLET, FILM COATED ORAL at 12:03

## 2024-04-27 RX ADMIN — SACUBITRIL AND VALSARTAN 1 TABLET(S): 24; 26 TABLET, FILM COATED ORAL at 17:16

## 2024-04-27 RX ADMIN — Medication 1 APPLICATION(S): at 06:41

## 2024-04-27 NOTE — PROGRESS NOTE ADULT - SUBJECTIVE AND OBJECTIVE BOX
Time of Visit:  Patient seen and examined. pat is lying bed     MEDICATIONS  (STANDING):  aMIOdarone    Tablet 200 milliGRAM(s) Oral daily  AQUAPHOR (petrolatum Ointment) 1 Application(s) Topical two times a day  aspirin enteric coated 81 milliGRAM(s) Oral daily  atorvastatin 80 milliGRAM(s) Oral <User Schedule>  clopidogrel Tablet 75 milliGRAM(s) Oral daily  dextrose 10% Bolus 125 milliLiter(s) IV Bolus once  dextrose 5%. 1000 milliLiter(s) (50 mL/Hr) IV Continuous <Continuous>  dextrose 5%. 1000 milliLiter(s) (100 mL/Hr) IV Continuous <Continuous>  dextrose 50% Injectable 25 Gram(s) IV Push once  dextrose 50% Injectable 12.5 Gram(s) IV Push once  famotidine    Tablet 20 milliGRAM(s) Oral daily  furosemide    Tablet 20 milliGRAM(s) Oral <User Schedule>  glucagon  Injectable 1 milliGRAM(s) IntraMuscular once  heparin   Injectable 5000 Unit(s) SubCutaneous every 8 hours  insulin glargine Injectable (LANTUS) 14 Unit(s) SubCutaneous <User Schedule>  insulin lispro (ADMELOG) corrective regimen sliding scale   SubCutaneous three times a day before meals  insulin lispro (ADMELOG) corrective regimen sliding scale   SubCutaneous at bedtime  linagliptin 5 milliGRAM(s) Oral with breakfast  metoprolol succinate ER 12.5 milliGRAM(s) Oral daily  ramelteon 8 milliGRAM(s) Oral at bedtime  sacubitril 24 mG/valsartan 26 mG 1 Tablet(s) Oral two times a day  senna 2 Tablet(s) Oral at bedtime      MEDICATIONS  (PRN):  acetaminophen     Tablet .. 650 milliGRAM(s) Oral every 6 hours PRN Moderate Pain (4 - 6)  ALPRAZolam 0.25 milliGRAM(s) Oral every 12 hours PRN anxiety  dextrose Oral Gel 15 Gram(s) Oral once PRN Blood Glucose LESS THAN 70 milliGRAM(s)/deciliter  polyethylene glycol 3350 17 Gram(s) Oral daily PRN Constipation       Medications up to date at time of exam.      PHYSICAL EXAMINATION:  Patient has no new complaints.  GENERAL: The patient  in no apparent distress.     Vital Signs Last 24 Hrs  T(C): 36.7 (27 Apr 2024 08:01), Max: 36.7 (27 Apr 2024 08:01)  T(F): 98 (27 Apr 2024 08:01), Max: 98 (27 Apr 2024 08:01)  HR: 78 (27 Apr 2024 08:01) (78 - 83)  BP: 134/79 (27 Apr 2024 08:01) (124/72 - 144/78)  BP(mean): --  RR: 15 (27 Apr 2024 08:01) (15 - 16)  SpO2: 93% (27 Apr 2024 08:01) (92% - 93%)    Parameters below as of 27 Apr 2024 08:01  Patient On (Oxygen Delivery Method): room air       (if applicable)    Chest Tube (if applicable)    HEENT: Head is normocephalic and atraumatic. Extraocular muscles are intact. Mucous membranes are moist.     NECK: Supple, no palpable adenopathy.    LUNGS: Fair bilateral berath sounds  no wheezing, rales, or rhonchi.    HEART: Regular rate and rhythm without murmur.    ABDOMEN: Soft, nontender, and nondistended.  No hepatosplenomegaly is noted.    : No painful voiding, no pelvic pain    EXTREMITIES: Without any cyanosis, clubbing, rash, lesions or edema.    NEUROLOGIC: Awake, alert, oriented, grossly intact    SKIN: Warm, dry, good turgor.      LABS:    04-26    142  |  106  |  25<H>  ----------------------------<  91  3.8   |  29  |  1.41<H>    Ca    8.6      26 Apr 2024 05:00        Urinalysis Basic - ( 26 Apr 2024 05:00 )    Color: x / Appearance: x / SG: x / pH: x  Gluc: 91 mg/dL / Ketone: x  / Bili: x / Urobili: x   Blood: x / Protein: x / Nitrite: x   Leuk Esterase: x / RBC: x / WBC x   Sq Epi: x / Non Sq Epi: x / Bacteria: x    MICROBIOLOGY: (if applicable)    RADIOLOGY & ADDITIONAL STUDIES:  EKG:   CXR:  ECHO:    IMPRESSION: 74y Male PAST MEDICAL & SURGICAL HISTORY:  HTN (hypertension)      Hearing decreased      CVA (cerebral vascular accident)  12/22/2016      Hyperlipemia      Kidney stones      Coronary artery disease  MI 12/22/2016      CHF (congestive heart failure)  1/2017 stents x3      Type 2 diabetes mellitus  2016      Confusion  from stroke      S/P medial meniscal repair  and ligament surgery      H/O lithotripsy      S/P tonsillectomy      Bilateral inguinal hernia       p/w           IMP: This is a 74 yr old man with  CVA, MI, HTN, DM2, HLD, CAD s/p cardiac stents, CHFr EF , hx of malignant melanoma/wide excision. To [facility] 4/1 for eval of ascending aorta replacement, aortic valve replacement, coronary artery bypass grafting with Dr. Ilia Ortiz. Admitted preop/NPO after midnight for OR on 4/7. On 4/8 underwent ASCENDING AORTIC REPLACEMENT WITH TRANSVERSE HEMIARCH, CABGx2, AVR-BIOPROSTHETIC VALVE.      Asked  to evaluate for moderate left pleural effusion and relatively asymptomatic from a pulmonary point of view. Pleural effusion most likely related to CABG or post cardiac instrumentation syndrome ,unlikely HF although pat has reduce EF and elevated proBNP. No physical finding of fluid over load or HF.  My Prelim echo review at bedside  + pericardial effusion not having tamponade  effect and pat is not clinically reflecting tamponade . Reduced EF ( ~EF 25%)  Pat is on plavix with recent cardiac surgery . Will no hold plavix for 7 days before thoracentesis ( non emergent )     Sugg    - IF pat become SOB , repeat 2 DECO and CXR  - Holding plavix when appropriate as per cards , not feasible at present due to recent CABG   - CXR 4/26 no change in left effusion   - Optimize HF meds   - Wound care   - No antibx at this time

## 2024-04-27 NOTE — PROGRESS NOTE ADULT - ASSESSMENT
74yr Male, PMHx of CVA, MI, HTN, DM2, HLD, CAD s/p cardiac stents, CHF, hx of malignant melanoma/wide excision. To [facility] 4/1 for eval of ascending aorta replacement, aortic valve replacement, coronary artery bypass grafting with Dr. Ilia Ortiz. s/p BioBental  ascending aorta replacement with transverse hemiarch with bioprosthetic aortic valve replacement and 2V CABG. post op requiring mechanical support with IABP na d Inotropic support with IV Dobutamine and Milrinone for acute systolic heart failure. PO Amiodarone started for afib prophylaxis.  4/13 febrile w/ CLAUDIA, +fluid overload, zosyn / vanco empirically,+ diuresis. ID consulted, BC NGTD. On 4/20 Started on Lopressor 25 mg PO BID. Therapy started. Increase ambulation and activity as tolerated. PMR recommends acute rehab. Cleared for dc to [facility] rehab.     #cardiac surgery s/p acute heart failure, sepsis, prolonged course  #debility  -c/w comprehensive rehab as tolerated     #s/p BioBental with replacement of ascending aorta and transverse hemiarch  #AVR  #CABG  #HFrEF, EF 30-35%  - CXR with persistent moderate left pleural effusion and consolidation as well as hazy opacity right midlung field.  - c/w ASA, Plavix, Lipitor 80, amiodarone   - GDMT goal is Lopressor 25 mg PO BID, aldactone 25 mg PO daily, Farxiga 10 mg daily, and entresto   - Due to hypotension on 4/23 - GDMT was held.  - Lopressor resumed  - Resume Entresto today and aldactone tomorrow if tolerates  - Farxiga later this week if BP tolerates  - monitor BP. if cant tolerate resuming entresto, will need arb  - Continue Lasix 20 mg daily  - monitor renal function and BP while on lasix  - cardio eval appreciated   - Strict I and Os  - daily standing weight    #Pleural effusion  -CXR with persistent moderate left pleural effusion and consolidation as well as hazy opacity right midlung field.  -seen by pulm, can consider thora however will need to hold plavix x 7 days which is not feasible at this time.  -no clinical signs of infection at this time  -c/w diuretics, escalate as tolerated/necessary    #CLAUDIA possible ATN  - plateau   - monitor BMP, repeat 4/28  - Avoid nephrotoxins  - continue to hold home Metformin    #DM2   - A1c 9.8  - Home meds: metformin 500 mg BID. Farxiga 10 mg daily  - hold metformin for now  - on Lantus 14 unit   - moderate ISS TIDAC, low ISS at h/s  - c/w linagliptin 5 mg daily (added on 4/23)  - Endo f/u OP    #BPH (benign prostatic hyperplasia).   -Tamsulosin 0.4 PO HS    # DVT-Ppx  - Heparin

## 2024-04-27 NOTE — PROGRESS NOTE ADULT - SUBJECTIVE AND OBJECTIVE BOX
HPI:  74yr Male, PMHx of CVA, MI, HTN, DM2, HLD, CAD s/p cardiac stents, CHF, hx of malignant melanoma/wide excision. To [facility] 4/1 for eval of ascending aorta replacement, aortic valve replacement, coronary artery bypass grafting with Dr. Ilia Ortiz. Admitted preop/NPO after midnight for OR on 4/7. On 4/8 underwent ASCENDING AORTIC REPLACEMENT WITH TRANSVERSE HEMIARCH, CABGx2, AVR-BIOPROSTHETIC VALVE. Inotropic support with IV Dobutamine and Milrinone post op for acute systolic heart failure. IABP, on PO Amiodarone for afib prophylaxis. Extubated to 5L-->hi flow. Endocrine consulted for DM2, Insulin gtt. 4/13 febrile w/CLAUDIA, +fluid overload, zosyn / vanco empirically,+ diuresis. Hypotension- Taran-Synephrine/ Milrinone gtt. ID consulted, BC NGTD. On 4/18 Serum Glucose 62. On 4/20 Started on Lopressor 25 mg PO BID. Therapy started. Increase ambulation and activity as tolerated.   PMR recommends acute rehab. Cleared for dc to [facility] rehab.  (22 Apr 2024 15:43)        ROS/subjective:  Patient seen and evaluated at bedside this morning. Patient in bed in NAD, no SOB, no n/b, no pain.    last Bm 4/26        MEDICATIONS  (STANDING):  aMIOdarone    Tablet 200 milliGRAM(s) Oral daily  AQUAPHOR (petrolatum Ointment) 1 Application(s) Topical two times a day  aspirin enteric coated 81 milliGRAM(s) Oral daily  atorvastatin 80 milliGRAM(s) Oral <User Schedule>  clopidogrel Tablet 75 milliGRAM(s) Oral daily  dextrose 10% Bolus 125 milliLiter(s) IV Bolus once  dextrose 5%. 1000 milliLiter(s) (100 mL/Hr) IV Continuous <Continuous>  dextrose 5%. 1000 milliLiter(s) (50 mL/Hr) IV Continuous <Continuous>  dextrose 50% Injectable 25 Gram(s) IV Push once  dextrose 50% Injectable 12.5 Gram(s) IV Push once  famotidine    Tablet 20 milliGRAM(s) Oral daily  furosemide    Tablet 20 milliGRAM(s) Oral <User Schedule>  glucagon  Injectable 1 milliGRAM(s) IntraMuscular once  heparin   Injectable 5000 Unit(s) SubCutaneous every 8 hours  insulin glargine Injectable (LANTUS) 14 Unit(s) SubCutaneous <User Schedule>  insulin lispro (ADMELOG) corrective regimen sliding scale   SubCutaneous three times a day before meals  insulin lispro (ADMELOG) corrective regimen sliding scale   SubCutaneous at bedtime  linagliptin 5 milliGRAM(s) Oral with breakfast  melatonin 6 milliGRAM(s) Oral at bedtime  metoprolol succinate ER 12.5 milliGRAM(s) Oral daily  senna 2 Tablet(s) Oral at bedtime    MEDICATIONS  (PRN):  acetaminophen     Tablet .. 650 milliGRAM(s) Oral every 6 hours PRN Moderate Pain (4 - 6)  ALPRAZolam 0.25 milliGRAM(s) Oral every 12 hours PRN anxiety  dextrose Oral Gel 15 Gram(s) Oral once PRN Blood Glucose LESS THAN 70 milliGRAM(s)/deciliter  polyethylene glycol 3350 17 Gram(s) Oral daily PRN Constipation                     Vital Signs Last 24 Hrs  T(C): 36.7 (27 Apr 2024 08:01), Max: 36.7 (27 Apr 2024 08:01)  T(F): 98 (27 Apr 2024 08:01), Max: 98 (27 Apr 2024 08:01)  HR: 78 (27 Apr 2024 08:01) (75 - 83)  BP: 134/79 (27 Apr 2024 08:01) (124/72 - 144/78)  BP(mean): --  RR: 15 (27 Apr 2024 08:01) (15 - 16)  SpO2: 93% (27 Apr 2024 08:01) (92% - 94%)    Parameters below as of 27 Apr 2024 08:01  Patient On (Oxygen Delivery Method): room air          Gen - NAD, Comfortable  HEENT - NCAT, MMM  Neck - Supple  Pulm - CTAB, No wheeze  Cardiovascular - RRR, S1S2  Chest - good chest expansion, good respiratory effort  Abdomen - Soft, NT/ND, +BS  Extremities - No Cyanosis, no clubbing, min edema to BLE, no calf tenderness  Neuro-     Cognitive - awake, alert, oriented to person, place, situation, month, follows simple commands ok     Communication - Fluent         Motor -                    LEFT    UE - 5/5                    RIGHT UE - 5/5                    LEFT    LE - 4+-5/5                    RIGHT LE - 4+-5/5         Sensory - Intact  to LT     Tone - normal    LABS:      Ca    8.6        26 Apr 2024 05:00                   9.7    7.54  )-----------( 586      ( 25 Apr 2024 05:18 )             30.3     04-26    142  |  106  |  25<H>  ----------------------------<  91  3.8   |  29  |  1.41<H>    Ca    8.6      26 Apr 2024 05:00    TPro  5.9<L>  /  Alb  2.6<L>  /  TBili  0.8  /  DBili  x   /  AST  19  /  ALT  27  /  AlkPhos  84  04-25    Urinalysis Basic - ( 26 Apr 2024 05:00 )    Color: x / Appearance: x / SG: x / pH: x  Gluc: 91 mg/dL / Ketone: x  / Bili: x / Urobili: x   Blood: x / Protein: x / Nitrite: x   Leuk Esterase: x / RBC: x / WBC x   Sq Epi: x / Non Sq Epi: x / Bacteria: x        CAPILLARY BLOOD GLUCOSE      POCT Blood Glucose.: 102 mg/dL (26 Apr 2024 07:43)  POCT Blood Glucose.: 124 mg/dL (25 Apr 2024 21:01)  POCT Blood Glucose.: 173 mg/dL (25 Apr 2024 17:15)  POCT Blood Glucose.: 131 mg/dL (25 Apr 2024 12:20)

## 2024-04-27 NOTE — PROGRESS NOTE ADULT - ASSESSMENT
IRA SEN is a 75yo Male s/p CABG, now with decreased functional mobility, gait instability and ADL impairments.    COMORBIDITIES/ACTIVE MEDICAL ISSUES     Gait Instability, ADL impairments and Functional impairments: Comprehensive Rehab Program of PT/OT      #S/P CABG (coronary artery bypass graft).   - ASA 81 mg PO Daily.   - Lipitor 80 mg PO HS   Plavix 75 mg PO Daily  - Lopressor 25 mg PO BID to Toprol 12.5mg-per Cardio   - Continue with Heparin subcutaneous 5000 units q 8 hr for DVT ppx   - Continue with Pepcid 20 mg PO Daily  - Lasix increased to 20mg 2x/day, 4/25  - aldactone 25 qd-on hold  - Hospitalist note appreciated-  Entresto restarted 4/27. Possible aldactone tomorrow if tolerates  - Farxiga later this week if BP tolerates    #Pleural effusion  - Pulmonary note appreciated- hold plavix when appropriate as per cards , not feasible at present due to recent CABG   - CXR 4/26 no change in left effusion as per Pulmonary  - lasix 20mg BID  - follow sats    # S/P ascending aortic aneurysm repair.   -Continue with Plavix 75 mg PO Daily  Continue Amio 200mg PO     #CLAUDIA (acute kidney injury) on CKD   -follow labs, avoid nephrotoxins/hold home Metformin  - Cr 1.4  - BMP 4/28    #DM2 (hbg a1c 9.8)  Lantus HS Admelog units  Sliding Scale.  - consistent low carb diet.  - DC on home Farxiga, add Tradjenta 5 mg daily  - FU outpatient.    #CAD (coronary artery disease).   - ASA/Lipitor, trop neg yesterday  - TTE at baseline 4/23     #BPH (benign prostatic hyperplasia).   -Tamsulosin 0.4 PO HS-on hold for low BP  -monitor voiding, mobilize    Pain Mgmt - Tylenol PRN  GI/Bowel Mgmt - Senna,  Miralax PRN  /Bladder Mgmt - PVR x1    #Skin; Sternal incision LELA + abd lap sites x4       - DVT:   -Hep sq    Labs:   BMP 4/28  CBC, CMP 4/29

## 2024-04-27 NOTE — PROGRESS NOTE ADULT - SUBJECTIVE AND OBJECTIVE BOX
Patient seen and examined at bedside. no complaints.      ALLERGIES:  No Known Allergies    MEDICATIONS  (STANDING):  aMIOdarone    Tablet 200 milliGRAM(s) Oral daily  AQUAPHOR (petrolatum Ointment) 1 Application(s) Topical two times a day  aspirin enteric coated 81 milliGRAM(s) Oral daily  atorvastatin 80 milliGRAM(s) Oral <User Schedule>  clopidogrel Tablet 75 milliGRAM(s) Oral daily  dextrose 10% Bolus 125 milliLiter(s) IV Bolus once  dextrose 5%. 1000 milliLiter(s) (50 mL/Hr) IV Continuous <Continuous>  dextrose 5%. 1000 milliLiter(s) (100 mL/Hr) IV Continuous <Continuous>  dextrose 50% Injectable 25 Gram(s) IV Push once  de    MEDICATIONS  (PRN):  acetaminophen     Tablet .. 650 milliGRAM(s) Oral every 6 hours PRN Moderate Pain (4 - 6)  ALPRAZolam 0.25 milliGRAM(s) Oral every 12 hours PRN anxiety  dextrose Oral Gel 15 Gram(s) Oral once PRN Blood Glucose LESS THAN 70 milliGRAM(s)/deciliter  polyethylene glycol 3350 17 Gram(s) Oral daily PRN Constipation    Vital Signs Last 24 Hrs  T(F): 98 (27 Apr 2024 08:01), Max: 98 (27 Apr 2024 08:01)  HR: 78 (27 Apr 2024 08:01) (75 - 83)  BP: 134/79 (27 Apr 2024 08:01) (124/72 - 144/78)  RR: 15 (27 Apr 2024 08:01) (15 - 16)  SpO2: 93% (27 Apr 2024 08:01) (92% - 94%)  I&O's Summary    26 Apr 2024 07:01  -  27 Apr 2024 07:00  --------------------------------------------------------  IN: 0 mL / OUT: 750 mL / NET: -750 mL      BMI (kg/m2): 26.6 (04-22-24 @ 16:35)  PHYSICAL EXAM:    Gen: nad, resting in bed  Neuro: aaox3, no focal deficits  Heent: eomi b/l, no jvd, no oral exudates  Pulm: cta b/l, no w/r/r  CV: +s1s2, no m/r/g  Ab: soft, nt/nd, normoactive bs x 4  Extrem: no edema, pulses intact and equal  Skin: no rashes  Psych: normal    LABS:                        9.7    7.54  )-----------( 586      ( 25 Apr 2024 05:18 )             30.3       04-26    142  |  106  |  25  ----------------------------<  91  3.8   |  29  |  1.41    Ca    8.6      26 Apr 2024 05:00    TPro  5.9  /  Alb  2.6  /  TBili  0.8  /  DBili  x   /  AST  19  /  ALT  27  /  AlkPhos  84  04-25         POCT Blood Glucose.: 145 mg/dL (27 Apr 2024 07:48)  POCT Blood Glucose.: 256 mg/dL (26 Apr 2024 21:14)  POCT Blood Glucose.: 127 mg/dL (26 Apr 2024 17:25)  POCT Blood Glucose.: 148 mg/dL (26 Apr 2024 11:53)      Urinalysis Basic - ( 26 Apr 2024 05:00 )    Color: x / Appearance: x / SG: x / pH: x  Gluc: 91 mg/dL / Ketone: x  / Bili: x / Urobili: x   Blood: x / Protein: x / Nitrite: x   Leuk Esterase: x / RBC: x / WBC x   Sq Epi: x / Non Sq Epi: x / Bacteria: x            RADIOLOGY & ADDITIONAL TESTS:    Care Discussed with Consultants/Other Providers:

## 2024-04-28 LAB
ANION GAP SERPL CALC-SCNC: 8 MMOL/L — SIGNIFICANT CHANGE UP (ref 5–17)
BUN SERPL-MCNC: 22 MG/DL — SIGNIFICANT CHANGE UP (ref 7–23)
CALCIUM SERPL-MCNC: 8.7 MG/DL — SIGNIFICANT CHANGE UP (ref 8.4–10.5)
CHLORIDE SERPL-SCNC: 105 MMOL/L — SIGNIFICANT CHANGE UP (ref 96–108)
CO2 SERPL-SCNC: 29 MMOL/L — SIGNIFICANT CHANGE UP (ref 22–31)
CREAT SERPL-MCNC: 1.32 MG/DL — HIGH (ref 0.5–1.3)
EGFR: 56 ML/MIN/1.73M2 — LOW
GLUCOSE BLDC GLUCOMTR-MCNC: 104 MG/DL — HIGH (ref 70–99)
GLUCOSE BLDC GLUCOMTR-MCNC: 135 MG/DL — HIGH (ref 70–99)
GLUCOSE BLDC GLUCOMTR-MCNC: 141 MG/DL — HIGH (ref 70–99)
GLUCOSE BLDC GLUCOMTR-MCNC: 146 MG/DL — HIGH (ref 70–99)
GLUCOSE SERPL-MCNC: 144 MG/DL — HIGH (ref 70–99)
POTASSIUM SERPL-MCNC: 3.5 MMOL/L — SIGNIFICANT CHANGE UP (ref 3.5–5.3)
POTASSIUM SERPL-SCNC: 3.5 MMOL/L — SIGNIFICANT CHANGE UP (ref 3.5–5.3)
SODIUM SERPL-SCNC: 142 MMOL/L — SIGNIFICANT CHANGE UP (ref 135–145)

## 2024-04-28 PROCEDURE — 99232 SBSQ HOSP IP/OBS MODERATE 35: CPT

## 2024-04-28 RX ORDER — SPIRONOLACTONE 25 MG/1
25 TABLET, FILM COATED ORAL DAILY
Refills: 0 | Status: DISCONTINUED | OUTPATIENT
Start: 2024-04-28 | End: 2024-05-04

## 2024-04-28 RX ADMIN — Medication 1 APPLICATION(S): at 17:27

## 2024-04-28 RX ADMIN — Medication 20 MILLIGRAM(S): at 06:02

## 2024-04-28 RX ADMIN — CLOPIDOGREL BISULFATE 75 MILLIGRAM(S): 75 TABLET, FILM COATED ORAL at 12:49

## 2024-04-28 RX ADMIN — AMIODARONE HYDROCHLORIDE 200 MILLIGRAM(S): 400 TABLET ORAL at 06:03

## 2024-04-28 RX ADMIN — FAMOTIDINE 20 MILLIGRAM(S): 10 INJECTION INTRAVENOUS at 12:49

## 2024-04-28 RX ADMIN — Medication 12.5 MILLIGRAM(S): at 06:02

## 2024-04-28 RX ADMIN — Medication 8 MILLIGRAM(S): at 22:39

## 2024-04-28 RX ADMIN — HEPARIN SODIUM 5000 UNIT(S): 5000 INJECTION INTRAVENOUS; SUBCUTANEOUS at 14:21

## 2024-04-28 RX ADMIN — LINAGLIPTIN 5 MILLIGRAM(S): 5 TABLET, FILM COATED ORAL at 08:05

## 2024-04-28 RX ADMIN — HEPARIN SODIUM 5000 UNIT(S): 5000 INJECTION INTRAVENOUS; SUBCUTANEOUS at 06:02

## 2024-04-28 RX ADMIN — Medication 81 MILLIGRAM(S): at 12:49

## 2024-04-28 RX ADMIN — INSULIN GLARGINE 14 UNIT(S): 100 INJECTION, SOLUTION SUBCUTANEOUS at 22:39

## 2024-04-28 RX ADMIN — ATORVASTATIN CALCIUM 80 MILLIGRAM(S): 80 TABLET, FILM COATED ORAL at 17:26

## 2024-04-28 RX ADMIN — SACUBITRIL AND VALSARTAN 1 TABLET(S): 24; 26 TABLET, FILM COATED ORAL at 17:27

## 2024-04-28 RX ADMIN — Medication 20 MILLIGRAM(S): at 17:27

## 2024-04-28 RX ADMIN — SACUBITRIL AND VALSARTAN 1 TABLET(S): 24; 26 TABLET, FILM COATED ORAL at 06:01

## 2024-04-28 RX ADMIN — Medication 1 APPLICATION(S): at 06:03

## 2024-04-28 RX ADMIN — HEPARIN SODIUM 5000 UNIT(S): 5000 INJECTION INTRAVENOUS; SUBCUTANEOUS at 22:39

## 2024-04-28 NOTE — PROGRESS NOTE ADULT - ASSESSMENT
IRA SEN is a 73yo Male s/p CABG, now with decreased functional mobility, gait instability and ADL impairments.    COMORBIDITIES/ACTIVE MEDICAL ISSUES     Gait Instability, ADL impairments and Functional impairments: Comprehensive Rehab Program of PT/OT      #S/P CABG (coronary artery bypass graft).   - ASA 81 mg PO Daily.   - Lipitor 80 mg PO HS   Plavix 75 mg PO Daily  - Lopressor 25 mg PO BID to Toprol 12.5mg-per Cardio   - Continue with Heparin subcutaneous 5000 units q 8 hr for DVT ppx   - Continue with Pepcid 20 mg PO Daily  - Lasix increased to 20mg 2x/day, 4/25  - aldactone 25 qd-on hold  - Hospitalist note appreciated-  Entresto restarted 4/27. Possible aldactone tomorrow if tolerates  - Farxiga later this week if BP tolerates    #Pleural effusion  - Pulmonary note appreciated- hold plavix when appropriate as per cards , not feasible at present due to recent CABG   - CXR 4/26 no change in left effusion as per Pulmonary  - lasix 20mg BID  - follow sats    # S/P ascending aortic aneurysm repair.   -Continue with Plavix 75 mg PO Daily  Continue Amio 200mg PO     #CLAUDIA (acute kidney injury) on CKD   -follow labs, avoid nephrotoxins/hold home Metformin  - Cr 1.41 4/26; Cr 1.32 4/28  - monitor    #DM2 (hbg a1c 9.8)  Lantus HS Admelog units  Sliding Scale.  - consistent low carb diet.  - DC on home Farxiga, add Tradjenta 5 mg daily  - FU outpatient.    #CAD (coronary artery disease).   - ASA/Lipitor, trop neg yesterday  - TTE at baseline 4/23     #BPH (benign prostatic hyperplasia).   -Tamsulosin 0.4 PO HS-on hold for low BP  -monitor voiding, mobilize    Pain Mgmt - Tylenol PRN  GI/Bowel Mgmt - Senna,  Miralax PRN  /Bladder Mgmt - PVR x1    #Skin; Sternal incision LELA + abd lap sites x4       - DVT:   -Hep sq    Labs:   CBC, CMP 4/29   IRA SEN is a 75yo Male s/p CABG, now with decreased functional mobility, gait instability and ADL impairments.    COMORBIDITIES/ACTIVE MEDICAL ISSUES     Gait Instability, ADL impairments and Functional impairments: Comprehensive Rehab Program of PT/OT      #S/P CABG (coronary artery bypass graft).   - ASA 81 mg PO Daily.   - Lipitor 80 mg PO HS   Plavix 75 mg PO Daily  - Lopressor 25 mg PO BID to Toprol 12.5mg-per Cardio   - Continue with Heparin subcutaneous 5000 units q 8 hr for DVT ppx   - Continue with Pepcid 20 mg PO Daily  - Lasix increased to 20mg 2x/day, 4/25  - aldactone 25 qd-on hold  - Hospitalist note appreciated-  Entresto restarted 4/27. Possible aldactone tomorrow if tolerates  - Farxiga later this week if BP tolerates    #Pleural effusion  - Pulmonary note appreciated- hold plavix when appropriate as per cards , not feasible at present due to recent CABG   - CXR 4/26 no change in left effusion as per Pulmonary  - lasix 20mg BID  - follow sats    # S/P ascending aortic aneurysm repair.   -Continue with Plavix 75 mg PO Daily  Continue Amio 200mg PO     #CLAUDIA (acute kidney injury) on CKD   -follow labs, avoid nephrotoxins/hold home Metformin  - Cr 1.41 4/26; Cr 1.32 4/28  - monitor    #DM2 (hbg a1c 9.8)  Lantus HS Admelog units  Sliding Scale.  - consistent low carb diet.  - DC on home Farxiga, add Tradjenta 5 mg daily  - FU outpatient.    #CAD (coronary artery disease).   - ASA/Lipitor, trop neg yesterday  - TTE at baseline 4/23     #sleep disturbance  - Melatonin discontinued  - Ramelteon 8mg QHS 4/27  - monitor    #BPH (benign prostatic hyperplasia).   -Tamsulosin 0.4 PO HS-on hold for low BP  -monitor voiding, mobilize    Pain Mgmt - Tylenol PRN  GI/Bowel Mgmt - Senna,  Miralax PRN  /Bladder Mgmt - PVR x1    #Skin; Sternal incision LELA + abd lap sites x4       - DVT:   -Hep sq    Labs:   CBC, CMP 4/29

## 2024-04-28 NOTE — PROGRESS NOTE ADULT - SUBJECTIVE AND OBJECTIVE BOX
Patient seen and examined at bedside. no complaints.      ALLERGIES:  No Known Allergies    MEDICATIONS  (STANDING):  aMIOdarone    Tablet 200 milliGRAM(s) Oral daily  AQUAPHOR (petrolatum Ointment) 1 Application(s) Topical two times a day  aspirin enteric coated 81 milliGRAM(s) Oral daily  atorvastatin 80 milliGRAM(s) Oral <User Schedule>  clopidogrel Tablet 75 milliGRAM(s) Oral daily  dextrose 10% Bolus 125 milliLiter(s) IV Bolus once  dextrose 5%. 1000 milliLiter(s) (100 mL/Hr) IV Continuous <Continuous>  dextrose 5%. 1000 milliLiter(s) (50 mL/Hr) IV Continuous <Continuous>  dextrose 50% Injectable 25 Gram(s) IV Push once  dextrose 50% Injectable 12.5 Gram(s) IV Push once  famotidine    Tablet 20 milliGRAM(s) Oral daily  furosemide    Tablet 20 milliGRAM(s) Oral <User Schedule>  glucagon  Injectable 1 milliGRAM(s) IntraMuscular once  heparin   Injectable 5000 Unit(s) SubCutaneous every 8 hours  insulin glargine Injectable (LANTUS) 14 Unit(s) SubCutaneous <User Schedule>  insulin lispro (ADMELOG) corrective regimen sliding scale   SubCutaneous three times a day before meals  insulin lispro (ADMELOG) corrective regimen sliding scale   SubCutaneous at bedtime  linagliptin 5 milliGRAM(s) Oral with breakfast  metoprolol succinate ER 12.5 milliGRAM(s) Oral daily  ramelteon 8 milliGRAM(s) Oral at bedtime  sacubitril 24 mG/valsartan 26 mG 1 Tablet(s) Oral two times a day  senna 2 Tablet(s) Oral at bedtime    MEDICATIONS  (PRN):  acetaminophen     Tablet .. 650 milliGRAM(s) Oral every 6 hours PRN Moderate Pain (4 - 6)  ALPRAZolam 0.25 milliGRAM(s) Oral every 12 hours PRN anxiety  dextrose Oral Gel 15 Gram(s) Oral once PRN Blood Glucose LESS THAN 70 milliGRAM(s)/deciliter  polyethylene glycol 3350 17 Gram(s) Oral daily PRN Constipation    Vital Signs Last 24 Hrs  T(F): 98.6 (28 Apr 2024 08:15), Max: 98.6 (28 Apr 2024 08:15)  HR: 79 (28 Apr 2024 08:15) (74 - 87)  BP: 111/71 (28 Apr 2024 08:15) (111/71 - 126/79)  RR: 15 (28 Apr 2024 08:15) (15 - 16)  SpO2: 92% (28 Apr 2024 08:15) (92% - 93%)  I&O's Summary    27 Apr 2024 07:01  -  28 Apr 2024 07:00  --------------------------------------------------------  IN: 0 mL / OUT: 1200 mL / NET: -1200 mL        PHYSICAL EXAM:    Gen: nad, resting in bed  Neuro: aaox3, no focal deficits  Heent: eomi b/l, no jvd, no oral exudates  Pulm: cta b/l, no w/r/r  CV: +s1s2, no m/r/g  Ab: soft, nt/nd, normoactive bs x 4  Extrem: no edema, pulses intact and equal  Skin: no rashes  Psych: normal    LABS:      04-28    142  |  105  |  22  ----------------------------<  144  3.5   |  29  |  1.32    Ca    8.7      28 Apr 2024 05:24    POCT Blood Glucose.: 141 mg/dL (28 Apr 2024 07:49)  POCT Blood Glucose.: 202 mg/dL (27 Apr 2024 21:30)  POCT Blood Glucose.: 132 mg/dL (27 Apr 2024 17:08)  POCT Blood Glucose.: 144 mg/dL (27 Apr 2024 11:56)      Urinalysis Basic - ( 28 Apr 2024 05:24 )    Color: x / Appearance: x / SG: x / pH: x  Gluc: 144 mg/dL / Ketone: x  / Bili: x / Urobili: x   Blood: x / Protein: x / Nitrite: x   Leuk Esterase: x / RBC: x / WBC x   Sq Epi: x / Non Sq Epi: x / Bacteria: x            RADIOLOGY & ADDITIONAL TESTS:    Care Discussed with Consultants/Other Providers:

## 2024-04-28 NOTE — PROGRESS NOTE ADULT - ASSESSMENT
74yr Male, PMHx of CVA, MI, HTN, DM2, HLD, CAD s/p cardiac stents, CHF, hx of malignant melanoma/wide excision. To [facility] 4/1 for eval of ascending aorta replacement, aortic valve replacement, coronary artery bypass grafting with Dr. Ilia Ortiz. s/p BioBental  ascending aorta replacement with transverse hemiarch with bioprosthetic aortic valve replacement and 2V CABG. post op requiring mechanical support with IABP na d Inotropic support with IV Dobutamine and Milrinone for acute systolic heart failure. PO Amiodarone started for afib prophylaxis.  4/13 febrile w/ CLAUDIA, +fluid overload, zosyn / vanco empirically,+ diuresis. ID consulted, BC NGTD. On 4/20 Started on Lopressor 25 mg PO BID. Therapy started. Increase ambulation and activity as tolerated. PMR recommends acute rehab. Cleared for dc to [facility] rehab.     #cardiac surgery s/p acute heart failure, sepsis, prolonged course  #debility  -c/w comprehensive rehab as tolerated     #s/p BioBental with replacement of ascending aorta and transverse hemiarch  #AVR  #CABG  #HFrEF, EF 30-35%  - CXR with persistent moderate left pleural effusion and consolidation as well as hazy opacity right midlung field.  - c/w ASA, Plavix, Lipitor 80, amiodarone   - GDMT goal is Lopressor 25 mg PO BID, aldactone 25 mg PO daily, Farxiga 10 mg daily, and entresto   - Due to hypotension on 4/23 - GDMT was held.  - Lopressor resumed  - Resumed Entresto 4/27, tolerated.  - resume aldactone sunday  - Farxiga later this week if BP tolerates  - monitor BP. if cant tolerate resuming Entresto, will need arb  - Continue Lasix 20 mg daily  - cardio eval appreciated   - Strict I and Os  - daily standing weight    #Pleural effusion  -CXR with persistent moderate left pleural effusion and consolidation as well as hazy opacity right midlung field.  -seen by pulm, can consider thora however will need to hold plavix x 7 days which is not feasible at this time.  -no clinical signs of infection at this time  -c/w diuretics, escalate as tolerated/necessary    #CLAUDIA possible ATN  - Improving  - monitor BMP, repeat 4/28  - Avoid nephrotoxins  - continue to hold home Metformin    #DM2   - A1c 9.8  - Home meds: metformin 500 mg BID. Farxiga 10 mg daily  - hold metformin for now  - c/w Lantus 14 unit   - moderate ISS TIDAC, low ISS at h/s  - c/w linagliptin 5 mg daily (added on 4/23)  - Endo f/u OP    #BPH (benign prostatic hyperplasia).   -Tamsulosin 0.4 PO HS    # DVT-Ppx  - Heparin

## 2024-04-28 NOTE — PROGRESS NOTE ADULT - SUBJECTIVE AND OBJECTIVE BOX
Time of Visit:  Patient seen and examined.     MEDICATIONS  (STANDING):  aMIOdarone    Tablet 200 milliGRAM(s) Oral daily  AQUAPHOR (petrolatum Ointment) 1 Application(s) Topical two times a day  aspirin enteric coated 81 milliGRAM(s) Oral daily  atorvastatin 80 milliGRAM(s) Oral <User Schedule>  clopidogrel Tablet 75 milliGRAM(s) Oral daily  dextrose 10% Bolus 125 milliLiter(s) IV Bolus once  dextrose 5%. 1000 milliLiter(s) (100 mL/Hr) IV Continuous <Continuous>  dextrose 5%. 1000 milliLiter(s) (50 mL/Hr) IV Continuous <Continuous>  dextrose 50% Injectable 25 Gram(s) IV Push once  dextrose 50% Injectable 12.5 Gram(s) IV Push once  famotidine    Tablet 20 milliGRAM(s) Oral daily  furosemide    Tablet 20 milliGRAM(s) Oral <User Schedule>  glucagon  Injectable 1 milliGRAM(s) IntraMuscular once  heparin   Injectable 5000 Unit(s) SubCutaneous every 8 hours  insulin glargine Injectable (LANTUS) 14 Unit(s) SubCutaneous <User Schedule>  insulin lispro (ADMELOG) corrective regimen sliding scale   SubCutaneous three times a day before meals  insulin lispro (ADMELOG) corrective regimen sliding scale   SubCutaneous at bedtime  linagliptin 5 milliGRAM(s) Oral with breakfast  metoprolol succinate ER 12.5 milliGRAM(s) Oral daily  ramelteon 8 milliGRAM(s) Oral at bedtime  sacubitril 24 mG/valsartan 26 mG 1 Tablet(s) Oral two times a day  senna 2 Tablet(s) Oral at bedtime  spironolactone 25 milliGRAM(s) Oral daily      MEDICATIONS  (PRN):  acetaminophen     Tablet .. 650 milliGRAM(s) Oral every 6 hours PRN Moderate Pain (4 - 6)  ALPRAZolam 0.25 milliGRAM(s) Oral every 12 hours PRN anxiety  dextrose Oral Gel 15 Gram(s) Oral once PRN Blood Glucose LESS THAN 70 milliGRAM(s)/deciliter  polyethylene glycol 3350 17 Gram(s) Oral daily PRN Constipation       Medications up to date at time of exam.      PHYSICAL EXAMINATION:  Patient has no new complaints.  GENERAL: The patient  in no apparent distress.     Vital Signs Last 24 Hrs  T(C): 37 (28 Apr 2024 08:15), Max: 37 (28 Apr 2024 08:15)  T(F): 98.6 (28 Apr 2024 08:15), Max: 98.6 (28 Apr 2024 08:15)  HR: 79 (28 Apr 2024 08:15) (74 - 87)  BP: 111/71 (28 Apr 2024 08:15) (111/71 - 126/79)  BP(mean): --  RR: 15 (28 Apr 2024 08:15) (15 - 16)  SpO2: 92% (28 Apr 2024 08:15) (92% - 93%)    Parameters below as of 28 Apr 2024 08:15  Patient On (Oxygen Delivery Method): room air       (if applicable)    Chest Tube (if applicable)    HEENT: Head is normocephalic and atraumatic. Extraocular muscles are intact. Mucous membranes are moist.     NECK: Supple, no palpable adenopathy.    LUNGS: Fair bilateral air entry   no wheezing, rales, or rhonchi.    HEART: Regular rate and rhythm without murmur. + sternal suture     ABDOMEN: Soft, nontender, and nondistended.  No hepatosplenomegaly is noted. + suture with redness and location granulation     : No painful voiding, no pelvic pain    EXTREMITIES: Without any cyanosis, clubbing, rash, lesions or edema.    NEUROLOGIC: Awake, alert, oriented, grossly intact    SKIN: Warm, dry, good turgor.      LABS:    04-28    142  |  105  |  22  ----------------------------<  144<H>  3.5   |  29  |  1.32<H>    Ca    8.7      28 Apr 2024 05:24        Urinalysis Basic - ( 28 Apr 2024 05:24 )    Color: x / Appearance: x / SG: x / pH: x  Gluc: 144 mg/dL / Ketone: x  / Bili: x / Urobili: x   Blood: x / Protein: x / Nitrite: x   Leuk Esterase: x / RBC: x / WBC x   Sq Epi: x / Non Sq Epi: x / Bacteria: x                      MICROBIOLOGY: (if applicable)    RADIOLOGY & ADDITIONAL STUDIES:  EKG:   CXR:  ECHO:    IMPRESSION: 74y Male PAST MEDICAL & SURGICAL HISTORY:  HTN (hypertension)      Hearing decreased      CVA (cerebral vascular accident)  12/22/2016      Hyperlipemia      Kidney stones      Coronary artery disease  MI 12/22/2016      CHF (congestive heart failure)  1/2017 stents x3      Type 2 diabetes mellitus  2016      Confusion  from stroke      S/P medial meniscal repair  and ligament surgery      H/O lithotripsy      S/P tonsillectomy      Bilateral inguinal hernia       p/w       IMP: This is a 74 yr old man with  CVA, MI, HTN, DM2, HLD, CAD s/p cardiac stents, CHFr EF , hx of malignant melanoma/wide excision. To [facility] 4/1 for eval of ascending aorta replacement, aortic valve replacement, coronary artery bypass grafting with Dr. Ilia Ortiz. Admitted preop/NPO after midnight for OR on 4/7. On 4/8 underwent ASCENDING AORTIC REPLACEMENT WITH TRANSVERSE HEMIARCH, CABGx2, AVR-BIOPROSTHETIC VALVE.      Asked  to evaluate for moderate left pleural effusion and relatively asymptomatic from a pulmonary point of view. Pleural effusion most likely related to CABG or post cardiac instrumentation syndrome ,unlikely HF although pat has reduce EF and elevated proBNP. No physical finding of fluid over load or HF.  My Prelim echo review at bedside  + pericardial effusion not having tamponade  effect and pat is not clinically reflecting tamponade . Reduced EF ( ~EF 25%)  Pat is on plavix with recent cardiac surgery . Will no hold plavix for 7 days before thoracentesis ( non emergent )     Sugg  - Please abd sutures   - IF pat become SOB , repeat 2 DECO and CXR  - Holding plavix when appropriate as per cards , not feasible at present due to recent CABG   - CXR 4/26 no change in left effusion   - Optimize HF meds   - Wound care   - No antibx at this time

## 2024-04-28 NOTE — PROGRESS NOTE ADULT - SUBJECTIVE AND OBJECTIVE BOX
HPI:  74yr Male, PMHx of CVA, MI, HTN, DM2, HLD, CAD s/p cardiac stents, CHF, hx of malignant melanoma/wide excision. To [facility] 4/1 for eval of ascending aorta replacement, aortic valve replacement, coronary artery bypass grafting with Dr. Ilia Ortiz. Admitted preop/NPO after midnight for OR on 4/7. On 4/8 underwent ASCENDING AORTIC REPLACEMENT WITH TRANSVERSE HEMIARCH, CABGx2, AVR-BIOPROSTHETIC VALVE. Inotropic support with IV Dobutamine and Milrinone post op for acute systolic heart failure. IABP, on PO Amiodarone for afib prophylaxis. Extubated to 5L-->hi flow. Endocrine consulted for DM2, Insulin gtt. 4/13 febrile w/CLAUDIA, +fluid overload, zosyn / vanco empirically,+ diuresis. Hypotension- Taran-Synephrine/ Milrinone gtt. ID consulted, BC NGTD. On 4/18 Serum Glucose 62. On 4/20 Started on Lopressor 25 mg PO BID. Therapy started. Increase ambulation and activity as tolerated.   PMR recommends acute rehab. Cleared for dc to [facility] rehab.  (22 Apr 2024 15:43)        ROS/subjective:  Patient seen and evaluated at bedside this morning. Patient in bed in NAD, no SOB, no n/v, no pain.  Difficulty sleeping  last  4/26        MEDICATIONS  (STANDING):  aMIOdarone    Tablet 200 milliGRAM(s) Oral daily  AQUAPHOR (petrolatum Ointment) 1 Application(s) Topical two times a day  aspirin enteric coated 81 milliGRAM(s) Oral daily  atorvastatin 80 milliGRAM(s) Oral <User Schedule>  clopidogrel Tablet 75 milliGRAM(s) Oral daily  dextrose 10% Bolus 125 milliLiter(s) IV Bolus once  dextrose 5%. 1000 milliLiter(s) (100 mL/Hr) IV Continuous <Continuous>  dextrose 5%. 1000 milliLiter(s) (50 mL/Hr) IV Continuous <Continuous>  dextrose 50% Injectable 25 Gram(s) IV Push once  dextrose 50% Injectable 12.5 Gram(s) IV Push once  famotidine    Tablet 20 milliGRAM(s) Oral daily  furosemide    Tablet 20 milliGRAM(s) Oral <User Schedule>  glucagon  Injectable 1 milliGRAM(s) IntraMuscular once  heparin   Injectable 5000 Unit(s) SubCutaneous every 8 hours  insulin glargine Injectable (LANTUS) 14 Unit(s) SubCutaneous <User Schedule>  insulin lispro (ADMELOG) corrective regimen sliding scale   SubCutaneous three times a day before meals  insulin lispro (ADMELOG) corrective regimen sliding scale   SubCutaneous at bedtime  linagliptin 5 milliGRAM(s) Oral with breakfast  melatonin 6 milliGRAM(s) Oral at bedtime  metoprolol succinate ER 12.5 milliGRAM(s) Oral daily  senna 2 Tablet(s) Oral at bedtime    MEDICATIONS  (PRN):  acetaminophen     Tablet .. 650 milliGRAM(s) Oral every 6 hours PRN Moderate Pain (4 - 6)  ALPRAZolam 0.25 milliGRAM(s) Oral every 12 hours PRN anxiety  dextrose Oral Gel 15 Gram(s) Oral once PRN Blood Glucose LESS THAN 70 milliGRAM(s)/deciliter  polyethylene glycol 3350 17 Gram(s) Oral daily PRN Constipation                     Vital Signs Last 24 Hrs  T(C): 37 (28 Apr 2024 08:15), Max: 37 (28 Apr 2024 08:15)  T(F): 98.6 (28 Apr 2024 08:15), Max: 98.6 (28 Apr 2024 08:15)  HR: 79 (28 Apr 2024 08:15) (74 - 87)  BP: 111/71 (28 Apr 2024 08:15) (111/71 - 126/79)  BP(mean): --  RR: 15 (28 Apr 2024 08:15) (15 - 16)  SpO2: 92% (28 Apr 2024 08:15) (92% - 93%)    Parameters below as of 28 Apr 2024 08:15  Patient On (Oxygen Delivery Method): room air          Gen - NAD, Comfortable  HEENT - NCAT, MMM  Neck - Supple  Pulm - CTAB,   Cardiovascular - RRR, S1S2  Chest - good chest expansion, good respiratory effort  Abdomen - Soft, NT/ND, +BS  Extremities - No Cyanosis, no clubbing, min edema to BLE, no calf tenderness  Neuro-     Cognitive - awake, alert, and follows simple commands, cooperative     Communication - Fluent         Motor -                    LEFT    UE - 5/5                    RIGHT UE - 5/5                    LEFT    LE - 4+-5/5                    RIGHT LE - 4+-5/5         Sensory - Intact  to LT     Tone - normal    LABS:  LABS:    28 Apr 2024 05:24    142    |  105    |  22     ----------------------------<  144    3.5     |  29     |  1.32     Ca    8.7        28 Apr 2024 05:24            Ca    8.6        26 Apr 2024 05:00                   9.7    7.54  )-----------( 586      ( 25 Apr 2024 05:18 )             30.3     04-26    142  |  106  |  25<H>  ----------------------------<  91  3.8   |  29  |  1.41<H>    Ca    8.6      26 Apr 2024 05:00    TPro  5.9<L>  /  Alb  2.6<L>  /  TBili  0.8  /  DBili  x   /  AST  19  /  ALT  27  /  AlkPhos  84  04-25    Urinalysis Basic - ( 26 Apr 2024 05:00 )    Color: x / Appearance: x / SG: x / pH: x  Gluc: 91 mg/dL / Ketone: x  / Bili: x / Urobili: x   Blood: x / Protein: x / Nitrite: x   Leuk Esterase: x / RBC: x / WBC x   Sq Epi: x / Non Sq Epi: x / Bacteria: x        CAPILLARY BLOOD GLUCOSE      POCT Blood Glucose.: 102 mg/dL (26 Apr 2024 07:43)  POCT Blood Glucose.: 124 mg/dL (25 Apr 2024 21:01)  POCT Blood Glucose.: 173 mg/dL (25 Apr 2024 17:15)  POCT Blood Glucose.: 131 mg/dL (25 Apr 2024 12:20)

## 2024-04-29 LAB
ALBUMIN SERPL ELPH-MCNC: 2.7 G/DL — LOW (ref 3.3–5)
ALP SERPL-CCNC: 104 U/L — SIGNIFICANT CHANGE UP (ref 40–120)
ALT FLD-CCNC: 24 U/L — SIGNIFICANT CHANGE UP (ref 10–45)
ANION GAP SERPL CALC-SCNC: 11 MMOL/L — SIGNIFICANT CHANGE UP (ref 5–17)
AST SERPL-CCNC: 16 U/L — SIGNIFICANT CHANGE UP (ref 10–40)
BILIRUB SERPL-MCNC: 1 MG/DL — SIGNIFICANT CHANGE UP (ref 0.2–1.2)
BUN SERPL-MCNC: 21 MG/DL — SIGNIFICANT CHANGE UP (ref 7–23)
CALCIUM SERPL-MCNC: 8.7 MG/DL — SIGNIFICANT CHANGE UP (ref 8.4–10.5)
CHLORIDE SERPL-SCNC: 104 MMOL/L — SIGNIFICANT CHANGE UP (ref 96–108)
CO2 SERPL-SCNC: 27 MMOL/L — SIGNIFICANT CHANGE UP (ref 22–31)
CREAT SERPL-MCNC: 1.24 MG/DL — SIGNIFICANT CHANGE UP (ref 0.5–1.3)
EGFR: 60 ML/MIN/1.73M2 — SIGNIFICANT CHANGE UP
GLUCOSE BLDC GLUCOMTR-MCNC: 135 MG/DL — HIGH (ref 70–99)
GLUCOSE BLDC GLUCOMTR-MCNC: 135 MG/DL — HIGH (ref 70–99)
GLUCOSE BLDC GLUCOMTR-MCNC: 139 MG/DL — HIGH (ref 70–99)
GLUCOSE BLDC GLUCOMTR-MCNC: 143 MG/DL — HIGH (ref 70–99)
GLUCOSE SERPL-MCNC: 145 MG/DL — HIGH (ref 70–99)
HCT VFR BLD CALC: 35 % — LOW (ref 39–50)
HGB BLD-MCNC: 11.5 G/DL — LOW (ref 13–17)
MCHC RBC-ENTMCNC: 28.1 PG — SIGNIFICANT CHANGE UP (ref 27–34)
MCHC RBC-ENTMCNC: 32.9 GM/DL — SIGNIFICANT CHANGE UP (ref 32–36)
MCV RBC AUTO: 85.6 FL — SIGNIFICANT CHANGE UP (ref 80–100)
NRBC # BLD: 0 /100 WBCS — SIGNIFICANT CHANGE UP (ref 0–0)
PLATELET # BLD AUTO: 454 K/UL — HIGH (ref 150–400)
POTASSIUM SERPL-MCNC: 3.3 MMOL/L — LOW (ref 3.5–5.3)
POTASSIUM SERPL-SCNC: 3.3 MMOL/L — LOW (ref 3.5–5.3)
PROT SERPL-MCNC: 6.1 G/DL — SIGNIFICANT CHANGE UP (ref 6–8.3)
RBC # BLD: 4.09 M/UL — LOW (ref 4.2–5.8)
RBC # FLD: 15.3 % — HIGH (ref 10.3–14.5)
SODIUM SERPL-SCNC: 142 MMOL/L — SIGNIFICANT CHANGE UP (ref 135–145)
WBC # BLD: 7.66 K/UL — SIGNIFICANT CHANGE UP (ref 3.8–10.5)
WBC # FLD AUTO: 7.66 K/UL — SIGNIFICANT CHANGE UP (ref 3.8–10.5)

## 2024-04-29 PROCEDURE — 99232 SBSQ HOSP IP/OBS MODERATE 35: CPT | Mod: GC

## 2024-04-29 RX ORDER — TRAZODONE HCL 50 MG
25 TABLET ORAL AT BEDTIME
Refills: 0 | Status: DISCONTINUED | OUTPATIENT
Start: 2024-04-29 | End: 2024-05-04

## 2024-04-29 RX ORDER — ALPRAZOLAM 0.25 MG
0.25 TABLET ORAL EVERY 12 HOURS
Refills: 0 | Status: DISCONTINUED | OUTPATIENT
Start: 2024-04-29 | End: 2024-05-01

## 2024-04-29 RX ORDER — POTASSIUM CHLORIDE 20 MEQ
40 PACKET (EA) ORAL ONCE
Refills: 0 | Status: COMPLETED | OUTPATIENT
Start: 2024-04-29 | End: 2024-04-29

## 2024-04-29 RX ADMIN — Medication 81 MILLIGRAM(S): at 11:26

## 2024-04-29 RX ADMIN — Medication 1 APPLICATION(S): at 06:31

## 2024-04-29 RX ADMIN — Medication 40 MILLIEQUIVALENT(S): at 11:28

## 2024-04-29 RX ADMIN — Medication 20 MILLIGRAM(S): at 06:30

## 2024-04-29 RX ADMIN — SACUBITRIL AND VALSARTAN 1 TABLET(S): 24; 26 TABLET, FILM COATED ORAL at 17:37

## 2024-04-29 RX ADMIN — Medication 20 MILLIGRAM(S): at 17:37

## 2024-04-29 RX ADMIN — SACUBITRIL AND VALSARTAN 1 TABLET(S): 24; 26 TABLET, FILM COATED ORAL at 06:30

## 2024-04-29 RX ADMIN — Medication 25 MILLIGRAM(S): at 22:13

## 2024-04-29 RX ADMIN — HEPARIN SODIUM 5000 UNIT(S): 5000 INJECTION INTRAVENOUS; SUBCUTANEOUS at 15:14

## 2024-04-29 RX ADMIN — CLOPIDOGREL BISULFATE 75 MILLIGRAM(S): 75 TABLET, FILM COATED ORAL at 11:27

## 2024-04-29 RX ADMIN — HEPARIN SODIUM 5000 UNIT(S): 5000 INJECTION INTRAVENOUS; SUBCUTANEOUS at 06:30

## 2024-04-29 RX ADMIN — SPIRONOLACTONE 25 MILLIGRAM(S): 25 TABLET, FILM COATED ORAL at 06:30

## 2024-04-29 RX ADMIN — HEPARIN SODIUM 5000 UNIT(S): 5000 INJECTION INTRAVENOUS; SUBCUTANEOUS at 22:13

## 2024-04-29 RX ADMIN — Medication 12.5 MILLIGRAM(S): at 06:31

## 2024-04-29 RX ADMIN — ATORVASTATIN CALCIUM 80 MILLIGRAM(S): 80 TABLET, FILM COATED ORAL at 17:37

## 2024-04-29 RX ADMIN — LINAGLIPTIN 5 MILLIGRAM(S): 5 TABLET, FILM COATED ORAL at 08:02

## 2024-04-29 RX ADMIN — FAMOTIDINE 20 MILLIGRAM(S): 10 INJECTION INTRAVENOUS at 11:27

## 2024-04-29 RX ADMIN — Medication 1 APPLICATION(S): at 22:23

## 2024-04-29 RX ADMIN — AMIODARONE HYDROCHLORIDE 200 MILLIGRAM(S): 400 TABLET ORAL at 06:30

## 2024-04-29 RX ADMIN — INSULIN GLARGINE 14 UNIT(S): 100 INJECTION, SOLUTION SUBCUTANEOUS at 22:13

## 2024-04-29 NOTE — PROGRESS NOTE ADULT - ASSESSMENT
IRA SEN is a 75yo Male s/p CABG, now with decreased functional mobility, gait instability and ADL impairments.    COMORBIDITIES/ACTIVE MEDICAL ISSUES     Gait Instability, ADL impairments and Functional impairments: Comprehensive Rehab Program of PT/OT      #S/P CABG (coronary artery bypass graft).   - ASA 81 mg PO Daily.   - Lipitor 80 mg PO HS   Plavix 75 mg PO Daily  - Lopressor 25 mg PO BID to Toprol 12.5mg-per Cardio   - Continue with Heparin subcutaneous 5000 units q 8 hr for DVT ppx   - Continue with Pepcid 20 mg PO Daily  - Lasix 20 mg po qd -resumed> increased to 20mg 2x/day.   - aldactone 25 qd restarted   Started on Entresto  - fall and sternal precautions  - IS    #Pleural effusion  -CXR today per pulm  - lasix 20mg BID  - sats 94% RA  will follow CXR    # S/P ascending aortic aneurysm repair.   -Continue with Plavix 75 mg PO Daily  Continue Amio 200mg PO     #CLAUDIA (acute kidney injury) on CKD   -follow labs, avoid nephrotoxins/hold home Metformin   1.2 4/29 improved    #DM2 (hbg a1c 9.8)  Lantus HS Admelog units  Sliding Scale.  - consistent low carb diet.  - DC on home Farxiga, add Tradjenta 5 mg daily  - FU outpatient.    Hypokalemia  secondary to Lasix  check BMP / Mg in AM  K supplemented- may need daily dosing    #CAD (coronary artery disease).   - ASA/Lipitor, trop neg yesterday  - TTE at baseline 4/23     #BPH (benign prostatic hyperplasia).   -Tamsulosin 0.4 PO HS-on hold for low BP  -monitor voiding, mobilize    Pain Mgmt - Tylenol PRN  GI/Bowel Mgmt - Senna,  Miralax PRN  /Bladder Mgmt - PVR x1    #Skin; Sternal incision LELA + abd lap sites x 4, b/l medial thigh incision, right groin puncture site, Deep tissue injury (DTI) to b/l buttock. pressure related    Poor Sleep  On Melatonin  Rozerem DCd  started on Trazadone 25mg HS    Precautions / PROPHYLAXIS:   - Falls, Cardiac, Sternal  - Lungs: Aspiration, Incentive Spirometer   - Pressure injury/Skin: Turn Q2hrs while in bed, OOB to Chair, PT/OT    - DVT: Hep sq

## 2024-04-29 NOTE — PROGRESS NOTE ADULT - SUBJECTIVE AND OBJECTIVE BOX
HPI:  74yr Male, PMHx of CVA, MI, HTN, DM2, HLD, CAD s/p cardiac stents, CHF, hx of malignant melanoma/wide excision. To [facility] 4/1 for eval of ascending aorta replacement, aortic valve replacement, coronary artery bypass grafting with Dr. Ilia Ortiz. Admitted preop/NPO after midnight for OR on 4/7. On 4/8 underwent ASCENDING AORTIC REPLACEMENT WITH TRANSVERSE HEMIARCH, CABGx2, AVR-BIOPROSTHETIC VALVE. Inotropic support with IV Dobutamine and Milrinone post op for acute systolic heart failure. IABP, on PO Amiodarone for afib prophylaxis. Extubated to 5L-->hi flow. Endocrine consulted for DM2, Insulin gtt. 4/13 febrile w/CLAUDIA, +fluid overload, zosyn / vanco empirically,+ diuresis. Hypotension- Taran-Synephrine/ Milrinone gtt. ID consulted, BC NGTD. On 4/18 Serum Glucose 62. On 4/20 Started on Lopressor 25 mg PO BID. Therapy started. Increase ambulation and activity as tolerated.   PMR recommends acute rehab. Cleared for dc to [facility] rehab.  (22 Apr 2024 15:43)    ROS/subjective:  Patient seen and evaluated in bed  reports inability to fall back to sleep after 3 hours- took Rozerem but ineffective  less Foggy  Hospitalist resumed all cardiac meds over weekend- BPS good, renal function better- no SOB- now on Entresto  last Bm 4/28  daily weights- bed weight today 159- inaccurate        MEDICATIONS  (STANDING):  aMIOdarone    Tablet 200 milliGRAM(s) Oral daily  AQUAPHOR (petrolatum Ointment) 1 Application(s) Topical two times a day  aspirin enteric coated 81 milliGRAM(s) Oral daily  atorvastatin 80 milliGRAM(s) Oral <User Schedule>  clopidogrel Tablet 75 milliGRAM(s) Oral daily  dextrose 10% Bolus 125 milliLiter(s) IV Bolus once  dextrose 5%. 1000 milliLiter(s) (100 mL/Hr) IV Continuous <Continuous>  dextrose 5%. 1000 milliLiter(s) (50 mL/Hr) IV Continuous <Continuous>  dextrose 50% Injectable 25 Gram(s) IV Push once  dextrose 50% Injectable 12.5 Gram(s) IV Push once  famotidine    Tablet 20 milliGRAM(s) Oral daily  furosemide    Tablet 20 milliGRAM(s) Oral <User Schedule>  glucagon  Injectable 1 milliGRAM(s) IntraMuscular once  heparin   Injectable 5000 Unit(s) SubCutaneous every 8 hours  insulin glargine Injectable (LANTUS) 14 Unit(s) SubCutaneous <User Schedule>  insulin lispro (ADMELOG) corrective regimen sliding scale   SubCutaneous three times a day before meals  insulin lispro (ADMELOG) corrective regimen sliding scale   SubCutaneous at bedtime  linagliptin 5 milliGRAM(s) Oral with breakfast  metoprolol succinate ER 12.5 milliGRAM(s) Oral daily  sacubitril 24 mG/valsartan 26 mG 1 Tablet(s) Oral two times a day  senna 2 Tablet(s) Oral at bedtime  spironolactone 25 milliGRAM(s) Oral daily  traZODone 25 milliGRAM(s) Oral at bedtime    MEDICATIONS  (PRN):  acetaminophen     Tablet .. 650 milliGRAM(s) Oral every 6 hours PRN Moderate Pain (4 - 6)  ALPRAZolam 0.25 milliGRAM(s) Oral every 12 hours PRN anxiety  dextrose Oral Gel 15 Gram(s) Oral once PRN Blood Glucose LESS THAN 70 milliGRAM(s)/deciliter  polyethylene glycol 3350 17 Gram(s) Oral daily PRN Constipation                            11.5   7.66  )-----------( 454      ( 29 Apr 2024 06:00 )             35.0     04-29    142  |  104  |  21  ----------------------------<  145<H>  3.3<L>   |  27  |  1.24    Ca    8.7      29 Apr 2024 06:00    TPro  6.1  /  Alb  2.7<L>  /  TBili  1.0  /  DBili  x   /  AST  16  /  ALT  24  /  AlkPhos  104  04-29    Urinalysis Basic - ( 29 Apr 2024 06:00 )    Color: x / Appearance: x / SG: x / pH: x  Gluc: 145 mg/dL / Ketone: x  / Bili: x / Urobili: x   Blood: x / Protein: x / Nitrite: x   Leuk Esterase: x / RBC: x / WBC x   Sq Epi: x / Non Sq Epi: x / Bacteria: x        CAPILLARY BLOOD GLUCOSE      POCT Blood Glucose.: 143 mg/dL (29 Apr 2024 11:41)  POCT Blood Glucose.: 135 mg/dL (29 Apr 2024 08:00)  POCT Blood Glucose.: 135 mg/dL (28 Apr 2024 22:31)  POCT Blood Glucose.: 104 mg/dL (28 Apr 2024 17:22)      Vital Signs Last 24 Hrs  T(C): 36.8 (29 Apr 2024 09:21), Max: 36.9 (29 Apr 2024 06:31)  T(F): 98.3 (29 Apr 2024 09:21), Max: 98.5 (29 Apr 2024 06:31)  HR: 75 (29 Apr 2024 09:21) (73 - 77)  BP: 117/77 (29 Apr 2024 09:21) (117/77 - 130/65)  BP(mean): --  RR: 16 (29 Apr 2024 09:21) (15 - 16)  SpO2: 96% (29 Apr 2024 09:21) (93% - 96%)    Parameters below as of 29 Apr 2024 09:21  Patient On (Oxygen Delivery Method): room air      Gen - NAD, Comfortable  HEENT - NCAT, EOMI  Neck - Supple, No limited ROM  Pulm - CTAB, No wheeze, No rhonchi, No crackles  Cardiovascular - RRR, S1S2  Chest - good chest expansion, good respiratory effort  Abdomen - Soft, NT/ND, +BS  Extremities - No Cyanosis, no clubbing, edema to BLE, no calf tenderness  Neuro-     Cognitive - awake, alert, oriented to person, place, situation, month, follows simple commands ok     Communication - Fluent     Memory: distant Memory intact     Cranial Nerves - CN 2-12 intact.     Motor -                    LEFT    UE - 5/5                    RIGHT UE - 5/5                    LEFT    LE - 4/5 HF otherwise 5/5                    RIGHT LE - 4+/5  HF otherwise 5/5       Sensory - Intact  to LT     Tone - normal  Skin: Sternal incision LELA + abd lap sites x 4, b/l medial thigh incision, right groin puncture site, Deep tissue injury (DTI) to b/l buttock-pressure related      Continue comprehensive acute rehab program consisting of 3hrs/day of OT/PT.

## 2024-04-30 LAB
ANION GAP SERPL CALC-SCNC: 9 MMOL/L — SIGNIFICANT CHANGE UP (ref 5–17)
BUN SERPL-MCNC: 21 MG/DL — SIGNIFICANT CHANGE UP (ref 7–23)
CALCIUM SERPL-MCNC: 8.7 MG/DL — SIGNIFICANT CHANGE UP (ref 8.4–10.5)
CHLORIDE SERPL-SCNC: 106 MMOL/L — SIGNIFICANT CHANGE UP (ref 96–108)
CO2 SERPL-SCNC: 28 MMOL/L — SIGNIFICANT CHANGE UP (ref 22–31)
CREAT SERPL-MCNC: 1.41 MG/DL — HIGH (ref 0.5–1.3)
EGFR: 52 ML/MIN/1.73M2 — LOW
GLUCOSE BLDC GLUCOMTR-MCNC: 123 MG/DL — HIGH (ref 70–99)
GLUCOSE BLDC GLUCOMTR-MCNC: 137 MG/DL — HIGH (ref 70–99)
GLUCOSE BLDC GLUCOMTR-MCNC: 138 MG/DL — HIGH (ref 70–99)
GLUCOSE BLDC GLUCOMTR-MCNC: 171 MG/DL — HIGH (ref 70–99)
GLUCOSE SERPL-MCNC: 127 MG/DL — HIGH (ref 70–99)
MAGNESIUM SERPL-MCNC: 1.9 MG/DL — SIGNIFICANT CHANGE UP (ref 1.6–2.6)
POTASSIUM SERPL-MCNC: 3.6 MMOL/L — SIGNIFICANT CHANGE UP (ref 3.5–5.3)
POTASSIUM SERPL-SCNC: 3.6 MMOL/L — SIGNIFICANT CHANGE UP (ref 3.5–5.3)
SODIUM SERPL-SCNC: 143 MMOL/L — SIGNIFICANT CHANGE UP (ref 135–145)

## 2024-04-30 PROCEDURE — 99232 SBSQ HOSP IP/OBS MODERATE 35: CPT | Mod: GC

## 2024-04-30 RX ADMIN — Medication 650 MILLIGRAM(S): at 08:55

## 2024-04-30 RX ADMIN — Medication 20 MILLIGRAM(S): at 17:57

## 2024-04-30 RX ADMIN — LINAGLIPTIN 5 MILLIGRAM(S): 5 TABLET, FILM COATED ORAL at 07:57

## 2024-04-30 RX ADMIN — HEPARIN SODIUM 5000 UNIT(S): 5000 INJECTION INTRAVENOUS; SUBCUTANEOUS at 14:13

## 2024-04-30 RX ADMIN — Medication 12.5 MILLIGRAM(S): at 05:41

## 2024-04-30 RX ADMIN — CLOPIDOGREL BISULFATE 75 MILLIGRAM(S): 75 TABLET, FILM COATED ORAL at 12:24

## 2024-04-30 RX ADMIN — AMIODARONE HYDROCHLORIDE 200 MILLIGRAM(S): 400 TABLET ORAL at 05:41

## 2024-04-30 RX ADMIN — SACUBITRIL AND VALSARTAN 1 TABLET(S): 24; 26 TABLET, FILM COATED ORAL at 17:57

## 2024-04-30 RX ADMIN — SPIRONOLACTONE 25 MILLIGRAM(S): 25 TABLET, FILM COATED ORAL at 05:40

## 2024-04-30 RX ADMIN — Medication 1 APPLICATION(S): at 05:41

## 2024-04-30 RX ADMIN — SACUBITRIL AND VALSARTAN 1 TABLET(S): 24; 26 TABLET, FILM COATED ORAL at 05:41

## 2024-04-30 RX ADMIN — FAMOTIDINE 20 MILLIGRAM(S): 10 INJECTION INTRAVENOUS at 12:25

## 2024-04-30 RX ADMIN — HEPARIN SODIUM 5000 UNIT(S): 5000 INJECTION INTRAVENOUS; SUBCUTANEOUS at 05:40

## 2024-04-30 RX ADMIN — Medication 20 MILLIGRAM(S): at 07:47

## 2024-04-30 RX ADMIN — Medication 81 MILLIGRAM(S): at 12:24

## 2024-04-30 RX ADMIN — Medication 0.25 MILLIGRAM(S): at 21:54

## 2024-04-30 RX ADMIN — ATORVASTATIN CALCIUM 80 MILLIGRAM(S): 80 TABLET, FILM COATED ORAL at 17:57

## 2024-04-30 RX ADMIN — HEPARIN SODIUM 5000 UNIT(S): 5000 INJECTION INTRAVENOUS; SUBCUTANEOUS at 21:54

## 2024-04-30 RX ADMIN — Medication 25 MILLIGRAM(S): at 21:54

## 2024-04-30 RX ADMIN — INSULIN GLARGINE 14 UNIT(S): 100 INJECTION, SOLUTION SUBCUTANEOUS at 21:53

## 2024-04-30 RX ADMIN — Medication 1 APPLICATION(S): at 17:58

## 2024-04-30 RX ADMIN — Medication 650 MILLIGRAM(S): at 07:58

## 2024-04-30 NOTE — PROGRESS NOTE ADULT - ASSESSMENT
Assessment  74 year  old male with  CVA, MI, HTN, DM2, HLD, CAD s/p cardiac stents, CHFr EF , hx of malignant melanoma/wide excision. To [facility] 4/1 for eval of ascending aorta replacement, aortic valve replacement, coronary artery bypass grafting with Dr. Ilia Ortiz. Admitted preop/NPO after midnight for OR on 4/7. On 4/8 underwent ASCENDING AORTIC REPLACEMENT WITH TRANSVERSE HEMIARCH, CABGx2, AVR-BIOPROSTHETIC VALVE.     Problem List  Moderate left pleural effusion     likely from post operative changes and cardiac instrumentation  doubt acute CHF exacerbation       Plan  patient asymptomatic from pulmonary pespective  continue PT/OT as tolerated  check CXR/echo if sob develops  no acute intervention planned  will need out patient f/u with CT surg and r/u cxr to see if effusion changing in size.  rest of care as per primary team and cardio  will follow up as needed

## 2024-04-30 NOTE — PROGRESS NOTE ADULT - ASSESSMENT
IRA SEN is a 75yo Male s/p CABG, now with decreased functional mobility, gait instability and ADL impairments.    COMORBIDITIES/ACTIVE MEDICAL ISSUES     Gait Instability, ADL impairments and Functional impairments: Comprehensive Rehab Program of PT/OT      #S/P CABG (coronary artery bypass graft).   - ASA 81 mg PO Daily.   - Lipitor 80 mg PO HS   Plavix 75 mg PO Daily  - Lopressor 25 mg PO BID to Toprol 12.5mg-per Cardio   - Continue with Heparin subcutaneous 5000 units q 8 hr for DVT ppx   - Continue with Pepcid 20 mg PO Daily  - Lasix 20 mg po qd -resumed> increased to 20mg 2x/day.   - aldactone 25 qd restarted   Started on Entresto  - fall and sternal precautions  - IS    #Pleural effusion  -CXR today per pulm  - lasix 20mg BID  - sats 94% RA  will follow CXR    # S/P ascending aortic aneurysm repair.   -Continue with Plavix 75 mg PO Daily  Continue Amio 200mg PO     #CLAUDIA (acute kidney injury) on CKD   -follow labs, avoid nephrotoxins/hold home Metformin   1.2 4/29> 1.4 4/30    #DM2 (hbg a1c 9.8)  Lantus HS Admelog units  Sliding Scale.  - consistent low carb diet.  - DC on home Farxiga, add Tradjenta 5 mg daily  - FU outpatient.    Hypokalemia  secondary to Lasix  K 3.6 Mg 1.9 on 4/30  follow BMP    #CAD (coronary artery disease).   - ASA/Lipitor, trop neg yesterday  - TTE at baseline 4/23     #BPH (benign prostatic hyperplasia).   -Tamsulosin 0.4 PO HS-on hold for low BP  -monitor voiding, mobilize    Pain Mgmt - Tylenol PRN  GI/Bowel Mgmt - Senna,  Miralax PRN  /Bladder Mgmt - PVR x1    #Skin; Sternal incision LELA + abd lap sites x 4, b/l medial thigh incision, right groin puncture site, Deep tissue injury (DTI) to b/l buttock. pressure related    Poor Sleep  On Melatonin  Rozerem DCd  started on Trazadone 25mg HS    Precautions / PROPHYLAXIS:   - Falls, Cardiac, Sternal  - Lungs: Aspiration, Incentive Spirometer   - Pressure injury/Skin: Turn Q2hrs while in bed, OOB to Chair, PT/OT    - DVT: Hep sq

## 2024-04-30 NOTE — PROGRESS NOTE ADULT - SUBJECTIVE AND OBJECTIVE BOX
HPI:  74yr Male, PMHx of CVA, MI, HTN, DM2, HLD, CAD s/p cardiac stents, CHF, hx of malignant melanoma/wide excision. To [facility] 4/1 for eval of ascending aorta replacement, aortic valve replacement, coronary artery bypass grafting with Dr. Ilia Ortiz. Admitted preop/NPO after midnight for OR on 4/7. On 4/8 underwent ASCENDING AORTIC REPLACEMENT WITH TRANSVERSE HEMIARCH, CABGx2, AVR-BIOPROSTHETIC VALVE. Inotropic support with IV Dobutamine and Milrinone post op for acute systolic heart failure. IABP, on PO Amiodarone for afib prophylaxis. Extubated to 5L-->hi flow. Endocrine consulted for DM2, Insulin gtt. 4/13 febrile w/CLAUDIA, +fluid overload, zosyn / vanco empirically,+ diuresis. Hypotension- Taran-Synephrine/ Milrinone gtt. ID consulted, BC NGTD. On 4/18 Serum Glucose 62. On 4/20 Started on Lopressor 25 mg PO BID. Therapy started. Increase ambulation and activity as tolerated.   PMR recommends acute rehab. Cleared for dc to [facility] rehab.  (22 Apr 2024 15:43)    ROS/subjective:  Patient seen and evaluated in bed  poor sleep still with trazadone  otherwise no overnight events  less Foggy  Hospitalist resumed all cardiac meds over weekend- BPS good, renal function better- no SOB- now on Entresto  last Bm 4/29  no standing weights over past few days noted      MEDICATIONS  (STANDING):  aMIOdarone    Tablet 200 milliGRAM(s) Oral daily  AQUAPHOR (petrolatum Ointment) 1 Application(s) Topical two times a day  aspirin enteric coated 81 milliGRAM(s) Oral daily  atorvastatin 80 milliGRAM(s) Oral <User Schedule>  clopidogrel Tablet 75 milliGRAM(s) Oral daily  dextrose 10% Bolus 125 milliLiter(s) IV Bolus once  dextrose 5%. 1000 milliLiter(s) (50 mL/Hr) IV Continuous <Continuous>  dextrose 5%. 1000 milliLiter(s) (100 mL/Hr) IV Continuous <Continuous>  dextrose 50% Injectable 25 Gram(s) IV Push once  dextrose 50% Injectable 12.5 Gram(s) IV Push once  famotidine    Tablet 20 milliGRAM(s) Oral daily  furosemide    Tablet 20 milliGRAM(s) Oral <User Schedule>  glucagon  Injectable 1 milliGRAM(s) IntraMuscular once  heparin   Injectable 5000 Unit(s) SubCutaneous every 8 hours  insulin glargine Injectable (LANTUS) 14 Unit(s) SubCutaneous <User Schedule>  insulin lispro (ADMELOG) corrective regimen sliding scale   SubCutaneous three times a day before meals  insulin lispro (ADMELOG) corrective regimen sliding scale   SubCutaneous at bedtime  linagliptin 5 milliGRAM(s) Oral with breakfast  metoprolol succinate ER 12.5 milliGRAM(s) Oral daily  sacubitril 24 mG/valsartan 26 mG 1 Tablet(s) Oral two times a day  senna 2 Tablet(s) Oral at bedtime  spironolactone 25 milliGRAM(s) Oral daily  traZODone 25 milliGRAM(s) Oral at bedtime    MEDICATIONS  (PRN):  acetaminophen     Tablet .. 650 milliGRAM(s) Oral every 6 hours PRN Moderate Pain (4 - 6)  ALPRAZolam 0.25 milliGRAM(s) Oral every 12 hours PRN anxiety  dextrose Oral Gel 15 Gram(s) Oral once PRN Blood Glucose LESS THAN 70 milliGRAM(s)/deciliter  polyethylene glycol 3350 17 Gram(s) Oral daily PRN Constipation                            11.5   7.66  )-----------( 454      ( 29 Apr 2024 06:00 )             35.0     04-30    143  |  106  |  21  ----------------------------<  127<H>  3.6   |  28  |  1.41<H>    Ca    8.7      30 Apr 2024 05:34  Mg     1.9     04-30    TPro  6.1  /  Alb  2.7<L>  /  TBili  1.0  /  DBili  x   /  AST  16  /  ALT  24  /  AlkPhos  104  04-29    Urinalysis Basic - ( 30 Apr 2024 05:34 )    Color: x / Appearance: x / SG: x / pH: x  Gluc: 127 mg/dL / Ketone: x  / Bili: x / Urobili: x   Blood: x / Protein: x / Nitrite: x   Leuk Esterase: x / RBC: x / WBC x   Sq Epi: x / Non Sq Epi: x / Bacteria: x        CAPILLARY BLOOD GLUCOSE      POCT Blood Glucose.: 123 mg/dL (30 Apr 2024 17:45)  POCT Blood Glucose.: 137 mg/dL (30 Apr 2024 12:29)  POCT Blood Glucose.: 138 mg/dL (30 Apr 2024 07:54)  POCT Blood Glucose.: 135 mg/dL (29 Apr 2024 22:09)      Vital Signs Last 24 Hrs  T(C): 36.8 (30 Apr 2024 08:02), Max: 36.9 (30 Apr 2024 05:41)  T(F): 98.2 (30 Apr 2024 08:02), Max: 98.5 (30 Apr 2024 05:41)  HR: 80 (30 Apr 2024 17:58) (72 - 80)  BP: 135/88 (30 Apr 2024 17:58) (112/76 - 135/88)  BP(mean): --  RR: 16 (30 Apr 2024 08:02) (15 - 16)  SpO2: 98% (30 Apr 2024 08:02) (95% - 98%)    Parameters below as of 30 Apr 2024 08:02  Patient On (Oxygen Delivery Method): room air    Gen - NAD, Comfortable  HEENT - NCAT, EOMI  Neck - Supple, No limited ROM  Pulm - CTAB, No wheeze, No rhonchi, No crackles  Cardiovascular - RRR, S1S2  Chest - good chest expansion, good respiratory effort  Abdomen - Soft, NT/ND, +BS  Extremities - No Cyanosis, no clubbing,  1+edema to BLE, no calf tenderness  Neuro-     Cognitive - awake, alert, oriented to person, place, situation, month, follows simple commands ok     Communication - Fluent     Memory: distant Memory intact     Cranial Nerves - CN 2-12 intact.     Motor -                    LEFT    UE - 5/5                    RIGHT UE - 5/5                    LEFT    LE - 4/5 HF otherwise 5/5                    RIGHT LE - 4+/5  HF otherwise 5/5       Sensory - Intact  to LT     Tone - normal  Skin: Sternal incision LELA + abd lap sites x 4, b/l medial thigh incision, right groin puncture site, Deep tissue injury (DTI) to b/l buttock-pressure related      Continue comprehensive acute rehab program consisting of 3hrs/day of OT/PT.

## 2024-04-30 NOTE — PROGRESS NOTE ADULT - SUBJECTIVE AND OBJECTIVE BOX
Follow-up Pulmonary Progress Note  Chief Complaint : Presence of aortocoronary bypass graft      patient seen and examined   comfortable  no cp, sob, palp, n/v, cough  states feels week     Allergies :No Known Allergies      PAST MEDICAL & SURGICAL HISTORY:  HTN (hypertension)    Hearing decreased    CVA (cerebral vascular accident)  12/22/2016    Hyperlipemia    Kidney stones    Coronary artery disease  MI 12/22/2016    CHF (congestive heart failure)  1/2017 stents x3    Type 2 diabetes mellitus  2016    Confusion  from stroke    S/P medial meniscal repair  and ligament surgery    H/O lithotripsy    S/P tonsillectomy    Bilateral inguinal hernia        Medications:  MEDICATIONS  (STANDING):  aMIOdarone    Tablet 200 milliGRAM(s) Oral daily  AQUAPHOR (petrolatum Ointment) 1 Application(s) Topical two times a day  aspirin enteric coated 81 milliGRAM(s) Oral daily  atorvastatin 80 milliGRAM(s) Oral <User Schedule>  clopidogrel Tablet 75 milliGRAM(s) Oral daily  dextrose 10% Bolus 125 milliLiter(s) IV Bolus once  dextrose 5%. 1000 milliLiter(s) (50 mL/Hr) IV Continuous <Continuous>  dextrose 5%. 1000 milliLiter(s) (100 mL/Hr) IV Continuous <Continuous>  dextrose 50% Injectable 25 Gram(s) IV Push once  dextrose 50% Injectable 12.5 Gram(s) IV Push once  famotidine    Tablet 20 milliGRAM(s) Oral daily  furosemide    Tablet 20 milliGRAM(s) Oral <User Schedule>  glucagon  Injectable 1 milliGRAM(s) IntraMuscular once  heparin   Injectable 5000 Unit(s) SubCutaneous every 8 hours  insulin glargine Injectable (LANTUS) 14 Unit(s) SubCutaneous <User Schedule>  insulin lispro (ADMELOG) corrective regimen sliding scale   SubCutaneous three times a day before meals  insulin lispro (ADMELOG) corrective regimen sliding scale   SubCutaneous at bedtime  linagliptin 5 milliGRAM(s) Oral with breakfast  metoprolol succinate ER 12.5 milliGRAM(s) Oral daily  sacubitril 24 mG/valsartan 26 mG 1 Tablet(s) Oral two times a day  senna 2 Tablet(s) Oral at bedtime  spironolactone 25 milliGRAM(s) Oral daily  traZODone 25 milliGRAM(s) Oral at bedtime    MEDICATIONS  (PRN):  acetaminophen     Tablet .. 650 milliGRAM(s) Oral every 6 hours PRN Moderate Pain (4 - 6)  ALPRAZolam 0.25 milliGRAM(s) Oral every 12 hours PRN anxiety  dextrose Oral Gel 15 Gram(s) Oral once PRN Blood Glucose LESS THAN 70 milliGRAM(s)/deciliter  polyethylene glycol 3350 17 Gram(s) Oral daily PRN Constipation      Antibiotics History      Heme Medications   aspirin enteric coated 81 milliGRAM(s) Oral daily, 04-23-24 @ 00:00  clopidogrel Tablet 75 milliGRAM(s) Oral daily, 04-23-24 @ 00:00  heparin   Injectable 5000 Unit(s) SubCutaneous every 8 hours, 04-22-24 @ 16:44      GI Medications  famotidine    Tablet 20 milliGRAM(s) Oral daily, 04-23-24 @ 00:00, Routine  polyethylene glycol 3350 17 Gram(s) Oral daily, 04-23-24 @ 19:19, Routine PRN  senna 2 Tablet(s) Oral at bedtime, 04-22-24 @ 16:44, Routine        LABS:                        11.5   7.66  )-----------( 454      ( 29 Apr 2024 06:00 )             35.0     04-30    143  |  106  |  21  ----------------------------<  127<H>  3.6   |  28  |  1.41<H>    Ca    8.7      30 Apr 2024 05:34  Mg     1.9     04-30    TPro  6.1  /  Alb  2.7<L>  /  TBili  1.0  /  DBili  x   /  AST  16  /  ALT  24  /  AlkPhos  104  04-29         VITALS:  T(C): 36.8 (04-30-24 @ 08:02), Max: 36.9 (04-30-24 @ 05:41)  < from: Xray Chest 1 View- PORTABLE-Routine (Xray Chest 1 View- PORTABLE-Routine in AM.) (04.26.24 @ 09:03) >  ACC: [id number] EXAM:  XR CHEST PORTABLE ROUTINE 1V   ORDERED BY:  CELY ROMAN     PROCEDURE DATE:  04/26/2024          INTERPRETATION:  Chest one view    HISTORY: Pleural effusion    COMPARISON STUDY: 4/22/2024    Frontal expiratory view of the chest shows the heart to be similar in   size. Sternal wires and aortic valve ring are again noted.    The lungs show clear right lung with slight reduction of left effusion   and there is no evidence of pneumothorax nor right pleural effusion.    IMPRESSION:  Slight decrease of left effusion.        Thank you for the courtesy of this referral.    < end of copied text >  T(F): 98.2 (04-30-24 @ 08:02), Max: 98.5 (04-30-24 @ 05:41)  HR: 80 (04-30-24 @ 17:58) (72 - 80)  BP: 135/88 (04-30-24 @ 17:58) (112/76 - 135/88)  BP(mean): --  ABP: --  ABP(mean): --  RR: 16 (04-30-24 @ 08:02) (15 - 16)  SpO2: 98% (04-30-24 @ 08:02) (95% - 98%)  CVP(mm Hg): --  CVP(cm H2O): --    Ins and Outs       Physical Examination:  GENERAL:               Alert, Oriented, No acute distress.    HEENT:                    No JVD, Moist MM  PULM:                     Bilateral air entry, Clear to auscultation bilaterally, no significant sputum production, No Rales, No Rhonchi, No Wheezing  CVS:                         S1, S2,  - Murmur  ABD:                        Soft, nondistended, nontender, normoactive bowel sounds,   EXT:                         No edema, nontender, No Cyanosis or Clubbing    NEURO:                  Alert, oriented, interactive, nonfocal, follows commands  PSYC:                      Calm, + Insight.

## 2024-05-01 ENCOUNTER — TRANSCRIPTION ENCOUNTER (OUTPATIENT)
Age: 74
End: 2024-05-01

## 2024-05-01 LAB
GLUCOSE BLDC GLUCOMTR-MCNC: 109 MG/DL — HIGH (ref 70–99)
GLUCOSE BLDC GLUCOMTR-MCNC: 112 MG/DL — HIGH (ref 70–99)
GLUCOSE BLDC GLUCOMTR-MCNC: 88 MG/DL — SIGNIFICANT CHANGE UP (ref 70–99)
GLUCOSE BLDC GLUCOMTR-MCNC: 90 MG/DL — SIGNIFICANT CHANGE UP (ref 70–99)

## 2024-05-01 PROCEDURE — 99232 SBSQ HOSP IP/OBS MODERATE 35: CPT

## 2024-05-01 PROCEDURE — 99233 SBSQ HOSP IP/OBS HIGH 50: CPT | Mod: GC

## 2024-05-01 RX ORDER — DAPAGLIFLOZIN 10 MG/1
10 TABLET, FILM COATED ORAL DAILY
Refills: 0 | Status: DISCONTINUED | OUTPATIENT
Start: 2024-05-02 | End: 2024-05-04

## 2024-05-01 RX ADMIN — Medication 25 MILLIGRAM(S): at 21:21

## 2024-05-01 RX ADMIN — HEPARIN SODIUM 5000 UNIT(S): 5000 INJECTION INTRAVENOUS; SUBCUTANEOUS at 21:22

## 2024-05-01 RX ADMIN — Medication 12.5 MILLIGRAM(S): at 06:07

## 2024-05-01 RX ADMIN — FAMOTIDINE 20 MILLIGRAM(S): 10 INJECTION INTRAVENOUS at 12:17

## 2024-05-01 RX ADMIN — INSULIN GLARGINE 14 UNIT(S): 100 INJECTION, SOLUTION SUBCUTANEOUS at 21:27

## 2024-05-01 RX ADMIN — Medication 81 MILLIGRAM(S): at 12:16

## 2024-05-01 RX ADMIN — Medication 20 MILLIGRAM(S): at 06:07

## 2024-05-01 RX ADMIN — CLOPIDOGREL BISULFATE 75 MILLIGRAM(S): 75 TABLET, FILM COATED ORAL at 12:17

## 2024-05-01 RX ADMIN — SACUBITRIL AND VALSARTAN 1 TABLET(S): 24; 26 TABLET, FILM COATED ORAL at 06:07

## 2024-05-01 RX ADMIN — HEPARIN SODIUM 5000 UNIT(S): 5000 INJECTION INTRAVENOUS; SUBCUTANEOUS at 13:43

## 2024-05-01 RX ADMIN — AMIODARONE HYDROCHLORIDE 200 MILLIGRAM(S): 400 TABLET ORAL at 06:07

## 2024-05-01 RX ADMIN — SACUBITRIL AND VALSARTAN 1 TABLET(S): 24; 26 TABLET, FILM COATED ORAL at 21:21

## 2024-05-01 RX ADMIN — SPIRONOLACTONE 25 MILLIGRAM(S): 25 TABLET, FILM COATED ORAL at 06:07

## 2024-05-01 RX ADMIN — Medication 20 MILLIGRAM(S): at 18:58

## 2024-05-01 RX ADMIN — Medication 1 APPLICATION(S): at 17:41

## 2024-05-01 RX ADMIN — LINAGLIPTIN 5 MILLIGRAM(S): 5 TABLET, FILM COATED ORAL at 08:26

## 2024-05-01 RX ADMIN — HEPARIN SODIUM 5000 UNIT(S): 5000 INJECTION INTRAVENOUS; SUBCUTANEOUS at 06:07

## 2024-05-01 RX ADMIN — Medication 1 APPLICATION(S): at 06:08

## 2024-05-01 RX ADMIN — ATORVASTATIN CALCIUM 80 MILLIGRAM(S): 80 TABLET, FILM COATED ORAL at 18:58

## 2024-05-01 NOTE — DISCHARGE NOTE NURSING/CASE MANAGEMENT/SOCIAL WORK - NSDCPEFALRISK_GEN_ALL_CORE
For information on Fall & Injury Prevention, visit: [email or web address] OR  [email or web address] OR  https://www.cdc.gov/steadi/patient.html

## 2024-05-01 NOTE — CHART NOTE - NSCHARTNOTEFT_GEN_A_CORE
Nutrition Follow Up Note  Hospital Course   (Per Electronic Medical Record)    Source:  Patient [X]  Medical Record [X]      Diet:   on Consistent Carbohydrate Low Sodium Diet w/ Thin Liquids (IDDSI Level 0)  Tolerates Diet Consistency Well  Consumes % of Meals Usually (as Per Documentation) - States Good PO Intake/Appetite (Per Patient)  on Unruly 1 Packet BID (Provides 180kcal & 28grams of Protein)  on Glucerna 8oz PO BID (Provides 440kcal-20grams of Protein)  Patient Declined to Complete Interview    Enteral/Parenteral Nutrition: Not Applicable    Current Weight: 159.1lb on 4/28  Obtain Weights Daily  Weight Fluctuates  Obtain New Weight to Confirm     Pertinent Medications: MEDICATIONS  (STANDING):  aMIOdarone    Tablet 200 milliGRAM(s) Oral daily  AQUAPHOR (petrolatum Ointment) 1 Application(s) Topical two times a day  aspirin enteric coated 81 milliGRAM(s) Oral daily  atorvastatin 80 milliGRAM(s) Oral <User Schedule>  clopidogrel Tablet 75 milliGRAM(s) Oral daily  dextrose 10% Bolus 125 milliLiter(s) IV Bolus once  dextrose 5%. 1000 milliLiter(s) (50 mL/Hr) IV Continuous <Continuous>  dextrose 5%. 1000 milliLiter(s) (100 mL/Hr) IV Continuous <Continuous>  dextrose 50% Injectable 25 Gram(s) IV Push once  dextrose 50% Injectable 12.5 Gram(s) IV Push once  famotidine    Tablet 20 milliGRAM(s) Oral daily  furosemide    Tablet 20 milliGRAM(s) Oral <User Schedule>  glucagon  Injectable 1 milliGRAM(s) IntraMuscular once  heparin   Injectable 5000 Unit(s) SubCutaneous every 8 hours  insulin glargine Injectable (LANTUS) 14 Unit(s) SubCutaneous <User Schedule>  insulin lispro (ADMELOG) corrective regimen sliding scale   SubCutaneous three times a day before meals  insulin lispro (ADMELOG) corrective regimen sliding scale   SubCutaneous at bedtime  linagliptin 5 milliGRAM(s) Oral with breakfast  metoprolol succinate ER 12.5 milliGRAM(s) Oral daily  sacubitril 24 mG/valsartan 26 mG 1 Tablet(s) Oral two times a day  senna 2 Tablet(s) Oral at bedtime  spironolactone 25 milliGRAM(s) Oral daily  traZODone 25 milliGRAM(s) Oral at bedtime    MEDICATIONS  (PRN):  acetaminophen     Tablet .. 650 milliGRAM(s) Oral every 6 hours PRN Moderate Pain (4 - 6)  ALPRAZolam 0.25 milliGRAM(s) Oral every 12 hours PRN anxiety  dextrose Oral Gel 15 Gram(s) Oral once PRN Blood Glucose LESS THAN 70 milliGRAM(s)/deciliter  polyethylene glycol 3350 17 Gram(s) Oral daily PRN Constipation    Pertinent Labs:  04-30 Na143 mmol/L Glu 127 mg/dL<H> K+ 3.6 mmol/L Cr  1.41 mg/dL<H> BUN 21 mg/dL 04-29 Alb 2.7 g/dL<L>    POCT (over Last 3 Days) - Ranging from 104-171    Skin: Multiple Pressure Ulcers (DTIx2)    Edema: None Noted Recently (as Per Documentation)     Last Bowel Movement: on 4/30    Estimated Needs:   [X] No Change Since Previous Assessment    Previous Nutrition Diagnosis:   Severe Malnutrition  Increased Nutrient Needs - Calories & Protein     Nutrition Diagnosis is [X] Ongoing - Continues on Nutrition Supplement     New Nutrition Diagnosis: [X] Not Applicable    Interventions:   1. Recommend Continue Nutrition Plan of Care     Monitoring & Evaluation:   [X] Weights   [X] PO Intake   [X] Skin Integrity   [X] Follow Up (Per Protocol)  [X] Tolerance to Diet Prescription   [X] Other: Labs & POCT    Registered Dietitian/Nutritionist Remains Available.  Everton Aguiar RDN, CDN    Phone# [phone]

## 2024-05-01 NOTE — PROGRESS NOTE ADULT - ASSESSMENT
IAR SEN is a 73yo Male s/p CABG, now with decreased functional mobility, gait instability and ADL impairments.    COMORBIDITIES/ACTIVE MEDICAL ISSUES     Gait Instability, ADL impairments and Functional impairments: Comprehensive Rehab Program of PT/OT      #S/P CABG (coronary artery bypass graft).   - ASA 81 mg PO Daily.   - Lipitor 80 mg PO HS   Plavix 75 mg PO Daily  - Lopressor 25 mg PO BID to Toprol 12.5mg-per Cardio   - Continue with Heparin subcutaneous 5000 units q 8 hr for DVT ppx   - Continue with Pepcid 20 mg PO Daily  - Lasix 20 mg po qd -resumed> increased to 20mg 2x/day.   - aldactone 25 qd restarted   Started on Entresto  - fall and sternal precautions  - IS    #Pleural effusion  -CXR today per pulm  - lasix 20mg BID  - sats 94% RA  will follow CXR    # S/P ascending aortic aneurysm repair.   -Continue with Plavix 75 mg PO Daily  Continue Amio 200mg PO     #CLAUDIA (acute kidney injury) on CKD   -follow labs, avoid nephrotoxins/hold home Metformin   1.2 4/29> 1.4 4/30  check CMP in AM    #DM2 (hbg a1c 9.8)  Lantus HS Admelog units  Sliding Scale.  - consistent low carb diet.  - DC on home Farxiga, add Tradjenta 5 mg daily  - FU outpatient.  Tradjenta DCd by hospitalist- started Farxiga    Hypokalemia  secondary to Lasix  K 3.6 Mg 1.9 on 4/30  follow BMP in AM    #CAD (coronary artery disease).   - ASA/Lipitor, trop neg yesterday  - TTE at baseline 4/23     #BPH (benign prostatic hyperplasia).   -Tamsulosin 0.4 PO HS-on hold for low BP  -monitor voiding, mobilize    Pain Mgmt - Tylenol PRN  GI/Bowel Mgmt - Senna,  Miralax PRN  /Bladder Mgmt - PVR x1    #Skin; Sternal incision LELA + abd lap sites x 4, b/l medial thigh incision, right groin puncture site, Deep tissue injury (DTI) to b/l buttock. pressure related    Poor Sleep  started on Trazadone 25mg HS  will DC  PRN xanax    Precautions / PROPHYLAXIS:   - Falls, Cardiac, Sternal  - Lungs: Aspiration, Incentive Spirometer   - Pressure injury/Skin: Turn Q2hrs while in bed, OOB to Chair, PT/OT    - DVT: Hep sq      50 total minutes spent on today's encounter including team conference as well as phone conversation with patient's wife Nicole- I updated her on plan of care- All questions were answered by me as well.

## 2024-05-01 NOTE — PROGRESS NOTE ADULT - SUBJECTIVE AND OBJECTIVE BOX
HPI:  74yr Male, PMHx of CVA, MI, HTN, DM2, HLD, CAD s/p cardiac stents, CHF, hx of malignant melanoma/wide excision. To [facility] 4/1 for eval of ascending aorta replacement, aortic valve replacement, coronary artery bypass grafting with Dr. Ilia Otriz. Admitted preop/NPO after midnight for OR on 4/7. On 4/8 underwent ASCENDING AORTIC REPLACEMENT WITH TRANSVERSE HEMIARCH, CABGx2, AVR-BIOPROSTHETIC VALVE. Inotropic support with IV Dobutamine and Milrinone post op for acute systolic heart failure. IABP, on PO Amiodarone for afib prophylaxis. Extubated to 5L-->hi flow. Endocrine consulted for DM2, Insulin gtt. 4/13 febrile w/CLAUDIA, +fluid overload, zosyn / vanco empirically,+ diuresis. Hypotension- Taran-Synephrine/ Milrinone gtt. ID consulted, BC NGTD. On 4/18 Serum Glucose 62. On 4/20 Started on Lopressor 25 mg PO BID. Therapy started. Increase ambulation and activity as tolerated.   PMR recommends acute rehab. Cleared for dc to [facility] rehab.  (22 Apr 2024 15:43)    ROS/subjective:  Patient seen and evaluated bedside  very sleepy on xanax and trazadone- to DC  incontinent - unable to void in urinal  less Foggy  Hospitalist resumed all cardiac meds over weekend- BPS good, renal function better- no SOB- now on Entresto  last Bm 4/30  no standing weights over past few days noted    MEDICATIONS  (STANDING):  aMIOdarone    Tablet 200 milliGRAM(s) Oral daily  AQUAPHOR (petrolatum Ointment) 1 Application(s) Topical two times a day  aspirin enteric coated 81 milliGRAM(s) Oral daily  atorvastatin 80 milliGRAM(s) Oral <User Schedule>  clopidogrel Tablet 75 milliGRAM(s) Oral daily  dextrose 10% Bolus 125 milliLiter(s) IV Bolus once  dextrose 5%. 1000 milliLiter(s) (100 mL/Hr) IV Continuous <Continuous>  dextrose 5%. 1000 milliLiter(s) (50 mL/Hr) IV Continuous <Continuous>  dextrose 50% Injectable 25 Gram(s) IV Push once  dextrose 50% Injectable 12.5 Gram(s) IV Push once  famotidine    Tablet 20 milliGRAM(s) Oral daily  furosemide    Tablet 20 milliGRAM(s) Oral <User Schedule>  glucagon  Injectable 1 milliGRAM(s) IntraMuscular once  heparin   Injectable 5000 Unit(s) SubCutaneous every 8 hours  insulin glargine Injectable (LANTUS) 14 Unit(s) SubCutaneous <User Schedule>  insulin lispro (ADMELOG) corrective regimen sliding scale   SubCutaneous three times a day before meals  insulin lispro (ADMELOG) corrective regimen sliding scale   SubCutaneous at bedtime  metoprolol succinate ER 12.5 milliGRAM(s) Oral daily  sacubitril 24 mG/valsartan 26 mG 1 Tablet(s) Oral two times a day  spironolactone 25 milliGRAM(s) Oral daily  traZODone 25 milliGRAM(s) Oral at bedtime    MEDICATIONS  (PRN):  acetaminophen     Tablet .. 650 milliGRAM(s) Oral every 6 hours PRN Moderate Pain (4 - 6)  dextrose Oral Gel 15 Gram(s) Oral once PRN Blood Glucose LESS THAN 70 milliGRAM(s)/deciliter  polyethylene glycol 3350 17 Gram(s) Oral daily PRN Constipation                            11.5   7.66  )-----------( 454      ( 29 Apr 2024 06:00 )             35.0     04-30    143  |  106  |  21  ----------------------------<  127<H>  3.6   |  28  |  1.41<H>    Ca    8.7      30 Apr 2024 05:34  Mg     1.9     04-30    TPro  6.1  /  Alb  2.7<L>  /  TBili  1.0  /  DBili  x   /  AST  16  /  ALT  24  /  AlkPhos  104  04-29    Urinalysis Basic - ( 30 Apr 2024 05:34 )    Color: x / Appearance: x / SG: x / pH: x  Gluc: 127 mg/dL / Ketone: x  / Bili: x / Urobili: x   Blood: x / Protein: x / Nitrite: x   Leuk Esterase: x / RBC: x / WBC x   Sq Epi: x / Non Sq Epi: x / Bacteria: x        CAPILLARY BLOOD GLUCOSE      POCT Blood Glucose.: 90 mg/dL (01 May 2024 12:15)  POCT Blood Glucose.: 88 mg/dL (01 May 2024 08:26)  POCT Blood Glucose.: 171 mg/dL (30 Apr 2024 21:48)  POCT Blood Glucose.: 123 mg/dL (30 Apr 2024 17:45)        Vital Signs Last 24 Hrs  T(C): 36.6 (01 May 2024 08:33), Max: 36.9 (30 Apr 2024 21:55)  T(F): 97.9 (01 May 2024 08:33), Max: 98.4 (30 Apr 2024 21:55)  HR: 79 (01 May 2024 08:33) (79 - 84)  BP: 103/67 (01 May 2024 08:33) (103/67 - 136/78)  BP(mean): --  RR: 16 (01 May 2024 08:33) (15 - 16)  SpO2: 95% (01 May 2024 08:33) (95% - 98%)    Parameters below as of 01 May 2024 08:33  Patient On (Oxygen Delivery Method): room air      Gen - NAD, Comfortable  HEENT - NCAT, EOMI  Neck - Supple, No limited ROM  Pulm - CTAB, No wheeze, No rhonchi, No crackles  Cardiovascular - RRR, S1S2  Chest - good chest expansion, good respiratory effort  Abdomen - Soft, NT/ND, +BS  Extremities - No Cyanosis, no clubbing, trace edema to BLE, no calf tenderness  Neuro-     Cognitive - awake, alert, oriented to person, place, situation, month, follows simple commands ok-? year     Communication - Fluent     Memory: distant Memory intact     Cranial Nerves - CN 2-12 intact.     Motor -                    LEFT    UE - 5/5                    RIGHT UE - 5/5                    LEFT    LE - 4/5 HF otherwise 5/5                    RIGHT LE - 4+/5  HF otherwise 5/5       Sensory - Intact  to LT     Tone - normal  Skin: Sternal incision LELA + abd lap sites x 4, b/l medial thigh incision, right groin puncture site, Deep tissue injury (DTI) to b/l buttock-pressure related    IDT in [facility] 5/1  ? change DC to 5/4- home with services    Continue comprehensive acute rehab program consisting of 3hrs/day of OT/PT.

## 2024-05-01 NOTE — PROGRESS NOTE ADULT - ASSESSMENT
74yr Male, PMHx of CVA, MI, HTN, DM2, HLD, CAD s/p cardiac stents, CHF, hx of malignant melanoma/wide excision. Now s/p bioprosthetic aortic valve replacement and 2V CABG. post op requiring mechanical support with IABP na d Inotropic support with IV Dobutamine and Milrinone for acute systolic heart failure. PO Amiodarone started for afib prophylaxis.  4/13 febrile w/ CLAUDIA, +fluid overload, zosyn / vanco empirically,+ diuresis. ID consulted, BC NGTD. On 4/20 Started on Lopressor 25 mg PO BID.  PMR recommends acute rehab. NOW at [facility] for rehab.      #s/p BioBental with replacement of ascending aorta and transverse hemiarch  #AVR  #CABG  #HFrEF, EF 30-35%, not in excerbation  - CXR with persistent moderate left pleural effusion and consolidation as well as hazy opacity right midlung field.  - c/w ASA, Plavix, Lipitor 80, amiodarone   - GDMT goal is Lopressor 25 mg PO BID, aldactone 25 mg PO daily, Farxiga 10 mg daily, and entresto   - monitor BP. if cant tolerate resuming Entresto, will need arb  - Continue Lasix 20 mg daily  - Strict I and Os  - daily standing weight    #Pleural effusion  -slightly decreased, etiology heart failure vs sec to post op changes, patient with no c/o sob/ chest pain. seen by pulmo, will continue to monitor. no thoracentesis for now. if patient develops fever or any other symptoms will revisit.    #CLAUDIA possible ATN  - renal function fluctuating, will continue to monitor. readjust medications as per renal function. not a candidate for metformin anymore.  - if gfr < 50, will decrease dose of aldactone.    #DM2   - A1c 9.8  - continue insulin and finger stick monitoring. monitor for episode of hypoglycemia  - Patient not a candidate for metformin due to fluctuating renal function.  - will confirm with primary care, if no contraindication, will change tradjenta to Farxiga which is part of GDMT.    #BPH (benign prostatic hyperplasia).   -Tamsulosin 0.4 PO HS    # DVT-Ppx  - Heparin 74yr Male, PMHx of CVA, MI, HTN, DM2, HLD, CAD s/p cardiac stents, CHF, hx of malignant melanoma/wide excision. Now s/p bioprosthetic aortic valve replacement and 2V CABG. post op requiring mechanical support with IABP na d Inotropic support with IV Dobutamine and Milrinone for acute systolic heart failure. PO Amiodarone started for afib prophylaxis.  4/13 febrile w/ CLUADIA, +fluid overload, zosyn / vanco empirically,+ diuresis. ID consulted, BC NGTD. On 4/20 Started on Lopressor 25 mg PO BID.  PMR recommends acute rehab. NOW at [facility] for rehab.      #s/p BioBental with replacement of ascending aorta and transverse hemiarch  #AVR  #CABG  #HFrEF, EF 30-35%, not in excerbation  - CXR with persistent moderate left pleural effusion and consolidation as well as hazy opacity right midlung field.  - c/w ASA, Plavix, Lipitor 80.  - GDMT goal is Lopressor 25 mg PO BID, aldactone 25 mg PO daily, Farxiga 10 mg daily, and entresto   - monitor BP. if cant tolerate resuming Entresto, will need arb  - Continue Lasix 20 mg daily  - Strict I and Os  - daily standing weight  - Patient on amiodarone, as per CT surgery notes for afib prophylaxis. will confirm the duration with CT surgery team.  #Pleural effusion  -slightly decreased, etiology heart failure vs sec to post op changes, patient with no c/o sob/ chest pain. seen by pulmo, will continue to monitor. no thoracentesis for now. if patient develops fever or any other symptoms will revisit.    #CLAUDIA possible ATN  - renal function fluctuating, will continue to monitor. readjust medications as per renal function. not a candidate for metformin anymore.  - if gfr < 50, will decrease dose of aldactone.    #DM2   - A1c 9.8  - continue insulin and finger stick monitoring. monitor for episode of hypoglycemia  - Patient not a candidate for metformin due to fluctuating renal function.  - will confirm with primary care, if no contraindication, will change tradjenta to Farxiga which is part of GDMT.    #BPH (benign prostatic hyperplasia).   -Tamsulosin 0.4 PO HS    # DVT-Ppx  - Heparin

## 2024-05-01 NOTE — DISCHARGE NOTE NURSING/CASE MANAGEMENT/SOCIAL WORK - NSDCDMENAME_GEN_ALL_CORE_FT
Shower chair, commode, rolling walker Shower chair, commode, rolling walker.  NOTE: Shower chair is not covered/$45.00. Please call equipment company if you would like to purchase and schedule delivery.

## 2024-05-01 NOTE — DISCHARGE NOTE NURSING/CASE MANAGEMENT/SOCIAL WORK - NSSCCARECORD_GEN_ALL_CORE
Home Care Agency/Durable Medical Equipment Agency Home Care Agency/Durable Medical Equipment Agency/Community [facility]

## 2024-05-01 NOTE — DISCHARGE NOTE NURSING/CASE MANAGEMENT/SOCIAL WORK - PATIENT PORTAL LINK FT
You can access the FollowMyHealth Patient Portal offered by [facility] by registering at the following website: [email or web address]. By joining [facility]’s FollowMyHealth portal, you will also be able to view your health information using other applications (apps) compatible with our system.

## 2024-05-01 NOTE — PROGRESS NOTE ADULT - SUBJECTIVE AND OBJECTIVE BOX
Patient seen and examined at bedside. Very upset and demotivated. does not want to stay in hospital. questions were answered and emotional support provided.      ALLERGIES:  No Known Allergies    MEDICATIONS  (STANDING):  aMIOdarone    Tablet 200 milliGRAM(s) Oral daily  AQUAPHOR (petrolatum Ointment) 1 Application(s) Topical two times a day  aspirin enteric coated 81 milliGRAM(s) Oral daily  atorvastatin 80 milliGRAM(s) Oral <User Schedule>  clopidogrel Tablet 75 milliGRAM(s) Oral daily  dextrose 10% Bolus 125 milliLiter(s) IV Bolus once  dextrose 5%. 1000 milliLiter(s) (100 mL/Hr) IV Continuous <Continuous>  dextrose 5%. 1000 milliLiter(s) (50 mL/Hr) IV Continuous <Continuous>  dextrose 50% Injectable 25 Gram(s) IV Push once  dextrose 50% Injectable 12.5 Gram(s) IV Push once  famotidine    Tablet 20 milliGRAM(s) Oral daily  furosemide    Tablet 20 milliGRAM(s) Oral <User Schedule>  glucagon  Injectable 1 milliGRAM(s) IntraMuscular once  heparin   Injectable 5000 Unit(s) SubCutaneous every 8 hours  insulin glargine Injectable (LANTUS) 14 Unit(s) SubCutaneous <User Schedule>  insulin lispro (ADMELOG) corrective regimen sliding scale   SubCutaneous three times a day before meals  insulin lispro (ADMELOG) corrective regimen sliding scale   SubCutaneous at bedtime  T(C): 36.6 (05-01-24 @ 08:33), Max: 36.9 (04-30-24 @ 21:55)  T(F): 97.9 (05-01-24 @ 08:33), Max: 98.4 (04-30-24 @ 21:55)  HR: 79 (05-01-24 @ 08:33) (79 - 84)  BP: 103/67 (05-01-24 @ 08:33) (103/67 - 136/78)  ABP: --  ABP(mean): --  RR: 16 (05-01-24 @ 08:33) (15 - 16)  SpO2: 95% (05-01-24 @ 08:33) (95% - 98%)  linagliptin 5 milliGRAM(s) Oral with breakfast  metoprolol succinate ER 12.5 milliGRAM(s) Oral daily  ramelteon 8 milliGRAM(s) Oral at bedtime  sacubitril 24 mG/valsartan 26 mG 1 Tablet(s) Oral two times a day  senna 2 Tablet(s) Oral at bedtime    MEDICATIONS  (PRN):  acetaminophen     Tablet .. 650 milliGRAM(s) Oral every 6 hours PRN Moderate Pain (4 - 6)  ALPRAZolam 0.25 milliGRAM(s) Oral every 12 hours PRN anxiety  dextrose Oral Gel 15 Gram(s) Oral once PRN Blood Glucose LESS THAN 70 milliGRAM(s)/deciliter  polyethylene glycol 3350 17 Gram(s) Oral daily PRN Constipation      T(C): 36.6 (05-01-24 @ 08:33), Max: 36.9 (04-30-24 @ 21:55)  T(F): 97.9 (05-01-24 @ 08:33), Max: 98.4 (04-30-24 @ 21:55)  HR: 79 (05-01-24 @ 08:33) (79 - 84)  BP: 103/67 (05-01-24 @ 08:33) (103/67 - 136/78)  ABP: --  ABP(mean): --  RR: 16 (05-01-24 @ 08:33) (15 - 16)  SpO2: 95% (05-01-24 @ 08:33) (95% - 98%)    on exam alert, awake, oriented  no apparant distress  both lungs clear  s1,s2, regular  abdomen- soft and non tender  no pedal edema    LABS:    04-30    143  |  106  |  21  ----------------------------<  127<H>  3.6   |  28  |  1.41<H>    Ca    8.7      30 Apr 2024 05:34  Mg     1.9     04-30        Urinalysis Basic - ( 30 Apr 2024 05:34 )    Color: x / Appearance: x / SG: x / pH: x  Gluc: 127 mg/dL / Ketone: x  / Bili: x / Urobili: x   Blood: x / Protein: x / Nitrite: x   Leuk Esterase: x / RBC: x / WBC x   Sq Epi: x / Non Sq Epi: x / Bacteria: x    echo :   1. Moderately decreased global left ventricular systolic function.   2. Left ventricular ejection fraction, by visual estimation, is 30 to   35%.   3. Moderately increased posterior wall thickness. Severely increased   septal wall thickness. Consider use of IV ultrasonic enhancing agent to   better evaluate regional wall motion, if clinically indicated.   4. The right ventricle is not well visualized, appears to have reduced   systolic function.   5. Mild thickening of the anterior and posterior mitral valve leaflets.   6. Mild mitral valve regurgitation.   7. There is a bioprosthetic valve in the aortic position, appears well   seated with peak velocity within normal limits.   8. S/p aorta graft repair (not well visualized).   9. Trivial pericardial effusion.  10. Moderate pleural effusion in the left lateral region

## 2024-05-02 LAB
ALBUMIN SERPL ELPH-MCNC: 2.9 G/DL — LOW (ref 3.3–5)
ALP SERPL-CCNC: 108 U/L — SIGNIFICANT CHANGE UP (ref 40–120)
ALT FLD-CCNC: 23 U/L — SIGNIFICANT CHANGE UP (ref 10–45)
ANION GAP SERPL CALC-SCNC: 11 MMOL/L — SIGNIFICANT CHANGE UP (ref 5–17)
AST SERPL-CCNC: 19 U/L — SIGNIFICANT CHANGE UP (ref 10–40)
BILIRUB SERPL-MCNC: 0.9 MG/DL — SIGNIFICANT CHANGE UP (ref 0.2–1.2)
BUN SERPL-MCNC: 20 MG/DL — SIGNIFICANT CHANGE UP (ref 7–23)
CALCIUM SERPL-MCNC: 8.7 MG/DL — SIGNIFICANT CHANGE UP (ref 8.4–10.5)
CHLORIDE SERPL-SCNC: 105 MMOL/L — SIGNIFICANT CHANGE UP (ref 96–108)
CO2 SERPL-SCNC: 26 MMOL/L — SIGNIFICANT CHANGE UP (ref 22–31)
CREAT SERPL-MCNC: 1.25 MG/DL — SIGNIFICANT CHANGE UP (ref 0.5–1.3)
EGFR: 60 ML/MIN/1.73M2 — SIGNIFICANT CHANGE UP
GLUCOSE BLDC GLUCOMTR-MCNC: 82 MG/DL — SIGNIFICANT CHANGE UP (ref 70–99)
GLUCOSE BLDC GLUCOMTR-MCNC: 83 MG/DL — SIGNIFICANT CHANGE UP (ref 70–99)
GLUCOSE SERPL-MCNC: 70 MG/DL — SIGNIFICANT CHANGE UP (ref 70–99)
HCT VFR BLD CALC: 36.5 % — LOW (ref 39–50)
HGB BLD-MCNC: 12 G/DL — LOW (ref 13–17)
MCHC RBC-ENTMCNC: 28.1 PG — SIGNIFICANT CHANGE UP (ref 27–34)
MCHC RBC-ENTMCNC: 32.9 GM/DL — SIGNIFICANT CHANGE UP (ref 32–36)
MCV RBC AUTO: 85.5 FL — SIGNIFICANT CHANGE UP (ref 80–100)
NRBC # BLD: 0 /100 WBCS — SIGNIFICANT CHANGE UP (ref 0–0)
PLATELET # BLD AUTO: 382 K/UL — SIGNIFICANT CHANGE UP (ref 150–400)
POTASSIUM SERPL-MCNC: 3.3 MMOL/L — LOW (ref 3.5–5.3)
POTASSIUM SERPL-SCNC: 3.3 MMOL/L — LOW (ref 3.5–5.3)
PROT SERPL-MCNC: 6.3 G/DL — SIGNIFICANT CHANGE UP (ref 6–8.3)
RBC # BLD: 4.27 M/UL — SIGNIFICANT CHANGE UP (ref 4.2–5.8)
RBC # FLD: 15.5 % — HIGH (ref 10.3–14.5)
SODIUM SERPL-SCNC: 142 MMOL/L — SIGNIFICANT CHANGE UP (ref 135–145)
WBC # BLD: 6.73 K/UL — SIGNIFICANT CHANGE UP (ref 3.8–10.5)
WBC # FLD AUTO: 6.73 K/UL — SIGNIFICANT CHANGE UP (ref 3.8–10.5)

## 2024-05-02 PROCEDURE — 71045 X-RAY EXAM CHEST 1 VIEW: CPT | Mod: 26

## 2024-05-02 PROCEDURE — 99232 SBSQ HOSP IP/OBS MODERATE 35: CPT

## 2024-05-02 RX ORDER — INSULIN GLARGINE 100 [IU]/ML
10 INJECTION, SOLUTION SUBCUTANEOUS
Refills: 0 | Status: DISCONTINUED | OUTPATIENT
Start: 2024-05-02 | End: 2024-05-04

## 2024-05-02 RX ORDER — SACUBITRIL AND VALSARTAN 24; 26 MG/1; MG/1
1 TABLET, FILM COATED ORAL
Refills: 0 | Status: DISCONTINUED | OUTPATIENT
Start: 2024-05-03 | End: 2024-05-04

## 2024-05-02 RX ORDER — FUROSEMIDE 40 MG
20 TABLET ORAL DAILY
Refills: 0 | Status: DISCONTINUED | OUTPATIENT
Start: 2024-05-03 | End: 2024-05-04

## 2024-05-02 RX ORDER — POTASSIUM CHLORIDE 20 MEQ
20 PACKET (EA) ORAL
Refills: 0 | Status: COMPLETED | OUTPATIENT
Start: 2024-05-02 | End: 2024-05-02

## 2024-05-02 RX ADMIN — Medication 20 MILLIEQUIVALENT(S): at 15:09

## 2024-05-02 RX ADMIN — ATORVASTATIN CALCIUM 80 MILLIGRAM(S): 80 TABLET, FILM COATED ORAL at 18:33

## 2024-05-02 RX ADMIN — HEPARIN SODIUM 5000 UNIT(S): 5000 INJECTION INTRAVENOUS; SUBCUTANEOUS at 15:10

## 2024-05-02 RX ADMIN — SPIRONOLACTONE 25 MILLIGRAM(S): 25 TABLET, FILM COATED ORAL at 06:25

## 2024-05-02 RX ADMIN — Medication 20 MILLIEQUIVALENT(S): at 18:34

## 2024-05-02 RX ADMIN — Medication 1 APPLICATION(S): at 06:27

## 2024-05-02 RX ADMIN — Medication 650 MILLIGRAM(S): at 06:29

## 2024-05-02 RX ADMIN — SACUBITRIL AND VALSARTAN 1 TABLET(S): 24; 26 TABLET, FILM COATED ORAL at 06:25

## 2024-05-02 RX ADMIN — AMIODARONE HYDROCHLORIDE 200 MILLIGRAM(S): 400 TABLET ORAL at 06:26

## 2024-05-02 RX ADMIN — Medication 20 MILLIGRAM(S): at 06:27

## 2024-05-02 RX ADMIN — Medication 12.5 MILLIGRAM(S): at 06:25

## 2024-05-02 RX ADMIN — HEPARIN SODIUM 5000 UNIT(S): 5000 INJECTION INTRAVENOUS; SUBCUTANEOUS at 06:25

## 2024-05-02 NOTE — PROGRESS NOTE ADULT - SUBJECTIVE AND OBJECTIVE BOX
HPI:  74yr Male, PMHx of CVA, MI, HTN, DM2, HLD, CAD s/p cardiac stents, CHF, hx of malignant melanoma/wide excision. To [facility] 4/1 for eval of ascending aorta replacement, aortic valve replacement, coronary artery bypass grafting with Dr. Ilia Ortiz. Admitted preop/NPO after midnight for OR on 4/7. On 4/8 underwent ASCENDING AORTIC REPLACEMENT WITH TRANSVERSE HEMIARCH, CABGx2, AVR-BIOPROSTHETIC VALVE. Inotropic support with IV Dobutamine and Milrinone post op for acute systolic heart failure. IABP, on PO Amiodarone for afib prophylaxis. Extubated to 5L-->hi flow. Endocrine consulted for DM2, Insulin gtt. 4/13 febrile w/CLAUDIA, +fluid overload, zosyn / vanco empirically,+ diuresis. Hypotension- Taran-Synephrine/ Milrinone gtt. ID consulted, BC NGTD. On 4/18 Serum Glucose 62. On 4/20 Started on Lopressor 25 mg PO BID. Therapy started. Increase ambulation and activity as tolerated.   PMR recommends acute rehab. Cleared for dc to [facility] rehab.        ROS/subjective:  Patient seen and evaluated bedside, NAD, RA sats well  slept well on trazadone-appears rested   CXR f/up effusion-improved, no SOB- now on Entresto-BPs 90s-will decrease Lasix to daily from BID, supple K, Entresto to nightly until BP>100  daily weights      MEDICATIONS  (STANDING):  aMIOdarone    Tablet 200 milliGRAM(s) Oral daily  AQUAPHOR (petrolatum Ointment) 1 Application(s) Topical two times a day  aspirin enteric coated 81 milliGRAM(s) Oral daily  atorvastatin 80 milliGRAM(s) Oral <User Schedule>  clopidogrel Tablet 75 milliGRAM(s) Oral daily  dapagliflozin 10 milliGRAM(s) Oral daily  dextrose 10% Bolus 125 milliLiter(s) IV Bolus once  dextrose 5%. 1000 milliLiter(s) (50 mL/Hr) IV Continuous <Continuous>  dextrose 5%. 1000 milliLiter(s) (100 mL/Hr) IV Continuous <Continuous>  dextrose 50% Injectable 12.5 Gram(s) IV Push once  dextrose 50% Injectable 25 Gram(s) IV Push once  famotidine    Tablet 20 milliGRAM(s) Oral daily  glucagon  Injectable 1 milliGRAM(s) IntraMuscular once  heparin   Injectable 5000 Unit(s) SubCutaneous every 8 hours  insulin glargine Injectable (LANTUS) 10 Unit(s) SubCutaneous <User Schedule>  insulin lispro (ADMELOG) corrective regimen sliding scale   SubCutaneous at bedtime  metoprolol succinate ER 12.5 milliGRAM(s) Oral daily  sacubitril 24 mG/valsartan 26 mG 1 Tablet(s) Oral <User Schedule>  spironolactone 25 milliGRAM(s) Oral daily  traZODone 25 milliGRAM(s) Oral at bedtime    MEDICATIONS  (PRN):  acetaminophen     Tablet .. 650 milliGRAM(s) Oral every 6 hours PRN Moderate Pain (4 - 6)  dextrose Oral Gel 15 Gram(s) Oral once PRN Blood Glucose LESS THAN 70 milliGRAM(s)/deciliter  polyethylene glycol 3350 17 Gram(s) Oral daily PRN Constipation                            12.0   6.73  )-----------( 382      ( 02 May 2024 05:35 )             36.5     05-02    142  |  105  |  20  ----------------------------<  70  3.3<L>   |  26  |  1.25    Ca    8.7      02 May 2024 05:35    TPro  6.3  /  Alb  2.9<L>  /  TBili  0.9  /  DBili  x   /  AST  19  /  ALT  23  /  AlkPhos  108  05-02    Urinalysis Basic - ( 02 May 2024 05:35 )    Color: x / Appearance: x / SG: x / pH: x  Gluc: 70 mg/dL / Ketone: x  / Bili: x / Urobili: x   Blood: x / Protein: x / Nitrite: x   Leuk Esterase: x / RBC: x / WBC x   Sq Epi: x / Non Sq Epi: x / Bacteria: x        CAPILLARY BLOOD GLUCOSE      POCT Blood Glucose.: 83 mg/dL (02 May 2024 08:40)  POCT Blood Glucose.: 112 mg/dL (01 May 2024 21:20)  POCT Blood Glucose.: 109 mg/dL (01 May 2024 17:41)      Vital Signs Last 24 Hrs  T(C): 36.4 (02 May 2024 08:41), Max: 36.9 (02 May 2024 06:15)  T(F): 97.6 (02 May 2024 08:41), Max: 98.5 (02 May 2024 06:15)  HR: 85 (02 May 2024 08:41) (68 - 87)  BP: 96/62 (02 May 2024 08:41) (92/64 - 120/80)  BP(mean): --  RR: 16 (02 May 2024 08:41) (16 - 16)  SpO2: 95% (02 May 2024 08:41) (92% - 95%)    Parameters below as of 02 May 2024 08:41  Patient On (Oxygen Delivery Method): room air        Gen - NAD, Comfortable  HEENT - NCAT, EOMI  Neck - Supple, No limited ROM  Pulm - CTAB, No wheeze, No rhonchi, No crackles  Cardiovascular - RRR, S1S2  Chest - good chest expansion, good respiratory effort  Abdomen - Soft, NT/ND, +BS  Extremities - No Cyanosis, no clubbing, trace edema to BLE, no calf tenderness  Neuro-     Cognitive - awake, alert, oriented to person, place, situation, month, follows simple commands ok-? year     Communication - Fluent     Memory: distant Memory intact     Cranial Nerves - CN 2-12 intact.     Motor -                    LEFT    UE - 5/5                    RIGHT UE - 5/5                    LEFT    LE - 4/5 HF otherwise 5/5                    RIGHT LE - 4+/5  HF otherwise 5/5       Sensory - Intact  to LT     Tone - normal  Skin: Sternal incision LELA + abd lap sites x 4, b/l medial thigh incision, right groin puncture site, Deep tissue injury (DTI) to b/l buttock-pressure related    IDT in [facility] 5/1  ? change DC to 5/4- home with services      Continue comprehensive acute rehab program consisting of 3hrs/day of OT/PT and SLP.

## 2024-05-02 NOTE — PROGRESS NOTE ADULT - ASSESSMENT
IRA SEN is a 75yo Male s/p CABG, now with decreased functional mobility, gait instability and ADL impairments.    COMORBIDITIES/ACTIVE MEDICAL ISSUES     Gait Instability, ADL impairments and Functional impairments: Comprehensive Rehab Program of PT/OT      #S/P CABG (coronary artery bypass graft).   - ASA 81 mg PO Daily.   - Lipitor 80 mg PO HS   Plavix 75 mg PO Daily  - Lopressor 25 mg PO BID to Toprol 12.5mg-per Cardio   - Continue with Heparin subcutaneous 5000 units q 8 hr for DVT ppx   - Continue with Pepcid 20 mg PO Daily  - Lasix 20mg 2x/day to daily. CXR ok, sats well on RA, no SOB   - aldactone 25 qd restarted   Started on Entresto -nightly until BP>100 consistently  - fall and sternal precautions  - IS    #Pleural effusion  -CXR today 5/2  - lasix 20mg BID to qd  - sats 96% RA    # S/P ascending aortic aneurysm repair.   -Continue with Plavix 75 mg PO Daily  Continue Amio 200mg PO     #CLAUDIA (acute kidney injury) on CKD   -follow labs, avoid nephrotoxins/hold home Metformin  cr 1.2    #DM2 (hbg a1c 9.8)  Lantus HS decreased, dc Admelog units  - consistent low carb diet.  - started on Farxiga  - hospitalist in    Hypokalemia  secondary to Lasix  K 3.3, supplement    #CAD (coronary artery disease).   - ASA/Lipitor, trop neg, no chest pain  - TTE at baseline 4/23     #BPH (benign prostatic hyperplasia).   -Tamsulosin 0.4 PO HS-on hold for low BP  -monitor voiding, mobilize    Pain Mgmt - Tylenol PRN  GI/Bowel Mgmt - Senna,  Miralax PRN  /Bladder Mgmt - PVR x1    #Skin; Sternal incision LELA + abd lap sites x 4, b/l medial thigh incision, right groin puncture site, Deep tissue injury (DTI) to b/l buttock. pressure related    Poor Sleep  started on Trazadone 25mg HS   DC  PRN xanax    Precautions / PROPHYLAXIS:   - Falls, Cardiac, Sternal  - Lungs: Aspiration, Incentive Spirometer   - Pressure injury/Skin: Turn Q2hrs while in bed, OOB to Chair, PT/OT    - DVT: Hep sq

## 2024-05-03 LAB
ANION GAP SERPL CALC-SCNC: 12 MMOL/L — SIGNIFICANT CHANGE UP (ref 5–17)
BUN SERPL-MCNC: 24 MG/DL — HIGH (ref 7–23)
CALCIUM SERPL-MCNC: 9 MG/DL — SIGNIFICANT CHANGE UP (ref 8.4–10.5)
CHLORIDE SERPL-SCNC: 103 MMOL/L — SIGNIFICANT CHANGE UP (ref 96–108)
CO2 SERPL-SCNC: 25 MMOL/L — SIGNIFICANT CHANGE UP (ref 22–31)
CREAT SERPL-MCNC: 1.7 MG/DL — HIGH (ref 0.5–1.3)
EGFR: 41 ML/MIN/1.73M2 — LOW
GLUCOSE BLDC GLUCOMTR-MCNC: 126 MG/DL — HIGH (ref 70–99)
GLUCOSE SERPL-MCNC: 163 MG/DL — HIGH (ref 70–99)
POTASSIUM SERPL-MCNC: 4.3 MMOL/L — SIGNIFICANT CHANGE UP (ref 3.5–5.3)
POTASSIUM SERPL-SCNC: 4.3 MMOL/L — SIGNIFICANT CHANGE UP (ref 3.5–5.3)
SODIUM SERPL-SCNC: 140 MMOL/L — SIGNIFICANT CHANGE UP (ref 135–145)

## 2024-05-03 PROCEDURE — 99232 SBSQ HOSP IP/OBS MODERATE 35: CPT | Mod: GC

## 2024-05-03 PROCEDURE — 99232 SBSQ HOSP IP/OBS MODERATE 35: CPT

## 2024-05-03 RX ORDER — ATORVASTATIN CALCIUM 80 MG/1
1 TABLET, FILM COATED ORAL
Qty: 30 | Refills: 0
Start: 2024-05-03 | End: 2024-06-01

## 2024-05-03 RX ORDER — FUROSEMIDE 40 MG
1 TABLET ORAL
Qty: 30 | Refills: 0
Start: 2024-05-03 | End: 2024-06-01

## 2024-05-03 RX ORDER — AMIODARONE HYDROCHLORIDE 400 MG/1
1 TABLET ORAL
Qty: 30 | Refills: 0
Start: 2024-05-03 | End: 2024-06-01

## 2024-05-03 RX ORDER — SACUBITRIL AND VALSARTAN 24; 26 MG/1; MG/1
1 TABLET, FILM COATED ORAL
Qty: 60 | Refills: 0
Start: 2024-05-03 | End: 2024-06-01

## 2024-05-03 RX ORDER — METFORMIN HYDROCHLORIDE 850 MG/1
1 TABLET ORAL
Qty: 60 | Refills: 0
Start: 2024-05-03 | End: 2024-06-01

## 2024-05-03 RX ORDER — DAPAGLIFLOZIN 10 MG/1
1 TABLET, FILM COATED ORAL
Refills: 0 | DISCHARGE

## 2024-05-03 RX ORDER — CLOPIDOGREL BISULFATE 75 MG/1
1 TABLET, FILM COATED ORAL
Qty: 30 | Refills: 0
Start: 2024-05-03 | End: 2024-06-01

## 2024-05-03 RX ORDER — METOPROLOL TARTRATE 50 MG
0.5 TABLET ORAL
Qty: 15 | Refills: 0
Start: 2024-05-03 | End: 2024-06-01

## 2024-05-03 RX ORDER — SPIRONOLACTONE 25 MG/1
1 TABLET, FILM COATED ORAL
Qty: 30 | Refills: 0
Start: 2024-05-03 | End: 2024-06-01

## 2024-05-03 RX ORDER — DAPAGLIFLOZIN 10 MG/1
1 TABLET, FILM COATED ORAL
Qty: 30 | Refills: 0
Start: 2024-05-03 | End: 2024-06-01

## 2024-05-03 RX ORDER — LINAGLIPTIN 5 MG/1
1 TABLET, FILM COATED ORAL
Qty: 30 | Refills: 0
Start: 2024-05-03 | End: 2024-06-01

## 2024-05-03 RX ADMIN — HEPARIN SODIUM 5000 UNIT(S): 5000 INJECTION INTRAVENOUS; SUBCUTANEOUS at 14:50

## 2024-05-03 RX ADMIN — INSULIN GLARGINE 10 UNIT(S): 100 INJECTION, SOLUTION SUBCUTANEOUS at 21:17

## 2024-05-03 RX ADMIN — CLOPIDOGREL BISULFATE 75 MILLIGRAM(S): 75 TABLET, FILM COATED ORAL at 12:17

## 2024-05-03 RX ADMIN — SACUBITRIL AND VALSARTAN 1 TABLET(S): 24; 26 TABLET, FILM COATED ORAL at 21:16

## 2024-05-03 RX ADMIN — Medication 1 APPLICATION(S): at 05:59

## 2024-05-03 RX ADMIN — SPIRONOLACTONE 25 MILLIGRAM(S): 25 TABLET, FILM COATED ORAL at 05:59

## 2024-05-03 RX ADMIN — Medication 12.5 MILLIGRAM(S): at 05:59

## 2024-05-03 RX ADMIN — Medication 25 MILLIGRAM(S): at 21:16

## 2024-05-03 RX ADMIN — Medication 20 MILLIGRAM(S): at 05:58

## 2024-05-03 RX ADMIN — Medication 650 MILLIGRAM(S): at 22:21

## 2024-05-03 RX ADMIN — AMIODARONE HYDROCHLORIDE 200 MILLIGRAM(S): 400 TABLET ORAL at 05:59

## 2024-05-03 RX ADMIN — ATORVASTATIN CALCIUM 80 MILLIGRAM(S): 80 TABLET, FILM COATED ORAL at 21:16

## 2024-05-03 RX ADMIN — Medication 650 MILLIGRAM(S): at 21:19

## 2024-05-03 RX ADMIN — Medication 81 MILLIGRAM(S): at 12:17

## 2024-05-03 RX ADMIN — HEPARIN SODIUM 5000 UNIT(S): 5000 INJECTION INTRAVENOUS; SUBCUTANEOUS at 21:17

## 2024-05-03 RX ADMIN — Medication 1 APPLICATION(S): at 17:42

## 2024-05-03 RX ADMIN — HEPARIN SODIUM 5000 UNIT(S): 5000 INJECTION INTRAVENOUS; SUBCUTANEOUS at 06:02

## 2024-05-03 RX ADMIN — FAMOTIDINE 20 MILLIGRAM(S): 10 INJECTION INTRAVENOUS at 12:17

## 2024-05-03 RX ADMIN — DAPAGLIFLOZIN 10 MILLIGRAM(S): 10 TABLET, FILM COATED ORAL at 12:17

## 2024-05-03 NOTE — PROGRESS NOTE ADULT - ASSESSMENT
74yr Male, PMHx of CVA, MI, HTN, DM2, HLD, CAD s/p cardiac stents, CHF, hx of malignant melanoma/wide excision. Now s/p bioprosthetic aortic valve replacement and 2V CABG. post op requiring mechanical support with IABP na d Inotropic support with IV Dobutamine and Milrinone for acute systolic heart failure. PO Amiodarone started for afib prophylaxis.  4/13 febrile w/ CLAUDIA, +fluid overload, zosyn / vanco empirically,+ diuresis. ID consulted, BC NGTD. On 4/20 Started on Lopressor 25 mg PO BID.  PMR recommends acute rehab. NOW at [facility] for rehab.      #s/p BioBental with replacement of ascending aorta and transverse hemiarch  #AVR  #CABG  #HFrEF, EF 30-35%, not in excerbation  - CXR with persistent moderate left pleural effusion and consolidation as well as hazy opacity right midlung field.  - c/w ASA, Plavix, Lipitor 80.  - continue GDMT - entresto, aldactone, beta blocker, farxiga.  - Continue Lasix 20 mg daily  - Strict I and Os  - daily standing weight  - Patient on amiodarone, as per CT surgery notes for afib prophylaxis. will confirm the duration with CT surgery team.  #Pleural effusion  -slightly decreased, etiology heart failure vs sec to post op changes, patient with no c/o sob/ chest pain. seen by pulmo, will continue to monitor. no thoracentesis for now. if patient develops fever or any other symptoms will revisit.    #CLAUDIA possible ATN  - renal function fluctuating, will continue to monitor. readjust medications as per renal function. not a candidate for metformin anymore.  - if gfr < 50, will decrease dose of aldactone.    #DM2   - A1c 9.8  - continue insulin and finger stick monitoring. monitor for episode of hypoglycemia  - Patient not a candidate for metformin due to fluctuating renal function.  - will confirm with primary care, if no contraindication, will change tradjenta to Farxiga which is part of GDMT.    #BPH (benign prostatic hyperplasia).   -Tamsulosin 0.4 PO HS    # DVT-Ppx  - Heparin

## 2024-05-03 NOTE — PROGRESS NOTE ADULT - TIME BILLING
- Ordering, reviewing, and interpreting labs, testing, and imaging.  - Independently obtaining a review of systems and performing a physical exam  - Reviewing prior hospitalization and where necessary, outpatient records.  - Counselling and educating patient and family regarding interpretation of aforementioned items and plan of care.
Time spent includes direct patient care (interview and examination of patient), discussion with other providers, support staff and/or patient's family members, review of medical records, ordering diagnostic tests and analyzing results, and documentation.
Time spent includes direct patient care  (interview and examination of patient), discussion with other providers, support staff and/or patient's family members, review of medical records, ordering diagnostic tests and analyzing results, and documentation.

## 2024-05-03 NOTE — PROGRESS NOTE ADULT - ASSESSMENT
IRA SEN is a 73yo Male s/p CABG, now with decreased functional mobility, gait instability and ADL impairments.    COMORBIDITIES/ACTIVE MEDICAL ISSUES     Gait Instability, ADL impairments and Functional impairments: Comprehensive Rehab Program of PT/OT      #S/P CABG (coronary artery bypass graft).   - ASA 81 mg PO Daily.   - Lipitor 80 mg PO HS   Plavix 75 mg PO Daily  - Lopressor 25 mg PO BID to Toprol 12.5mg-per Cardio   - Continue with Heparin subcutaneous 5000 units q 8 hr for DVT ppx   - Continue with Pepcid 20 mg PO Daily  - Lasix 20mg 2x/day to daily. CXR ok, sats well on RA, no SOB   - aldactone 25 qd restarted   Will resume Entresto 2x/day on DC  - fall and sternal precautions  - IS  ANTICIPATE DC IN AM    #Pleural effusion  -CXR today 5/2-left pleural effusion appears better  - lasix 20mg BID to qd  - sats 96% RA    # S/P ascending aortic aneurysm repair.   -Continue with Plavix 75 mg PO Daily  Continue Amio 200mg PO     #CLAUDIA (acute kidney injury) on CKD   -follow labs, avoid nephrotoxins/hold home Metformin  cr 1.2    #DM2 (hbg a1c 9.8)  Lantus HS decreased, dc Admelog units  - consistent low carb diet.  - started on Farxiga  - hospitalist in  DC Home on Lantus 10 units  ? resume Metformin    Hypokalemia  secondary to Lasix  K 3.3, supplemented  Repeat BMP ordered    #CAD (coronary artery disease).   - ASA/Lipitor, trop neg, no chest pain  - TTE at baseline 4/23     #BPH (benign prostatic hyperplasia).   -Tamsulosin 0.4 PO HS-on hold for low BP  -monitor voiding, mobilize    Pain Mgmt - Tylenol PRN  GI/Bowel Mgmt - Senna,  Miralax PRN  /Bladder Mgmt - PVR x1    #Skin; Sternal incision LELA + abd lap sites x 4, b/l medial thigh incision, right groin puncture site, Deep tissue injury (DTI) to b/l buttock. pressure related    Poor Sleep  started on Trazadone 25mg HS   DC  PRN xanax    Precautions / PROPHYLAXIS:   - Falls, Cardiac, Sternal  - Lungs: Aspiration, Incentive Spirometer   - Pressure injury/Skin: Turn Q2hrs while in bed, OOB to Chair, PT/OT    - DVT: Hep sq

## 2024-05-03 NOTE — PROGRESS NOTE ADULT - SUBJECTIVE AND OBJECTIVE BOX
No acute events overnight. No new complaints. Excited for his discharge.      ALLERGIES:  No Known Allergies    MEDICATIONS  (STANDING):  aMIOdarone    Tablet 200 milliGRAM(s) Oral daily  AQUAPHOR (petrolatum Ointment) 1 Application(s) Topical two times a day  aspirin enteric coated 81 milliGRAM(s) Oral daily  atorvastatin 80 milliGRAM(s) Oral <User Schedule>  clopidogrel Tablet 75 milliGRAM(s) Oral daily  dapagliflozin 10 milliGRAM(s) Oral daily  dextrose 10% Bolus 125 milliLiter(s) IV Bolus once  dextrose 5%. 1000 milliLiter(s) (50 mL/Hr) IV Continuous <Continuous>  dextrose 5%. 1000 milliLiter(s) (100 mL/Hr) IV Continuous <Continuous>  dextrose 50% Injectable 25 Gram(s) IV Push once  dextrose 50% Injectable 12.5 Gram(s) IV Push once  famotidine    Tablet 20 milliGRAM(s) Oral daily  furosemide    Tablet 20 milliGRAM(s) Oral daily  glucagon  Injectable 1 milliGRAM(s) IntraMuscular once  heparin   Injectable 5000 Unit(s) SubCutaneous every 8 hours  insulin glargine Injectable (LANTUS) 10 Unit(s) SubCutaneous <User Schedule>  insulin lispro (ADMELOG) corrective regimen sliding scale   SubCutaneous at bedtime  metoprolol succinate ER 12.5 milliGRAM(s) Oral daily  sacubitril 24 mG/valsartan 26 mG 1 Tablet(s) Oral <User Schedule>  spironolactone 25 milliGRAM(s) Oral daily  traZODone 25 milliGRAM(s) Oral at bedtime    MEDICATIONS  (PRN):  acetaminophen     Tablet .. 650 milliGRAM(s) Oral every 6 hours PRN Moderate Pain (4 - 6)  dextrose Oral Gel 15 Gram(s) Oral once PRN Blood Glucose LESS THAN 70 milliGRAM(s)/deciliter  polyethylene glycol 3350 17 Gram(s) Oral daily PRN Constipation    T(C): 36.8 (05-03-24 @ 07:48), Max: 36.8 (05-03-24 @ 07:48)  T(F): 98.3 (05-03-24 @ 07:48), Max: 98.3 (05-03-24 @ 07:48)  HR: 75 (05-03-24 @ 07:48) (75 - 84)  BP: 131/82 (05-03-24 @ 07:48) (124/82 - 131/82)  ABP: --  ABP(mean): --  RR: 15 (05-03-24 @ 07:48) (15 - 16)  SpO2: 97% (05-03-24 @ 07:48) (96% - 97%)    on exam alert, awake, oriented  seen sitting in chair  no apparant distress  both lungs clear  s1,s2, regular  abdomen- soft and non tender  no pedal edema    LABS:                        12.0   6.73  )-----------( 382      ( 02 May 2024 05:35 )             36.5     05-02    142  |  105  |  20  ----------------------------<  70  3.3<L>   |  26  |  1.25    Ca    8.7      02 May 2024 05:35    TPro  6.3  /  Alb  2.9<L>  /  TBili  0.9  /  DBili  x   /  AST  19  /  ALT  23  /  AlkPhos  108  05-02      Urinalysis Basic - ( 02 May 2024 05:35 )    Color: x / Appearance: x / SG: x / pH: x  Gluc: 70 mg/dL / Ketone: x  / Bili: x / Urobili: x   Blood: x / Protein: x / Nitrite: x   Leuk Esterase: x / RBC: x / WBC x   Sq Epi: x / Non Sq Epi: x / Bacteria: x       CAPILLARY BLOOD GLUCOSE      POCT Blood Glucose.: 82 mg/dL (02 May 2024 21:51)      echo :   1. Moderately decreased global left ventricular systolic function.   2. Left ventricular ejection fraction, by visual estimation, is 30 to   35%.   3. Moderately increased posterior wall thickness. Severely increased   septal wall thickness. Consider use of IV ultrasonic enhancing agent to   better evaluate regional wall motion, if clinically indicated.   4. The right ventricle is not well visualized, appears to have reduced   systolic function.   5. Mild thickening of the anterior and posterior mitral valve leaflets.   6. Mild mitral valve regurgitation.   7. There is a bioprosthetic valve in the aortic position, appears well   seated with peak velocity within normal limits.   8. S/p aorta graft repair (not well visualized).   9. Trivial pericardial effusion.  10. Moderate pleural effusion in the left lateral region

## 2024-05-03 NOTE — PROGRESS NOTE ADULT - SUBJECTIVE AND OBJECTIVE BOX
HPI:  74yr Male, PMHx of CVA, MI, HTN, DM2, HLD, CAD s/p cardiac stents, CHF, hx of malignant melanoma/wide excision. To [facility] 4/1 for eval of ascending aorta replacement, aortic valve replacement, coronary artery bypass grafting with Dr. Ilia Ortiz. Admitted preop/NPO after midnight for OR on 4/7. On 4/8 underwent ASCENDING AORTIC REPLACEMENT WITH TRANSVERSE HEMIARCH, CABGx2, AVR-BIOPROSTHETIC VALVE. Inotropic support with IV Dobutamine and Milrinone post op for acute systolic heart failure. IABP, on PO Amiodarone for afib prophylaxis. Extubated to 5L-->hi flow. Endocrine consulted for DM2, Insulin gtt. 4/13 febrile w/CLAUDIA, +fluid overload, zosyn / vanco empirically,+ diuresis. Hypotension- Taran-Synephrine/ Milrinone gtt. ID consulted, BC NGTD. On 4/18 Serum Glucose 62. On 4/20 Started on Lopressor 25 mg PO BID. Therapy started. Increase ambulation and activity as tolerated.   PMR recommends acute rehab. Cleared for dc to [facility] rehab.        ROS/subjective:  Patient seen and evaluated bedside,  still reporting poor sleep   anticipate Dc in AM  CXR f/up effusion-improved, no SOB- now on Entresto-BPs 90s-will decrease Lasix to daily from BID, supple K, Entresto to nightly until BP>100  daily weights    MEDICATIONS  (STANDING):  aMIOdarone    Tablet 200 milliGRAM(s) Oral daily  AQUAPHOR (petrolatum Ointment) 1 Application(s) Topical two times a day  aspirin enteric coated 81 milliGRAM(s) Oral daily  atorvastatin 80 milliGRAM(s) Oral <User Schedule>  clopidogrel Tablet 75 milliGRAM(s) Oral daily  dapagliflozin 10 milliGRAM(s) Oral daily  dextrose 10% Bolus 125 milliLiter(s) IV Bolus once  dextrose 5%. 1000 milliLiter(s) (100 mL/Hr) IV Continuous <Continuous>  dextrose 5%. 1000 milliLiter(s) (50 mL/Hr) IV Continuous <Continuous>  dextrose 50% Injectable 25 Gram(s) IV Push once  dextrose 50% Injectable 12.5 Gram(s) IV Push once  famotidine    Tablet 20 milliGRAM(s) Oral daily  furosemide    Tablet 20 milliGRAM(s) Oral daily  glucagon  Injectable 1 milliGRAM(s) IntraMuscular once  heparin   Injectable 5000 Unit(s) SubCutaneous every 8 hours  insulin glargine Injectable (LANTUS) 10 Unit(s) SubCutaneous <User Schedule>  insulin lispro (ADMELOG) corrective regimen sliding scale   SubCutaneous at bedtime  metoprolol succinate ER 12.5 milliGRAM(s) Oral daily  sacubitril 24 mG/valsartan 26 mG 1 Tablet(s) Oral <User Schedule>  spironolactone 25 milliGRAM(s) Oral daily  traZODone 25 milliGRAM(s) Oral at bedtime    MEDICATIONS  (PRN):  acetaminophen     Tablet .. 650 milliGRAM(s) Oral every 6 hours PRN Moderate Pain (4 - 6)  dextrose Oral Gel 15 Gram(s) Oral once PRN Blood Glucose LESS THAN 70 milliGRAM(s)/deciliter  polyethylene glycol 3350 17 Gram(s) Oral daily PRN Constipation                            12.0   6.73  )-----------( 382      ( 02 May 2024 05:35 )             36.5     05-02    142  |  105  |  20  ----------------------------<  70  3.3<L>   |  26  |  1.25    Ca    8.7      02 May 2024 05:35    TPro  6.3  /  Alb  2.9<L>  /  TBili  0.9  /  DBili  x   /  AST  19  /  ALT  23  /  AlkPhos  108  05-02    Urinalysis Basic - ( 02 May 2024 05:35 )    Color: x / Appearance: x / SG: x / pH: x  Gluc: 70 mg/dL / Ketone: x  / Bili: x / Urobili: x   Blood: x / Protein: x / Nitrite: x   Leuk Esterase: x / RBC: x / WBC x   Sq Epi: x / Non Sq Epi: x / Bacteria: x        Vital Signs Last 24 Hrs  T(C): 36.8 (03 May 2024 07:48), Max: 36.8 (03 May 2024 07:48)  T(F): 98.3 (03 May 2024 07:48), Max: 98.3 (03 May 2024 07:48)  HR: 75 (03 May 2024 07:48) (75 - 84)  BP: 131/82 (03 May 2024 07:48) (124/82 - 131/82)  BP(mean): --  RR: 15 (03 May 2024 07:48) (15 - 16)  SpO2: 97% (03 May 2024 07:48) (96% - 97%)    Parameters below as of 03 May 2024 07:48  Patient On (Oxygen Delivery Method): room air      Gen - NAD, Comfortable  HEENT - NCAT, EOMI  Neck - Supple, No limited ROM  Pulm - CTAB, No wheeze, No rhonchi, No crackles  Cardiovascular - RRR, S1S2  Chest - good chest expansion, good respiratory effort  Abdomen - Soft, NT/ND, +BS  Extremities - No Cyanosis, no clubbing, trace edema to BLE, no calf tenderness  Neuro-     Cognitive - awake, alert, oriented to person, place, situation, month, follows simple commands ok-? year     Communication - Fluent     Memory: distant Memory intact     Cranial Nerves - CN 2-12 intact.     Motor -                    LEFT    UE - 5/5                    RIGHT UE - 5/5                    LEFT    LE - 4/5 HF otherwise 5/5                    RIGHT LE - 4+/5  HF otherwise 5/5       Sensory - Intact  to LT     Tone - normal  Skin: Sternal incision LELA + abd lap sites x 4, b/l medial thigh incision, right groin puncture site, Deep tissue injury (DTI) to b/l buttock-pressure related      IDT in [facility] 5/1  ? change DC to 5/4- home with services      Continue comprehensive acute rehab program consisting of 3hrs/day of OT/PT and SLP.

## 2024-05-04 ENCOUNTER — NON-APPOINTMENT (OUTPATIENT)
Age: 74
End: 2024-05-04

## 2024-05-04 VITALS
RESPIRATION RATE: 16 BRPM | TEMPERATURE: 98 F | DIASTOLIC BLOOD PRESSURE: 75 MMHG | SYSTOLIC BLOOD PRESSURE: 110 MMHG | HEART RATE: 78 BPM | OXYGEN SATURATION: 96 %

## 2024-05-04 LAB
ANION GAP SERPL CALC-SCNC: 12 MMOL/L — SIGNIFICANT CHANGE UP (ref 5–17)
BUN SERPL-MCNC: 27 MG/DL — HIGH (ref 7–23)
CALCIUM SERPL-MCNC: 8.9 MG/DL — SIGNIFICANT CHANGE UP (ref 8.4–10.5)
CHLORIDE SERPL-SCNC: 105 MMOL/L — SIGNIFICANT CHANGE UP (ref 96–108)
CO2 SERPL-SCNC: 25 MMOL/L — SIGNIFICANT CHANGE UP (ref 22–31)
CREAT SERPL-MCNC: 1.44 MG/DL — HIGH (ref 0.5–1.3)
EGFR: 50 ML/MIN/1.73M2 — LOW
GLUCOSE BLDC GLUCOMTR-MCNC: 97 MG/DL — SIGNIFICANT CHANGE UP (ref 70–99)
GLUCOSE SERPL-MCNC: 84 MG/DL — SIGNIFICANT CHANGE UP (ref 70–99)
POTASSIUM SERPL-MCNC: 3.8 MMOL/L — SIGNIFICANT CHANGE UP (ref 3.5–5.3)
POTASSIUM SERPL-SCNC: 3.8 MMOL/L — SIGNIFICANT CHANGE UP (ref 3.5–5.3)
SODIUM SERPL-SCNC: 142 MMOL/L — SIGNIFICANT CHANGE UP (ref 135–145)

## 2024-05-04 PROCEDURE — 99238 HOSP IP/OBS DSCHRG MGMT 30/<: CPT

## 2024-05-04 PROCEDURE — 99232 SBSQ HOSP IP/OBS MODERATE 35: CPT

## 2024-05-04 RX ADMIN — Medication 1 APPLICATION(S): at 06:40

## 2024-05-04 RX ADMIN — Medication 20 MILLIGRAM(S): at 06:37

## 2024-05-04 RX ADMIN — AMIODARONE HYDROCHLORIDE 200 MILLIGRAM(S): 400 TABLET ORAL at 06:37

## 2024-05-04 RX ADMIN — SPIRONOLACTONE 25 MILLIGRAM(S): 25 TABLET, FILM COATED ORAL at 06:36

## 2024-05-04 RX ADMIN — HEPARIN SODIUM 5000 UNIT(S): 5000 INJECTION INTRAVENOUS; SUBCUTANEOUS at 06:36

## 2024-05-04 RX ADMIN — Medication 12.5 MILLIGRAM(S): at 06:35

## 2024-05-04 NOTE — PROGRESS NOTE ADULT - PROVIDER SPECIALTY LIST ADULT
Cardiology
Hospitalist
Physiatry
Physiatry
Pulmonology
Hospitalist
Neuro Rehabilitation
Physiatry
Pulmonology
Hospitalist
Hospitalist
Internal Medicine
Physiatry
Rehab Medicine
Rehab Medicine
Physiatry
Pulmonology
Hospitalist
Internal Medicine
Physiatry

## 2024-05-04 NOTE — PROGRESS NOTE ADULT - ASSESSMENT
74yr Male, PMHx of CVA, MI, HTN, DM2, HLD, CAD s/p cardiac stents, CHF, hx of malignant melanoma/wide excision. Now s/p bioprosthetic aortic valve replacement and 2V CABG. post op requiring mechanical support with IABP na d Inotropic support with IV Dobutamine and Milrinone for acute systolic heart failure. PO Amiodarone started for afib prophylaxis.  4/13 febrile w/ CLAUDIA, +fluid overload, zosyn / vanco empirically,+ diuresis. ID consulted, BC NGTD. On 4/20 Started on Lopressor 25 mg PO BID.  PMR recommends acute rehab. NOW at [facility] for rehab.      #s/p BioBental with replacement of ascending aorta and transverse hemiarch  #AVR  #CABG  #HFrEF, EF 30-35%, not in excerbation  - CXR with persistent moderate left pleural effusion and consolidation as well as hazy opacity right midlung field.  - c/w ASA, Plavix, Lipitor 80.  - continue GDMT - entresto, aldactone, beta blocker, farxiga.  - Continue Lasix 20 mg daily  - Strict I and Os  - daily standing weight  - Patient on amiodarone, as per CT surgery notes for afib prophylaxis. Please confirm the duration with CT surgery team.  #Pleural effusion  -slightly decreased, etiology heart failure vs sec to post op changes, patient with no c/o sob/ chest pain. seen by pulmo, will continue to monitor. no thoracentesis for now. if patient develops fever or any other symptoms will revisit.    #CLAUDIA possible ATN  - renal function fluctuating, will continue to monitor. readjust medications as per renal function. not a candidate for metformin anymore.  - if gfr < 50, will decrease dose of aldactone.    #DM2   - A1c 9.8  - continue insulin and finger stick monitoring. monitor for episode of hypoglycemia  - Patient not a candidate for metformin due to fluctuating renal function.  - will confirm with primary care, if no contraindication, will change tradjenta to Farxiga which is part of GDMT.  - POCT goal as inpt 140-180    #BPH (benign prostatic hyperplasia).   -Tamsulosin 0.4 PO HS    # DVT-Ppx  - Heparin    Case discussed in detail with physiatry team.

## 2024-05-04 NOTE — PROGRESS NOTE ADULT - SUBJECTIVE AND OBJECTIVE BOX
Patient is a 74y old  Male who presents with a chief complaint of cardiac debilitation s/p CABG (03 May 2024 13:22)      Patient seen and examined at bedside.  Denies chest pain, dyspnea, abd pain.    ALLERGIES:  No Known Allergies    MEDICATIONS  (STANDING):  aMIOdarone    Tablet 200 milliGRAM(s) Oral daily  AQUAPHOR (petrolatum Ointment) 1 Application(s) Topical two times a day  aspirin enteric coated 81 milliGRAM(s) Oral daily  atorvastatin 80 milliGRAM(s) Oral <User Schedule>  clopidogrel Tablet 75 milliGRAM(s) Oral daily  dapagliflozin 10 milliGRAM(s) Oral daily  dextrose 10% Bolus 125 milliLiter(s) IV Bolus once  dextrose 5%. 1000 milliLiter(s) (50 mL/Hr) IV Continuous <Continuous>  dextrose 5%. 1000 milliLiter(s) (100 mL/Hr) IV Continuous <Continuous>  dextrose 50% Injectable 12.5 Gram(s) IV Push once  dextrose 50% Injectable 25 Gram(s) IV Push once  famotidine    Tablet 20 milliGRAM(s) Oral daily  furosemide    Tablet 20 milliGRAM(s) Oral daily  glucagon  Injectable 1 milliGRAM(s) IntraMuscular once  heparin   Injectable 5000 Unit(s) SubCutaneous every 8 hours  insulin glargine Injectable (LANTUS) 10 Unit(s) SubCutaneous <User Schedule>  insulin lispro (ADMELOG) corrective regimen sliding scale   SubCutaneous at bedtime  metoprolol succinate ER 12.5 milliGRAM(s) Oral daily  sacubitril 24 mG/valsartan 26 mG 1 Tablet(s) Oral <User Schedule>  spironolactone 25 milliGRAM(s) Oral daily  traZODone 25 milliGRAM(s) Oral at bedtime    MEDICATIONS  (PRN):  acetaminophen     Tablet .. 650 milliGRAM(s) Oral every 6 hours PRN Moderate Pain (4 - 6)  dextrose Oral Gel 15 Gram(s) Oral once PRN Blood Glucose LESS THAN 70 milliGRAM(s)/deciliter  polyethylene glycol 3350 17 Gram(s) Oral daily PRN Constipation    Vital Signs Last 24 Hrs  T(F): 97.6 (04 May 2024 08:14), Max: 98 (03 May 2024 21:12)  HR: 78 (04 May 2024 08:14) (78 - 86)  BP: 110/75 (04 May 2024 08:14) (110/75 - 123/76)  RR: 16 (04 May 2024 08:14) (16 - 18)  SpO2: 96% (04 May 2024 08:14) (93% - 96%)  I&O's Summary    03 May 2024 07:01  -  04 May 2024 07:00  --------------------------------------------------------  IN: 0 mL / OUT: 850 mL / NET: -850 mL        PHYSICAL EXAM:  General: NAD, A/O x 3  ENT: MMM  Neck: Supple, No JVD  Lungs: Clear to auscultation bilaterally  Cardio: RRR, S1/S2, No murmurs  Abdomen: Soft, Nontender, Nondistended; Bowel sounds present  Extremities: No calf tenderness, No pitting edema    LABS:                        12.0   6.73  )-----------( 382      ( 02 May 2024 05:35 )             36.5       05-04    142  |  105  |  27  ----------------------------<  84  3.8   |  25  |  1.44    Ca    8.9      04 May 2024 05:25    TPro  6.3  /  Alb  2.9  /  TBili  0.9  /  DBili  x   /  AST  19  /  ALT  23  /  AlkPhos  108  05-02                              POCT Blood Glucose.: 97 mg/dL (04 May 2024 07:43)  POCT Blood Glucose.: 126 mg/dL (03 May 2024 21:14)      Urinalysis Basic - ( 04 May 2024 05:25 )    Color: x / Appearance: x / SG: x / pH: x  Gluc: 84 mg/dL / Ketone: x  / Bili: x / Urobili: x   Blood: x / Protein: x / Nitrite: x   Leuk Esterase: x / RBC: x / WBC x   Sq Epi: x / Non Sq Epi: x / Bacteria: x            RADIOLOGY & ADDITIONAL TESTS:    Care Discussed with Consultants/Other Providers:

## 2024-05-04 NOTE — PROGRESS NOTE ADULT - REASON FOR ADMISSION
cardiac debilitation s/p CABG

## 2024-05-04 NOTE — PROGRESS NOTE ADULT - SUBJECTIVE AND OBJECTIVE BOX
Chief complaint: no new complaints, ready for discharge      Patient is a 74y old  Male who presents with a chief complaint of cardiac debilitation s/p CABG (04 May 2024 09:39)    PAST MEDICAL & SURGICAL HISTORY:  HTN (hypertension)      Hearing decreased      CVA (cerebral vascular accident)  12/22/2016      Hyperlipemia      Kidney stones      Coronary artery disease  MI 12/22/2016      CHF (congestive heart failure)  1/2017 stents x3      Type 2 diabetes mellitus  2016      Confusion  from stroke      S/P medial meniscal repair  and ligament surgery      H/O lithotripsy      S/P tonsillectomy      Bilateral inguinal hernia      VITALS  Vital Signs Last 24 Hrs  T(C): 36.4 (04 May 2024 08:14), Max: 36.7 (03 May 2024 21:12)  T(F): 97.6 (04 May 2024 08:14), Max: 98 (03 May 2024 21:12)  HR: 78 (04 May 2024 08:14) (78 - 86)  BP: 110/75 (04 May 2024 08:14) (110/75 - 123/76)  BP(mean): --  RR: 16 (04 May 2024 08:14) (16 - 18)  SpO2: 96% (04 May 2024 08:14) (93% - 96%)    Parameters below as of 04 May 2024 08:14  Patient On (Oxygen Delivery Method): room air          PHYSICAL EXAM  Constitutional - NAD, Comfortable  HEENT - NCAT, EOMI  Neck - Supple, No limited ROM  Chest - CTA bilaterally  Cardiovascular - RRR, S1S2  Abdomen - Soft, NTND  Extremities - No calf tenderness               RECENT LABS    05-04    142  |  105  |  27<H>  ----------------------------<  84  3.8   |  25  |  1.44<H>    Ca    8.9      04 May 2024 05:25        Urinalysis Basic - ( 04 May 2024 05:25 )    Color: x / Appearance: x / SG: x / pH: x  Gluc: 84 mg/dL / Ketone: x  / Bili: x / Urobili: x   Blood: x / Protein: x / Nitrite: x   Leuk Esterase: x / RBC: x / WBC x   Sq Epi: x / Non Sq Epi: x / Bacteria: x              CURRENT MEDICATIONS    MEDICATIONS  (STANDING):  aMIOdarone    Tablet 200 milliGRAM(s) Oral daily  AQUAPHOR (petrolatum Ointment) 1 Application(s) Topical two times a day  aspirin enteric coated 81 milliGRAM(s) Oral daily  atorvastatin 80 milliGRAM(s) Oral <User Schedule>  clopidogrel Tablet 75 milliGRAM(s) Oral daily  dapagliflozin 10 milliGRAM(s) Oral daily  dextrose 10% Bolus 125 milliLiter(s) IV Bolus once  dextrose 5%. 1000 milliLiter(s) (100 mL/Hr) IV Continuous <Continuous>  dextrose 5%. 1000 milliLiter(s) (50 mL/Hr) IV Continuous <Continuous>  dextrose 50% Injectable 25 Gram(s) IV Push once  dextrose 50% Injectable 12.5 Gram(s) IV Push once  famotidine    Tablet 20 milliGRAM(s) Oral daily  furosemide    Tablet 20 milliGRAM(s) Oral daily  glucagon  Injectable 1 milliGRAM(s) IntraMuscular once  heparin   Injectable 5000 Unit(s) SubCutaneous every 8 hours  insulin glargine Injectable (LANTUS) 10 Unit(s) SubCutaneous <User Schedule>  insulin lispro (ADMELOG) corrective regimen sliding scale   SubCutaneous at bedtime  metoprolol succinate ER 12.5 milliGRAM(s) Oral daily  sacubitril 24 mG/valsartan 26 mG 1 Tablet(s) Oral <User Schedule>  spironolactone 25 milliGRAM(s) Oral daily  traZODone 25 milliGRAM(s) Oral at bedtime    MEDICATIONS  (PRN):  acetaminophen     Tablet .. 650 milliGRAM(s) Oral every 6 hours PRN Moderate Pain (4 - 6)  dextrose Oral Gel 15 Gram(s) Oral once PRN Blood Glucose LESS THAN 70 milliGRAM(s)/deciliter  polyethylene glycol 3350 17 Gram(s) Oral daily PRN Constipation    ASSESSMENT & PLAN      Patient is being discharged home with home care  today as per primary team.   Discharge instructions were discussed with patient, all current medications were sent to the pharmacy. Patient was educated on importance of medication compliance,  continued  care with PMD and follow-up care with the specialists in the community. Safety and fall risk precautions  were discussed in detail, counseled on healthy life style modifications.  All questions were answered to their satisfaction.    25 min spent

## 2024-05-04 NOTE — PROGRESS NOTE ADULT - NUTRITIONAL ASSESSMENT
This patient has been assessed with a concern for Malnutrition and has been determined to have a diagnosis/diagnoses of Severe protein-calorie malnutrition.    This patient is being managed with:   Diet Regular-  Consistent Carbohydrate {Evening Snack}  Low Sodium  Unruly(7 Gm Arginine/7 Gm Glut/1.2 Gm HMB     Qty per Day:  2  Supplement Feeding Modality:  Oral  Glucerna Shake Cans or Servings Per Day:  1       Frequency:  Two Times a day  Entered: Apr 23 2024 10:22AM  

## 2024-05-06 ENCOUNTER — TRANSCRIPTION ENCOUNTER (OUTPATIENT)
Age: 74
End: 2024-05-06

## 2024-05-08 ENCOUNTER — TRANSCRIPTION ENCOUNTER (OUTPATIENT)
Age: 74
End: 2024-05-08

## 2024-05-08 DIAGNOSIS — K59.00 CONSTIPATION, UNSPECIFIED: ICD-10-CM

## 2024-05-08 DIAGNOSIS — E11.9 TYPE 2 DIABETES MELLITUS W/OUT COMPLICATIONS: ICD-10-CM

## 2024-05-08 RX ORDER — FAMOTIDINE 20 MG/1
20 TABLET, FILM COATED ORAL
Qty: 30 | Refills: 0 | Status: ACTIVE | COMMUNITY
Start: 2024-05-08 | End: 1900-01-01

## 2024-05-08 RX ORDER — TAMSULOSIN HYDROCHLORIDE 0.4 MG/1
0.4 CAPSULE ORAL AT BEDTIME
Qty: 30 | Refills: 0 | Status: ACTIVE | COMMUNITY
Start: 2024-05-08 | End: 1900-01-01

## 2024-05-08 RX ORDER — BLOOD SUGAR DIAGNOSTIC
STRIP MISCELLANEOUS 4 TIMES DAILY
Qty: 1 | Refills: 0 | Status: ACTIVE | COMMUNITY
Start: 2024-05-08 | End: 1900-01-01

## 2024-05-09 ENCOUNTER — TRANSCRIPTION ENCOUNTER (OUTPATIENT)
Age: 74
End: 2024-05-09

## 2024-05-09 ENCOUNTER — NON-APPOINTMENT (OUTPATIENT)
Age: 74
End: 2024-05-09

## 2024-05-14 ENCOUNTER — APPOINTMENT (OUTPATIENT)
Dept: CARDIOLOGY | Facility: CLINIC | Age: 74
End: 2024-05-14
Payer: MEDICARE

## 2024-05-14 VITALS
OXYGEN SATURATION: 98 % | HEIGHT: 67 IN | DIASTOLIC BLOOD PRESSURE: 76 MMHG | BODY MASS INDEX: 22.91 KG/M2 | WEIGHT: 146 LBS | HEART RATE: 82 BPM | SYSTOLIC BLOOD PRESSURE: 110 MMHG

## 2024-05-14 PROCEDURE — 99214 OFFICE O/P EST MOD 30 MIN: CPT

## 2024-05-14 PROCEDURE — 93000 ELECTROCARDIOGRAM COMPLETE: CPT

## 2024-05-14 RX ORDER — LINAGLIPTIN 5 MG/1
5 TABLET, FILM COATED ORAL
Refills: 0 | Status: ACTIVE | COMMUNITY

## 2024-05-14 RX ORDER — METOPROLOL SUCCINATE 25 MG/1
25 TABLET, EXTENDED RELEASE ORAL DAILY
Qty: 30 | Refills: 2 | Status: DISCONTINUED | COMMUNITY
End: 2024-05-14

## 2024-05-14 RX ORDER — CLOPIDOGREL BISULFATE 75 MG/1
75 TABLET, FILM COATED ORAL DAILY
Qty: 30 | Refills: 3 | Status: DISCONTINUED | COMMUNITY
End: 2024-05-14

## 2024-05-14 RX ORDER — CLOPIDOGREL BISULFATE 75 MG/1
75 TABLET, FILM COATED ORAL
Refills: 0 | Status: ACTIVE | COMMUNITY

## 2024-05-14 RX ORDER — SACUBITRIL AND VALSARTAN 24; 26 MG/1; MG/1
24-26 TABLET, FILM COATED ORAL
Refills: 0 | Status: ACTIVE | COMMUNITY

## 2024-05-14 RX ORDER — METOPROLOL SUCCINATE 25 MG/1
25 TABLET, EXTENDED RELEASE ORAL
Qty: 90 | Refills: 3 | Status: ACTIVE | COMMUNITY

## 2024-05-14 RX ORDER — AMIODARONE HYDROCHLORIDE 200 MG/1
200 TABLET ORAL
Refills: 0 | Status: ACTIVE | COMMUNITY

## 2024-05-14 NOTE — HISTORY OF PRESENT ILLNESS
[FreeTextEntry1] : Patient is a 74 M PMH HTN, HLD, uncontrolled DM2, CAD s/p PCI in the past recently admitted to [facility] 4/7/24 for ascending aorta replacement, aortic valve replacement, coronary artery bypass grafting   interval note 5/14/24  74yr Male, PMHx of CVA, MI, HTN, DM2, HLD, CAD s/p cardiac stents, CHF, hx of malignant melanoma/wide excision. To [facility] 4/1 for eval of ascending aorta replacement, aortic valve replacement, coronary artery bypass grafting with Dr.Derek Ortiz. Admitted preop/NPO after midnight for OR on 4/7. On 4/8 underwent ASCENDING AORTIC REPLACEMENT WITH TRANSVERSE HEMIARCH, CABGx2, AVR-BIOPROSTHETIC VALVE. Inotropic support with IV Dobutamine and Milrinone post op for acute systolic heart failure. IABP, on PO Amiodarone for afib prophylaxis. Extubated to 5L-->hi flow. Endocrine consulted for DM2, Insulin gtt. 4/13 febrile w/CLAUDIA, +fluid overload, zosyn / vanco empirically,+ diuresis. Hypotension- Taran-Synephrine/ Milrinone gtt. ID consulted, BC NGTD. On 4/18 Serum Glucose 62. On 4/20 Started on Lopressor 25 mg PO BID. Therapy started. Increase ambulation and activity as tolerated. PMR recommends acute rehab. Cleared for dc to [facility] rehab. At [facility] rehab patient completed comprehensive PT OT SLP program. While at rehab patient evaluated by medicine, cardiology and pulmonary. medications adjusted. Cleared for dc to home on 5/4 presents today post rehab - feels weak but as per him and his wife, has been slowly improving both physically and mentally. asymptomatic - denies chest pain, SOB/GREENE, palpitations, lightheadedness or syncope has been compliant with his medical therapy and will be seeing the [facility] nurse tomorrow - 5/15/24

## 2024-05-14 NOTE — PHYSICAL EXAM
[Well Developed] : well developed [Well Nourished] : well nourished [No Acute Distress] : no acute distress [Normal Venous Pressure] : normal venous pressure [No Carotid Bruit] : no carotid bruit [Normal S1, S2] : normal S1, S2 [No Murmur] : no murmur [No Rub] : no rub [No Gallop] : no gallop [Clear Lung Fields] : clear lung fields [Good Air Entry] : good air entry [No Respiratory Distress] : no respiratory distress  [Soft] : abdomen soft [Non Tender] : non-tender [No Masses/organomegaly] : no masses/organomegaly [Normal Bowel Sounds] : normal bowel sounds [Normal Gait] : normal gait [No Edema] : no edema [No Cyanosis] : no cyanosis [No Clubbing] : no clubbing [No Varicosities] : no varicosities [No Rash] : no rash [No Skin Lesions] : no skin lesions [Moves all extremities] : moves all extremities [No Focal Deficits] : no focal deficits [Normal Speech] : normal speech [Alert and Oriented] : alert and oriented [Normal memory] : normal memory [General Appearance - Well Developed] : well developed [Normal Appearance] : normal appearance [Well Groomed] : well groomed [General Appearance - Well Nourished] : well nourished [No Deformities] : no deformities [General Appearance - In No Acute Distress] : no acute distress [Normal Conjunctiva] : the conjunctiva exhibited no abnormalities [Eyelids - No Xanthelasma] : the eyelids demonstrated no xanthelasmas [Normal Oral Mucosa] : normal oral mucosa [No Oral Pallor] : no oral pallor [No Oral Cyanosis] : no oral cyanosis [Normal Jugular Venous A Waves Present] : normal jugular venous A waves present [Normal Jugular Venous V Waves Present] : normal jugular venous V waves present [No Jugular Venous Gibbs A Waves] : no jugular venous gibbs A waves [Respiration, Rhythm And Depth] : normal respiratory rhythm and effort [Exaggerated Use Of Accessory Muscles For Inspiration] : no accessory muscle use [Auscultation Breath Sounds / Voice Sounds] : lungs were clear to auscultation bilaterally [Heart Rate And Rhythm] : heart rate and rhythm were normal [Heart Sounds] : normal S1 and S2 [Murmurs] : no murmurs present [Abdomen Soft] : soft [Abdomen Tenderness] : non-tender [Abdomen Mass (___ Cm)] : no abdominal mass palpated [Abnormal Walk] : normal gait [Gait - Sufficient For Exercise Testing] : the gait was sufficient for exercise testing [Nail Clubbing] : no clubbing of the fingernails [Cyanosis, Localized] : no localized cyanosis [Petechial Hemorrhages (___cm)] : no petechial hemorrhages [Skin Color & Pigmentation] : normal skin color and pigmentation [No Venous Stasis] : no venous stasis [] : no rash [Skin Lesions] : no skin lesions [No Skin Ulcers] : no skin ulcer [No Xanthoma] : no  xanthoma was observed [Oriented To Time, Place, And Person] : oriented to person, place, and time [Affect] : the affect was normal [Mood] : the mood was normal [No Anxiety] : not feeling anxious

## 2024-05-15 ENCOUNTER — APPOINTMENT (OUTPATIENT)
Dept: CARDIOTHORACIC SURGERY | Facility: CLINIC | Age: 74
End: 2024-05-15

## 2024-05-15 VITALS
DIASTOLIC BLOOD PRESSURE: 73 MMHG | HEART RATE: 85 BPM | TEMPERATURE: 98.2 F | RESPIRATION RATE: 14 BRPM | HEIGHT: 67 IN | BODY MASS INDEX: 23.07 KG/M2 | SYSTOLIC BLOOD PRESSURE: 106 MMHG | OXYGEN SATURATION: 96 % | WEIGHT: 147 LBS

## 2024-05-15 NOTE — REASON FOR VISIT
[de-identified] :  Coronary bypass grafting x2 with reverse saphenous vein graft harvested from both bilateral thighs endoscopically of the aorta to large diagonal  and aorta to mid posterior descending artery of the right.  Insertion of percutaneous intra-aortic balloon pump utilizing ultrasound guidance via the left femoral artery and positioned with transesophageal echo.  Aortic valve replacement utilizing a size 25 mm Inspiris Resilia.  Replacement of the ascending aorta.  Replacement of the proximal transverse arch, lucie-arch with selective cerebral perfusion. [de-identified] : 4/8/24 [Family Member] : family member

## 2024-05-15 NOTE — CONSULT LETTER
[Dear  ___] : Dear  [unfilled], [Courtesy Letter:] : I had the pleasure of seeing your patient, [unfilled], in my office today. [Please see my note below.] : Please see my note below. [Consult Closing:] : Thank you very much for allowing me to participate in the care of this patient.  If you have any questions, please do not hesitate to contact me. [Sincerely,] : Sincerely, [FreeTextEntry2] : Dr. Demetrice Joyce,  [FreeTextEntry3] : Ilia Ortiz M.D. Professor of Cardiovascular and Thoracic Surgery Minimally Invasive Valve Surgeon Director of Aortic Surgery, [facility] Cell: [phone] Email: [email or web address]

## 2024-05-15 NOTE — PHYSICAL EXAM
[] : no respiratory distress [Respiration, Rhythm And Depth] : normal respiratory rhythm and effort [Auscultation Breath Sounds / Voice Sounds] : lungs were clear to auscultation bilaterally [Apical Impulse] : the apical impulse was normal [Heart Rate And Rhythm] : heart rate was normal and rhythm regular [Heart Sounds] : normal S1 and S2 [Murmurs] : no murmurs [Clean] : clean [Dry] : dry [Healing Well] : healing well [FreeTextEntry5] : B/L Groin  [No Edema] : no edema

## 2024-05-15 NOTE — ASSESSMENT
[FreeTextEntry1] : 74yr Male, PMHx of CVA, MI, HTN, DM2, HLD, CAD s/p cardiac stents, CHF, hx of malignant melanoma/wide excision. To [facility] 4/1 for eval of ascending aorta replacement, aortic valve replacement, coronary artery bypass grafting with Dr. Ilia Ortiz. Admitted preop/NPO after midnight for OR on 4/7.   On 4/8 underwent ASCENDING AORTIC REPLACEMENT WITH TRANSVERSE HEMIARCH, CABGx2, AVR-BIOPROSTHETIC VALVE. Inotropic support with IV Dobutamine and Milrinone post op for acute systolic heart failure. IABP, on PO Amiodarone for afib prophylaxis. Extubated to 5L-->hi flow. Endocrine consulted for DM2, Insulin gtt. 4/13 febrile w/CLAUDIA, +fluid overload, zosyn / vanco empirically,+ diuresis. Hypotension- Taran-Synephrine/ Milrinone gtt. ID consulted, BC NGTD. On 4/18 Serum Glucose 62. On 4/20 Started on Lopressor 25 mg PO BID. Therapy started. Increase ambulation and activity as tolerated. PMR recommends acute rehab. Cleared for dc to [facility] rehab. Cleared for dc to home on 5/4.  Today he presents and reports that he is doing well. Denies any chest pain, shortness of breath, palpitations, dizziness or pedal edema.   Today on exam patient's lungs clear bilaterally, normal sinus rhythm, sternum stable, incision clean, dry and intact. B/L SVG site is clean, dry and intact.  No peripheral edema noted.    Instructed patient on importance of optimal glycemic control, daily showering, daily weights, any signs of fever (temperature greater than 101F, chills,  incentive spirometer use, and increase ambulation as tolerated. Instructed to call office with any signs or symptoms of infection or weight gain of 2 or more pounds in 1 day or 3 or more pounds in 1 week.    Discussed intake of plant based foods, including vegetables, fruits, and whole grain foods: legumes, nuts and seeds, fish or seafood, lean meats, and non-fat or low-fat diary foods. Plant based oils (non-tropical) in place of solid fats. Instructed patient to limit intake of high fat meats and processed meats, high-fat diary foods, dietary cholesterol and sodium, foods and beverages with added sugars.   Plan: 1) Continue current medication regimen 2) Follow up with cardiologist ( Dr. AMANUEL Joyce on 5/14/24) and PCP  3) Hold diuretics, advised to weigh daily   4) Follow up with Dr. Ortiz on June 12 2024 5) SBE antibiotic prophylaxis discussed at length  6) Continue to increase activity and walk daily as tolerated. Continue to use incentive spirometer.  7) Keep legs elevated above heart when resting/sitting/sleeping.  8) Call MD if you experience fever, fatigue, dizziness, confusion, syncope, shortness of breath, chest pain not relieved with analgesics, increased redness/drainage from the surgical  incision site 9) Advised to call back if weight goes up more than 3 lbs/ day

## 2024-05-31 ENCOUNTER — APPOINTMENT (OUTPATIENT)
Dept: CARDIOLOGY | Facility: CLINIC | Age: 74
End: 2024-05-31
Payer: MEDICARE

## 2024-05-31 PROCEDURE — 93306 TTE W/DOPPLER COMPLETE: CPT

## 2024-06-05 ENCOUNTER — APPOINTMENT (OUTPATIENT)
Dept: CARDIOTHORACIC SURGERY | Facility: CLINIC | Age: 74
End: 2024-06-05
Payer: MEDICARE

## 2024-06-05 VITALS
DIASTOLIC BLOOD PRESSURE: 67 MMHG | HEART RATE: 65 BPM | BODY MASS INDEX: 23.07 KG/M2 | HEIGHT: 67 IN | TEMPERATURE: 97.9 F | WEIGHT: 147 LBS | SYSTOLIC BLOOD PRESSURE: 111 MMHG | RESPIRATION RATE: 14 BRPM | OXYGEN SATURATION: 98 %

## 2024-06-05 DIAGNOSIS — I71.10: ICD-10-CM

## 2024-06-05 DIAGNOSIS — I71.20 THORACIC AORTIC ANEURYSM, WITHOUT RUPTURE, UNSPECIFIED: ICD-10-CM

## 2024-06-05 DIAGNOSIS — Z95.1 PRESENCE OF AORTOCORONARY BYPASS GRAFT: ICD-10-CM

## 2024-06-05 PROCEDURE — 99024 POSTOP FOLLOW-UP VISIT: CPT

## 2024-06-05 RX ORDER — FUROSEMIDE 20 MG/1
20 TABLET ORAL
Refills: 0 | Status: COMPLETED | COMMUNITY
End: 2024-06-05

## 2024-06-06 NOTE — PHYSICAL EXAM
[] : no respiratory distress [Respiration, Rhythm And Depth] : normal respiratory rhythm and effort [Exaggerated Use Of Accessory Muscles For Inspiration] : no accessory muscle use [Auscultation Breath Sounds / Voice Sounds] : lungs were clear to auscultation bilaterally [Apical Impulse] : the apical impulse was normal [Heart Rate And Rhythm] : heart rate was normal and rhythm regular [Heart Sounds] : normal S1 and S2 [Clean] : clean [Dry] : dry [Healing Well] : healing well [No Edema] : no edema [Bleeding] : no active bleeding [Foul Odor] : no foul smell [Purulent Drainage] : no purulent drainage [Serosanguinous Drainage] : no serosanguinous drainage [Erythema] : not erythematous [Warm] : not warm [Tender] : not tender [FreeTextEntry5] : Left LE SVG

## 2024-06-06 NOTE — ASSESSMENT
[FreeTextEntry1] : Presents today and reports doing and feeling well. Gradually progressing since DC and walking more everyday. Eating and drinking with +BM. Denies chest pain, SOB, swelling, weight gain, palpitations, cough, fever or chills.  Today on exam patient's lungs clear bilaterally, normal sinus rhythm, sternum stable, incision clean, dry and intact. BL SVG site is clean, dry and intact. No peripheral edema noted. Instructed patient on importance of optimal glycemic control, daily showering, daily weights, incentive spirometer use, and increase ambulation as tolerated. Instructed to call office with any signs or symptoms of infection or weight gain of 2 or more pounds in 1 day or 3 or more pounds in 1 week.   Plan:  - Continue current medication regimen - Follow up with cardiologist Dr. Joyce - Follow up with PCP  - Continue to walk and increase as tolerated - Low salt diet and fluids discussed in detail - Tight glucose control discussed in detail for wound healing - SBE antibiotic prophylaxis discussed at length  - Follow up in aortic registry in one year with repeat CT - Call with any questions or concerns

## 2024-06-06 NOTE — END OF VISIT
[FreeTextEntry3] : Written by Anders Perez NP, acting as a scribe for Dr. Ortiz. The documentation recorded by the scribe accurately reflects the service I personally performed and the decisions made by me. Signature Celi Ortiz MD. I, CELI Sanchez personally performed the evaluation and management (E/M) services for this established patient who presents today with (a) new problem(s)/exacerbation of (an) existing condition(s).  That E/M includes conducting the clinically appropriate interval history &/or exam, assessing all new/exacerbated conditions, and establishing a new plan of care.  Today, my BELLA, was here to observe &/or participate in the visit & follow plan of care established by me.

## 2024-06-06 NOTE — REASON FOR VISIT
[de-identified] : Coronary bypass grafting x2 with reverse saphenous vein graft harvested from both bilateral thighs endoscopically of the aorta to large diagonal and aorta to mid posterior descending artery of the right.  Insertion of percutaneous intra-aortic balloon pump utilizing ultrasound guidance via the left femoral artery and positioned with transesophageal echo.  Aortic valve replacement utilizing a size 25 mm Inspiris Resilia.  Replacement of the ascending aorta.  Replacement of the proximal transverse arch, lucie-arch with selective cerebral perfusion [de-identified] : 4/8/24 [de-identified] :  Inotropic support with IV Dobutamine and Milrinone post op for acute systolic heart failure. IABP, on PO Amiodarone for afib prophylaxis. Extubated to 5L-->hi flow. Endocrine consulted for DM2, Insulin gtt. 4/13 febrile w/CLAUDIA, +fluid overload, zosyn / vanco empirically,+ diuresis. Hypotension- Taran-Synephrine/ Milrinone gtt. ID consulted, BC NGTD. On 4/18 Serum Glucose 62. On 4/20 Started on Lopressor 25 mg PO BID. Therapy started. Increase ambulation and activity as tolerated. PMR recommends acute rehab. Cleared for dc to [facility] rehab. At [facility] rehab patient completed comprehensive PT OT SLP program. While at rehab patient evaluated by medicine, cardiology and pulmonary. medications adjusted. Cleared for dc to home on 5/4.  Patient presents for 2nd postop visit.

## 2024-06-17 PROCEDURE — 97110 THERAPEUTIC EXERCISES: CPT | Mod: GO

## 2024-06-17 PROCEDURE — 36415 COLL VENOUS BLD VENIPUNCTURE: CPT

## 2024-06-17 PROCEDURE — 80048 BASIC METABOLIC PNL TOTAL CA: CPT

## 2024-06-17 PROCEDURE — 97116 GAIT TRAINING THERAPY: CPT | Mod: GP

## 2024-06-17 PROCEDURE — 84484 ASSAY OF TROPONIN QUANT: CPT

## 2024-06-17 PROCEDURE — 97530 THERAPEUTIC ACTIVITIES: CPT | Mod: GO

## 2024-06-17 PROCEDURE — 97161 PT EVAL LOW COMPLEX 20 MIN: CPT | Mod: GP

## 2024-06-17 PROCEDURE — 83880 ASSAY OF NATRIURETIC PEPTIDE: CPT

## 2024-06-17 PROCEDURE — 85025 COMPLETE CBC W/AUTO DIFF WBC: CPT

## 2024-06-17 PROCEDURE — 83735 ASSAY OF MAGNESIUM: CPT

## 2024-06-17 PROCEDURE — 97112 NEUROMUSCULAR REEDUCATION: CPT | Mod: GP

## 2024-06-17 PROCEDURE — 80053 COMPREHEN METABOLIC PANEL: CPT

## 2024-06-17 PROCEDURE — 97535 SELF CARE MNGMENT TRAINING: CPT | Mod: GO

## 2024-06-17 PROCEDURE — 85027 COMPLETE CBC AUTOMATED: CPT

## 2024-06-17 PROCEDURE — 93306 TTE W/DOPPLER COMPLETE: CPT

## 2024-06-17 PROCEDURE — 71045 X-RAY EXAM CHEST 1 VIEW: CPT

## 2024-06-17 PROCEDURE — 97165 OT EVAL LOW COMPLEX 30 MIN: CPT | Mod: GO

## 2024-06-17 PROCEDURE — 82962 GLUCOSE BLOOD TEST: CPT

## 2024-06-17 PROCEDURE — 87637 SARSCOV2&INF A&B&RSV AMP PRB: CPT

## 2024-06-17 PROCEDURE — 93005 ELECTROCARDIOGRAM TRACING: CPT

## 2024-06-24 RX ORDER — FUROSEMIDE 40 MG/1
40 TABLET ORAL
Qty: 30 | Refills: 0 | Status: ACTIVE | COMMUNITY
Start: 2024-06-24 | End: 1900-01-01

## 2024-06-24 RX ORDER — FUROSEMIDE 20 MG/1
20 TABLET ORAL
Qty: 90 | Refills: 1 | Status: ACTIVE | COMMUNITY
Start: 2024-06-21 | End: 1900-01-01

## 2024-07-17 ENCOUNTER — APPOINTMENT (OUTPATIENT)
Dept: ELECTROPHYSIOLOGY | Facility: CLINIC | Age: 74
End: 2024-07-17

## 2024-07-17 ENCOUNTER — APPOINTMENT (OUTPATIENT)
Dept: CARDIOLOGY | Facility: CLINIC | Age: 74
End: 2024-07-17
Payer: MEDICARE

## 2024-07-17 VITALS
DIASTOLIC BLOOD PRESSURE: 70 MMHG | SYSTOLIC BLOOD PRESSURE: 109 MMHG | OXYGEN SATURATION: 98 % | WEIGHT: 146 LBS | BODY MASS INDEX: 22.91 KG/M2 | HEART RATE: 85 BPM | HEIGHT: 67 IN

## 2024-07-17 DIAGNOSIS — E11.9 TYPE 2 DIABETES MELLITUS W/OUT COMPLICATIONS: ICD-10-CM

## 2024-07-17 DIAGNOSIS — R00.2 PALPITATIONS: ICD-10-CM

## 2024-07-17 DIAGNOSIS — I63.411 CEREBRAL INFARCTION DUE TO EMBOLISM OF RIGHT MIDDLE CEREBRAL ARTERY: ICD-10-CM

## 2024-07-17 DIAGNOSIS — I71.20 THORACIC AORTIC ANEURYSM, WITHOUT RUPTURE, UNSPECIFIED: ICD-10-CM

## 2024-07-17 DIAGNOSIS — E11.65 TYPE 2 DIABETES MELLITUS WITH HYPERGLYCEMIA: ICD-10-CM

## 2024-07-17 DIAGNOSIS — Z95.1 PRESENCE OF AORTOCORONARY BYPASS GRAFT: ICD-10-CM

## 2024-07-17 PROCEDURE — 99215 OFFICE O/P EST HI 40 MIN: CPT

## 2024-07-17 PROCEDURE — 93000 ELECTROCARDIOGRAM COMPLETE: CPT

## 2024-07-17 RX ORDER — DAPAGLIFLOZIN 10 MG/1
10 TABLET, FILM COATED ORAL
Qty: 90 | Refills: 3 | Status: ACTIVE | COMMUNITY
Start: 2024-07-17 | End: 1900-01-01

## 2024-07-17 RX ORDER — TAMSULOSIN HYDROCHLORIDE 0.4 MG/1
0.4 CAPSULE ORAL
Qty: 90 | Refills: 3 | Status: ACTIVE | COMMUNITY
Start: 2024-07-17

## 2024-07-23 ENCOUNTER — NON-APPOINTMENT (OUTPATIENT)
Age: 74
End: 2024-07-23

## 2024-07-30 ENCOUNTER — NON-APPOINTMENT (OUTPATIENT)
Age: 74
End: 2024-07-30

## 2024-07-31 ENCOUNTER — NON-APPOINTMENT (OUTPATIENT)
Age: 74
End: 2024-07-31

## 2024-07-31 NOTE — REASON FOR VISIT
[Home] : at home, [unfilled] , at the time of the visit. [Medical Office: ([facility])___] : at the medical office located in  [Verbal consent obtained from patient] : the patient, [unfilled]

## 2024-08-01 ENCOUNTER — APPOINTMENT (OUTPATIENT)
Dept: CARDIOLOGY | Facility: CLINIC | Age: 74
End: 2024-08-01

## 2024-08-01 NOTE — HISTORY OF PRESENT ILLNESS
[FreeTextEntry1] : IRA SEN is a 74 year old male who was referred to cardiac rehab by Dr. Joyce. Patient is s/p  cardiac surgery on 4/8/2024: Coronary bypass grafting x2 with reverse saphenous vein graft harvested from both bilateral thighs endoscopically of the aorta to large diagonal and aorta to mid posterior descending artery of the right.  Insertion of percutaneous intra-aortic balloon pump utilizing ultrasound guidance via the left femoral artery and positioned with transesophageal echo.  Aortic valve replacement utilizing a size 25 mm Inspiris Resilia.  Replacement of the ascending aorta.  Replacement of the proximal transverse arch, lucie-arch with selective cerebral perfusion. Past medical history includes: HTN, HLD, DM2, CVA, m.i., CAD/stents, CHF, LVEF 30% (via 5/2024 TTE), EJ, LBBB, aortic aneurysm and aortic root dilatation, and malignant melanoma with wide excision.  7/31/24: Patient presents for intake for cardiac rehab, referred by Dr. Joyce. He states he is gradually improving with activity tolerance, has done some walking in the house as well as in his neighborhood, for 20-30 minutes, walks with cane on occasion, states he can be unsteady, denies falls within past 6months. He denies chest pain, states he had palpitations and has recently worn a home monitor that he just sent back to company. He denies shortness of breath, reports some dizziness. He complains of fatigue, improved sleeping pattern, states he has some "brain fog" related to decreased stamina with conversation and activity. He endorses numbness of bilateral foot and toes, states his feet and ankles are somewhat swollen (left greater than right). He states he has lost 30 lbs since his surgery, and feels that he has lost muscle mass. He aims to follow a heart healthy diabetes eating pattern, states he does not check his home blood sugar. He follows with psychologist Dr. Huffman for emotional well-being, states he and his wife hope to go to their [facility] home for the month of August. He is looking forward to starting a structured cardiac rehab program.

## 2024-08-01 NOTE — REVIEW OF SYSTEMS
[Fever] : no fever [Fatigue] : fatigue [Recent Change In Weight] : ~T recent weight change [Chest Pain] : no chest pain [Palpitations] : palpitations [Claudication] : no  leg claudication [Lower Ext Edema] : lower extremity edema [Shortness Of Breath] : no shortness of breath [Wheezing] : no wheezing [Cough] : no cough [Dyspnea on Exertion] : not dyspnea on exertion [Abdominal Pain] : no abdominal pain [Nausea] : no nausea [Constipation] : no constipation [Vomiting] : no vomiting [Heartburn] : no heartburn [Joint Pain] : no joint pain [Muscle Pain] : no muscle pain [Muscle Weakness] : no muscle weakness [Back Pain] : no back pain [Headache] : no headache [Dizziness] : dizziness [Fainting] : no fainting [FreeTextEntry2] : see HPI [FreeTextEntry3] : last ophthalmologist appt ~ 1.5 years ago- encouraged to follow-up with an appointment [FreeTextEntry5] : see HPI [FreeTextEntry9] : feet and toes with numbness [de-identified] : states MSI and leg incisions dry, intact without signs of infection; itching of MSI [de-identified] : see HPI

## 2024-08-01 NOTE — PLAN
[FreeTextEntry1] : Cardiac Rehabilitation Phase 2 Focus assessment and physical exam at session #1 ITP initiated- to be established by Dr. Vidal prior to session #1.  All questions answered.  Welcome appointment information emailed to patient (ok from patient). Start date: 10/16/24, 7am; SRN with Dr. Vidal 10/15/24

## 2024-08-01 NOTE — DATA REVIEWED
[FreeTextEntry1] : nsthoracic Echocardiogram-TTE             Final  No Documents Attached    	 TRANSTHORACIC ECHOCARDIOGRAM REPORT ________________________________________________________________________________ _______   Pt. Name:       IRA SEN      Study Date:    5/31/2024 MRN:            [id number]      YOB: 1950 Accession #:    [id number]  Age:           74 years Account#:       [id number]        Gender:        M Visit ID# Heart Rate:                     Height:        67.00 in (170.18 cm) Rhythm:                         Weight:        147.00 lb (66.68 kg) Blood Pressure: 106/73 mmHg     BSA/BMI:       1.77 m / 23.02 kg/m ________________________________________________________________________________________ Referring Physician:    Demetrice Luna MD Interpreting Physician: Bob Torres Primary Sonographer:    Bouchra Flores [facility]  Indication(s):     Dyspnea, unspecified - R06.00 Procedure:         Transthoracic echocardiogram with 2-D, M-mode and complete spectral and color flow Doppler. Ordering Location: 003 Admission Status:  Outpatient Study Information: Image quality for this study is fair.  _______________________________________________________________________________________  CONCLUSIONS:  1. 1. Mild eccentric LV enlargement with severe segmental LV dysfunction and grade I diastolic dysfunction. 2. Normal LA size. 3. Aortic bioprosthesis with expected gradient and no AI. 4. Mitral leaflets teathered and moderate mitral regurgitation. 5. Normal right sided structures. Minimal TR and PA systolic pressure estimated at 15.  ________________________________________________________________________________________ FINDINGS:  Left Ventricle: There is mild (grade 1) left ventricular diastolic dysfunction. Severe segmental LV dysfunction with LVEF 30% visually Hypokinetic to akineticbasal inf/post. wall,mid lateral, apical lateral, apical anterior, apical septum,apex, basal anteroseptal and basal to midinferolateral walls.  Right Ventricle: The right ventricular cavity is normal in size, with normal wall thickness and probably normal systolic function.  Left Atrium: The left atrium is normal in size with an indexed volume of 17 ml/m.  Right Atrium: The right atrium is normal in size with an indexed volume of 23.11 ml/m.  Interatrial Septum: The interatrial septum appears intact.  Aortic Valve: There is no intravalvular regurgitation. S/P Ao valve replacement. The peak transaortic velocity is 1.45 m/s, peak transaortic gradient is 8.4 mmHg and mean transaortic gradient is 4.7 mmHg with an LVOT/aortic valve VTI ratio of 0.53. The aortic valve area is estimated at 1.46 cm by the continuity equation.  Mitral Valve: Structurally normal mitral valve with normal leaflet excursion. There is symmetric leaflet tethering. There is moderate mitral regurgitation.  Tricuspid Valve: Structurally normal tricuspid valve with normal leaflet excursion. Minimal to mild TR. Estimated pulmonary artery systolic pressure is 15 mmHg.  Pulmonic Valve: Structurally normal pulmonic valve with normal leaflet excursion. Minimal to mild PI.  Aorta: S/p repair of prox. ascd. Ao and prox. transverse Ao arc. Ao root at sinuses measures 4.3cm. Prox. Ao at sinotubular junction 4.4cm. Ao repair segment 3.4cm.  Pericardium: No pericardial effusion seen.  Systemic Veins: The inferior vena cava is normal in size (normal <2.1cm) with normal inspiratory collapse (normal >50%) consistent with normal right atrial pressure (\R\3, range 0-5mmHg). ____________________________________________________________________ QUANTITATIVE DATA: Left Ventricle Measurements: (Indexed to BSA)  IVSd (2D):   1.2 cm LVPWd (2D):  0.9 cm LVIDd (2D):  5.7 cm LVIDs (2D):  4.9 cm LV Mass:     242 g  136.6 g/m LV Vol d, MOD A2C: 179.1 ml 100.95 ml/m LV Vol d, MOD A4C: 162.6 ml 91.63 ml/m LV Vol d, MOD BP:  175.2 ml 98.78 ml/m LV Vol s, MOD A2C: 95.4 ml  53.77 ml/m LV Vol s, MOD A4C: 106.1 ml 59.80 ml/m LV Vol s, MOD BP:  102.4 ml 57.72 ml/m LVOT SV MOD BP:    72.8 ml LV EF% MOD BP:     42 %  MV E Vmax:    0.37 m/s MV A Vmax:    1.04 m/s MV E/A:       0.35 e' lateral:   6.74 cm/s e' medial:    2.13 cm/s E/e' lateral: 5.45 E/e' medial:  17.24 E/e' Average: 8.29 MV DT:        146 msec  Aorta Measurements: (Normal range) (Indexed to BSA)  Ao Root 4.3 cm Ao Asc: 3.3 cm   Left Atrium Measurements: (Indexed to BSA) LA Diam 2D:        4.06 cm LA Vol s, MOD A4C: 22.06 ml. LA Vol s, MOD A2C: 31.08 ml. LA Vol BP:         30.0 ml.  17 ml/m.  Right Atrial Measurements:  RA Vol:       41.00 ml RA Vol Index: 23.11 ml/m   LVOT / RVOT/ Qp/Qs Data: (Indexed to BSA) LVOT Diameter:  1.87 cm LVOT Area:      2.75 cm LVOT Vmax:      0.72 m/s LVOT VTI:       15.69 cm LVOT peak grad: 2 mmHg LVOT mean grad: 1.0 mmHg LVOT SV:        43.1 ml  24.30 ml/m  Aortic Valve Measurements: AV Vmax:                1.4 m/s AV Peak Gradient:       8.4 mmHg AV Mean Gradient:       4.7 mmHg AV VTI:                 29.5 cm AV VTI Ratio:           0.53 AoV EOA, Contin:        1.46 cm AoV EOA, Contin i:      0.82 cm/m AoV Dimensionless Index 0.53  Mitral Valve Measurements:  MV E Vmax: 0.4 m/s MV A Vmax: 1.0 m/s MV E/A:    0.4   Tricuspid Valve Measurements:  TR Vmax:          1.7 m/s TR Peak Gradient: 11.9 mmHg RA Pressure:      3 mmHg PASP:             15 mmHg  ________________________________________________________________________________________ Electronically signed on 5/31/2024 at 4:00:56 PM by Bob Torres    *** Final ***  Ordered by: DEMETRICE LUNA       Collected/Examined: 31May2024 01:42PM       Verified by: DEMETRICE LUNA 04Jun2024 03:13PM       Result Communication: No patient communication needed at this time; Stage: Final       Performed at: [facility]       Resulted: 31May2024 01:42PM       Last Updated: 04Jun2024 03:13PM       Accession: [id number]

## 2024-08-02 ENCOUNTER — APPOINTMENT (OUTPATIENT)
Dept: CARDIOLOGY | Facility: CLINIC | Age: 74
End: 2024-08-02

## 2024-08-07 PROCEDURE — 93248 EXT ECG>7D<15D REV&INTERPJ: CPT

## 2024-08-13 ENCOUNTER — NON-APPOINTMENT (OUTPATIENT)
Age: 74
End: 2024-08-13

## 2024-08-20 ENCOUNTER — NON-APPOINTMENT (OUTPATIENT)
Age: 74
End: 2024-08-20

## 2024-08-27 ENCOUNTER — NON-APPOINTMENT (OUTPATIENT)
Age: 74
End: 2024-08-27

## 2024-09-06 NOTE — ASU PATIENT PROFILE, ADULT - PRESSURE ULCER(S)
DO NOT TAKE  FISH OIL, MOBIC, IBUPROFEN, MOTRIN-LIKE DRUGS AND ANY MULTIVITAMINS OR OVER THE COUNTER SUPPLEMENTS 14 DAYS PRIOR TO SURGERY.    MUST HAVE AN ADULT OVER THE AGE OF 18 WITH YOU AT THE TIME OF THE DISCHARGE         Princess Kirby 1951     Surgical Physician: Dr. Padron      You have been scheduled for the procedure marked below:      Surgery: CYSTOSCOPY, BLADDER BOTOX 100 UNITS          Date: 10/16/2024     Anesthesia:  MAC     Place of Service: [facility] --Second Floor Same Day Surgery         Arrive to same day surgery at:  6:00AM  (Surgery time is subject to change)      INSTRUCTIONS AS MARKED BELOW:    1.  DO NOT eat or drink anything after midnight before surgery.  2.  We prefer you shower or bathe with an antibacterial soap (Dial) the morning of surgery.  3  Please bring a current medication list, photo ID and insurance card(s) with you  4. Okay to take Tylenol  5. Take blood pressure or heart medication as directed, if taken in the morning take with a small sip of water  6.The office will call you in 1-2 days after your procedure to schedule a follow up.    DATE SENSITIVE PRE OP TESTING:    TO AVOID YOUR SURGERY BEING CANCELLED DO ON THE DATE LISTED *WALK IN *NO APPOINTMENT.      DO EKG NOW    DO URINE CULTURE AND FASTING LABS ON 10/2/2024        Date: 9/6/2024   no

## 2024-09-10 ENCOUNTER — NON-APPOINTMENT (OUTPATIENT)
Age: 74
End: 2024-09-10

## 2024-09-24 ENCOUNTER — NON-APPOINTMENT (OUTPATIENT)
Age: 74
End: 2024-09-24

## 2024-10-01 ENCOUNTER — RX RENEWAL (OUTPATIENT)
Age: 74
End: 2024-10-01

## 2024-10-04 ENCOUNTER — INPATIENT (INPATIENT)
Facility: HOSPITAL | Age: 74
LOS: 3 days | Discharge: HOME | DRG: 194 | End: 2024-10-08
Attending: STUDENT IN AN ORGANIZED HEALTH CARE EDUCATION/TRAINING PROGRAM | Admitting: STUDENT IN AN ORGANIZED HEALTH CARE EDUCATION/TRAINING PROGRAM
Payer: MEDICARE

## 2024-10-04 VITALS
SYSTOLIC BLOOD PRESSURE: 168 MMHG | RESPIRATION RATE: 20 BRPM | OXYGEN SATURATION: 95 % | WEIGHT: 149.91 LBS | DIASTOLIC BLOOD PRESSURE: 100 MMHG | HEART RATE: 112 BPM | HEIGHT: 66 IN | TEMPERATURE: 99 F

## 2024-10-04 DIAGNOSIS — Z90.89 ACQUIRED ABSENCE OF OTHER ORGANS: Chronic | ICD-10-CM

## 2024-10-04 DIAGNOSIS — Z98.89 OTHER SPECIFIED POSTPROCEDURAL STATES: Chronic | ICD-10-CM

## 2024-10-04 DIAGNOSIS — K40.20 BILATERAL INGUINAL HERNIA, WITHOUT OBSTRUCTION OR GANGRENE, NOT SPECIFIED AS RECURRENT: Chronic | ICD-10-CM

## 2024-10-04 PROCEDURE — 99285 EMERGENCY DEPT VISIT HI MDM: CPT | Mod: GC

## 2024-10-05 DIAGNOSIS — J18.9 PNEUMONIA, UNSPECIFIED ORGANISM: ICD-10-CM

## 2024-10-05 DIAGNOSIS — I50.20 UNSPECIFIED SYSTOLIC (CONGESTIVE) HEART FAILURE: ICD-10-CM

## 2024-10-05 DIAGNOSIS — Z95.1 PRESENCE OF AORTOCORONARY BYPASS GRAFT: ICD-10-CM

## 2024-10-05 DIAGNOSIS — E11.9 TYPE 2 DIABETES MELLITUS WITHOUT COMPLICATIONS: ICD-10-CM

## 2024-10-05 DIAGNOSIS — Z98.890 OTHER SPECIFIED POSTPROCEDURAL STATES: ICD-10-CM

## 2024-10-05 DIAGNOSIS — I10 ESSENTIAL (PRIMARY) HYPERTENSION: ICD-10-CM

## 2024-10-05 DIAGNOSIS — Z29.9 ENCOUNTER FOR PROPHYLACTIC MEASURES, UNSPECIFIED: ICD-10-CM

## 2024-10-05 DIAGNOSIS — Z95.2 PRESENCE OF PROSTHETIC HEART VALVE: ICD-10-CM

## 2024-10-05 LAB
ADD ON TEST-SPECIMEN IN LAB: SIGNIFICANT CHANGE UP
ALBUMIN SERPL ELPH-MCNC: 3.6 G/DL — SIGNIFICANT CHANGE UP (ref 3.3–5)
ALP SERPL-CCNC: 167 U/L — HIGH (ref 40–120)
ALT FLD-CCNC: 73 U/L — HIGH (ref 10–45)
ANION GAP SERPL CALC-SCNC: 15 MMOL/L — SIGNIFICANT CHANGE UP (ref 5–17)
AST SERPL-CCNC: 34 U/L — SIGNIFICANT CHANGE UP (ref 10–40)
BASOPHILS # BLD AUTO: 0.05 K/UL — SIGNIFICANT CHANGE UP (ref 0–0.2)
BASOPHILS NFR BLD AUTO: 0.5 % — SIGNIFICANT CHANGE UP (ref 0–2)
BILIRUB SERPL-MCNC: 0.9 MG/DL — SIGNIFICANT CHANGE UP (ref 0.2–1.2)
BUN SERPL-MCNC: 21 MG/DL — SIGNIFICANT CHANGE UP (ref 7–23)
CALCIUM SERPL-MCNC: 8.9 MG/DL — SIGNIFICANT CHANGE UP (ref 8.4–10.5)
CHLORIDE SERPL-SCNC: 107 MMOL/L — SIGNIFICANT CHANGE UP (ref 96–108)
CO2 SERPL-SCNC: 20 MMOL/L — LOW (ref 22–31)
CREAT SERPL-MCNC: 1.15 MG/DL — SIGNIFICANT CHANGE UP (ref 0.5–1.3)
EGFR: 66 ML/MIN/1.73M2 — SIGNIFICANT CHANGE UP
EOSINOPHIL # BLD AUTO: 0.03 K/UL — SIGNIFICANT CHANGE UP (ref 0–0.5)
EOSINOPHIL NFR BLD AUTO: 0.3 % — SIGNIFICANT CHANGE UP (ref 0–6)
FLUAV AG NPH QL: SIGNIFICANT CHANGE UP
FLUBV AG NPH QL: SIGNIFICANT CHANGE UP
GAS PNL BLDV: SIGNIFICANT CHANGE UP
GLUCOSE BLDC GLUCOMTR-MCNC: 198 MG/DL — HIGH (ref 70–99)
GLUCOSE BLDC GLUCOMTR-MCNC: 356 MG/DL — HIGH (ref 70–99)
GLUCOSE SERPL-MCNC: 290 MG/DL — HIGH (ref 70–99)
HCT VFR BLD CALC: 41.5 % — SIGNIFICANT CHANGE UP (ref 39–50)
HGB BLD-MCNC: 12.9 G/DL — LOW (ref 13–17)
IMM GRANULOCYTES NFR BLD AUTO: 0.4 % — SIGNIFICANT CHANGE UP (ref 0–0.9)
LEGIONELLA AG UR QL: NEGATIVE — SIGNIFICANT CHANGE UP
LYMPHOCYTES # BLD AUTO: 1.65 K/UL — SIGNIFICANT CHANGE UP (ref 1–3.3)
LYMPHOCYTES # BLD AUTO: 17.4 % — SIGNIFICANT CHANGE UP (ref 13–44)
MCHC RBC-ENTMCNC: 26.7 PG — LOW (ref 27–34)
MCHC RBC-ENTMCNC: 31.1 GM/DL — LOW (ref 32–36)
MCV RBC AUTO: 85.9 FL — SIGNIFICANT CHANGE UP (ref 80–100)
MONOCYTES # BLD AUTO: 0.92 K/UL — HIGH (ref 0–0.9)
MONOCYTES NFR BLD AUTO: 9.7 % — SIGNIFICANT CHANGE UP (ref 2–14)
NEUTROPHILS # BLD AUTO: 6.81 K/UL — SIGNIFICANT CHANGE UP (ref 1.8–7.4)
NEUTROPHILS NFR BLD AUTO: 71.7 % — SIGNIFICANT CHANGE UP (ref 43–77)
NRBC # BLD: 0 /100 WBCS — SIGNIFICANT CHANGE UP (ref 0–0)
NT-PROBNP SERPL-SCNC: HIGH PG/ML (ref 0–300)
PLATELET # BLD AUTO: 281 K/UL — SIGNIFICANT CHANGE UP (ref 150–400)
POTASSIUM SERPL-MCNC: 4.9 MMOL/L — SIGNIFICANT CHANGE UP (ref 3.5–5.3)
POTASSIUM SERPL-SCNC: 4.9 MMOL/L — SIGNIFICANT CHANGE UP (ref 3.5–5.3)
PROCALCITONIN SERPL-MCNC: 0.05 NG/ML — SIGNIFICANT CHANGE UP (ref 0.02–0.1)
PROT SERPL-MCNC: 6.6 G/DL — SIGNIFICANT CHANGE UP (ref 6–8.3)
RBC # BLD: 4.83 M/UL — SIGNIFICANT CHANGE UP (ref 4.2–5.8)
RBC # FLD: 17.2 % — HIGH (ref 10.3–14.5)
RSV RNA NPH QL NAA+NON-PROBE: SIGNIFICANT CHANGE UP
SARS-COV-2 RNA SPEC QL NAA+PROBE: SIGNIFICANT CHANGE UP
SODIUM SERPL-SCNC: 142 MMOL/L — SIGNIFICANT CHANGE UP (ref 135–145)
TROPONIN T, HIGH SENSITIVITY RESULT: 61 NG/L — HIGH (ref 0–51)
TROPONIN T, HIGH SENSITIVITY RESULT: 68 NG/L — HIGH (ref 0–51)
WBC # BLD: 9.5 K/UL — SIGNIFICANT CHANGE UP (ref 3.8–10.5)
WBC # FLD AUTO: 9.5 K/UL — SIGNIFICANT CHANGE UP (ref 3.8–10.5)

## 2024-10-05 PROCEDURE — 74174 CTA ABD&PLVS W/CONTRAST: CPT | Mod: 26,MC

## 2024-10-05 PROCEDURE — 71045 X-RAY EXAM CHEST 1 VIEW: CPT | Mod: 26

## 2024-10-05 PROCEDURE — 99223 1ST HOSP IP/OBS HIGH 75: CPT

## 2024-10-05 PROCEDURE — 71275 CT ANGIOGRAPHY CHEST: CPT | Mod: 26,MC

## 2024-10-05 RX ORDER — SODIUM CHLORIDE IRRIG SOLUTION 0.9 %
1000 SOLUTION, IRRIGATION IRRIGATION
Refills: 0 | Status: DISCONTINUED | OUTPATIENT
Start: 2024-10-05 | End: 2024-10-08

## 2024-10-05 RX ORDER — TAMSULOSIN HCL 0.4 MG
0.4 CAPSULE ORAL AT BEDTIME
Refills: 0 | Status: DISCONTINUED | OUTPATIENT
Start: 2024-10-05 | End: 2024-10-08

## 2024-10-05 RX ORDER — INSULIN LISPRO 100/ML
VIAL (ML) SUBCUTANEOUS
Refills: 0 | Status: DISCONTINUED | OUTPATIENT
Start: 2024-10-05 | End: 2024-10-08

## 2024-10-05 RX ORDER — GLUCAGON INJECTION, SOLUTION 0.5 MG/.1ML
1 INJECTION, SOLUTION SUBCUTANEOUS ONCE
Refills: 0 | Status: DISCONTINUED | OUTPATIENT
Start: 2024-10-05 | End: 2024-10-08

## 2024-10-05 RX ORDER — BENZONATATE 150 MG/1
100 CAPSULE ORAL THREE TIMES A DAY
Refills: 0 | Status: DISCONTINUED | OUTPATIENT
Start: 2024-10-05 | End: 2024-10-08

## 2024-10-05 RX ORDER — ALCOHOL ANTISEPTIC PADS
12.5 PADS, MEDICATED (EA) TOPICAL ONCE
Refills: 0 | Status: DISCONTINUED | OUTPATIENT
Start: 2024-10-05 | End: 2024-10-08

## 2024-10-05 RX ORDER — ATORVASTATIN CALCIUM 10 MG/1
80 TABLET, FILM COATED ORAL AT BEDTIME
Refills: 0 | Status: DISCONTINUED | OUTPATIENT
Start: 2024-10-05 | End: 2024-10-08

## 2024-10-05 RX ORDER — ONDANSETRON HCL/PF 4 MG/2 ML
4 VIAL (ML) INJECTION EVERY 6 HOURS
Refills: 0 | Status: DISCONTINUED | OUTPATIENT
Start: 2024-10-05 | End: 2024-10-08

## 2024-10-05 RX ORDER — FUROSEMIDE 10 MG/ML
40 INJECTION INTRAVENOUS ONCE
Refills: 0 | Status: COMPLETED | OUTPATIENT
Start: 2024-10-05 | End: 2024-10-05

## 2024-10-05 RX ORDER — SACUBITRIL AND VALSARTAN 97; 103 MG/1; MG/1
1 TABLET, FILM COATED ORAL
Refills: 0 | Status: DISCONTINUED | OUTPATIENT
Start: 2024-10-05 | End: 2024-10-08

## 2024-10-05 RX ORDER — ACETAMINOPHEN 325 MG
650 TABLET ORAL ONCE
Refills: 0 | Status: COMPLETED | OUTPATIENT
Start: 2024-10-05 | End: 2024-10-05

## 2024-10-05 RX ORDER — ENOXAPARIN SODIUM 150 MG/ML
30 INJECTION SUBCUTANEOUS EVERY 24 HOURS
Refills: 0 | Status: DISCONTINUED | OUTPATIENT
Start: 2024-10-05 | End: 2024-10-08

## 2024-10-05 RX ORDER — MULTI VITAMIN/MINERAL SUPPLEMENT WITH ASCORBIC ACID, NIACIN, PYRIDOXINE, PANTOTHENIC ACID, FOLIC ACID, RIBOFLAVIN, THIAMIN, BIOTIN, COBALAMIN AND ZINC. 60; 20; 12.5; 10; 10; 1.7; 1.5; 1; .3; .006 MG/1; MG/1; MG/1; MG/1; MG/1; MG/1; MG/1; MG/1; MG/1; MG/1
1 TABLET, COATED ORAL DAILY
Refills: 0 | Status: DISCONTINUED | OUTPATIENT
Start: 2024-10-05 | End: 2024-10-08

## 2024-10-05 RX ORDER — ALCOHOL ANTISEPTIC PADS
25 PADS, MEDICATED (EA) TOPICAL ONCE
Refills: 0 | Status: DISCONTINUED | OUTPATIENT
Start: 2024-10-05 | End: 2024-10-08

## 2024-10-05 RX ORDER — 5-HYDROXYTRYPTOPHAN (5-HTP) 100 MG
3 TABLET,DISINTEGRATING ORAL AT BEDTIME
Refills: 0 | Status: DISCONTINUED | OUTPATIENT
Start: 2024-10-05 | End: 2024-10-08

## 2024-10-05 RX ORDER — ALCOHOL ANTISEPTIC PADS
15 PADS, MEDICATED (EA) TOPICAL ONCE
Refills: 0 | Status: DISCONTINUED | OUTPATIENT
Start: 2024-10-05 | End: 2024-10-08

## 2024-10-05 RX ORDER — AZITHROMYCIN 250 MG/1
500 TABLET, FILM COATED ORAL ONCE
Refills: 0 | Status: COMPLETED | OUTPATIENT
Start: 2024-10-05 | End: 2024-10-05

## 2024-10-05 RX ORDER — CEFTRIAXONE SODIUM 1 G
VIAL (EA) INJECTION
Refills: 0 | Status: DISCONTINUED | OUTPATIENT
Start: 2024-10-05 | End: 2024-10-08

## 2024-10-05 RX ORDER — CEFTRIAXONE SODIUM 1 G
1000 VIAL (EA) INJECTION ONCE
Refills: 0 | Status: COMPLETED | OUTPATIENT
Start: 2024-10-05 | End: 2024-10-05

## 2024-10-05 RX ORDER — ACETAMINOPHEN 325 MG
650 TABLET ORAL EVERY 6 HOURS
Refills: 0 | Status: DISCONTINUED | OUTPATIENT
Start: 2024-10-05 | End: 2024-10-08

## 2024-10-05 RX ORDER — AZITHROMYCIN 250 MG/1
TABLET, FILM COATED ORAL
Refills: 0 | Status: DISCONTINUED | OUTPATIENT
Start: 2024-10-05 | End: 2024-10-07

## 2024-10-05 RX ORDER — DAPAGLIFLOZIN 10 MG/1
10 TABLET, FILM COATED ORAL DAILY
Refills: 0 | Status: DISCONTINUED | OUTPATIENT
Start: 2024-10-05 | End: 2024-10-08

## 2024-10-05 RX ORDER — CEFTRIAXONE SODIUM 1 G
1000 VIAL (EA) INJECTION EVERY 24 HOURS
Refills: 0 | Status: DISCONTINUED | OUTPATIENT
Start: 2024-10-06 | End: 2024-10-08

## 2024-10-05 RX ORDER — INSULIN LISPRO 100/ML
VIAL (ML) SUBCUTANEOUS AT BEDTIME
Refills: 0 | Status: DISCONTINUED | OUTPATIENT
Start: 2024-10-05 | End: 2024-10-08

## 2024-10-05 RX ORDER — ASPIRIN 325 MG
81 TABLET ORAL DAILY
Refills: 0 | Status: DISCONTINUED | OUTPATIENT
Start: 2024-10-05 | End: 2024-10-08

## 2024-10-05 RX ORDER — AMIODARONE HYDROCHLORIDE 50 MG/ML
200 INJECTION, SOLUTION INTRAVENOUS DAILY
Refills: 0 | Status: DISCONTINUED | OUTPATIENT
Start: 2024-10-05 | End: 2024-10-08

## 2024-10-05 RX ORDER — GUAIFENESIN 100 MG/5ML
200 SOLUTION ORAL EVERY 6 HOURS
Refills: 0 | Status: DISCONTINUED | OUTPATIENT
Start: 2024-10-05 | End: 2024-10-06

## 2024-10-05 RX ORDER — SODIUM CHLORIDE 0.9 % (FLUSH) 0.9 %
500 SYRINGE (ML) INJECTION ONCE
Refills: 0 | Status: DISCONTINUED | OUTPATIENT
Start: 2024-10-05 | End: 2024-10-05

## 2024-10-05 RX ORDER — AZITHROMYCIN 250 MG/1
500 TABLET, FILM COATED ORAL EVERY 24 HOURS
Refills: 0 | Status: DISCONTINUED | OUTPATIENT
Start: 2024-10-06 | End: 2024-10-07

## 2024-10-05 RX ORDER — METOPROLOL TARTRATE 50 MG
25 TABLET ORAL DAILY
Refills: 0 | Status: DISCONTINUED | OUTPATIENT
Start: 2024-10-05 | End: 2024-10-08

## 2024-10-05 RX ADMIN — Medication 0.4 MILLIGRAM(S): at 21:06

## 2024-10-05 RX ADMIN — Medication 5: at 17:49

## 2024-10-05 RX ADMIN — Medication 650 MILLIGRAM(S): at 12:23

## 2024-10-05 RX ADMIN — BENZONATATE 100 MILLIGRAM(S): 150 CAPSULE ORAL at 21:07

## 2024-10-05 RX ADMIN — MULTI VITAMIN/MINERAL SUPPLEMENT WITH ASCORBIC ACID, NIACIN, PYRIDOXINE, PANTOTHENIC ACID, FOLIC ACID, RIBOFLAVIN, THIAMIN, BIOTIN, COBALAMIN AND ZINC. 1 TABLET(S): 60; 20; 12.5; 10; 10; 1.7; 1.5; 1; .3; .006 TABLET, COATED ORAL at 17:14

## 2024-10-05 RX ADMIN — Medication 100 MILLIGRAM(S): at 01:22

## 2024-10-05 RX ADMIN — Medication 3 MILLIGRAM(S): at 21:12

## 2024-10-05 RX ADMIN — Medication 75 MILLIGRAM(S): at 17:16

## 2024-10-05 RX ADMIN — AMIODARONE HYDROCHLORIDE 200 MILLIGRAM(S): 50 INJECTION, SOLUTION INTRAVENOUS at 17:13

## 2024-10-05 RX ADMIN — ENOXAPARIN SODIUM 30 MILLIGRAM(S): 150 INJECTION SUBCUTANEOUS at 17:16

## 2024-10-05 RX ADMIN — FUROSEMIDE 40 MILLIGRAM(S): 10 INJECTION INTRAVENOUS at 06:07

## 2024-10-05 RX ADMIN — ATORVASTATIN CALCIUM 80 MILLIGRAM(S): 10 TABLET, FILM COATED ORAL at 21:06

## 2024-10-05 RX ADMIN — Medication 81 MILLIGRAM(S): at 17:14

## 2024-10-05 RX ADMIN — SACUBITRIL AND VALSARTAN 1 TABLET(S): 97; 103 TABLET, FILM COATED ORAL at 17:13

## 2024-10-05 RX ADMIN — Medication 1000 MILLIGRAM(S): at 14:44

## 2024-10-05 RX ADMIN — AZITHROMYCIN 500 MILLIGRAM(S): 250 TABLET, FILM COATED ORAL at 15:25

## 2024-10-05 RX ADMIN — GUAIFENESIN 200 MILLIGRAM(S): 100 SOLUTION ORAL at 17:50

## 2024-10-05 RX ADMIN — Medication 650 MILLIGRAM(S): at 11:35

## 2024-10-05 NOTE — ED ADULT NURSE REASSESSMENT NOTE - NS ED NURSE REASSESS COMMENT FT1
Bell López & Laura Cerda contacted via teams for off tele & cont pulse ox for transport to [facility].  RN awaiting order
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
## 2024-10-05 NOTE — ED ADULT NURSE NOTE - NSFALLUNIVINTERV_ED_ALL_ED
Bed/Stretcher in lowest position, wheels locked, appropriate side rails in place/Call bell, personal items and telephone in reach/Instruct patient to call for assistance before getting out of bed/chair/stretcher/Non-slip footwear applied when patient is off stretcher/[place] to call system/Physically safe environment - no spills, clutter or unnecessary equipment/Purposeful proactive rounding/Room/bathroom lighting operational, light cord in reach

## 2024-10-05 NOTE — H&P ADULT - HISTORY OF PRESENT ILLNESS
75 Y/O M with PMHx of CVA, MI, HTN, T2DM, HLD, CAD s/p cardiac stents, CHF, hx of malignant melanoma/wide excision, with recent elective bioprosthetic aortic valve replacement and ascending aortic aneurysm repair with transverse hemiarch and CABG x2 (4/8/24) with post op course complicated by cardiogenic shock requiring inotropic support with IV dobutamine and milrinone, IABP and also on amiodarone for atrial fibrillation prophylaxis, discharged to [facility] presents to the ED for 3 days of subjective fevers and persistent cough. Also reports orthopnea. Denies any sick contacts at home. Denies any chest pain.  No nausea or vomiting, no dizziness.     In the ED, VSS - notably tachycardic. Received 500 cc bolus, lasix 40 IVP and ceftriaxone.    75 Y/O M with PMHx of CVA, MI, HTN, T2DM, HLD, CAD s/p cardiac stents, CHF, hx of malignant melanoma/wide excision, with recent elective bioprosthetic aortic valve replacement and ascending aortic aneurysm repair with transverse hemiarch and CABG x2 (4/8/24) with post op course complicated by cardiogenic shock requiring inotropic support with IV dobutamine and milrinone, IABP and also on amiodarone for atrial fibrillation prophylaxis, discharged to [facility] presents to the ED for 3 days of subjective fevers and persistent cough. Reports unrelenting dry cough that has been affecting his sleep. Also reports orthopnea. Was discharged from rehab in April and has been working with PT at home for improved mobility and functional status. Denies any sick contacts at home, recent travel hx. Denies any chest pain.  No nausea or vomiting, no dizziness.     In the ED, VSS - notably tachycardic. Received 500 cc bolus, lasix 40 IVP and ceftriaxone.    73 Y/O M with PMHx of CVA, MI, HTN, T2DM, HLD, CAD s/p cardiac stents, CHF, hx of malignant melanoma/wide excision, with recent elective bioprosthetic aortic valve replacement and ascending aortic aneurysm repair with transverse hemiarch and CABG x2 (4/8/24) with post op course complicated by cardiogenic shock requiring inotropic support with IV dobutamine and milrinone, IABP and also on amiodarone for atrial fibrillation prophylaxis, discharged to [facility] presents to the ED from home for 3 days of subjective fevers and persistent cough. Reports unrelenting dry cough that has been affecting his sleep. Also reports orthopnea. Was discharged from rehab in April and has been working with PT at home for improved mobility and functional status. Denies any sick contacts at home, recent travel hx. Denies any chest pain.  No nausea or vomiting, no dizziness.     In the ED, VSS - notably tachycardic. Received 500 cc bolus, lasix 40 IVP and ceftriaxone.    73 Y/O M with PMHx of CVA, MI, HTN, T2DM, HLD, CAD s/p cardiac stents, CHF, hx of malignant melanoma/wide excision, with recent elective bioprosthetic aortic valve replacement and ascending aortic aneurysm repair with transverse hemiarch and CABG x2 (4/8/24) with post op course complicated by cardiogenic shock requiring inotropic support with IV dobutamine and milrinone, IABP and also on amiodarone for atrial fibrillation prophylaxis, discharged to [facility] presents to the ED from home for 3 days of subjective fevers and persistent cough. Reports unrelenting dry cough that has been affecting his sleep. Also reports orthopnea. Was discharged from rehab in May and has been working with PT at home for improved mobility and functional status. Denies any sick contacts at home, recent travel hx. Denies any chest pain.  No nausea or vomiting, no dizziness.     In the ED, VSS - notably tachycardic. Received 500 cc bolus, lasix 40 IVP and ceftriaxone.

## 2024-10-05 NOTE — H&P ADULT - PROBLEM SELECTOR PLAN 5
DVT ppx: s/p  recent elective bioprosthetic aortic valve replacement and ascending aortic aneurysm repair with transverse hemiarch and CABG x2 (4/8/24) with post op course complicated by cardiogenic shock requiring inotropic support with IV dobutamine and milrinone, IABP and also on amiodarone for atrial fibrillation prophylaxis   CT on admission with no acute findings     - c/w meds as above  - c/w amiodarone

## 2024-10-05 NOTE — H&P ADULT - ASSESSMENT
75 Y/O M with PMHx of CVA, HTN, T2DM, HLD, CAD s/p stents, HFrEF (EF: 35%), hx of malignant melanoma/wide excision, with recent elective bioprosthetic aortic valve replacement and ascending aortic aneurysm repair with transverse hemiarch and CABG x2 (4/8/24) with post op course complicated by cardiogenic shock requiring inotropic support with IV dobutamine and milrinone, IABP and also on amiodarone for atrial fibrillation prophylaxis, discharged to [facility] presents to the ED for 3 days of subjective fevers and persistent cough admitted with PNA 73 Y/O M with PMHx of CVA, HTN, T2DM, HLD, CAD s/p stents, HFrEF (EF: 35%), hx of malignant melanoma/wide excision, with recent elective bioprosthetic aortic valve replacement and ascending aortic aneurysm repair with transverse hemiarch and CABG x2 (4/8/24), presents to the ED from home for 3 days of subjective fevers and persistent cough admitted with PNA

## 2024-10-05 NOTE — ED PROVIDER NOTE - PROGRESS NOTE DETAILS
Skip Young, DO: Patient having persistent tachycardia and still feels ill feeling very weak, will admit for treatment of pneumonia.

## 2024-10-05 NOTE — H&P ADULT - PROBLEM SELECTOR PLAN 3
s/p recent elective bioprosthetic aortic valve replacement and ascending aortic aneurysm repair with transverse hemiarch and CABG x2 (4/8/24) with post op course complicated by cardiogenic shock requiring inotropic support with IV dobutamine and milrinone, IABP and also on amiodarone for atrial fibrillation prophylaxis   CT on admission with no acute findings    - c/w aspirin, plavix  - c/w lipitor   - c/w toprol

## 2024-10-05 NOTE — H&P ADULT - NSICDXPASTMEDICALHX_GEN_ALL_CORE_FT
November 8, 2023         Patient: Curry Carlin   YOB: 2019   Date of Visit: 11/8/2023           To Whom it May Concern:    Curry Carlin was seen in my clinic on 11/8/2023. She may return to school on 11/8/2023.    If you have any questions or concerns, please don't hesitate to call.        Sincerely,           Kiarra Menjivar M.D.  Electronically Signed      PAST MEDICAL HISTORY:  CHF (congestive heart failure) 1/2017 stents x3    Confusion from stroke    Coronary artery disease MI 12/22/2016    CVA (cerebral vascular accident) 12/22/2016    Hearing decreased     HTN (hypertension)     Hyperlipemia     Kidney stones     Type 2 diabetes mellitus 2016

## 2024-10-05 NOTE — PATIENT PROFILE ADULT - FALL HARM RISK - HARM RISK INTERVENTIONS

## 2024-10-05 NOTE — ED ADULT NURSE REASSESSMENT NOTE - NS ED NURSE REASSESS COMMENT FT1
Pt admitted to TELE with cont pulox.   Pt placed on monitor, VS see flow sheet. Pt edu on importance of not disconnecting cardiac monitoring himself. Pt given call bell and notified to inform RN if he needs restroom.   Pt a&ox3, respirations spontaneous and labored. Pt has dry nonproductive cough. pt denies chest pain, n/v/d, headache, blurred vision, SOB. Safety and comfort measure maintained. Skin warm and dry

## 2024-10-05 NOTE — ED PROVIDER NOTE - ATTENDING CONTRIBUTION TO CARE
Skip Young DO: I have personally performed a face to face medical and diagnostic evaluation of the patient. I have discussed with and reviewed the Resident's and/or ACP's and/or Medical/PA/NP student's note and agree with the History, ROS, Physical Exam and MDM unless otherwise indicated. A brief summary of my personal evaluation and impression can be found below.     74yr Male, PMHx of CVA, MI, HTN, DM2, HLD, CAD s/p cardiac stents, CHF, hx of malignant melanoma/wide excision s/p 4/8 ascending aortic replacement with transverse hemiarch, CABG x 2, AVR bioprosthetic valve, presents ED for 3 days of fevers and persistent cough, some orthopnea.  Patient states he feels like he is sick.  Denies any sick contacts at home.  Fever of 100 oral at home.  Patient denies any chest pain.  No nausea or vomiting, no dizziness.    CONSTITUTIONAL: Mild–moderate respiratory distress, [place]  SKIN: Warm dry, normal skin turgor  HEAD: NCAT  NECK: Supple; non tender. Full ROM.  CARD: Regular rate, tachycardia to 120.  RESP: Cough, tachypnea to 22, bilateral crackles, dyspnea,  ABD: non-distended, no abdominal tenderness  MSK: Full ROM, 2+ pitting edema bilaterally.  PSYCH: Cooperative, appropriate.    Given patient's complex medical history, concern for possible cardiac/aortic/vascular cause of shortness of breath, patient has mild pitting edema which she says is normal, with fevers at home, tachycardia and tachypnea consider giving small fluid bolus will hold until further labs return given cardiac history.  Will give antibiotics, evaluate aorta on CT, with recent cardiac surgery will consider CT surgery consult.  PE less likely, will evaluate chest with CT aortography.  If infectious etiology ultimately giving antibiotics, probable admission.

## 2024-10-05 NOTE — H&P ADULT - NSHPREVIEWOFSYSTEMS_GEN_ALL_CORE
CONSTITUTIONAL: No weakness, + fevers, + chills  EYES/ENT: No visual changes;  No vertigo or throat pain   NECK: No pain or stiffness  RESPIRATORY: + cough, no wheezing, hemoptysis, shortness of breath  CARDIOVASCULAR: No chest pain or palpitations  GASTROINTESTINAL: No abdominal or epigastric pain. No nausea, vomiting, or hematemesis; No diarrhea or constipation. No melena or hematochezia.  GENITOURINARY: No dysuria, frequency or hematuria  NEUROLOGICAL: No numbness or weakness  SKIN: No itching, burning, rashes, or lesions   PSYCH: no hx depression, no hx anxiety

## 2024-10-05 NOTE — PATIENT PROFILE ADULT - NSPROHMDIABETHBA1C_GEN_A_NUR
1st attempt  Lm to cb with % improvement and pain level 
2nd attempt  Lm to cb with % improvement and pain level 
Pt has an appt on 6/29
unknown

## 2024-10-05 NOTE — H&P ADULT - NSHPLABSRESULTS_GEN_ALL_CORE
LABS:                        12.9   9.50  )-----------( 281      ( 05 Oct 2024 01:33 )             41.5     10-05    142  |  107  |  21  ----------------------------<  290[H]  4.9   |  20[L]  |  1.15    Ca    8.9      05 Oct 2024 01:33    TPro  6.6  /  Alb  3.6  /  TBili  0.9  /  DBili  x   /  AST  34  /  ALT  73[H]  /  AlkPhos  167[H]  10-05            Urinalysis Basic - ( 05 Oct 2024 01:33 )    Color: x / Appearance: x / SG: x / pH: x  Gluc: 290 mg/dL / Ketone: x  / Bili: x / Urobili: x   Blood: x / Protein: x / Nitrite: x   Leuk Esterase: x / RBC: x / WBC x   Sq Epi: x / Non Sq Epi: x / Bacteria: x        VBG 10-05 @ 01:32  pH: 7.37/pCO2: 42 /pO2: 26/HCO3: 24/lactate: 2.7    Microbiology     EKG:    RADIOLOGY & ADDITIONAL TESTS:    CXR:   IMPRESSION:  Right perihilar airspace disease may represent edema or pneumonia.  Left pleural effusion.    CT Angio Chest/Abd and Pelvis:     IMPRESSION:    Status post repair of ascending aorta. No acute dissection.    Small bilateral pleural effusions. Centrally predominant right lung   groundglass and consolidative opacities with areas of smooth interlobular   septal thickening. Differential considerations may include asymmetric   pulmonary edema or infection.    Nonspecific infiltration of the anterior abdominal wall may represent   edema, contusion, or cellulitis in the appropriate clinical setting.

## 2024-10-05 NOTE — H&P ADULT - PROBLEM SELECTOR PLAN 2
4/24: LVEF: 35%, Moderately decreased global left ventricular systolic function.  Pro-bnp: 29793     - f/u TTE  - monitor I&O  - 4/24: LVEF: 35%, Moderately decreased global left ventricular systolic function.  Pro-bnp: 91410   currently euvolemic    - f/u TTE  - monitor I&O  - hold diuresis at this time  - c/w farxiga  - c/w toprol  - c/w entresto

## 2024-10-05 NOTE — H&P ADULT - PROBLEM SELECTOR PLAN 6
a1c (4/24): 9.8    -  [facility]  - c/w farxiga  - hold tradjenta  - monitor FSG

## 2024-10-05 NOTE — H&P ADULT - PROBLEM SELECTOR PLAN 4
a1c (4/24): 9.8 s/p recent elective bioprosthetic aortic valve replacement and ascending aortic aneurysm repair with transverse hemiarch and CABG x2 (4/8/24) with post op course complicated by cardiogenic shock requiring inotropic support with IV dobutamine and milrinone, IABP and also on amiodarone for atrial fibrillation prophylaxis   CT on admission with no acute findings    - c/w aspirin, plavix  - c/w lipitor  - c/w toprol

## 2024-10-05 NOTE — H&P ADULT - PROBLEM SELECTOR PLAN 1
Presenting with cough, fevers with CXR showing right perihilar airspace disease may represent edema or pneumonia.  and CT chest showing Small bilateral pleural effusions. Centrally predominant right lung groundglass and consolidative opacities with areas of smooth interlobular septal thickening. Differential considerations may include asymmetric pulmonary edema or infection.  RVP: neg    - f/u procal  - start ceftriaxone + azithro  -f/u legionella, urine strep pneumo  - monitor WBC, temp curve Presenting with cough, fevers with CXR showing right perihilar airspace disease may represent edema or pneumonia.  and CT chest showing Small bilateral pleural effusions. Centrally predominant right lung groundglass and consolidative opacities with areas of smooth interlobular septal thickening. Differential considerations may include asymmetric pulmonary edema or infection.  RVP: neg    - f/u procal  - start ceftriaxone + azithro  -f/u legionella, urine strep pneumo  - monitor WBC, temp curve  - symptomatic treatment for cough

## 2024-10-05 NOTE — H&P ADULT - NSHPPHYSICALEXAM_GEN_ALL_CORE
LOS:     VITALS:   T(C): 36.7 (10-05-24 @ 11:31), Max: 37.1 (10-04-24 @ 23:01)  HR: 100 (10-05-24 @ 11:31) (100 - 112)  BP: 126/85 (10-05-24 @ 11:31) (121/83 - 168/100)  RR: 22 (10-05-24 @ 11:31) (20 - 29)  SpO2: 97% (10-05-24 @ 11:31) (94% - 97%)    GENERAL: NAD, lying in bed comfortably  HEAD:  Atraumatic, Normocephalic  EYES: EOMI, conjunctiva and sclera clear  ENT: Moist mucous membranes  NECK: Supple, No JVD  CHEST/LUNG: Clear to auscultation bilaterally; No rales, rhonchi, wheezing, or rubs. Unlabored respirations  HEART: Regular rate and rhythm; No murmurs, rubs, or gallops  ABDOMEN: BSx4; Soft, nontender, nondistended  EXTREMITIES:  2+ Peripheral Pulses, brisk capillary refill. No clubbing, cyanosis, or edema  NERVOUS SYSTEM:  A&Ox3, no focal deficits   SKIN: No rashes or lesions LOS:     VITALS:   T(C): 36.7 (10-05-24 @ 11:31), Max: 37.1 (10-04-24 @ 23:01)  HR: 100 (10-05-24 @ 11:31) (100 - 112)  BP: 126/85 (10-05-24 @ 11:31) (121/83 - 168/100)  RR: 22 (10-05-24 @ 11:31) (20 - 29)  SpO2: 97% (10-05-24 @ 11:31) (94% - 97%)    GENERAL: NAD, chronically ill-appearing, lying in bed comfortably  HEAD:  Atraumatic, Normocephalic  EYES: EOMI, conjunctiva and sclera clear  ENT: Moist mucous membranes  NECK: Supple, No JVD  CHEST/LUNG: mild crackles b/l in lower lung fields, No wheezing. Unlabored respirations  HEART: Regular rate and rhythm; No murmurs, rubs, or gallops  ABDOMEN: BSx4; Soft, nontender, nondistended  EXTREMITIES:  2+ Peripheral Pulses, brisk capillary refill. No clubbing, cyanosis, or edema  NERVOUS SYSTEM:  A&Ox3, no focal deficits   SKIN: No rashes or lesions

## 2024-10-06 LAB
A1C WITH ESTIMATED AVERAGE GLUCOSE RESULT: 9.3 % — HIGH (ref 4–5.6)
ANION GAP SERPL CALC-SCNC: 16 MMOL/L — SIGNIFICANT CHANGE UP (ref 5–17)
BASOPHILS # BLD AUTO: 0.05 K/UL — SIGNIFICANT CHANGE UP (ref 0–0.2)
BASOPHILS NFR BLD AUTO: 0.6 % — SIGNIFICANT CHANGE UP (ref 0–2)
BUN SERPL-MCNC: 28 MG/DL — HIGH (ref 7–23)
CALCIUM SERPL-MCNC: 8.9 MG/DL — SIGNIFICANT CHANGE UP (ref 8.4–10.5)
CHLORIDE SERPL-SCNC: 105 MMOL/L — SIGNIFICANT CHANGE UP (ref 96–108)
CO2 SERPL-SCNC: 21 MMOL/L — LOW (ref 22–31)
CREAT SERPL-MCNC: 1.19 MG/DL — SIGNIFICANT CHANGE UP (ref 0.5–1.3)
EGFR: 63 ML/MIN/1.73M2 — SIGNIFICANT CHANGE UP
EOSINOPHIL # BLD AUTO: 0.11 K/UL — SIGNIFICANT CHANGE UP (ref 0–0.5)
EOSINOPHIL NFR BLD AUTO: 1.2 % — SIGNIFICANT CHANGE UP (ref 0–6)
ESTIMATED AVERAGE GLUCOSE: 220 MG/DL — HIGH (ref 68–114)
GAS PNL BLDV: SIGNIFICANT CHANGE UP
GLUCOSE BLDC GLUCOMTR-MCNC: 103 MG/DL — HIGH (ref 70–99)
GLUCOSE BLDC GLUCOMTR-MCNC: 148 MG/DL — HIGH (ref 70–99)
GLUCOSE BLDC GLUCOMTR-MCNC: 204 MG/DL — HIGH (ref 70–99)
GLUCOSE BLDC GLUCOMTR-MCNC: 219 MG/DL — HIGH (ref 70–99)
GLUCOSE SERPL-MCNC: 135 MG/DL — HIGH (ref 70–99)
HCT VFR BLD CALC: 38.1 % — LOW (ref 39–50)
HGB BLD-MCNC: 12.1 G/DL — LOW (ref 13–17)
IMM GRANULOCYTES NFR BLD AUTO: 0.3 % — SIGNIFICANT CHANGE UP (ref 0–0.9)
LYMPHOCYTES # BLD AUTO: 1.98 K/UL — SIGNIFICANT CHANGE UP (ref 1–3.3)
LYMPHOCYTES # BLD AUTO: 22.5 % — SIGNIFICANT CHANGE UP (ref 13–44)
MAGNESIUM SERPL-MCNC: 1.9 MG/DL — SIGNIFICANT CHANGE UP (ref 1.6–2.6)
MCHC RBC-ENTMCNC: 27.1 PG — SIGNIFICANT CHANGE UP (ref 27–34)
MCHC RBC-ENTMCNC: 31.8 GM/DL — LOW (ref 32–36)
MCV RBC AUTO: 85.2 FL — SIGNIFICANT CHANGE UP (ref 80–100)
MONOCYTES # BLD AUTO: 0.79 K/UL — SIGNIFICANT CHANGE UP (ref 0–0.9)
MONOCYTES NFR BLD AUTO: 9 % — SIGNIFICANT CHANGE UP (ref 2–14)
NEUTROPHILS # BLD AUTO: 5.85 K/UL — SIGNIFICANT CHANGE UP (ref 1.8–7.4)
NEUTROPHILS NFR BLD AUTO: 66.4 % — SIGNIFICANT CHANGE UP (ref 43–77)
NRBC # BLD: 0 /100 WBCS — SIGNIFICANT CHANGE UP (ref 0–0)
PHOSPHATE SERPL-MCNC: 3.2 MG/DL — SIGNIFICANT CHANGE UP (ref 2.5–4.5)
PLATELET # BLD AUTO: 260 K/UL — SIGNIFICANT CHANGE UP (ref 150–400)
POTASSIUM SERPL-MCNC: 4.1 MMOL/L — SIGNIFICANT CHANGE UP (ref 3.5–5.3)
POTASSIUM SERPL-SCNC: 4.1 MMOL/L — SIGNIFICANT CHANGE UP (ref 3.5–5.3)
RBC # BLD: 4.47 M/UL — SIGNIFICANT CHANGE UP (ref 4.2–5.8)
RBC # FLD: 16.9 % — HIGH (ref 10.3–14.5)
S PNEUM AG UR QL: NEGATIVE — SIGNIFICANT CHANGE UP
SODIUM SERPL-SCNC: 142 MMOL/L — SIGNIFICANT CHANGE UP (ref 135–145)
WBC # BLD: 8.81 K/UL — SIGNIFICANT CHANGE UP (ref 3.8–10.5)
WBC # FLD AUTO: 8.81 K/UL — SIGNIFICANT CHANGE UP (ref 3.8–10.5)

## 2024-10-06 PROCEDURE — 99233 SBSQ HOSP IP/OBS HIGH 50: CPT

## 2024-10-06 RX ADMIN — Medication 2: at 12:40

## 2024-10-06 RX ADMIN — Medication 81 MILLIGRAM(S): at 11:56

## 2024-10-06 RX ADMIN — DAPAGLIFLOZIN 10 MILLIGRAM(S): 10 TABLET, FILM COATED ORAL at 11:58

## 2024-10-06 RX ADMIN — Medication 75 MILLIGRAM(S): at 11:56

## 2024-10-06 RX ADMIN — MULTI VITAMIN/MINERAL SUPPLEMENT WITH ASCORBIC ACID, NIACIN, PYRIDOXINE, PANTOTHENIC ACID, FOLIC ACID, RIBOFLAVIN, THIAMIN, BIOTIN, COBALAMIN AND ZINC. 1 TABLET(S): 60; 20; 12.5; 10; 10; 1.7; 1.5; 1; .3; .006 TABLET, COATED ORAL at 11:57

## 2024-10-06 RX ADMIN — AMIODARONE HYDROCHLORIDE 200 MILLIGRAM(S): 50 INJECTION, SOLUTION INTRAVENOUS at 11:55

## 2024-10-06 RX ADMIN — Medication 100 MILLIGRAM(S): at 13:49

## 2024-10-06 RX ADMIN — ENOXAPARIN SODIUM 30 MILLIGRAM(S): 150 INJECTION SUBCUTANEOUS at 17:59

## 2024-10-06 RX ADMIN — AZITHROMYCIN 255 MILLIGRAM(S): 250 TABLET, FILM COATED ORAL at 13:49

## 2024-10-06 RX ADMIN — SACUBITRIL AND VALSARTAN 1 TABLET(S): 97; 103 TABLET, FILM COATED ORAL at 18:00

## 2024-10-06 RX ADMIN — Medication 25 MILLIGRAM(S): at 11:57

## 2024-10-06 RX ADMIN — SACUBITRIL AND VALSARTAN 1 TABLET(S): 97; 103 TABLET, FILM COATED ORAL at 11:56

## 2024-10-06 RX ADMIN — Medication 2: at 17:59

## 2024-10-06 NOTE — PROGRESS NOTE ADULT - PROBLEM SELECTOR PLAN 3
s/p recent elective bioprosthetic aortic valve replacement and ascending aortic aneurysm repair with transverse hemiarch and CABG x2 (4/8/24) with post op course complicated by cardiogenic shock requiring inotropic support with IV dobutamine and milrinone, IABP and also on amiodarone for atrial fibrillation prophylaxis   CT on admission with no acute findings    - c/w aspirin, plavix  - c/w lipitor   - c/w toprol s/p recent elective bioprosthetic aortic valve replacement and ascending aortic aneurysm repair with transverse hemiarch and CABG x2 (4/8/24) with post op course complicated by cardiogenic shock requiring inotropic support with IV dobutamine and milrinone, IABP.   CT on admission with no acute findings    - c/w aspirin, plavix  - c/w lipitor   - c/w toprol

## 2024-10-06 NOTE — PHYSICAL THERAPY INITIAL EVALUATION ADULT - GENERAL OBSERVATIONS, REHAB EVAL
Received pt seated in chair. Not wearing O2 NC. +tele, cont pulse ox. /71, HR 87, O2 sats 94% on room air. O2 left off during session as per nurse Vital

## 2024-10-06 NOTE — PROGRESS NOTE ADULT - PROBLEM SELECTOR PLAN 5
s/p  recent elective bioprosthetic aortic valve replacement and ascending aortic aneurysm repair with transverse hemiarch and CABG x2 (4/8/24) with post op course complicated by cardiogenic shock requiring inotropic support with IV dobutamine and milrinone, IABP and also on amiodarone for atrial fibrillation prophylaxis   CT on admission with no acute findings     - c/w meds as above  - c/w amiodarone as above   - c/w amiodarone for atrial fibrillation prophylaxis

## 2024-10-06 NOTE — PROGRESS NOTE ADULT - PROBLEM SELECTOR PLAN 1
Presenting with cough, fevers with CXR showing right perihilar airspace disease may represent edema or pneumonia.  and CT chest showing Small bilateral pleural effusions. Centrally predominant right lung groundglass and consolidative opacities with areas of smooth interlobular septal thickening. Differential considerations may include asymmetric pulmonary edema or infection.  RVP: neg    - c/w ceftriaxone + azithro  -f/u legionella, urine strep pneumo  - monitor WBC, temp curve  - symptomatic treatment for cough - added hycodan qhs prn Presenting with cough, fevers with CXR showing right perihilar airspace disease may represent edema or pneumonia.  and CT chest showing Small bilateral pleural effusions. Centrally predominant right lung groundglass and consolidative opacities with areas of smooth interlobular septal thickening. Differential considerations may include asymmetric pulmonary edema or infection.  RVP: neg    - c/w ceftriaxone + azithro  -f/u legionella, urine strep pneumo  - monitor WBC, temp curve  - symptomatic treatment for cough - added hycodan qhs prn, c/w tessalon perle prn

## 2024-10-06 NOTE — PHYSICAL THERAPY INITIAL EVALUATION ADULT - ADDITIONAL COMMENTS
Pt lives with wife and granddaughter in private house, 4 stairs to enter, stairs to bedroom. (+)rail. Pt ambulates with straight cane, reports having had 2 falls since his discharge from rehab. Owns rolling walker, states he wasn't using it any more. Has grab bars in the shower. States he owns a shower seat, but now it is "in the garden." Is scheduled to start outpatient cardiac rehab on 10/16.

## 2024-10-06 NOTE — PROVIDER CONTACT NOTE (OTHER) - BACKGROUND
74yr Male, PMHx of CVA, MI, HTN, DM2, HLD, CAD s/p cardiac stents, CHF, hx of malignant melanoma/wide excision s/p 4/8 ascending aortic replacement with transverse hemiarch

## 2024-10-06 NOTE — PHYSICAL THERAPY INITIAL EVALUATION ADULT - PERTINENT HX OF CURRENT PROBLEM, REHAB EVAL
"Chief Complaint: Vaginal dryness     HPI:      Shavon Ortega is a 19 y.o.  who presents today for vaginal dryness and vaginal bump. Primary complaint today is vaginal dryness. Has tried over the counter lubricant with minimal relief. Has been prescribed vaginal estrogen suppositories but she would prefer a cream. She is sexually active. She is not on any contraception at this time.   Also notes a "razor bump" in vaginal area that has started to improve. Started as a boil prior to her cycle then it drained on its own and has been improving. Does also endorse today she has had worse cramping with her cycles, midol provides only minimal relief. Menses are regular, lasting 6-7 days and heavy-moderate flow, denies any anemia sxs.  No other complaints or concerns today. Denies f/c, n/v, vaginal discharge.    Physical Exam:      PHYSICAL EXAM:  /84   Ht 5' 2" (1.575 m)   Wt 89 kg (196 lb 3.4 oz)   BMI 35.89 kg/m²   Body mass index is 35.89 kg/m².     APPEARANCE: Well nourished, well developed, in no acute distress.  PELVIC:  External genitalia and urethra within normal limits. Small hair bump improving. Vagina without lesions, without discharge, without erythema, without ulcers.  Cervix without cervical motion tenderness, non-friable. Uterus: normal size, mobile, non-tender.  No adnexal masses or tenderness palpated.    Assessment/Plan:     Vaginal dryness  -     estradioL (ESTRACE) 0.01 % (0.1 mg/gram) vaginal cream; Insert 0.5 gms per vagina qhs x 2 weeks then twice weekly.  Dispense: 42.5 g; Refill: 1    Folliculitis  -     mupirocin (BACTROBAN) 2 % ointment; Apply topically 3 (three) times daily. for 10 days  Dispense: 30 g; Refill: 1    - discussed vaginal estrace cream to help with dryness symptoms, also discussed and information given for [place] and she will try this, as well  - Rx bactroban for folliculitis  - vaginal and razor hygiene discussed  - RTC when due for annual or sooner if " 74 y/oM from [facility] for 3 days of subjective fevers and persistent cough, admitted for pna. Recent elective bioprosthetic aortic valve replacement and ascending aortic aneurysm repair with transverse hemiarch and CABG x2 (4/8/24) with post op course complicated by cardiogenic shock requiring inotropic support with IV dobutamine and milrinone, IABP and also on amiodarone for atrial fibrillation prophylaxis. PMH  CVA, MI, HTN, T2DM, HLD, CAD s/p cardiac stents, CHF, hx of malignant melanoma/wide excision marcy Foster MD  Obstetrics and Gynecology  [facility]     74 y/oM from [facility] for 3 days of subjective fevers and persistent cough, admitted for pna. Recent elective bioprosthetic aortic valve replacement and ascending aortic aneurysm repair with transverse hemiarch and CABG x2 (4/8/24) with post op course complicated by cardiogenic shock requiring inotropic support with IV dobutamine and milrinone, IABP and also on amiodarone for atrial fibrillation prophylaxis. PMH  CVA, MI, HTN, T2DM, HLD, CAD s/p cardiac stents, CHF, hx of malignant melanoma/wide excision. As per pt, spent two weeks in rehab following hospitalization, then was home.

## 2024-10-06 NOTE — PROGRESS NOTE ADULT - PROBLEM SELECTOR PLAN 6
a1c (4/24): 9.8    -  [facility]  - c/w farxiga  - hold tradjenta  - monitor FSG a1c (4/24): 9.8    - monitor FS with ISS for coverage - may need to add low dose lantus if FS remain elevated  - c/w farxiga  - hold tradjenta

## 2024-10-06 NOTE — PROGRESS NOTE ADULT - PROBLEM SELECTOR PLAN 4
s/p recent elective bioprosthetic aortic valve replacement and ascending aortic aneurysm repair with transverse hemiarch and CABG x2 (4/8/24) with post op course complicated by cardiogenic shock requiring inotropic support with IV dobutamine and milrinone, IABP and also on amiodarone for atrial fibrillation prophylaxis   CT on admission with no acute findings    - c/w aspirin, plavix  - c/w lipitor  - c/w toprol as above

## 2024-10-07 LAB
ANION GAP SERPL CALC-SCNC: 15 MMOL/L — SIGNIFICANT CHANGE UP (ref 5–17)
BUN SERPL-MCNC: 31 MG/DL — HIGH (ref 7–23)
CALCIUM SERPL-MCNC: 8.7 MG/DL — SIGNIFICANT CHANGE UP (ref 8.4–10.5)
CHLORIDE SERPL-SCNC: 106 MMOL/L — SIGNIFICANT CHANGE UP (ref 96–108)
CO2 SERPL-SCNC: 20 MMOL/L — LOW (ref 22–31)
CREAT SERPL-MCNC: 1.3 MG/DL — SIGNIFICANT CHANGE UP (ref 0.5–1.3)
EGFR: 57 ML/MIN/1.73M2 — LOW
GAS PNL BLDV: SIGNIFICANT CHANGE UP
GLUCOSE BLDC GLUCOMTR-MCNC: 132 MG/DL — HIGH (ref 70–99)
GLUCOSE BLDC GLUCOMTR-MCNC: 196 MG/DL — HIGH (ref 70–99)
GLUCOSE BLDC GLUCOMTR-MCNC: 210 MG/DL — HIGH (ref 70–99)
GLUCOSE BLDC GLUCOMTR-MCNC: 84 MG/DL — SIGNIFICANT CHANGE UP (ref 70–99)
GLUCOSE SERPL-MCNC: 160 MG/DL — HIGH (ref 70–99)
LACTATE SERPL-SCNC: 1.7 MMOL/L — SIGNIFICANT CHANGE UP (ref 0.5–2)
POTASSIUM SERPL-MCNC: 4.1 MMOL/L — SIGNIFICANT CHANGE UP (ref 3.5–5.3)
POTASSIUM SERPL-SCNC: 4.1 MMOL/L — SIGNIFICANT CHANGE UP (ref 3.5–5.3)
SODIUM SERPL-SCNC: 141 MMOL/L — SIGNIFICANT CHANGE UP (ref 135–145)

## 2024-10-07 PROCEDURE — 99233 SBSQ HOSP IP/OBS HIGH 50: CPT

## 2024-10-07 RX ORDER — ZOLPIDEM TARTRATE 5 MG
5 TABLET ORAL AT BEDTIME
Refills: 0 | Status: DISCONTINUED | OUTPATIENT
Start: 2024-10-07 | End: 2024-10-08

## 2024-10-07 RX ADMIN — AMIODARONE HYDROCHLORIDE 200 MILLIGRAM(S): 50 INJECTION, SOLUTION INTRAVENOUS at 09:25

## 2024-10-07 RX ADMIN — BENZONATATE 100 MILLIGRAM(S): 150 CAPSULE ORAL at 09:34

## 2024-10-07 RX ADMIN — Medication 75 MILLIGRAM(S): at 13:27

## 2024-10-07 RX ADMIN — DAPAGLIFLOZIN 10 MILLIGRAM(S): 10 TABLET, FILM COATED ORAL at 13:33

## 2024-10-07 RX ADMIN — SACUBITRIL AND VALSARTAN 1 TABLET(S): 97; 103 TABLET, FILM COATED ORAL at 18:04

## 2024-10-07 RX ADMIN — MULTI VITAMIN/MINERAL SUPPLEMENT WITH ASCORBIC ACID, NIACIN, PYRIDOXINE, PANTOTHENIC ACID, FOLIC ACID, RIBOFLAVIN, THIAMIN, BIOTIN, COBALAMIN AND ZINC. 1 TABLET(S): 60; 20; 12.5; 10; 10; 1.7; 1.5; 1; .3; .006 TABLET, COATED ORAL at 13:28

## 2024-10-07 RX ADMIN — BENZONATATE 100 MILLIGRAM(S): 150 CAPSULE ORAL at 20:24

## 2024-10-07 RX ADMIN — ENOXAPARIN SODIUM 30 MILLIGRAM(S): 150 INJECTION SUBCUTANEOUS at 18:03

## 2024-10-07 RX ADMIN — Medication 100 MILLIGRAM(S): at 18:02

## 2024-10-07 RX ADMIN — Medication 4 MILLIGRAM(S): at 09:34

## 2024-10-07 RX ADMIN — ATORVASTATIN CALCIUM 80 MILLIGRAM(S): 10 TABLET, FILM COATED ORAL at 22:25

## 2024-10-07 RX ADMIN — AZITHROMYCIN 255 MILLIGRAM(S): 250 TABLET, FILM COATED ORAL at 14:05

## 2024-10-07 RX ADMIN — Medication 81 MILLIGRAM(S): at 13:26

## 2024-10-07 RX ADMIN — Medication 0.4 MILLIGRAM(S): at 22:25

## 2024-10-07 RX ADMIN — Medication 5 MILLIGRAM(S): at 22:25

## 2024-10-07 RX ADMIN — Medication 4 MILLIGRAM(S): at 20:23

## 2024-10-07 RX ADMIN — Medication 2: at 18:02

## 2024-10-07 RX ADMIN — Medication 25 MILLIGRAM(S): at 09:26

## 2024-10-07 RX ADMIN — SACUBITRIL AND VALSARTAN 1 TABLET(S): 97; 103 TABLET, FILM COATED ORAL at 09:25

## 2024-10-07 NOTE — PROGRESS NOTE ADULT - NSPROGADDITIONALINFOA_GEN_ALL_CORE
Wean o2, obtain ambulatory sat. Continue ctx. PT recs. Pending TTE    50 minutes spent  Cee Ponce MD  Division of Hospital Medicine  Available on Microsoft Teams
d/w LUKE Guillen

## 2024-10-07 NOTE — DIETITIAN INITIAL EVALUATION ADULT - PROBLEM SELECTOR PLAN 2
4/24: LVEF: 35%, Moderately decreased global left ventricular systolic function.  Pro-bnp: [id number]   currently euvolemic    - f/u TTE  - monitor I&O  - hold diuresis at this time  - c/w farxiga  - c/w toprol  - c/w entresto

## 2024-10-07 NOTE — DIETITIAN INITIAL EVALUATION ADULT - ORAL INTAKE PTA/DIET HISTORY
eggs, rice, turkey rashid for breakfast, chinese take out for lunch, whatever his grand daughter prepares for dinner including chicken, fish, salad, orzo. snacks on chips.

## 2024-10-07 NOTE — DIETITIAN INITIAL EVALUATION ADULT - PROBLEM SELECTOR PLAN 5
s/p  recent elective bioprosthetic aortic valve replacement and ascending aortic aneurysm repair with transverse hemiarch and CABG x2 (4/8/24) with post op course complicated by cardiogenic shock requiring inotropic support with IV dobutamine and milrinone, IABP and also on amiodarone for atrial fibrillation prophylaxis   CT on admission with no acute findings     - c/w meds as above  - c/w amiodarone
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
## 2024-10-07 NOTE — DIETITIAN INITIAL EVALUATION ADULT - PERSON TAUGHT/METHOD
grand daughter/verbal instruction/written material/teach back - (Patient repeats in own words)/patient instructed/spouse instructed

## 2024-10-07 NOTE — PROGRESS NOTE ADULT - PROBLEM SELECTOR PLAN 3
s/p recent elective bioprosthetic aortic valve replacement and ascending aortic aneurysm repair with transverse hemiarch and CABG x2 (4/8/24) with post op course complicated by cardiogenic shock requiring inotropic support with IV dobutamine and milrinone, IABP.   CT on admission with no acute findings    - c/w aspirin, plavix  - c/w lipitor   - c/w toprol

## 2024-10-07 NOTE — PROGRESS NOTE ADULT - PROBLEM SELECTOR PLAN 8
DVT ppx: lovenox  Dispo: PT pending    Insomonia- ISTOP Reference #: [id number]- Recently prescribed ambien 10mg.
DVT ppx: heparin  Dispo: PT pending

## 2024-10-07 NOTE — DIETITIAN INITIAL EVALUATION ADULT - PERTINENT MEDS FT
MEDICATIONS  (STANDING):  aMIOdarone    Tablet 200 milliGRAM(s) Oral daily  aspirin enteric coated 81 milliGRAM(s) Oral daily  atorvastatin 80 milliGRAM(s) Oral at bedtime  azithromycin  IVPB 500 milliGRAM(s) IV Intermittent every 24 hours  azithromycin  IVPB      cefTRIAXone   IVPB      cefTRIAXone   IVPB 1000 milliGRAM(s) IV Intermittent every 24 hours  clopidogrel Tablet 75 milliGRAM(s) Oral daily  dapagliflozin 10 milliGRAM(s) Oral daily  dextrose 5%. 1000 milliLiter(s) (50 mL/Hr) IV Continuous <Continuous>  dextrose 5%. 1000 milliLiter(s) (100 mL/Hr) IV Continuous <Continuous>  dextrose 50% Injectable 25 Gram(s) IV Push once  dextrose 50% Injectable 12.5 Gram(s) IV Push once  dextrose 50% Injectable 25 Gram(s) IV Push once  enoxaparin Injectable 30 milliGRAM(s) SubCutaneous every 24 hours  glucagon  Injectable 1 milliGRAM(s) IntraMuscular once  insulin lispro (ADMELOG) corrective regimen sliding scale   SubCutaneous at bedtime  insulin lispro (ADMELOG) corrective regimen sliding scale   SubCutaneous three times a day before meals  metoprolol succinate ER 25 milliGRAM(s) Oral daily  multivitamin 1 Tablet(s) Oral daily  sacubitril 24 mG/valsartan 26 mG 1 Tablet(s) Oral two times a day  tamsulosin 0.4 milliGRAM(s) Oral at bedtime    MEDICATIONS  (PRN):  acetaminophen     Tablet .. 650 milliGRAM(s) Oral every 6 hours PRN Temp greater or equal to 38C (100.4F), Mild Pain (1 - 3)  benzonatate 100 milliGRAM(s) Oral three times a day PRN Cough  dextrose Oral Gel 15 Gram(s) Oral once PRN Blood Glucose LESS THAN 70 milliGRAM(s)/deciliter  hydrocodone/homatropine Syrup 5 milliLiter(s) Oral at bedtime PRN Cough  melatonin 3 milliGRAM(s) Oral at bedtime PRN Insomnia  ondansetron Injectable 4 milliGRAM(s) IV Push every 6 hours PRN Nausea

## 2024-10-07 NOTE — PROGRESS NOTE ADULT - PROBLEM SELECTOR PROBLEM 2
HFrEF (heart failure with reduced ejection fraction)
HFrEF (heart failure with reduced ejection fraction)

## 2024-10-07 NOTE — DIETITIAN INITIAL EVALUATION ADULT - PERTINENT LABORATORY DATA
10-07    141  |  106  |  31[H]  ----------------------------<  160[H]  4.1   |  20[L]  |  1.30    Ca    8.7      07 Oct 2024 09:59  Phos  3.2     10-06  Mg     1.9     10-06    POCT Blood Glucose.: 132 mg/dL (10-07-24 @ 12:49)  A1C with Estimated Average Glucose Result: 9.3 % (10-06-24 @ 07:24)  A1C with Estimated Average Glucose Result: 9.8 % (04-07-24 @ 15:28)  A1C with Estimated Average Glucose Result: 9.9 % (04-01-24 @ 14:49)

## 2024-10-07 NOTE — PROGRESS NOTE ADULT - PROBLEM SELECTOR PLAN 1
Presenting with cough, fevers with CXR showing right perihilar airspace disease may represent edema or pneumonia.  and CT chest showing Small bilateral pleural effusions. Centrally predominant right lung groundglass and consolidative opacities with areas of smooth interlobular septal thickening. Differential considerations may include asymmetric pulmonary edema or infection.  RVP: neg    - c/w ceftriaxone 11/5-  - legionella, urine strep pneumo neg. stop azithryo (11/5-11/7)  - monitor WBC, temp curve  - symptomatic treatment for cough - added hycodan qhs prn, c/w tessalon perle prn

## 2024-10-07 NOTE — PROGRESS NOTE ADULT - PROBLEM SELECTOR PLAN 2
4/24: LVEF: 35%, Moderately decreased global left ventricular systolic function.  Pro-bnp: [id number]   currently euvolemic    - f/u TTE  - monitor I&O  - hold diuresis at this time  - c/w farxiga  - c/w toprol  - c/w entresto
4/24: LVEF: 35%, Moderately decreased global left ventricular systolic function.  Pro-bnp: [id number]   currently euvolemic    - f/u TTE  - monitor I&O  - hold diuresis at this time  - c/w farxiga  - c/w toprol  - c/w entresto

## 2024-10-07 NOTE — DIETITIAN INITIAL EVALUATION ADULT - PROBLEM SELECTOR PLAN 1
Presenting with cough, fevers with CXR showing right perihilar airspace disease may represent edema or pneumonia.  and CT chest showing Small bilateral pleural effusions. Centrally predominant right lung groundglass and consolidative opacities with areas of smooth interlobular septal thickening. Differential considerations may include asymmetric pulmonary edema or infection.  RVP: neg    - f/u procal  - start ceftriaxone + azithro  -f/u legionella, urine strep pneumo  - monitor WBC, temp curve  - symptomatic treatment for cough

## 2024-10-07 NOTE — PROGRESS NOTE ADULT - PROBLEM SELECTOR PLAN 6
a1c (4/24): 9.8    - monitor FS with ISS for coverage - may need to add low dose lantus if FS remain elevated  - c/w farxiga  - hold tradjenta

## 2024-10-07 NOTE — PROGRESS NOTE ADULT - ASSESSMENT
75 Y/O M with PMHx of CVA, HTN, T2DM, HLD, CAD s/p stents, HFrEF (EF: 35%), hx of malignant melanoma/wide excision, with recent elective bioprosthetic aortic valve replacement and ascending aortic aneurysm repair with transverse hemiarch and CABG x2 (4/8/24), presents to the ED from home for 3 days of subjective fevers and persistent cough admitted with PNA
75 Y/O M with PMHx of CVA, HTN, T2DM, HLD, CAD s/p stents, HFrEF (EF: 35%), hx of malignant melanoma/wide excision, with recent elective bioprosthetic aortic valve replacement and ascending aortic aneurysm repair with transverse hemiarch and CABG x2 (4/8/24), presents to the ED from home for 3 days of subjective fevers and persistent cough admitted with PNA

## 2024-10-07 NOTE — DIETITIAN INITIAL EVALUATION ADULT - NSICDXPASTSURGICALHX_GEN_ALL_CORE_FT
Post-Care Instructions: I reviewed with the patient in detail post-care instructions. Patient is to keep the biopsy site dry overnight, and then apply bacitracin twice daily until healed. Patient may apply hydrogen peroxide soaks to remove any crusting. PAST SURGICAL HISTORY:  Bilateral inguinal hernia     H/O lithotripsy     S/P medial meniscal repair and ligament surgery    S/P tonsillectomy

## 2024-10-08 ENCOUNTER — TRANSCRIPTION ENCOUNTER (OUTPATIENT)
Age: 74
End: 2024-10-08

## 2024-10-08 ENCOUNTER — NON-APPOINTMENT (OUTPATIENT)
Age: 74
End: 2024-10-08

## 2024-10-08 ENCOUNTER — RESULT REVIEW (OUTPATIENT)
Age: 74
End: 2024-10-08

## 2024-10-08 VITALS
OXYGEN SATURATION: 94 % | DIASTOLIC BLOOD PRESSURE: 62 MMHG | TEMPERATURE: 98 F | HEART RATE: 78 BPM | RESPIRATION RATE: 18 BRPM | SYSTOLIC BLOOD PRESSURE: 92 MMHG

## 2024-10-08 LAB
ANION GAP SERPL CALC-SCNC: 11 MMOL/L — SIGNIFICANT CHANGE UP (ref 5–17)
BUN SERPL-MCNC: 35 MG/DL — HIGH (ref 7–23)
CALCIUM SERPL-MCNC: 8.5 MG/DL — SIGNIFICANT CHANGE UP (ref 8.4–10.5)
CHLORIDE SERPL-SCNC: 106 MMOL/L — SIGNIFICANT CHANGE UP (ref 96–108)
CO2 SERPL-SCNC: 21 MMOL/L — LOW (ref 22–31)
CREAT SERPL-MCNC: 1.29 MG/DL — SIGNIFICANT CHANGE UP (ref 0.5–1.3)
EGFR: 57 ML/MIN/1.73M2 — LOW
GLUCOSE BLDC GLUCOMTR-MCNC: 109 MG/DL — HIGH (ref 70–99)
GLUCOSE BLDC GLUCOMTR-MCNC: 220 MG/DL — HIGH (ref 70–99)
GLUCOSE SERPL-MCNC: 223 MG/DL — HIGH (ref 70–99)
HCT VFR BLD CALC: 37.9 % — LOW (ref 39–50)
HGB BLD-MCNC: 12 G/DL — LOW (ref 13–17)
MCHC RBC-ENTMCNC: 26.8 PG — LOW (ref 27–34)
MCHC RBC-ENTMCNC: 31.7 GM/DL — LOW (ref 32–36)
MCV RBC AUTO: 84.6 FL — SIGNIFICANT CHANGE UP (ref 80–100)
NRBC # BLD: 0 /100 WBCS — SIGNIFICANT CHANGE UP (ref 0–0)
NT-PROBNP SERPL-SCNC: 8193 PG/ML — HIGH (ref 0–300)
PLATELET # BLD AUTO: 316 K/UL — SIGNIFICANT CHANGE UP (ref 150–400)
POTASSIUM SERPL-MCNC: 4.2 MMOL/L — SIGNIFICANT CHANGE UP (ref 3.5–5.3)
POTASSIUM SERPL-SCNC: 4.2 MMOL/L — SIGNIFICANT CHANGE UP (ref 3.5–5.3)
RBC # BLD: 4.48 M/UL — SIGNIFICANT CHANGE UP (ref 4.2–5.8)
RBC # FLD: 16.5 % — HIGH (ref 10.3–14.5)
SODIUM SERPL-SCNC: 138 MMOL/L — SIGNIFICANT CHANGE UP (ref 135–145)
WBC # BLD: 8.32 K/UL — SIGNIFICANT CHANGE UP (ref 3.8–10.5)
WBC # FLD AUTO: 8.32 K/UL — SIGNIFICANT CHANGE UP (ref 3.8–10.5)

## 2024-10-08 PROCEDURE — 99497 ADVNCD CARE PLAN 30 MIN: CPT

## 2024-10-08 PROCEDURE — 99239 HOSP IP/OBS DSCHRG MGMT >30: CPT

## 2024-10-08 PROCEDURE — 93306 TTE W/DOPPLER COMPLETE: CPT | Mod: 26

## 2024-10-08 PROCEDURE — 76376 3D RENDER W/INTRP POSTPROCES: CPT | Mod: 26

## 2024-10-08 RX ORDER — BENZONATATE 150 MG/1
1 CAPSULE ORAL
Qty: 15 | Refills: 0
Start: 2024-10-08 | End: 2024-10-12

## 2024-10-08 RX ORDER — FUROSEMIDE 10 MG/ML
1 INJECTION INTRAVENOUS
Qty: 30 | Refills: 0
Start: 2024-10-08 | End: 2024-11-06

## 2024-10-08 RX ADMIN — DAPAGLIFLOZIN 10 MILLIGRAM(S): 10 TABLET, FILM COATED ORAL at 12:47

## 2024-10-08 RX ADMIN — Medication 100 MILLIGRAM(S): at 12:47

## 2024-10-08 RX ADMIN — MULTI VITAMIN/MINERAL SUPPLEMENT WITH ASCORBIC ACID, NIACIN, PYRIDOXINE, PANTOTHENIC ACID, FOLIC ACID, RIBOFLAVIN, THIAMIN, BIOTIN, COBALAMIN AND ZINC. 1 TABLET(S): 60; 20; 12.5; 10; 10; 1.7; 1.5; 1; .3; .006 TABLET, COATED ORAL at 12:48

## 2024-10-08 RX ADMIN — Medication 81 MILLIGRAM(S): at 12:48

## 2024-10-08 RX ADMIN — SACUBITRIL AND VALSARTAN 1 TABLET(S): 97; 103 TABLET, FILM COATED ORAL at 08:50

## 2024-10-08 RX ADMIN — Medication 75 MILLIGRAM(S): at 12:48

## 2024-10-08 RX ADMIN — AMIODARONE HYDROCHLORIDE 200 MILLIGRAM(S): 50 INJECTION, SOLUTION INTRAVENOUS at 08:50

## 2024-10-08 RX ADMIN — Medication 25 MILLIGRAM(S): at 08:50

## 2024-10-08 RX ADMIN — Medication 2: at 12:48

## 2024-10-08 NOTE — DISCHARGE NOTE PROVIDER - NSDCCPTREATMENT_GEN_ALL_CORE_FT
PRINCIPAL PROCEDURE  Procedure: 2D echo  Findings and Treatment:   < end of copied text >  CONCLUSIONS:      1. Left ventricular cavity is mildly dilated. Left ventricular wall thickness is normal. Left ventricular systolic function is severely decreased with an ejection fraction visually estimated at 20 to 25 %. There are no regional wall motion abnormalities seen.   2. Reduced right ventricular systolic function.   3. A bioprosthetic valve replacement valve replacement is present in the aortic position The prosthetic valve has normal leaflet excursion and is well seated. No intravalvular regurgitation.   4. Multiple segmental abnormalities exist. See findings.< from: TTE W or WO Ultrasound Enhancing Agent (10.08.24 @ 06:50) >

## 2024-10-08 NOTE — DISCHARGE NOTE PROVIDER - HOSPITAL COURSE
HPI:  73 Y/O M with PMHx of CVA, MI, HTN, T2DM, HLD, CAD s/p cardiac stents, CHF, hx of malignant melanoma/wide excision, with recent elective bioprosthetic aortic valve replacement and ascending aortic aneurysm repair with transverse hemiarch and CABG x2 (4/8/24) with post op course complicated by cardiogenic shock requiring inotropic support with IV dobutamine and milrinone, IABP and also on amiodarone for atrial fibrillation prophylaxis, discharged to [facility] presents to the ED from home for 3 days of subjective fevers and persistent cough. Reports unrelenting dry cough that has been affecting his sleep. Also reports orthopnea. Was discharged from rehab in May and has been working with PT at home for improved mobility and functional status. Denies any sick contacts at home, recent travel hx. Denies any chest pain.  No nausea or vomiting, no dizziness.     In the ED, VSS - notably tachycardic. Received 500 cc bolus, lasix 40 IVP and ceftriaxone.    (05 Oct 2024 14:01)    Hospital Course:  #Pneumonia.   ·  Plan: Presenting with cough, fevers with CXR showing right perihilar airspace disease may represent edema or pneumonia.  and CT chest showing Small bilateral pleural effusions. Centrally predominant right lung groundglass and consolidative opacities with areas of smooth interlobular septal thickening. Differential considerations may include asymmetric pulmonary edema or infection.  RVP: neg    - c/w ceftriaxone 11/5-  - legionella, urine strep pneumo neg. stop azithryo (11/5-11/7)  - symptomatic treatment for cough - added hycodan qhs prn, c/w tessalon perle prn.    # HFrEF (heart failure with reduced ejection fraction).   ·  Plan: 4/24: LVEF: 35%, Moderately decreased global left ventricular systolic function.  Pro-bnp: [id number]   - fs/pTTE  - hold diuresis at this time  - c/w farxiga  - c/w toprol  - c/w entresto.    #S/P aortic aneurysm repair.   ·  Plan: s/p recent elective bioprosthetic aortic valve replacement and ascending aortic aneurysm repair with transverse hemiarch and CABG x2 (4/8/24) with post op course complicated by cardiogenic shock requiring inotropic support with IV dobutamine and milrinone, IABP.   CT on admission with no acute findings.  - c/w aspirin, plavix  - c/w lipitor   - c/w toprol.    # S/P CABG (coronary artery bypass graft).   ·  Plan: as above.    #S/P aortic valve replacement.   ·  Plan: as above   - c/w amiodarone for atrial fibrillation prophylaxis.    #Type 2 diabetes mellitus.   ·  Plan: a1c (4/24): 9.8%  -- may need to add low dose lantus if FS remain elevated  - c/w farxiga  - hold tradjenta.    # Hypertension.   ·  Plan: - c/w entresto  - c/w toprol   - monitor BP.    Important Medication Changes and Reason:    Active or Pending Issues Requiring Follow-up:  Follow up with Dr. Pacheco in 1 week.   Advanced Directives:   [ x] Full code  [ ] DNR  [ ] Hospice    Discharge Diagnoses:  Pneumonia

## 2024-10-08 NOTE — GOALS OF CARE CONVERSATION - ADVANCED CARE PLANNING - CONVERSATION DETAILS
Results of the TTE were discussed in which EF 20-25% (30-35% back in 4/2024) likely from noncompliance from medications. Patient states he would miss doses due to nausea and weakness. Diagnosis of heart failure was discussed and reiterated importance on medication compliance. Patient confirmed that he was on lasix in the past. Resumed lasix prn as he clinically euvolemic. Advised to check his daily weights at home and follow with his cardiologist.

## 2024-10-08 NOTE — DISCHARGE NOTE PROVIDER - NSDCCPCAREPLAN_GEN_ALL_CORE_FT
PRINCIPAL DISCHARGE DIAGNOSIS  Diagnosis: Community acquired pneumonia  Assessment and Plan of Treatment: S/p IV abx.   Encourage fluids  Follow up with PMD in 1 week.      SECONDARY DISCHARGE DIAGNOSES  Diagnosis: HFrEF (heart failure with reduced ejection fraction)  Assessment and Plan of Treatment: Continue present medication regimen.   Follow up with PMD in 1 week.     PRINCIPAL DISCHARGE DIAGNOSIS  Diagnosis: Community acquired pneumonia  Assessment and Plan of Treatment: S/p IV abx.   Encourage fluids  Follow up with PMD in 1 week. Please complete you course of abx through 10/14 for pneumonia      SECONDARY DISCHARGE DIAGNOSES  Diagnosis: HFrEF (heart failure with reduced ejection fraction)  Assessment and Plan of Treatment: Continue present medication regimen. Results of your echo was reviewed. Please check your weights daily. If you gain 5lb or develop worsening sob, please take lasix to minimize fluid retention.   Follow up with PMD in 1 week. Follow up with your cardiologist Dr. Joyce     PRINCIPAL DISCHARGE DIAGNOSIS  Diagnosis: Community acquired pneumonia  Assessment and Plan of Treatment: S/p IV abx.   Encourage fluids  Follow up with PMD in 1 week. Please complete you course of abx through 10/14 for pneumonia      SECONDARY DISCHARGE DIAGNOSES  Diagnosis: HFrEF (heart failure with reduced ejection fraction)  Assessment and Plan of Treatment: Continue present medication regimen. Results of your echo was reviewed. Please check your weights daily. If you gain 5lb or develop worsening sob, please take lasix to minimize fluid retention. Your heart failure medications include entresto, farxiga, metoprolol, aspirin, and plavix.   Follow up with PMD in 1 week. Follow up with your cardiologist Dr. Joyce

## 2024-10-08 NOTE — DISCHARGE NOTE PROVIDER - CARE PROVIDER_API CALL
Bird Pacheco Keron  Internal Medicine  [address] [place] [address]  Phone: [phone]  Fax: [phone]  Established Patient  Follow Up Time: 2 weeks   Bird Pacheco  Internal Medicine  [address] [place] [address]  Phone: [phone]  Fax: [phone]  Established Patient  Follow Up Time: 2 weeks    Demetrice Joyce  Cardiovascular Disease  [address] [place] [id number]  Phone: [phone]  Fax: [phone]  Follow Up Time: 2 weeks

## 2024-10-08 NOTE — DISCHARGE NOTE PROVIDER - NSDCFUADDAPPT_GEN_ALL_CORE_FT
APPTS ARE READY TO BE MADE: [X] YES    Best Family or Patient Contact (if needed):    Additional Information about above appointments (if needed):    1: PCP within 1 week  2: Cardiology within 2 weeks  3:     Other comments or requests:    APPTS ARE READY TO BE MADE: [X] YES    Best Family or Patient Contact (if needed):    Additional Information about above appointments (if needed):    1: PCP within 1 week  2: Cardiology Dr Joyce within 1 week  3:     Other comments or requests:    APPTS ARE READY TO BE MADE: [X] YES    Best Family or Patient Contact (if needed):    Additional Information about above appointments (if needed):    1: PCP within 1 week  2: Cardiology Dr Joyce within 1 week  3:     Other comments or requests:     Prior to outreaching the patient, it was visible that the patient has secured a follow up appointment which was not scheduled by our team. Patient is scheduled to see SAM Kwan on 10/15 through Telehealth     Prior to outreaching the patient, it was visible that the patient has secured a follow up appointment which was not scheduled by our team. Patient is scheduled to see dr. Kahn on 10/15 through Telehealth

## 2024-10-08 NOTE — CHART NOTE - NSCHARTNOTEFT_GEN_A_CORE
75 Y/O M with PMHx of CVA, HTN, T2DM, HLD, CAD s/p stents, HFrEF (EF: 35%), hx of malignant melanoma/wide excision, with recent elective bioprosthetic aortic valve replacement and ascending aortic aneurysm repair with transverse hemiarch and CABG x2 (4/8/24), presents to the ED from home for 3 days of subjective fevers and persistent cough admitted with PNA on IV ctx. Weaned off O2 with ambulation sat 93% and sob improved per patient. Will go home on agumentin 875mg bid, last dose 10/14 to complete a 10 day course.    Results of the TTE were discussed in which EF 20-25% (30-35% back in 4/2024) likely from noncompliance from medications. Patient states he would miss doses due to nausea and weakness. Updated outpatient cardiology Dr. Joyce.   Updated wife who will pick him up this afternoon.    Cee Ponce MD  Division of Hospital Medicine  Available on [employer] Teams 75 Y/O M with PMHx of CVA, HTN, T2DM, HLD, CAD s/p stents, HFrEF (EF: 35%), hx of malignant melanoma/wide excision, with recent elective bioprosthetic aortic valve replacement and ascending aortic aneurysm repair with transverse hemiarch and CABG x2 (4/8/24), presents to the ED from home for 3 days of subjective fevers and persistent cough admitted with PNA on IV ctx. Weaned off O2 with ambulation sat 93% and sob improved per patient. Will go home on agumentin 875mg bid, last dose 10/14 to complete a 10 day course.    Results of the TTE were discussed in which EF 20-25% (30-35% back in 4/2024) likely from noncompliance from medications. Patient states he would miss doses due to nausea and weakness. Diagnosis of heart failure was discussed and reiterated importance on medication compliance. Patient confirmed that he was on lasix in the past. Resumed lasix prn as he clinically euvolemic. Advised to check his daily weights at home and follow with his cardiologist. Updated outpatient cardiology Dr. Joyce.   Updated wife who will pick him up this afternoon.    Cee Ponce MD  Division of Hospital Medicine  Available on [employer] Teams

## 2024-10-08 NOTE — DISCHARGE NOTE PROVIDER - ATTENDING DISCHARGE PHYSICAL EXAMINATION:
GENERAL: NAD, breathing normal  EYES: conjunctiva and sclera clear  NECK: supple, No JVD  CHEST/LUNG: bibasilar crackles   HEART: S1 S2 RRR  ABDOMEN: +BS Soft, NT/ND  EXTREMITIES:  2+ DP Pulses, No c/c. no LE edema  NEUROLOGY: AAOx3, no facial droop, moving all extremities   SKIN: No rashes or lesions

## 2024-10-08 NOTE — DISCHARGE NOTE PROVIDER - NSDCMRMEDTOKEN_GEN_ALL_CORE_FT
amiodarone 200 mg oral tablet: 1 tab(s) orally once a day  aspirin 81 mg oral delayed release tablet: 1 tab(s) orally once a day  atorvastatin 80 mg oral tablet: 1 tab(s) orally once a day (at bedtime)  benzonatate 100 mg oral capsule: 1 cap(s) orally 3 times a day as needed for Cough MDD: 3  clopidogrel 75 mg oral tablet: 1 tab(s) orally once a day  Farxiga 10 mg oral tablet: 1 tab(s) orally once a day  Flomax 0.4 mg oral capsule: 1 cap(s) orally once a day (at bedtime)  linagliptin 5 mg oral tablet: 1 tab(s) orally once a day (before a meal)  LORazepam 0.5 mg oral tablet: 1 tab(s) orally once a day (at bedtime) as needed for  anxiety  melatonin 3 mg oral tablet: 2 tab(s) orally once a day (at bedtime)  metoprolol succinate 25 mg oral tablet, extended release: 0.5 tab(s) orally once a day  Multiple Vitamins oral tablet: 1 tab(s) orally once a day  sacubitril-valsartan 24 mg-26 mg oral tablet: 1 tab(s) orally 2 times a day   amiodarone 200 mg oral tablet: 1 tab(s) orally once a day  amoxicillin-clavulanate 875 mg-125 mg oral tablet: 875 milligram(s) orally 2 times a day MDD: 2  aspirin 81 mg oral delayed release tablet: 1 tab(s) orally once a day  atorvastatin 80 mg oral tablet: 1 tab(s) orally once a day (at bedtime)  benzonatate 100 mg oral capsule: 1 cap(s) orally 3 times a day as needed for Cough MDD: 3  clopidogrel 75 mg oral tablet: 1 tab(s) orally once a day  Farxiga 10 mg oral tablet: 1 tab(s) orally once a day  Flomax 0.4 mg oral capsule: 1 cap(s) orally once a day (at bedtime)  linagliptin 5 mg oral tablet: 1 tab(s) orally once a day (before a meal)  LORazepam 0.5 mg oral tablet: 1 tab(s) orally once a day (at bedtime) as needed for  anxiety  melatonin 3 mg oral tablet: 2 tab(s) orally once a day (at bedtime)  metoprolol succinate 25 mg oral tablet, extended release: 0.5 tab(s) orally once a day  Multiple Vitamins oral tablet: 1 tab(s) orally once a day  sacubitril-valsartan 24 mg-26 mg oral tablet: 1 tab(s) orally 2 times a day   amiodarone 200 mg oral tablet: 1 tab(s) orally once a day  amoxicillin-clavulanate 875 mg-125 mg oral tablet: 875 milligram(s) orally 2 times a day MDD: 2  aspirin 81 mg oral delayed release tablet: 1 tab(s) orally once a day  atorvastatin 80 mg oral tablet: 1 tab(s) orally once a day (at bedtime)  benzonatate 100 mg oral capsule: 1 cap(s) orally 3 times a day as needed for Cough MDD: 3  clopidogrel 75 mg oral tablet: 1 tab(s) orally once a day  Farxiga 10 mg oral tablet: 1 tab(s) orally once a day  Flomax 0.4 mg oral capsule: 1 cap(s) orally once a day (at bedtime)  Lasix 20 mg oral tablet: 1 tab(s) orally once a day as needed for swelling  linagliptin 5 mg oral tablet: 1 tab(s) orally once a day (before a meal)  LORazepam 0.5 mg oral tablet: 1 tab(s) orally once a day (at bedtime) as needed for  anxiety  melatonin 3 mg oral tablet: 2 tab(s) orally once a day (at bedtime)  metoprolol succinate 25 mg oral tablet, extended release: 0.5 tab(s) orally once a day  Multiple Vitamins oral tablet: 1 tab(s) orally once a day  sacubitril-valsartan 24 mg-26 mg oral tablet: 1 tab(s) orally 2 times a day   amiodarone 200 mg oral tablet: 1 tab(s) orally once a day  amoxicillin-clavulanate 875 mg-125 mg oral tablet: 875 milligram(s) orally 2 times a day MDD: 2  aspirin 81 mg oral delayed release tablet: 1 tab(s) orally once a day  atorvastatin 80 mg oral tablet: 1 tab(s) orally once a day (at bedtime)  benzonatate 100 mg oral capsule: 1 cap(s) orally 3 times a day as needed for Cough MDD: 3  clopidogrel 75 mg oral tablet: 1 tab(s) orally once a day  Farxiga 10 mg oral tablet: 1 tab(s) orally once a day  Flomax 0.4 mg oral capsule: 1 cap(s) orally once a day (at bedtime)  Lasix 20 mg oral tablet: 1 tab(s) orally once a day as needed for swelling  linagliptin 5 mg oral tablet: 1 tab(s) orally once a day (before a meal)  LORazepam 0.5 mg oral tablet: 1 tab(s) orally once a day (at bedtime) as needed for  anxiety  melatonin 3 mg oral tablet: 2 tab(s) orally once a day (at bedtime)  metoprolol succinate 25 mg oral tablet, extended release: 0.5 tab(s) orally once a day  Multiple Vitamins oral tablet: 1 tab(s) orally once a day  Outpatient Physical Therapy: Evaluate and Treat. MDD: 1  sacubitril-valsartan 24 mg-26 mg oral tablet: 1 tab(s) orally 2 times a day

## 2024-10-08 NOTE — DISCHARGE NOTE PROVIDER - PROVIDER TOKENS
PROVIDER:[TOKEN:[[id number]],FOLLOWUP:[2 weeks],ESTABLISHEDPATIENT:[T]] PROVIDER:[TOKEN:[[id number]],FOLLOWUP:[2 weeks],ESTABLISHEDPATIENT:[T]],PROVIDER:[TOKEN:[[id number]],FOLLOWUP:[2 weeks]]

## 2024-10-08 NOTE — DISCHARGE NOTE PROVIDER - CARE PROVIDERS DIRECT ADDRESSES
,DirectAddress_Unknown ,DirectAddress_Unknown,[email or web address]

## 2024-10-08 NOTE — DISCHARGE NOTE NURSING/CASE MANAGEMENT/SOCIAL WORK - PATIENT PORTAL LINK FT
You can access the FollowMyHealth Patient Portal offered by [facility] by registering at the following website: [email or web address]. By joining [employer]’s FollowMyHealth portal, you will also be able to view your health information using other applications (apps) compatible with our system.

## 2024-10-09 ENCOUNTER — TRANSCRIPTION ENCOUNTER (OUTPATIENT)
Age: 74
End: 2024-10-09

## 2024-10-10 ENCOUNTER — NON-APPOINTMENT (OUTPATIENT)
Age: 74
End: 2024-10-10

## 2024-10-11 ENCOUNTER — TRANSCRIPTION ENCOUNTER (OUTPATIENT)
Age: 74
End: 2024-10-11

## 2024-10-15 ENCOUNTER — APPOINTMENT (OUTPATIENT)
Dept: CARE COORDINATION | Facility: HOME HEALTH | Age: 74
End: 2024-10-15

## 2024-10-15 ENCOUNTER — APPOINTMENT (OUTPATIENT)
Dept: CARDIOLOGY | Facility: CLINIC | Age: 74
End: 2024-10-15

## 2024-10-15 ENCOUNTER — NON-APPOINTMENT (OUTPATIENT)
Age: 74
End: 2024-10-15

## 2024-10-15 DIAGNOSIS — R00.2 PALPITATIONS: ICD-10-CM

## 2024-10-15 DIAGNOSIS — R01.1 CARDIAC MURMUR, UNSPECIFIED: ICD-10-CM

## 2024-10-16 ENCOUNTER — NON-APPOINTMENT (OUTPATIENT)
Age: 74
End: 2024-10-16

## 2024-10-18 NOTE — PHYSICAL THERAPY INITIAL EVALUATION ADULT - ACTIVE RANGE OF MOTION EXAMINATION, REHAB EVAL
Orthopaedic Hand Surgery Note    Name: Iris Olvieros  YOB: 2002  Gender: female  MRN: [id number]    CC: New patient referred for bilateral hand pain, numbness, and tingling, right worse than left      HPI: Patient is a 22 y.o. female with a chief complaint of bilateral hand pain, numbness, and tingling in the median nerve distribution. The symptoms have been going on for 1 month. The patient does complain of night wakening and increased symptoms with driving. Evaluation to date has included none. Treatment to date has included braces.  She works as a hairstylist.  She reports more pain while working.  She has noticed certain things that exacerbate her pain such as planks with Pilates and while cutting hair.  She says her hands feel stiff and tight.  She also notes loss of strength.  She is very active and healthy.    ROS/Meds/PSH/PMH/FH/SH: I personally reviewed the patients standard intake form.  Pertinents are discussed In the HPI    Physical Examination:    Musculoskeletal:   Cervical spine has normal range of motion without tenderness to palpation, negative Spurling's test. Shoulders and elbows have normal pain free range of motion.    Examination of the bilateral upper extremity demonstrates Decreased sensation to light touch in the median distribution, normal sensation in ulnar and radial distribution, positive carpal tunnel compression testing and Phalen testing, cap refill < 5 seconds in all fingers. Inspection reveals no thenar atrophy. Negative Tinel and elbow flexion compression test of the cubital tunnel, negative Tinel over Guyon's canal. Sensation to light touch in the ulnar 2 digits is normal with no intrinsic atrophy/weakness. No tenderness to palpation or masses noted in the forearm.    Imaging / Electrodiagnostic Tests:     None    Assessment:     ICD-10-CM    1. Right carpal tunnel syndrome  G56.01       2. Left carpal tunnel syndrome  G56.02           Plan:  We 
no Active ROM deficits were identified

## 2024-10-28 ENCOUNTER — NON-APPOINTMENT (OUTPATIENT)
Age: 74
End: 2024-10-28

## 2024-10-28 ENCOUNTER — APPOINTMENT (OUTPATIENT)
Dept: CARDIOLOGY | Facility: CLINIC | Age: 74
End: 2024-10-28
Payer: MEDICARE

## 2024-10-28 VITALS
BODY MASS INDEX: 22.76 KG/M2 | WEIGHT: 145 LBS | HEIGHT: 67 IN | OXYGEN SATURATION: 98 % | HEART RATE: 78 BPM | DIASTOLIC BLOOD PRESSURE: 74 MMHG | SYSTOLIC BLOOD PRESSURE: 106 MMHG

## 2024-10-28 DIAGNOSIS — I63.411 CEREBRAL INFARCTION DUE TO EMBOLISM OF RIGHT MIDDLE CEREBRAL ARTERY: ICD-10-CM

## 2024-10-28 DIAGNOSIS — R06.09 OTHER FORMS OF DYSPNEA: ICD-10-CM

## 2024-10-28 DIAGNOSIS — Z95.1 PRESENCE OF AORTOCORONARY BYPASS GRAFT: ICD-10-CM

## 2024-10-28 LAB
HCT VFR BLD CALC: 45.1 %
HGB BLD-MCNC: 14.7 G/DL
MCHC RBC-ENTMCNC: 27.3 PG
MCHC RBC-ENTMCNC: 32.6 GM/DL
MCV RBC AUTO: 83.8 FL
PLATELET # BLD AUTO: 283 K/UL
RBC # BLD: 5.38 M/UL
RBC # FLD: 16.3 %
WBC # FLD AUTO: 9.09 K/UL

## 2024-10-28 PROCEDURE — 93000 ELECTROCARDIOGRAM COMPLETE: CPT

## 2024-10-28 PROCEDURE — 99214 OFFICE O/P EST MOD 30 MIN: CPT

## 2024-10-29 LAB
ALBUMIN SERPL ELPH-MCNC: 3.7 G/DL
ALP BLD-CCNC: 122 U/L
ALT SERPL-CCNC: 15 U/L
ANION GAP SERPL CALC-SCNC: 14 MMOL/L
AST SERPL-CCNC: 16 U/L
BILIRUB SERPL-MCNC: 0.6 MG/DL
BUN SERPL-MCNC: 19 MG/DL
CALCIUM SERPL-MCNC: 9.4 MG/DL
CHLORIDE SERPL-SCNC: 103 MMOL/L
CO2 SERPL-SCNC: 23 MMOL/L
CREAT SERPL-MCNC: 1.27 MG/DL
EGFR: 59 ML/MIN/1.73M2
GLUCOSE SERPL-MCNC: 256 MG/DL
NT-PROBNP SERPL-MCNC: 9640 PG/ML
POTASSIUM SERPL-SCNC: 5.1 MMOL/L
PROT SERPL-MCNC: 6.4 G/DL
SODIUM SERPL-SCNC: 140 MMOL/L

## 2024-10-31 ENCOUNTER — NON-APPOINTMENT (OUTPATIENT)
Age: 74
End: 2024-10-31

## 2024-11-01 ENCOUNTER — NON-APPOINTMENT (OUTPATIENT)
Age: 74
End: 2024-11-01

## 2024-11-13 ENCOUNTER — NON-APPOINTMENT (OUTPATIENT)
Age: 74
End: 2024-11-13

## 2024-11-14 NOTE — H&P ADULT - PROBLEM SELECTOR PLAN 2
36.2 May continue on Aspirin 81mg.  Confirmed with Yohana Jauregui NP (CTS).    Stop Plavix 7 days prior to procedure.  Last dose 4/1/2024

## 2024-11-26 ENCOUNTER — INPATIENT (INPATIENT)
Facility: HOSPITAL | Age: 74
LOS: 8 days | Discharge: HOME | DRG: 204 | End: 2024-12-05
Attending: INTERNAL MEDICINE | Admitting: INTERNAL MEDICINE
Payer: MEDICARE

## 2024-11-26 VITALS
HEART RATE: 109 BPM | HEIGHT: 66 IN | OXYGEN SATURATION: 97 % | DIASTOLIC BLOOD PRESSURE: 85 MMHG | SYSTOLIC BLOOD PRESSURE: 126 MMHG | RESPIRATION RATE: 18 BRPM | TEMPERATURE: 98 F | WEIGHT: 141.98 LBS

## 2024-11-26 DIAGNOSIS — I10 ESSENTIAL (PRIMARY) HYPERTENSION: ICD-10-CM

## 2024-11-26 DIAGNOSIS — R06.02 SHORTNESS OF BREATH: ICD-10-CM

## 2024-11-26 DIAGNOSIS — Z90.89 ACQUIRED ABSENCE OF OTHER ORGANS: Chronic | ICD-10-CM

## 2024-11-26 DIAGNOSIS — E11.9 TYPE 2 DIABETES MELLITUS WITHOUT COMPLICATIONS: ICD-10-CM

## 2024-11-26 DIAGNOSIS — I25.10 ATHEROSCLEROTIC HEART DISEASE OF NATIVE CORONARY ARTERY WITHOUT ANGINA PECTORIS: ICD-10-CM

## 2024-11-26 DIAGNOSIS — J18.9 PNEUMONIA, UNSPECIFIED ORGANISM: ICD-10-CM

## 2024-11-26 DIAGNOSIS — K40.20 BILATERAL INGUINAL HERNIA, WITHOUT OBSTRUCTION OR GANGRENE, NOT SPECIFIED AS RECURRENT: Chronic | ICD-10-CM

## 2024-11-26 DIAGNOSIS — I50.9 HEART FAILURE, UNSPECIFIED: ICD-10-CM

## 2024-11-26 DIAGNOSIS — E78.5 HYPERLIPIDEMIA, UNSPECIFIED: ICD-10-CM

## 2024-11-26 DIAGNOSIS — Z98.89 OTHER SPECIFIED POSTPROCEDURAL STATES: Chronic | ICD-10-CM

## 2024-11-26 DIAGNOSIS — Z29.9 ENCOUNTER FOR PROPHYLACTIC MEASURES, UNSPECIFIED: ICD-10-CM

## 2024-11-26 LAB
ALBUMIN SERPL ELPH-MCNC: 2.8 G/DL — LOW (ref 3.5–5)
ALP SERPL-CCNC: 178 U/L — HIGH (ref 40–120)
ALT FLD-CCNC: 48 U/L DA — SIGNIFICANT CHANGE UP (ref 10–60)
ANION GAP SERPL CALC-SCNC: 10 MMOL/L — SIGNIFICANT CHANGE UP (ref 5–17)
APPEARANCE UR: CLEAR — SIGNIFICANT CHANGE UP
APTT BLD: 29.8 SEC — SIGNIFICANT CHANGE UP (ref 24.5–35.6)
AST SERPL-CCNC: 24 U/L — SIGNIFICANT CHANGE UP (ref 10–40)
BASOPHILS # BLD AUTO: 0.03 K/UL — SIGNIFICANT CHANGE UP (ref 0–0.2)
BASOPHILS NFR BLD AUTO: 0.2 % — SIGNIFICANT CHANGE UP (ref 0–2)
BILIRUB SERPL-MCNC: 0.6 MG/DL — SIGNIFICANT CHANGE UP (ref 0.2–1.2)
BILIRUB UR-MCNC: NEGATIVE — SIGNIFICANT CHANGE UP
BUN SERPL-MCNC: 18 MG/DL — SIGNIFICANT CHANGE UP (ref 7–18)
CALCIUM SERPL-MCNC: 8.9 MG/DL — SIGNIFICANT CHANGE UP (ref 8.4–10.5)
CHLORIDE SERPL-SCNC: 109 MMOL/L — HIGH (ref 96–108)
CO2 SERPL-SCNC: 19 MMOL/L — LOW (ref 22–31)
COLOR SPEC: YELLOW — SIGNIFICANT CHANGE UP
CREAT SERPL-MCNC: 1.2 MG/DL — SIGNIFICANT CHANGE UP (ref 0.5–1.3)
DIFF PNL FLD: NEGATIVE — SIGNIFICANT CHANGE UP
EGFR: 63 ML/MIN/1.73M2 — SIGNIFICANT CHANGE UP
EOSINOPHIL # BLD AUTO: 0.01 K/UL — SIGNIFICANT CHANGE UP (ref 0–0.5)
EOSINOPHIL NFR BLD AUTO: 0.1 % — SIGNIFICANT CHANGE UP (ref 0–6)
FLUAV AG NPH QL: SIGNIFICANT CHANGE UP
FLUBV AG NPH QL: SIGNIFICANT CHANGE UP
GLUCOSE BLDC GLUCOMTR-MCNC: 284 MG/DL — HIGH (ref 70–99)
GLUCOSE BLDC GLUCOMTR-MCNC: 320 MG/DL — HIGH (ref 70–99)
GLUCOSE SERPL-MCNC: 276 MG/DL — HIGH (ref 70–99)
GLUCOSE UR QL: >=1000 MG/DL
HCT VFR BLD CALC: 41.7 % — SIGNIFICANT CHANGE UP (ref 39–50)
HGB BLD-MCNC: 13.7 G/DL — SIGNIFICANT CHANGE UP (ref 13–17)
IMM GRANULOCYTES NFR BLD AUTO: 0.3 % — SIGNIFICANT CHANGE UP (ref 0–0.9)
INR BLD: 1.22 RATIO — HIGH (ref 0.85–1.16)
KETONES UR-MCNC: NEGATIVE MG/DL — SIGNIFICANT CHANGE UP
LACTATE SERPL-SCNC: 1.7 MMOL/L — SIGNIFICANT CHANGE UP (ref 0.7–2)
LEUKOCYTE ESTERASE UR-ACNC: NEGATIVE — SIGNIFICANT CHANGE UP
LYMPHOCYTES # BLD AUTO: 0.81 K/UL — LOW (ref 1–3.3)
LYMPHOCYTES # BLD AUTO: 6.6 % — LOW (ref 13–44)
MCHC RBC-ENTMCNC: 27.3 PG — SIGNIFICANT CHANGE UP (ref 27–34)
MCHC RBC-ENTMCNC: 32.9 G/DL — SIGNIFICANT CHANGE UP (ref 32–36)
MCV RBC AUTO: 83.2 FL — SIGNIFICANT CHANGE UP (ref 80–100)
MONOCYTES # BLD AUTO: 0.75 K/UL — SIGNIFICANT CHANGE UP (ref 0–0.9)
MONOCYTES NFR BLD AUTO: 6.1 % — SIGNIFICANT CHANGE UP (ref 2–14)
NEUTROPHILS # BLD AUTO: 10.72 K/UL — HIGH (ref 1.8–7.4)
NEUTROPHILS NFR BLD AUTO: 86.7 % — HIGH (ref 43–77)
NITRITE UR-MCNC: NEGATIVE — SIGNIFICANT CHANGE UP
NRBC # BLD: 0 /100 WBCS — SIGNIFICANT CHANGE UP (ref 0–0)
NT-PROBNP SERPL-SCNC: HIGH PG/ML (ref 0–450)
PH UR: 5 — SIGNIFICANT CHANGE UP (ref 5–8)
PLATELET # BLD AUTO: 367 K/UL — SIGNIFICANT CHANGE UP (ref 150–400)
POTASSIUM SERPL-MCNC: 5 MMOL/L — SIGNIFICANT CHANGE UP (ref 3.5–5.3)
POTASSIUM SERPL-SCNC: 5 MMOL/L — SIGNIFICANT CHANGE UP (ref 3.5–5.3)
PROT SERPL-MCNC: 6.4 G/DL — SIGNIFICANT CHANGE UP (ref 6–8.3)
PROT UR-MCNC: NEGATIVE MG/DL — SIGNIFICANT CHANGE UP
PROTHROM AB SERPL-ACNC: 14.2 SEC — HIGH (ref 9.9–13.4)
RBC # BLD: 5.01 M/UL — SIGNIFICANT CHANGE UP (ref 4.2–5.8)
RBC # FLD: 14.9 % — HIGH (ref 10.3–14.5)
RSV RNA NPH QL NAA+NON-PROBE: SIGNIFICANT CHANGE UP
SARS-COV-2 RNA SPEC QL NAA+PROBE: SIGNIFICANT CHANGE UP
SODIUM SERPL-SCNC: 138 MMOL/L — SIGNIFICANT CHANGE UP (ref 135–145)
SP GR SPEC: 1.01 — SIGNIFICANT CHANGE UP (ref 1–1.03)
TROPONIN I, HIGH SENSITIVITY RESULT: 24.6 NG/L — SIGNIFICANT CHANGE UP
TROPONIN I, HIGH SENSITIVITY RESULT: 27.7 NG/L — SIGNIFICANT CHANGE UP
UROBILINOGEN FLD QL: 0.2 MG/DL — SIGNIFICANT CHANGE UP (ref 0.2–1)
WBC # BLD: 12.36 K/UL — HIGH (ref 3.8–10.5)
WBC # FLD AUTO: 12.36 K/UL — HIGH (ref 3.8–10.5)

## 2024-11-26 PROCEDURE — 97116 GAIT TRAINING THERAPY: CPT

## 2024-11-26 PROCEDURE — 71275 CT ANGIOGRAPHY CHEST: CPT | Mod: MC

## 2024-11-26 PROCEDURE — 99285 EMERGENCY DEPT VISIT HI MDM: CPT

## 2024-11-26 PROCEDURE — 82803 BLOOD GASES ANY COMBINATION: CPT

## 2024-11-26 PROCEDURE — 0241U: CPT

## 2024-11-26 PROCEDURE — 83605 ASSAY OF LACTIC ACID: CPT

## 2024-11-26 PROCEDURE — 97162 PT EVAL MOD COMPLEX 30 MIN: CPT

## 2024-11-26 PROCEDURE — 83880 ASSAY OF NATRIURETIC PEPTIDE: CPT

## 2024-11-26 PROCEDURE — 84145 PROCALCITONIN (PCT): CPT

## 2024-11-26 PROCEDURE — 85014 HEMATOCRIT: CPT

## 2024-11-26 PROCEDURE — 97110 THERAPEUTIC EXERCISES: CPT

## 2024-11-26 PROCEDURE — 83735 ASSAY OF MAGNESIUM: CPT

## 2024-11-26 PROCEDURE — 84295 ASSAY OF SERUM SODIUM: CPT

## 2024-11-26 PROCEDURE — 71045 X-RAY EXAM CHEST 1 VIEW: CPT | Mod: 26

## 2024-11-26 PROCEDURE — 85025 COMPLETE CBC W/AUTO DIFF WBC: CPT

## 2024-11-26 PROCEDURE — 85018 HEMOGLOBIN: CPT

## 2024-11-26 PROCEDURE — 83036 HEMOGLOBIN GLYCOSYLATED A1C: CPT

## 2024-11-26 PROCEDURE — 76376 3D RENDER W/INTRP POSTPROCES: CPT

## 2024-11-26 PROCEDURE — 82962 GLUCOSE BLOOD TEST: CPT

## 2024-11-26 PROCEDURE — 87899 AGENT NOS ASSAY W/OPTIC: CPT

## 2024-11-26 PROCEDURE — 80048 BASIC METABOLIC PNL TOTAL CA: CPT

## 2024-11-26 PROCEDURE — 85027 COMPLETE CBC AUTOMATED: CPT

## 2024-11-26 PROCEDURE — 84100 ASSAY OF PHOSPHORUS: CPT

## 2024-11-26 PROCEDURE — 82435 ASSAY OF BLOOD CHLORIDE: CPT

## 2024-11-26 PROCEDURE — 84484 ASSAY OF TROPONIN QUANT: CPT

## 2024-11-26 PROCEDURE — 82330 ASSAY OF CALCIUM: CPT

## 2024-11-26 PROCEDURE — 87637 SARSCOV2&INF A&B&RSV AMP PRB: CPT

## 2024-11-26 PROCEDURE — 87449 NOS EACH ORGANISM AG IA: CPT

## 2024-11-26 PROCEDURE — 74174 CTA ABD&PLVS W/CONTRAST: CPT | Mod: MC

## 2024-11-26 PROCEDURE — 96375 TX/PRO/DX INJ NEW DRUG ADDON: CPT

## 2024-11-26 PROCEDURE — 96374 THER/PROPH/DIAG INJ IV PUSH: CPT

## 2024-11-26 PROCEDURE — 71045 X-RAY EXAM CHEST 1 VIEW: CPT

## 2024-11-26 PROCEDURE — 80053 COMPREHEN METABOLIC PANEL: CPT

## 2024-11-26 PROCEDURE — C8929: CPT

## 2024-11-26 PROCEDURE — 84132 ASSAY OF SERUM POTASSIUM: CPT

## 2024-11-26 PROCEDURE — 82947 ASSAY GLUCOSE BLOOD QUANT: CPT

## 2024-11-26 RX ORDER — AZITHROMYCIN 250 MG/1
500 TABLET, FILM COATED ORAL ONCE
Refills: 0 | Status: COMPLETED | OUTPATIENT
Start: 2024-11-26 | End: 2024-11-26

## 2024-11-26 RX ORDER — METOPROLOL TARTRATE 100 MG/1
12.5 TABLET, FILM COATED ORAL DAILY
Refills: 0 | Status: DISCONTINUED | OUTPATIENT
Start: 2024-11-26 | End: 2024-11-29

## 2024-11-26 RX ORDER — FUROSEMIDE 40 MG/1
40 TABLET ORAL EVERY 12 HOURS
Refills: 0 | Status: DISCONTINUED | OUTPATIENT
Start: 2024-11-26 | End: 2024-11-29

## 2024-11-26 RX ORDER — ACETAMINOPHEN 500MG 500 MG/1
650 TABLET, COATED ORAL EVERY 6 HOURS
Refills: 0 | Status: DISCONTINUED | OUTPATIENT
Start: 2024-11-26 | End: 2024-12-05

## 2024-11-26 RX ORDER — ALBUTEROL 90 MCG
2.5 AEROSOL (GRAM) INHALATION ONCE
Refills: 0 | Status: COMPLETED | OUTPATIENT
Start: 2024-11-26 | End: 2024-11-26

## 2024-11-26 RX ORDER — CYANOCOBALAMIN/FOLIC AC/VIT B6 1-2.2-25MG
1 TABLET ORAL DAILY
Refills: 0 | Status: DISCONTINUED | OUTPATIENT
Start: 2024-11-26 | End: 2024-12-05

## 2024-11-26 RX ORDER — AZITHROMYCIN 250 MG/1
500 TABLET, FILM COATED ORAL EVERY 24 HOURS
Refills: 0 | Status: COMPLETED | OUTPATIENT
Start: 2024-11-26 | End: 2024-11-29

## 2024-11-26 RX ORDER — FUROSEMIDE 40 MG/1
40 TABLET ORAL ONCE
Refills: 0 | Status: COMPLETED | OUTPATIENT
Start: 2024-11-26 | End: 2024-11-26

## 2024-11-26 RX ORDER — METOPROLOL TARTRATE 100 MG/1
25 TABLET, FILM COATED ORAL DAILY
Refills: 0 | Status: DISCONTINUED | OUTPATIENT
Start: 2024-11-26 | End: 2024-11-26

## 2024-11-26 RX ORDER — AMIODARONE HYDROCHLORIDE 200 MG/1
200 TABLET ORAL DAILY
Refills: 0 | Status: DISCONTINUED | OUTPATIENT
Start: 2024-11-27 | End: 2024-11-29

## 2024-11-26 RX ORDER — GUAIFENESIN 400 MG
600 TABLET ORAL EVERY 12 HOURS
Refills: 0 | Status: DISCONTINUED | OUTPATIENT
Start: 2024-11-26 | End: 2024-12-05

## 2024-11-26 RX ORDER — SACUBITRIL AND VALSARTAN 24; 26 MG/1; MG/1
1 TABLET, FILM COATED ORAL
Refills: 0 | Status: DISCONTINUED | OUTPATIENT
Start: 2024-11-26 | End: 2024-11-26

## 2024-11-26 RX ORDER — CEFEPIME 2 G/1
2000 INJECTION, POWDER, FOR SOLUTION INTRAVENOUS EVERY 12 HOURS
Refills: 0 | Status: DISCONTINUED | OUTPATIENT
Start: 2024-11-27 | End: 2024-12-02

## 2024-11-26 RX ORDER — CEFTRIAXONE SODIUM 1 G
1000 VIAL (EA) INJECTION EVERY 24 HOURS
Refills: 0 | Status: DISCONTINUED | OUTPATIENT
Start: 2024-11-26 | End: 2024-11-26

## 2024-11-26 RX ORDER — ENOXAPARIN SODIUM 30 MG/.3ML
40 INJECTION SUBCUTANEOUS EVERY 24 HOURS
Refills: 0 | Status: DISCONTINUED | OUTPATIENT
Start: 2024-11-26 | End: 2024-12-05

## 2024-11-26 RX ORDER — CEFTRIAXONE SODIUM 1 G
1000 VIAL (EA) INJECTION ONCE
Refills: 0 | Status: COMPLETED | OUTPATIENT
Start: 2024-11-26 | End: 2024-11-26

## 2024-11-26 RX ORDER — CEFEPIME 2 G/1
INJECTION, POWDER, FOR SOLUTION INTRAVENOUS
Refills: 0 | Status: DISCONTINUED | OUTPATIENT
Start: 2024-11-26 | End: 2024-11-26

## 2024-11-26 RX ORDER — CLOPIDOGREL 75 MG/1
75 TABLET, FILM COATED ORAL DAILY
Refills: 0 | Status: DISCONTINUED | OUTPATIENT
Start: 2024-11-26 | End: 2024-12-05

## 2024-11-26 RX ORDER — CEFEPIME 2 G/1
INJECTION, POWDER, FOR SOLUTION INTRAVENOUS
Refills: 0 | Status: DISCONTINUED | OUTPATIENT
Start: 2024-11-26 | End: 2024-12-02

## 2024-11-26 RX ORDER — TAMSULOSIN HYDROCHLORIDE 0.4 MG/1
0.4 CAPSULE ORAL AT BEDTIME
Refills: 0 | Status: DISCONTINUED | OUTPATIENT
Start: 2024-11-26 | End: 2024-12-05

## 2024-11-26 RX ORDER — CEFEPIME 2 G/1
2000 INJECTION, POWDER, FOR SOLUTION INTRAVENOUS ONCE
Refills: 0 | Status: COMPLETED | OUTPATIENT
Start: 2024-11-26 | End: 2024-11-26

## 2024-11-26 RX ADMIN — Medication 2.5 MILLIGRAM(S): at 14:59

## 2024-11-26 RX ADMIN — Medication 600 MILLIGRAM(S): at 22:58

## 2024-11-26 RX ADMIN — Medication 4: at 20:52

## 2024-11-26 RX ADMIN — Medication 100 MILLIGRAM(S): at 17:04

## 2024-11-26 RX ADMIN — ENOXAPARIN SODIUM 40 MILLIGRAM(S): 30 INJECTION SUBCUTANEOUS at 22:58

## 2024-11-26 RX ADMIN — FUROSEMIDE 40 MILLIGRAM(S): 40 TABLET ORAL at 16:19

## 2024-11-26 RX ADMIN — CEFEPIME 100 MILLIGRAM(S): 2 INJECTION, POWDER, FOR SOLUTION INTRAVENOUS at 22:59

## 2024-11-26 RX ADMIN — Medication 80 MILLIGRAM(S): at 20:52

## 2024-11-26 RX ADMIN — AZITHROMYCIN 255 MILLIGRAM(S): 250 TABLET, FILM COATED ORAL at 18:43

## 2024-11-26 RX ADMIN — TAMSULOSIN HYDROCHLORIDE 0.4 MILLIGRAM(S): 0.4 CAPSULE ORAL at 20:52

## 2024-11-26 NOTE — ED ADULT NURSE NOTE - NS ED NURSE TRANSPORT WITH
Cardiac Monitor/Defib/ACLS/Rescue Kit/O2/BVM/oxygen Cardiac Monitor/Defib/ACLS/Rescue Kit/O2/BVM/oxygen/ACLS Rescue Kit

## 2024-11-26 NOTE — ED PROVIDER NOTE - CLINICAL SUMMARY MEDICAL DECISION MAKING FREE TEXT BOX
patient presenting with cough shortness of breath saturation stable but noting desaturation to the high 80s with exertion and lying flat clinically likely pneumonia versus fluid overload obtain lab x-ray symptomatic treatment and reassess

## 2024-11-26 NOTE — H&P ADULT - PROBLEM SELECTOR PLAN 2
p/w HR >90, WBC>12  -CXR: Significant bibasilar infiltrates improved on the right but increased on the left  -s/p  ceftriaxone, azithromycin in ED  -IVF boluses held in s/o acute CHF and signs of fluid overload   -c/w ceftriaxone 1g IV and  azithromycin 500mg IV qd  -f/u lactate   -f/u BCx,, UCx  -ID consult:  p/w sob + LE edema x 4 days  CXR shows Significant bibasilar infiltrates improved on the right but increased on the left.  BNP 06822   takes metoprolol, entresto, lasix and amiodarone for Afib ppx  c/w home metoprolol and entresto  Start IV lasix 40mg IV BID  f/u echo  Remote tele  Troponin 27.7-->24.6  f/u repeat troponin  daily weights, strict I&Os  Cardio Consulted: Dr. Ward p/w sob and  3+ pitting LE edema x 4 days  -CXR shows Significant bibasilar infiltrates improved on the right but increased on the left.  -BNP 21188 -- (8193 on 10/8)  -takes metoprolol, entresto, farxiga, lasix and amiodarone for Afib ppx  -TTE 10/8:  EF 20-25%  -c/w home metoprolol   -hold entresto in s/o sepsis  -Start IV lasix 40mg IV BID  -f/u echo  -Remote tele  -Troponin 27.7-->24.6  -f/u repeat troponin  -daily weights, strict I&Os  -Cardio Consulted: Dr. Ward

## 2024-11-26 NOTE — H&P ADULT - NSHPSOCIALHISTORY_GEN_ALL_CORE
, lives at home with wife and 4 kids, retired. 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

## 2024-11-26 NOTE — ED ADULT NURSE NOTE - ED STAT RN HANDOFF DETAILS
PT received A&Ox4, resting in bed.  Denies CP, at this time does not feel short of breath.  Normal sinus on telemetry, 2L on NC.  No apparent signs of distress noted.

## 2024-11-26 NOTE — ED ADULT NURSE NOTE - OBJECTIVE STATEMENT
73 y/o male BIBA, Pt c/o coughing x 5 wks and diff breathing x 5 days.  Pt was treated for pneumonia 3 week ago. Pt on 2liter oxygen via NC. PT denies chest pain, nausea, vomiting, dizziness, fever, chills.

## 2024-11-26 NOTE — ED PROVIDER NOTE - OBJECTIVE STATEMENT
74 -year-old presenting for difficulty breathing for the past 4 days preceded by cough for the past 4 to 5 weeks patient was discharged in the past month for pneumonia endorses still feeling the current way in the past weeks but started feeling more short of breath in the past several days denies any nausea vomiting fever abdominal pain travel

## 2024-11-26 NOTE — ED PROVIDER NOTE - CONSTITUTIONAL, MLM
normal... Well appearing, awake, alert, oriented to person, place, time/situation and in no apparent distress. Unna Boot Text: An Unna boot was placed to help immobilize the limb and facilitate more rapid healing.

## 2024-11-26 NOTE — H&P ADULT - PROBLEM SELECTOR PLAN 3
h/o DM  -hold oral dm meds  -f/u A1c  -start moderate sliding scale  -Adjust insulin as indicated  -FS ACHS - h/o DM  -hold oral dm meds  -f/u A1c  -start moderate sliding scale  -Adjust insulin as indicated  -FS ACHS h/o DM linagliptin and faxiga   -hold oral dm meds  -f/u A1c  -start moderate sliding scale  -Adjust insulin as indicated  -FS ACHS

## 2024-11-26 NOTE — ED PROVIDER NOTE - PHYSICAL EXAMINATION
crackles noted bilateral lung bases no increased work of breathing speaking full sentences airway intact

## 2024-11-26 NOTE — H&P ADULT - ATTENDING COMMENTS
74y M with PMHx of CVA, MI, HTN, T2DM, HLD, CAD s/p cardiac stents, HFrEF (EF 20-25% 10/2024), hx of malignant melanoma/wide excision, with recent elective bioprosthetic aortic valve replacement and ascending aortic aneurysm repair with transverse hemiarch and CABG x2 (4/8/24) with post op course complicated by cardiogenic shock requiring inotropic support with IV dobutamine and milrinone, IABP and also on amiodarone for atrial fibrillation prophylaxis presenting with difficulty breathing for the past 4 days preceded by cough for the past 4 to 5 weeks. Patient was recently admitted to [facility] 10/5-10/8 for pneumonia. Patient reports completion of full course of antibiotics prescribed on discharge. Patient states that he developed cough around a month ago that has been persistent and now causing abdominal muscle pain. Patient developed shortness of breath 4 days ago that got progressively worse prompting him to come to the ED today. Since treatment in ED, patient reports improvement of dyspnea but cough is still present but less frequent. Endorses constipation with no BM for last 3 days. Denies fever, chills, headache, dizziness, chest pain, palpitations, nausea, vomiting, diarrhea, and dysuria.    # PROGNOSIS IS GUARDED. CASE D/W PATIENT, ALL QUESTIONS ANSWERED. ALSO DISCUSSED REGARDING GOC WITH PATIENT. PATIENT CONVEYS THAT HE WILL DISCUSS FURTHER WITH HIS FAMILY AND RECONVENE WITH TEAM.       # ACUTE HYPOXIC RESPIRATORY FAILURE MULTIFACTORIAL - SUSPECT S/T PNA + DECOMPENSATED HF  # HX OF HFrEF [20-25%, 10/24], HX OF RECENT ELECTIVE BIOPROSTHETIC AORTIC VALVE REPLACEMENT, ASCENDING AORTIC ANEURYSM REPAIR W/ TRANSVERSE HEMIARCH AND CABG X 2  [4/2024] - C/B POST-OP CARDIOGENIC SHOCK REQUIRING IONOTROPIC SUPPORT, IABP   # HX OF CAD S/P STENT  - TELEMETRY  - CEFEPIME + AZITHROMYCIN, F/U BCX  - ANTITUSSIVE  - CHEST PT  - IV LASIX  - F/U CT CHEST  - ON AMIODARONE  - HOLD ENTRESTO, WILL RESUME IF BP TOLERATES  - CARDIOLOGY CONSULT  - ID CONSULT  - PULMONOLOGY CONSULT    # DM  - SSI + FS  - F/U HBA1C    # BPH  - FLOMAX    # HX OF CVA  # HX OF MI  # HTN  # HLD  # HX OF MALIGNANT MELANOMA/WIDE EXCISION  # GI PPX

## 2024-11-26 NOTE — ED ADULT NURSE NOTE - NSFALLRISKINTERV_ED_ALL_ED

## 2024-11-26 NOTE — H&P ADULT - PROBLEM SELECTOR PLAN 1
p/w sob + LE edema x 4 days  CXR shows Significant bibasilar infiltrates improved on the right but increased on the left.  BNP 40208   takes metoprolol, entresto, lasix  c/w home metoprolol and entresto  Start IV lasix 40mg IV BID  f/u echo  Remote tele  Troponin 27.7  daily weights, strict I&O  Cardio Consulted:  ___ p/w cough and shortness of breath  -CXR: Significant bibasilar infiltrates improved on the right but increased on the left  -, WBC 12.36 meeting sepsis criteria   -s/p  ceftriaxone, azithromycin in ED  -IVF boluses held in s/o acute CHF and signs of fluid overload   -Lactate 1.7  -start cefepime 2g q12 for HCAP (hospitalized 10/5-10/8)  -c/w azithromycin 500mg IV qd for atypical coverage  -f/u BCx,, UCx  -ID consult: Dr. Armenta

## 2024-11-26 NOTE — H&P ADULT - PROBLEM SELECTOR PLAN 4
h/o HTN   -c/w home meds   -monitor BP  -adjust antihypertensive meds as appropriate h/o HTN   -c/w home meds with holding parameters  -monitor BP  -adjust antihypertensive meds as appropriate h/o HTN   -hold home entresto in s/o sepsis  -c/w metoprolol w/ holding parameters  -monitor BP  -adjust antihypertensive meds as appropriate

## 2024-11-26 NOTE — H&P ADULT - ASSESSMENT
74y M with PMHx of CVA, MI, HTN, T2DM, HLD, CAD s/p cardiac stents, CHF, hx of malignant melanoma/wide excision, with recent elective bioprosthetic aortic valve replacement and ascending aortic aneurysm repair with transverse hemiarch and CABG x2 (4/8/24) with post op course complicated by cardiogenic shock requiring inotropic support with IV dobutamine and milrinone, IABP and also on amiodarone for atrial fibrillation prophylaxis presenting with difficulty breathing for the past 4 days preceded by cough for the past 4 to 5 weeks. Admitted to telemetry for acute on chronic CHF and sepsis 2/2 PNA.  74y M with PMHx of CVA, MI, HTN, T2DM, HLD, CAD s/p cardiac stents, HFrEF (EF 20-25% 10/2024), hx of malignant melanoma/wide excision, with recent elective bioprosthetic aortic valve replacement and ascending aortic aneurysm repair with transverse hemiarch and CABG x2 (4/8/24) with post op course complicated by cardiogenic shock requiring inotropic support with IV dobutamine and milrinone, IABP and also on amiodarone for atrial fibrillation prophylaxis presenting with difficulty breathing for the past 4 days preceded by cough for the past 4 to 5 weeks. Admitted to telemetry for acute on chronic CHF and sepsis 2/2 PNA.       PRIMARY TEAM TO PLEASE CONFIRM MED REC IN AM, CURRENT MEDS CONTINUED BASED ON PREVIOUS ADMISSION D/C 10/8

## 2024-11-26 NOTE — H&P ADULT - NSHPPHYSICALEXAM_GEN_ALL_CORE
LOS:     VITALS:   T(C): 36.6 (11-26-24 @ 14:07), Max: 36.6 (11-26-24 @ 14:07)  HR: 110 (11-26-24 @ 14:20) (109 - 110)  BP: 126/85 (11-26-24 @ 14:07) (126/85 - 126/85)  RR: 18 (11-26-24 @ 14:07) (18 - 18)  SpO2: 97% (11-26-24 @ 14:07) (97% - 97%)    GENERAL: NAD, lying in bed comfortably  HEAD:  Atraumatic, Normocephalic  EYES: EOMI, PERRLA, conjunctiva and sclera clear  ENT: Moist mucous membranes  NECK: Supple, No JVD  CHEST/LUNG: Clear to auscultation bilaterally; No rales, rhonchi, wheezing, or rubs. Unlabored respirations  HEART: Regular rate and rhythm; No murmurs, rubs, or gallops  ABDOMEN: BSx4; Soft, nontender, nondistended  EXTREMITIES:  2+ Peripheral Pulses, brisk capillary refill. No clubbing, cyanosis, or edema  NERVOUS SYSTEM:  A&Ox3, no focal deficits   SKIN: No rashes or lesions LOS:     VITALS:   T(C): 36.6 (11-26-24 @ 14:07), Max: 36.6 (11-26-24 @ 14:07)  HR: 110 (11-26-24 @ 14:20) (109 - 110)  BP: 126/85 (11-26-24 @ 14:07) (126/85 - 126/85)  RR: 18 (11-26-24 @ 14:07) (18 - 18)  SpO2: 97% (11-26-24 @ 14:07) (97% - 97%)    GENERAL: NAD, lying in bed comfortably, flat affect  HEAD:  Atraumatic, Normocephalic  EYES: EOMI, PERRLA, conjunctiva and sclera clear  ENT: Dry mucous membranes  NECK: Supple, No JVD  CHEST/LUNG: grossly CTA b/l with diminished bibasilar breath sounds; No rales, rhonchi, wheezing, or rubs. Unlabored respirations  HEART: Regular rate and rhythm; No murmurs, rubs, or gallops  ABDOMEN: BSx4; Soft, nontender, nondistended  EXTREMITIES:  2+ Peripheral Pulses, brisk capillary refill. No clubbing, cyanosis, 3+ pitting edema b/l LE  NERVOUS SYSTEM:  A&Ox3, no focal deficits   SKIN: No rashes or lesions

## 2024-11-26 NOTE — H&P ADULT - HISTORY OF PRESENT ILLNESS
74y M with PMHx of CVA, MI, HTN, T2DM, HLD, CAD s/p cardiac stents, CHF, hx of malignant melanoma/wide excision, with recent elective bioprosthetic aortic valve replacement and ascending aortic aneurysm repair with transverse hemiarch and CABG x2 (4/8/24) with post op course complicated by cardiogenic shock requiring inotropic support with IV dobutamine and milrinone, IABP and also on amiodarone for atrial fibrillation prophylaxis presenting with difficulty breathing for the past 4 days preceded by cough for the past 4 to 5 weeks.   74y M with PMHx of CVA, MI, HTN, T2DM, HLD, CAD s/p cardiac stents, HFrEF (EF 20-25% 10/2024), hx of malignant melanoma/wide excision, with recent elective bioprosthetic aortic valve replacement and ascending aortic aneurysm repair with transverse hemiarch and CABG x2 (4/8/24) with post op course complicated by cardiogenic shock requiring inotropic support with IV dobutamine and milrinone, IABP and also on amiodarone for atrial fibrillation prophylaxis presenting with difficulty breathing for the past 4 days preceded by cough for the past 4 to 5 weeks. Patient was recently admitted to [facility] 10/5-10/8 for pneumonia. Patient reports completion of full course of antibiotics prescribed on discharge. Patient states that he developed cough around a month ago that has been persistent and now causing abdominal muscle pain. Patient developed shortness of breath 4 days ago that got progressively worse prompting him to come to the ED today. Since treatment in ED, patient reports improvement of dyspnea but cough is still present but less frequent. Endorses constipation with no BM for last 3 days. Denies fever, chills, headache, dizziness, chest pain, palpitations, nausea, vomiting, diarrhea, and dysuria.

## 2024-11-27 LAB
A1C WITH ESTIMATED AVERAGE GLUCOSE RESULT: 10.2 % — HIGH (ref 4–5.6)
ALBUMIN SERPL ELPH-MCNC: 2.4 G/DL — LOW (ref 3.5–5)
ALP SERPL-CCNC: 131 U/L — HIGH (ref 40–120)
ALT FLD-CCNC: 32 U/L DA — SIGNIFICANT CHANGE UP (ref 10–60)
ANION GAP SERPL CALC-SCNC: 6 MMOL/L — SIGNIFICANT CHANGE UP (ref 5–17)
AST SERPL-CCNC: 14 U/L — SIGNIFICANT CHANGE UP (ref 10–40)
BASOPHILS # BLD AUTO: 0.03 K/UL — SIGNIFICANT CHANGE UP (ref 0–0.2)
BASOPHILS NFR BLD AUTO: 0.4 % — SIGNIFICANT CHANGE UP (ref 0–2)
BILIRUB SERPL-MCNC: 0.8 MG/DL — SIGNIFICANT CHANGE UP (ref 0.2–1.2)
BUN SERPL-MCNC: 19 MG/DL — HIGH (ref 7–18)
CALCIUM SERPL-MCNC: 8.5 MG/DL — SIGNIFICANT CHANGE UP (ref 8.4–10.5)
CHLORIDE SERPL-SCNC: 109 MMOL/L — HIGH (ref 96–108)
CHOLEST SERPL-MCNC: 188 MG/DL — SIGNIFICANT CHANGE UP
CO2 SERPL-SCNC: 27 MMOL/L — SIGNIFICANT CHANGE UP (ref 22–31)
CREAT SERPL-MCNC: 1.11 MG/DL — SIGNIFICANT CHANGE UP (ref 0.5–1.3)
EGFR: 69 ML/MIN/1.73M2 — SIGNIFICANT CHANGE UP
EOSINOPHIL # BLD AUTO: 0.02 K/UL — SIGNIFICANT CHANGE UP (ref 0–0.5)
EOSINOPHIL NFR BLD AUTO: 0.3 % — SIGNIFICANT CHANGE UP (ref 0–6)
ESTIMATED AVERAGE GLUCOSE: 246 MG/DL — HIGH (ref 68–114)
GLUCOSE BLDC GLUCOMTR-MCNC: 147 MG/DL — HIGH (ref 70–99)
GLUCOSE BLDC GLUCOMTR-MCNC: 174 MG/DL — HIGH (ref 70–99)
GLUCOSE BLDC GLUCOMTR-MCNC: 219 MG/DL — HIGH (ref 70–99)
GLUCOSE SERPL-MCNC: 139 MG/DL — HIGH (ref 70–99)
HCT VFR BLD CALC: 37.4 % — LOW (ref 39–50)
HDLC SERPL-MCNC: 45 MG/DL — SIGNIFICANT CHANGE UP
HGB BLD-MCNC: 12.5 G/DL — LOW (ref 13–17)
IMM GRANULOCYTES NFR BLD AUTO: 0.4 % — SIGNIFICANT CHANGE UP (ref 0–0.9)
LEGIONELLA AG UR QL: NEGATIVE — SIGNIFICANT CHANGE UP
LIPID PNL WITH DIRECT LDL SERPL: 128 MG/DL — HIGH
LYMPHOCYTES # BLD AUTO: 1.35 K/UL — SIGNIFICANT CHANGE UP (ref 1–3.3)
LYMPHOCYTES # BLD AUTO: 18.1 % — SIGNIFICANT CHANGE UP (ref 13–44)
MAGNESIUM SERPL-MCNC: 2.2 MG/DL — SIGNIFICANT CHANGE UP (ref 1.6–2.6)
MCHC RBC-ENTMCNC: 27.5 PG — SIGNIFICANT CHANGE UP (ref 27–34)
MCHC RBC-ENTMCNC: 33.4 G/DL — SIGNIFICANT CHANGE UP (ref 32–36)
MCV RBC AUTO: 82.4 FL — SIGNIFICANT CHANGE UP (ref 80–100)
MONOCYTES # BLD AUTO: 0.67 K/UL — SIGNIFICANT CHANGE UP (ref 0–0.9)
MONOCYTES NFR BLD AUTO: 9 % — SIGNIFICANT CHANGE UP (ref 2–14)
NEUTROPHILS # BLD AUTO: 5.35 K/UL — SIGNIFICANT CHANGE UP (ref 1.8–7.4)
NEUTROPHILS NFR BLD AUTO: 71.8 % — SIGNIFICANT CHANGE UP (ref 43–77)
NON HDL CHOLESTEROL: 143 MG/DL — HIGH
NRBC # BLD: 0 /100 WBCS — SIGNIFICANT CHANGE UP (ref 0–0)
PHOSPHATE SERPL-MCNC: 3.4 MG/DL — SIGNIFICANT CHANGE UP (ref 2.5–4.5)
PLATELET # BLD AUTO: 331 K/UL — SIGNIFICANT CHANGE UP (ref 150–400)
POTASSIUM SERPL-MCNC: 3.7 MMOL/L — SIGNIFICANT CHANGE UP (ref 3.5–5.3)
POTASSIUM SERPL-SCNC: 3.7 MMOL/L — SIGNIFICANT CHANGE UP (ref 3.5–5.3)
PROT SERPL-MCNC: 5.6 G/DL — LOW (ref 6–8.3)
RBC # BLD: 4.54 M/UL — SIGNIFICANT CHANGE UP (ref 4.2–5.8)
RBC # FLD: 14.7 % — HIGH (ref 10.3–14.5)
S PNEUM AG UR QL: NEGATIVE — SIGNIFICANT CHANGE UP
SODIUM SERPL-SCNC: 142 MMOL/L — SIGNIFICANT CHANGE UP (ref 135–145)
TRIGL SERPL-MCNC: 83 MG/DL — SIGNIFICANT CHANGE UP
TSH SERPL-MCNC: 0.86 UU/ML — SIGNIFICANT CHANGE UP (ref 0.34–4.82)
WBC # BLD: 7.45 K/UL — SIGNIFICANT CHANGE UP (ref 3.8–10.5)
WBC # FLD AUTO: 7.45 K/UL — SIGNIFICANT CHANGE UP (ref 3.8–10.5)

## 2024-11-27 PROCEDURE — 99222 1ST HOSP IP/OBS MODERATE 55: CPT | Mod: 25

## 2024-11-27 PROCEDURE — 71250 CT THORAX DX C-: CPT | Mod: 26

## 2024-11-27 RX ORDER — ALBUTEROL 90 MCG
2 AEROSOL (GRAM) INHALATION EVERY 6 HOURS
Refills: 0 | Status: DISCONTINUED | OUTPATIENT
Start: 2024-11-27 | End: 2024-11-28

## 2024-11-27 RX ORDER — FLUTICASONE PROPIONATE 50 MCG
2 SPRAY, SUSPENSION NASAL
Refills: 0 | Status: DISCONTINUED | OUTPATIENT
Start: 2024-11-27 | End: 2024-12-05

## 2024-11-27 RX ORDER — DIPHENHYDRAMINE HCL 25 MG
12.5 CAPSULE ORAL ONCE
Refills: 0 | Status: COMPLETED | OUTPATIENT
Start: 2024-11-27 | End: 2024-11-27

## 2024-11-27 RX ADMIN — Medication 4: at 12:00

## 2024-11-27 RX ADMIN — CEFEPIME 100 MILLIGRAM(S): 2 INJECTION, POWDER, FOR SOLUTION INTRAVENOUS at 17:12

## 2024-11-27 RX ADMIN — Medication 81 MILLIGRAM(S): at 11:10

## 2024-11-27 RX ADMIN — AMIODARONE HYDROCHLORIDE 200 MILLIGRAM(S): 200 TABLET ORAL at 05:32

## 2024-11-27 RX ADMIN — FUROSEMIDE 40 MILLIGRAM(S): 40 TABLET ORAL at 05:32

## 2024-11-27 RX ADMIN — Medication 2: at 17:06

## 2024-11-27 RX ADMIN — Medication 600 MILLIGRAM(S): at 04:52

## 2024-11-27 RX ADMIN — CEFEPIME 100 MILLIGRAM(S): 2 INJECTION, POWDER, FOR SOLUTION INTRAVENOUS at 05:32

## 2024-11-27 RX ADMIN — Medication 600 MILLIGRAM(S): at 17:12

## 2024-11-27 RX ADMIN — CLOPIDOGREL 75 MILLIGRAM(S): 75 TABLET, FILM COATED ORAL at 11:10

## 2024-11-27 RX ADMIN — Medication 80 MILLIGRAM(S): at 22:04

## 2024-11-27 RX ADMIN — Medication 2 PUFF(S): at 23:37

## 2024-11-27 RX ADMIN — FUROSEMIDE 40 MILLIGRAM(S): 40 TABLET ORAL at 17:12

## 2024-11-27 RX ADMIN — TAMSULOSIN HYDROCHLORIDE 0.4 MILLIGRAM(S): 0.4 CAPSULE ORAL at 22:04

## 2024-11-27 RX ADMIN — Medication 12.5 MILLIGRAM(S): at 23:37

## 2024-11-27 RX ADMIN — Medication 2 SPRAY(S): at 23:37

## 2024-11-27 RX ADMIN — ENOXAPARIN SODIUM 40 MILLIGRAM(S): 30 INJECTION SUBCUTANEOUS at 22:04

## 2024-11-27 RX ADMIN — AZITHROMYCIN 255 MILLIGRAM(S): 250 TABLET, FILM COATED ORAL at 17:12

## 2024-11-27 RX ADMIN — Medication 1 TABLET(S): at 11:10

## 2024-11-27 NOTE — DIETITIAN INITIAL EVALUATION ADULT - NS FNS DIET ORDER
Diet, Regular:   Consistent Carbohydrate {No Snacks}  DASH/TLC {Sodium & Cholesterol Restricted} (11-26-24 @ 18:25)

## 2024-11-27 NOTE — CONSULT NOTE ADULT - RESPIRATORY
no wheezes/no rhonchi/breath sounds equal/diminished breath sounds, L/diminished breath sounds, R/rales

## 2024-11-27 NOTE — DIETITIAN INITIAL EVALUATION ADULT - PERTINENT MEDS FT
MEDICATIONS  (STANDING):  aMIOdarone    Tablet 200 milliGRAM(s) Oral daily  aspirin enteric coated 81 milliGRAM(s) Oral daily  atorvastatin 80 milliGRAM(s) Oral at bedtime  azithromycin  IVPB 500 milliGRAM(s) IV Intermittent every 24 hours  cefepime   IVPB      cefepime   IVPB 2000 milliGRAM(s) IV Intermittent every 12 hours  clopidogrel Tablet 75 milliGRAM(s) Oral daily  enoxaparin Injectable 40 milliGRAM(s) SubCutaneous every 24 hours  furosemide   Injectable 40 milliGRAM(s) IV Push every 12 hours  guaiFENesin  milliGRAM(s) Oral every 12 hours  insulin lispro (ADMELOG) corrective regimen sliding scale   SubCutaneous three times a day before meals  insulin lispro (ADMELOG) corrective regimen sliding scale   SubCutaneous at bedtime  metoprolol succinate ER 12.5 milliGRAM(s) Oral daily  multivitamin 1 Tablet(s) Oral daily  tamsulosin 0.4 milliGRAM(s) Oral at bedtime    MEDICATIONS  (PRN):  acetaminophen     Tablet .. 650 milliGRAM(s) Oral every 6 hours PRN Temp greater or equal to 38C (100.4F), Mild Pain (1 - 3)

## 2024-11-27 NOTE — PROGRESS NOTE ADULT - CONVERSATION DETAILS
Spoke with patient and daughter Celine at bedside. Extensive discussion on readmission, previous hospitalization, and current medical status. All questions answered. At this time pt is alert oriented x3, has insight and verbalized understanding on his medical condition(s). Patient wishes to remain Full code and will have further discussion Spoke with patient and daughter Celine at bedside. Extensive discussion on readmission, previous hospitalization, and current medical status. All questions answered. At this time pt is alert oriented x3, has insight and verbalized understanding on his medical condition(s). Patient wishes to remain Full code and will have further discussion with his family. Celine, daughter at bedside in agreement. Patient will have MOLST form filled out once he has discussed with his family.

## 2024-11-27 NOTE — DIETITIAN INITIAL EVALUATION ADULT - PROBLEM SELECTOR PLAN 1
p/w cough and shortness of breath  -CXR: Significant bibasilar infiltrates improved on the right but increased on the left  -, WBC 12.36 meeting sepsis criteria   -s/p  ceftriaxone, azithromycin in ED  -IVF boluses held in s/o acute CHF and signs of fluid overload   -Lactate 1.7  -start cefepime 2g q12 for HCAP (hospitalized 10/5-10/8)  -c/w azithromycin 500mg IV qd for atypical coverage  -f/u BCx,, UCx  -ID consult: Dr. Armenta

## 2024-11-27 NOTE — DIETITIAN INITIAL EVALUATION ADULT - NUTRITION CONSULT
Prior Authorization Approval    Authorization Effective Date: 1/24/2019  Authorization Expiration Date: 5/23/2019  Medication: Entresto 97/103- Approved  Approved Dose/Quantity:   Reference #: [id number]   Insurance Company: [place] Medicaid ([facility]) - Phone [phone] Fax [phone]  Expected CoPay: $0.00     CoPay Card Available:      Foundation Assistance Needed:    Which Pharmacy is filling the prescription (Not needed for infusion/clinic administered): [facility] [place], [place] - [id number]  Pharmacy Notified: Yes  Patient Notified: Yes         yes

## 2024-11-27 NOTE — PROGRESS NOTE ADULT - ASSESSMENT
74y M with PMHx of CVA, MI, HTN, T2DM, HLD, CAD s/p cardiac stents, HFrEF (EF 20-25% 10/2024), hx of malignant melanoma/wide excision, with recent elective bioprosthetic aortic valve replacement and ascending aortic aneurysm repair with transverse hemiarch and CABG x2 (4/8/24) with post op course complicated by cardiogenic shock requiring inotropic support with IV dobutamine and milrinone, IABP and also on amiodarone for atrial fibrillation prophylaxis presenting with difficulty breathing for the past 4 days preceded by cough for the past 4 to 5 weeks. Admitted to telemetry for acute on chronic CHF and sepsis 2/2 PNA.     Pulm  consulted, f/up on recs

## 2024-11-27 NOTE — CONSULT NOTE ADULT - ATTENDING COMMENTS
I agree with the resident progress note/outlined plan of care. I have the edited the above note as needed. IVDA/heroin

## 2024-11-27 NOTE — PATIENT PROFILE ADULT - FALL HARM RISK - HARM RISK INTERVENTIONS

## 2024-11-27 NOTE — DIETITIAN INITIAL EVALUATION ADULT - PROBLEM SELECTOR PLAN 4
h/o HTN   -hold home entresto in s/o sepsis  -c/w metoprolol w/ holding parameters  -monitor BP  -adjust antihypertensive meds as appropriate

## 2024-11-27 NOTE — DIETITIAN INITIAL EVALUATION ADULT - LITERATURE/VIDEOS GIVEN
Reviewed CHF diet education. Discussed sodium restriction, high sodium foods to avoid, reading food labels, tips for limiting sodium in you diet. Reviewed  daily weights, weight gain parameters to contact MD. Discussed sodium intake in relation to fluid retention, edema, and weight gain. Pt verbalized understanding and accepted Heart Failure Nutrition Therapy handout. RD remains available for diet education review. Provided diabetes education.

## 2024-11-27 NOTE — CONSULT NOTE ADULT - SUBJECTIVE AND OBJECTIVE BOX
HPI:  74y M with PMHx of CVA, MI, HTN, T2DM, HLD, CAD s/p cardiac stents, HFrEF (EF 20-25% 10/2024), hx of malignant melanoma/wide excision, with recent elective bioprosthetic aortic valve replacement and ascending aortic aneurysm repair with transverse hemiarch and CABG x2 (4/8/24) with post op course complicated by cardiogenic shock requiring inotropic support with IV dobutamine and milrinone, IABP and also on amiodarone for atrial fibrillation prophylaxis presenting with difficulty breathing for the past 4 days preceded by cough for the past 4 to 5 weeks. Patient was recently admitted to [facility] 10/5-10/8 for pneumonia. Patient reports completion of full course of antibiotics prescribed on discharge. Patient states that he developed cough around a month ago that has been persistent and now causing abdominal muscle pain. Patient developed shortness of breath 4 days ago that got progressively worse prompting him to come to the ED today. Since treatment in ED, patient reports improvement of dyspnea but cough is still present but less frequent. Endorses constipation with no BM for last 3 days. Denies fever, chills, headache, dizziness, chest pain, palpitations, nausea, vomiting, diarrhea, and dysuria.   (26 Nov 2024 18:29)                  PAST MEDICAL & SURGICAL HISTORY:  HTN (hypertension)      Hearing decreased      CVA (cerebral vascular accident)  12/22/2016      Hyperlipemia      Kidney stones      Coronary artery disease  MI 12/22/2016      CHF (congestive heart failure)  1/2017 stents x3      Type 2 diabetes mellitus  2016      Confusion  from stroke      S/P medial meniscal repair  and ligament surgery      H/O lithotripsy      S/P tonsillectomy      Bilateral inguinal hernia          No Known Allergies      Meds:  acetaminophen     Tablet .. 650 milliGRAM(s) Oral every 6 hours PRN  aMIOdarone    Tablet 200 milliGRAM(s) Oral daily  aspirin enteric coated 81 milliGRAM(s) Oral daily  atorvastatin 80 milliGRAM(s) Oral at bedtime  azithromycin  IVPB 500 milliGRAM(s) IV Intermittent every 24 hours  cefepime   IVPB      cefepime   IVPB 2000 milliGRAM(s) IV Intermittent every 12 hours  clopidogrel Tablet 75 milliGRAM(s) Oral daily  enoxaparin Injectable 40 milliGRAM(s) SubCutaneous every 24 hours  furosemide   Injectable 40 milliGRAM(s) IV Push every 12 hours  guaiFENesin  milliGRAM(s) Oral every 12 hours  insulin lispro (ADMELOG) corrective regimen sliding scale   SubCutaneous three times a day before meals  insulin lispro (ADMELOG) corrective regimen sliding scale   SubCutaneous at bedtime  metoprolol succinate ER 12.5 milliGRAM(s) Oral daily  multivitamin 1 Tablet(s) Oral daily  tamsulosin 0.4 milliGRAM(s) Oral at bedtime      SOCIAL HISTORY:  Smoker:  YES / NO        PACK YEARS:                         WHEN QUIT?  ETOH use:  YES / NO               FREQUENCY / QUANTITY:  [employer]lic[employer] Drug use:  YES / NO  Occupation:  Assisted device use (Cane / Walker):  Live with:    FAMILY HISTORY:  Family history of diabetes mellitus    Family history of stroke        VITALS:  Vital Signs Last 24 Hrs  T(C): 36.8 (27 Nov 2024 16:13), Max: 36.9 (26 Nov 2024 20:00)  T(F): 98.2 (27 Nov 2024 16:13), Max: 98.5 (26 Nov 2024 20:00)  HR: 96 (27 Nov 2024 17:05) (86 - 96)  BP: 102/71 (27 Nov 2024 17:05) (92/69 - 102/77)  BP(mean): --  RR: 18 (27 Nov 2024 16:13) (18 - 20)  SpO2: 94% (27 Nov 2024 16:13) (93% - 98%)    Parameters below as of 27 Nov 2024 16:13  Patient On (Oxygen Delivery Method): nasal cannula  O2 Flow (L/min): 2      LABS/DIAGNOSTIC TESTS:                          12.5   7.45  )-----------( 331      ( 27 Nov 2024 05:50 )             37.4     WBC Count: 7.45 K/uL (11-27 @ 05:50)  WBC Count: 12.36 K/uL (11-26 @ 15:15)      11-27    142  |  109[H]  |  19[H]  ----------------------------<  139[H]  3.7   |  27  |  1.11    Ca    8.5      27 Nov 2024 05:50  Phos  3.4     11-27  Mg     2.2     11-27    TPro  5.6[L]  /  Alb  2.4[L]  /  TBili  0.8  /  DBili  x   /  AST  14  /  ALT  32  /  AlkPhos  131[H]  11-27      Urinalysis with Rflx Culture (11.26.24 @ 19:34)   Urine Appearance: Clear  Color: Yellow  Specific Gravity: 1.010  pH Urine: 5.0  Protein, Urine: Negative mg/dL  Glucose Qualitative, Urine: >=1000 mg/dL  Ketone - Urine: Negative mg/dL  Blood, Urine: Negative  Bilirubin: Negative  Urobilinogen: 0.2 mg/dL  Leukocyte Esterase Concentration: Negative      Legionella Antigen, Urine: Negative:      LIVER FUNCTIONS - ( 27 Nov 2024 05:50 )  Alb: 2.4 g/dL / Pro: 5.6 g/dL / ALK PHOS: 131 U/L / ALT: 32 U/L DA / AST: 14 U/L / GGT: x             PT/INR - ( 26 Nov 2024 15:15 )   PT: 14.2 sec;   INR: 1.22 ratio         PTT - ( 26 Nov 2024 15:15 )  PTT:29.8 sec    LACTATE:Lactate, Blood: 1.7 mmol/L (11-26 @ 19:40)      ABG -     CULTURES:       RADIOLOGY:< from: CT Chest No Cont (11.27.24 @ 09:41) >  ACC: [id number] EXAM:  CT CHEST   ORDERED BY: JODEE PADILLA     PROCEDURE DATE:  11/27/2024          INTERPRETATION:  CLINICAL INFORMATION: Shortness of breath. Hx HFrEF,   malignant melanoma, CABG x2, ascending aortic repair    COMPARISON: CT chest/abdomen/pelvis from 10/5/2024    CONTRAST/COMPLICATIONS:  IV Contrast: NONE  Oral Contrast: NONE      PROCEDURE:  CT of the Chest was performed.  Sagittal and coronal reformats were performed.    FINDINGS:    LUNGS AND LARGE AIRWAYS: Patent central airways. Bilateral central   groundglass opacities have progressed since 10/5/2024. Bilateral diffuse   interlobular septal thickening.  PLEURA: Stable small bilateral pleural effusions.  VESSELS: Status post aortic valve and ascending aortic replacement.   Aortic calcifications.  HEART: Coronary artery stents and calcifications. Cardiomegaly. No   pericardial effusion.  MEDIASTINUM AND AVEL: Stable prominent mediastinal lymph nodes.   Mediastinal surgical clips.  CHEST WALL AND LOWER NECK: Stable 1.8 cm right chest wall cutaneous   lesion.  VISUALIZED UPPER ABDOMEN: Partially visualized left renal cyst.  BONES: Degenerative changes. Median sternotomy wires.    IMPRESSION:  CT findings of pulmonary edema have progressed since October 5, 2024.    Stable small bilateral pleural effusions.    Stable 1.8 cm right chest wall cutaneous lesion.    --- End of Report ---           VERO SIMMS DO; Resident Radiologist  This document has been electronically signed.  SIMÓN CORMIER DO; Attending Radiologist  This document has been electronically signed. Nov 27 2024 11:52AM    < end of copied text >  -------------------------------------------------------------------------------------------------------------------------------  ACC: [id number] EXAM:  XR CHEST PORTABLE URGENT 1V   ORDERED BY: TAM ESCOBEDO     PROCEDURE DATE:  11/26/2024          INTERPRETATION:  AP erect chest on November 26, 2024 2:48 PM. Patient is   short of breath and has difficulty breathing.    Heart magnified by technique. Sternotomy again noted.    There are persistent bibasilar infiltrates somewhat improved on the right   but increased on the left.    IMPRESSION: Significant bibasilar infiltrates improved on the right but   increased on the left.    --- End of Report ---            JESSY JACOBSEN MD; Attending Radiologist  This document has been electronically signed. Nov 26 2024  4:02PM    < end of copied text >        ROS  [  ] UNABLE TO ELICIT               HPI:  74y M with PMHx of CVA, MI, HTN, T2DM, HLD, CAD s/p cardiac stents, HFrEF (EF 20-25% 10/2024), hx of malignant melanoma/wide excision, with recent elective bioprosthetic aortic valve replacement and ascending aortic aneurysm repair with transverse hemiarch and CABG x2 (4/8/24) with post op course complicated by cardiogenic shock requiring inotropic support with IV dobutamine and milrinone, IABP and also on amiodarone for atrial fibrillation prophylaxis presenting with difficulty breathing for the past 4 days preceded by cough for the past 4 to 5 weeks. Patient was recently admitted to [facility] 10/5-10/8 for pneumonia. Patient reports completion of full course of antibiotics prescribed on discharge. Patient states that he developed cough around a month ago that has been persistent and now causing abdominal muscle pain. Patient developed shortness of breath 4 days ago that got progressively worse prompting him to come to the ED today. Since treatment in ED, patient reports improvement of dyspnea but cough is still present but less frequent. Endorses constipation with no BM for last 3 days. Denies fever, chills, headache, dizziness, chest pain, palpitations, nausea, vomiting, diarrhea, and dysuria.   (26 Nov 2024 18:29)          History as above, asked to see this patient who has an extensive cardiac history and has had a CABG and valve replacement in the past 6 weeks or so and has been treated twice for Pneumonia and CHF since then. He presents to the hospital with  a cough ( chronic since 1 month but acutely worse) with sputum production and SOB since the last 4 days, no chest pain, some abdominal pain from straining his muscles secondary to coughing. He has no fevers or chills, he did have leg swelling as well , both his leg swelling and SOB have improved since being here.        PAST MEDICAL & SURGICAL HISTORY:  HTN (hypertension)      Hearing decreased      CVA (cerebral vascular accident)  12/22/2016      Hyperlipemia      Kidney stones      Coronary artery disease  MI 12/22/2016      CHF (congestive heart failure)  1/2017 stents x3      Type 2 diabetes mellitus  2016      Confusion  from stroke      S/P medial meniscal repair  and ligament surgery      H/O lithotripsy      S/P tonsillectomy      Bilateral inguinal hernia          No Known Allergies      Meds:  acetaminophen     Tablet .. 650 milliGRAM(s) Oral every 6 hours PRN  aMIOdarone    Tablet 200 milliGRAM(s) Oral daily  aspirin enteric coated 81 milliGRAM(s) Oral daily  atorvastatin 80 milliGRAM(s) Oral at bedtime  azithromycin  IVPB 500 milliGRAM(s) IV Intermittent every 24 hours  cefepime   IVPB      cefepime   IVPB 2000 milliGRAM(s) IV Intermittent every 12 hours  clopidogrel Tablet 75 milliGRAM(s) Oral daily  enoxaparin Injectable 40 milliGRAM(s) SubCutaneous every 24 hours  furosemide   Injectable 40 milliGRAM(s) IV Push every 12 hours  guaiFENesin  milliGRAM(s) Oral every 12 hours  insulin lispro (ADMELOG) corrective regimen sliding scale   SubCutaneous three times a day before meals  insulin lispro (ADMELOG) corrective regimen sliding scale   SubCutaneous at bedtime  metoprolol succinate ER 12.5 milliGRAM(s) Oral daily  multivitamin 1 Tablet(s) Oral daily  tamsulosin 0.4 milliGRAM(s) Oral at bedtime      SOCIAL HISTORY:  Smoker:  no  ETOH use:  no    FAMILY HISTORY:  Family history of diabetes mellitus    Family history of stroke        VITALS:  Vital Signs Last 24 Hrs  T(C): 36.8 (27 Nov 2024 16:13), Max: 36.9 (26 Nov 2024 20:00)  T(F): 98.2 (27 Nov 2024 16:13), Max: 98.5 (26 Nov 2024 20:00)  HR: 96 (27 Nov 2024 17:05) (86 - 96)  BP: 102/71 (27 Nov 2024 17:05) (92/69 - 102/77)  BP(mean): --  RR: 18 (27 Nov 2024 16:13) (18 - 20)  SpO2: 94% (27 Nov 2024 16:13) (93% - 98%)    Parameters below as of 27 Nov 2024 16:13  Patient On (Oxygen Delivery Method): nasal cannula  O2 Flow (L/min): 2      LABS/DIAGNOSTIC TESTS:                          12.5   7.45  )-----------( 331      ( 27 Nov 2024 05:50 )             37.4     WBC Count: 7.45 K/uL (11-27 @ 05:50)  WBC Count: 12.36 K/uL (11-26 @ 15:15)      11-27    142  |  109[H]  |  19[H]  ----------------------------<  139[H]  3.7   |  27  |  1.11    Ca    8.5      27 Nov 2024 05:50  Phos  3.4     11-27  Mg     2.2     11-27    TPro  5.6[L]  /  Alb  2.4[L]  /  TBili  0.8  /  DBili  x   /  AST  14  /  ALT  32  /  AlkPhos  131[H]  11-27      Urinalysis with Rflx Culture (11.26.24 @ 19:34)   Urine Appearance: Clear  Color: Yellow  Specific Gravity: 1.010  pH Urine: 5.0  Protein, Urine: Negative mg/dL  Glucose Qualitative, Urine: >=1000 mg/dL  Ketone - Urine: Negative mg/dL  Blood, Urine: Negative  Bilirubin: Negative  Urobilinogen: 0.2 mg/dL  Leukocyte Esterase Concentration: Negative      Legionella Antigen, Urine: Negative:      LIVER FUNCTIONS - ( 27 Nov 2024 05:50 )  Alb: 2.4 g/dL / Pro: 5.6 g/dL / ALK PHOS: 131 U/L / ALT: 32 U/L DA / AST: 14 U/L / GGT: x             PT/INR - ( 26 Nov 2024 15:15 )   PT: 14.2 sec;   INR: 1.22 ratio         PTT - ( 26 Nov 2024 15:15 )  PTT:29.8 sec    LACTATE:Lactate, Blood: 1.7 mmol/L (11-26 @ 19:40)      ABG -     CULTURES:       RADIOLOGY:< from: CT Chest No Cont (11.27.24 @ 09:41) >  ACC: [id number] EXAM:  CT CHEST   ORDERED BY: JODEE PADILLA     PROCEDURE DATE:  11/27/2024          INTERPRETATION:  CLINICAL INFORMATION: Shortness of breath. Hx HFrEF,   malignant melanoma, CABG x2, ascending aortic repair    COMPARISON: CT chest/abdomen/pelvis from 10/5/2024    CONTRAST/COMPLICATIONS:  IV Contrast: NONE  Oral Contrast: NONE      PROCEDURE:  CT of the Chest was performed.  Sagittal and coronal reformats were performed.    FINDINGS:    LUNGS AND LARGE AIRWAYS: Patent central airways. Bilateral central   groundglass opacities have progressed since 10/5/2024. Bilateral diffuse   interlobular septal thickening.  PLEURA: Stable small bilateral pleural effusions.  VESSELS: Status post aortic valve and ascending aortic replacement.   Aortic calcifications.  HEART: Coronary artery stents and calcifications. Cardiomegaly. No   pericardial effusion.  MEDIASTINUM AND AVEL: Stable prominent mediastinal lymph nodes.   Mediastinal surgical clips.  CHEST WALL AND LOWER NECK: Stable 1.8 cm right chest wall cutaneous   lesion.  VISUALIZED UPPER ABDOMEN: Partially visualized left renal cyst.  BONES: Degenerative changes. Median sternotomy wires.    IMPRESSION:  CT findings of pulmonary edema have progressed since October 5, 2024.    Stable small bilateral pleural effusions.    Stable 1.8 cm right chest wall cutaneous lesion.    --- End of Report ---           VERO SIMMS DO; Resident Radiologist  This document has been electronically signed.  SIMÓN CORMIER DO; Attending Radiologist  This document has been electronically signed. Nov 27 2024 11:52AM    < end of copied text >  -------------------------------------------------------------------------------------------------------------------------------  ACC: [id number] EXAM:  XR CHEST PORTABLE URGENT 1V   ORDERED BY: TAM ESCOBEDO     PROCEDURE DATE:  11/26/2024          INTERPRETATION:  AP erect chest on November 26, 2024 2:48 PM. Patient is   short of breath and has difficulty breathing.    Heart magnified by technique. Sternotomy again noted.    There are persistent bibasilar infiltrates somewhat improved on the right   but increased on the left.    IMPRESSION: Significant bibasilar infiltrates improved on the right but   increased on the left.    --- End of Report ---            JESSY JACOBSEN MD; Attending Radiologist  This document has been electronically signed. Nov 26 2024  4:02PM    < end of copied text >        ROS  [  ] UNABLE TO ELICIT

## 2024-11-27 NOTE — DIETITIAN INITIAL EVALUATION ADULT - PROBLEM SELECTOR PLAN 2
p/w sob and  3+ pitting LE edema x 4 days  -CXR shows Significant bibasilar infiltrates improved on the right but increased on the left.  -BNP 31093 -- (8193 on 10/8)  -takes metoprolol, entresto, farxiga, lasix and amiodarone for Afib ppx  -TTE 10/8:  EF 20-25%  -c/w home metoprolol   -hold entresto in s/o sepsis  -Start IV lasix 40mg IV BID  -f/u echo  -Remote tele  -Troponin 27.7-->24.6  -f/u repeat troponin  -daily weights, strict I&Os  -Cardio Consulted: Dr. Ward

## 2024-11-27 NOTE — DIETITIAN INITIAL EVALUATION ADULT - PROBLEM SELECTOR PLAN 3
h/o DM linagliptin and faxiga   -hold oral dm meds  -f/u A1c  -start moderate sliding scale  -Adjust insulin as indicated  -FS ACHS

## 2024-11-27 NOTE — PROGRESS NOTE ADULT - PROBLEM SELECTOR PLAN 1
p/w cough and shortness of breath  -CXR: Significant bibasilar infiltrates improved on the right but increased on the left  -Lactate 1.7  -start cefepime 2g q12 for HCAP (hospitalized 10/5-10/8)  -c/w azithromycin 500mg IV qd for atypical coverage  -f/u BCx,, UCx  -ID consult: Dr. Armenta

## 2024-11-27 NOTE — CONSULT NOTE ADULT - SUBJECTIVE AND OBJECTIVE BOX
Time of visit: spoke with eber Yousif at bedside     CHIEF COMPLAINT: Patient is a 74y old  Male who presents with a chief complaint of acute CHF/sepsis 2/2 PNA (27 Nov 2024 15:21)      HPI: This is a 74 yr old man  with  CVA, MI, HTN, T2DM, HLD, CAD s/p cardiac stents, HFrEF (EF 20-25% 10/2024), hx of malignant melanoma/wide excision, with recent elective bioprosthetic aortic valve replacement and ascending aortic aneurysm repair with transverse hemiarch and CABG x2 (4/8/24) with post op course complicated by cardiogenic shock requiring inotropic support with IV dobutamine and milrinone, IABP and also on amiodarone for atrial fibrillation prophylaxis presenting with difficulty breathing for the past 4 days preceded by cough for the past 4 to 5 weeks. Patient was recently admitted to [facility] 10/5-10/8 for pneumonia. Patient reports completion of full course of antibiotics prescribed on discharge. Patient states that he developed cough around a month ago that has been persistent and now causing abdominal muscle pain. Patient developed shortness of breath 4 days ago that got progressively worse prompting him to come to the ED today. Since treatment in ED, patient reports improvement of dyspnea but cough is still present but less frequent. Endorses constipation with no BM for last 3 days. Denies fever, chills, headache, dizziness, chest pain, palpitations, nausea, vomiting, diarrhea, and dysuria.   (26 Nov 2024 18:29)   Patient seen and examined.     PAST MEDICAL & SURGICAL HISTORY:  HTN (hypertension)      Hearing decreased      CVA (cerebral vascular accident)  12/22/2016      Hyperlipemia      Kidney stones      Coronary artery disease  MI 12/22/2016      CHF (congestive heart failure)  1/2017 stents x3      Type 2 diabetes mellitus  2016      Confusion  from stroke      S/P medial meniscal repair  and ligament surgery      H/O lithotripsy      S/P tonsillectomy      Bilateral inguinal hernia          Allergies    No Known Allergies    Intolerances        MEDICATIONS  (STANDING):  aMIOdarone    Tablet 200 milliGRAM(s) Oral daily  aspirin enteric coated 81 milliGRAM(s) Oral daily  atorvastatin 80 milliGRAM(s) Oral at bedtime  azithromycin  IVPB 500 milliGRAM(s) IV Intermittent every 24 hours  cefepime   IVPB      cefepime   IVPB 2000 milliGRAM(s) IV Intermittent every 12 hours  clopidogrel Tablet 75 milliGRAM(s) Oral daily  enoxaparin Injectable 40 milliGRAM(s) SubCutaneous every 24 hours  furosemide   Injectable 40 milliGRAM(s) IV Push every 12 hours  guaiFENesin  milliGRAM(s) Oral every 12 hours  insulin lispro (ADMELOG) corrective regimen sliding scale   SubCutaneous three times a day before meals  insulin lispro (ADMELOG) corrective regimen sliding scale   SubCutaneous at bedtime  metoprolol succinate ER 12.5 milliGRAM(s) Oral daily  multivitamin 1 Tablet(s) Oral daily  tamsulosin 0.4 milliGRAM(s) Oral at bedtime      MEDICATIONS  (PRN):  acetaminophen     Tablet .. 650 milliGRAM(s) Oral every 6 hours PRN Temp greater or equal to 38C (100.4F), Mild Pain (1 - 3)   Medications up to date at time of exam.    Medications up to date at time of exam.    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    CARDIOVASCULAR:  denies pressure, squeezing, tightness, or heaviness about the chest; no palpitations.    RESPIRATORY:  + SOB, cough, GREENE,     GASTROINTESTINAL:  denies abdominal pain, nausea, vomiting or diarrhea.    GENITOURINARY: denies dysuria, frequency or urgency.    NEUROLOGIC:  denies numbness, tingling, seizures or weakness.    PSYCHIATRIC:  denies disorder of thought or mood.    MSK: denies swelling, redness      PHYSICAL EXAMINATION:    GENERAL: The patient  in + dry cough     Vital Signs Last 24 Hrs  T(C): 36.8 (27 Nov 2024 16:13), Max: 36.9 (26 Nov 2024 20:00)  T(F): 98.2 (27 Nov 2024 16:13), Max: 98.5 (26 Nov 2024 20:00)  HR: 96 (27 Nov 2024 17:05) (86 - 96)  BP: 102/71 (27 Nov 2024 17:05) (92/69 - 102/77)  BP(mean): --  RR: 18 (27 Nov 2024 16:13) (18 - 20)  SpO2: 94% (27 Nov 2024 16:13) (93% - 98%)    Parameters below as of 27 Nov 2024 16:13  Patient On (Oxygen Delivery Method): nasal cannula  O2 Flow (L/min): 2     (if applicable)    Chest Tube (if applicable)    HEENT: Head is normocephalic and atraumatic. Extraocular muscles are intact. Mucous membranes are moist.     NECK: Supple, no palpable adenopathy.    LUNGS: Fair b/l breath sounds, no wheezing, rales, or rhonchi.    HEART: Regular rate and rhythm without murmur.    ABDOMEN: Soft, nontender, and nondistended.  No hepatosplenomegaly is noted.    RENAL: No difficulty voiding, no pelvic pain    EXTREMITIES: Without any cyanosis, clubbing, rash, lesions or edema.    NEUROLOGIC: Awake, alert, oriented, grossly intact    SKIN: Warm, dry, good turgor.      LABS:                        12.5   7.45  )-----------( 331      ( 27 Nov 2024 05:50 )             37.4     11-27    142  |  109[H]  |  19[H]  ----------------------------<  139[H]  3.7   |  27  |  1.11    Ca    8.5      27 Nov 2024 05:50  Phos  3.4     11-27  Mg     2.2     11-27    TPro  5.6[L]  /  Alb  2.4[L]  /  TBili  0.8  /  DBili  x   /  AST  14  /  ALT  32  /  AlkPhos  131[H]  11-27    PT/INR - ( 26 Nov 2024 15:15 )   PT: 14.2 sec;   INR: 1.22 ratio         PTT - ( 26 Nov 2024 15:15 )  PTT:29.8 sec  Urinalysis Basic - ( 27 Nov 2024 05:50 )    Color: x / Appearance: x / SG: x / pH: x  Gluc: 139 mg/dL / Ketone: x  / Bili: x / Urobili: x   Blood: x / Protein: x / Nitrite: x   Leuk Esterase: x / RBC: x / WBC x   Sq Epi: x / Non Sq Epi: x / Bacteria: x    Lactate, Blood: 1.7 mmol/L (11-26-24 @ 19:40)  MICROBIOLOGY: (if applicable)    RADIOLOGY & ADDITIONAL STUDIES:  EKG:   CXR:< from: CT Chest No Cont (11.27.24 @ 09:41) >  IMPRESSION:  CT findings of pulmonary edema have progressed since October 5, 2024.    Stable small bilateral pleural effusions.    Stable 1.8 cm right chest wall cutaneous lesion.    < end of copied text >    ECHO:    IMPRESSION: 74y Male PAST MEDICAL & SURGICAL HISTORY:  HTN (hypertension)      Hearing decreased      CVA (cerebral vascular accident)  12/22/2016      Hyperlipemia      Kidney stones      Coronary artery disease  MI 12/22/2016      CHF (congestive heart failure)  1/2017 stents x3      Type 2 diabetes mellitus  2016      Confusion  from stroke      S/P medial meniscal repair  and ligament surgery      H/O lithotripsy      S/P tonsillectomy      Bilateral inguinal hernia       p/w       IMP: This is a 74 yr old man  with  CVA, MI, HTN, T2DM, HLD, CAD s/p cardiac stents, HFrEF (EF 20-25% 10/2024), hx of malignant melanoma/wide excision, with recent elective bioprosthetic aortic valve replacement and ascending aortic aneurysm repair with transverse hemiarch and CABG x2 (4/8/24) with post op course complicated by cardiogenic shock requiring inotropic support with IV dobutamine and milrinone, IABP and also on amiodarone for atrial fibrillation prophylaxis presenting with difficulty breathing for the past 4 days preceded by cough for the past 4 to 5 weeks. Patient was recently admitted to [facility] 10/5-10/8 for pneumonia. Patient reports completion of full course of antibiotics prescribed on discharge. Patient states that he developed cough around a month ago that has been persistent and now causing abdominal muscle pain. Pat admitted for acute hypoxic resp failure due to acute EX of HF requiring o2 supp. His chronic cough maybe due to HF .     Sugg;    - Continue O2 supp   - Diuresis   - Optimize HF meds   - keep neg fluid balance   - Monitor blood glucose with coverage   - Can d/c antibx if cultures are neg ( pat is afebrile and normal WBC )   - Advice compliance meds and appt   - PT regan     spoke with daughter at bedside          Time of visit: spoke with eber Yousif at bedside     CHIEF COMPLAINT: Patient is a 74y old  Male who presents with a chief complaint of acute CHF/sepsis 2/2 PNA (27 Nov 2024 15:21)      HPI: This is a 74 yr old man  with  CVA, MI, HTN, T2DM, HLD, CAD s/p cardiac stents, HFrEF (EF 20-25% 10/2024), hx of malignant melanoma/wide excision, with recent elective bioprosthetic aortic valve replacement and ascending aortic aneurysm repair with transverse hemiarch and CABG x2 (4/8/24) with post op course complicated by cardiogenic shock requiring inotropic support with IV dobutamine and milrinone, IABP and also on amiodarone for atrial fibrillation prophylaxis presenting with difficulty breathing for the past 4 days preceded by cough for the past 4 to 5 weeks. Patient was recently admitted to [facility] 10/5-10/8 for pneumonia. Patient reports completion of full course of antibiotics prescribed on discharge. Patient states that he developed cough around a month ago that has been persistent and now causing abdominal muscle pain. Patient developed shortness of breath 4 days ago that got progressively worse prompting him to come to the ED today. Since treatment in ED, patient reports improvement of dyspnea but cough is still present but less frequent. Endorses constipation with no BM for last 3 days. Denies fever, chills, headache, dizziness, chest pain, palpitations, nausea, vomiting, diarrhea, and dysuria.   (26 Nov 2024 18:29)   Patient seen and examined.     PAST MEDICAL & SURGICAL HISTORY:  HTN (hypertension)      Hearing decreased      CVA (cerebral vascular accident)  12/22/2016      Hyperlipemia      Kidney stones      Coronary artery disease  MI 12/22/2016      CHF (congestive heart failure)  1/2017 stents x3      Type 2 diabetes mellitus  2016      Confusion  from stroke      S/P medial meniscal repair  and ligament surgery      H/O lithotripsy      S/P tonsillectomy      Bilateral inguinal hernia          Allergies    No Known Allergies    Intolerances        MEDICATIONS  (STANDING):  aMIOdarone    Tablet 200 milliGRAM(s) Oral daily  aspirin enteric coated 81 milliGRAM(s) Oral daily  atorvastatin 80 milliGRAM(s) Oral at bedtime  azithromycin  IVPB 500 milliGRAM(s) IV Intermittent every 24 hours  cefepime   IVPB      cefepime   IVPB 2000 milliGRAM(s) IV Intermittent every 12 hours  clopidogrel Tablet 75 milliGRAM(s) Oral daily  enoxaparin Injectable 40 milliGRAM(s) SubCutaneous every 24 hours  furosemide   Injectable 40 milliGRAM(s) IV Push every 12 hours  guaiFENesin  milliGRAM(s) Oral every 12 hours  insulin lispro (ADMELOG) corrective regimen sliding scale   SubCutaneous three times a day before meals  insulin lispro (ADMELOG) corrective regimen sliding scale   SubCutaneous at bedtime  metoprolol succinate ER 12.5 milliGRAM(s) Oral daily  multivitamin 1 Tablet(s) Oral daily  tamsulosin 0.4 milliGRAM(s) Oral at bedtime      MEDICATIONS  (PRN):  acetaminophen     Tablet .. 650 milliGRAM(s) Oral every 6 hours PRN Temp greater or equal to 38C (100.4F), Mild Pain (1 - 3)   Medications up to date at time of exam.    Medications up to date at time of exam.    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    CARDIOVASCULAR:  denies pressure, squeezing, tightness, or heaviness about the chest; no palpitations.    RESPIRATORY:  + SOB, cough, GREENE,     GASTROINTESTINAL:  denies abdominal pain, nausea, vomiting or diarrhea.    GENITOURINARY: denies dysuria, frequency or urgency.    NEUROLOGIC:  denies numbness, tingling, seizures or weakness.    PSYCHIATRIC:  denies disorder of thought or mood.    MSK: denies swelling, redness      PHYSICAL EXAMINATION:    GENERAL: The patient  in + dry cough     Vital Signs Last 24 Hrs  T(C): 36.8 (27 Nov 2024 16:13), Max: 36.9 (26 Nov 2024 20:00)  T(F): 98.2 (27 Nov 2024 16:13), Max: 98.5 (26 Nov 2024 20:00)  HR: 96 (27 Nov 2024 17:05) (86 - 96)  BP: 102/71 (27 Nov 2024 17:05) (92/69 - 102/77)  BP(mean): --  RR: 18 (27 Nov 2024 16:13) (18 - 20)  SpO2: 94% (27 Nov 2024 16:13) (93% - 98%)    Parameters below as of 27 Nov 2024 16:13  Patient On (Oxygen Delivery Method): nasal cannula  O2 Flow (L/min): 2     (if applicable)    Chest Tube (if applicable)    HEENT: Head is normocephalic and atraumatic. Extraocular muscles are intact. Mucous membranes are moist.     NECK: Supple, no palpable adenopathy.    LUNGS: Fair b/l breath sounds, no wheezing, rales, or rhonchi.    HEART: Regular rate and rhythm without murmur.    ABDOMEN: Soft, nontender, and nondistended.  No hepatosplenomegaly is noted.    RENAL: No difficulty voiding, no pelvic pain    EXTREMITIES: Without any cyanosis, clubbing, rash, lesions or edema.    NEUROLOGIC: Awake, alert, oriented, grossly intact    SKIN: Warm, dry, good turgor.      LABS:                        12.5   7.45  )-----------( 331      ( 27 Nov 2024 05:50 )             37.4     11-27    142  |  109[H]  |  19[H]  ----------------------------<  139[H]  3.7   |  27  |  1.11    Ca    8.5      27 Nov 2024 05:50  Phos  3.4     11-27  Mg     2.2     11-27    TPro  5.6[L]  /  Alb  2.4[L]  /  TBili  0.8  /  DBili  x   /  AST  14  /  ALT  32  /  AlkPhos  131[H]  11-27    PT/INR - ( 26 Nov 2024 15:15 )   PT: 14.2 sec;   INR: 1.22 ratio         PTT - ( 26 Nov 2024 15:15 )  PTT:29.8 sec  Urinalysis Basic - ( 27 Nov 2024 05:50 )    Color: x / Appearance: x / SG: x / pH: x  Gluc: 139 mg/dL / Ketone: x  / Bili: x / Urobili: x   Blood: x / Protein: x / Nitrite: x   Leuk Esterase: x / RBC: x / WBC x   Sq Epi: x / Non Sq Epi: x / Bacteria: x    Lactate, Blood: 1.7 mmol/L (11-26-24 @ 19:40)  MICROBIOLOGY: (if applicable)    RADIOLOGY & ADDITIONAL STUDIES:  EKG:   CXR:< from: CT Chest No Cont (11.27.24 @ 09:41) >  IMPRESSION:  CT findings of pulmonary edema have progressed since October 5, 2024.    Stable small bilateral pleural effusions.    Stable 1.8 cm right chest wall cutaneous lesion.    < end of copied text >    ECHO:< from: TTE W or WO Ultrasound Enhancing Agent (10.08.24 @ 06:50) >   1. Left ventricular cavity is mildly dilated. Left ventricular wall thickness is normal. Left ventricular systolic function is severely decreased with an ejection fraction visually estimated at 20 to 25 %. There are no regional wall motion abnormalities seen.   2. Reduced right ventricular systolic function.   3. A bioprosthetic valve replacement valve replacement is present in the aortic position The prosthetic valve has normal leaflet excursion and is well seated. No intravalvular regurgitation.   4. Multiple segmental abnormalities exist. See findings.    < end of copied text >      IMPRESSION: 74y Male PAST MEDICAL & SURGICAL HISTORY:  HTN (hypertension)      Hearing decreased      CVA (cerebral vascular accident)  12/22/2016      Hyperlipemia      Kidney stones      Coronary artery disease  MI 12/22/2016      CHF (congestive heart failure)  1/2017 stents x3      Type 2 diabetes mellitus  2016      Confusion  from stroke      S/P medial meniscal repair  and ligament surgery      H/O lithotripsy      S/P tonsillectomy      Bilateral inguinal hernia       p/w       IMP: This is a 74 yr old man  with  CVA, MI, HTN, T2DM, HLD, CAD s/p cardiac stents, HFrEF (EF 20-25% 10/2024), hx of malignant melanoma/wide excision, with recent elective bioprosthetic aortic valve replacement and ascending aortic aneurysm repair with transverse hemiarch and CABG x2 (4/8/24) with post op course complicated by cardiogenic shock requiring inotropic support with IV dobutamine and milrinone, IABP and also on amiodarone for atrial fibrillation prophylaxis presenting with difficulty breathing for the past 4 days preceded by cough for the past 4 to 5 weeks. Patient was recently admitted to [facility] 10/5-10/8 for pneumonia. Patient reports completion of full course of antibiotics prescribed on discharge. Patient states that he developed cough around a month ago that has been persistent and now causing abdominal muscle pain. Pat admitted for acute hypoxic resp failure due to acute EX of HF requiring o2 supp. His chronic cough maybe due to HF .     Sugg;    - Continue O2 supp   - Diuresis   - Optimize HF meds   - keep neg fluid balance   - Monitor blood glucose with coverage   - Can d/c antibx if cultures are neg ( pat is afebrile and normal WBC )   - Advice compliance meds and appt   - PT eval   - Out pat ENT eval   - Swallow eval   - Out pat pulmo eval   spoke with daughter at bedside          Time of visit: spoke with eber Yousif at bedside     CHIEF COMPLAINT: Patient is a 74y old  Male who presents with a chief complaint of acute CHF/sepsis 2/2 PNA (27 Nov 2024 15:21)      HPI: This is a 74 yr old man  with  CVA, MI, HTN, T2DM, HLD, CAD s/p cardiac stents, HFrEF (EF 20-25% 10/2024), hx of malignant melanoma/wide excision, with recent elective bioprosthetic aortic valve replacement and ascending aortic aneurysm repair with transverse hemiarch and CABG x2 (4/8/24) with post op course complicated by cardiogenic shock requiring inotropic support with IV dobutamine and milrinone, IABP and also on amiodarone for atrial fibrillation prophylaxis presenting with difficulty breathing for the past 4 days preceded by cough for the past 4 to 5 weeks. Patient was recently admitted to [facility] 10/5-10/8 for pneumonia. Patient reports completion of full course of antibiotics prescribed on discharge. Patient states that he developed cough around a month ago that has been persistent and now causing abdominal muscle pain. Patient developed shortness of breath 4 days ago that got progressively worse prompting him to come to the ED today. Since treatment in ED, patient reports improvement of dyspnea but cough is still present but less frequent. Endorses constipation with no BM for last 3 days. Denies fever, chills, headache, dizziness, chest pain, palpitations, nausea, vomiting, diarrhea, and dysuria.   (26 Nov 2024 18:29)   Patient seen and examined.     PAST MEDICAL & SURGICAL HISTORY:  HTN (hypertension)      Hearing decreased      CVA (cerebral vascular accident)  12/22/2016      Hyperlipemia      Kidney stones      Coronary artery disease  MI 12/22/2016      CHF (congestive heart failure)  1/2017 stents x3      Type 2 diabetes mellitus  2016      Confusion  from stroke      S/P medial meniscal repair  and ligament surgery      H/O lithotripsy      S/P tonsillectomy      Bilateral inguinal hernia          Allergies    No Known Allergies    Intolerances        MEDICATIONS  (STANDING):  aMIOdarone    Tablet 200 milliGRAM(s) Oral daily  aspirin enteric coated 81 milliGRAM(s) Oral daily  atorvastatin 80 milliGRAM(s) Oral at bedtime  azithromycin  IVPB 500 milliGRAM(s) IV Intermittent every 24 hours  cefepime   IVPB      cefepime   IVPB 2000 milliGRAM(s) IV Intermittent every 12 hours  clopidogrel Tablet 75 milliGRAM(s) Oral daily  enoxaparin Injectable 40 milliGRAM(s) SubCutaneous every 24 hours  furosemide   Injectable 40 milliGRAM(s) IV Push every 12 hours  guaiFENesin  milliGRAM(s) Oral every 12 hours  insulin lispro (ADMELOG) corrective regimen sliding scale   SubCutaneous three times a day before meals  insulin lispro (ADMELOG) corrective regimen sliding scale   SubCutaneous at bedtime  metoprolol succinate ER 12.5 milliGRAM(s) Oral daily  multivitamin 1 Tablet(s) Oral daily  tamsulosin 0.4 milliGRAM(s) Oral at bedtime      MEDICATIONS  (PRN):  acetaminophen     Tablet .. 650 milliGRAM(s) Oral every 6 hours PRN Temp greater or equal to 38C (100.4F), Mild Pain (1 - 3)   Medications up to date at time of exam.    Medications up to date at time of exam.    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    CARDIOVASCULAR:  denies pressure, squeezing, tightness, or heaviness about the chest; no palpitations.    RESPIRATORY:  + SOB, cough, GREENE,     GASTROINTESTINAL:  denies abdominal pain, nausea, vomiting or diarrhea.    GENITOURINARY: denies dysuria, frequency or urgency.    NEUROLOGIC:  denies numbness, tingling, seizures or weakness.    PSYCHIATRIC:  denies disorder of thought or mood.    MSK: denies swelling, redness      PHYSICAL EXAMINATION:    GENERAL: The patient  in + dry cough     Vital Signs Last 24 Hrs  T(C): 36.8 (27 Nov 2024 16:13), Max: 36.9 (26 Nov 2024 20:00)  T(F): 98.2 (27 Nov 2024 16:13), Max: 98.5 (26 Nov 2024 20:00)  HR: 96 (27 Nov 2024 17:05) (86 - 96)  BP: 102/71 (27 Nov 2024 17:05) (92/69 - 102/77)  BP(mean): --  RR: 18 (27 Nov 2024 16:13) (18 - 20)  SpO2: 94% (27 Nov 2024 16:13) (93% - 98%)    Parameters below as of 27 Nov 2024 16:13  Patient On (Oxygen Delivery Method): nasal cannula  O2 Flow (L/min): 2     (if applicable)    Chest Tube (if applicable)    HEENT: Head is normocephalic and atraumatic. Extraocular muscles are intact. Mucous membranes are moist.     NECK: Supple, no palpable adenopathy.    LUNGS: Fair b/l breath sounds, no wheezing, rales, or rhonchi.    HEART: Regular rate and rhythm without murmur.    ABDOMEN: Soft, nontender, and nondistended.  No hepatosplenomegaly is noted.    RENAL: No difficulty voiding, no pelvic pain    EXTREMITIES: Without any cyanosis, clubbing, rash, lesions or edema.    NEUROLOGIC: Awake, alert, oriented, grossly intact    SKIN: Warm, dry, good turgor.      LABS:                        12.5   7.45  )-----------( 331      ( 27 Nov 2024 05:50 )             37.4     11-27    142  |  109[H]  |  19[H]  ----------------------------<  139[H]  3.7   |  27  |  1.11    Ca    8.5      27 Nov 2024 05:50  Phos  3.4     11-27  Mg     2.2     11-27    TPro  5.6[L]  /  Alb  2.4[L]  /  TBili  0.8  /  DBili  x   /  AST  14  /  ALT  32  /  AlkPhos  131[H]  11-27    PT/INR - ( 26 Nov 2024 15:15 )   PT: 14.2 sec;   INR: 1.22 ratio         PTT - ( 26 Nov 2024 15:15 )  PTT:29.8 sec  Urinalysis Basic - ( 27 Nov 2024 05:50 )    Color: x / Appearance: x / SG: x / pH: x  Gluc: 139 mg/dL / Ketone: x  / Bili: x / Urobili: x   Blood: x / Protein: x / Nitrite: x   Leuk Esterase: x / RBC: x / WBC x   Sq Epi: x / Non Sq Epi: x / Bacteria: x    Lactate, Blood: 1.7 mmol/L (11-26-24 @ 19:40)  MICROBIOLOGY: (if applicable)    RADIOLOGY & ADDITIONAL STUDIES:  EKG:   CXR:< from: CT Chest No Cont (11.27.24 @ 09:41) >  IMPRESSION:  CT findings of pulmonary edema have progressed since October 5, 2024.    Stable small bilateral pleural effusions.    Stable 1.8 cm right chest wall cutaneous lesion.    < end of copied text >    ECHO:< from: TTE W or WO Ultrasound Enhancing Agent (10.08.24 @ 06:50) >   1. Left ventricular cavity is mildly dilated. Left ventricular wall thickness is normal. Left ventricular systolic function is severely decreased with an ejection fraction visually estimated at 20 to 25 %. There are no regional wall motion abnormalities seen.   2. Reduced right ventricular systolic function.   3. A bioprosthetic valve replacement valve replacement is present in the aortic position The prosthetic valve has normal leaflet excursion and is well seated. No intravalvular regurgitation.   4. Multiple segmental abnormalities exist. See findings.    < end of copied text >      IMPRESSION: 74y Male PAST MEDICAL & SURGICAL HISTORY:  HTN (hypertension)      Hearing decreased      CVA (cerebral vascular accident)  12/22/2016      Hyperlipemia      Kidney stones      Coronary artery disease  MI 12/22/2016      CHF (congestive heart failure)  1/2017 stents x3      Type 2 diabetes mellitus  2016      Confusion  from stroke      S/P medial meniscal repair  and ligament surgery      H/O lithotripsy      S/P tonsillectomy      Bilateral inguinal hernia       p/w       IMP: This is a 74 yr old man  with  CVA, MI, HTN, T2DM, HLD, CAD s/p cardiac stents, HFrEF (EF 20-25% 10/2024), hx of malignant melanoma/wide excision, with recent elective bioprosthetic aortic valve replacement and ascending aortic aneurysm repair with transverse hemiarch and CABG x2 (4/8/24) with post op course complicated by cardiogenic shock requiring inotropic support with IV dobutamine and milrinone, IABP and also on amiodarone for atrial fibrillation prophylaxis presenting with difficulty breathing for the past 4 days preceded by cough for the past 4 to 5 weeks. Patient was recently admitted to [facility] 10/5-10/8 for pneumonia. Patient reports completion of full course of antibiotics prescribed on discharge. Patient states that he developed cough around a month ago that has been persistent and now causing abdominal muscle pain. Pat admitted for acute hypoxic resp failure due to acute EX of HF requiring o2 supp. His chronic cough maybe due to HF .     Sugg;    - Continue O2 supp   - Diuresis   - Optimize HF meds   - keep neg fluid balance   - Monitor blood glucose with coverage   - Can d/c antibx if cultures are neg ( pat is afebrile and normal WBC )   - Advice compliance meds and appt   - PT eval   - Out pat ENT eval   - Swallow eval   - Out pat pulmo eval   spoke with daughter Celine  at bedside

## 2024-11-27 NOTE — CONSULT NOTE ADULT - SUBJECTIVE AND OBJECTIVE BOX
C A R D I O L O G Y  *********************    DATE OF SERVICE: 11-27-24    HISTORY OF PRESENT ILLNESS:  74y M with PMHx of CVA, MI, HTN, T2DM, HLD, CAD s/p cardiac stents, HFrEF (EF 20-25% 10/2024), hx of malignant melanoma/wide excision, with recent elective bioprosthetic aortic valve replacement and ascending aortic aneurysm repair with transverse hemiarch and CABG x2 (4/8/24) with post op course complicated by cardiogenic shock requiring inotropic support with IV dobutamine and milrinone, IABP and also on amiodarone for atrial fibrillation prophylaxis presenting with difficulty breathing for the past 4 days preceded by cough for the past 4 to 5 weeks. Patient was recently admitted to [facility] 10/5-10/8 for pneumonia. Patient reports completion of full course of antibiotics prescribed on discharge. Patient states that he developed cough around a month ago that has been persistent and now causing abdominal muscle pain. Patient developed shortness of breath 4 days ago that got progressively worse prompting him to come to the ED today. Since treatment in ED, patient reports improvement of dyspnea but cough is still present but less frequent. Endorses constipation with no BM for last 3 days. Denies fever, chills, headache, dizziness, chest pain, palpitations, LOC  nausea, vomiting, diarrhea, and dysuria.   (26 Nov 2024 18:29)      PAST MEDICAL & SURGICAL HISTORY:  HTN (hypertension)  Hearing decreased  CVA (cerebral vascular accident)  12/22/2016  Hyperlipemia  Kidney stones  Coronary artery disease  MI 12/22/2016  CHF (congestive heart failure)  1/2017 stents x3  Type 2 diabetes mellitus  2016  Confusion  from stroke  S/P medial meniscal repair  and ligament surgery      H/O lithotripsy      S/P tonsillectomy      Bilateral inguinal hernia              MEDICATIONS:  MEDICATIONS  (STANDING):  aMIOdarone    Tablet 200 milliGRAM(s) Oral daily  aspirin enteric coated 81 milliGRAM(s) Oral daily  atorvastatin 80 milliGRAM(s) Oral at bedtime  azithromycin  IVPB 500 milliGRAM(s) IV Intermittent every 24 hours  cefepime   IVPB      cefepime   IVPB 2000 milliGRAM(s) IV Intermittent every 12 hours  clopidogrel Tablet 75 milliGRAM(s) Oral daily  enoxaparin Injectable 40 milliGRAM(s) SubCutaneous every 24 hours  furosemide   Injectable 40 milliGRAM(s) IV Push every 12 hours  guaiFENesin  milliGRAM(s) Oral every 12 hours  insulin lispro (ADMELOG) corrective regimen sliding scale   SubCutaneous three times a day before meals  insulin lispro (ADMELOG) corrective regimen sliding scale   SubCutaneous at bedtime  metoprolol succinate ER 12.5 milliGRAM(s) Oral daily  multivitamin 1 Tablet(s) Oral daily  tamsulosin 0.4 milliGRAM(s) Oral at bedtime      Allergies    No Known Allergies    Intolerances        FAMILY HISTORY:  Family history of diabetes mellitus    Family history of stroke      Non-contributary for premature coronary disease or sudden cardiac death    SOCIAL HISTORY:    [ ] Non-smoker  [ ] Smoker  [ ] Alcohol    FLU VACCINE THIS YEAR STARTS IN AUGUST:  [ ] Yes    [ ] No    IF OVER 65 HAVE YOU EVER HAD A PNA VACCINE:  [ ] Yes    [ ] No       [ ] N/A      REVIEW OF SYSTEMS:  [ ]chest pain  [  ]shortness of breath  [  ]palpitations  [  ]syncope  [ ]near syncope [ ]upper extremity weakness   [ ] lower extremity weakness  [  ]diplopia  [  ]altered mental status   [  ]fevers  [ ]chills [ ]nausea  [ ]vomitting  [  ]dysphagia    [ ]abdominal pain  [ ]melena  [ ]BRBPR    [  ]epistaxis  [  ]rash    [ ]lower extremity edema        [X] All others negative	  [ ] Unable to obtain      LABS:	 	    CARDIAC MARKERS:        Troponin I, High Sensitivity Result: 24.6 ng/L (11-26-24 @ 19:40)  Troponin I, High Sensitivity Result: 27.7 ng/L (11-26-24 @ 15:15)                            12.5   7.45  )-----------( 331      ( 27 Nov 2024 05:50 )             37.4     Hb Trend: 12.5<--, 13.7<--    11-27    142  |  109[H]  |  19[H]  ----------------------------<  139[H]  3.7   |  27  |  1.11    Ca    8.5      27 Nov 2024 05:50  Phos  3.4     11-27  Mg     2.2     11-27    TPro  5.6[L]  /  Alb  2.4[L]  /  TBili  0.8  /  DBili  x   /  AST  14  /  ALT  32  /  AlkPhos  131[H]  11-27    Creatinine Trend: 1.11<--, 1.20<--    Coags:  PT/INR - ( 26 Nov 2024 15:15 )   PT: 14.2 sec;   INR: 1.22 ratio         PTT - ( 26 Nov 2024 15:15 )  PTT:29.8 sec    proBNP:   Lipid Profile:   HgA1c:   TSH: Thyroid Stimulating Hormone, Serum: 0.86 uU/mL (11-27 @ 05:50)          PHYSICAL EXAM:  T(C): 36.6 (11-27-24 @ 08:15), Max: 36.9 (11-26-24 @ 20:00)  HR: 86 (11-27-24 @ 08:15) (86 - 110)  BP: 100/70 (11-27-24 @ 08:15) (94/64 - 126/85)  RR: 18 (11-27-24 @ 08:15) (18 - 20)  SpO2: 93% (11-27-24 @ 08:15) (93% - 98%)  Wt(kg): --   BMI (kg/m2): 22.9 (11-26-24 @ 14:07)  I&O's Summary    26 Nov 2024 07:01  -  27 Nov 2024 07:00  --------------------------------------------------------  IN: 600 mL / OUT: 600 mL / NET: 0 mL    27 Nov 2024 07:01  -  27 Nov 2024 09:15  --------------------------------------------------------  IN: 200 mL / OUT: 400 mL / NET: -200 mL      HEENT:  (-)icterus (-)pallor  CV: N S1 S2 1/6 GAEL (+)2 Pulses B/l  Resp:  Clear to ausculatation B/L, normal effort  GI: (+) BS Soft, NT, ND  Lymph:  (-)Edema, (-)obvious lymphadenopathy  Skin: Warm to touch, Normal turgor  Psych: Appropriate mood and affect        ECG:  	sinus tach 110 BPM, delayed R wave progression nonspecific T wave abnormality    RADIOLOGY:         CXR:   < from: Xray Chest 1 View- PORTABLE-Urgent (Xray Chest 1 View- PORTABLE-Urgent .) (11.26.24 @ 15:08) >   Significant bibasilar infiltrates improved on the right but   increased on the left.      < end of copied text >      ASSESSMENT/PLAN: 	74y Male PMHx of CVA, MI, HTN, T2DM, HLD, CAD s/p cardiac stents, HFrEF (EF 20-25% 10/2024), hx of malignant melanoma/wide excision, with recent elective bioprosthetic aortic valve replacement and ascending aortic aneurysm repair with transverse hemiarch and CABG x2 (4/8/24) with post op course complicated by cardiogenic shock requiring inotropic support with IV dobutamine and milrinone, IABP and also on amiodarone for atrial fibrillation prophylaxis presenting with difficulty breathing for the past 4 days preceded by cough for the past 4 to 5 weeks.    # CHF  - Acute on chronic systolic heart failure  - He is mildly volume overloaded on exam  - COnt Lasix 40 IV BID to keep net negative  - can cont low dose BB  - would resume Entresto if BP remains stable  - Out pt f/u with Dr. Berna Ramon    # CAD   - cont asa and statin    # Post op Afib prophylaxis  - On amio  - Doesnt appear he has demonstarted PAF as he is not on AC currently  - ? if need to be stopped    # PNA  - Abx per medical team       I once again thank you for allowing me to participate in the care of your patient.  If you have any questions or concerns please do not hesitate to contact me.    Rogelio Ward MD, [facility]  BEEPER [phone]

## 2024-11-27 NOTE — DIETITIAN INITIAL EVALUATION ADULT - PERTINENT LABORATORY DATA
11-27    142  |  109[H]  |  19[H]  ----------------------------<  139[H]  3.7   |  27  |  1.11    Ca    8.5      27 Nov 2024 05:50  Phos  3.4     11-27  Mg     2.2     11-27    TPro  5.6[L]  /  Alb  2.4[L]  /  TBili  0.8  /  DBili  x   /  AST  14  /  ALT  32  /  AlkPhos  131[H]  11-27  POCT Blood Glucose.: 147 mg/dL (11-27-24 @ 07:58)  A1C with Estimated Average Glucose Result: 9.3 % (10-06-24 @ 07:24)  A1C with Estimated Average Glucose Result: 9.8 % (04-07-24 @ 15:28)  A1C with Estimated Average Glucose Result: 9.9 % (04-01-24 @ 14:49)

## 2024-11-27 NOTE — PROGRESS NOTE ADULT - SUBJECTIVE AND OBJECTIVE BOX
Patient is a 74y old  Male who presents with a chief complaint of Shortness of breath     (2024 10:43)    PATIENT IS SEEN AND EXAMINED IN MEDICAL FLOOR.  NGT [    ]    JONAS [   ]      GT [   ]    ALLERGIES:  No Known Allergies      Daily Height in cm: 167.64 (2024 14:07)    Daily Weight in k (2024 04:34)    VITALS:    Vital Signs Last 24 Hrs  T(C): 36.4 (2024 11:12), Max: 36.9 (2024 20:00)  T(F): 97.5 (2024 11:12), Max: 98.5 (2024 20:00)  HR: 88 (2024 11:12) (86 - 110)  BP: 92/69 (2024 11:12) (92/69 - 126/85)  BP(mean): --  RR: 18 (2024 11:12) (18 - 20)  SpO2: 97% (2024 11:12) (93% - 98%)    Parameters below as of 2024 11:12  Patient On (Oxygen Delivery Method): nasal cannula  O2 Flow (L/min): 2      LABS:    CBC Full  -  ( 2024 05:50 )  WBC Count : 7.45 K/uL  RBC Count : 4.54 M/uL  Hemoglobin : 12.5 g/dL  Hematocrit : 37.4 %  Platelet Count - Automated : 331 K/uL  Mean Cell Volume : 82.4 fl  Mean Cell Hemoglobin : 27.5 pg  Mean Cell Hemoglobin Concentration : 33.4 g/dL  Auto Neutrophil # : 5.35 K/uL  Auto Lymphocyte # : 1.35 K/uL  Auto Monocyte # : 0.67 K/uL  Auto Eosinophil # : 0.02 K/uL  Auto Basophil # : 0.03 K/uL  Auto Neutrophil % : 71.8 %  Auto Lymphocyte % : 18.1 %  Auto Monocyte % : 9.0 %  Auto Eosinophil % : 0.3 %  Auto Basophil % : 0.4 %    PT/INR - ( 2024 15:15 )   PT: 14.2 sec;   INR: 1.22 ratio         PTT - ( 2024 15:15 )  PTT:29.8 sec      142  |  109[H]  |  19[H]  ----------------------------<  139[H]  3.7   |  27  |  1.11    Ca    8.5      2024 05:50  Phos  3.4       Mg     2.2         TPro  5.6[L]  /  Alb  2.4[L]  /  TBili  0.8  /  DBili  x   /  AST  14  /  ALT  32  /  AlkPhos  131[H]      CAPILLARY BLOOD GLUCOSE      POCT Blood Glucose.: 147 mg/dL (2024 07:58)  POCT Blood Glucose.: 320 mg/dL (2024 20:45)  POCT Blood Glucose.: 284 mg/dL (2024 20:08)        LIVER FUNCTIONS - ( 2024 05:50 )  Alb: 2.4 g/dL / Pro: 5.6 g/dL / ALK PHOS: 131 U/L / ALT: 32 U/L DA / AST: 14 U/L / GGT: x           Creatinine Trend: 1.11<--, 1.20<--  I&O's Summary    2024 07:  -  2024 07:00  --------------------------------------------------------  IN: 600 mL / OUT: 600 mL / NET: 0 mL    2024 07:01  -  2024 11:28  --------------------------------------------------------  IN: 380 mL / OUT: 400 mL / NET: -20 mL                MEDICATIONS:    MEDICATIONS  (STANDING):  aMIOdarone    Tablet 200 milliGRAM(s) Oral daily  aspirin enteric coated 81 milliGRAM(s) Oral daily  atorvastatin 80 milliGRAM(s) Oral at bedtime  azithromycin  IVPB 500 milliGRAM(s) IV Intermittent every 24 hours  cefepime   IVPB      cefepime   IVPB 2000 milliGRAM(s) IV Intermittent every 12 hours  clopidogrel Tablet 75 milliGRAM(s) Oral daily  enoxaparin Injectable 40 milliGRAM(s) SubCutaneous every 24 hours  furosemide   Injectable 40 milliGRAM(s) IV Push every 12 hours  guaiFENesin  milliGRAM(s) Oral every 12 hours  insulin lispro (ADMELOG) corrective regimen sliding scale   SubCutaneous three times a day before meals  insulin lispro (ADMELOG) corrective regimen sliding scale   SubCutaneous at bedtime  metoprolol succinate ER 12.5 milliGRAM(s) Oral daily  multivitamin 1 Tablet(s) Oral daily  tamsulosin 0.4 milliGRAM(s) Oral at bedtime      MEDICATIONS  (PRN):  acetaminophen     Tablet .. 650 milliGRAM(s) Oral every 6 hours PRN Temp greater or equal to 38C (100.4F), Mild Pain (1 - 3)      REVIEW OF SYSTEMS:                           ALL ROS DONE [ X   ]    CONSTITUTIONAL:  LETHARGIC [   ], FEVER [   ], UNRESPONSIVE [   ]  CVS:  CP  [   ], SOB, [   ], PALPITATIONS [   ], DIZZYNESS [   ]  RS: COUGH [   ], SPUTUM [   ]  GI: ABDOMINAL PAIN [   ], NAUSEA [   ], VOMITINGS [   ], DIARRHEA [   ], CONSTIPATION [   ]  :  DYSURIA [   ], NOCTURIA [   ], INCREASED FREQUENCY [   ], DRIBLING [   ],  SKELETAL: PAINFUL JOINTS [   ], SWOLLEN JOINTS [   ], NECK ACHE [   ], LOW BACK ACHE [   ],  SKIN : ULCERS [   ], RASH [   ], ITCHING [   ]  CNS: HEAD ACHE [   ], DOUBLE VISION [   ], BLURRED VISION [   ], AMS / CONFUSION [   ], SEIZURES [   ], WEAKNESS [   ],TINGLING / NUMBNESS [   ]    PHYSICAL EXAMINATION:  GENERAL APPEARANCE: NO DISTRESS  HEENT:  NO PALLOR, NO  JVD,  NO   NODES, NECK SUPPLE  CVS: S1 +, S2 +,   RS: AEEB,  OCCASIONAL  RALES +,   NO RONCHI  ABD: SOFT, NT, NO, BS +  EXT: NO PE  SKIN: WARM,   SKELETAL:  ROM ACCEPTABLE  CNS:  AAO X    ,   DEFICITS    RADIOLOGY :      ASSESSMENT :     Shortness of breath    HTN (hypertension)    Hearing decreased    CVA (cerebral vascular accident)    Hyperlipemia    Kidney stones    Coronary artery disease    CHF (congestive heart failure)    Type 2 diabetes mellitus    Confusion    S/P medial meniscal repair    H/O lithotripsy    No significant past surgical history    S/P tonsillectomy    Bilateral inguinal hernia        PLAN:  HPI:  74y M with PMHx of CVA, MI, HTN, T2DM, HLD, CAD s/p cardiac stents, HFrEF (EF 20-25% 10/2024), hx of malignant melanoma/wide excision, with recent elective bioprosthetic aortic valve replacement and ascending aortic aneurysm repair with transverse hemiarch and CABG x2 (24) with post op course complicated by cardiogenic shock requiring inotropic support with IV dobutamine and milrinone, IABP and also on amiodarone for atrial fibrillation prophylaxis presenting with difficulty breathing for the past 4 days preceded by cough for the past 4 to 5 weeks. Patient was recently admitted to [facility] 10/5-10/8 for pneumonia. Patient reports completion of full course of antibiotics prescribed on discharge. Patient states that he developed cough around a month ago that has been persistent and now causing abdominal muscle pain. Patient developed shortness of breath 4 days ago that got progressively worse prompting him to come to the ED today. Since treatment in ED, patient reports improvement of dyspnea but cough is still present but less frequent. Endorses constipation with no BM for last 3 days. Denies fever, chills, headache, dizziness, chest pain, palpitations, nausea, vomiting, diarrhea, and dysuria.   (2024 18:29)      # PROGNOSIS IS GUARDED. CASE D/W PATIENT, ALL QUESTIONS ANSWERED. ALSO DISCUSSED REGARDING GOC WITH PATIENT. PATIENT CONVEYS THAT HE WILL DISCUSS FURTHER WITH HIS FAMILY AND RECONVENE WITH TEAM.       # ACUTE HYPOXIC RESPIRATORY FAILURE MULTIFACTORIAL - SUSPECT S/T PNA + DECOMPENSATED HF  # HX OF HFrEF [20-25%, 10/24], HX OF RECENT ELECTIVE BIOPROSTHETIC AORTIC VALVE REPLACEMENT, ASCENDING AORTIC ANEURYSM REPAIR W/ TRANSVERSE HEMIARCH AND CABG X 2  [2024] - C/B POST-OP CARDIOGENIC SHOCK REQUIRING IONOTROPIC SUPPORT, IABP   # HX OF CAD S/P STENT  - TELEMETRY  - CEFEPIME + AZITHROMYCIN, F/U BCX  - ANTITUSSIVE  - CHEST PT  - IV LASIX  - F/U CT CHEST  - ON AMIODARONE  - HOLD ENTRESTO, WILL RESUME IF BP TOLERATES  - CARDIOLOGY CONSULT  - ID CONSULT  - PULMONOLOGY CONSULT    # DM  - SSI + FS  - F/U HBA1C    # BPH  - FLOMAX    # HX OF CVA  # HX OF MI  # HTN  # HLD  # HX OF MALIGNANT MELANOMA/WIDE EXCISION  # GI PPX .      -      Patient is a 74y old  Male who presents with a chief complaint of Shortness of breath     (2024 10:43)    PATIENT IS SEEN AND EXAMINED IN MEDICAL FLOOR.      ALLERGIES:  No Known Allergies      Daily Height in cm: 167.64 (2024 14:07)    Daily Weight in k (2024 04:34)    VITALS:    Vital Signs Last 24 Hrs  T(C): 36.4 (2024 11:12), Max: 36.9 (2024 20:00)  T(F): 97.5 (2024 11:12), Max: 98.5 (2024 20:00)  HR: 88 (2024 11:12) (86 - 110)  BP: 92/69 (2024 11:12) (92/69 - 126/85)  BP(mean): --  RR: 18 (2024 11:12) (18 - 20)  SpO2: 97% (2024 11:12) (93% - 98%)    Parameters below as of 2024 11:12  Patient On (Oxygen Delivery Method): nasal cannula  O2 Flow (L/min): 2      LABS:    CBC Full  -  ( 2024 05:50 )  WBC Count : 7.45 K/uL  RBC Count : 4.54 M/uL  Hemoglobin : 12.5 g/dL  Hematocrit : 37.4 %  Platelet Count - Automated : 331 K/uL  Mean Cell Volume : 82.4 fl  Mean Cell Hemoglobin : 27.5 pg  Mean Cell Hemoglobin Concentration : 33.4 g/dL  Auto Neutrophil # : 5.35 K/uL  Auto Lymphocyte # : 1.35 K/uL  Auto Monocyte # : 0.67 K/uL  Auto Eosinophil # : 0.02 K/uL  Auto Basophil # : 0.03 K/uL  Auto Neutrophil % : 71.8 %  Auto Lymphocyte % : 18.1 %  Auto Monocyte % : 9.0 %  Auto Eosinophil % : 0.3 %  Auto Basophil % : 0.4 %    PT/INR - ( 2024 15:15 )   PT: 14.2 sec;   INR: 1.22 ratio         PTT - ( 2024 15:15 )  PTT:29.8 sec      142  |  109[H]  |  19[H]  ----------------------------<  139[H]  3.7   |  27  |  1.11    Ca    8.5      2024 05:50  Phos  3.4       Mg     2.2         TPro  5.6[L]  /  Alb  2.4[L]  /  TBili  0.8  /  DBili  x   /  AST  14  /  ALT  32  /  AlkPhos  131[H]      CAPILLARY BLOOD GLUCOSE      POCT Blood Glucose.: 147 mg/dL (2024 07:58)  POCT Blood Glucose.: 320 mg/dL (2024 20:45)  POCT Blood Glucose.: 284 mg/dL (2024 20:08)        LIVER FUNCTIONS - ( 2024 05:50 )  Alb: 2.4 g/dL / Pro: 5.6 g/dL / ALK PHOS: 131 U/L / ALT: 32 U/L DA / AST: 14 U/L / GGT: x           Creatinine Trend: 1.11<--, 1.20<--  I&O's Summary    2024 07:01  -  2024 07:00  --------------------------------------------------------  IN: 600 mL / OUT: 600 mL / NET: 0 mL    2024 07:01  -  2024 11:28  --------------------------------------------------------  IN: 380 mL / OUT: 400 mL / NET: -20 mL                MEDICATIONS:    MEDICATIONS  (STANDING):  aMIOdarone    Tablet 200 milliGRAM(s) Oral daily  aspirin enteric coated 81 milliGRAM(s) Oral daily  atorvastatin 80 milliGRAM(s) Oral at bedtime  azithromycin  IVPB 500 milliGRAM(s) IV Intermittent every 24 hours  cefepime   IVPB      cefepime   IVPB 2000 milliGRAM(s) IV Intermittent every 12 hours  clopidogrel Tablet 75 milliGRAM(s) Oral daily  enoxaparin Injectable 40 milliGRAM(s) SubCutaneous every 24 hours  furosemide   Injectable 40 milliGRAM(s) IV Push every 12 hours  guaiFENesin  milliGRAM(s) Oral every 12 hours  insulin lispro (ADMELOG) corrective regimen sliding scale   SubCutaneous three times a day before meals  insulin lispro (ADMELOG) corrective regimen sliding scale   SubCutaneous at bedtime  metoprolol succinate ER 12.5 milliGRAM(s) Oral daily  multivitamin 1 Tablet(s) Oral daily  tamsulosin 0.4 milliGRAM(s) Oral at bedtime      MEDICATIONS  (PRN):  acetaminophen     Tablet .. 650 milliGRAM(s) Oral every 6 hours PRN Temp greater or equal to 38C (100.4F), Mild Pain (1 - 3)      REVIEW OF SYSTEMS:                           ALL ROS DONE [ X   ]    CONSTITUTIONAL:  LETHARGIC [   ], FEVER [   ], UNRESPONSIVE [   ]  CVS:  CP  [   ], SOB, [   ], PALPITATIONS [   ], DIZZYNESS [   ]  RS: COUGH [   ], SPUTUM [   ]  GI: ABDOMINAL PAIN [   ], NAUSEA [   ], VOMITINGS [   ], DIARRHEA [   ], CONSTIPATION [   ]  :  DYSURIA [   ], NOCTURIA [   ], INCREASED FREQUENCY [   ], DRIBLING [   ],  SKELETAL: PAINFUL JOINTS [   ], SWOLLEN JOINTS [   ], NECK ACHE [   ], LOW BACK ACHE [   ],  SKIN : ULCERS [   ], RASH [   ], ITCHING [   ]  CNS: HEAD ACHE [   ], DOUBLE VISION [   ], BLURRED VISION [   ], AMS / CONFUSION [   ], SEIZURES [   ], WEAKNESS [   ],TINGLING / NUMBNESS [   ]    PHYSICAL EXAMINATION:  GENERAL APPEARANCE: NO DISTRESS  HEENT:  NO PALLOR, NO  JVD,  NO   NODES, NECK SUPPLE  CVS: S1 +, S2 +,   RS: AEEB,  OCCASIONAL  RALES +,   RONCHI +  ABD: SOFT, NT, NO, BS +  EXT: PE +   SKIN: WARM,   SKELETAL:  ROM ACCEPTABLE  CNS:  AAO X 3    RADIOLOGY :  RADIOLOGY AND READINGS REVIEWED    ASSESSMENT :     Shortness of breath    HTN (hypertension)    Hearing decreased    CVA (cerebral vascular accident)    Hyperlipemia    Kidney stones    Coronary artery disease    CHF (congestive heart failure)    Type 2 diabetes mellitus    Confusion    S/P medial meniscal repair    H/O lithotripsy    S/P tonsillectomy    Bilateral inguinal hernia        PLAN:  HPI:  74y M with PMHx of CVA, MI, HTN, T2DM, HLD, CAD s/p cardiac stents, HFrEF (EF 20-25% 10/2024), hx of malignant melanoma/wide excision, with recent elective bioprosthetic aortic valve replacement and ascending aortic aneurysm repair with transverse hemiarch and CABG x2 (24) with post op course complicated by cardiogenic shock requiring inotropic support with IV dobutamine and milrinone, IABP and also on amiodarone for atrial fibrillation prophylaxis presenting with difficulty breathing for the past 4 days preceded by cough for the past 4 to 5 weeks. Patient was recently admitted to [facility] 10/5-10/8 for pneumonia. Patient reports completion of full course of antibiotics prescribed on discharge. Patient states that he developed cough around a month ago that has been persistent and now causing abdominal muscle pain. Patient developed shortness of breath 4 days ago that got progressively worse prompting him to come to the ED today. Since treatment in ED, patient reports improvement of dyspnea but cough is still present but less frequent. Endorses constipation with no BM for last 3 days. Denies fever, chills, headache, dizziness, chest pain, palpitations, nausea, vomiting, diarrhea, and dysuria.   (2024 18:29)      # PROGNOSIS IS GUARDED. CASE D/W PATIENT, ALL QUESTIONS ANSWERED. ALSO DISCUSSED REGARDING GOC WITH PATIENT. PATIENT CONVEYS THAT HE WILL DISCUSS FURTHER WITH HIS FAMILY AND RECONVENE WITH TEAM.       # ACUTE HYPOXIC RESPIRATORY FAILURE MULTIFACTORIAL - LIKELY S/T PNA + DECOMPENSATED HF  # HX OF HFrEF [20-25%, 10/24], HX OF RECENT ELECTIVE BIOPROSTHETIC AORTIC VALVE REPLACEMENT, ASCENDING AORTIC ANEURYSM REPAIR W/ TRANSVERSE HEMIARCH AND CABG X 2  [2024] - C/B POST-OP CARDIOGENIC SHOCK REQUIRING IONOTROPIC SUPPORT, IABP   # HX OF CAD S/P STENT  - TELEMETRY  - CEFEPIME + AZITHROMYCIN, F/U BCX  - ANTITUSSIVE  - CHEST PT  - IV LASIX  - NOTED CT CHEST  - ON AMIODARONE  - HOLD ENTRESTO, WILL RESUME IF BP TOLERATES  - CARDIOLOGY CONSULT  - ID CONSULT  - PULMONOLOGY CONSULT    # DM  # HBA1C - 10.2  - SSI + FS  - ENDOCRINOLOGY CONSULT    # BPH  - FLOMAX    # HX OF CVA  # HX OF MI  # HTN  # HLD  # HX OF MALIGNANT MELANOMA/WIDE EXCISION  # GI PPX

## 2024-11-27 NOTE — PROGRESS NOTE ADULT - PROBLEM SELECTOR PLAN 2
p/w sob and  3+ pitting LE edema x 4 days  -CXR shows Significant bibasilar infiltrates improved on the right but increased on the left.  -BNP 74829 -- (8193 on 10/8)  -takes metoprolol, entresto, farxiga, lasix and amiodarone for Afib ppx  -TTE 10/8:  EF 20-25%  -c/w home metoprolol   -hold entresto in s/o sepsis  -Start IV lasix 40mg IV BID  -f/u echo  -Remote tele  -Troponin 27.7-->24.6  -f/u repeat troponin  -daily weights, strict I&Os  -Cardio Consulted: Dr. Ward

## 2024-11-27 NOTE — CONSULT NOTE ADULT - ASSESSMENT
Pneumonia  Leukocytosis - decreased      Plan - Cont Maxipime 2gms iv q12hrs  Cont Azithromycin 500mgs iv q24hrs x 3 days

## 2024-11-27 NOTE — CONSULT NOTE ADULT - SUBJECTIVE AND OBJECTIVE BOX
Endocrine Initial Consult Note:     Patient is a 74y old  Male who presents with a chief complaint of acute CHF/sepsis 2/2 PNA (27 Nov 2024 11:27)    HPI:  74y M with PMHx of CVA, MI, HTN, T2DM, HLD, CAD s/p cardiac stents, HFrEF (EF 20-25% 10/2024), hx of malignant melanoma/wide excision, with recent elective bioprosthetic aortic valve replacement and ascending aortic aneurysm repair with transverse hemiarch and CABG x2 (4/8/24) with post op course complicated by cardiogenic shock requiring inotropic support with IV dobutamine and milrinone, IABP and also on amiodarone for atrial fibrillation prophylaxis presenting with difficulty breathing for the past 4 days preceded by cough for the past 4 to 5 weeks. Patient was recently admitted to [facility] 10/5-10/8 for pneumonia. Patient reports completion of full course of antibiotics prescribed on discharge. Patient states that he developed cough around a month ago that has been persistent and now causing abdominal muscle pain. Patient developed shortness of breath 4 days ago that got progressively worse prompting him to come to the ED today. Since treatment in ED, patient reports improvement of dyspnea but cough is still present but less frequent. Endorses constipation with no BM for last 3 days. Denies fever, chills, headache, dizziness, chest pain, palpitations, nausea, vomiting, diarrhea, and dysuria.   (26 Nov 2024 18:29)    Endocrinology consulted for 74y Male with hyperglycemia. A1C from today was 10.2%. He states that he had open heart surgery 5 months ago and has lost 40 lbs since then due to poor appetite.    Diabetes History:  Diagnosis: Diagnosed in 2019   Home regimen: Metforming 500 mg qd   Home fingersticks/ CGM: occasionally fingersticks  Hypoglycemia events: Rare  Retinopathy/most recent opthalmology:  Last saw ophthalmologist 1 year ago  Peripheral Neuropathy: Reports numbness to feet B/L   Nephropathy: No  Cardiovascular disease: s/p heart sx 5 months ago (bypass?)  Peripheral vascular disease: H/o stent both thighs - Vasc: Dr. Ramon  Diet: High Carb  Recent A1C: 10.2   PCP: Dr. Bird Suarez   Endocrinologist: No    Review of systems:  Constitutional:  Constitutional: No fever, yes weight loss (185 lb to 145 lb in last 5 months), fatigue, low energy, generalized weakness, poor appetite   Eyes: No redness, no dryness, no pain, no tearing, no bulging  Cardiovascular/ Respiratory: No palpitations,, no chest pain, no shortness of breath, no exercise intolerance, no cough, no leg/ ankle swelling  Gastrointestinal: No trouble swallowing, no heart burn, no abdominal pain, no bloating, no nausea, no vomiting, no constipation, no diarrhea, no frequent bowel movements  Skin: No excessive hair growth, no hair loss, no acne, no excessive sweating, no rash, no easy bruising  Neurological: No headaches, no change in vision, no dizziness/ lightheadedness, no tremors, yes numbness/ tingling in feet, no pain/ burning in feet, no trouble with balance, no muscular weakness.   Endocrine: Yes frequent urination, excessive urination, excessive thirst, symptoms     Past Medical and Surgical History:  HTN (hypertension)  Hearing decreased  CVA (cerebral vascular accident)12/22/2016  Hyperlipemia  Kidney stones  Coronary artery disease  MI 12/22/2016  CHF (congestive heart failure)  1/2017 stents x3  type 2 diabetes mellitus  2016  Confusion from stroke    SHx:  S/P medial meniscal repair and ligament surgery  H/O lithotripsy  S/P tonsillectomy  Bilateral inguinal hernia    FAMILY HISTORY:  Family history of diabetes mellitus  Family history of stroke    Social History:  Occupation: Retired  Marital status/Lives with: / wife   Exercise: No  Tobacco: No  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, in recovery  illicit drug abuse: No  Health insurance status: Yes    MEDICATIONS  (STANDING):  aMIOdarone    Tablet 200 milliGRAM(s) Oral daily  aspirin enteric coated 81 milliGRAM(s) Oral daily  atorvastatin 80 milliGRAM(s) Oral at bedtime  azithromycin  IVPB 500 milliGRAM(s) IV Intermittent every 24 hours  cefepime   IVPB      cefepime   IVPB 2000 milliGRAM(s) IV Intermittent every 12 hours  clopidogrel Tablet 75 milliGRAM(s) Oral daily  enoxaparin Injectable 40 milliGRAM(s) SubCutaneous every 24 hours  furosemide   Injectable 40 milliGRAM(s) IV Push every 12 hours  guaiFENesin  milliGRAM(s) Oral every 12 hours  insulin lispro (ADMELOG) corrective regimen sliding scale   SubCutaneous three times a day before meals  insulin lispro (ADMELOG) corrective regimen sliding scale   SubCutaneous at bedtime  metoprolol succinate ER 12.5 milliGRAM(s) Oral daily  multivitamin 1 Tablet(s) Oral daily  tamsulosin 0.4 milliGRAM(s) Oral at bedtime    MEDICATIONS  (PRN):  acetaminophen     Tablet .. 650 milliGRAM(s) Oral every 6 hours PRN Temp greater or equal to 38C (100.4F), Mild Pain (1 - 3)      Physical Examination  Vital Signs Last 24 Hrs  T(C): 36.4 (27 Nov 2024 11:12), Max: 36.9 (26 Nov 2024 20:00)  T(F): 97.5 (27 Nov 2024 11:12), Max: 98.5 (26 Nov 2024 20:00)  HR: 88 (27 Nov 2024 11:12) (86 - 110)  BP: 92/69 (27 Nov 2024 11:12) (92/69 - 126/85)  BP(mean): --  RR: 18 (27 Nov 2024 11:12) (18 - 20)  SpO2: 97% (27 Nov 2024 11:12) (93% - 98%)    Parameters below as of 27 Nov 2024 11:12  Patient On (Oxygen Delivery Method): nasal cannula  O2 Flow (L/min): 2    Constitutional: No acute distress, ill- appearing, no anxious appearing, hyperkinetic, no diaphoretic  HEENT: Moist mucous membranes  Neck:  No JVD, bruits or thyromegaly, No thyroid nodules palpable  Respiratory:  Respiratory effort normal, lungs clear to ausculation  Cardiovascular:  Regular heart rate, normal S1 and S2 sounds  Gastrointestinal: Soft, non tender without hepatosplenomegaly and masses, yes central abdominal obesity  Extremities: Sensation intact to monofilament in feet, no cyanosis, clubbing or edema, positive pedal pulses  Neurological:  Oriented to person, place and time, No gross sensory or motor defects, visual fields intact to confrontation, normal deep tendon reflexes    Labs:                        12.5   7.45  )-----------( 331      ( 27 Nov 2024 05:50 )             37.4     11-27    142  |  109[H]  |  19[H]  ----------------------------<  139[H]  3.7   |  27  |  1.11    Ca    8.5      27 Nov 2024 05:50  Phos  3.4     11-27  Mg     2.2     11-27    TPro  5.6[L]  /  Alb  2.4[L]  /  TBili  0.8  /  DBili  x   /  AST  14  /  ALT  32  /  AlkPhos  131[H]  11-27        PT/INR - ( 26 Nov 2024 15:15 )   PT: 14.2 sec;   INR: 1.22 ratio         PTT - ( 26 Nov 2024 15:15 )  PTT:29.8 sec  Urinalysis Basic - ( 27 Nov 2024 05:50 )    Color: x / Appearance: x / SG: x / pH: x  Gluc: 139 mg/dL / Ketone: x  / Bili: x / Urobili: x   Blood: x / Protein: x / Nitrite: x   Leuk Esterase: x / RBC: x / WBC x   Sq Epi: x / Non Sq Epi: x / Bacteria: x      CAPILLARY BLOOD GLUCOSE      POCT Blood Glucose.: 219 mg/dL (27 Nov 2024 11:32)  POCT Blood Glucose.: 147 mg/dL (27 Nov 2024 07:58)  POCT Blood Glucose.: 320 mg/dL (26 Nov 2024 20:45)  POCT Blood Glucose.: 284 mg/dL (26 Nov 2024 20:08)      Radiology and diagnostic studies:      Assessment and Plan:  74y Male   Endocrinology is consulted fro    1) Type 2 diabetes:      Recommendations:  Basal Insulin:   Glargine ( Lantus) units once daily    Nutritional Insulin:   Lispro (Admelog) units with breakfast, Hold if NPO or eating <50% of meals  Lispro ( Admelog) units with lunch, Hold if NPO or eating < 50% of meals  Lispro (Admelog) units with dinner, Hold if NPO or eating < 50% of meals    Correctional Insulin:  Normal Lispro ( Admelog) correctional scale with meals and bedtime    Oral Diabetes Medications:  Non in the hospital     Endocrine Initial Consult Note:     Patient is a 74y old  Male who presents with a chief complaint of acute CHF/sepsis 2/2 PNA (27 Nov 2024 11:27)    HPI:  74y M with PMHx of CVA, MI, HTN, T2DM, HLD, CAD s/p cardiac stents, HFrEF (EF 20-25% 10/2024), hx of malignant melanoma/wide excision, with recent elective bioprosthetic aortic valve replacement and ascending aortic aneurysm repair with transverse hemiarch and CABG x2 (4/8/24) with post op course complicated by cardiogenic shock requiring inotropic support with IV dobutamine and milrinone, IABP and also on amiodarone for atrial fibrillation prophylaxis presenting with difficulty breathing for the past 4 days preceded by cough for the past 4 to 5 weeks. Patient was recently admitted to [facility] 10/5-10/8 for pneumonia. Patient reports completion of full course of antibiotics prescribed on discharge. Patient states that he developed cough around a month ago that has been persistent and now causing abdominal muscle pain. Patient developed shortness of breath 4 days ago that got progressively worse prompting him to come to the ED today. Since treatment in ED, patient reports improvement of dyspnea but cough is still present but less frequent. Endorses constipation with no BM for last 3 days. Denies fever, chills, headache, dizziness, chest pain, palpitations, nausea, vomiting, diarrhea, and dysuria.   (26 Nov 2024 18:29)    Endocrinology consulted for 74y Male with hyperglycemia. A1C from today was 10.2%. He states that he had open heart surgery 5 months ago and has lost 40 lbs since then due to poor appetite.    Diabetes History:  Diagnosis: Diagnosed in 2019   Home regimen: Metformin 500 mg qd   Home fingersticks/ CGM: occasionally fingersticks  Hypoglycemia events: Rare  Retinopathy/most recent opthalmology:  Last saw ophthalmologist 1 year ago  Peripheral Neuropathy: Reports numbness to feet B/L   Nephropathy: No  Cardiovascular disease: s/p heart sx 5 months ago (bypass?)  Peripheral vascular disease: H/o stent both thighs - Vasc: Dr. Ramon  Diet: High Carb  Recent A1C: 10.2   PCP: Dr. Bird Suarez   Endocrinologist: No    Review of systems:  Constitutional:  Constitutional: No fever, yes weight loss (185 lb to 145 lb in last 5 months), fatigue, low energy, generalized weakness, poor appetite   Eyes: No redness, no dryness, no pain, no tearing, no bulging  Cardiovascular/ Respiratory: No palpitations,, no chest pain, no shortness of breath, no exercise intolerance, no cough, no leg/ ankle swelling  Gastrointestinal: No trouble swallowing, no heart burn, no abdominal pain, no bloating, no nausea, no vomiting, no constipation, no diarrhea, no frequent bowel movements  Skin: No excessive hair growth, no hair loss, no acne, no excessive sweating, no rash, no easy bruising  Neurological: No headaches, no change in vision, no dizziness/ lightheadedness, no tremors, yes numbness/ tingling in feet, no pain/ burning in feet, no trouble with balance, no muscular weakness.   Endocrine: Yes frequent urination, excessive urination, excessive thirst, symptoms     Past Medical and Surgical History:  HTN (hypertension)  Hearing decreased  CVA (cerebral vascular accident)12/22/2016  Hyperlipemia  Kidney stones  Coronary artery disease  MI 12/22/2016  CHF (congestive heart failure)  1/2017 stents x3  type 2 diabetes mellitus  2016  Confusion from stroke    SHx:  S/P medial meniscal repair and ligament surgery  H/O lithotripsy  S/P tonsillectomy  Bilateral inguinal hernia    FAMILY HISTORY:  Family history of diabetes mellitus  Family history of stroke    Social History:  Occupation: Retired  Marital status/Lives with: / wife   Exercise: No  Tobacco: No  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, in recovery  illicit drug abuse: No  Health insurance status: Yes    MEDICATIONS  (STANDING):  aMIOdarone    Tablet 200 milliGRAM(s) Oral daily  aspirin enteric coated 81 milliGRAM(s) Oral daily  atorvastatin 80 milliGRAM(s) Oral at bedtime  azithromycin  IVPB 500 milliGRAM(s) IV Intermittent every 24 hours  cefepime   IVPB      cefepime   IVPB 2000 milliGRAM(s) IV Intermittent every 12 hours  clopidogrel Tablet 75 milliGRAM(s) Oral daily  enoxaparin Injectable 40 milliGRAM(s) SubCutaneous every 24 hours  furosemide   Injectable 40 milliGRAM(s) IV Push every 12 hours  guaiFENesin  milliGRAM(s) Oral every 12 hours  insulin lispro (ADMELOG) corrective regimen sliding scale   SubCutaneous three times a day before meals  insulin lispro (ADMELOG) corrective regimen sliding scale   SubCutaneous at bedtime  metoprolol succinate ER 12.5 milliGRAM(s) Oral daily  multivitamin 1 Tablet(s) Oral daily  tamsulosin 0.4 milliGRAM(s) Oral at bedtime    MEDICATIONS  (PRN):  acetaminophen     Tablet .. 650 milliGRAM(s) Oral every 6 hours PRN Temp greater or equal to 38C (100.4F), Mild Pain (1 - 3)      Physical Examination  Vital Signs Last 24 Hrs  T(C): 36.4 (27 Nov 2024 11:12), Max: 36.9 (26 Nov 2024 20:00)  T(F): 97.5 (27 Nov 2024 11:12), Max: 98.5 (26 Nov 2024 20:00)  HR: 88 (27 Nov 2024 11:12) (86 - 110)  BP: 92/69 (27 Nov 2024 11:12) (92/69 - 126/85)  BP(mean): --  RR: 18 (27 Nov 2024 11:12) (18 - 20)  SpO2: 97% (27 Nov 2024 11:12) (93% - 98%)    Parameters below as of 27 Nov 2024 11:12  Patient On (Oxygen Delivery Method): nasal cannula  O2 Flow (L/min): 2    Constitutional: No acute distress, ill- appearing, no anxious appearing, hyperkinetic, no diaphoretic  HEENT: Moist mucous membranes  Neck:  No JVD, bruits or thyromegaly, No thyroid nodules palpable  Respiratory:  Respiratory effort normal, lungs clear to ausculation  Cardiovascular:  Regular heart rate, normal S1 and S2 sounds  Gastrointestinal: Soft, non tender without hepatosplenomegaly and masses, yes central abdominal obesity  Extremities: Sensation intact to monofilament in feet, no cyanosis, clubbing or edema, positive pedal pulses  Neurological:  Oriented to person, place and time, No gross sensory or motor defects, visual fields intact to confrontation, normal deep tendon reflexes    Labs:                        12.5   7.45  )-----------( 331      ( 27 Nov 2024 05:50 )             37.4     11-27    142  |  109[H]  |  19[H]  ----------------------------<  139[H]  3.7   |  27  |  1.11    Ca    8.5      27 Nov 2024 05:50  Phos  3.4     11-27  Mg     2.2     11-27    TPro  5.6[L]  /  Alb  2.4[L]  /  TBili  0.8  /  DBili  x   /  AST  14  /  ALT  32  /  AlkPhos  131[H]  11-27        PT/INR - ( 26 Nov 2024 15:15 )   PT: 14.2 sec;   INR: 1.22 ratio         PTT - ( 26 Nov 2024 15:15 )  PTT:29.8 sec  Urinalysis Basic - ( 27 Nov 2024 05:50 )    Color: x / Appearance: x / SG: x / pH: x  Gluc: 139 mg/dL / Ketone: x  / Bili: x / Urobili: x   Blood: x / Protein: x / Nitrite: x   Leuk Esterase: x / RBC: x / WBC x   Sq Epi: x / Non Sq Epi: x / Bacteria: x      CAPILLARY BLOOD GLUCOSE      POCT Blood Glucose.: 219 mg/dL (27 Nov 2024 11:32)  POCT Blood Glucose.: 147 mg/dL (27 Nov 2024 07:58)  POCT Blood Glucose.: 320 mg/dL (26 Nov 2024 20:45)  POCT Blood Glucose.: 284 mg/dL (26 Nov 2024 20:08)      Radiology and diagnostic studies:      Assessment and Plan:  74y Male   Endocrinology is consulted fro    1) Type 2 diabetes:      Recommendations:  Basal Insulin:   Increase Glargine ( Lantus) to 5 units once daily    Nutritional Insulin:  2 units Lispro with meals. Hold off if NPO or eating <50% of meals    Low sliding scale     Oral Diabetes Medications:  On discharge, patient will need increased dose of metformin if renal function is good     Endocrine Initial Consult Note:     Patient is a 74y old  Male who presents with a chief complaint of acute CHF/sepsis 2/2 PNA (27 Nov 2024 11:27)    HPI:  74y M with PMHx of CVA, MI, HTN, T2DM, HLD, CAD s/p cardiac stents, HFrEF (EF 20-25% 10/2024), hx of malignant melanoma/wide excision, with recent elective bioprosthetic aortic valve replacement and ascending aortic aneurysm repair with transverse hemiarch and CABG x2 (4/8/24) with post op course complicated by cardiogenic shock requiring inotropic support with IV dobutamine and milrinone, IABP and also on amiodarone for atrial fibrillation prophylaxis presenting with difficulty breathing for the past 4 days preceded by cough for the past 4 to 5 weeks. Patient was recently admitted to [facility] 10/5-10/8 for pneumonia. Patient reports completion of full course of antibiotics prescribed on discharge. Patient states that he developed cough around a month ago that has been persistent and now causing abdominal muscle pain. Patient developed shortness of breath 4 days ago that got progressively worse prompting him to come to the ED today. Since treatment in ED, patient reports improvement of dyspnea but cough is still present but less frequent. Endorses constipation with no BM for last 3 days. Denies fever, chills, headache, dizziness, chest pain, palpitations, nausea, vomiting, diarrhea, and dysuria.   (26 Nov 2024 18:29)    Endocrinology consulted for 74y Male with hyperglycemia. A1C from today was 10.2%. He states that he had open heart surgery 5 months ago and has lost 40 lbs since then due to poor appetite.    Diabetes History:  Diagnosis: Diagnosed in 2019   Home regimen: Metformin 500 mg qd, reports compliance   Home fingersticks/ CGM: occasionally fingersticks  Hypoglycemia events: Rare  Retinopathy/most recent opthalmology:  Last saw ophthalmologist 1 year ago  Peripheral Neuropathy: Reports numbness to feet B/L   Nephropathy: No  Cardiovascular disease: s/p heart sx 5 months ago (bypass?)  Peripheral vascular disease: H/o stent both thighs - Vasc: Dr. Ramon  Diet: High Carb  Recent A1C: 10.2   PCP: Dr. Bird Suarez   Endocrinologist: No    Review of systems:  Constitutional: No fever, yes weight loss (185 lb to 145 lb in last 5 months), fatigue, low energy, generalized weakness, poor appetite   Cardiovascular/ Respiratory: No palpitations,, no chest pain, no shortness of breath, no exercise intolerance, no cough, no leg/ ankle swelling  Gastrointestinal: No abdominal pain, no bloating, no nausea, no vomiting, no constipation, no diarrhea, no frequent bowel movements  Neurological: No headaches, no change in vision, no dizziness/ lightheadedness, no tremors, yes numbness/ tingling in feet, no pain/ burning in feet.   Endocrine: Yes frequent urination, excessive urination, excessive thirst, symptoms     Past Medical and Surgical History:  HTN (hypertension)  Hearing decreased  CVA (cerebral vascular accident)12/22/2016  Hyperlipemia  Kidney stones  Coronary artery disease  MI 12/22/2016  CHF (congestive heart failure)  1/2017 stents x3  type 2 diabetes mellitus  2016  Confusion from stroke    SHx:  S/P medial meniscal repair and ligament surgery  H/O lithotripsy  S/P tonsillectomy  Bilateral inguinal hernia    Family Hx:  Family history of diabetes mellitus  Family history of stroke    Social History:  Occupation: Retired  Marital status/Lives with: / wife   Exercise: No  Tobacco: No  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, in recovery  illicit drug abuse: No  Health insurance status: Yes    Medications (Standing):  aMIOdarone    Tablet 200 milliGRAM(s) Oral daily  aspirin enteric coated 81 milliGRAM(s) Oral daily  atorvastatin 80 milliGRAM(s) Oral at bedtime  azithromycin  IVPB 500 milliGRAM(s) IV Intermittent every 24 hours  cefepime   IVPB      cefepime   IVPB 2000 milliGRAM(s) IV Intermittent every 12 hours  clopidogrel Tablet 75 milliGRAM(s) Oral daily  enoxaparin Injectable 40 milliGRAM(s) SubCutaneous every 24 hours  furosemide   Injectable 40 milliGRAM(s) IV Push every 12 hours  guaiFENesin  milliGRAM(s) Oral every 12 hours  insulin lispro (ADMELOG) corrective regimen sliding scale   SubCutaneous three times a day before meals  insulin lispro (ADMELOG) corrective regimen sliding scale   SubCutaneous at bedtime  metoprolol succinate ER 12.5 milliGRAM(s) Oral daily  multivitamin 1 Tablet(s) Oral daily  tamsulosin 0.4 milliGRAM(s) Oral at bedtime    Medications  (PRN):  acetaminophen     Tablet .. 650 milliGRAM(s) Oral every 6 hours PRN Temp greater or equal to 38C (100.4F), Mild Pain (1 - 3)      Physical Examination  Vital Signs Last 24 Hrs  T(C): 36.4 (27 Nov 2024 11:12), Max: 36.9 (26 Nov 2024 20:00)  T(F): 97.5 (27 Nov 2024 11:12), Max: 98.5 (26 Nov 2024 20:00)  HR: 88 (27 Nov 2024 11:12) (86 - 110)  BP: 92/69 (27 Nov 2024 11:12) (92/69 - 126/85)  BP(mean): --  RR: 18 (27 Nov 2024 11:12) (18 - 20)  SpO2: 97% (27 Nov 2024 11:12) (93% - 98%)    Parameters below as of 27 Nov 2024 11:12  Patient On (Oxygen Delivery Method): nasal cannula  O2 Flow (L/min): 2    Constitutional: No acute distress, ill- appearing, no anxious appearing, hyperkinetic, no diaphoretic  HEENT: Moist mucous membranes  Neck:  No JVD, bruits or thyromegaly, No thyroid nodules palpable  Respiratory:  Respiratory effort normal, lungs clear to ausculation  Cardiovascular:  Regular heart rate, normal S1 and S2 sounds  Gastrointestinal: Soft, non tender without hepatosplenomegaly and masses, yes central abdominal obesity  Extremities: Sensation intact  in feet, no cyanosis, clubbing or edema, positive pedal pulses  Neurological:  Oriented to person, place and time,    Labs:                        12.5   7.45  )-----------( 331      ( 27 Nov 2024 05:50 )             37.4     11-27    142  |  109[H]  |  19[H]  ----------------------------<  139[H]  3.7   |  27  |  1.11    Ca    8.5      27 Nov 2024 05:50  Phos  3.4     11-27  Mg     2.2     11-27  TPro  5.6[L]  /  Alb  2.4[L]  /  TBili  0.8  /  DBili  x   /  AST  14  /  ALT  32  /  AlkPhos  131[H]  11-27    capillary Blood glucose:    POCT Blood Glucose.: 219 mg/dL (27 Nov 2024 11:32)  POCT Blood Glucose.: 147 mg/dL (27 Nov 2024 07:58)  POCT Blood Glucose.: 320 mg/dL (26 Nov 2024 20:45)  POCT Blood Glucose.: 284 mg/dL (26 Nov 2024 20:08)    A1C with Estimated Average Glucose Result: 10.2:abetes Association.  Interpretation may vary for children  and adolescents. % (11.27.24 @ 05:50)          Assessment and Plan:  74y Male 74y M with PMHx of CVA, MI, HTN, T2DM, HLD, CAD s/p cardiac stents, HFrEF (EF 20-25% 10/2024), hx of malignant melanoma/wide excision, with recent elective bioprosthetic aortic valve replacement and ascending aortic aneurysm repair with transverse hemiarch and CABG x2 (4/8/24) with post op course complicated by cardiogenic shock requiring inotropic support with IV dobutamine and milrinone, IABP and also on amiodarone for atrial fibrillation prophylaxis presenting with difficulty breathing for the past 4 days.   Endocrinology is consulted for glycemic management    1) Type 2 diabetes:  A1C is above goal.     Recommendations:  Basal Insulin:   Increase Glargine ( Lantus) to 5 units once daily    Nutritional Insulin:  2 units Lispro with meals. Hold off if NPO or eating <50% of meals    Low sliding scale     Oral Diabetes Medications:  On discharge, patient will need increased dose of metformin if renal function is good

## 2024-11-28 LAB
ANION GAP SERPL CALC-SCNC: 8 MMOL/L — SIGNIFICANT CHANGE UP (ref 5–17)
BUN SERPL-MCNC: 26 MG/DL — HIGH (ref 7–18)
CALCIUM SERPL-MCNC: 8.4 MG/DL — SIGNIFICANT CHANGE UP (ref 8.4–10.5)
CHLORIDE SERPL-SCNC: 103 MMOL/L — SIGNIFICANT CHANGE UP (ref 96–108)
CO2 SERPL-SCNC: 29 MMOL/L — SIGNIFICANT CHANGE UP (ref 22–31)
CREAT SERPL-MCNC: 1.24 MG/DL — SIGNIFICANT CHANGE UP (ref 0.5–1.3)
EGFR: 60 ML/MIN/1.73M2 — SIGNIFICANT CHANGE UP
GLUCOSE BLDC GLUCOMTR-MCNC: 154 MG/DL — HIGH (ref 70–99)
GLUCOSE BLDC GLUCOMTR-MCNC: 163 MG/DL — HIGH (ref 70–99)
GLUCOSE BLDC GLUCOMTR-MCNC: 180 MG/DL — HIGH (ref 70–99)
GLUCOSE BLDC GLUCOMTR-MCNC: 199 MG/DL — HIGH (ref 70–99)
GLUCOSE SERPL-MCNC: 165 MG/DL — HIGH (ref 70–99)
HCT VFR BLD CALC: 39.2 % — SIGNIFICANT CHANGE UP (ref 39–50)
HGB BLD-MCNC: 13.1 G/DL — SIGNIFICANT CHANGE UP (ref 13–17)
MCHC RBC-ENTMCNC: 27.3 PG — SIGNIFICANT CHANGE UP (ref 27–34)
MCHC RBC-ENTMCNC: 33.4 G/DL — SIGNIFICANT CHANGE UP (ref 32–36)
MCV RBC AUTO: 81.8 FL — SIGNIFICANT CHANGE UP (ref 80–100)
NRBC # BLD: 0 /100 WBCS — SIGNIFICANT CHANGE UP (ref 0–0)
PLATELET # BLD AUTO: 342 K/UL — SIGNIFICANT CHANGE UP (ref 150–400)
POTASSIUM SERPL-MCNC: 3.4 MMOL/L — LOW (ref 3.5–5.3)
POTASSIUM SERPL-SCNC: 3.4 MMOL/L — LOW (ref 3.5–5.3)
RBC # BLD: 4.79 M/UL — SIGNIFICANT CHANGE UP (ref 4.2–5.8)
RBC # FLD: 14.9 % — HIGH (ref 10.3–14.5)
SODIUM SERPL-SCNC: 140 MMOL/L — SIGNIFICANT CHANGE UP (ref 135–145)
WBC # BLD: 7.8 K/UL — SIGNIFICANT CHANGE UP (ref 3.8–10.5)
WBC # FLD AUTO: 7.8 K/UL — SIGNIFICANT CHANGE UP (ref 3.8–10.5)

## 2024-11-28 RX ORDER — SENNOSIDES 8.6 MG
2 TABLET ORAL AT BEDTIME
Refills: 0 | Status: DISCONTINUED | OUTPATIENT
Start: 2024-11-28 | End: 2024-12-05

## 2024-11-28 RX ORDER — BENZONATATE 100 MG/1
100 CAPSULE ORAL THREE TIMES A DAY
Refills: 0 | Status: DISCONTINUED | OUTPATIENT
Start: 2024-11-28 | End: 2024-12-05

## 2024-11-28 RX ORDER — POLYETHYLENE GLYCOL 3350 17 G/17G
17 POWDER, FOR SOLUTION ORAL DAILY
Refills: 0 | Status: DISCONTINUED | OUTPATIENT
Start: 2024-11-28 | End: 2024-12-05

## 2024-11-28 RX ORDER — IPRATROPIUM BROMIDE AND ALBUTEROL SULFATE 2.5; .5 MG/3ML; MG/3ML
3 SOLUTION RESPIRATORY (INHALATION) EVERY 6 HOURS
Refills: 0 | Status: DISCONTINUED | OUTPATIENT
Start: 2024-11-28 | End: 2024-12-05

## 2024-11-28 RX ORDER — POTASSIUM CHLORIDE 600 MG/1
40 TABLET, EXTENDED RELEASE ORAL ONCE
Refills: 0 | Status: COMPLETED | OUTPATIENT
Start: 2024-11-28 | End: 2024-11-28

## 2024-11-28 RX ADMIN — CEFEPIME 100 MILLIGRAM(S): 2 INJECTION, POWDER, FOR SOLUTION INTRAVENOUS at 17:34

## 2024-11-28 RX ADMIN — ENOXAPARIN SODIUM 40 MILLIGRAM(S): 30 INJECTION SUBCUTANEOUS at 22:32

## 2024-11-28 RX ADMIN — Medication 2 TABLET(S): at 21:40

## 2024-11-28 RX ADMIN — POLYETHYLENE GLYCOL 3350 17 GRAM(S): 17 POWDER, FOR SOLUTION ORAL at 11:28

## 2024-11-28 RX ADMIN — METOPROLOL TARTRATE 12.5 MILLIGRAM(S): 100 TABLET, FILM COATED ORAL at 05:28

## 2024-11-28 RX ADMIN — IPRATROPIUM BROMIDE AND ALBUTEROL SULFATE 3 MILLILITER(S): 2.5; .5 SOLUTION RESPIRATORY (INHALATION) at 14:15

## 2024-11-28 RX ADMIN — Medication 1 TABLET(S): at 11:27

## 2024-11-28 RX ADMIN — BENZONATATE 100 MILLIGRAM(S): 100 CAPSULE ORAL at 05:28

## 2024-11-28 RX ADMIN — POTASSIUM CHLORIDE 40 MILLIEQUIVALENT(S): 600 TABLET, EXTENDED RELEASE ORAL at 11:28

## 2024-11-28 RX ADMIN — AZITHROMYCIN 255 MILLIGRAM(S): 250 TABLET, FILM COATED ORAL at 17:34

## 2024-11-28 RX ADMIN — TAMSULOSIN HYDROCHLORIDE 0.4 MILLIGRAM(S): 0.4 CAPSULE ORAL at 21:40

## 2024-11-28 RX ADMIN — FUROSEMIDE 40 MILLIGRAM(S): 40 TABLET ORAL at 17:33

## 2024-11-28 RX ADMIN — BENZONATATE 100 MILLIGRAM(S): 100 CAPSULE ORAL at 21:39

## 2024-11-28 RX ADMIN — BENZONATATE 100 MILLIGRAM(S): 100 CAPSULE ORAL at 14:55

## 2024-11-28 RX ADMIN — Medication 81 MILLIGRAM(S): at 11:28

## 2024-11-28 RX ADMIN — BENZONATATE 100 MILLIGRAM(S): 100 CAPSULE ORAL at 01:49

## 2024-11-28 RX ADMIN — Medication 4: at 21:37

## 2024-11-28 RX ADMIN — Medication 2: at 11:58

## 2024-11-28 RX ADMIN — CEFEPIME 100 MILLIGRAM(S): 2 INJECTION, POWDER, FOR SOLUTION INTRAVENOUS at 05:28

## 2024-11-28 RX ADMIN — Medication 600 MILLIGRAM(S): at 20:43

## 2024-11-28 RX ADMIN — IPRATROPIUM BROMIDE AND ALBUTEROL SULFATE 3 MILLILITER(S): 2.5; .5 SOLUTION RESPIRATORY (INHALATION) at 09:01

## 2024-11-28 RX ADMIN — FUROSEMIDE 40 MILLIGRAM(S): 40 TABLET ORAL at 05:28

## 2024-11-28 RX ADMIN — Medication 2: at 17:07

## 2024-11-28 RX ADMIN — IPRATROPIUM BROMIDE AND ALBUTEROL SULFATE 3 MILLILITER(S): 2.5; .5 SOLUTION RESPIRATORY (INHALATION) at 20:22

## 2024-11-28 RX ADMIN — Medication 80 MILLIGRAM(S): at 21:39

## 2024-11-28 RX ADMIN — AMIODARONE HYDROCHLORIDE 200 MILLIGRAM(S): 200 TABLET ORAL at 05:28

## 2024-11-28 RX ADMIN — Medication 2: at 08:15

## 2024-11-28 RX ADMIN — CLOPIDOGREL 75 MILLIGRAM(S): 75 TABLET, FILM COATED ORAL at 11:27

## 2024-11-28 RX ADMIN — Medication 600 MILLIGRAM(S): at 05:28

## 2024-11-28 NOTE — PROGRESS NOTE ADULT - SUBJECTIVE AND OBJECTIVE BOX
no complaints, ROS - .          acetaminophen     Tablet .. 650 milliGRAM(s) Oral every 6 hours PRN  albuterol/ipratropium for Nebulization 3 milliLiter(s) Nebulizer every 6 hours  aMIOdarone    Tablet 200 milliGRAM(s) Oral daily  aspirin enteric coated 81 milliGRAM(s) Oral daily  atorvastatin 80 milliGRAM(s) Oral at bedtime  azithromycin  IVPB 500 milliGRAM(s) IV Intermittent every 24 hours  benzonatate 100 milliGRAM(s) Oral three times a day  cefepime   IVPB      cefepime   IVPB 2000 milliGRAM(s) IV Intermittent every 12 hours  clopidogrel Tablet 75 milliGRAM(s) Oral daily  enoxaparin Injectable 40 milliGRAM(s) SubCutaneous every 24 hours  fluticasone propionate 50 MICROgram(s)/spray Nasal Spray 2 Spray(s) Both Nostrils two times a day  furosemide   Injectable 40 milliGRAM(s) IV Push every 12 hours  guaiFENesin  milliGRAM(s) Oral every 12 hours  insulin lispro (ADMELOG) corrective regimen sliding scale   SubCutaneous three times a day before meals  insulin lispro (ADMELOG) corrective regimen sliding scale   SubCutaneous at bedtime  metoprolol succinate ER 12.5 milliGRAM(s) Oral daily  multivitamin 1 Tablet(s) Oral daily  polyethylene glycol 3350 17 Gram(s) Oral daily  senna 2 Tablet(s) Oral at bedtime  tamsulosin 0.4 milliGRAM(s) Oral at bedtime                            12.5   7.45  )-----------( 331      ( 27 Nov 2024 05:50 )             37.4       Hemoglobin: 12.5 g/dL (11-27 @ 05:50)  Hemoglobin: 13.7 g/dL (11-26 @ 15:15)      11-27    142  |  109[H]  |  19[H]  ----------------------------<  139[H]  3.7   |  27  |  1.11    Ca    8.5      27 Nov 2024 05:50  Phos  3.4     11-27  Mg     2.2     11-27    TPro  5.6[L]  /  Alb  2.4[L]  /  TBili  0.8  /  DBili  x   /  AST  14  /  ALT  32  /  AlkPhos  131[H]  11-27    Creatinine Trend: 1.11<--, 1.20<--    COAGS:           T(C): 36.8 (11-28-24 @ 05:12), Max: 36.8 (11-27-24 @ 16:13)  HR: 90 (11-28-24 @ 05:12) (86 - 96)  BP: 106/74 (11-28-24 @ 05:12) (92/69 - 108/75)  RR: 19 (11-28-24 @ 05:12) (18 - 19)  SpO2: 98% (11-28-24 @ 05:12) (93% - 98%)  Wt(kg): --    I&O's Summary    26 Nov 2024 07:01  -  27 Nov 2024 07:00  --------------------------------------------------------  IN: 600 mL / OUT: 600 mL / NET: 0 mL    27 Nov 2024 07:01  -  28 Nov 2024 06:41  --------------------------------------------------------  IN: 1820 mL / OUT: 2550 mL / NET: -730 mL        HEENT:  (-)icterus (-)pallor  CV: N S1 S2 1/6 GAEL (+)2 Pulses B/l  Resp:  Clear to ausculatation B/L, normal effort  GI: (+) BS Soft, NT, ND  Lymph:  (-)Edema, (-)obvious lymphadenopathy  Skin: Warm to touch, Normal turgor  Psych: Appropriate mood and affect        ECG:  	sinus tach 110 BPM, delayed R wave progression nonspecific T wave abnormality    RADIOLOGY:         CXR:   < from: Xray Chest 1 View- PORTABLE-Urgent (Xray Chest 1 View- PORTABLE-Urgent .) (11.26.24 @ 15:08) >   Significant bibasilar infiltrates improved on the right but   increased on the left.      < end of copied text >      ASSESSMENT/PLAN: 	74y Male PMHx of CVA, MI, HTN, T2DM, HLD, CAD s/p cardiac stents, HFrEF (EF 20-25% 10/2024), hx of malignant melanoma/wide excision, with recent elective bioprosthetic aortic valve replacement and ascending aortic aneurysm repair with transverse hemiarch and CABG x2 (4/8/24) with post op course complicated by cardiogenic shock requiring inotropic support with IV dobutamine and milrinone, IABP and also on amiodarone for atrial fibrillation prophylaxis presenting with difficulty breathing for the past 4 days preceded by cough for the past 4 to 5 weeks.    # CHF  - Acute on chronic systolic heart failure  - He is mildly volume overloaded on exam  - COnt Lasix 40 IV BID to keep net negative  - can cont low dose BB  - would resume Entresto if BP remains stable  - Out pt f/u with Dr. Berna Ramon    # CAD   - cont asa and statin    # Post op Afib prophylaxis  - On amio  - Doesnt appear he has demonstarted PAF as he is not on AC currently  - ? if need to be stopped    # PNA  - Abx per medical team

## 2024-11-28 NOTE — PHYSICAL THERAPY INITIAL EVALUATION ADULT - GENERAL OBSERVATIONS, REHAB EVAL
Pt seen supine in bed w/telemetry, denied c/o pain/discomfort, was cooperative during assessment; noted with intermittent coughing

## 2024-11-28 NOTE — PROGRESS NOTE ADULT - ASSESSMENT
_________________________________________________________________________________________  ========>>  M E D I C A L   A T T E N D I N G    F O L L O W  U P  N O T E  <<=========  -----------------------------------------------------------------------------------------------------    - Patient seen and examined by me earlier today. (covering today)   - In summary,  IRA SEN is a 74y year old man admitted with CHF  - Patient today overall doing ok, comfortable, eating OK. ovreall better, + occasional cough     ==================>> REVIEW OF SYSTEM <<=================    GEN: no fever, no chills, no pain  RESP: no SOB, occasional cough  CVS: no chest pain, no palpitations  GI: no abdominal pain, no nausea  : no dysuria, no frequency  Neuro: no headache, no dizziness    ==================>> PHYSICAL EXAM <<=================    GEN: A&O X 3 , NAD , comfortable, pleasant, calm , a bit weak appearing   HEENT: NCAT, MMM, hearing intact  CVS: S1S2 , regular , No M/R/G appreciated  PULM: CTA B/L , limited exam as not taking deep breaths   ABD.: soft. non tender, non distended,  bowel sounds present  Extrem: intact pulses , no edema          ( Note written / Date of service 11-28-24 ( This is certified to be the same as "ENTERED" date above ( for billing purposes)))    ==================>> MEDICATIONS <<====================    MEDICATIONS  (STANDING):  albuterol/ipratropium for Nebulization 3 milliLiter(s) Nebulizer every 6 hours  aMIOdarone    Tablet 200 milliGRAM(s) Oral daily  aspirin enteric coated 81 milliGRAM(s) Oral daily  atorvastatin 80 milliGRAM(s) Oral at bedtime  azithromycin  IVPB 500 milliGRAM(s) IV Intermittent every 24 hours  benzonatate 100 milliGRAM(s) Oral three times a day  cefepime   IVPB      cefepime   IVPB 2000 milliGRAM(s) IV Intermittent every 12 hours  clopidogrel Tablet 75 milliGRAM(s) Oral daily  enoxaparin Injectable 40 milliGRAM(s) SubCutaneous every 24 hours  fluticasone propionate 50 MICROgram(s)/spray Nasal Spray 2 Spray(s) Both Nostrils two times a day  furosemide   Injectable 40 milliGRAM(s) IV Push every 12 hours  guaiFENesin  milliGRAM(s) Oral every 12 hours  insulin lispro (ADMELOG) corrective regimen sliding scale   SubCutaneous three times a day before meals  insulin lispro (ADMELOG) corrective regimen sliding scale   SubCutaneous at bedtime  metoprolol succinate ER 12.5 milliGRAM(s) Oral daily  multivitamin 1 Tablet(s) Oral daily  polyethylene glycol 3350 17 Gram(s) Oral daily  senna 2 Tablet(s) Oral at bedtime  tamsulosin 0.4 milliGRAM(s) Oral at bedtime    MEDICATIONS  (PRN):  acetaminophen     Tablet .. 650 milliGRAM(s) Oral every 6 hours PRN Temp greater or equal to 38C (100.4F), Mild Pain (1 - 3)    ___________  Active diet:  Diet, Regular:   Consistent Carbohydrate No Snacks  DASH/TLC Sodium & Cholesterol Restricted  ___________________    ==================>> VITAL SIGNS <<==================    T(C): 36.3 (11-28-24 @ 11:15), Max: 36.8 (11-27-24 @ 16:13)  HR: 85 (11-28-24 @ 11:15) (83 - 96)  BP: 94/67 (11-28-24 @ 11:15) (94/65 - 108/75)  RR: 20 (11-28-24 @ 11:15) (18 - 20)  SpO2: 93% (11-28-24 @ 11:15) (93% - 98%)     CAPILLARY BLOOD GLUCOSE  POCT Blood Glucose.: 163 mg/dL (28 Nov 2024 11:32)  POCT Blood Glucose.: 154 mg/dL (28 Nov 2024 07:58)  POCT Blood Glucose.: 180 mg/dL (27 Nov 2024 21:02)  POCT Blood Glucose.: 174 mg/dL (27 Nov 2024 16:51)    I&O's Summary    27 Nov 2024 07:01  -  28 Nov 2024 07:00  --------------------------------------------------------  IN: 1820 mL / OUT: 3150 mL / NET: -1330 mL    28 Nov 2024 07:01  -  28 Nov 2024 14:18  --------------------------------------------------------  IN: 480 mL / OUT: 600 mL / NET: -120 mL       ==================>> LAB AND IMAGING <<==================                        13.1   7.80  )-----------( 342      ( 28 Nov 2024 07:35 )             39.2        11-28    140  |  103  |  26[H]  ----------------------------<  165[H]  3.4[L]   |  29  |  1.24    Ca    8.4      28 Nov 2024 07:35  Phos  3.4     11-27  Mg     2.2     11-27    TPro  5.6[L]  /  Alb  2.4[L]  /  TBili  0.8  /  DBili  x   /  AST  14  /  ALT  32  /  AlkPhos  131[H]  11-27    PT/INR - ( 26 Nov 2024 15:15 )   PT: 14.2 sec;   INR: 1.22 ratio    PTT - ( 26 Nov 2024 15:15 )  PTT:29.8 sec               Urinalysis:  11-26-24 @ 19:34  Leuk. Est.: Negative  Nirite: Negative  WBC: --  Blood: Negative     salt Crystal: --     calcium crystal: --     Bili: Negative     cast: --  color: Yellow  Bacteria: --  Epith. cell: --  Yeast: --     Ketone: Negative     Protein: Negative     Glucose: >=1000     sperm: --     Spec.Gravity: 1.010    TSH:      0.86   (11-27-24)           Lipid profile:  (11-27-24)     Total: 188     LDL  : (p)     HDL  :45     TG   :83     HgA1C:   (11-27-24)          (11-27-24)      10.2,   (10-06-24)          (10-06-24)      9.3    Urinalysis with Rflx Culture (collected 26 Nov 2024 19:34)    Culture - Blood (collected 26 Nov 2024 15:35)  Source: .Blood BLOOD  Preliminary Report (27 Nov 2024 22:01):    No growth at 24 hours    Culture - Blood (collected 26 Nov 2024 15:10)  Source: .Blood BLOOD  Preliminary Report (27 Nov 2024 22:01):    No growth at 24 hours      ___________________________________________________________________________________  ===============>>  A S S E S S M E N T   A N D   P L A N <<===============  ------------------------------------------------------------------------------------------    74y M with PMHx of CVA, MI, HTN, T2DM, HLD, CAD s/p cardiac stents, HFrEF (EF 20-25% 10/2024), hx of malignant melanoma/wide excision, with recent elective bioprosthetic aortic valve replacement and ascending aortic aneurysm repair with transverse hemiarch and CABG x2 (4/8/24) with post op course complicated by cardiogenic shock requiring inotropic support with IV dobutamine and milrinone, IABP and also on amiodarone for atrial fibrillation prophylaxis presenting with difficulty breathing for the past 4 days preceded by cough for the past 4 to 5 weeks. Admitted to telemetry for acute on chronic CHF and sepsis 2/2 PNA.      Problem/Plan - 1:  ·  Problem: Sepsis due to pneumonia.   ·  Plan: p/w cough and shortness of breath  -CXR: Significant bibasilar infiltrates improved on the right but increased on the left  -Lactate 1.7  - continue antibiotics per ID  -f/u BCx,, UCx  - pulm onboard, appreciated     Problem/Plan - 2:  ·  Problem: CHF, acute on chronic.   ·  Plan: p/w sob and  3+ pitting LE edema x 4 days  -CXR shows Significant bibasilar infiltrates improved on the right but increased on the left.  -BNP 89199 -- (8193 on 10/8)  -takes metoprolol, entresto, farxiga, lasix and amiodarone for Afib ppx  -TTE 10/8:  EF 20-25%  -c/w home metoprolol   -hold entresto in s/o sepsis  -IV lasix :: monitor I/O  -daily weights, strict I&Os  -Cardio on board, appreciated      Problem/Plan - 3:  ·  Problem: Type 2 diabetes mellitus.   ·  Plan: h/o DM linagliptin and faxiga   -hold oral dm meds  -A1c 10.2  -start moderate sliding scale  -Adjust insulin as indicated >> FS controlled here for now > Endo as needed    -FS ACHS.     Problem/Plan - 4:  ·  Problem: HTN (hypertension).   ·  Plan: h/o HTN   -hold home entresto in s/o sepsis  -c/w metoprolol w/ holding parameters  -monitor BP  -adjust antihypertensive meds as appropriate.     Problem/Plan - 5:  ·  Problem: Coronary artery disease.   ·  Plan: hx of CAD on plavix   -c/w home meds.     Problem/Plan - 6:  ·  Problem: HLD (hyperlipidemia).   ·  Plan: h/o HLD on atorvastatin  -c/w home med  -f/u lipid profile  -calculate ASCVD risk  -DASH diet.     Problem/Plan - 7:  ·  Problem: Prophylactic measure.   ·  Plan: DVT: Lovenox  GI: PPI.    --------------------------------------------  Case discussed with patient., ACP   Education given on findings and plan of care  ___________________________  H. MALGORZATA Elliott. (covering today)   Pager: [phone]

## 2024-11-28 NOTE — PROGRESS NOTE ADULT - SUBJECTIVE AND OBJECTIVE BOX
Time of Visit:  Patient seen and examined.     MEDICATIONS  (STANDING):  albuterol/ipratropium for Nebulization 3 milliLiter(s) Nebulizer every 6 hours  aMIOdarone    Tablet 200 milliGRAM(s) Oral daily  aspirin enteric coated 81 milliGRAM(s) Oral daily  atorvastatin 80 milliGRAM(s) Oral at bedtime  azithromycin  IVPB 500 milliGRAM(s) IV Intermittent every 24 hours  benzonatate 100 milliGRAM(s) Oral three times a day  cefepime   IVPB      cefepime   IVPB 2000 milliGRAM(s) IV Intermittent every 12 hours  clopidogrel Tablet 75 milliGRAM(s) Oral daily  enoxaparin Injectable 40 milliGRAM(s) SubCutaneous every 24 hours  fluticasone propionate 50 MICROgram(s)/spray Nasal Spray 2 Spray(s) Both Nostrils two times a day  furosemide   Injectable 40 milliGRAM(s) IV Push every 12 hours  guaiFENesin  milliGRAM(s) Oral every 12 hours  insulin lispro (ADMELOG) corrective regimen sliding scale   SubCutaneous three times a day before meals  insulin lispro (ADMELOG) corrective regimen sliding scale   SubCutaneous at bedtime  metoprolol succinate ER 12.5 milliGRAM(s) Oral daily  multivitamin 1 Tablet(s) Oral daily  polyethylene glycol 3350 17 Gram(s) Oral daily  senna 2 Tablet(s) Oral at bedtime  tamsulosin 0.4 milliGRAM(s) Oral at bedtime      MEDICATIONS  (PRN):  acetaminophen     Tablet .. 650 milliGRAM(s) Oral every 6 hours PRN Temp greater or equal to 38C (100.4F), Mild Pain (1 - 3)       Medications up to date at time of exam.      PHYSICAL EXAMINATION:  Patient has no new complaints.  GENERAL: The patient  in no apparent distress.     Vital Signs Last 24 Hrs  T(C): 36.3 (28 Nov 2024 15:50), Max: 36.8 (28 Nov 2024 05:12)  T(F): 97.3 (28 Nov 2024 15:50), Max: 98.2 (28 Nov 2024 05:12)  HR: 82 (28 Nov 2024 15:50) (82 - 96)  BP: 100/71 (28 Nov 2024 15:50) (94/65 - 108/75)  BP(mean): --  RR: 19 (28 Nov 2024 15:50) (19 - 20)  SpO2: 98% (28 Nov 2024 15:50) (93% - 98%)    Parameters below as of 28 Nov 2024 15:50  Patient On (Oxygen Delivery Method): room air       (if applicable)    Chest Tube (if applicable)    HEENT: Head is normocephalic and atraumatic. Extraocular muscles are intact. Mucous membranes are moist.     NECK: Supple, no palpable adenopathy.    LUNGS: Fair bilateral air entry   no wheezing, rales, or rhonchi.    HEART: Regular rate and rhythm without murmur.    ABDOMEN: Soft, nontender, and nondistended.  No hepatosplenomegaly is noted.    : No painful voiding, no pelvic pain    EXTREMITIES: Without any cyanosis, clubbing, rash, lesions or edema.    NEUROLOGIC: Awake, alert, oriented, grossly intact    SKIN: Warm, dry, good turgor.      LABS:                        13.1   7.80  )-----------( 342      ( 28 Nov 2024 07:35 )             39.2     11-28    140  |  103  |  26[H]  ----------------------------<  165[H]  3.4[L]   |  29  |  1.24    Ca    8.4      28 Nov 2024 07:35  Phos  3.4     11-27  Mg     2.2     11-27    TPro  5.6[L]  /  Alb  2.4[L]  /  TBili  0.8  /  DBili  x   /  AST  14  /  ALT  32  /  AlkPhos  131[H]  11-27      Urinalysis Basic - ( 28 Nov 2024 07:35 )    Color: x / Appearance: x / SG: x / pH: x  Gluc: 165 mg/dL / Ketone: x  / Bili: x / Urobili: x   Blood: x / Protein: x / Nitrite: x   Leuk Esterase: x / RBC: x / WBC x   Sq Epi: x / Non Sq Epi: x / Bacteria: x                      MICROBIOLOGY: (if applicable)    RADIOLOGY & ADDITIONAL STUDIES:  EKG:   CXR:  ECHO:    IMPRESSION: 74y Male PAST MEDICAL & SURGICAL HISTORY:  HTN (hypertension)      Hearing decreased      CVA (cerebral vascular accident)  12/22/2016      Hyperlipemia      Kidney stones      Coronary artery disease  MI 12/22/2016      CHF (congestive heart failure)  1/2017 stents x3      Type 2 diabetes mellitus  2016      Confusion  from stroke      S/P medial meniscal repair  and ligament surgery      H/O lithotripsy      S/P tonsillectomy      Bilateral inguinal hernia       p/w            Time of Visit:  Patient seen and examined.     MEDICATIONS  (STANDING):  albuterol/ipratropium for Nebulization 3 milliLiter(s) Nebulizer every 6 hours  aMIOdarone    Tablet 200 milliGRAM(s) Oral daily  aspirin enteric coated 81 milliGRAM(s) Oral daily  atorvastatin 80 milliGRAM(s) Oral at bedtime  azithromycin  IVPB 500 milliGRAM(s) IV Intermittent every 24 hours  benzonatate 100 milliGRAM(s) Oral three times a day  cefepime   IVPB      cefepime   IVPB 2000 milliGRAM(s) IV Intermittent every 12 hours  clopidogrel Tablet 75 milliGRAM(s) Oral daily  enoxaparin Injectable 40 milliGRAM(s) SubCutaneous every 24 hours  fluticasone propionate 50 MICROgram(s)/spray Nasal Spray 2 Spray(s) Both Nostrils two times a day  furosemide   Injectable 40 milliGRAM(s) IV Push every 12 hours  guaiFENesin  milliGRAM(s) Oral every 12 hours  insulin lispro (ADMELOG) corrective regimen sliding scale   SubCutaneous three times a day before meals  insulin lispro (ADMELOG) corrective regimen sliding scale   SubCutaneous at bedtime  metoprolol succinate ER 12.5 milliGRAM(s) Oral daily  multivitamin 1 Tablet(s) Oral daily  polyethylene glycol 3350 17 Gram(s) Oral daily  senna 2 Tablet(s) Oral at bedtime  tamsulosin 0.4 milliGRAM(s) Oral at bedtime      MEDICATIONS  (PRN):  acetaminophen     Tablet .. 650 milliGRAM(s) Oral every 6 hours PRN Temp greater or equal to 38C (100.4F), Mild Pain (1 - 3)       Medications up to date at time of exam.      PHYSICAL EXAMINATION:  Patient has no new complaints.  GENERAL: The patient  in no apparent distress.     Vital Signs Last 24 Hrs  T(C): 36.3 (28 Nov 2024 15:50), Max: 36.8 (28 Nov 2024 05:12)  T(F): 97.3 (28 Nov 2024 15:50), Max: 98.2 (28 Nov 2024 05:12)  HR: 82 (28 Nov 2024 15:50) (82 - 96)  BP: 100/71 (28 Nov 2024 15:50) (94/65 - 108/75)  BP(mean): --  RR: 19 (28 Nov 2024 15:50) (19 - 20)  SpO2: 98% (28 Nov 2024 15:50) (93% - 98%)    Parameters below as of 28 Nov 2024 15:50  Patient On (Oxygen Delivery Method): room air       (if applicable)    Chest Tube (if applicable)    HEENT: Head is normocephalic and atraumatic. Extraocular muscles are intact. Mucous membranes are moist.     NECK: Supple, no palpable adenopathy.    LUNGS: Fair bilateral air entry   no wheezing, rales, or rhonchi.    HEART: Regular rate and rhythm without murmur.    ABDOMEN: Soft, nontender, and nondistended.  No hepatosplenomegaly is noted.    : No painful voiding, no pelvic pain    EXTREMITIES: Without any cyanosis, clubbing, rash, lesions or edema.    NEUROLOGIC: Awake, alert, oriented, grossly intact    SKIN: Warm, dry, good turgor.      LABS:                        13.1   7.80  )-----------( 342      ( 28 Nov 2024 07:35 )             39.2     11-28    140  |  103  |  26[H]  ----------------------------<  165[H]  3.4[L]   |  29  |  1.24    Ca    8.4      28 Nov 2024 07:35  Phos  3.4     11-27  Mg     2.2     11-27    TPro  5.6[L]  /  Alb  2.4[L]  /  TBili  0.8  /  DBili  x   /  AST  14  /  ALT  32  /  AlkPhos  131[H]  11-27      Urinalysis Basic - ( 28 Nov 2024 07:35 )    Color: x / Appearance: x / SG: x / pH: x  Gluc: 165 mg/dL / Ketone: x  / Bili: x / Urobili: x   Blood: x / Protein: x / Nitrite: x   Leuk Esterase: x / RBC: x / WBC x   Sq Epi: x / Non Sq Epi: x / Bacteria: x                      MICROBIOLOGY: (if applicable)    RADIOLOGY & ADDITIONAL STUDIES:  EKG:   CXR:  ECHO:    IMPRESSION: 74y Male PAST MEDICAL & SURGICAL HISTORY:  HTN (hypertension)      Hearing decreased      CVA (cerebral vascular accident)  12/22/2016      Hyperlipemia      Kidney stones      Coronary artery disease  MI 12/22/2016      CHF (congestive heart failure)  1/2017 stents x3      Type 2 diabetes mellitus  2016      Confusion  from stroke      S/P medial meniscal repair  and ligament surgery      H/O lithotripsy      S/P tonsillectomy      Bilateral inguinal hernia       p/w         IMP: This is a 74 yr old man  with  CVA, MI, HTN, T2DM, HLD, CAD s/p cardiac stents, HFrEF (EF 20-25% 10/2024), hx of malignant melanoma/wide excision, with recent elective bioprosthetic aortic valve replacement and ascending aortic aneurysm repair with transverse hemiarch and CABG x2 (4/8/24) with post op course complicated by cardiogenic shock requiring inotropic support with IV dobutamine and milrinone, IABP and also on amiodarone for atrial fibrillation prophylaxis presenting with difficulty breathing for the past 4 days preceded by cough for the past 4 to 5 weeks. Patient was recently admitted to [facility] 10/5-10/8 for pneumonia. Patient reports completion of full course of antibiotics prescribed on discharge. Patient states that he developed cough around a month ago that has been persistent and now causing abdominal muscle pain. Pat admitted for acute hypoxic resp failure due to acute EX of HF requiring o2 supp. His chronic cough maybe due to HF .     Sugg;    - Continue O2 supp   - Diuresis   - Optimize HF meds   - keep neg fluid balance   - Monitor blood glucose with coverage   - Can d/c antibx if cultures are neg ( pat is afebrile and normal WBC )   - Advice compliance meds and appt   - PT eval   - Out pat ENT eval   - Swallow eval   - Out pat pulmo eval

## 2024-11-29 DIAGNOSIS — Z75.8 OTHER PROBLEMS RELATED TO MEDICAL FACILITIES AND OTHER HEALTH CARE: ICD-10-CM

## 2024-11-29 LAB
ANION GAP SERPL CALC-SCNC: 4 MMOL/L — LOW (ref 5–17)
BUN SERPL-MCNC: 26 MG/DL — HIGH (ref 7–18)
CALCIUM SERPL-MCNC: 8.4 MG/DL — SIGNIFICANT CHANGE UP (ref 8.4–10.5)
CHLORIDE SERPL-SCNC: 104 MMOL/L — SIGNIFICANT CHANGE UP (ref 96–108)
CO2 SERPL-SCNC: 30 MMOL/L — SIGNIFICANT CHANGE UP (ref 22–31)
CREAT SERPL-MCNC: 1.23 MG/DL — SIGNIFICANT CHANGE UP (ref 0.5–1.3)
EGFR: 61 ML/MIN/1.73M2 — SIGNIFICANT CHANGE UP
GLUCOSE BLDC GLUCOMTR-MCNC: 172 MG/DL — HIGH (ref 70–99)
GLUCOSE BLDC GLUCOMTR-MCNC: 197 MG/DL — HIGH (ref 70–99)
GLUCOSE BLDC GLUCOMTR-MCNC: 279 MG/DL — HIGH (ref 70–99)
GLUCOSE BLDC GLUCOMTR-MCNC: 327 MG/DL — HIGH (ref 70–99)
GLUCOSE SERPL-MCNC: 169 MG/DL — HIGH (ref 70–99)
HCT VFR BLD CALC: 39.1 % — SIGNIFICANT CHANGE UP (ref 39–50)
HGB BLD-MCNC: 12.8 G/DL — LOW (ref 13–17)
MCHC RBC-ENTMCNC: 26.7 PG — LOW (ref 27–34)
MCHC RBC-ENTMCNC: 32.7 G/DL — SIGNIFICANT CHANGE UP (ref 32–36)
MCV RBC AUTO: 81.5 FL — SIGNIFICANT CHANGE UP (ref 80–100)
NRBC # BLD: 0 /100 WBCS — SIGNIFICANT CHANGE UP (ref 0–0)
PLATELET # BLD AUTO: 337 K/UL — SIGNIFICANT CHANGE UP (ref 150–400)
POTASSIUM SERPL-MCNC: 3.5 MMOL/L — SIGNIFICANT CHANGE UP (ref 3.5–5.3)
POTASSIUM SERPL-SCNC: 3.5 MMOL/L — SIGNIFICANT CHANGE UP (ref 3.5–5.3)
RBC # BLD: 4.8 M/UL — SIGNIFICANT CHANGE UP (ref 4.2–5.8)
RBC # FLD: 14.7 % — HIGH (ref 10.3–14.5)
SODIUM SERPL-SCNC: 138 MMOL/L — SIGNIFICANT CHANGE UP (ref 135–145)
WBC # BLD: 6.6 K/UL — SIGNIFICANT CHANGE UP (ref 3.8–10.5)
WBC # FLD AUTO: 6.6 K/UL — SIGNIFICANT CHANGE UP (ref 3.8–10.5)

## 2024-11-29 PROCEDURE — 93970 EXTREMITY STUDY: CPT | Mod: 26

## 2024-11-29 PROCEDURE — 99231 SBSQ HOSP IP/OBS SF/LOW 25: CPT

## 2024-11-29 RX ORDER — FUROSEMIDE 40 MG/1
20 TABLET ORAL EVERY 12 HOURS
Refills: 0 | Status: DISCONTINUED | OUTPATIENT
Start: 2024-11-29 | End: 2024-12-02

## 2024-11-29 RX ORDER — FUROSEMIDE 40 MG/1
40 TABLET ORAL EVERY 12 HOURS
Refills: 0 | Status: DISCONTINUED | OUTPATIENT
Start: 2024-11-29 | End: 2024-11-29

## 2024-11-29 RX ORDER — INSULIN GLARGINE 100 [IU]/ML
5 INJECTION, SOLUTION SUBCUTANEOUS AT BEDTIME
Refills: 0 | Status: DISCONTINUED | OUTPATIENT
Start: 2024-11-29 | End: 2024-12-04

## 2024-11-29 RX ADMIN — CEFEPIME 100 MILLIGRAM(S): 2 INJECTION, POWDER, FOR SOLUTION INTRAVENOUS at 17:47

## 2024-11-29 RX ADMIN — Medication 2 TABLET(S): at 21:08

## 2024-11-29 RX ADMIN — Medication 6: at 16:53

## 2024-11-29 RX ADMIN — BENZONATATE 100 MILLIGRAM(S): 100 CAPSULE ORAL at 06:10

## 2024-11-29 RX ADMIN — AMIODARONE HYDROCHLORIDE 200 MILLIGRAM(S): 200 TABLET ORAL at 06:10

## 2024-11-29 RX ADMIN — Medication 2: at 08:00

## 2024-11-29 RX ADMIN — FUROSEMIDE 20 MILLIGRAM(S): 40 TABLET ORAL at 17:49

## 2024-11-29 RX ADMIN — ENOXAPARIN SODIUM 40 MILLIGRAM(S): 30 INJECTION SUBCUTANEOUS at 22:01

## 2024-11-29 RX ADMIN — TAMSULOSIN HYDROCHLORIDE 0.4 MILLIGRAM(S): 0.4 CAPSULE ORAL at 21:09

## 2024-11-29 RX ADMIN — Medication 600 MILLIGRAM(S): at 06:10

## 2024-11-29 RX ADMIN — Medication 2: at 11:58

## 2024-11-29 RX ADMIN — INSULIN GLARGINE 5 UNIT(S): 100 INJECTION, SOLUTION SUBCUTANEOUS at 22:02

## 2024-11-29 RX ADMIN — Medication 5 MILLIGRAM(S): at 21:09

## 2024-11-29 RX ADMIN — POLYETHYLENE GLYCOL 3350 17 GRAM(S): 17 POWDER, FOR SOLUTION ORAL at 11:59

## 2024-11-29 RX ADMIN — BENZONATATE 100 MILLIGRAM(S): 100 CAPSULE ORAL at 13:06

## 2024-11-29 RX ADMIN — Medication 600 MILLIGRAM(S): at 21:09

## 2024-11-29 RX ADMIN — Medication 81 MILLIGRAM(S): at 11:59

## 2024-11-29 RX ADMIN — IPRATROPIUM BROMIDE AND ALBUTEROL SULFATE 3 MILLILITER(S): 2.5; .5 SOLUTION RESPIRATORY (INHALATION) at 08:36

## 2024-11-29 RX ADMIN — CLOPIDOGREL 75 MILLIGRAM(S): 75 TABLET, FILM COATED ORAL at 11:59

## 2024-11-29 RX ADMIN — Medication 80 MILLIGRAM(S): at 21:09

## 2024-11-29 RX ADMIN — BENZONATATE 100 MILLIGRAM(S): 100 CAPSULE ORAL at 21:09

## 2024-11-29 RX ADMIN — AZITHROMYCIN 255 MILLIGRAM(S): 250 TABLET, FILM COATED ORAL at 17:51

## 2024-11-29 RX ADMIN — IPRATROPIUM BROMIDE AND ALBUTEROL SULFATE 3 MILLILITER(S): 2.5; .5 SOLUTION RESPIRATORY (INHALATION) at 15:23

## 2024-11-29 RX ADMIN — Medication 1 TABLET(S): at 11:59

## 2024-11-29 RX ADMIN — IPRATROPIUM BROMIDE AND ALBUTEROL SULFATE 3 MILLILITER(S): 2.5; .5 SOLUTION RESPIRATORY (INHALATION) at 21:09

## 2024-11-29 RX ADMIN — CEFEPIME 100 MILLIGRAM(S): 2 INJECTION, POWDER, FOR SOLUTION INTRAVENOUS at 06:10

## 2024-11-29 NOTE — PROGRESS NOTE ADULT - ASSESSMENT
Pneumonia  Leukocytosis - decreased    Plan -   ·	Cont Maxipime 2gms iv q12hrs  ·	Cont Azithromycin 500mgs till today to complete total of 3 day course

## 2024-11-29 NOTE — PROGRESS NOTE ADULT - SUBJECTIVE AND OBJECTIVE BOX
Subjective:  Chart Notes, Work list Manager, and fingersticks reviewed.    Review of Systems:  Constitutional: No fever  Eyes: No blurry vision  Neuro: No tremors  HEENT: No pain  Cardiovascular: No chest pain, palpitations  Respiratory: No SOB, no cough  GI: No nausea, vomiting, abdominal pain  : No dysuria  Skin: no rash  Psych: no depression  Endocrine: no polyuria, polydipsia  Hem/lymph: no swelling  Osteoporosis: no fractures    ALL OTHER SYSTEMS REVIEWED AND NEGATIVE    UNABLE TO OBTAIN    MEDICATIONS  (STANDING):  albuterol/ipratropium for Nebulization 3 milliLiter(s) Nebulizer every 6 hours  aspirin enteric coated 81 milliGRAM(s) Oral daily  atorvastatin 80 milliGRAM(s) Oral at bedtime  azithromycin  IVPB 500 milliGRAM(s) IV Intermittent every 24 hours  benzonatate 100 milliGRAM(s) Oral three times a day  cefepime   IVPB      cefepime   IVPB 2000 milliGRAM(s) IV Intermittent every 12 hours  clopidogrel Tablet 75 milliGRAM(s) Oral daily  enoxaparin Injectable 40 milliGRAM(s) SubCutaneous every 24 hours  fluticasone propionate 50 MICROgram(s)/spray Nasal Spray 2 Spray(s) Both Nostrils two times a day  furosemide   Injectable 20 milliGRAM(s) IV Push every 12 hours  guaiFENesin  milliGRAM(s) Oral every 12 hours  insulin lispro (ADMELOG) corrective regimen sliding scale   SubCutaneous three times a day before meals  insulin lispro (ADMELOG) corrective regimen sliding scale   SubCutaneous at bedtime  multivitamin 1 Tablet(s) Oral daily  polyethylene glycol 3350 17 Gram(s) Oral daily  senna 2 Tablet(s) Oral at bedtime  tamsulosin 0.4 milliGRAM(s) Oral at bedtime    MEDICATIONS  (PRN):  acetaminophen     Tablet .. 650 milliGRAM(s) Oral every 6 hours PRN Temp greater or equal to 38C (100.4F), Mild Pain (1 - 3)      PHYSICAL EXAM:  VITALS: T(C): 36.9 (11-29-24 @ 10:58)  T(F): 98.4 (11-29-24 @ 10:58), Max: 98.4 (11-29-24 @ 10:58)  HR: 88 (11-29-24 @ 10:58) (82 - 93)  BP: 94/64 (11-29-24 @ 10:58) (90/66 - 100/71)  RR:  (18 - 19)  SpO2:  (97% - 100%)  Wt(kg): --  GENERAL: NAD,   HEENT:  Atraumatic, Normocephalic, drymucous membranes  THYROID: Normal size, no palpable nodules  RESPIRATORY: Clear to auscultation bilaterally; No rales, rhonchi, wheezing  CARDIOVASCULAR: Regular rate and rhythm; No murmurs; no peripheral edema  GI: Soft, nontender, non distended, +ve abdominal obesity  EXTREMITIES: +ve peripheral pulses, -ve pedal edema  SKIN: Dry, intact, No rashes or lesions  PSYCH: Alert and oriented x 3    CAPILLARY BLOOD GLUCOSE      POCT Blood Glucose.: 197 mg/dL (29 Nov 2024 11:55)  POCT Blood Glucose.: 172 mg/dL (29 Nov 2024 08:00)  POCT Blood Glucose.: 327 mg/dL (28 Nov 2024 21:06)  POCT Blood Glucose.: 199 mg/dL (28 Nov 2024 17:02)    11-29    138  |  104  |  26[H]  ----------------------------<  169[H]  3.5   |  30  |  1.23    eGFR: 61    Ca    8.4      11-29  Mg     2.2     11-27  Phos  3.4     11-27    TPro  5.6[L]  /  Alb  2.4[L]  /  TBili  0.8  /  DBili  x   /  AST  14  /  ALT  32  /  AlkPhos  131[H]  11-27    Thyroid Function Tests:  11-27 @ 05:50 TSH 0.86 FreeT4 -- T3 -- Anti TPO -- Anti Thyroglobulin Ab -- TSI --        Assessment and Plan:      Assessment and Plan:  74y Male 74y M with PMHx of CVA, MI, HTN, T2DM, HLD, CAD s/p cardiac stents, HFrEF (EF 20-25% 10/2024), hx of malignant melanoma/wide excision, with recent elective bioprosthetic aortic valve replacement and ascending aortic aneurysm repair with transverse hemiarch and CABG x2 (4/8/24) with post op course complicated by cardiogenic shock requiring inotropic support with IV dobutamine and milrinone, IABP and also on amiodarone for atrial fibrillation prophylaxis presenting with difficulty breathing for the past 4 days.   Endocrinology is consulted for glycemic management    1) Type 2 diabetes:  A1C is above goal.     Recommendations:  Basal Insulin:   Increase Glargine ( Lantus) to 5 units once daily    Nutritional Insulin:  2 units Lispro with meals. Hold off if NPO or eating <50% of meals    Low sliding scale     Oral Diabetes Medications:  On discharge, patient will need increased dose of metformin if renal function is good

## 2024-11-29 NOTE — PROGRESS NOTE ADULT - PROBLEM SELECTOR PLAN 3
h/o DM linagliptin and faxiga   -hold oral dm meds  -f/u A1c  -start moderate sliding scale  -Adjust insulin as indicated  -FS ACHS h/o DM linagliptin and faxiga   -hold oral dm meds  -A1C 10.2  -c/w  moderate sliding scale  -Adjust insulin as indicated  -FS ACHS  -Endo dr. Jimenez following  -Increase Glargine ( Lantus) to 5 units once daily and premeal Insulin: 2 units Lispro with meals. Hold off if NPO or eating <50% of meals

## 2024-11-29 NOTE — PROGRESS NOTE ADULT - PROBLEM SELECTOR PLAN 4
h/o HTN   -hold home entresto in s/o sepsis  -c/w metoprolol w/ holding parameters  -monitor BP  -adjust antihypertensive meds as appropriate h/o HTN   -hold home entresto in s/o sepsis  -hold metoprolol per card  -monitor BP  -adjust antihypertensive meds as appropriate

## 2024-11-29 NOTE — PROGRESS NOTE ADULT - PROBLEM SELECTOR PLAN 8
From home  pt still requires IV lasix 20mg, decreased due to hypotension  monitor I/O. daily weight  monitor home O2 needs. From home  pt still requires IV lasix 20mg, decreased due to hypotension  monitor I/O. daily weight  monitor home O2 needs.  on Cefepime #3, f/u ID reccs for duration abt

## 2024-11-29 NOTE — PROGRESS NOTE ADULT - ASSESSMENT
_________________________________________________________________________________________  ========>>  M E D I C A L   A T T E N D I N G    F O L L O W  U P  N O T E  <<=========  -----------------------------------------------------------------------------------------------------    - Patient seen and examined by me earlier today. (covering today)   - In summary,  IRA SEN is a 74y year old man admitted with CHF, pneumonia   - Patient today overall doing ok, comfortable, eating OK. ovreall better, + occasional cough      BP occasionally on the lower side .. medications afjusted per cardi > LE venous duplex ordered as well ..     ==================>> REVIEW OF SYSTEM <<=================    GEN: no fever, no chills, no pain  RESP: no SOB, occasional cough  CVS: no chest pain, no palpitations  GI: no abdominal pain, no nausea  : no dysuria, no frequency  Neuro: no headache, no dizziness    ==================>> PHYSICAL EXAM <<=================    GEN: A&O X 3 , NAD , comfortable, pleasant, calm , overall improved today .. in chair, more energetic..   HEENT: NCAT, MMM, hearing intact  CVS: S1S2 , regular , No M/R/G appreciated  PULM: CTA B/L , limited exam as not taking deep breaths   ABD.: soft. non tender, non distended,  bowel sounds present  Extrem: intact pulses , mild edema        ( Note written / Date of service 11-29-24 ( This is certified to be the same as "ENTERED" date above ( for billing purposes)))    ==================>> MEDICATIONS <<====================    albuterol/ipratropium for Nebulization 3 milliLiter(s) Nebulizer every 6 hours  aspirin enteric coated 81 milliGRAM(s) Oral daily  atorvastatin 80 milliGRAM(s) Oral at bedtime  azithromycin  IVPB 500 milliGRAM(s) IV Intermittent every 24 hours  benzonatate 100 milliGRAM(s) Oral three times a day  cefepime   IVPB      cefepime   IVPB 2000 milliGRAM(s) IV Intermittent every 12 hours  clopidogrel Tablet 75 milliGRAM(s) Oral daily  enoxaparin Injectable 40 milliGRAM(s) SubCutaneous every 24 hours  fluticasone propionate 50 MICROgram(s)/spray Nasal Spray 2 Spray(s) Both Nostrils two times a day  furosemide   Injectable 20 milliGRAM(s) IV Push every 12 hours  guaiFENesin  milliGRAM(s) Oral every 12 hours  insulin lispro (ADMELOG) corrective regimen sliding scale   SubCutaneous three times a day before meals  insulin lispro (ADMELOG) corrective regimen sliding scale   SubCutaneous at bedtime  multivitamin 1 Tablet(s) Oral daily  polyethylene glycol 3350 17 Gram(s) Oral daily  senna 2 Tablet(s) Oral at bedtime  tamsulosin 0.4 milliGRAM(s) Oral at bedtime    MEDICATIONS  (PRN):  acetaminophen     Tablet .. 650 milliGRAM(s) Oral every 6 hours PRN Temp greater or equal to 38C (100.4F), Mild Pain (1 - 3)    ___________  Active diet:  Diet, Regular:   Consistent Carbohydrate No Snacks  DASH/TLC Sodium & Cholesterol Restricted  ___________________    ==================>> VITAL SIGNS <<==================    Height (cm): 167.6  Weight (kg): 64.4  BMI (kg/m2): 22.9  Vital Signs Last 24 HrsT(C): 36.9 (11-29-24 @ 10:58)  T(F): 98.4 (11-29-24 @ 10:58), Max: 98.4 (11-29-24 @ 10:58)  HR: 88 (11-29-24 @ 10:58) (82 - 93)  BP: 94/64 (11-29-24 @ 10:58)  RR: 18 (11-29-24 @ 10:58) (18 - 19)  SpO2: 98% (11-29-24 @ 10:58) (97% - 100%)      CAPILLARY BLOOD GLUCOSE  POCT Blood Glucose.: 199 mg/dL (28 Nov 2024 17:02)     ==================>> LAB AND IMAGING <<==================                        12.8   6.60  )-----------( 337      ( 29 Nov 2024 05:27 )             39.1        11-29    138  |  104  |  26[H]  ----------------------------<  169[H]  3.5   |  30  |  1.23    Ca    8.4      29 Nov 2024 05:27    WBC count:   6.60 <<== ,  7.80 <<== ,  7.45 <<== ,  12.36 <<==   Hemoglobin:   12.8 <<==,  13.1 <<==,  12.5 <<==,  13.7 <<==  platelets:  337 <==, 342 <==, 331 <==, 367 <==    Creatinine:  1.23  <<==, 1.24  <<==, 1.11  <<==, 1.20  <<==  Sodium:   138  <==, 140  <==, 142  <==, 138  <==       AST:          14(11-27) <== , 24(11-26) <==      ALT:        32(11-27)  <== , 48(11-26)  <==      AP:        131(11-27)  <=, 178(11-26)  <=     Bili:        0.8(11-27)  <=, 0.6(11-26)  <=    ____________________________    M I C R O B I O L O G Y :    Urinalysis with Rflx Culture (collected 26 Nov 2024 19:34)    Culture - Blood (collected 26 Nov 2024 15:35)  Source: .Blood BLOOD  Preliminary Report (28 Nov 2024 22:01):    No growth at 48 Hours    Culture - Blood (collected 26 Nov 2024 15:10)  Source: .Blood BLOOD  Preliminary Report (28 Nov 2024 22:01):    No growth at 48 Hours      ___________________________________________________________________________________  ===============>>  A S S E S S M E N T   A N D   P L A N <<===============  ------------------------------------------------------------------------------------------    74y M with PMHx of CVA, MI, HTN, T2DM, HLD, CAD s/p cardiac stents, HFrEF (EF 20-25% 10/2024), hx of malignant melanoma/wide excision, with recent elective bioprosthetic aortic valve replacement and ascending aortic aneurysm repair with transverse hemiarch and CABG x2 (4/8/24) with post op course complicated by cardiogenic shock requiring inotropic support with IV dobutamine and milrinone, IABP and also on amiodarone for atrial fibrillation prophylaxis presenting with difficulty breathing for the past 4 days preceded by cough for the past 4 to 5 weeks. Admitted to telemetry for acute on chronic CHF and sepsis 2/2 PNA.      Problem/Plan - 1:  ·  Problem: Sepsis due to pneumonia.   ·  Plan: p/w cough and shortness of breath  -CXR: Significant bibasilar infiltrates improved on the right but increased on the left  - antibiotics per ID  -f/u BCx,, UCx  - pulm onboard, appreciated     Problem/Plan - 2:  ·  Problem: CHF, acute on chronic.   ·  Plan: p/w sob and  3+ pitting LE edema x 4 days  -CXR shows Significant bibasilar infiltrates improved on the right but increased on the left.  -BNP 50931 -- (8193 on 10/8)  -takes metoprolol, entresto, farxiga, lasix and amiodarone for Afib ppx  -TTE 10/8:  EF 20-25%  -c/w home metoprolol   -held entresto in s/o sepsis / hypotension   -IV lasix :: monitor I/O >> adjusted per cardio due to lower BP   -daily weights, strict I&Os  -Cardio on board, appreciated   > follow LE duplex      Problem/Plan - 3:  ·  Problem: Type 2 diabetes mellitus.   ·  Plan: h/o DM linagliptin and faxiga   -hold oral dm meds  -A1c 10.2  -start moderate sliding scale  -Adjust insulin as indicated >> FS controlled here for now > Endo as needed    -FS ACHS.     Problem/Plan - 4:  ·  Problem: history of HTN   -hold home entresto in s/o sepsis / hypotension   -c/w metoprolol w/ holding parameters  -monitor BP  -adjust antihypertensive meds as appropriate.     Problem/Plan - 5:  ·  Problem: Coronary artery disease.   ·  Plan: hx of CAD on plavix   -c/w home meds.     Problem/Plan - 6:  ·  Problem: HLD (hyperlipidemia).   ·  Plan: h/o HLD on atorvastatin  -c/w home med  -f/u lipid profile  -calculate ASCVD risk  -DASH diet.     Problem/Plan - 7:  ·  Problem: Prophylactic measure.   ·  Plan: DVT: Lovenox  GI: PPI.    --------------------------------------------  Case discussed with patient., ACP   Education given on findings and plan of care  ___________________________  H. MALGORZATA Elliott. (covering today)   Pager: [phone]

## 2024-11-29 NOTE — PROGRESS NOTE ADULT - ASSESSMENT
74y M with PMHx of CVA, MI, HTN, T2DM, HLD, CAD s/p cardiac stents, HFrEF (EF 20-25% 10/2024), hx of malignant melanoma/wide excision, with recent elective bioprosthetic aortic valve replacement and ascending aortic aneurysm repair with transverse hemiarch and CABG x2 (4/8/24) with post op course complicated by cardiogenic shock requiring inotropic support with IV dobutamine and milrinone, IABP and also on amiodarone for atrial fibrillation prophylaxis presenting with difficulty breathing for the past 4 days preceded by cough for the past 4 to 5 weeks. Admitted to telemetry for acute on chronic CHF and sepsis 2/2 PNA.     Pulm  consulted, f/up on recs       74y M with PMHx of CVA, MI, HTN, T2DM, HLD, CAD s/p cardiac stents, HFrEF (EF 20-25% 10/2024), hx of malignant melanoma/wide excision, with recent elective bioprosthetic aortic valve replacement and ascending aortic aneurysm repair with transverse hemiarch and CABG x2 (4/8/24) with post op course complicated by cardiogenic shock requiring inotropic support with IV dobutamine and milrinone, IABP and also on amiodarone for atrial fibrillation prophylaxis presenting with difficulty breathing for the past 4 days preceded by cough for the past 4 to 5 weeks.  Admitted to telemetry for acute on chronic CHF and sepsis 2/2 PNA. ID, Cardiology and pulmonary following. Started Cefepime and completed 3 days Azithromycin.   CT chest  findings of pulmonary edema have progressed since October 5, 2024. Stable small bilateral pleural effusions. Stable 1.8 cm right chest wall cutaneous lesion.  started IV lasix, held Entresto and Beta blocker per Card.  DVT negative on b/l LE doppler US.   Endocrine consulted for uncontrolled DM. A1C 10.2. Insulin adjusted.   Pt requires oxygen 2L NC for dyspnea, will monitor for home O2 needs.     PT recs no Skilled PT needed.

## 2024-11-29 NOTE — PROGRESS NOTE ADULT - SUBJECTIVE AND OBJECTIVE BOX
DATE OF SERVICE: 11-29-24    Patient denies chest pain or shortness of breath.   Review of symptoms otherwise negative.    acetaminophen     Tablet .. 650 milliGRAM(s) Oral every 6 hours PRN  albuterol/ipratropium for Nebulization 3 milliLiter(s) Nebulizer every 6 hours  aMIOdarone    Tablet 200 milliGRAM(s) Oral daily  aspirin enteric coated 81 milliGRAM(s) Oral daily  atorvastatin 80 milliGRAM(s) Oral at bedtime  azithromycin  IVPB 500 milliGRAM(s) IV Intermittent every 24 hours  benzonatate 100 milliGRAM(s) Oral three times a day  cefepime   IVPB      cefepime   IVPB 2000 milliGRAM(s) IV Intermittent every 12 hours  clopidogrel Tablet 75 milliGRAM(s) Oral daily  enoxaparin Injectable 40 milliGRAM(s) SubCutaneous every 24 hours  fluticasone propionate 50 MICROgram(s)/spray Nasal Spray 2 Spray(s) Both Nostrils two times a day  furosemide   Injectable 20 milliGRAM(s) IV Push every 12 hours  guaiFENesin  milliGRAM(s) Oral every 12 hours  insulin lispro (ADMELOG) corrective regimen sliding scale   SubCutaneous three times a day before meals  insulin lispro (ADMELOG) corrective regimen sliding scale   SubCutaneous at bedtime  metoprolol succinate ER 12.5 milliGRAM(s) Oral daily  multivitamin 1 Tablet(s) Oral daily  polyethylene glycol 3350 17 Gram(s) Oral daily  senna 2 Tablet(s) Oral at bedtime  tamsulosin 0.4 milliGRAM(s) Oral at bedtime                            12.8   6.60  )-----------( 337      ( 29 Nov 2024 05:27 )             39.1       Hemoglobin: 12.8 g/dL (11-29 @ 05:27)  Hemoglobin: 13.1 g/dL (11-28 @ 07:35)  Hemoglobin: 12.5 g/dL (11-27 @ 05:50)  Hemoglobin: 13.7 g/dL (11-26 @ 15:15)      11-29    138  |  104  |  26[H]  ----------------------------<  169[H]  3.5   |  30  |  1.23    Ca    8.4      29 Nov 2024 05:27      Creatinine Trend: 1.23<--, 1.24<--, 1.11<--, 1.20<--    COAGS:           T(C): 36.9 (11-29-24 @ 10:58), Max: 36.9 (11-29-24 @ 10:58)  HR: 88 (11-29-24 @ 10:58) (82 - 93)  BP: 94/64 (11-29-24 @ 10:58) (90/66 - 100/71)  RR: 18 (11-29-24 @ 10:58) (18 - 20)  SpO2: 98% (11-29-24 @ 10:58) (93% - 100%)  Wt(kg): --    I&O's Summary    28 Nov 2024 07:01  -  29 Nov 2024 07:00  --------------------------------------------------------  IN: 1330 mL / OUT: 1600 mL / NET: -270 mL    29 Nov 2024 07:01  -  29 Nov 2024 11:03  --------------------------------------------------------  IN: 200 mL / OUT: 0 mL / NET: 200 mL        HEENT:  (-)icterus (-)pallor  CV: N S1 S2 1/6 GAEL (+)2 Pulses B/l  Resp:  Clear to ausculatation B/L, normal effort  GI: (+) BS Soft, NT, ND  Lymph:  (+)Edema, (-)obvious lymphadenopathy  Skin: Warm to touch, Normal turgor  Psych: Appropriate mood and affect      Tele Sinus     ASSESSMENT/PLAN: 	74y Male PMHx of CVA, MI, HTN, T2DM, HLD, CAD s/p cardiac stents, HFrEF (EF 20-25% 10/2024), hx of malignant melanoma/wide excision, with recent elective bioprosthetic aortic valve replacement and ascending aortic aneurysm repair with transverse hemiarch and CABG x2 (4/8/24) with post op course complicated by cardiogenic shock requiring inotropic support with IV dobutamine and milrinone, IABP and also on amiodarone for atrial fibrillation prophylaxis presenting with difficulty breathing for the past 4 days preceded by cough for the past 4 to 5 weeks.    # CHF  - Acute on chronic systolic heart failure  - He is mildly volume overloaded on exam  - HYpotension has limited diuresis  - D/C beta blocker to allow room for diuretics   - Out pt f/u with Dr. Berna Ramon    # CAD   - cont asa and statin    # Post op Afib prophylaxis  - On amio  - Doesnt appear he has demonstrated PAF as he is not on AC currently  - ? if need to be stopped    # PNA  - Abx per medical team      Rogelio Ward MD, FACC  BEEPER [phone]       DATE OF SERVICE: 11-29-24    Patient denies chest pain or shortness of breath.   Review of symptoms otherwise negative.    acetaminophen     Tablet .. 650 milliGRAM(s) Oral every 6 hours PRN  albuterol/ipratropium for Nebulization 3 milliLiter(s) Nebulizer every 6 hours  aMIOdarone    Tablet 200 milliGRAM(s) Oral daily  aspirin enteric coated 81 milliGRAM(s) Oral daily  atorvastatin 80 milliGRAM(s) Oral at bedtime  azithromycin  IVPB 500 milliGRAM(s) IV Intermittent every 24 hours  benzonatate 100 milliGRAM(s) Oral three times a day  cefepime   IVPB      cefepime   IVPB 2000 milliGRAM(s) IV Intermittent every 12 hours  clopidogrel Tablet 75 milliGRAM(s) Oral daily  enoxaparin Injectable 40 milliGRAM(s) SubCutaneous every 24 hours  fluticasone propionate 50 MICROgram(s)/spray Nasal Spray 2 Spray(s) Both Nostrils two times a day  furosemide   Injectable 20 milliGRAM(s) IV Push every 12 hours  guaiFENesin  milliGRAM(s) Oral every 12 hours  insulin lispro (ADMELOG) corrective regimen sliding scale   SubCutaneous three times a day before meals  insulin lispro (ADMELOG) corrective regimen sliding scale   SubCutaneous at bedtime  metoprolol succinate ER 12.5 milliGRAM(s) Oral daily  multivitamin 1 Tablet(s) Oral daily  polyethylene glycol 3350 17 Gram(s) Oral daily  senna 2 Tablet(s) Oral at bedtime  tamsulosin 0.4 milliGRAM(s) Oral at bedtime                            12.8   6.60  )-----------( 337      ( 29 Nov 2024 05:27 )             39.1       Hemoglobin: 12.8 g/dL (11-29 @ 05:27)  Hemoglobin: 13.1 g/dL (11-28 @ 07:35)  Hemoglobin: 12.5 g/dL (11-27 @ 05:50)  Hemoglobin: 13.7 g/dL (11-26 @ 15:15)      11-29    138  |  104  |  26[H]  ----------------------------<  169[H]  3.5   |  30  |  1.23    Ca    8.4      29 Nov 2024 05:27      Creatinine Trend: 1.23<--, 1.24<--, 1.11<--, 1.20<--    COAGS:           T(C): 36.9 (11-29-24 @ 10:58), Max: 36.9 (11-29-24 @ 10:58)  HR: 88 (11-29-24 @ 10:58) (82 - 93)  BP: 94/64 (11-29-24 @ 10:58) (90/66 - 100/71)  RR: 18 (11-29-24 @ 10:58) (18 - 20)  SpO2: 98% (11-29-24 @ 10:58) (93% - 100%)  Wt(kg): --    I&O's Summary    28 Nov 2024 07:01  -  29 Nov 2024 07:00  --------------------------------------------------------  IN: 1330 mL / OUT: 1600 mL / NET: -270 mL    29 Nov 2024 07:01  -  29 Nov 2024 11:03  --------------------------------------------------------  IN: 200 mL / OUT: 0 mL / NET: 200 mL        HEENT:  (-)icterus (-)pallor  CV: N S1 S2 1/6 GAEL (+)2 Pulses B/l  Resp:  Clear to ausculatation B/L, normal effort  GI: (+) BS Soft, NT, ND  Lymph:  (+)Edema, (-)obvious lymphadenopathy  Skin: Warm to touch, Normal turgor  Psych: Appropriate mood and affect      Tele Sinus     ASSESSMENT/PLAN: 	74y Male PMHx of CVA, MI, HTN, T2DM, HLD, CAD s/p cardiac stents, HFrEF (EF 20-25% 10/2024), hx of malignant melanoma/wide excision, with recent elective bioprosthetic aortic valve replacement and ascending aortic aneurysm repair with transverse hemiarch and CABG x2 (4/8/24) with post op course complicated by cardiogenic shock requiring inotropic support with IV dobutamine and milrinone, IABP and also on amiodarone for atrial fibrillation prophylaxis presenting with difficulty breathing for the past 4 days preceded by cough for the past 4 to 5 weeks.    # CHF  - Acute on chronic systolic heart failure  - He is mildly volume overloaded on exam  - HYpotension has limited diuresis  - D/C beta blocker to allow room for diuretics   - Out pt f/u with Dr. Berna Ramon    # CAD   - cont asa and statin    # Post op Afib prophylaxis  - Doesnt appear he has demonstrated PAF as he is not on AC currently  - D/C amio    # PNA  - Abx per medical team      Rogelio Ward MD, FACC  BEEPER [phone]

## 2024-11-29 NOTE — PROGRESS NOTE ADULT - PROBLEM SELECTOR PLAN 2
p/w sob and  3+ pitting LE edema x 4 days  -CXR shows Significant bibasilar infiltrates improved on the right but increased on the left.  -BNP 70350 -- (8193 on 10/8)  -takes metoprolol, entresto, farxiga, lasix and amiodarone for Afib ppx  -TTE 10/8:  EF 20-25%  -c/w home metoprolol   -hold entresto in s/o sepsis  -Start IV lasix 40mg IV BID  -f/u echo  -Remote tele  -Troponin 27.7-->24.6  -f/u repeat troponin  -daily weights, strict I&Os  -Cardio Consulted: Dr. Ward p/w sob and  3+ pitting LE edema x 4 days  -CXR shows Significant bibasilar infiltrates improved on the right but increased on the left.  -BNP 61756 -- (8193 on 10/8)  -takes metoprolol, entresto, farxiga, lasix and amiodarone for Afib ppx  -TTE 10/8:  EF 20-25%  -hold  metoprolol per card  -hold entresto in s/o sepsis  -s/p  IV lasix 40mg IV BID---> Decreased 20mg BID in the setting BP soft.   -Troponin 27.7-->24.6  -c/w Telemetry   -daily weights, strict I&Os  -Cardio Consulted: Dr. Ward  -Out pt f/u with Dr. Berna Ramon  -leg edema with tightness, DVT negative on US b/l LEs

## 2024-11-29 NOTE — PROGRESS NOTE ADULT - SUBJECTIVE AND OBJECTIVE BOX
Time of Visit:  Patient seen and examined.     MEDICATIONS  (STANDING):  albuterol/ipratropium for Nebulization 3 milliLiter(s) Nebulizer every 6 hours  aspirin enteric coated 81 milliGRAM(s) Oral daily  atorvastatin 80 milliGRAM(s) Oral at bedtime  azithromycin  IVPB 500 milliGRAM(s) IV Intermittent every 24 hours  benzonatate 100 milliGRAM(s) Oral three times a day  cefepime   IVPB      cefepime   IVPB 2000 milliGRAM(s) IV Intermittent every 12 hours  clopidogrel Tablet 75 milliGRAM(s) Oral daily  enoxaparin Injectable 40 milliGRAM(s) SubCutaneous every 24 hours  fluticasone propionate 50 MICROgram(s)/spray Nasal Spray 2 Spray(s) Both Nostrils two times a day  furosemide   Injectable 20 milliGRAM(s) IV Push every 12 hours  guaiFENesin  milliGRAM(s) Oral every 12 hours  insulin lispro (ADMELOG) corrective regimen sliding scale   SubCutaneous three times a day before meals  insulin lispro (ADMELOG) corrective regimen sliding scale   SubCutaneous at bedtime  multivitamin 1 Tablet(s) Oral daily  polyethylene glycol 3350 17 Gram(s) Oral daily  senna 2 Tablet(s) Oral at bedtime  tamsulosin 0.4 milliGRAM(s) Oral at bedtime      MEDICATIONS  (PRN):  acetaminophen     Tablet .. 650 milliGRAM(s) Oral every 6 hours PRN Temp greater or equal to 38C (100.4F), Mild Pain (1 - 3)       Medications up to date at time of exam.    ROS; No fever, chills, cough, congestion on exam.   PHYSICAL EXAMINATION:  Vital Signs Last 24 Hrs  T(C): 36.9 (29 Nov 2024 10:58), Max: 36.9 (29 Nov 2024 10:58)  T(F): 98.4 (29 Nov 2024 10:58), Max: 98.4 (29 Nov 2024 10:58)  HR: 88 (29 Nov 2024 10:58) (82 - 93)  BP: 94/64 (29 Nov 2024 10:58) (90/66 - 100/71)  BP(mean): 74 (29 Nov 2024 10:58) (74 - 76)  RR: 18 (29 Nov 2024 10:58) (18 - 19)  SpO2: 98% (29 Nov 2024 10:58) (97% - 100%)    Parameters below as of 29 Nov 2024 10:58  Patient On (Oxygen Delivery Method): nasal cannula  O2 Flow (L/min): 2     (if applicable)    General ; Alert and oriented. Able to answer question with no SOB. No acute distress.      HEENT: Head is normocephalic and atraumatic. Extraocular muscles are intact. Mucous membranes are moist.     NECK: Supple, no palpable adenopathy.    LUNGS: Non labored. No wheezing. No use of accessory muscle.     HEART: S1 S2 Regular rate and rhythm without murmur.    ABDOMEN: Soft, nontender, and nondistended. Active bowel sounds.     NEUROLOGIC: Awake, alert, oriented.     SKIN: Warm, dry, good turgor.      LABS:                        12.8   6.60  )-----------( 337      ( 29 Nov 2024 05:27 )             39.1     11-29    138  |  104  |  26[H]  ----------------------------<  169[H]  3.5   |  30  |  1.23    Ca    8.4      29 Nov 2024 05:27        Urinalysis Basic - ( 29 Nov 2024 05:27 )    Color: x / Appearance: x / SG: x / pH: x  Gluc: 169 mg/dL / Ketone: x  / Bili: x / Urobili: x   Blood: x / Protein: x / Nitrite: x   Leuk Esterase: x / RBC: x / WBC x   Sq Epi: x / Non Sq Epi: x / Bacteria: x    MICROBIOLOGY: (if applicable)    RADIOLOGY & ADDITIONAL STUDIES:  EKG:   CXR: < from: CT Chest No Cont (11.27.24 @ 09:41) >    ACC: [id number] EXAM:  CT CHEST   ORDERED BY: JODEE PADILLA     PROCEDURE DATE:  11/27/2024          INTERPRETATION:  CLINICAL INFORMATION: Shortness of breath. Hx HFrEF,   malignant melanoma, CABG x2, ascending aortic repair    COMPARISON: CT chest/abdomen/pelvis from 10/5/2024    CONTRAST/COMPLICATIONS:  IV Contrast: NONE  Oral Contrast: NONE      PROCEDURE:  CT of the Chest was performed.  Sagittal and coronal reformats were performed.    FINDINGS:    LUNGS AND LARGE AIRWAYS: Patent central airways. Bilateral central   groundglass opacities have progressed since 10/5/2024. Bilateral diffuse   interlobular septal thickening.  PLEURA: Stable small bilateral pleural effusions.  VESSELS: Status post aortic valve and ascending aortic replacement.   Aortic calcifications.  HEART: Coronary artery stents and calcifications. Cardiomegaly. No   pericardial effusion.  MEDIASTINUM AND AVEL: Stable prominent mediastinal lymph nodes.   Mediastinal surgical clips.  CHEST WALL AND LOWER NECK: Stable 1.8 cm right chest wall cutaneous   lesion.  VISUALIZED UPPER ABDOMEN: Partially visualized left renal cyst.  BONES: Degenerative changes. Median sternotomy wires.    IMPRESSION:  CT findings of pulmonary edema have progressed since October 5, 2024.    Stable small bilateral pleural effusions.    Stable 1.8 cm right chest wall cutaneous lesion.    --- End of Report ---           VERO SIMMS DO; Resident Radiologist  This document has been electronically signed.  SIMÓN CORMIER DO; Attending Radiologist  This document has been electronically signed. Nov 27 2024 11:52AM    < end of copied text >    ECHO:    IMPRESSION: 74y Male PAST MEDICAL & SURGICAL HISTORY:  HTN (hypertension)      Hearing decreased      CVA (cerebral vascular accident)  12/22/2016      Hyperlipemia      Kidney stones      Coronary artery disease  MI 12/22/2016      CHF (congestive heart failure)  1/2017 stents x3      Type 2 diabetes mellitus  2016      Confusion  from stroke      S/P medial meniscal repair  and ligament surgery      H/O lithotripsy      S/P tonsillectomy      Bilateral inguinal hernia    Impression : This is a 75 Y/O Male with Hx of Malignant Melanoma with Wide Excision . Presented to ED with difficulty breathing for the past 4 days preceded by cough for the past 4 to 5 weeks. Patient was recently admitted to [facility] 10/5-10/8 for pneumonia. Patient reports completion of full course of antibiotics prescribed on discharge. For pulmonary follow up of Chronic cough with episode of SOB due to Acute Hypoxic Respiratory Failure secondary to Acute exacerbation of HF and Pneumonia   . CT chest with stable B/L Small Pleural Effusions .      Suggestion:   O2 saturation 96% room air. So far saturating good room air. Monitor O2 saturation trend.   On Duoneb via nebulization Q 6 Hours.   On Azithromycin 500 mg IVPB Daily .  On Cefepime 2 Gm IVPB Q 12 Hours.   On Fluticasone nasal spray Twice  daily.   On Mucinex 600 mg Oral Q 12 Hours.   DVT/ GI prophylactic. On Lovenox 40 mg SQ Daily.   Strict I & O .   Outpatient ENT Eval.  Pulmonary follow up outpatient .

## 2024-11-29 NOTE — PROGRESS NOTE ADULT - PROBLEM SELECTOR PLAN 6
h/o HLD on atorvastatin  -c/w home med  -f/u lipid profile  -calculate ASCVD risk  -DASH diet h/o HLD on atorvastatin  -c/w home med  -, TG 83.   -DASH diet

## 2024-11-29 NOTE — PROGRESS NOTE ADULT - SUBJECTIVE AND OBJECTIVE BOX
NP Note discussed with  Primary Attending    INTERVAL HPI/OVERNIGHT EVENTS: no new complaints, no acute overnight event. noted b/l leg edema L>R    MEDICATIONS  (STANDING):  albuterol/ipratropium for Nebulization 3 milliLiter(s) Nebulizer every 6 hours  aspirin enteric coated 81 milliGRAM(s) Oral daily  atorvastatin 80 milliGRAM(s) Oral at bedtime  azithromycin  IVPB 500 milliGRAM(s) IV Intermittent every 24 hours  benzonatate 100 milliGRAM(s) Oral three times a day  cefepime   IVPB      cefepime   IVPB 2000 milliGRAM(s) IV Intermittent every 12 hours  clopidogrel Tablet 75 milliGRAM(s) Oral daily  enoxaparin Injectable 40 milliGRAM(s) SubCutaneous every 24 hours  fluticasone propionate 50 MICROgram(s)/spray Nasal Spray 2 Spray(s) Both Nostrils two times a day  furosemide   Injectable 20 milliGRAM(s) IV Push every 12 hours  guaiFENesin  milliGRAM(s) Oral every 12 hours  insulin lispro (ADMELOG) corrective regimen sliding scale   SubCutaneous three times a day before meals  insulin lispro (ADMELOG) corrective regimen sliding scale   SubCutaneous at bedtime  multivitamin 1 Tablet(s) Oral daily  polyethylene glycol 3350 17 Gram(s) Oral daily  senna 2 Tablet(s) Oral at bedtime  tamsulosin 0.4 milliGRAM(s) Oral at bedtime    MEDICATIONS  (PRN):  acetaminophen     Tablet .. 650 milliGRAM(s) Oral every 6 hours PRN Temp greater or equal to 38C (100.4F), Mild Pain (1 - 3)      __________________________________________________  REVIEW OF SYSTEMS:    CONSTITUTIONAL: No fever,   EYES: no acute visual disturbances  NECK: No pain or stiffness  RESPIRATORY: No cough; No shortness of breath  CARDIOVASCULAR: No chest pain, no palpitations  GASTROINTESTINAL: No pain. No nausea or vomiting; No diarrhea   NEUROLOGICAL: No headache or numbness, no tremors  MUSCULOSKELETAL: No joint pain, no muscle pain, lower leg pain with edema  GENITOURINARY: no dysuria, no frequency, no hesitancy  PSYCHIATRY: no depression , no anxiety  ALL OTHER  ROS negative        Vital Signs Last 24 Hrs  T(C): 36.9 (29 Nov 2024 10:58), Max: 36.9 (29 Nov 2024 10:58)  T(F): 98.4 (29 Nov 2024 10:58), Max: 98.4 (29 Nov 2024 10:58)  HR: 88 (29 Nov 2024 10:58) (82 - 93)  BP: 94/64 (29 Nov 2024 10:58) (90/66 - 100/71)  BP(mean): 74 (29 Nov 2024 10:58) (74 - 76)  RR: 18 (29 Nov 2024 10:58) (18 - 19)  SpO2: 98% (29 Nov 2024 10:58) (97% - 100%)    Parameters below as of 29 Nov 2024 10:58  Patient On (Oxygen Delivery Method): nasal cannula  O2 Flow (L/min): 2      ________________________________________________  PHYSICAL EXAM:  GENERAL: NAD  HEENT: Normocephalic;  conjunctivae and sclerae clear; moist mucous membranes;   NECK : supple  CHEST/LUNG: fair air entry, diminished BS as bases. no wheezing, no crackles.   HEART: S1 S2  regular; no murmurs, gallops or rubs  ABDOMEN: Soft, Nontender, Nondistended; Bowel sounds present  EXTREMITIES: no cyanosis; pitting edema L>R ; no calf tenderness  SKIN: warm and dry; no rash  NERVOUS SYSTEM:  Awake and alert; Oriented  to place, person and time ; no new deficits    _________________________________________________  LABS:                        12.8   6.60  )-----------( 337      ( 29 Nov 2024 05:27 )             39.1     11-29    138  |  104  |  26[H]  ----------------------------<  169[H]  3.5   |  30  |  1.23    Ca    8.4      29 Nov 2024 05:27        Urinalysis Basic - ( 29 Nov 2024 05:27 )    Color: x / Appearance: x / SG: x / pH: x  Gluc: 169 mg/dL / Ketone: x  / Bili: x / Urobili: x   Blood: x / Protein: x / Nitrite: x   Leuk Esterase: x / RBC: x / WBC x   Sq Epi: x / Non Sq Epi: x / Bacteria: x      CAPILLARY BLOOD GLUCOSE      POCT Blood Glucose.: 197 mg/dL (29 Nov 2024 11:55)  POCT Blood Glucose.: 172 mg/dL (29 Nov 2024 08:00)  POCT Blood Glucose.: 327 mg/dL (28 Nov 2024 21:06)  POCT Blood Glucose.: 199 mg/dL (28 Nov 2024 17:02)        RADIOLOGY & ADDITIONAL TESTS:    Imaging  Reviewed:  YES  < from: US Duplex Venous Lower Ext Complete, Bilateral (11.29.24 @ 12:08) >  IMPRESSION:  No evidence of deep venous thrombosis in either lower extremity.      < end of copied text >    < from: CT Chest No Cont (11.27.24 @ 09:41) >    ACC: [id number] EXAM:  CT CHEST   ORDERED BY: JODEE PADILLA     PROCEDURE DATE:  11/27/2024          INTERPRETATION:  CLINICAL INFORMATION: Shortness of breath. Hx HFrEF,   malignant melanoma, CABG x2, ascending aortic repair    COMPARISON: CT chest/abdomen/pelvis from 10/5/2024    CONTRAST/COMPLICATIONS:  IV Contrast: NONE  Oral Contrast: NONE      PROCEDURE:  CT of the Chest was performed.  Sagittal and coronal reformats were performed.    FINDINGS:    LUNGS AND LARGE AIRWAYS: Patent central airways. Bilateral central   groundglass opacities have progressed since 10/5/2024. Bilateral diffuse   interlobular septal thickening.  PLEURA: Stable small bilateral pleural effusions.  VESSELS: Status post aortic valve and ascending aortic replacement.   Aortic calcifications.  HEART: Coronary artery stents and calcifications. Cardiomegaly. No   pericardial effusion.  MEDIASTINUM AND AVEL: Stable prominent mediastinal lymph nodes.   Mediastinal surgical clips.  CHEST WALL AND LOWER NECK: Stable 1.8 cm right chest wall cutaneous   lesion.  VISUALIZED UPPER ABDOMEN: Partially visualized left renal cyst.  BONES: Degenerative changes. Median sternotomy wires.    IMPRESSION:  CT findings of pulmonary edema have progressed since October 5, 2024.    Stable small bilateral pleural effusions.    Stable 1.8 cm right chest wall cutaneous lesion.    --- End of Report ---     VERO SIMMS DO; Resident Radiologist  This document has been electronically signed.  SIMÓN CORMIER DO; Attending Radiologist  This document has been electronically signed. Nov 27 2024 11:52AM    < end of copied text >    Consultant(s) Notes Reviewed:   YES      Plan of care was discussed with patient and /or primary care giver; all questions and concerns were addressed

## 2024-11-29 NOTE — PROGRESS NOTE ADULT - PROBLEM SELECTOR PLAN 1
p/w cough and shortness of breath  -CXR: Significant bibasilar infiltrates improved on the right but increased on the left  -Lactate 1.7  -start cefepime 2g q12 for HCAP (hospitalized 10/5-10/8)  -c/w azithromycin 500mg IV qd for atypical coverage  -f/u BCx,, UCx  -ID consult: Dr. Armenta p/w cough and shortness of breath  -CXR: Significant bibasilar infiltrates improved on the right but increased on the left  -Lactate 1.7  -c/w cefepime 2g q12 D#3  -s/p zithromycin 500mg IV qd for atypical coverage x 3d  -Bcx, Ucx NGTD  -ID consult: Dr. Armenta  -trudi Boyer, appreciated reccs.   -OOB daily.   -monitor O2 sat to evaluate home O2 needs.

## 2024-11-29 NOTE — PROGRESS NOTE ADULT - SUBJECTIVE AND OBJECTIVE BOX
74y Male is under our care for     MEDS:  azithromycin  IVPB 500 milliGRAM(s) IV Intermittent every 24 hours  cefepime   IVPB      cefepime   IVPB 2000 milliGRAM(s) IV Intermittent every 12 hours    ALLERGIES: Allergies    No Known Allergies    Intolerances    REVIEW OF SYSTEMS:  [  ] Not able to elicit  General:	  Chest:	  GI:	  :  Skin:	  Musculoskeletal:	  Neuro:	    VITALS:  Vital Signs Last 24 Hrs  T(C): 36.3 (29 Nov 2024 08:17), Max: 36.5 (28 Nov 2024 20:35)  T(F): 97.3 (29 Nov 2024 08:17), Max: 97.7 (28 Nov 2024 20:35)  HR: 93 (29 Nov 2024 08:17) (82 - 93)  BP: 98/65 (29 Nov 2024 08:17) (90/66 - 100/71)  BP(mean): 76 (29 Nov 2024 08:17) (76 - 76)  RR: 18 (29 Nov 2024 08:17) (18 - 20)  SpO2: 99% (29 Nov 2024 08:17) (93% - 100%)    Parameters below as of 29 Nov 2024 08:17  Patient On (Oxygen Delivery Method): nasal cannula  O2 Flow (L/min): 2    PHYSICAL EXAM:  HEENT:  Neck:  Respiratory:  Cardiovascular:  Gastrointestinal:  :  Extremities:  Skin:  Ortho:  Neuro:    LABS/DIAGNOSTIC TESTS:                        12.8   6.60  )-----------( 337      ( 29 Nov 2024 05:27 )             39.1     WBC Count: 6.60 K/uL (11-29 @ 05:27)  WBC Count: 7.80 K/uL (11-28 @ 07:35)  WBC Count: 7.45 K/uL (11-27 @ 05:50)  WBC Count: 12.36 K/uL (11-26 @ 15:15)    11-29    138  |  104  |  26[H]  ----------------------------<  169[H]  3.5   |  30  |  1.23    Ca    8.4      29 Nov 2024 05:27    CULTURES:   .Blood BLOOD  11-26 @ 15:35   No growth at 48 Hours  --  --    .Blood BLOOD  11-26 @ 15:10   No growth at 48 Hours  --  --    RADIOLOGY:  no new studies 74y Male sitting up in the chair and in no apparent distress. Endorse his dry cough has significantly improved. He still has mild dyspnea on exertion but improving as well. Denies nausea, vomiting or diarrhea. No fevers or chills.     MEDS:  azithromycin  IVPB 500 milliGRAM(s) IV Intermittent every 24 hours  cefepime   IVPB      cefepime   IVPB 2000 milliGRAM(s) IV Intermittent every 12 hours    ALLERGIES: Allergies    No Known Allergies    Intolerances    REVIEW OF SYSTEMS:  [  ] Not able to elicit  General: no fevers no malaise  Chest: +dry cough ; +GREENE   GI: no nvd  : no urinary sxs   Skin: no rashes  Musculoskeletal: no trauma no LBP  Neuro: no ha's no dizziness     VITALS:  Vital Signs Last 24 Hrs  T(C): 36.3 (29 Nov 2024 08:17), Max: 36.5 (28 Nov 2024 20:35)  T(F): 97.3 (29 Nov 2024 08:17), Max: 97.7 (28 Nov 2024 20:35)  HR: 93 (29 Nov 2024 08:17) (82 - 93)  BP: 98/65 (29 Nov 2024 08:17) (90/66 - 100/71)  BP(mean): 76 (29 Nov 2024 08:17) (76 - 76)  RR: 18 (29 Nov 2024 08:17) (18 - 20)  SpO2: 99% (29 Nov 2024 08:17) (93% - 100%)    Parameters below as of 29 Nov 2024 08:17  Patient On (Oxygen Delivery Method): nasal cannula  O2 Flow (L/min): 2    PHYSICAL EXAM:  HEENT: normocephalic, conjunctivae and sclerae clear; moist mucous membranes  Neck: supple no LN's   Respiratory: lungs clear no rales  Cardiovascular: S1 S2 reg no murmurs  Gastrointestinal: +BS with soft, nondistended abdomen; nontender  Extremities: mild b/l LE edema   Skin: no rashes  Ortho: no erythema or joint swelling  Neuro: AAO x 3    LABS/DIAGNOSTIC TESTS:                        12.8   6.60  )-----------( 337      ( 29 Nov 2024 05:27 )             39.1     WBC Count: 6.60 K/uL (11-29 @ 05:27)  WBC Count: 7.80 K/uL (11-28 @ 07:35)  WBC Count: 7.45 K/uL (11-27 @ 05:50)  WBC Count: 12.36 K/uL (11-26 @ 15:15)    11-29    138  |  104  |  26[H]  ----------------------------<  169[H]  3.5   |  30  |  1.23    Ca    8.4      29 Nov 2024 05:27    CULTURES:   .Blood BLOOD  11-26 @ 15:35   No growth at 48 Hours  --  --    .Blood BLOOD  11-26 @ 15:10   No growth at 48 Hours  --  --    RADIOLOGY:    < from: US Duplex Venous Lower Ext Complete, Bilateral (11.29.24 @ 12:08) >  ACC: [id number] EXAM:  US DPLX LWR EXT VEINS COMPL BI   ORDERED BY:   ANTONINO MENG     PROCEDURE DATE:  11/29/2024      INTERPRETATION:  CLINICAL INFORMATION: Bilateral ankle edema. CVA. Leg   swelling.    COMPARISON: Left lower extremity venous Doppler 12/31/2016    TECHNIQUE: Duplex sonography of the BILATERAL LOWER extremity veins with   color and spectral Doppler, with and without compression.    FINDINGS:    RIGHT:  Normal compressibility of the RIGHT common femoral, femoral and popliteal   veins.  Doppler examination shows normal spontaneous and phasic flow.  No RIGHT calf vein thrombosis is detected.    LEFT:  Normal compressibility of the LEFT common femoral, femoral and popliteal   veins.  Doppler examination shows normal spontaneous and phasic flow.  No LEFT calf vein thrombosis is detected.    IMPRESSION:  No evidence of deep venous thrombosis in either lower extremity.    --- End of Report ---    JODI FONTAINE MD; Attending Radiologist  This document has been electronically signed. Nov 29 2024 12:11PM    < end of copied text >   74y Male sitting up in the chair and in no apparent distress. Endorse his dry cough has significantly improved. He still has mild dyspnea on exertion but improving as well. Denies nausea, vomiting or diarrhea. No fevers or chills.     MEDS:  azithromycin  IVPB 500 milliGRAM(s) IV Intermittent every 24 hours  cefepime   IVPB      cefepime   IVPB 2000 milliGRAM(s) IV Intermittent every 12 hours    ALLERGIES: Allergies    No Known Allergies    Intolerances    REVIEW OF SYSTEMS:  [  ] Not able to elicit  General: no fevers no malaise  Chest: +dry cough ; +GREENE   GI: no nvd  : no urinary sxs   Skin: no rashes  Musculoskeletal: no trauma no LBP  Neuro: no ha's no dizziness     VITALS:  Vital Signs Last 24 Hrs  T(C): 36.3 (29 Nov 2024 08:17), Max: 36.5 (28 Nov 2024 20:35)  T(F): 97.3 (29 Nov 2024 08:17), Max: 97.7 (28 Nov 2024 20:35)  HR: 93 (29 Nov 2024 08:17) (82 - 93)  BP: 98/65 (29 Nov 2024 08:17) (90/66 - 100/71)  BP(mean): 76 (29 Nov 2024 08:17) (76 - 76)  RR: 18 (29 Nov 2024 08:17) (18 - 20)  SpO2: 99% (29 Nov 2024 08:17) (93% - 100%)    Parameters below as of 29 Nov 2024 08:17  Patient On (Oxygen Delivery Method): nasal cannula  O2 Flow (L/min): 2    PHYSICAL EXAM:  HEENT: normocephalic, conjunctivae and sclerae clear; moist mucous membranes  Neck: supple no LN's   Respiratory: lungs clear no rales  Cardiovascular: S1 S2 reg  Gastrointestinal: +BS with soft, nondistended abdomen; nontender  Extremities: mild b/l LE edema   Skin: no rashes  Ortho: no erythema or joint swelling  Neuro: AAO x 3    LABS/DIAGNOSTIC TESTS:                        12.8   6.60  )-----------( 337      ( 29 Nov 2024 05:27 )             39.1     WBC Count: 6.60 K/uL (11-29 @ 05:27)  WBC Count: 7.80 K/uL (11-28 @ 07:35)  WBC Count: 7.45 K/uL (11-27 @ 05:50)  WBC Count: 12.36 K/uL (11-26 @ 15:15)    11-29    138  |  104  |  26[H]  ----------------------------<  169[H]  3.5   |  30  |  1.23    Ca    8.4      29 Nov 2024 05:27    CULTURES:   .Blood BLOOD  11-26 @ 15:35   No growth at 48 Hours  --  --    .Blood BLOOD  11-26 @ 15:10   No growth at 48 Hours  --  --    RADIOLOGY:    < from: US Duplex Venous Lower Ext Complete, Bilateral (11.29.24 @ 12:08) >  ACC: [id number] EXAM:  US DPLX LWR EXT VEINS COMPL BI   ORDERED BY:   ANTONINO MENG     PROCEDURE DATE:  11/29/2024      INTERPRETATION:  CLINICAL INFORMATION: Bilateral ankle edema. CVA. Leg   swelling.    COMPARISON: Left lower extremity venous Doppler 12/31/2016    TECHNIQUE: Duplex sonography of the BILATERAL LOWER extremity veins with   color and spectral Doppler, with and without compression.    FINDINGS:    RIGHT:  Normal compressibility of the RIGHT common femoral, femoral and popliteal   veins.  Doppler examination shows normal spontaneous and phasic flow.  No RIGHT calf vein thrombosis is detected.    LEFT:  Normal compressibility of the LEFT common femoral, femoral and popliteal   veins.  Doppler examination shows normal spontaneous and phasic flow.  No LEFT calf vein thrombosis is detected.    IMPRESSION:  No evidence of deep venous thrombosis in either lower extremity.    --- End of Report ---    JODI FONTAINE MD; Attending Radiologist  This document has been electronically signed. Nov 29 2024 12:11PM    < end of copied text >

## 2024-11-30 LAB
ALBUMIN SERPL ELPH-MCNC: 2.5 G/DL — LOW (ref 3.5–5)
ALP SERPL-CCNC: 111 U/L — SIGNIFICANT CHANGE UP (ref 40–120)
ALT FLD-CCNC: 23 U/L DA — SIGNIFICANT CHANGE UP (ref 10–60)
ANION GAP SERPL CALC-SCNC: 7 MMOL/L — SIGNIFICANT CHANGE UP (ref 5–17)
AST SERPL-CCNC: 15 U/L — SIGNIFICANT CHANGE UP (ref 10–40)
BILIRUB SERPL-MCNC: 0.6 MG/DL — SIGNIFICANT CHANGE UP (ref 0.2–1.2)
BUN SERPL-MCNC: 25 MG/DL — HIGH (ref 7–18)
CALCIUM SERPL-MCNC: 8.4 MG/DL — SIGNIFICANT CHANGE UP (ref 8.4–10.5)
CHLORIDE SERPL-SCNC: 105 MMOL/L — SIGNIFICANT CHANGE UP (ref 96–108)
CK MB BLD-MCNC: 4.1 % — HIGH (ref 0–3.5)
CK MB CFR SERPL CALC: 1.9 NG/ML — SIGNIFICANT CHANGE UP (ref 0–3.6)
CK SERPL-CCNC: 46 U/L — SIGNIFICANT CHANGE UP (ref 35–232)
CO2 SERPL-SCNC: 28 MMOL/L — SIGNIFICANT CHANGE UP (ref 22–31)
CREAT SERPL-MCNC: 1.2 MG/DL — SIGNIFICANT CHANGE UP (ref 0.5–1.3)
EGFR: 63 ML/MIN/1.73M2 — SIGNIFICANT CHANGE UP
GLUCOSE BLDC GLUCOMTR-MCNC: 141 MG/DL — HIGH (ref 70–99)
GLUCOSE BLDC GLUCOMTR-MCNC: 159 MG/DL — HIGH (ref 70–99)
GLUCOSE BLDC GLUCOMTR-MCNC: 214 MG/DL — HIGH (ref 70–99)
GLUCOSE BLDC GLUCOMTR-MCNC: 215 MG/DL — HIGH (ref 70–99)
GLUCOSE SERPL-MCNC: 156 MG/DL — HIGH (ref 70–99)
HCT VFR BLD CALC: 39 % — SIGNIFICANT CHANGE UP (ref 39–50)
HGB BLD-MCNC: 13.1 G/DL — SIGNIFICANT CHANGE UP (ref 13–17)
MAGNESIUM SERPL-MCNC: 2.1 MG/DL — SIGNIFICANT CHANGE UP (ref 1.6–2.6)
MCHC RBC-ENTMCNC: 27.6 PG — SIGNIFICANT CHANGE UP (ref 27–34)
MCHC RBC-ENTMCNC: 33.6 G/DL — SIGNIFICANT CHANGE UP (ref 32–36)
MCV RBC AUTO: 82.3 FL — SIGNIFICANT CHANGE UP (ref 80–100)
NRBC # BLD: 0 /100 WBCS — SIGNIFICANT CHANGE UP (ref 0–0)
NT-PROBNP SERPL-SCNC: 8409 PG/ML — HIGH (ref 0–450)
PHOSPHATE SERPL-MCNC: 2.2 MG/DL — LOW (ref 2.5–4.5)
PLATELET # BLD AUTO: 335 K/UL — SIGNIFICANT CHANGE UP (ref 150–400)
POTASSIUM SERPL-MCNC: 3.5 MMOL/L — SIGNIFICANT CHANGE UP (ref 3.5–5.3)
POTASSIUM SERPL-SCNC: 3.5 MMOL/L — SIGNIFICANT CHANGE UP (ref 3.5–5.3)
PROT SERPL-MCNC: 6.1 G/DL — SIGNIFICANT CHANGE UP (ref 6–8.3)
RBC # BLD: 4.74 M/UL — SIGNIFICANT CHANGE UP (ref 4.2–5.8)
RBC # FLD: 14.4 % — SIGNIFICANT CHANGE UP (ref 10.3–14.5)
SODIUM SERPL-SCNC: 140 MMOL/L — SIGNIFICANT CHANGE UP (ref 135–145)
TROPONIN I, HIGH SENSITIVITY RESULT: 26.5 NG/L — SIGNIFICANT CHANGE UP
WBC # BLD: 7.07 K/UL — SIGNIFICANT CHANGE UP (ref 3.8–10.5)
WBC # FLD AUTO: 7.07 K/UL — SIGNIFICANT CHANGE UP (ref 3.8–10.5)

## 2024-11-30 PROCEDURE — 93010 ELECTROCARDIOGRAM REPORT: CPT

## 2024-11-30 RX ORDER — SODIUM,POTASSIUM PHOSPHATES 278-250MG
1 POWDER IN PACKET (EA) ORAL ONCE
Refills: 0 | Status: COMPLETED | OUTPATIENT
Start: 2024-11-30 | End: 2024-11-30

## 2024-11-30 RX ORDER — ACETAMINOPHEN 500MG 500 MG/1
1000 TABLET, COATED ORAL ONCE
Refills: 0 | Status: COMPLETED | OUTPATIENT
Start: 2024-11-30 | End: 2024-11-30

## 2024-11-30 RX ORDER — POTASSIUM CHLORIDE 600 MG/1
20 TABLET, EXTENDED RELEASE ORAL ONCE
Refills: 0 | Status: COMPLETED | OUTPATIENT
Start: 2024-11-30 | End: 2024-11-30

## 2024-11-30 RX ADMIN — Medication 1 TABLET(S): at 13:28

## 2024-11-30 RX ADMIN — BENZONATATE 100 MILLIGRAM(S): 100 CAPSULE ORAL at 21:40

## 2024-11-30 RX ADMIN — IPRATROPIUM BROMIDE AND ALBUTEROL SULFATE 3 MILLILITER(S): 2.5; .5 SOLUTION RESPIRATORY (INHALATION) at 02:10

## 2024-11-30 RX ADMIN — CLOPIDOGREL 75 MILLIGRAM(S): 75 TABLET, FILM COATED ORAL at 13:28

## 2024-11-30 RX ADMIN — INSULIN GLARGINE 5 UNIT(S): 100 INJECTION, SOLUTION SUBCUTANEOUS at 21:41

## 2024-11-30 RX ADMIN — ENOXAPARIN SODIUM 40 MILLIGRAM(S): 30 INJECTION SUBCUTANEOUS at 22:02

## 2024-11-30 RX ADMIN — ACETAMINOPHEN 500MG 1000 MILLIGRAM(S): 500 TABLET, COATED ORAL at 10:23

## 2024-11-30 RX ADMIN — Medication 2: at 08:10

## 2024-11-30 RX ADMIN — POTASSIUM CHLORIDE 20 MILLIEQUIVALENT(S): 600 TABLET, EXTENDED RELEASE ORAL at 09:22

## 2024-11-30 RX ADMIN — CEFEPIME 100 MILLIGRAM(S): 2 INJECTION, POWDER, FOR SOLUTION INTRAVENOUS at 19:02

## 2024-11-30 RX ADMIN — TAMSULOSIN HYDROCHLORIDE 0.4 MILLIGRAM(S): 0.4 CAPSULE ORAL at 21:40

## 2024-11-30 RX ADMIN — Medication 80 MILLIGRAM(S): at 21:40

## 2024-11-30 RX ADMIN — FUROSEMIDE 20 MILLIGRAM(S): 40 TABLET ORAL at 06:10

## 2024-11-30 RX ADMIN — Medication 600 MILLIGRAM(S): at 06:10

## 2024-11-30 RX ADMIN — BENZONATATE 100 MILLIGRAM(S): 100 CAPSULE ORAL at 06:10

## 2024-11-30 RX ADMIN — Medication 81 MILLIGRAM(S): at 13:28

## 2024-11-30 RX ADMIN — Medication 600 MILLIGRAM(S): at 17:39

## 2024-11-30 RX ADMIN — Medication 2 UNIT(S): at 17:20

## 2024-11-30 RX ADMIN — Medication 1 PACKET(S): at 09:22

## 2024-11-30 RX ADMIN — CEFEPIME 100 MILLIGRAM(S): 2 INJECTION, POWDER, FOR SOLUTION INTRAVENOUS at 06:11

## 2024-11-30 RX ADMIN — Medication 10 MILLIGRAM(S): at 21:40

## 2024-11-30 RX ADMIN — FUROSEMIDE 20 MILLIGRAM(S): 40 TABLET ORAL at 19:02

## 2024-11-30 RX ADMIN — ACETAMINOPHEN 500MG 1000 MILLIGRAM(S): 500 TABLET, COATED ORAL at 09:22

## 2024-11-30 RX ADMIN — Medication 2 TABLET(S): at 21:40

## 2024-11-30 RX ADMIN — IPRATROPIUM BROMIDE AND ALBUTEROL SULFATE 3 MILLILITER(S): 2.5; .5 SOLUTION RESPIRATORY (INHALATION) at 20:05

## 2024-11-30 RX ADMIN — BENZONATATE 100 MILLIGRAM(S): 100 CAPSULE ORAL at 13:29

## 2024-11-30 RX ADMIN — Medication 2 UNIT(S): at 08:20

## 2024-11-30 NOTE — PROGRESS NOTE ADULT - SUBJECTIVE AND OBJECTIVE BOX
Patient is a 74y old  Male who presents with a chief complaint of acute CHF/sepsis 2/2 PNA (2024 09:35)    PATIENT IS SEEN AND EXAMINED IN MEDICAL FLOOR.  LEISA [    ]    JONAS [   ]      GT [   ]    ALLERGIES:  No Known Allergies      Daily     Daily Weight in k (2024 04:36)    VITALS:    Vital Signs Last 24 Hrs  T(C): 36.5 (2024 08:04), Max: 36.9 (2024 10:58)  T(F): 97.7 (2024 08:04), Max: 98.4 (2024 10:58)  HR: 93 (2024 08:04) (88 - 97)  BP: 90/66 (2024 08:04) (90/66 - 106/72)  BP(mean): 79 (2024 20:31) (74 - 85)  RR: 20 (2024 08:04) (18 - 20)  SpO2: 96% (2024 08:04) (91% - 98%)    Parameters below as of 2024 08:04  Patient On (Oxygen Delivery Method): room air        LABS:    CBC Full  -  ( 2024 05:45 )  WBC Count : 7.07 K/uL  RBC Count : 4.74 M/uL  Hemoglobin : 13.1 g/dL  Hematocrit : 39.0 %  Platelet Count - Automated : 335 K/uL  Mean Cell Volume : 82.3 fl  Mean Cell Hemoglobin : 27.6 pg  Mean Cell Hemoglobin Concentration : 33.6 g/dL  Auto Neutrophil # : x  Auto Lymphocyte # : x  Auto Monocyte # : x  Auto Eosinophil # : x  Auto Basophil # : x  Auto Neutrophil % : x  Auto Lymphocyte % : x  Auto Monocyte % : x  Auto Eosinophil % : x  Auto Basophil % : x      11-30    140  |  105  |  25[H]  ----------------------------<  156[H]  3.5   |  28  |  1.20    Ca    8.4      2024 05:45  Phos  2.2     11-30  Mg     2.1     11-30    TPro  6.1  /  Alb  2.5[L]  /  TBili  0.6  /  DBili  x   /  AST  15  /  ALT  23  /  AlkPhos  111  11-30    CAPILLARY BLOOD GLUCOSE      POCT Blood Glucose.: 279 mg/dL (2024 16:49)  POCT Blood Glucose.: 197 mg/dL (2024 11:55)    CARDIAC MARKERS ( 2024 01:14 )  x     / x     / x     / x     / 1.9 ng/mL      LIVER FUNCTIONS - ( 2024 05:45 )  Alb: 2.5 g/dL / Pro: 6.1 g/dL / ALK PHOS: 111 U/L / ALT: 23 U/L DA / AST: 15 U/L / GGT: x           Creatinine Trend: 1.20<--, 1.23<--, 1.24<--, 1.11<--, 1.20<--  I&O's Summary    2024 07:01  -  2024 07:00  --------------------------------------------------------  IN: 920 mL / OUT: 0 mL / NET: 920 mL            .Blood BLOOD  11- @ 15:35   No growth at 72 Hours  --  --      .Blood BLOOD  11- @ 15:10   No growth at 72 Hours  --  --          MEDICATIONS:    MEDICATIONS  (STANDING):  albuterol/ipratropium for Nebulization 3 milliLiter(s) Nebulizer every 6 hours  aspirin enteric coated 81 milliGRAM(s) Oral daily  atorvastatin 80 milliGRAM(s) Oral at bedtime  benzonatate 100 milliGRAM(s) Oral three times a day  cefepime   IVPB      cefepime   IVPB 2000 milliGRAM(s) IV Intermittent every 12 hours  clopidogrel Tablet 75 milliGRAM(s) Oral daily  enoxaparin Injectable 40 milliGRAM(s) SubCutaneous every 24 hours  fluticasone propionate 50 MICROgram(s)/spray Nasal Spray 2 Spray(s) Both Nostrils two times a day  furosemide   Injectable 20 milliGRAM(s) IV Push every 12 hours  guaiFENesin  milliGRAM(s) Oral every 12 hours  insulin glargine Injectable (LANTUS) 5 Unit(s) SubCutaneous at bedtime  insulin lispro (ADMELOG) corrective regimen sliding scale   SubCutaneous three times a day before meals  insulin lispro (ADMELOG) corrective regimen sliding scale   SubCutaneous at bedtime  insulin lispro Injectable (ADMELOG) 2 Unit(s) SubCutaneous three times a day before meals  multivitamin 1 Tablet(s) Oral daily  polyethylene glycol 3350 17 Gram(s) Oral daily  senna 2 Tablet(s) Oral at bedtime  tamsulosin 0.4 milliGRAM(s) Oral at bedtime      MEDICATIONS  (PRN):  acetaminophen     Tablet .. 650 milliGRAM(s) Oral every 6 hours PRN Temp greater or equal to 38C (100.4F), Mild Pain (1 - 3)  bisacodyl 5 milliGRAM(s) Oral every 12 hours PRN Constipation  bisacodyl Suppository 10 milliGRAM(s) Rectal daily PRN Constipation      REVIEW OF SYSTEMS:                           ALL ROS DONE [ X   ]    CONSTITUTIONAL:  LETHARGIC [   ], FEVER [   ], UNRESPONSIVE [   ]  CVS:  CP  [   ], SOB, [   ], PALPITATIONS [   ], DIZZYNESS [   ]  RS: COUGH [   ], SPUTUM [   ]  GI: ABDOMINAL PAIN [   ], NAUSEA [   ], VOMITINGS [   ], DIARRHEA [   ], CONSTIPATION [   ]  :  DYSURIA [   ], NOCTURIA [   ], INCREASED FREQUENCY [   ], DRIBLING [   ],  SKELETAL: PAINFUL JOINTS [   ], SWOLLEN JOINTS [   ], NECK ACHE [   ], LOW BACK ACHE [   ],  SKIN : ULCERS [   ], RASH [   ], ITCHING [   ]  CNS: HEAD ACHE [   ], DOUBLE VISION [   ], BLURRED VISION [   ], AMS / CONFUSION [   ], SEIZURES [   ], WEAKNESS [   ],TINGLING / NUMBNESS [   ]    PHYSICAL EXAMINATION:  GENERAL APPEARANCE: NO DISTRESS  HEENT:  NO PALLOR, NO  JVD,  NO   NODES, NECK SUPPLE  CVS: S1 +, S2 +,   RS: AEEB,  OCCASIONAL  RALES +,   RONCHI +  ABD: SOFT, NT, NO, BS +  EXT: PE +   SKIN: WARM,   SKELETAL:  ROM ACCEPTABLE  CNS:  AAO X 3    RADIOLOGY :  RADIOLOGY AND READINGS REVIEWED    ASSESSMENT :     Shortness of breath    HTN (hypertension)    Hearing decreased    CVA (cerebral vascular accident)    Hyperlipemia    Kidney stones    Coronary artery disease    CHF (congestive heart failure)    Type 2 diabetes mellitus    Confusion    S/P medial meniscal repair    H/O lithotripsy    S/P tonsillectomy    Bilateral inguinal hernia        PLAN:  HPI:  74y M with PMHx of CVA, MI, HTN, T2DM, HLD, CAD s/p cardiac stents, HFrEF (EF 20-25% 10/2024), hx of malignant melanoma/wide excision, with recent elective bioprosthetic aortic valve replacement and ascending aortic aneurysm repair with transverse hemiarch and CABG x2 (24) with post op course complicated by cardiogenic shock requiring inotropic support with IV dobutamine and milrinone, IABP and also on amiodarone for atrial fibrillation prophylaxis presenting with difficulty breathing for the past 4 days preceded by cough for the past 4 to 5 weeks. Patient was recently admitted to [facility] 10/5-10/8 for pneumonia. Patient reports completion of full course of antibiotics prescribed on discharge. Patient states that he developed cough around a month ago that has been persistent and now causing abdominal muscle pain. Patient developed shortness of breath 4 days ago that got progressively worse prompting him to come to the ED today. Since treatment in ED, patient reports improvement of dyspnea but cough is still present but less frequent. Endorses constipation with no BM for last 3 days. Denies fever, chills, headache, dizziness, chest pain, palpitations, nausea, vomiting, diarrhea, and dysuria.   (2024 18:29)      # PROGNOSIS IS GUARDED. CASE D/W PATIENT, ALL QUESTIONS ANSWERED. ALSO DISCUSSED REGARDING GOC WITH PATIENT. PATIENT CONVEYS THAT HE WILL DISCUSS FURTHER WITH HIS FAMILY AND RECONVENE WITH TEAM.       # ACUTE HYPOXIC RESPIRATORY FAILURE MULTIFACTORIAL - LIKELY S/T PNA + DECOMPENSATED HF  # HX OF HFrEF [20-25%, 10/24], HX OF RECENT ELECTIVE BIOPROSTHETIC AORTIC VALVE REPLACEMENT, ASCENDING AORTIC ANEURYSM REPAIR W/ TRANSVERSE HEMIARCH AND CABG X 2  [2024] - C/B POST-OP CARDIOGENIC SHOCK REQUIRING IONOTROPIC SUPPORT, IABP   # HX OF CAD S/P STENT  - TELEMETRY  - CEFEPIME + AZITHROMYCIN, F/U BCX  - ANTITUSSIVE  - CHEST PT  - IV LASIX  - NOTED CT CHEST  - ON AMIODARONE  - HOLD ENTRESTO, WILL RESUME IF BP TOLERATES  - CARDIOLOGY CONSULT  - ID CONSULT  - PULMONOLOGY CONSULT    # DM  # HBA1C - 10.2  - SSI + FS  - ENDOCRINOLOGY CONSULT    # BPH  - FLOMAX    # HX OF CVA  # HX OF MI  # HTN  # HLD  # HX OF MALIGNANT MELANOMA/WIDE EXCISION  # GI PPX    Patient is a 74y old  Male who presents with a chief complaint of acute CHF/sepsis 2/2 PNA (2024 09:35)    PATIENT IS SEEN AND EXAMINED IN MEDICAL FLOOR.      ALLERGIES:  No Known Allergies      Daily     Daily Weight in k (2024 04:36)    VITALS:    Vital Signs Last 24 Hrs  T(C): 36.5 (2024 08:04), Max: 36.9 (2024 10:58)  T(F): 97.7 (2024 08:04), Max: 98.4 (2024 10:58)  HR: 93 (2024 08:04) (88 - 97)  BP: 90/66 (2024 08:04) (90/66 - 106/72)  BP(mean): 79 (2024 20:31) (74 - 85)  RR: 20 (2024 08:04) (18 - 20)  SpO2: 96% (2024 08:04) (91% - 98%)    Parameters below as of 2024 08:04  Patient On (Oxygen Delivery Method): room air        LABS:    CBC Full  -  ( 2024 05:45 )  WBC Count : 7.07 K/uL  RBC Count : 4.74 M/uL  Hemoglobin : 13.1 g/dL  Hematocrit : 39.0 %  Platelet Count - Automated : 335 K/uL  Mean Cell Volume : 82.3 fl  Mean Cell Hemoglobin : 27.6 pg  Mean Cell Hemoglobin Concentration : 33.6 g/dL  Auto Neutrophil # : x  Auto Lymphocyte # : x  Auto Monocyte # : x  Auto Eosinophil # : x  Auto Basophil # : x  Auto Neutrophil % : x  Auto Lymphocyte % : x  Auto Monocyte % : x  Auto Eosinophil % : x  Auto Basophil % : x      11-30    140  |  105  |  25[H]  ----------------------------<  156[H]  3.5   |  28  |  1.20    Ca    8.4      2024 05:45  Phos  2.2     11-30  Mg     2.1     11-30    TPro  6.1  /  Alb  2.5[L]  /  TBili  0.6  /  DBili  x   /  AST  15  /  ALT  23  /  AlkPhos  111  11-30    CAPILLARY BLOOD GLUCOSE      POCT Blood Glucose.: 279 mg/dL (2024 16:49)  POCT Blood Glucose.: 197 mg/dL (2024 11:55)    CARDIAC MARKERS ( 2024 01:14 )  x     / x     / x     / x     / 1.9 ng/mL      LIVER FUNCTIONS - ( 2024 05:45 )  Alb: 2.5 g/dL / Pro: 6.1 g/dL / ALK PHOS: 111 U/L / ALT: 23 U/L DA / AST: 15 U/L / GGT: x           Creatinine Trend: 1.20<--, 1.23<--, 1.24<--, 1.11<--, 1.20<--  I&O's Summary    2024 07:01  -  2024 07:00  --------------------------------------------------------  IN: 920 mL / OUT: 0 mL / NET: 920 mL            .Blood BLOOD  11- @ 15:35   No growth at 72 Hours  --  --      .Blood BLOOD  11- @ 15:10   No growth at 72 Hours  --  --          MEDICATIONS:    MEDICATIONS  (STANDING):  albuterol/ipratropium for Nebulization 3 milliLiter(s) Nebulizer every 6 hours  aspirin enteric coated 81 milliGRAM(s) Oral daily  atorvastatin 80 milliGRAM(s) Oral at bedtime  benzonatate 100 milliGRAM(s) Oral three times a day  cefepime   IVPB      cefepime   IVPB 2000 milliGRAM(s) IV Intermittent every 12 hours  clopidogrel Tablet 75 milliGRAM(s) Oral daily  enoxaparin Injectable 40 milliGRAM(s) SubCutaneous every 24 hours  fluticasone propionate 50 MICROgram(s)/spray Nasal Spray 2 Spray(s) Both Nostrils two times a day  furosemide   Injectable 20 milliGRAM(s) IV Push every 12 hours  guaiFENesin  milliGRAM(s) Oral every 12 hours  insulin glargine Injectable (LANTUS) 5 Unit(s) SubCutaneous at bedtime  insulin lispro (ADMELOG) corrective regimen sliding scale   SubCutaneous three times a day before meals  insulin lispro (ADMELOG) corrective regimen sliding scale   SubCutaneous at bedtime  insulin lispro Injectable (ADMELOG) 2 Unit(s) SubCutaneous three times a day before meals  multivitamin 1 Tablet(s) Oral daily  polyethylene glycol 3350 17 Gram(s) Oral daily  senna 2 Tablet(s) Oral at bedtime  tamsulosin 0.4 milliGRAM(s) Oral at bedtime      MEDICATIONS  (PRN):  acetaminophen     Tablet .. 650 milliGRAM(s) Oral every 6 hours PRN Temp greater or equal to 38C (100.4F), Mild Pain (1 - 3)  bisacodyl 5 milliGRAM(s) Oral every 12 hours PRN Constipation  bisacodyl Suppository 10 milliGRAM(s) Rectal daily PRN Constipation      REVIEW OF SYSTEMS:                           ALL ROS DONE [ X   ]    CONSTITUTIONAL:  LETHARGIC [   ], FEVER [   ], UNRESPONSIVE [   ]  CVS:  CP  [   ], SOB, [   ], PALPITATIONS [   ], DIZZYNESS [   ]  RS: COUGH [   ], SPUTUM [   ]  GI: ABDOMINAL PAIN [   ], NAUSEA [   ], VOMITINGS [   ], DIARRHEA [   ], CONSTIPATION [   ]  :  DYSURIA [   ], NOCTURIA [   ], INCREASED FREQUENCY [   ], DRIBLING [   ],  SKELETAL: PAINFUL JOINTS [   ], SWOLLEN JOINTS [   ], NECK ACHE [   ], LOW BACK ACHE [   ],  SKIN : ULCERS [   ], RASH [   ], ITCHING [   ]  CNS: HEAD ACHE [   ], DOUBLE VISION [   ], BLURRED VISION [   ], AMS / CONFUSION [   ], SEIZURES [   ], WEAKNESS [   ],TINGLING / NUMBNESS [   ]    PHYSICAL EXAMINATION:  GENERAL APPEARANCE: NO DISTRESS  HEENT:  NO PALLOR, NO  JVD,  NO   NODES, NECK SUPPLE  CVS: S1 +, S2 +,   RS: AEEB,  OCCASIONAL  RALES +,   RONCHI +  ABD: SOFT, NT, NO, BS +  EXT: PE +   SKIN: WARM,   SKELETAL:  ROM ACCEPTABLE  CNS:  AAO X 3    RADIOLOGY :  RADIOLOGY AND READINGS REVIEWED    ASSESSMENT :     Shortness of breath    HTN (hypertension)    Hearing decreased    CVA (cerebral vascular accident)    Hyperlipemia    Kidney stones    Coronary artery disease    CHF (congestive heart failure)    Type 2 diabetes mellitus    Confusion    S/P medial meniscal repair    H/O lithotripsy    S/P tonsillectomy    Bilateral inguinal hernia        PLAN:  HPI:  74y M with PMHx of CVA, MI, HTN, T2DM, HLD, CAD s/p cardiac stents, HFrEF (EF 20-25% 10/2024), hx of malignant melanoma/wide excision, with recent elective bioprosthetic aortic valve replacement and ascending aortic aneurysm repair with transverse hemiarch and CABG x2 (24) with post op course complicated by cardiogenic shock requiring inotropic support with IV dobutamine and milrinone, IABP and also on amiodarone for atrial fibrillation prophylaxis presenting with difficulty breathing for the past 4 days preceded by cough for the past 4 to 5 weeks. Patient was recently admitted to [facility] 10/5-10/8 for pneumonia. Patient reports completion of full course of antibiotics prescribed on discharge. Patient states that he developed cough around a month ago that has been persistent and now causing abdominal muscle pain. Patient developed shortness of breath 4 days ago that got progressively worse prompting him to come to the ED today. Since treatment in ED, patient reports improvement of dyspnea but cough is still present but less frequent. Endorses constipation with no BM for last 3 days. Denies fever, chills, headache, dizziness, chest pain, palpitations, nausea, vomiting, diarrhea, and dysuria.   (2024 18:29)      # CASE DISCUSSED AT LENGTH WITH PATIENT, WIFE, DAUGHTER, SON, SON-IN-LAW - ALL QUESTIONS ANSWERED. RECOMMENDATIONS AS MADE BY MULTIDISCIPLINARY TEAM REVIEWED. DISCUSSED THAT PROGNOSIS IS GUARDED/POOR. PATIENT AND FAMILY VERBALIZED UNDERSTANDING. IN DISCUSSION REGARDING GOC - PATIENT CONVEYS THAT HE HAS PREVIOUSLY WISHED TO BE DNR/DNI - BUT FAMILY AND PATIENT PLAN TO DISCUSS AND WILL RECONVENE WITH TEAM TO CONFIRM GOC    # ACUTE HYPOXIC RESPIRATORY FAILURE MULTIFACTORIAL - LIKELY S/T PNA + DECOMPENSATED HF  # HX OF HFrEF [20-25%, 10/24], HX OF RECENT ELECTIVE BIOPROSTHETIC AORTIC VALVE REPLACEMENT, ASCENDING AORTIC ANEURYSM REPAIR W/ TRANSVERSE HEMIARCH AND CABG X 2  [2024] - C/B POST-OP CARDIOGENIC SHOCK REQUIRING IONOTROPIC SUPPORT, IABP   # HX OF CAD S/P STENT  - TELEMETRY  - CEFEPIME + AZITHROMYCIN, F/U BCX  - ANTITUSSIVE  - CHEST PT  - IV LASIX [ DOWNTITRATED - DUE TO HYPOTENSION  - NOTED CT CHEST  - ON AMIODARONE  - HOLD ENTRESTO, WILL RESUME IF BP TOLERATES  - CARDIOLOGY CONSULT  - ID CONSULT  - PULMONOLOGY CONSULT    # ? POST-OP A.FIB PPX - ON AMIODARONE - RECOMMENDED D/C PER CARDIOLOGY    # DM  # HBA1C - 10.2  - LANTUS  - SSI + FS  - ENDOCRINOLOGY CONSULT    # BPH  - FLOMAX    # HX OF CVA  # HX OF MI  # HTN  # HLD  # HX OF MALIGNANT MELANOMA/WIDE EXCISION  # GI PPX

## 2024-11-30 NOTE — PROGRESS NOTE ADULT - SUBJECTIVE AND OBJECTIVE BOX
DATE OF SERVICE: 11-30-24       events noted overnight,   pt resting in bed, offers no complaints        acetaminophen     Tablet .. 650 milliGRAM(s) Oral every 6 hours PRN  albuterol/ipratropium for Nebulization 3 milliLiter(s) Nebulizer every 6 hours  aspirin enteric coated 81 milliGRAM(s) Oral daily  atorvastatin 80 milliGRAM(s) Oral at bedtime  benzonatate 100 milliGRAM(s) Oral three times a day  bisacodyl 5 milliGRAM(s) Oral every 12 hours PRN  bisacodyl Suppository 10 milliGRAM(s) Rectal daily PRN  cefepime   IVPB      cefepime   IVPB 2000 milliGRAM(s) IV Intermittent every 12 hours  clopidogrel Tablet 75 milliGRAM(s) Oral daily  enoxaparin Injectable 40 milliGRAM(s) SubCutaneous every 24 hours  fluticasone propionate 50 MICROgram(s)/spray Nasal Spray 2 Spray(s) Both Nostrils two times a day  furosemide   Injectable 20 milliGRAM(s) IV Push every 12 hours  guaiFENesin  milliGRAM(s) Oral every 12 hours  insulin glargine Injectable (LANTUS) 5 Unit(s) SubCutaneous at bedtime  insulin lispro (ADMELOG) corrective regimen sliding scale   SubCutaneous three times a day before meals  insulin lispro (ADMELOG) corrective regimen sliding scale   SubCutaneous at bedtime  insulin lispro Injectable (ADMELOG) 2 Unit(s) SubCutaneous three times a day before meals  multivitamin 1 Tablet(s) Oral daily  polyethylene glycol 3350 17 Gram(s) Oral daily  senna 2 Tablet(s) Oral at bedtime  tamsulosin 0.4 milliGRAM(s) Oral at bedtime                            13.1   7.07  )-----------( 335      ( 30 Nov 2024 05:45 )             39.0       Hemoglobin: 13.1 g/dL (11-30 @ 05:45)  Hemoglobin: 12.8 g/dL (11-29 @ 05:27)  Hemoglobin: 13.1 g/dL (11-28 @ 07:35)  Hemoglobin: 12.5 g/dL (11-27 @ 05:50)  Hemoglobin: 13.7 g/dL (11-26 @ 15:15)      11-30    140  |  105  |  25[H]  ----------------------------<  156[H]  3.5   |  28  |  1.20    Ca    8.4      30 Nov 2024 05:45  Phos  2.2     11-30  Mg     2.1     11-30    TPro  6.1  /  Alb  2.5[L]  /  TBili  0.6  /  DBili  x   /  AST  15  /  ALT  23  /  AlkPhos  111  11-30    Creatinine Trend: 1.20<--, 1.23<--, 1.24<--, 1.11<--, 1.20<--    COAGS:     CARDIAC MARKERS ( 30 Nov 2024 01:14 )  x     / x     / x     / x     / 1.9 ng/mL        T(C): 36.5 (11-30-24 @ 08:04), Max: 36.9 (11-29-24 @ 10:58)  HR: 93 (11-30-24 @ 08:04) (88 - 97)  BP: 90/66 (11-30-24 @ 08:04) (90/66 - 106/72)  RR: 20 (11-30-24 @ 08:04) (18 - 20)  SpO2: 96% (11-30-24 @ 08:04) (91% - 98%)  Wt(kg): --    I&O's Summary    29 Nov 2024 07:01  -  30 Nov 2024 07:00  --------------------------------------------------------  IN: 920 mL / OUT: 0 mL / NET: 920 mL      HEENT:  (-)icterus (-)pallor  CV: N S1 S2 1/6 GAEL (+)2 Pulses B/l  Resp:  Clear to ausculatation B/L, normal effort  GI: (+) BS Soft, NT, ND  Lymph:  (+)Edema, (-)obvious lymphadenopathy  Skin: Warm to touch, Normal turgor  Psych: Appropriate mood and affect      Tele Sinus     ASSESSMENT/PLAN: 	74y Male PMHx of CVA, MI, HTN, T2DM, HLD, CAD s/p cardiac stents, HFrEF (EF 20-25% 10/2024), hx of malignant melanoma/wide excision, with recent elective bioprosthetic aortic valve replacement and ascending aortic aneurysm repair with transverse hemiarch and CABG x2 (4/8/24) with post op course complicated by cardiogenic shock requiring inotropic support with IV dobutamine and milrinone, IABP and also on amiodarone for atrial fibrillation prophylaxis presenting with difficulty breathing for the past 4 days preceded by cough for the past 4 to 5 weeks.    # CHF  - Acute on chronic systolic heart failure  - He is mildly volume overloaded on exam  - HYpotension has limited diuresis  - D/C beta blocker to allow room for diuretics   - Out pt f/u with Dr. Berna Ramon    # CAD   - cont asa and statin    # Post op Afib prophylaxis  - Doesnt appear he has demonstrated PAF as he is not on AC currently  - D/C amio    # PNA  - Abx per medical team

## 2024-11-30 NOTE — CHART NOTE - NSCHARTNOTEFT_GEN_A_CORE
EVENT:   11/30/24, 1am, called by RN re: patient c/o chest pressure. Assessed at bedside. Pt. denied pain at this time, and refused meds. Not in acute distress. Stat EKG showed sinus rhythm with first degree AV block. Left bundle branch block. VS: HR - 89, BP - 98/66, RR - 18, 95%RA, T- 98.4F. Stat : Trop - 26.5, CK - 46, CK MB - 1.9, CPK Mass Assay - 4.1, BNP - 8409. Currently, pt.'s on ASA 81 mg QD, Plavix 75 mg QD and Lovenox 40 mg Q24hrs.   HPI:  74y M with PMH of CVA, MI, HTN, T2DM, HLD, CAD s/p cardiac stents, HFrEF (EF 20-25% 10/2024), hx of malignant melanoma/wide excision, with recent elective bioprosthetic aortic valve replacement and ascending aortic aneurysm repair with transverse hemiarch and CABG x2 (4/8/24) with post op course complicated by cardiogenic shock requiring inotropic support with IV dobutamine and milrinone, IABP and also on amiodarone for atrial fibrillation prophylaxis presenting with difficulty breathing for the past 4 days preceded by cough for the past 4 to 5 weeks. Patient was recently admitted to [facility] 10/5-10/8 for pneumonia. Patient reports completion of full course of antibiotics prescribed on discharge. Patient states that he developed cough around a month ago that has been persistent and now causing abdominal muscle pain. Patient developed shortness of breath 4 days ago that got progressively worse prompting him to come to the ED today. Since treatment in ED, patient reports improvement of dyspnea but cough is still present but less frequent. Endorses constipation with no BM for last 3 days. Denies fever, chills, headache, dizziness, chest pain, palpitations, nausea, vomiting, diarrhea, and dysuria.   (26 Nov 2024 18:29)    OBJECTIVE:  Vital Signs Last 24 Hrs  T(C): 36.9 (29 Nov 2024 23:06), Max: 36.9 (29 Nov 2024 10:58)  T(F): 98.4 (29 Nov 2024 23:06), Max: 98.4 (29 Nov 2024 10:58)  HR: 89 (29 Nov 2024 23:06) (88 - 97)  BP: 98/66 (29 Nov 2024 23:06) (92/66 - 105/74)  BP(mean): 79 (29 Nov 2024 20:31) (74 - 85)  RR: 18 (29 Nov 2024 23:06) (18 - 19)  SpO2: 95% (29 Nov 2024 23:06) (94% - 100%)    Parameters below as of 29 Nov 2024 23:06  Patient On (Oxygen Delivery Method): room air  LABS:                        12.8   6.60  )-----------( 337      ( 29 Nov 2024 05:27 )             39.1   CARDIAC MARKERS ( 30 Nov 2024 01:14 )  x     / x     / x     / x     / 1.9 ng/mL    11-29    138  |  104  |  26[H]  ----------------------------<  169[H]  3.5   |  30  |  1.23    Ca    8.4      29 Nov 2024 05:27    PLAN:   - Re-evaluate patient comfort level,   - Continue to monitor pt.'s VS,   - Follow-up morning labs. EVENT:   11/30/24, 1am, called by RN re: patient c/o chest pressure. Assessed at bedside. Pt. denied pain at this time, and refused meds. Not in acute distress. Stat EKG showed sinus rhythm with first degree AV block. Left bundle branch block. VS: HR - 89, BP - 98/66, RR - 18, 95%RA, T- 98.4F. Stat : Trop - 26.5, CK - 46, CK MB - 1.9, CPK Mass Assay - 4.1, BNP - 8409. Currently, pt.'s on ASA 81 mg QD, Plavix 75 mg QD and Lovenox 40 mg Q24hrs. Day shift provider will be endorsed.   11/30/24, 3:10am, called by RN re: patient's worry and wanted to talk to provider regarding his EKG and labs. results. Met at bedside. Pt. was informed about the result of his EKG and cardiac enzymes. Denied pain at this time.    HPI:  74y M with PMH of CVA, MI, HTN, T2DM, HLD, CAD s/p cardiac stents, HFrEF (EF 20-25% 10/2024), hx of malignant melanoma/wide excision, with recent elective bioprosthetic aortic valve replacement and ascending aortic aneurysm repair with transverse hemiarch and CABG x2 (4/8/24) with post op course complicated by cardiogenic shock requiring inotropic support with IV dobutamine and milrinone, IABP and also on amiodarone for atrial fibrillation prophylaxis presenting with difficulty breathing for the past 4 days preceded by cough for the past 4 to 5 weeks. Patient was recently admitted to [facility] 10/5-10/8 for pneumonia. Patient reports completion of full course of antibiotics prescribed on discharge. Patient states that he developed cough around a month ago that has been persistent and now causing abdominal muscle pain. Patient developed shortness of breath 4 days ago that got progressively worse prompting him to come to the ED today. Since treatment in ED, patient reports improvement of dyspnea but cough is still present but less frequent. Endorses constipation with no BM for last 3 days. Denies fever, chills, headache, dizziness, chest pain, palpitations, nausea, vomiting, diarrhea, and dysuria.   (26 Nov 2024 18:29)    OBJECTIVE:  Vital Signs Last 24 Hrs  T(C): 36.9 (29 Nov 2024 23:06), Max: 36.9 (29 Nov 2024 10:58)  T(F): 98.4 (29 Nov 2024 23:06), Max: 98.4 (29 Nov 2024 10:58)  HR: 89 (29 Nov 2024 23:06) (88 - 97)  BP: 98/66 (29 Nov 2024 23:06) (92/66 - 105/74)  BP(mean): 79 (29 Nov 2024 20:31) (74 - 85)  RR: 18 (29 Nov 2024 23:06) (18 - 19)  SpO2: 95% (29 Nov 2024 23:06) (94% - 100%)    Parameters below as of 29 Nov 2024 23:06  Patient On (Oxygen Delivery Method): room air  LABS:                        12.8   6.60  )-----------( 337      ( 29 Nov 2024 05:27 )             39.1   CARDIAC MARKERS ( 30 Nov 2024 01:14 )  x     / x     / x     / x     / 1.9 ng/mL    11-29    138  |  104  |  26[H]  ----------------------------<  169[H]  3.5   |  30  |  1.23    Ca    8.4      29 Nov 2024 05:27    PLAN:   - Re-evaluate patient comfort level,   - Continue to monitor pt.'s VS,   - Follow-up morning labs. EVENT:   11/30/24, 1am, called by RN re: patient c/o chest pressure. Assessed and auscultated at bedside. Pt. denied pain at this time, and refused meds. Not in acute distress. Stat EKG showed sinus rhythm with first degree AV block. Left bundle branch block. VS: HR - 89, BP - 98/66, RR - 18, 95%RA, T- 98.4F. Stat : Trop - 26.5, CK - 46, CK MB - 1.9, CPK Mass Assay - 4.1, BNP - 8409. Currently, pt.'s on ASA 81 mg QD, Plavix 75 mg QD and Lovenox 40 mg Q24hrs. Day shift provider will be endorsed.   11/30/24, 3:10am, called by RN re: patient's worry and wanted to talk to provider regarding his EKG and labs. results. Met at bedside. Pt. was informed about the result of his EKG and cardiac enzymes. Denied pain  at this time. Attending, Marlyn Rodriguez was notified by text message. Day shift provider will be endorsed.    HPI:  74y M with PMH of CVA, MI, HTN, T2DM, HLD, CAD s/p cardiac stents, HFrEF (EF 20-25% 10/2024), hx of malignant melanoma/wide excision, with recent elective bioprosthetic aortic valve replacement and ascending aortic aneurysm repair with transverse hemiarch and CABG x2 (4/8/24) with post op course complicated by cardiogenic shock requiring inotropic support with IV dobutamine and milrinone, IABP and also on amiodarone for atrial fibrillation prophylaxis presenting with difficulty breathing for the past 4 days preceded by cough for the past 4 to 5 weeks. Patient was recently admitted to [facility] 10/5-10/8 for pneumonia. Patient reports completion of full course of antibiotics prescribed on discharge. Patient states that he developed cough around a month ago that has been persistent and now causing abdominal muscle pain. Patient developed shortness of breath 4 days ago that got progressively worse prompting him to come to the ED today. Since treatment in ED, patient reports improvement of dyspnea but cough is still present but less frequent. Endorses constipation with no BM for last 3 days. Denies fever, chills, headache, dizziness, chest pain, palpitations, nausea, vomiting, diarrhea, and dysuria.   (26 Nov 2024 18:29)    OBJECTIVE:  Vital Signs Last 24 Hrs  T(C): 36.9 (29 Nov 2024 23:06), Max: 36.9 (29 Nov 2024 10:58)  T(F): 98.4 (29 Nov 2024 23:06), Max: 98.4 (29 Nov 2024 10:58)  HR: 89 (29 Nov 2024 23:06) (88 - 97)  BP: 98/66 (29 Nov 2024 23:06) (92/66 - 105/74)  BP(mean): 79 (29 Nov 2024 20:31) (74 - 85)  RR: 18 (29 Nov 2024 23:06) (18 - 19)  SpO2: 95% (29 Nov 2024 23:06) (94% - 100%)    Parameters below as of 29 Nov 2024 23:06  Patient On (Oxygen Delivery Method): room air  LABS:                        12.8   6.60  )-----------( 337      ( 29 Nov 2024 05:27 )             39.1   CARDIAC MARKERS ( 30 Nov 2024 01:14 )  x     / x     / x     / x     / 1.9 ng/mL    11-29    138  |  104  |  26[H]  ----------------------------<  169[H]  3.5   |  30  |  1.23    Ca    8.4      29 Nov 2024 05:27    PLAN:   - Re-evaluate patient comfort level,   - Continue to monitor pt.'s VS,   - Follow-up morning labs.

## 2024-11-30 NOTE — PHARMACOTHERAPY INTERVENTION NOTE - COMMENTS
Patient identified by [facility] for Heart Failure. Medication list was reviewed and no additional interventions at this time.

## 2024-11-30 NOTE — PROGRESS NOTE ADULT - CONVERSATION DETAILS
CASE DISCUSSED AT LENGTH WITH PATIENT, WIFE, DAUGHTER, SON, SON-IN-LAW - ALL QUESTIONS ANSWERED. RECOMMENDATIONS AS MADE BY MULTIDISCIPLINARY TEAM REVIEWED. DISCUSSED THAT PROGNOSIS IS GUARDED/POOR. PATIENT AND FAMILY VERBALIZED UNDERSTANDING. IN DISCUSSION REGARDING GOC - PATIENT CONVEYS THAT HE HAS PREVIOUSLY WISHED TO BE DNR/DNI - BUT FAMILY AND PATIENT PLAN TO DISCUSS AND WILL RECONVENE WITH TEAM TO CONFIRM GOC

## 2024-12-01 LAB
ANION GAP SERPL CALC-SCNC: 5 MMOL/L — SIGNIFICANT CHANGE UP (ref 5–17)
BUN SERPL-MCNC: 22 MG/DL — HIGH (ref 7–18)
CALCIUM SERPL-MCNC: 8.5 MG/DL — SIGNIFICANT CHANGE UP (ref 8.4–10.5)
CHLORIDE SERPL-SCNC: 106 MMOL/L — SIGNIFICANT CHANGE UP (ref 96–108)
CO2 SERPL-SCNC: 27 MMOL/L — SIGNIFICANT CHANGE UP (ref 22–31)
CREAT SERPL-MCNC: 1.21 MG/DL — SIGNIFICANT CHANGE UP (ref 0.5–1.3)
CULTURE RESULTS: SIGNIFICANT CHANGE UP
CULTURE RESULTS: SIGNIFICANT CHANGE UP
EGFR: 62 ML/MIN/1.73M2 — SIGNIFICANT CHANGE UP
GLUCOSE BLDC GLUCOMTR-MCNC: 146 MG/DL — HIGH (ref 70–99)
GLUCOSE BLDC GLUCOMTR-MCNC: 182 MG/DL — HIGH (ref 70–99)
GLUCOSE SERPL-MCNC: 181 MG/DL — HIGH (ref 70–99)
HCT VFR BLD CALC: 39.1 % — SIGNIFICANT CHANGE UP (ref 39–50)
HGB BLD-MCNC: 12.9 G/DL — LOW (ref 13–17)
MCHC RBC-ENTMCNC: 26.8 PG — LOW (ref 27–34)
MCHC RBC-ENTMCNC: 33 G/DL — SIGNIFICANT CHANGE UP (ref 32–36)
MCV RBC AUTO: 81.3 FL — SIGNIFICANT CHANGE UP (ref 80–100)
NRBC # BLD: 0 /100 WBCS — SIGNIFICANT CHANGE UP (ref 0–0)
PLATELET # BLD AUTO: 330 K/UL — SIGNIFICANT CHANGE UP (ref 150–400)
POTASSIUM SERPL-MCNC: 3.8 MMOL/L — SIGNIFICANT CHANGE UP (ref 3.5–5.3)
POTASSIUM SERPL-SCNC: 3.8 MMOL/L — SIGNIFICANT CHANGE UP (ref 3.5–5.3)
RBC # BLD: 4.81 M/UL — SIGNIFICANT CHANGE UP (ref 4.2–5.8)
RBC # FLD: 14.5 % — SIGNIFICANT CHANGE UP (ref 10.3–14.5)
SODIUM SERPL-SCNC: 138 MMOL/L — SIGNIFICANT CHANGE UP (ref 135–145)
SPECIMEN SOURCE: SIGNIFICANT CHANGE UP
SPECIMEN SOURCE: SIGNIFICANT CHANGE UP
WBC # BLD: 7.46 K/UL — SIGNIFICANT CHANGE UP (ref 3.8–10.5)
WBC # FLD AUTO: 7.46 K/UL — SIGNIFICANT CHANGE UP (ref 3.8–10.5)

## 2024-12-01 PROCEDURE — 99497 ADVNCD CARE PLAN 30 MIN: CPT

## 2024-12-01 RX ORDER — LACTULOSE 10 G/15ML
20 SOLUTION ORAL ONCE
Refills: 0 | Status: DISCONTINUED | OUTPATIENT
Start: 2024-12-01 | End: 2024-12-01

## 2024-12-01 RX ADMIN — BENZONATATE 100 MILLIGRAM(S): 100 CAPSULE ORAL at 05:27

## 2024-12-01 RX ADMIN — CLOPIDOGREL 75 MILLIGRAM(S): 75 TABLET, FILM COATED ORAL at 13:00

## 2024-12-01 RX ADMIN — IPRATROPIUM BROMIDE AND ALBUTEROL SULFATE 3 MILLILITER(S): 2.5; .5 SOLUTION RESPIRATORY (INHALATION) at 15:22

## 2024-12-01 RX ADMIN — Medication 1 ENEMA: at 00:03

## 2024-12-01 RX ADMIN — POLYETHYLENE GLYCOL 3350 17 GRAM(S): 17 POWDER, FOR SOLUTION ORAL at 13:00

## 2024-12-01 RX ADMIN — BENZONATATE 100 MILLIGRAM(S): 100 CAPSULE ORAL at 18:47

## 2024-12-01 RX ADMIN — FUROSEMIDE 20 MILLIGRAM(S): 40 TABLET ORAL at 18:47

## 2024-12-01 RX ADMIN — Medication 2 UNIT(S): at 08:10

## 2024-12-01 RX ADMIN — Medication 2: at 08:10

## 2024-12-01 RX ADMIN — ACETAMINOPHEN 500MG 650 MILLIGRAM(S): 500 TABLET, COATED ORAL at 06:53

## 2024-12-01 RX ADMIN — CEFEPIME 100 MILLIGRAM(S): 2 INJECTION, POWDER, FOR SOLUTION INTRAVENOUS at 18:48

## 2024-12-01 RX ADMIN — IPRATROPIUM BROMIDE AND ALBUTEROL SULFATE 3 MILLILITER(S): 2.5; .5 SOLUTION RESPIRATORY (INHALATION) at 20:25

## 2024-12-01 RX ADMIN — ACETAMINOPHEN 500MG 650 MILLIGRAM(S): 500 TABLET, COATED ORAL at 07:53

## 2024-12-01 RX ADMIN — Medication 81 MILLIGRAM(S): at 13:01

## 2024-12-01 RX ADMIN — Medication 2 SPRAY(S): at 07:03

## 2024-12-01 RX ADMIN — Medication 600 MILLIGRAM(S): at 18:47

## 2024-12-01 RX ADMIN — Medication 600 MILLIGRAM(S): at 05:27

## 2024-12-01 RX ADMIN — Medication 1 TABLET(S): at 13:02

## 2024-12-01 RX ADMIN — CEFEPIME 100 MILLIGRAM(S): 2 INJECTION, POWDER, FOR SOLUTION INTRAVENOUS at 05:27

## 2024-12-01 RX ADMIN — IPRATROPIUM BROMIDE AND ALBUTEROL SULFATE 3 MILLILITER(S): 2.5; .5 SOLUTION RESPIRATORY (INHALATION) at 03:07

## 2024-12-01 RX ADMIN — FUROSEMIDE 20 MILLIGRAM(S): 40 TABLET ORAL at 07:02

## 2024-12-01 NOTE — PROGRESS NOTE ADULT - CONVERSATION DETAILS
CASE D/W PATIENT AT BEDSIDE. CASE REVIEWED AT LENGTH, ALL QUESTIONS ANSWERED. DISCUSSED REGARDING GOC - PATIENT CONFIRMS GOC OF DNR/DNI/NO PEG.

## 2024-12-01 NOTE — PROGRESS NOTE ADULT - SUBJECTIVE AND OBJECTIVE BOX
DATE OF SERVICE: 12/1/24          pt offers no complaints        acetaminophen     Tablet .. 650 milliGRAM(s) Oral every 6 hours PRN  albuterol/ipratropium for Nebulization 3 milliLiter(s) Nebulizer every 6 hours  aspirin enteric coated 81 milliGRAM(s) Oral daily  atorvastatin 80 milliGRAM(s) Oral at bedtime  benzonatate 100 milliGRAM(s) Oral three times a day  bisacodyl 5 milliGRAM(s) Oral every 12 hours PRN  bisacodyl Suppository 10 milliGRAM(s) Rectal daily PRN  cefepime   IVPB 2000 milliGRAM(s) IV Intermittent every 12 hours  cefepime   IVPB      clopidogrel Tablet 75 milliGRAM(s) Oral daily  enoxaparin Injectable 40 milliGRAM(s) SubCutaneous every 24 hours  fluticasone propionate 50 MICROgram(s)/spray Nasal Spray 2 Spray(s) Both Nostrils two times a day  furosemide   Injectable 20 milliGRAM(s) IV Push every 12 hours  guaiFENesin  milliGRAM(s) Oral every 12 hours  insulin glargine Injectable (LANTUS) 5 Unit(s) SubCutaneous at bedtime  insulin lispro (ADMELOG) corrective regimen sliding scale   SubCutaneous three times a day before meals  insulin lispro (ADMELOG) corrective regimen sliding scale   SubCutaneous at bedtime  insulin lispro Injectable (ADMELOG) 2 Unit(s) SubCutaneous three times a day before meals  multivitamin 1 Tablet(s) Oral daily  polyethylene glycol 3350 17 Gram(s) Oral daily  senna 2 Tablet(s) Oral at bedtime  tamsulosin 0.4 milliGRAM(s) Oral at bedtime                            12.9   7.46  )-----------( 330      ( 01 Dec 2024 06:55 )             39.1       Hemoglobin: 12.9 g/dL (12-01 @ 06:55)  Hemoglobin: 13.1 g/dL (11-30 @ 05:45)  Hemoglobin: 12.8 g/dL (11-29 @ 05:27)  Hemoglobin: 13.1 g/dL (11-28 @ 07:35)  Hemoglobin: 12.5 g/dL (11-27 @ 05:50)      12-01    138  |  106  |  22[H]  ----------------------------<  181[H]  3.8   |  27  |  1.21    Ca    8.5      01 Dec 2024 06:55  Phos  2.2     11-30  Mg     2.1     11-30    TPro  6.1  /  Alb  2.5[L]  /  TBili  0.6  /  DBili  x   /  AST  15  /  ALT  23  /  AlkPhos  111  11-30    Creatinine Trend: 1.21<--, 1.20<--, 1.23<--, 1.24<--, 1.11<--, 1.20<--    COAGS:     CARDIAC MARKERS ( 30 Nov 2024 01:14 )  x     / x     / x     / x     / 1.9 ng/mL        T(C): 36.5 (12-01-24 @ 04:58), Max: 36.6 (11-30-24 @ 15:50)  HR: 98 (12-01-24 @ 04:58) (89 - 98)  BP: 112/78 (12-01-24 @ 06:41) (90/66 - 138/82)  RR: 19 (12-01-24 @ 04:58) (18 - 20)  SpO2: 97% (12-01-24 @ 04:58) (96% - 100%)  Wt(kg): --    I&O's Summary    30 Nov 2024 07:01  -  01 Dec 2024 07:00  --------------------------------------------------------  IN: 500 mL / OUT: 475 mL / NET: 25 mL        HEENT:  (-)icterus (-)pallor  CV: N S1 S2 1/6 GAEL (+)2 Pulses B/l  Resp:  Clear to ausculatation B/L, normal effort  GI: (+) BS Soft, NT, ND  Lymph:  (+)Edema, (-)obvious lymphadenopathy  Skin: Warm to touch, Normal turgor  Psych: Appropriate mood and affect      Tele Sinus     ASSESSMENT/PLAN: 	74y Male PMHx of CVA, MI, HTN, T2DM, HLD, CAD s/p cardiac stents, HFrEF (EF 20-25% 10/2024), hx of malignant melanoma/wide excision, with recent elective bioprosthetic aortic valve replacement and ascending aortic aneurysm repair with transverse hemiarch and CABG x2 (4/8/24) with post op course complicated by cardiogenic shock requiring inotropic support with IV dobutamine and milrinone, IABP and also on amiodarone for atrial fibrillation prophylaxis presenting with difficulty breathing for the past 4 days preceded by cough for the past 4 to 5 weeks.    # CHF  - Acute on chronic systolic heart failure  - He is mildly volume overloaded on exam  - Hypotension has limited diuresis  - D/C beta blocker to allow room for diuretics   - Out pt f/u with Dr. Berna Ramon    # CAD   - cont asa and statin    # Post op Afib prophylaxis  - Doesnt appear he has demonstrated PAF as he is not on AC currently  - D/C amio    # PNA  - Abx per medical team

## 2024-12-01 NOTE — DISCHARGE NOTE PROVIDER - HOSPITAL COURSE
74y M with PMHx of CVA, MI, HTN, T2DM, HLD, CAD s/p cardiac stents, HFrEF (EF 20-25% 10/2024), hx of malignant melanoma/wide excision, with recent elective bioprosthetic aortic valve replacement and ascending aortic aneurysm repair with transverse hemiarch and CABG x2 (4/8/24) with post op course complicated by cardiogenic shock requiring inotropic support with IV dobutamine and milrinone, IABP and also on amiodarone for atrial fibrillation prophylaxis presenting with difficulty breathing for the past 4 days preceded by cough for the past 4 to 5 weeks.  Admitted to telemetry for acute on chronic CHF and sepsis 2/2 PNA. ID, Cardiology and pulmonary following. ID followed, Started Cefepime and completed 3 days Azithromycin. Pt to continue abx ????????????????  CT chest  findings of pulmonary edema have progressed since October 5, 2024. Stable small bilateral pleural effusions. Stable 1.8 cm right chest wall cutaneous lesion.  started on IV lasix, held Entresto and Beta blocker per Card to give room for diuresis. OP f/u with Dr Berna Ramon  DVT negative on b/l LE doppler US.   Endocrine consulted for uncontrolled DM. A1C 10.2. On discharge, patient will need increased dose of metformin if renal function is good  Pt initially required oxygen 2L NC for dyspnea, pulmonology consulted. Pt now on room air and saturating well. Pulmonary follow up outpatient    PT recs no Skilled PT needed.    Pt is medically optimized for discharge, discussed with the attending  This is just a brief hospital course, please refer to the progress notes for detailed information 74 year old male with a medical history of  CVA, MI, HTN, T2DM, HLD, CAD s/p cardiac stents, HFrEF (EF 20-25% 10/2024), hx of malignant melanoma/wide excision, with recent elective bioprosthetic aortic valve replacement and ascending aortic aneurysm repair with transverse hemiarch and CABG x2 (4/8/24) with post op course complicated by cardiogenic shock requiring inotropic support with IV dobutamine and milrinone, IABP and also on amiodarone for atrial fibrillation prophylaxis presenting with difficulty breathing for the past 4 days preceded by cough for the past 4 to 5 weeks.  Patient was admitted to telemetry for acute on chronic CHF and sepsis secondary to PNA.   ID, Cardiology and pulmonary following. ID followed.  Started Cefepime and completed 3 days Azithromycin.  Patient completed full course of antibiotics.   CT chest  findings of pulmonary edema have progressed since October 5, 2024. Stable small bilateral pleural effusions. Stable 1.8 cm right chest wall cutaneous lesion.  started on IV lasix, held Entresto and Beta blocker per Card to give room for diuresis. OP f/u with Dr Berna Ramon  DVT negative on b/l LE doppler US.   Endocrine consulted for uncontrolled DM. A1C 10.2. On discharge, patient will need increased dose of metformin if renal function is good  Pt initially required oxygen 2L NC for dyspnea, pulmonology consulted. Pt now on room air and saturating well. Pulmonary follow up outpatient    Patient was evaluated by PT with no skilled needs.    Pt is medically optimized for discharge, discussed with the attending  This is just a brief hospital course, please refer to the progress notes for detailed information 74y M with PMHx of CVA, MI, HTN, T2DM, HLD, CAD s/p cardiac stents, HFrEF (EF 20-25% 10/2024), hx of malignant melanoma/wide excision, with recent elective bioprosthetic aortic valve replacement and ascending aortic aneurysm repair with transverse hemiarch and CABG x2 (4/8/24) with post op course complicated by cardiogenic shock requiring inotropic support with IV dobutamine and milrinone, IABP and also on amiodarone for atrial fibrillation prophylaxis presenting with difficulty breathing for the past 4 days preceded by cough for the past 4 to 5 weeks.  Admitted to telemetry for acute on chronic CHF and sepsis 2/2 PNA. CT chest  findings of pulmonary edema have progressed since October 5, 2024. Stable small bilateral pleural effusions. Stable 1.8 cm right chest wall cutaneous lesion. DVT negative on b/l LE doppler US.  Pulm following, initially requiring nasal cannula on admission, now on room air. Started on duonebs  ID following, completed course of cefepime and azithromycin for PNA.  Cardiology following, pt started on IV Lasix initially holding BB and Entresto 2/2 soft BP. On 12/3, re-introduced metoprolol but became hypotensive with SBP 70's. DC Lasix and BB, started on dig for inotrope support  Endocrine following for uncontrolled DM. A1C 10.2. Discharge on Metformin 500 mg with dinner for one week , if feeling better then increase to metformin 1000 mg daily with dinner from following week and from the third week onwards, patient needs metformin 1000 mg BID  Discharge held today 2/2 hypotension, cardiac meds re-adjusted as pre cardiology recommendation. Plan discussed with the wife    PT recommends no Skilled PT needed.    Pt is medically optimized for discharge, discussed with the attending Dr Guillen  This is just a brief hospital course, please refer to the progress notes for detailed information 74y M with PMHx of CVA, MI, HTN, T2DM, HLD, CAD s/p cardiac stents, HFrEF (EF 20-25% 10/2024), hx of malignant melanoma/wide excision, with recent elective bioprosthetic aortic valve replacement and ascending aortic aneurysm repair with transverse hemiarch and CABG x2 (4/8/24) with post op course complicated by cardiogenic shock requiring inotropic support with IV dobutamine and milrinone, IABP and also on amiodarone for atrial fibrillation prophylaxis presenting with difficulty breathing for the past 4 days preceded by cough for the past 4 to 5 weeks.  Admitted to telemetry for acute on chronic CHF and sepsis 2/2 PNA. CT chest  findings of pulmonary edema have progressed since October 5, 2024. Stable small bilateral pleural effusions. Stable 1.8 cm right chest wall cutaneous lesion. DVT negative on b/l LE doppler US.  Pulm following, initially requiring nasal cannula on admission, now on room air. Started on duonebs  ID following, completed course of cefepime and azithromycin for PNA.  Cardiology following, pt started on IV Lasix initially holding BB and Entresto 2/2 soft BP. On 12/3, re-introduced metoprolol but became hypotensive with SBP 70's. DC Lasix and BB, started on dig for inotrope support  Endocrine following for uncontrolled DM. A1C 10.2. Discharge on Metformin 500 mg with dinner for one week , if feeling better then increase to metformin 1000 mg daily with dinner from following week and from the third week onwards, patient needs metformin 1000 mg BID  Discharge held 12/4 hypotension, cardiac meds re-adjusted as pre cardiology recommendation. Plan discussed with the wife    PT recommends no Skilled PT needed.    Pt is medically optimized for discharge, discussed with the attending Dr Guillen  This is just a brief hospital course, please refer to the progress notes for detailed information

## 2024-12-01 NOTE — DISCHARGE NOTE PROVIDER - CARE PROVIDER_API CALL
Yany Velasquez  Endocrinology/Metab/Diabetes  [address], [address]  [place] [address]  Phone: [phone]  Fax: [phone]  Follow Up Time: 2 weeks    Raffaele Appiah  Pulmonary Disease  [address], [address]  [place] [address]  Phone: [phone]  Fax: [phone]  Follow Up Time: 2 weeks   Raffaele Appiah  Pulmonary Disease  [address], [address]  [place] [address]  Phone: [phone]  Fax: [phone]  Follow Up Time: 2 weeks    Fely Deleon  Endocrinology/Metab/Diabetes  [address] [place] [address]  Phone: [phone]  Fax: [phone]  Follow Up Time: 2 weeks    Shania Ortega  Cardiovascular Disease  [address] [place] [address], [address]  [place] [address]  Phone: [phone]  Fax: [phone]  Follow Up Time: 1 week    Demetrice Joyce  Cardiovascular Disease  [address] [address]  Phone: [phone]  Fax: [phone]  Follow Up Time: 1 week    Bird Pacheco  Internal Medicine  [address] [place] [address]  Phone: [phone]  Fax: [phone]  Follow Up Time: 2 weeks

## 2024-12-01 NOTE — DISCHARGE NOTE PROVIDER - NSDCCPCAREPLAN_GEN_ALL_CORE_FT
PRINCIPAL DISCHARGE DIAGNOSIS  Diagnosis: Sepsis due to pneumonia  Assessment and Plan of Treatment: You presented with cough and shortness of breath. Your CXR was consistent for pneumonia. You were started on IV antibiotics  You are to continue???????????  You were also evaluated by pulmonologist as you require oxygen supplementation. Continue advair at home and follow up with pulmonologist outpatient  You are tolerating room air with adequate oxygen saturation  Nutrition is important, eat small frequent meals.  Get lots of rest and drink fluids.  Call your health care provider upon arrival home from hospital and make a follow up appointment for one week.  If your cough worsens, you develop fever greater than 101', you have shaking chills, a fast heartbeat, trouble breathing and/or feel your are breathing much faster than usual, call your healthcare provider.  Make sure you wash your hands frequently.      SECONDARY DISCHARGE DIAGNOSES  Diagnosis: Type 2 diabetes mellitus  Assessment and Plan of Treatment: HgA1C this admission 10.2  On discharge, patient will need increased dose of metformin if renal function is good  Make sure you get your HgA1c checked every three months.  If you have not seen your ophthalmologist this year call for appointment.  Check your feet daily for redness, sores, or openings. Do not self treat. If no improvement in two days call your primary care physician for an appointment.  Follow up with endocrinologist to establish long term goals for your A1c and for long term management and monitoring      Diagnosis: HTN (hypertension)  Assessment and Plan of Treatment: ???? metoprolol and entresto ???????  Follow up with primary care provider to establish long term goal for your blood pressure and for long term management and monitoring  Notify your doctor if you have any of the following symptoms:   Dizziness, Lightheadedness, Blurry vision, Headache, Chest pain, Shortness of breath    Diagnosis: HLD (hyperlipidemia)  Assessment and Plan of Treatment: Continue your anticholesterol medication  Follow up with PCP for treatment goals, continue medication, have liver function testing every 3 months as anti lipid medications can cause liver irritation, eat low fat, low cholesterol meals      Diagnosis: Coronary artery disease  Assessment and Plan of Treatment: Continue plavix at home    Diagnosis: CHF, acute on chronic  Assessment and Plan of Treatment: You were given IV diuretics and was seen by cardiologist. You are to continue ???????????  Your entresto and metoprolol was held due to low blood pressure and to give room for diuresis  Follow up with cardiologist outpatient Dr. Berna Ramon     PRINCIPAL DISCHARGE DIAGNOSIS  Diagnosis: Sepsis due to pneumonia  Assessment and Plan of Treatment: You presented with cough and shortness of breath. Your CXR was consistent for pneumonia. You were started on IV antibiotics and completed your full course while in the hospital.  You were also evaluated by pulmonologist as you require oxygen supplementation. Continue advair at home and follow up with pulmonologist outpatient  You are tolerating room air with adequate oxygen saturation  Nutrition is important, eat small frequent meals.  Get lots of rest and drink fluids.  Call your health care provider upon arrival home from hospital and make a follow up appointment for one week.  If your cough worsens, you develop fever greater than 101', you have shaking chills, a fast heartbeat, trouble breathing and/or feel your are breathing much faster than usual, call your healthcare provider.  Make sure you wash your hands frequently.      SECONDARY DISCHARGE DIAGNOSES  Diagnosis: CHF, acute on chronic  Assessment and Plan of Treatment: You were given IV diuretics and was seen by cardiologist. You are to continue lasix 40mg daily o discharge.  Your entresto and metoprolol was held due to low blood pressure and to give room for diuresis  Follow up with cardiologist outpatient Dr. Berna Ramon  You will be discharged on your outpatient dose of Metoprolol  You will stop taking Entresto  You will take Valsartan 40mg daily    Diagnosis: Type 2 diabetes mellitus  Assessment and Plan of Treatment: HgA1C this admission 10.2  On discharge, patient will need increased dose of metformin if renal function is good  Make sure you get your HgA1c checked every three months.  If you have not seen your ophthalmologist this year call for appointment.  Check your feet daily for redness, sores, or openings. Do not self treat. If no improvement in two days call your primary care physician for an appointment.  Follow up with endocrinologist to establish long term goals for your A1c and for long term management and monitoring      Diagnosis: HTN (hypertension)  Assessment and Plan of Treatment: You will stop your Entresto  You will now take Valsartan 40mg and Metoprolol 12.5mg daily  Follow up with primary care provider to establish long term goal for your blood pressure and for long term management and monitoring  Notify your doctor if you have any of the following symptoms:   Dizziness, Lightheadedness, Blurry vision, Headache, Chest pain, Shortness of breath    Diagnosis: Coronary artery disease  Assessment and Plan of Treatment: Continue plavix at home    Diagnosis: HLD (hyperlipidemia)  Assessment and Plan of Treatment: Continue your anticholesterol medication  Follow up with PCP for treatment goals, continue medication, have liver function testing every 3 months as anti lipid medications can cause liver irritation, eat low fat, low cholesterol meals       PRINCIPAL DISCHARGE DIAGNOSIS  Diagnosis: Sepsis due to pneumonia  Assessment and Plan of Treatment: You presented with cough and shortness of breath. Your CXR was consistent for pneumonia. You were started on IV antibiotics and completed your full course while in the hospital.  You were also evaluated by pulmonologist as you require oxygen supplementation. Continue advair at home and follow up with pulmonologist outpatient  You are tolerating room air with adequate oxygen saturation  Nutrition is important, eat small frequent meals.  Get lots of rest and drink fluids.  Call your health care provider upon arrival home from hospital and make a follow up appointment for one week.  If your cough worsens, you develop fever greater than 101', you have shaking chills, a fast heartbeat, trouble breathing and/or feel your are breathing much faster than usual, call your healthcare provider.  Make sure you wash your hands frequently.      SECONDARY DISCHARGE DIAGNOSES  Diagnosis: Type 2 diabetes mellitus  Assessment and Plan of Treatment: HgA1C this admission 10.2  On discharge, take Metformin 500 mg with dinner for one week , if feeling better then increase to metformin 1000 mg daily with dinner from following week and from the third week onwards, take metformin 1000 mg BID.  Make sure you get your HgA1c checked every three months.  If you have not seen your ophthalmologist this year call for appointment.  Check your feet daily for redness, sores, or openings. Do not self treat. If no improvement in two days call your primary care physician for an appointment.  Follow up with endocrinologist to establish long term goals for your A1c and for long term management and monitoring    Diagnosis: HTN (hypertension)  Assessment and Plan of Treatment: Your entresto and metoprolol was held due to low blood pressure and to give room for diuresis. DO NOT TAKE THESE MEDICATIONS  Continue to take Valsartan 40mg daily  Continue to take Digoxin 125mcg daily  Follow up with cardiologist outpatient Dr. Berna Ramon  Follow up with primary care provider to establish long term goal for your blood pressure and for long term management and monitoring  Notify your doctor if you have any of the following symptoms:   Dizziness, Lightheadedness, Blurry vision, Headache, Chest pain, Shortness of breath    Diagnosis: HLD (hyperlipidemia)  Assessment and Plan of Treatment: Continue your anticholesterol medication  Follow up with PCP for treatment goals, continue medication, have liver function testing every 3 months as anti lipid medications can cause liver irritation, eat low fat, low cholesterol meals      Diagnosis: Coronary artery disease  Assessment and Plan of Treatment: Continue aspirin, plavix and statin at home    Diagnosis: CHF, acute on chronic  Assessment and Plan of Treatment: You were given IV diuretics and was seen by cardiologist.  Your lasix was discontinued due to hypotension.  Weight your self daily. If you gain more than 3 lbs in 1 day you need to resume lasix 40 mg Daily  Your entresto and metoprolol was held due to low blood pressure and to give room for diuresis. DO NOT TAKE THESE MEDICATIONS  Continue to take Valsartan 40mg daily  Continue to take Digoxin 125mcg daily  Follow up with cardiologist outpatient Dr. Berna Ramon     PRINCIPAL DISCHARGE DIAGNOSIS  Diagnosis: Sepsis due to pneumonia  Assessment and Plan of Treatment: You presented with cough and shortness of breath. Your CXR was consistent for pneumonia. You were started on IV antibiotics and completed your full course while in the hospital.  You were also evaluated by pulmonologist as you require oxygen supplementation. Continue advair at home and follow up with pulmonologist outpatient  You are tolerating room air with adequate oxygen saturation  Nutrition is important, eat small frequent meals.  Get lots of rest and drink fluids.  Call your health care provider upon arrival home from hospital and make a follow up appointment for one week.  If your cough worsens, you develop fever greater than 101', you have shaking chills, a fast heartbeat, trouble breathing and/or feel your are breathing much faster than usual, call your healthcare provider.  Make sure you wash your hands frequently.      SECONDARY DISCHARGE DIAGNOSES  Diagnosis: CHF, acute on chronic  Assessment and Plan of Treatment: You were given IV diuretics and was seen by cardiologist.  Your lasix was discontinued due to hypotension.  Your weight today before discharge is 63kg. Weight your self daily. If you gain more than 3 lbs in 1 day you need to resume lasix 40 mg Daily  Your entresto and metoprolol was held due to low blood pressure and to give room for diuresis. DO NOT TAKE THESE MEDICATIONS  Continue to take Valsartan 40mg daily  Continue to take Digoxin 125mcg daily  Follow up with cardiologist outpatient Dr. Demetrice Joyce and Dr Ameena Ortega    Diagnosis: Type 2 diabetes mellitus  Assessment and Plan of Treatment: HgA1C this admission 10.2  On discharge, take Metformin 500 mg with dinner for one week , if feeling better then increase to metformin 1000 mg daily with dinner from following week and from the third week onwards, take metformin 1000 mg BID.  Make sure you get your HgA1c checked every three months.  If you have not seen your ophthalmologist this year call for appointment.  Check your feet daily for redness, sores, or openings. Do not self treat. If no improvement in two days call your primary care physician for an appointment.  Follow up with endocrinologist to establish long term goals for your A1c and for long term management and monitoring    Diagnosis: HTN (hypertension)  Assessment and Plan of Treatment: Your entresto and metoprolol was held due to low blood pressure and to give room for diuresis. DO NOT TAKE THESE MEDICATIONS  Continue to take Valsartan 40mg daily  Continue to take Digoxin 125mcg daily  Follow up with cardiologist outpatient Dr. Berna Ramon  Follow up with primary care provider to establish long term goal for your blood pressure and for long term management and monitoring  Notify your doctor if you have any of the following symptoms:   Dizziness, Lightheadedness, Blurry vision, Headache, Chest pain, Shortness of breath    Diagnosis: HLD (hyperlipidemia)  Assessment and Plan of Treatment: Continue your anticholesterol medication  Follow up with PCP for treatment goals, continue medication, have liver function testing every 3 months as anti lipid medications can cause liver irritation, eat low fat, low cholesterol meals      Diagnosis: Coronary artery disease  Assessment and Plan of Treatment: Continue aspirin, plavix and statin at home     PRINCIPAL DISCHARGE DIAGNOSIS  Diagnosis: Sepsis due to pneumonia  Assessment and Plan of Treatment: You presented with cough and shortness of breath. Your CXR was consistent for pneumonia. You were started on IV antibiotics and completed your full course while in the hospital.  You were also evaluated by pulmonologist as you require oxygen supplementation. Continue fluticasone at home and follow up with pulmonologist outpatient  You are tolerating room air with adequate oxygen saturation  Nutrition is important, eat small frequent meals.  Get lots of rest and drink fluids.  Call your health care provider upon arrival home from hospital and make a follow up appointment for one week.  If your cough worsens, you develop fever greater than 101', you have shaking chills, a fast heartbeat, trouble breathing and/or feel your are breathing much faster than usual, call your healthcare provider.  Make sure you wash your hands frequently.      SECONDARY DISCHARGE DIAGNOSES  Diagnosis: CHF, acute on chronic  Assessment and Plan of Treatment: You were given IV diuretics and was seen by cardiologist.  Your lasix was discontinued due to hypotension.  Your weight today before discharge is 63kg. Weight your self daily. If you gain more than 3 lbs in 1 day you need to resume lasix 40 mg Daily  Your entresto and metoprolol was held due to low blood pressure and to give room for diuresis. DO NOT TAKE THESE MEDICATIONS  Continue to take Valsartan 40mg daily  Continue to take Digoxin 125mcg daily  Follow up with cardiologist outpatient Dr. Demetrice Joyce and Dr Ameena Ortega    Diagnosis: Type 2 diabetes mellitus  Assessment and Plan of Treatment: HgA1C this admission 10.2  On discharge, take Metformin 500 mg with dinner for one week , if feeling better then increase to metformin 1000 mg daily with dinner from following week and from the third week onwards, take metformin 1000 mg BID.  Make sure you get your HgA1c checked every three months.  If you have not seen your ophthalmologist this year call for appointment.  Check your feet daily for redness, sores, or openings. Do not self treat. If no improvement in two days call your primary care physician for an appointment.  Follow up with endocrinologist to establish long term goals for your A1c and for long term management and monitoring    Diagnosis: HTN (hypertension)  Assessment and Plan of Treatment: Your entresto and metoprolol was held due to low blood pressure and to give room for diuresis. DO NOT TAKE THESE MEDICATIONS  Continue to take Valsartan 40mg daily  Continue to take Digoxin 125mcg daily  Follow up with cardiologist outpatient Dr. Berna Ramon  Follow up with primary care provider to establish long term goal for your blood pressure and for long term management and monitoring  Notify your doctor if you have any of the following symptoms:   Dizziness, Lightheadedness, Blurry vision, Headache, Chest pain, Shortness of breath    Diagnosis: HLD (hyperlipidemia)  Assessment and Plan of Treatment: Continue your anticholesterol medication  Follow up with PCP for treatment goals, continue medication, have liver function testing every 3 months as anti lipid medications can cause liver irritation, eat low fat, low cholesterol meals      Diagnosis: Coronary artery disease  Assessment and Plan of Treatment: Continue aspirin, plavix and statin at home

## 2024-12-01 NOTE — GOALS OF CARE CONVERSATION - ADVANCED CARE PLANNING - CONVERSATION DETAILS
Pt was introduced to advanced care planning/MOLST FORM yesterday by the primary attending. Pt asked to have some time to think about it and discuss with his family. Pt today decided that he does not want CPR in the event of cardiac arrest and intubation in the event of respiratory arrest. Pt though is willing to have trial of non-invasive ventilation such as BiPAP/CPAP. Pt also does not want tube feeding in the future he is unable to tolerate oral intake. Will re-visit dialysis conversation when the situation arise. MOLST drafted  Pt is DNR/trial of NIV/no feeding tube,

## 2024-12-01 NOTE — DISCHARGE NOTE PROVIDER - PROVIDER TOKENS
PROVIDER:[TOKEN:[[id number]],FOLLOWUP:[2 weeks]],PROVIDER:[TOKEN:[[id number]],FOLLOWUP:[2 weeks]] PROVIDER:[TOKEN:[[id number]],FOLLOWUP:[2 weeks]],PROVIDER:[TOKEN:[[id number]],FOLLOWUP:[2 weeks]],PROVIDER:[TOKEN:[[id number]],FOLLOWUP:[1 week]],PROVIDER:[TOKEN:[[id number]],FOLLOWUP:[1 week]],PROVIDER:[TOKEN:[[id number]],FOLLOWUP:[2 weeks]]

## 2024-12-01 NOTE — DISCHARGE NOTE PROVIDER - CARE PROVIDERS DIRECT ADDRESSES
,[email or web address],DirectAddress_Unknown ,DirectAddress_Unknown,DirectAddress_Unknown,DirectAddress_Unknown,[email or web address],[email or web address]

## 2024-12-01 NOTE — PROGRESS NOTE ADULT - SUBJECTIVE AND OBJECTIVE BOX
Patient is a 74y old  Male who presents with a chief complaint of acute CHF/sepsis 2/2 PNA (01 Dec 2024 07:51)    PATIENT IS SEEN AND EXAMINED IN MEDICAL FLOOR.  LEISA [    ]    JONAS [   ]      GT [   ]    ALLERGIES:  No Known Allergies      Daily     Daily     VITALS:    Vital Signs Last 24 Hrs  T(C): 36.7 (01 Dec 2024 11:07), Max: 36.8 (01 Dec 2024 08:10)  T(F): 98.1 (01 Dec 2024 11:07), Max: 98.2 (01 Dec 2024 08:10)  HR: 91 (01 Dec 2024 11:07) (89 - 98)  BP: 93/69 (01 Dec 2024 11:07) (93/69 - 138/82)  BP(mean): --  RR: 19 (01 Dec 2024 11:07) (18 - 19)  SpO2: 94% (01 Dec 2024 11:07) (94% - 100%)    Parameters below as of 01 Dec 2024 11:07  Patient On (Oxygen Delivery Method): room air        LABS:    CBC Full  -  ( 01 Dec 2024 06:55 )  WBC Count : 7.46 K/uL  RBC Count : 4.81 M/uL  Hemoglobin : 12.9 g/dL  Hematocrit : 39.1 %  Platelet Count - Automated : 330 K/uL  Mean Cell Volume : 81.3 fl  Mean Cell Hemoglobin : 26.8 pg  Mean Cell Hemoglobin Concentration : 33.0 g/dL  Auto Neutrophil # : x  Auto Lymphocyte # : x  Auto Monocyte # : x  Auto Eosinophil # : x  Auto Basophil # : x  Auto Neutrophil % : x  Auto Lymphocyte % : x  Auto Monocyte % : x  Auto Eosinophil % : x  Auto Basophil % : x      12-01    138  |  106  |  22[H]  ----------------------------<  181[H]  3.8   |  27  |  1.21    Ca    8.5      01 Dec 2024 06:55  Phos  2.2     11-30  Mg     2.1     11-30    TPro  6.1  /  Alb  2.5[L]  /  TBili  0.6  /  DBili  x   /  AST  15  /  ALT  23  /  AlkPhos  111  11-30    CAPILLARY BLOOD GLUCOSE      POCT Blood Glucose.: 214 mg/dL (30 Nov 2024 20:56)  POCT Blood Glucose.: 141 mg/dL (30 Nov 2024 16:50)    CARDIAC MARKERS ( 30 Nov 2024 01:14 )  x     / x     / x     / x     / 1.9 ng/mL      LIVER FUNCTIONS - ( 30 Nov 2024 05:45 )  Alb: 2.5 g/dL / Pro: 6.1 g/dL / ALK PHOS: 111 U/L / ALT: 23 U/L DA / AST: 15 U/L / GGT: x           Creatinine Trend: 1.21<--, 1.20<--, 1.23<--, 1.24<--, 1.11<--, 1.20<--  I&O's Summary    30 Nov 2024 07:01  -  01 Dec 2024 07:00  --------------------------------------------------------  IN: 500 mL / OUT: 475 mL / NET: 25 mL            .Blood BLOOD  11-26 @ 15:35   No growth at 4 days  --  --      .Blood BLOOD  11-26 @ 15:10   No growth at 4 days  --  --          MEDICATIONS:    MEDICATIONS  (STANDING):  albuterol/ipratropium for Nebulization 3 milliLiter(s) Nebulizer every 6 hours  aspirin enteric coated 81 milliGRAM(s) Oral daily  atorvastatin 80 milliGRAM(s) Oral at bedtime  benzonatate 100 milliGRAM(s) Oral three times a day  cefepime   IVPB      cefepime   IVPB 2000 milliGRAM(s) IV Intermittent every 12 hours  clopidogrel Tablet 75 milliGRAM(s) Oral daily  enoxaparin Injectable 40 milliGRAM(s) SubCutaneous every 24 hours  fluticasone propionate 50 MICROgram(s)/spray Nasal Spray 2 Spray(s) Both Nostrils two times a day  furosemide   Injectable 20 milliGRAM(s) IV Push every 12 hours  guaiFENesin  milliGRAM(s) Oral every 12 hours  insulin glargine Injectable (LANTUS) 5 Unit(s) SubCutaneous at bedtime  insulin lispro (ADMELOG) corrective regimen sliding scale   SubCutaneous three times a day before meals  insulin lispro (ADMELOG) corrective regimen sliding scale   SubCutaneous at bedtime  insulin lispro Injectable (ADMELOG) 2 Unit(s) SubCutaneous three times a day before meals  multivitamin 1 Tablet(s) Oral daily  polyethylene glycol 3350 17 Gram(s) Oral daily  senna 2 Tablet(s) Oral at bedtime  tamsulosin 0.4 milliGRAM(s) Oral at bedtime      MEDICATIONS  (PRN):  acetaminophen     Tablet .. 650 milliGRAM(s) Oral every 6 hours PRN Temp greater or equal to 38C (100.4F), Mild Pain (1 - 3)  bisacodyl 5 milliGRAM(s) Oral every 12 hours PRN Constipation  bisacodyl Suppository 10 milliGRAM(s) Rectal daily PRN Constipation      REVIEW OF SYSTEMS:                           ALL ROS DONE [ X   ]    CONSTITUTIONAL:  LETHARGIC [   ], FEVER [   ], UNRESPONSIVE [   ]  CVS:  CP  [   ], SOB, [   ], PALPITATIONS [   ], DIZZYNESS [   ]  RS: COUGH [   ], SPUTUM [   ]  GI: ABDOMINAL PAIN [   ], NAUSEA [   ], VOMITINGS [   ], DIARRHEA [   ], CONSTIPATION [   ]  :  DYSURIA [   ], NOCTURIA [   ], INCREASED FREQUENCY [   ], DRIBLING [   ],  SKELETAL: PAINFUL JOINTS [   ], SWOLLEN JOINTS [   ], NECK ACHE [   ], LOW BACK ACHE [   ],  SKIN : ULCERS [   ], RASH [   ], ITCHING [   ]  CNS: HEAD ACHE [   ], DOUBLE VISION [   ], BLURRED VISION [   ], AMS / CONFUSION [   ], SEIZURES [   ], WEAKNESS [   ],TINGLING / NUMBNESS [   ]      PHYSICAL EXAMINATION:  GENERAL APPEARANCE: NO DISTRESS  HEENT:  NO PALLOR, NO  JVD,  NO   NODES, NECK SUPPLE  CVS: S1 +, S2 +,   RS: AEEB,  OCCASIONAL  RALES +,   RONCHI +  ABD: SOFT, NT, NO, BS +  EXT: PE +   SKIN: WARM,   SKELETAL:  ROM ACCEPTABLE  CNS:  AAO X 3    RADIOLOGY :  RADIOLOGY AND READINGS REVIEWED    ASSESSMENT :     Shortness of breath    HTN (hypertension)    Hearing decreased    CVA (cerebral vascular accident)    Hyperlipemia    Kidney stones    Coronary artery disease    CHF (congestive heart failure)    Type 2 diabetes mellitus    Confusion    S/P medial meniscal repair    H/O lithotripsy    S/P tonsillectomy    Bilateral inguinal hernia        PLAN:  HPI:  74y M with PMHx of CVA, MI, HTN, T2DM, HLD, CAD s/p cardiac stents, HFrEF (EF 20-25% 10/2024), hx of malignant melanoma/wide excision, with recent elective bioprosthetic aortic valve replacement and ascending aortic aneurysm repair with transverse hemiarch and CABG x2 (4/8/24) with post op course complicated by cardiogenic shock requiring inotropic support with IV dobutamine and milrinone, IABP and also on amiodarone for atrial fibrillation prophylaxis presenting with difficulty breathing for the past 4 days preceded by cough for the past 4 to 5 weeks. Patient was recently admitted to [facility] 10/5-10/8 for pneumonia. Patient reports completion of full course of antibiotics prescribed on discharge. Patient states that he developed cough around a month ago that has been persistent and now causing abdominal muscle pain. Patient developed shortness of breath 4 days ago that got progressively worse prompting him to come to the ED today. Since treatment in ED, patient reports improvement of dyspnea but cough is still present but less frequent. Endorses constipation with no BM for last 3 days. Denies fever, chills, headache, dizziness, chest pain, palpitations, nausea, vomiting, diarrhea, and dysuria.   (26 Nov 2024 18:29)      # CASE DISCUSSED AT LENGTH WITH PATIENT, WIFE, DAUGHTER, SON, SON-IN-LAW - ALL QUESTIONS ANSWERED. RECOMMENDATIONS AS MADE BY MULTIDISCIPLINARY TEAM REVIEWED. DISCUSSED THAT PROGNOSIS IS GUARDED/POOR. PATIENT AND FAMILY VERBALIZED UNDERSTANDING. IN DISCUSSION REGARDING GOC - PATIENT CONVEYS THAT HE HAS PREVIOUSLY WISHED TO BE DNR/DNI - BUT FAMILY AND PATIENT PLAN TO DISCUSS AND WILL RECONVENE WITH TEAM TO CONFIRM GOC    # ACUTE HYPOXIC RESPIRATORY FAILURE MULTIFACTORIAL - LIKELY S/T PNA + DECOMPENSATED HF  # HX OF HFrEF [20-25%, 10/24], HX OF RECENT ELECTIVE BIOPROSTHETIC AORTIC VALVE REPLACEMENT, ASCENDING AORTIC ANEURYSM REPAIR W/ TRANSVERSE HEMIARCH AND CABG X 2  [4/2024] - C/B POST-OP CARDIOGENIC SHOCK REQUIRING IONOTROPIC SUPPORT, IABP   # HX OF CAD S/P STENT  - TELEMETRY  - CEFEPIME + AZITHROMYCIN, F/U BCX  - ANTITUSSIVE  - CHEST PT  - IV LASIX [ DOWNTITRATED - DUE TO HYPOTENSION  - NOTED CT CHEST  - ON AMIODARONE  - HOLD ENTRESTO, WILL RESUME IF BP TOLERATES  - CARDIOLOGY CONSULT  - ID CONSULT  - PULMONOLOGY CONSULT    # ? POST-OP A.FIB PPX - ON AMIODARONE - RECOMMENDED D/C PER CARDIOLOGY    # DM  # HBA1C - 10.2  - LANTUS  - SSI + FS  - ENDOCRINOLOGY CONSULT    # BPH  - FLOMAX    # HX OF CVA  # HX OF MI  # HTN  # HLD  # HX OF MALIGNANT MELANOMA/WIDE EXCISION  # GI PPX      Patient is a 74y old  Male who presents with a chief complaint of acute CHF/sepsis 2/2 PNA (01 Dec 2024 07:51)    PATIENT IS SEEN AND EXAMINED IN MEDICAL FLOOR.      ALLERGIES:  No Known Allergies      VITALS:    Vital Signs Last 24 Hrs  T(C): 36.7 (01 Dec 2024 11:07), Max: 36.8 (01 Dec 2024 08:10)  T(F): 98.1 (01 Dec 2024 11:07), Max: 98.2 (01 Dec 2024 08:10)  HR: 91 (01 Dec 2024 11:07) (89 - 98)  BP: 93/69 (01 Dec 2024 11:07) (93/69 - 138/82)  BP(mean): --  RR: 19 (01 Dec 2024 11:07) (18 - 19)  SpO2: 94% (01 Dec 2024 11:07) (94% - 100%)    Parameters below as of 01 Dec 2024 11:07  Patient On (Oxygen Delivery Method): room air        LABS:    CBC Full  -  ( 01 Dec 2024 06:55 )  WBC Count : 7.46 K/uL  RBC Count : 4.81 M/uL  Hemoglobin : 12.9 g/dL  Hematocrit : 39.1 %  Platelet Count - Automated : 330 K/uL  Mean Cell Volume : 81.3 fl  Mean Cell Hemoglobin : 26.8 pg  Mean Cell Hemoglobin Concentration : 33.0 g/dL  Auto Neutrophil # : x  Auto Lymphocyte # : x  Auto Monocyte # : x  Auto Eosinophil # : x  Auto Basophil # : x  Auto Neutrophil % : x  Auto Lymphocyte % : x  Auto Monocyte % : x  Auto Eosinophil % : x  Auto Basophil % : x      12-01    138  |  106  |  22[H]  ----------------------------<  181[H]  3.8   |  27  |  1.21    Ca    8.5      01 Dec 2024 06:55  Phos  2.2     11-30  Mg     2.1     11-30    TPro  6.1  /  Alb  2.5[L]  /  TBili  0.6  /  DBili  x   /  AST  15  /  ALT  23  /  AlkPhos  111  11-30    CAPILLARY BLOOD GLUCOSE      POCT Blood Glucose.: 214 mg/dL (30 Nov 2024 20:56)  POCT Blood Glucose.: 141 mg/dL (30 Nov 2024 16:50)    CARDIAC MARKERS ( 30 Nov 2024 01:14 )  x     / x     / x     / x     / 1.9 ng/mL      LIVER FUNCTIONS - ( 30 Nov 2024 05:45 )  Alb: 2.5 g/dL / Pro: 6.1 g/dL / ALK PHOS: 111 U/L / ALT: 23 U/L DA / AST: 15 U/L / GGT: x           Creatinine Trend: 1.21<--, 1.20<--, 1.23<--, 1.24<--, 1.11<--, 1.20<--  I&O's Summary    30 Nov 2024 07:01  -  01 Dec 2024 07:00  --------------------------------------------------------  IN: 500 mL / OUT: 475 mL / NET: 25 mL            .Blood BLOOD  11-26 @ 15:35   No growth at 4 days  --  --      .Blood BLOOD  11-26 @ 15:10   No growth at 4 days  --  --          MEDICATIONS:    MEDICATIONS  (STANDING):  albuterol/ipratropium for Nebulization 3 milliLiter(s) Nebulizer every 6 hours  aspirin enteric coated 81 milliGRAM(s) Oral daily  atorvastatin 80 milliGRAM(s) Oral at bedtime  benzonatate 100 milliGRAM(s) Oral three times a day  cefepime   IVPB      cefepime   IVPB 2000 milliGRAM(s) IV Intermittent every 12 hours  clopidogrel Tablet 75 milliGRAM(s) Oral daily  enoxaparin Injectable 40 milliGRAM(s) SubCutaneous every 24 hours  fluticasone propionate 50 MICROgram(s)/spray Nasal Spray 2 Spray(s) Both Nostrils two times a day  furosemide   Injectable 20 milliGRAM(s) IV Push every 12 hours  guaiFENesin  milliGRAM(s) Oral every 12 hours  insulin glargine Injectable (LANTUS) 5 Unit(s) SubCutaneous at bedtime  insulin lispro (ADMELOG) corrective regimen sliding scale   SubCutaneous three times a day before meals  insulin lispro (ADMELOG) corrective regimen sliding scale   SubCutaneous at bedtime  insulin lispro Injectable (ADMELOG) 2 Unit(s) SubCutaneous three times a day before meals  multivitamin 1 Tablet(s) Oral daily  polyethylene glycol 3350 17 Gram(s) Oral daily  senna 2 Tablet(s) Oral at bedtime  tamsulosin 0.4 milliGRAM(s) Oral at bedtime      MEDICATIONS  (PRN):  acetaminophen     Tablet .. 650 milliGRAM(s) Oral every 6 hours PRN Temp greater or equal to 38C (100.4F), Mild Pain (1 - 3)  bisacodyl 5 milliGRAM(s) Oral every 12 hours PRN Constipation  bisacodyl Suppository 10 milliGRAM(s) Rectal daily PRN Constipation      REVIEW OF SYSTEMS:                           ALL ROS DONE [ X   ]    CONSTITUTIONAL:  LETHARGIC [   ], FEVER [   ], UNRESPONSIVE [   ]  CVS:  CP  [   ], SOB, [   ], PALPITATIONS [   ], DIZZYNESS [   ]  RS: COUGH [   ], SPUTUM [   ]  GI: ABDOMINAL PAIN [   ], NAUSEA [   ], VOMITINGS [   ], DIARRHEA [   ], CONSTIPATION [   ]  :  DYSURIA [   ], NOCTURIA [   ], INCREASED FREQUENCY [   ], DRIBLING [   ],  SKELETAL: PAINFUL JOINTS [   ], SWOLLEN JOINTS [   ], NECK ACHE [   ], LOW BACK ACHE [   ],  SKIN : ULCERS [   ], RASH [   ], ITCHING [   ]  CNS: HEAD ACHE [   ], DOUBLE VISION [   ], BLURRED VISION [   ], AMS / CONFUSION [   ], SEIZURES [   ], WEAKNESS [   ],TINGLING / NUMBNESS [   ]      PHYSICAL EXAMINATION:  GENERAL APPEARANCE: NO DISTRESS  HEENT:  NO PALLOR, NO  JVD,  NO   NODES, NECK SUPPLE  CVS: S1 +, S2 +,   RS: AEEB,  OCCASIONAL  RALES +,   RONCHI +  ABD: SOFT, NT, NO, BS +  EXT: PE +   SKIN: WARM,   SKELETAL:  ROM ACCEPTABLE  CNS:  AAO X 3    RADIOLOGY :  RADIOLOGY AND READINGS REVIEWED    ASSESSMENT :     Shortness of breath    HTN (hypertension)    Hearing decreased    CVA (cerebral vascular accident)    Hyperlipemia    Kidney stones    Coronary artery disease    CHF (congestive heart failure)    Type 2 diabetes mellitus    Confusion    S/P medial meniscal repair    H/O lithotripsy    S/P tonsillectomy    Bilateral inguinal hernia        PLAN:  HPI:  74y M with PMHx of CVA, MI, HTN, T2DM, HLD, CAD s/p cardiac stents, HFrEF (EF 20-25% 10/2024), hx of malignant melanoma/wide excision, with recent elective bioprosthetic aortic valve replacement and ascending aortic aneurysm repair with transverse hemiarch and CABG x2 (4/8/24) with post op course complicated by cardiogenic shock requiring inotropic support with IV dobutamine and milrinone, IABP and also on amiodarone for atrial fibrillation prophylaxis presenting with difficulty breathing for the past 4 days preceded by cough for the past 4 to 5 weeks. Patient was recently admitted to [facility] 10/5-10/8 for pneumonia. Patient reports completion of full course of antibiotics prescribed on discharge. Patient states that he developed cough around a month ago that has been persistent and now causing abdominal muscle pain. Patient developed shortness of breath 4 days ago that got progressively worse prompting him to come to the ED today. Since treatment in ED, patient reports improvement of dyspnea but cough is still present but less frequent. Endorses constipation with no BM for last 3 days. Denies fever, chills, headache, dizziness, chest pain, palpitations, nausea, vomiting, diarrhea, and dysuria.   (26 Nov 2024 18:29)    # CASE D/W PATIENT AT BEDSIDE. CASE REVIEWED AT LENGTH, ALL QUESTIONS ANSWERED. DISCUSSED REGARDING GOC - PATIENT CONFIRMS GOC OF DNR/DNI/NO PEG.     # CASE DISCUSSED AT LENGTH WITH PATIENT, WIFE, DAUGHTER, SON, SON-IN-LAW - ALL QUESTIONS ANSWERED. RECOMMENDATIONS AS MADE BY MULTIDISCIPLINARY TEAM REVIEWED. DISCUSSED THAT PROGNOSIS IS GUARDED/POOR. PATIENT AND FAMILY VERBALIZED UNDERSTANDING. IN DISCUSSION REGARDING GOC - PATIENT CONVEYS THAT HE HAS PREVIOUSLY WISHED TO BE DNR/DNI - BUT FAMILY AND PATIENT PLAN TO DISCUSS AND WILL RECONVENE WITH TEAM TO CONFIRM GOC    # ACUTE HYPOXIC RESPIRATORY FAILURE MULTIFACTORIAL - LIKELY S/T PNA + DECOMPENSATED HF  # HX OF HFrEF [20-25%, 10/24], HX OF RECENT ELECTIVE BIOPROSTHETIC AORTIC VALVE REPLACEMENT, ASCENDING AORTIC ANEURYSM REPAIR W/ TRANSVERSE HEMIARCH AND CABG X 2  [4/2024] - C/B POST-OP CARDIOGENIC SHOCK REQUIRING IONOTROPIC SUPPORT, IABP   # HX OF CAD S/P STENT  - TELEMETRY  - CEFEPIME + AZITHROMYCIN, F/U BCX  - ANTITUSSIVE  - CHEST PT  - IV LASIX [ DOWNTITRATED - DUE TO HYPOTENSION  - NOTED CT CHEST  - ON AMIODARONE  - HOLD ENTRESTO, WILL RESUME IF BP TOLERATES  - CARDIOLOGY CONSULT  - ID CONSULT  - PULMONOLOGY CONSULT    # ? POST-OP A.FIB PPX - ON AMIODARONE - RECOMMENDED D/C PER CARDIOLOGY    # DM  # HBA1C - 10.2  - LANTUS  - SSI + FS  - ENDOCRINOLOGY CONSULT    # BPH  - FLOMAX    # HX OF CVA  # HX OF MI  # HTN  # HLD  # HX OF MALIGNANT MELANOMA/WIDE EXCISION  # GI PPX

## 2024-12-01 NOTE — PROGRESS NOTE ADULT - SUBJECTIVE AND OBJECTIVE BOX
Time of Visit:  Patient seen and examined.     MEDICATIONS  (STANDING):  albuterol/ipratropium for Nebulization 3 milliLiter(s) Nebulizer every 6 hours  aspirin enteric coated 81 milliGRAM(s) Oral daily  atorvastatin 80 milliGRAM(s) Oral at bedtime  benzonatate 100 milliGRAM(s) Oral three times a day  cefepime   IVPB      cefepime   IVPB 2000 milliGRAM(s) IV Intermittent every 12 hours  clopidogrel Tablet 75 milliGRAM(s) Oral daily  enoxaparin Injectable 40 milliGRAM(s) SubCutaneous every 24 hours  fluticasone propionate 50 MICROgram(s)/spray Nasal Spray 2 Spray(s) Both Nostrils two times a day  furosemide   Injectable 20 milliGRAM(s) IV Push every 12 hours  guaiFENesin  milliGRAM(s) Oral every 12 hours  insulin glargine Injectable (LANTUS) 5 Unit(s) SubCutaneous at bedtime  insulin lispro (ADMELOG) corrective regimen sliding scale   SubCutaneous three times a day before meals  insulin lispro (ADMELOG) corrective regimen sliding scale   SubCutaneous at bedtime  insulin lispro Injectable (ADMELOG) 2 Unit(s) SubCutaneous three times a day before meals  multivitamin 1 Tablet(s) Oral daily  polyethylene glycol 3350 17 Gram(s) Oral daily  senna 2 Tablet(s) Oral at bedtime  tamsulosin 0.4 milliGRAM(s) Oral at bedtime      MEDICATIONS  (PRN):  acetaminophen     Tablet .. 650 milliGRAM(s) Oral every 6 hours PRN Temp greater or equal to 38C (100.4F), Mild Pain (1 - 3)  bisacodyl 5 milliGRAM(s) Oral every 12 hours PRN Constipation  bisacodyl Suppository 10 milliGRAM(s) Rectal daily PRN Constipation       Medications up to date at time of exam.      PHYSICAL EXAMINATION:  Patient has no new complaints.  GENERAL: The patient  in no apparent distress.     Vital Signs Last 24 Hrs  T(C): 36.6 (01 Dec 2024 15:45), Max: 36.8 (01 Dec 2024 08:10)  T(F): 97.9 (01 Dec 2024 15:45), Max: 98.2 (01 Dec 2024 08:10)  HR: 95 (01 Dec 2024 15:45) (89 - 98)  BP: 104/76 (01 Dec 2024 15:45) (93/69 - 138/82)  BP(mean): --  RR: 19 (01 Dec 2024 15:45) (19 - 19)  SpO2: 97% (01 Dec 2024 15:45) (94% - 98%)    Parameters below as of 01 Dec 2024 15:45  Patient On (Oxygen Delivery Method): room air       (if applicable)    Chest Tube (if applicable)    HEENT: Head is normocephalic and atraumatic. Extraocular muscles are intact. Mucous membranes are moist.     NECK: Supple, no palpable adenopathy.    LUNGS: Fair bilateral air entry   no wheezing, rales, or rhonchi.    HEART: Regular rate and rhythm without murmur.    ABDOMEN: Soft, nontender, and nondistended.  No hepatosplenomegaly is noted.    : No painful voiding, no pelvic pain    EXTREMITIES: Without any cyanosis, clubbing, rash, lesions or edema.    NEUROLOGIC: Awake, alert, oriented, grossly intact    SKIN: Warm, dry, good turgor.      LABS:                        12.9   7.46  )-----------( 330      ( 01 Dec 2024 06:55 )             39.1     12-01    138  |  106  |  22[H]  ----------------------------<  181[H]  3.8   |  27  |  1.21    Ca    8.5      01 Dec 2024 06:55  Phos  2.2     11-30  Mg     2.1     11-30    TPro  6.1  /  Alb  2.5[L]  /  TBili  0.6  /  DBili  x   /  AST  15  /  ALT  23  /  AlkPhos  111  11-30      Urinalysis Basic - ( 01 Dec 2024 06:55 )    Color: x / Appearance: x / SG: x / pH: x  Gluc: 181 mg/dL / Ketone: x  / Bili: x / Urobili: x   Blood: x / Protein: x / Nitrite: x   Leuk Esterase: x / RBC: x / WBC x   Sq Epi: x / Non Sq Epi: x / Bacteria: x        CARDIAC MARKERS ( 30 Nov 2024 01:14 )  x     / x     / x     / x     / 1.9 ng/mL                MICROBIOLOGY: (if applicable)    RADIOLOGY & ADDITIONAL STUDIES:  EKG:   CXR:  ECHO:    IMPRESSION: 74y Male PAST MEDICAL & SURGICAL HISTORY:  HTN (hypertension)      Hearing decreased      CVA (cerebral vascular accident)  12/22/2016      Hyperlipemia      Kidney stones      Coronary artery disease  MI 12/22/2016      CHF (congestive heart failure)  1/2017 stents x3      Type 2 diabetes mellitus  2016      Confusion  from stroke      S/P medial meniscal repair  and ligament surgery      H/O lithotripsy      S/P tonsillectomy      Bilateral inguinal hernia       p/w            Time of Visit:  Patient seen and examined. pat seen earlier asleep    MEDICATIONS  (STANDING):  albuterol/ipratropium for Nebulization 3 milliLiter(s) Nebulizer every 6 hours  aspirin enteric coated 81 milliGRAM(s) Oral daily  atorvastatin 80 milliGRAM(s) Oral at bedtime  benzonatate 100 milliGRAM(s) Oral three times a day  cefepime   IVPB      cefepime   IVPB 2000 milliGRAM(s) IV Intermittent every 12 hours  clopidogrel Tablet 75 milliGRAM(s) Oral daily  enoxaparin Injectable 40 milliGRAM(s) SubCutaneous every 24 hours  fluticasone propionate 50 MICROgram(s)/spray Nasal Spray 2 Spray(s) Both Nostrils two times a day  furosemide   Injectable 20 milliGRAM(s) IV Push every 12 hours  guaiFENesin  milliGRAM(s) Oral every 12 hours  insulin glargine Injectable (LANTUS) 5 Unit(s) SubCutaneous at bedtime  insulin lispro (ADMELOG) corrective regimen sliding scale   SubCutaneous three times a day before meals  insulin lispro (ADMELOG) corrective regimen sliding scale   SubCutaneous at bedtime  insulin lispro Injectable (ADMELOG) 2 Unit(s) SubCutaneous three times a day before meals  multivitamin 1 Tablet(s) Oral daily  polyethylene glycol 3350 17 Gram(s) Oral daily  senna 2 Tablet(s) Oral at bedtime  tamsulosin 0.4 milliGRAM(s) Oral at bedtime      MEDICATIONS  (PRN):  acetaminophen     Tablet .. 650 milliGRAM(s) Oral every 6 hours PRN Temp greater or equal to 38C (100.4F), Mild Pain (1 - 3)  bisacodyl 5 milliGRAM(s) Oral every 12 hours PRN Constipation  bisacodyl Suppository 10 milliGRAM(s) Rectal daily PRN Constipation       Medications up to date at time of exam.      PHYSICAL EXAMINATION:  Patient has no new complaints.  GENERAL: The patient  in no apparent distress.     Vital Signs Last 24 Hrs  T(C): 36.6 (01 Dec 2024 15:45), Max: 36.8 (01 Dec 2024 08:10)  T(F): 97.9 (01 Dec 2024 15:45), Max: 98.2 (01 Dec 2024 08:10)  HR: 95 (01 Dec 2024 15:45) (89 - 98)  BP: 104/76 (01 Dec 2024 15:45) (93/69 - 138/82)  BP(mean): --  RR: 19 (01 Dec 2024 15:45) (19 - 19)  SpO2: 97% (01 Dec 2024 15:45) (94% - 98%)    Parameters below as of 01 Dec 2024 15:45  Patient On (Oxygen Delivery Method): room air       (if applicable)    Chest Tube (if applicable)    HEENT: Head is normocephalic and atraumatic. Extraocular muscles are intact. Mucous membranes are moist.     NECK: Supple, no palpable adenopathy.    LUNGS: Fair bilateral air entry   no wheezing, rales, or rhonchi.    HEART: Regular rate and rhythm without murmur.    ABDOMEN: Soft, nontender, and nondistended.  No hepatosplenomegaly is noted.    : No painful voiding, no pelvic pain    EXTREMITIES: Without any cyanosis, clubbing, rash, lesions or edema.    NEUROLOGIC: Awake, alert, oriented, grossly intact    SKIN: Warm, dry, good turgor.      LABS:                        12.9   7.46  )-----------( 330      ( 01 Dec 2024 06:55 )             39.1     12-01    138  |  106  |  22[H]  ----------------------------<  181[H]  3.8   |  27  |  1.21    Ca    8.5      01 Dec 2024 06:55  Phos  2.2     11-30  Mg     2.1     11-30    TPro  6.1  /  Alb  2.5[L]  /  TBili  0.6  /  DBili  x   /  AST  15  /  ALT  23  /  AlkPhos  111  11-30      Urinalysis Basic - ( 01 Dec 2024 06:55 )    Color: x / Appearance: x / SG: x / pH: x  Gluc: 181 mg/dL / Ketone: x  / Bili: x / Urobili: x   Blood: x / Protein: x / Nitrite: x   Leuk Esterase: x / RBC: x / WBC x   Sq Epi: x / Non Sq Epi: x / Bacteria: x        CARDIAC MARKERS ( 30 Nov 2024 01:14 )  x     / x     / x     / x     / 1.9 ng/mL                MICROBIOLOGY: (if applicable)    RADIOLOGY & ADDITIONAL STUDIES:  EKG:   CXR:  ECHO:    IMPRESSION: 74y Male PAST MEDICAL & SURGICAL HISTORY:  HTN (hypertension)      Hearing decreased      CVA (cerebral vascular accident)  12/22/2016      Hyperlipemia      Kidney stones      Coronary artery disease  MI 12/22/2016      CHF (congestive heart failure)  1/2017 stents x3      Type 2 diabetes mellitus  2016      Confusion  from stroke      S/P medial meniscal repair  and ligament surgery      H/O lithotripsy      S/P tonsillectomy      Bilateral inguinal hernia       p/w       Impression : This is a 75 Y/O Male with Hx of Malignant Melanoma with Wide Excision . Presented to ED with difficulty breathing for the past 4 days preceded by cough for the past 4 to 5 weeks. Patient was recently admitted to [facility] 10/5-10/8 for pneumonia. Patient reports completion of full course of antibiotics prescribed on discharge. For pulmonary follow up of Chronic cough with episode of SOB due to Acute Hypoxic Respiratory Failure secondary to Acute exacerbation of HF and Pneumonia   . CT chest with stable B/L Small Pleural Effusions .      Suggestion:   O2 saturation 96% room air. So far saturating good room air. Monitor O2 saturation trend.   On Duoneb via nebulization Q 6 Hours.   On Azithromycin 500 mg IVPB Daily .  On Cefepime 2 Gm IVPB Q 12 Hours.   On Fluticasone nasal spray Twice  daily.   On Mucinex 600 mg Oral Q 12 Hours.   DVT/ GI prophylactic. On Lovenox 40 mg SQ Daily.   Strict I & O .   Outpatient ENT Eval.  Pulmonary follow up outpatient .

## 2024-12-01 NOTE — DISCHARGE NOTE PROVIDER - NSDCFUADDAPPT_GEN_ALL_CORE_FT
Cardiologist: Dr Rosaline Ramon   APPTS ARE READY TO BE MADE: [X] YES    Best Family or Patient Contact (if needed):    Additional Information about above appointments (if needed):    1: Cardiologist: Dr Rosaline Ramon   2: PCP Dr. Pacheco  3:     Other comments or requests:      APPTS ARE READY TO BE MADE: [X] YES    Best Family or Patient Contact (if needed):    Additional Information about above appointments (if needed):    1: Cardiologist: Dr Rosaline Ramon   2: PCP Dr. Pacheco  3:     Other comments or requests:     Appointment was scheduled in [facility] for Internal Medicine with Dr. Bruno on 12/6/2024 at 9:30am, [address] [place] [address]  [id number]. [phone]    Appointment was scheduled in [facility] for Pulmonary with Dr. Lockhart on 1/14/2025 at 2pm, [address] [place] [address]  [id number]. [phone]    Appointment was scheduled in [facility] for Cardiology with Dr. Marie on 1/31/2025 at 8:30am, [address] [place] [address]  [id number]. [phone]    Appointment was scheduled in [facility] for Pulmonary with Dr. Canela on 12/9/2024 at 11am, [address] [place] [address]  [id number]. [phone]    Appointment was scheduled in [facility] for Endocrinology with Dr. Velasquez on 5/1/2025 at 9am, [address] [place] [id number]. [phone]

## 2024-12-01 NOTE — CHART NOTE - NSCHARTNOTEFT_GEN_A_CORE
EVENT:   11/30/24, 11 : 52pm, patient c/o no BM x 5 days. Patient is on bowel regimen. Saline laxative fleet enema x 1 dose given w/small amount of output (per RN).   HPI:  73 y/o male with PMH of CVA, MI, HTN, T2DM, HLD, CAD s/p cardiac stents, HFrEF (EF 20-25% 10/2024), hx of malignant melanoma/wide excision, with recent elective bioprosthetic aortic valve replacement and ascending aortic aneurysm repair with transverse hemiarch and CABG x2 (4/8/24) with post op course complicated by cardiogenic shock requiring inotropic support with IV dobutamine and milrinone, IABP and also on amiodarone for atrial fibrillation prophylaxis presenting with difficulty breathing for the past 4 days preceded by cough for the past 4 to 5 weeks. Patient was recently admitted to [facility] 10/5-10/8 for pneumonia. Patient reports completion of full course of antibiotics prescribed on discharge. Patient states that he developed cough around a month ago that has been persistent and now causing abdominal muscle pain. Patient developed shortness of breath 4 days ago that got progressively worse prompting him to come to the ED today. Since treatment in ED, patient reports improvement of dyspnea but cough is still present but less frequent. Endorses constipation with no BM for last 3 days.  OBJECTIVE:  Vital Signs Last 24 Hrs  T(C): 36.6 (30 Nov 2024 19:36), Max: 36.7 (30 Nov 2024 04:36)  T(F): 97.9 (30 Nov 2024 19:36), Max: 98.1 (30 Nov 2024 04:36)  HR: 89 (30 Nov 2024 19:36) (89 - 93)  BP: 138/82 (30 Nov 2024 19:36) (90/66 - 138/82)  BP(mean): --  RR: 19 (30 Nov 2024 19:36) (18 - 20)  SpO2: 98% (30 Nov 2024 19:36) (91% - 100%)    PLAN:   - Monitor I &O,   - Follow-up morning labs,   - Safety and comfort measures maintain.

## 2024-12-01 NOTE — DISCHARGE NOTE PROVIDER - NSDCMRMEDTOKEN_GEN_ALL_CORE_FT
amiodarone 200 mg oral tablet: 1 tab(s) orally once a day  amoxicillin-clavulanate 875 mg-125 mg oral tablet: 875 milligram(s) orally 2 times a day MDD: 2  aspirin 81 mg oral delayed release tablet: 1 tab(s) orally once a day  atorvastatin 80 mg oral tablet: 1 tab(s) orally once a day (at bedtime)  benzonatate 100 mg oral capsule: 1 cap(s) orally 3 times a day as needed for Cough MDD: 3  clopidogrel 75 mg oral tablet: 1 tab(s) orally once a day  Farxiga 10 mg oral tablet: 1 tab(s) orally once a day  Flomax 0.4 mg oral capsule: 1 cap(s) orally once a day (at bedtime)  Lasix 20 mg oral tablet: 1 tab(s) orally once a day as needed for swelling  linagliptin 5 mg oral tablet: 1 tab(s) orally once a day (before a meal)  LORazepam 0.5 mg oral tablet: 1 tab(s) orally once a day (at bedtime) as needed for  anxiety  melatonin 3 mg oral tablet: 2 tab(s) orally once a day (at bedtime)  metoprolol succinate 25 mg oral tablet, extended release: 0.5 tab(s) orally once a day  Multiple Vitamins oral tablet: 1 tab(s) orally once a day  Outpatient Physical Therapy: Evaluate and Treat. MDD: 1  sacubitril-valsartan 24 mg-26 mg oral tablet: 1 tab(s) orally 2 times a day   aspirin 81 mg oral delayed release tablet: 1 tab(s) orally once a day  atorvastatin 80 mg oral tablet: 1 tab(s) orally once a day (at bedtime)  benzonatate 100 mg oral capsule: 1 cap(s) orally 3 times a day as needed for Cough MDD: 3  clopidogrel 75 mg oral tablet: 1 tab(s) orally once a day  Flomax 0.4 mg oral capsule: 1 cap(s) orally once a day (at bedtime)  Lasix 20 mg oral tablet: 1 tab(s) orally once a day as needed for swelling  linagliptin 5 mg oral tablet: 1 tab(s) orally once a day (before a meal)  LORazepam 0.5 mg oral tablet: 1 tab(s) orally once a day (at bedtime) as needed for  anxiety  melatonin 3 mg oral tablet: 2 tab(s) orally once a day (at bedtime)  metoprolol succinate 25 mg oral tablet, extended release: 0.5 tab(s) orally once a day  Multiple Vitamins oral tablet: 1 tab(s) orally once a day   aspirin 81 mg oral delayed release tablet: 1 tab(s) orally once a day  atorvastatin 80 mg oral tablet: 1 tab(s) orally once a day (at bedtime)  benzonatate 100 mg oral capsule: 1 cap(s) orally 3 times a day as needed for Cough MDD: 3  clopidogrel 75 mg oral tablet: 1 tab(s) orally once a day  digoxin 125 mcg (0.125 mg) oral tablet: 1 tab(s) orally once a day  Flomax 0.4 mg oral capsule: 1 cap(s) orally once a day (at bedtime)  fluticasone 50 mcg/inh nasal spray: 2 spray(s) nasal 2 times a day  LORazepam 0.5 mg oral tablet: 1 tab(s) orally once a day (at bedtime) as needed for  anxiety  melatonin 3 mg oral tablet: 2 tab(s) orally once a day (at bedtime)  Multiple Vitamins oral tablet: 1 tab(s) orally once a day  valsartan 40 mg oral tablet: 1 tab(s) orally once a day   aspirin 81 mg oral delayed release tablet: 1 tab(s) orally once a day  atorvastatin 80 mg oral tablet: 1 tab(s) orally once a day (at bedtime)  benzonatate 100 mg oral capsule: 1 cap(s) orally 3 times a day as needed for Cough MDD: 3  clopidogrel 75 mg oral tablet: 1 tab(s) orally once a day  digoxin 125 mcg (0.125 mg) oral tablet: 1 tab(s) orally once a day  Flomax 0.4 mg oral capsule: 1 cap(s) orally once a day (at bedtime)  fluticasone 50 mcg/inh nasal spray: 2 spray(s) nasal 2 times a day  LORazepam 0.5 mg oral tablet: 1 tab(s) orally once a day (at bedtime) as needed for  anxiety  melatonin 3 mg oral tablet: 2 tab(s) orally once a day (at bedtime)  metFORMIN 500 mg oral tablet: 1 tab(s) orally once a day Take 500 mg with dinner for one week , if feeling better then increase to metformin 1000 mg daily with dinner for the following week and from the third week onwards, take metformin 1000 mg twice a day  Multiple Vitamins oral tablet: 1 tab(s) orally once a day  valsartan 40 mg oral tablet: 1 tab(s) orally once a day

## 2024-12-02 ENCOUNTER — NON-APPOINTMENT (OUTPATIENT)
Age: 74
End: 2024-12-02

## 2024-12-02 LAB
ANION GAP SERPL CALC-SCNC: 7 MMOL/L — SIGNIFICANT CHANGE UP (ref 5–17)
BUN SERPL-MCNC: 22 MG/DL — HIGH (ref 7–18)
CALCIUM SERPL-MCNC: 8.8 MG/DL — SIGNIFICANT CHANGE UP (ref 8.4–10.5)
CHLORIDE SERPL-SCNC: 106 MMOL/L — SIGNIFICANT CHANGE UP (ref 96–108)
CO2 SERPL-SCNC: 28 MMOL/L — SIGNIFICANT CHANGE UP (ref 22–31)
CREAT SERPL-MCNC: 1.22 MG/DL — SIGNIFICANT CHANGE UP (ref 0.5–1.3)
EGFR: 61 ML/MIN/1.73M2 — SIGNIFICANT CHANGE UP
GLUCOSE BLDC GLUCOMTR-MCNC: 101 MG/DL — HIGH (ref 70–99)
GLUCOSE BLDC GLUCOMTR-MCNC: 146 MG/DL — HIGH (ref 70–99)
GLUCOSE BLDC GLUCOMTR-MCNC: 161 MG/DL — HIGH (ref 70–99)
GLUCOSE BLDC GLUCOMTR-MCNC: 164 MG/DL — HIGH (ref 70–99)
GLUCOSE SERPL-MCNC: 153 MG/DL — HIGH (ref 70–99)
HCT VFR BLD CALC: 41.6 % — SIGNIFICANT CHANGE UP (ref 39–50)
HGB BLD-MCNC: 13.5 G/DL — SIGNIFICANT CHANGE UP (ref 13–17)
MAGNESIUM SERPL-MCNC: 2.2 MG/DL — SIGNIFICANT CHANGE UP (ref 1.6–2.6)
MCHC RBC-ENTMCNC: 26.3 PG — LOW (ref 27–34)
MCHC RBC-ENTMCNC: 32.5 G/DL — SIGNIFICANT CHANGE UP (ref 32–36)
MCV RBC AUTO: 81.1 FL — SIGNIFICANT CHANGE UP (ref 80–100)
NRBC # BLD: 0 /100 WBCS — SIGNIFICANT CHANGE UP (ref 0–0)
PHOSPHATE SERPL-MCNC: 2.5 MG/DL — SIGNIFICANT CHANGE UP (ref 2.5–4.5)
PLATELET # BLD AUTO: 333 K/UL — SIGNIFICANT CHANGE UP (ref 150–400)
POTASSIUM SERPL-MCNC: 4.1 MMOL/L — SIGNIFICANT CHANGE UP (ref 3.5–5.3)
POTASSIUM SERPL-SCNC: 4.1 MMOL/L — SIGNIFICANT CHANGE UP (ref 3.5–5.3)
RBC # BLD: 5.13 M/UL — SIGNIFICANT CHANGE UP (ref 4.2–5.8)
RBC # FLD: 14.5 % — SIGNIFICANT CHANGE UP (ref 10.3–14.5)
SODIUM SERPL-SCNC: 141 MMOL/L — SIGNIFICANT CHANGE UP (ref 135–145)
WBC # BLD: 7.61 K/UL — SIGNIFICANT CHANGE UP (ref 3.8–10.5)
WBC # FLD AUTO: 7.61 K/UL — SIGNIFICANT CHANGE UP (ref 3.8–10.5)

## 2024-12-02 PROCEDURE — 99231 SBSQ HOSP IP/OBS SF/LOW 25: CPT

## 2024-12-02 RX ORDER — VALSARTAN 320 MG/1
40 TABLET ORAL DAILY
Refills: 0 | Status: DISCONTINUED | OUTPATIENT
Start: 2024-12-02 | End: 2024-12-03

## 2024-12-02 RX ORDER — FUROSEMIDE 40 MG/1
40 TABLET ORAL DAILY
Refills: 0 | Status: DISCONTINUED | OUTPATIENT
Start: 2024-12-03 | End: 2024-12-04

## 2024-12-02 RX ADMIN — BENZONATATE 100 MILLIGRAM(S): 100 CAPSULE ORAL at 11:37

## 2024-12-02 RX ADMIN — Medication 2 UNIT(S): at 17:34

## 2024-12-02 RX ADMIN — IPRATROPIUM BROMIDE AND ALBUTEROL SULFATE 3 MILLILITER(S): 2.5; .5 SOLUTION RESPIRATORY (INHALATION) at 14:14

## 2024-12-02 RX ADMIN — Medication 2: at 17:34

## 2024-12-02 RX ADMIN — IPRATROPIUM BROMIDE AND ALBUTEROL SULFATE 3 MILLILITER(S): 2.5; .5 SOLUTION RESPIRATORY (INHALATION) at 20:02

## 2024-12-02 RX ADMIN — Medication 81 MILLIGRAM(S): at 11:37

## 2024-12-02 RX ADMIN — Medication 1 TABLET(S): at 11:37

## 2024-12-02 RX ADMIN — Medication 2 UNIT(S): at 08:16

## 2024-12-02 RX ADMIN — CEFEPIME 100 MILLIGRAM(S): 2 INJECTION, POWDER, FOR SOLUTION INTRAVENOUS at 07:04

## 2024-12-02 RX ADMIN — FUROSEMIDE 20 MILLIGRAM(S): 40 TABLET ORAL at 17:34

## 2024-12-02 RX ADMIN — Medication 2 SPRAY(S): at 17:18

## 2024-12-02 RX ADMIN — FUROSEMIDE 20 MILLIGRAM(S): 40 TABLET ORAL at 07:03

## 2024-12-02 RX ADMIN — BENZONATATE 100 MILLIGRAM(S): 100 CAPSULE ORAL at 21:04

## 2024-12-02 RX ADMIN — TAMSULOSIN HYDROCHLORIDE 0.4 MILLIGRAM(S): 0.4 CAPSULE ORAL at 21:04

## 2024-12-02 RX ADMIN — Medication 2: at 08:15

## 2024-12-02 RX ADMIN — Medication 600 MILLIGRAM(S): at 07:02

## 2024-12-02 RX ADMIN — Medication 2 UNIT(S): at 11:39

## 2024-12-02 RX ADMIN — CLOPIDOGREL 75 MILLIGRAM(S): 75 TABLET, FILM COATED ORAL at 11:37

## 2024-12-02 RX ADMIN — BENZONATATE 100 MILLIGRAM(S): 100 CAPSULE ORAL at 07:02

## 2024-12-02 RX ADMIN — Medication 2 TABLET(S): at 21:04

## 2024-12-02 RX ADMIN — INSULIN GLARGINE 5 UNIT(S): 100 INJECTION, SOLUTION SUBCUTANEOUS at 21:07

## 2024-12-02 RX ADMIN — ENOXAPARIN SODIUM 40 MILLIGRAM(S): 30 INJECTION SUBCUTANEOUS at 23:00

## 2024-12-02 RX ADMIN — CEFEPIME 100 MILLIGRAM(S): 2 INJECTION, POWDER, FOR SOLUTION INTRAVENOUS at 17:35

## 2024-12-02 RX ADMIN — ACETAMINOPHEN 500MG 650 MILLIGRAM(S): 500 TABLET, COATED ORAL at 07:02

## 2024-12-02 RX ADMIN — Medication 80 MILLIGRAM(S): at 21:04

## 2024-12-02 RX ADMIN — ACETAMINOPHEN 500MG 650 MILLIGRAM(S): 500 TABLET, COATED ORAL at 08:42

## 2024-12-02 NOTE — PROGRESS NOTE ADULT - ASSESSMENT
Pneumonia  Leukocytosis - stable    Plan -   ·	Cont Maxipime 2gms iv q12hrs till today then DC all antibiotics   ·	Completed Azithromycin   ·	reconsult prn  Pneumonia  Leukocytosis - normalized    Plan -   ·	Cont Maxipime 2gms iv q12hrs till today then DC all antibiotics   ·	Completed Azithromycin   ·	reconsult prn

## 2024-12-02 NOTE — PROGRESS NOTE ADULT - SUBJECTIVE AND OBJECTIVE BOX
Time of Visit:  Patient seen and examined.     MEDICATIONS  (STANDING):  albuterol/ipratropium for Nebulization 3 milliLiter(s) Nebulizer every 6 hours  aspirin enteric coated 81 milliGRAM(s) Oral daily  atorvastatin 80 milliGRAM(s) Oral at bedtime  benzonatate 100 milliGRAM(s) Oral three times a day  cefepime   IVPB      cefepime   IVPB 2000 milliGRAM(s) IV Intermittent every 12 hours  clopidogrel Tablet 75 milliGRAM(s) Oral daily  enoxaparin Injectable 40 milliGRAM(s) SubCutaneous every 24 hours  fluticasone propionate 50 MICROgram(s)/spray Nasal Spray 2 Spray(s) Both Nostrils two times a day  furosemide   Injectable 20 milliGRAM(s) IV Push every 12 hours  guaiFENesin  milliGRAM(s) Oral every 12 hours  insulin glargine Injectable (LANTUS) 5 Unit(s) SubCutaneous at bedtime  insulin lispro (ADMELOG) corrective regimen sliding scale   SubCutaneous three times a day before meals  insulin lispro (ADMELOG) corrective regimen sliding scale   SubCutaneous at bedtime  insulin lispro Injectable (ADMELOG) 2 Unit(s) SubCutaneous three times a day before meals  multivitamin 1 Tablet(s) Oral daily  polyethylene glycol 3350 17 Gram(s) Oral daily  senna 2 Tablet(s) Oral at bedtime  tamsulosin 0.4 milliGRAM(s) Oral at bedtime  valsartan 40 milliGRAM(s) Oral daily      MEDICATIONS  (PRN):  acetaminophen     Tablet .. 650 milliGRAM(s) Oral every 6 hours PRN Temp greater or equal to 38C (100.4F), Mild Pain (1 - 3)  bisacodyl 5 milliGRAM(s) Oral every 12 hours PRN Constipation  bisacodyl Suppository 10 milliGRAM(s) Rectal daily PRN Constipation       Medications up to date at time of exam.    ROS; No fever, chills, cough, nasal congestion on exam.   PHYSICAL EXAMINATION:    Vital Signs Last 24 Hrs  T(C): 36.4 (02 Dec 2024 11:13), Max: 36.6 (01 Dec 2024 15:45)  T(F): 97.5 (02 Dec 2024 11:13), Max: 97.9 (01 Dec 2024 15:45)  HR: 93 (02 Dec 2024 11:13) (92 - 100)  BP: 106/76 (02 Dec 2024 11:13) (104/76 - 123/87)  BP(mean): 96 (02 Dec 2024 04:17) (93 - 96)  RR: 19 (02 Dec 2024 11:13) (18 - 19)  SpO2: 95% (02 Dec 2024 11:13) (95% - 98%)    Parameters below as of 02 Dec 2024 11:13  Patient On (Oxygen Delivery Method): room air       General ; Alert and oriented. Able to answer question with no SOB. No acute distress.      HEENT: Head is normocephalic and atraumatic. Extraocular muscles are intact. Mucous membranes are moist.     NECK: Supple, no palpable adenopathy.    LUNGS: Non labored. No wheezing. No use of accessory muscle.     HEART: S1 S2 Regular rate and rhythm without murmur.    ABDOMEN: Soft, nontender, and nondistended. Active bowel sounds.     NEUROLOGIC: Awake, alert, oriented.     SKIN: Warm, dry, good turgor.    LABS:                        13.5   7.61  )-----------( 333      ( 02 Dec 2024 06:05 )             41.6     12-02    141  |  106  |  22[H]  ----------------------------<  153[H]  4.1   |  28  |  1.22    Ca    8.8      02 Dec 2024 06:05  Phos  2.5     12-02  Mg     2.2     12-02        Urinalysis Basic - ( 02 Dec 2024 06:05 )    Color: x / Appearance: x / SG: x / pH: x  Gluc: 153 mg/dL / Ketone: x  / Bili: x / Urobili: x   Blood: x / Protein: x / Nitrite: x   Leuk Esterase: x / RBC: x / WBC x   Sq Epi: x / Non Sq Epi: x / Bacteria: x      MICROBIOLOGY: (if applicable)    RADIOLOGY & ADDITIONAL STUDIES:  EKG:   CXR:   < from: CT Chest No Cont (11.27.24 @ 09:41) >    ACC: [id number] EXAM:  CT CHEST   ORDERED BY: JODEE PADILLA     PROCEDURE DATE:  11/27/2024          INTERPRETATION:  CLINICAL INFORMATION: Shortness of breath. Hx HFrEF,   malignant melanoma, CABG x2, ascending aortic repair    COMPARISON: CT chest/abdomen/pelvis from 10/5/2024    CONTRAST/COMPLICATIONS:  IV Contrast: NONE  Oral Contrast: NONE      PROCEDURE:  CT of the Chest was performed.  Sagittal and coronal reformats were performed.    FINDINGS:    LUNGS AND LARGE AIRWAYS: Patent central airways. Bilateral central   groundglass opacities have progressed since 10/5/2024. Bilateral diffuse   interlobular septal thickening.  PLEURA: Stable small bilateral pleural effusions.  VESSELS: Status post aortic valve and ascending aortic replacement.   Aortic calcifications.  HEART: Coronary artery stents and calcifications. Cardiomegaly. No   pericardial effusion.  MEDIASTINUM AND AVEL: Stable prominent mediastinal lymph nodes.   Mediastinal surgical clips.  CHEST WALL AND LOWER NECK: Stable 1.8 cm right chest wall cutaneous   lesion.  VISUALIZED UPPER ABDOMEN: Partially visualized left renal cyst.  BONES: Degenerative changes. Median sternotomy wires.    IMPRESSION:  CT findings of pulmonary edema have progressed since October 5, 2024.    Stable small bilateral pleural effusions.    Stable 1.8 cm right chest wall cutaneous lesion.    --- End of Report ---           VERO SIMMS DO; Resident Radiologist  This document has been electronically signed.  SIMÓN CORMIER DO; Attending Radiologist  This document has been electronically signed. Nov 27 2024 11:52AM    < end of copied text >    ECHO:    IMPRESSION: 74y Male PAST MEDICAL & SURGICAL HISTORY:  HTN (hypertension)      Hearing decreased      CVA (cerebral vascular accident)  12/22/2016      Hyperlipemia      Kidney stones      Coronary artery disease  MI 12/22/2016      CHF (congestive heart failure)  1/2017 stents x3      Type 2 diabetes mellitus  2016      Confusion  from stroke      S/P medial meniscal repair  and ligament surgery      H/O lithotripsy      S/P tonsillectomy      Bilateral inguinal hernia    Impression : This is a 75 Y/O Male with Hx of Malignant Melanoma with Wide Excision . Presented to ED with difficulty breathing for the past 4 days preceded by cough for the past 4 to 5 weeks. Patient was recently admitted to [facility] 10/5-10/8 for pneumonia. Patient reports completion of full course of antibiotics prescribed on discharge. For pulmonary follow up of Chronic cough with episode of SOB due to Acute Hypoxic Respiratory Failure secondary to Acute exacerbation of HF and Pneumonia   . CT chest with stable B/L Small Pleural Effusions .      Suggestion:   O2 saturation 97% room air. So far saturating good room air.    Continue Duoneb via nebulization Q 6 Hours.   Completed  Azithromycin 500 mg IVPB Daily .  On Cefepime 2 Gm IVPB Q 12 Hours.   On Fluticasone nasal spray Twice  daily.   On Mucinex 600 mg Oral Q 12 Hours.   DVT/ GI prophylactic. On Lovenox 40 mg SQ Daily.   Strict I & O .   Outpatient ENT Eval.  Pulmonary follow up outpatient .

## 2024-12-02 NOTE — PROGRESS NOTE ADULT - SUBJECTIVE AND OBJECTIVE BOX
Patient is a 74y old  Male who presents with a chief complaint of acute CHF/sepsis 2/2 PNA (02 Dec 2024 10:09)    PATIENT IS SEEN AND EXAMINED IN MEDICAL FLOOR.  LEISA [    ]    JONAS [   ]      GT [   ]    ALLERGIES:  No Known Allergies      Daily     Daily Weight in k.6 (02 Dec 2024 04:17)    VITALS:    Vital Signs Last 24 Hrs  T(C): 36.3 (02 Dec 2024 08:33), Max: 36.7 (01 Dec 2024 11:07)  T(F): 97.4 (02 Dec 2024 08:33), Max: 98.1 (01 Dec 2024 11:07)  HR: 92 (02 Dec 2024 08:33) (91 - 100)  BP: 123/87 (02 Dec 2024 08:33) (93/69 - 123/87)  BP(mean): 96 (02 Dec 2024 04:17) (93 - 96)  RR: 19 (02 Dec 2024 08:33) (18 - 19)  SpO2: 95% (02 Dec 2024 08:33) (94% - 98%)    Parameters below as of 02 Dec 2024 08:33  Patient On (Oxygen Delivery Method): room air        LABS:    CBC Full  -  ( 02 Dec 2024 06:05 )  WBC Count : 7.61 K/uL  RBC Count : 5.13 M/uL  Hemoglobin : 13.5 g/dL  Hematocrit : 41.6 %  Platelet Count - Automated : 333 K/uL  Mean Cell Volume : 81.1 fl  Mean Cell Hemoglobin : 26.3 pg  Mean Cell Hemoglobin Concentration : 32.5 g/dL  Auto Neutrophil # : x  Auto Lymphocyte # : x  Auto Monocyte # : x  Auto Eosinophil # : x  Auto Basophil # : x  Auto Neutrophil % : x  Auto Lymphocyte % : x  Auto Monocyte % : x  Auto Eosinophil % : x  Auto Basophil % : x      12-02    141  |  106  |  22[H]  ----------------------------<  153[H]  4.1   |  28  |  1.22    Ca    8.8      02 Dec 2024 06:05  Phos  2.5     12-02  Mg     2.2     12-02      CAPILLARY BLOOD GLUCOSE      POCT Blood Glucose.: 161 mg/dL (02 Dec 2024 07:44)  POCT Blood Glucose.: 146 mg/dL (01 Dec 2024 16:53)          Creatinine Trend: 1.22<--, 1.21<--, 1.20<--, 1.23<--, 1.24<--, 1.11<--  I&O's Summary          .Blood BLOOD  11- @ 15:35   No growth at 5 days  --  --      .Blood BLOOD  11- @ 15:10   No growth at 5 days  --  --          MEDICATIONS:    MEDICATIONS  (STANDING):  albuterol/ipratropium for Nebulization 3 milliLiter(s) Nebulizer every 6 hours  aspirin enteric coated 81 milliGRAM(s) Oral daily  atorvastatin 80 milliGRAM(s) Oral at bedtime  benzonatate 100 milliGRAM(s) Oral three times a day  cefepime   IVPB      cefepime   IVPB 2000 milliGRAM(s) IV Intermittent every 12 hours  clopidogrel Tablet 75 milliGRAM(s) Oral daily  enoxaparin Injectable 40 milliGRAM(s) SubCutaneous every 24 hours  fluticasone propionate 50 MICROgram(s)/spray Nasal Spray 2 Spray(s) Both Nostrils two times a day  furosemide   Injectable 20 milliGRAM(s) IV Push every 12 hours  guaiFENesin  milliGRAM(s) Oral every 12 hours  insulin glargine Injectable (LANTUS) 5 Unit(s) SubCutaneous at bedtime  insulin lispro (ADMELOG) corrective regimen sliding scale   SubCutaneous three times a day before meals  insulin lispro (ADMELOG) corrective regimen sliding scale   SubCutaneous at bedtime  insulin lispro Injectable (ADMELOG) 2 Unit(s) SubCutaneous three times a day before meals  multivitamin 1 Tablet(s) Oral daily  polyethylene glycol 3350 17 Gram(s) Oral daily  senna 2 Tablet(s) Oral at bedtime  tamsulosin 0.4 milliGRAM(s) Oral at bedtime  valsartan 40 milliGRAM(s) Oral daily      MEDICATIONS  (PRN):  acetaminophen     Tablet .. 650 milliGRAM(s) Oral every 6 hours PRN Temp greater or equal to 38C (100.4F), Mild Pain (1 - 3)  bisacodyl 5 milliGRAM(s) Oral every 12 hours PRN Constipation  bisacodyl Suppository 10 milliGRAM(s) Rectal daily PRN Constipation      REVIEW OF SYSTEMS:                           ALL ROS DONE [ X   ]    CONSTITUTIONAL:  LETHARGIC [   ], FEVER [   ], UNRESPONSIVE [   ]  CVS:  CP  [   ], SOB, [   ], PALPITATIONS [   ], DIZZYNESS [   ]  RS: COUGH [   ], SPUTUM [   ]  GI: ABDOMINAL PAIN [   ], NAUSEA [   ], VOMITINGS [   ], DIARRHEA [   ], CONSTIPATION [   ]  :  DYSURIA [   ], NOCTURIA [   ], INCREASED FREQUENCY [   ], DRIBLING [   ],  SKELETAL: PAINFUL JOINTS [   ], SWOLLEN JOINTS [   ], NECK ACHE [   ], LOW BACK ACHE [   ],  SKIN : ULCERS [   ], RASH [   ], ITCHING [   ]  CNS: HEAD ACHE [   ], DOUBLE VISION [   ], BLURRED VISION [   ], AMS / CONFUSION [   ], SEIZURES [   ], WEAKNESS [   ],TINGLING / NUMBNESS [   ]      PHYSICAL EXAMINATION:  GENERAL APPEARANCE: NO DISTRESS  HEENT:  NO PALLOR, NO  JVD,  NO   NODES, NECK SUPPLE  CVS: S1 +, S2 +,   RS: AEEB,  OCCASIONAL  RALES +,   RONCHI +  ABD: SOFT, NT, NO, BS +  EXT: PE +   SKIN: WARM,   SKELETAL:  ROM ACCEPTABLE  CNS:  AAO X 3    RADIOLOGY :  RADIOLOGY AND READINGS REVIEWED    ASSESSMENT :     Shortness of breath    HTN (hypertension)    Hearing decreased    CVA (cerebral vascular accident)    Hyperlipemia    Kidney stones    Coronary artery disease    CHF (congestive heart failure)    Type 2 diabetes mellitus    Confusion    S/P medial meniscal repair    H/O lithotripsy    S/P tonsillectomy    Bilateral inguinal hernia        PLAN:  HPI:  74y M with PMHx of CVA, MI, HTN, T2DM, HLD, CAD s/p cardiac stents, HFrEF (EF 20-25% 10/2024), hx of malignant melanoma/wide excision, with recent elective bioprosthetic aortic valve replacement and ascending aortic aneurysm repair with transverse hemiarch and CABG x2 (24) with post op course complicated by cardiogenic shock requiring inotropic support with IV dobutamine and milrinone, IABP and also on amiodarone for atrial fibrillation prophylaxis presenting with difficulty breathing for the past 4 days preceded by cough for the past 4 to 5 weeks. Patient was recently admitted to [facility] 10/5-10/8 for pneumonia. Patient reports completion of full course of antibiotics prescribed on discharge. Patient states that he developed cough around a month ago that has been persistent and now causing abdominal muscle pain. Patient developed shortness of breath 4 days ago that got progressively worse prompting him to come to the ED today. Since treatment in ED, patient reports improvement of dyspnea but cough is still present but less frequent. Endorses constipation with no BM for last 3 days. Denies fever, chills, headache, dizziness, chest pain, palpitations, nausea, vomiting, diarrhea, and dysuria.   (2024 18:29)    # CASE D/W PATIENT AT BEDSIDE. CASE REVIEWED AT LENGTH, ALL QUESTIONS ANSWERED. DISCUSSED REGARDING GOC - PATIENT CONFIRMS GOC OF DNR/DNI/NO PEG.     # CASE DISCUSSED AT LENGTH WITH PATIENT, WIFE, DAUGHTER, SON, SON-IN-LAW - ALL QUESTIONS ANSWERED. RECOMMENDATIONS AS MADE BY MULTIDISCIPLINARY TEAM REVIEWED. DISCUSSED THAT PROGNOSIS IS GUARDED/POOR. PATIENT AND FAMILY VERBALIZED UNDERSTANDING. IN DISCUSSION REGARDING GOC - PATIENT CONVEYS THAT HE HAS PREVIOUSLY WISHED TO BE DNR/DNI - BUT FAMILY AND PATIENT PLAN TO DISCUSS AND WILL RECONVENE WITH TEAM TO CONFIRM GOC    # ACUTE HYPOXIC RESPIRATORY FAILURE MULTIFACTORIAL - LIKELY S/T PNA + DECOMPENSATED HF  # HX OF HFrEF [20-25%, 10/24], HX OF RECENT ELECTIVE BIOPROSTHETIC AORTIC VALVE REPLACEMENT, ASCENDING AORTIC ANEURYSM REPAIR W/ TRANSVERSE HEMIARCH AND CABG X 2  [2024] - C/B POST-OP CARDIOGENIC SHOCK REQUIRING IONOTROPIC SUPPORT, IABP   # HX OF CAD S/P STENT  - TELEMETRY  - CEFEPIME + AZITHROMYCIN, F/U BCX  - ANTITUSSIVE  - CHEST PT  - IV LASIX [ DOWNTITRATED - DUE TO HYPOTENSION  - NOTED CT CHEST  - ON AMIODARONE  - HOLD ENTRESTO, WILL RESUME IF BP TOLERATES  - CARDIOLOGY CONSULT  - ID CONSULT  - PULMONOLOGY CONSULT    # ? POST-OP A.FIB PPX - ON AMIODARONE - RECOMMENDED D/C PER CARDIOLOGY    # DM  # HBA1C - 10.2  - LANTUS  - SSI + FS  - ENDOCRINOLOGY CONSULT    # BPH  - FLOMAX    # HX OF CVA  # HX OF MI  # HTN  # HLD  # HX OF MALIGNANT MELANOMA/WIDE EXCISION  # GI PPX      Patient is a 74y old  Male who presents with a chief complaint of acute CHF/sepsis 2/2 PNA (02 Dec 2024 10:09)    PATIENT IS SEEN AND EXAMINED IN MEDICAL FLOOR.      ALLERGIES:  No Known Allergies      Daily     Daily Weight in k.6 (02 Dec 2024 04:17)    VITALS:    Vital Signs Last 24 Hrs  T(C): 36.3 (02 Dec 2024 08:33), Max: 36.7 (01 Dec 2024 11:07)  T(F): 97.4 (02 Dec 2024 08:33), Max: 98.1 (01 Dec 2024 11:07)  HR: 92 (02 Dec 2024 08:33) (91 - 100)  BP: 123/87 (02 Dec 2024 08:33) (93/69 - 123/87)  BP(mean): 96 (02 Dec 2024 04:17) (93 - 96)  RR: 19 (02 Dec 2024 08:33) (18 - 19)  SpO2: 95% (02 Dec 2024 08:33) (94% - 98%)    Parameters below as of 02 Dec 2024 08:33  Patient On (Oxygen Delivery Method): room air        LABS:    CBC Full  -  ( 02 Dec 2024 06:05 )  WBC Count : 7.61 K/uL  RBC Count : 5.13 M/uL  Hemoglobin : 13.5 g/dL  Hematocrit : 41.6 %  Platelet Count - Automated : 333 K/uL  Mean Cell Volume : 81.1 fl  Mean Cell Hemoglobin : 26.3 pg  Mean Cell Hemoglobin Concentration : 32.5 g/dL  Auto Neutrophil # : x  Auto Lymphocyte # : x  Auto Monocyte # : x  Auto Eosinophil # : x  Auto Basophil # : x  Auto Neutrophil % : x  Auto Lymphocyte % : x  Auto Monocyte % : x  Auto Eosinophil % : x  Auto Basophil % : x      12-02    141  |  106  |  22[H]  ----------------------------<  153[H]  4.1   |  28  |  1.22    Ca    8.8      02 Dec 2024 06:05  Phos  2.5     12-02  Mg     2.2     12-02      CAPILLARY BLOOD GLUCOSE      POCT Blood Glucose.: 161 mg/dL (02 Dec 2024 07:44)  POCT Blood Glucose.: 146 mg/dL (01 Dec 2024 16:53)          Creatinine Trend: 1.22<--, 1.21<--, 1.20<--, 1.23<--, 1.24<--, 1.11<--  I&O's Summary          .Blood BLOOD  11- @ 15:35   No growth at 5 days  --  --      .Blood BLOOD  11- @ 15:10   No growth at 5 days  --  --          MEDICATIONS:    MEDICATIONS  (STANDING):  albuterol/ipratropium for Nebulization 3 milliLiter(s) Nebulizer every 6 hours  aspirin enteric coated 81 milliGRAM(s) Oral daily  atorvastatin 80 milliGRAM(s) Oral at bedtime  benzonatate 100 milliGRAM(s) Oral three times a day  cefepime   IVPB      cefepime   IVPB 2000 milliGRAM(s) IV Intermittent every 12 hours  clopidogrel Tablet 75 milliGRAM(s) Oral daily  enoxaparin Injectable 40 milliGRAM(s) SubCutaneous every 24 hours  fluticasone propionate 50 MICROgram(s)/spray Nasal Spray 2 Spray(s) Both Nostrils two times a day  furosemide   Injectable 20 milliGRAM(s) IV Push every 12 hours  guaiFENesin  milliGRAM(s) Oral every 12 hours  insulin glargine Injectable (LANTUS) 5 Unit(s) SubCutaneous at bedtime  insulin lispro (ADMELOG) corrective regimen sliding scale   SubCutaneous three times a day before meals  insulin lispro (ADMELOG) corrective regimen sliding scale   SubCutaneous at bedtime  insulin lispro Injectable (ADMELOG) 2 Unit(s) SubCutaneous three times a day before meals  multivitamin 1 Tablet(s) Oral daily  polyethylene glycol 3350 17 Gram(s) Oral daily  senna 2 Tablet(s) Oral at bedtime  tamsulosin 0.4 milliGRAM(s) Oral at bedtime  valsartan 40 milliGRAM(s) Oral daily      MEDICATIONS  (PRN):  acetaminophen     Tablet .. 650 milliGRAM(s) Oral every 6 hours PRN Temp greater or equal to 38C (100.4F), Mild Pain (1 - 3)  bisacodyl 5 milliGRAM(s) Oral every 12 hours PRN Constipation  bisacodyl Suppository 10 milliGRAM(s) Rectal daily PRN Constipation      REVIEW OF SYSTEMS:                           ALL ROS DONE [ X   ]    CONSTITUTIONAL:  LETHARGIC [   ], FEVER [   ], UNRESPONSIVE [   ]  CVS:  CP  [   ], SOB, [   ], PALPITATIONS [   ], DIZZYNESS [   ]  RS: COUGH [   ], SPUTUM [   ]  GI: ABDOMINAL PAIN [   ], NAUSEA [   ], VOMITINGS [   ], DIARRHEA [   ], CONSTIPATION [   ]  :  DYSURIA [   ], NOCTURIA [   ], INCREASED FREQUENCY [   ], DRIBLING [   ],  SKELETAL: PAINFUL JOINTS [   ], SWOLLEN JOINTS [   ], NECK ACHE [   ], LOW BACK ACHE [   ],  SKIN : ULCERS [   ], RASH [   ], ITCHING [   ]  CNS: HEAD ACHE [   ], DOUBLE VISION [   ], BLURRED VISION [   ], AMS / CONFUSION [   ], SEIZURES [   ], WEAKNESS [   ],TINGLING / NUMBNESS [   ]        PHYSICAL EXAMINATION:  GENERAL APPEARANCE: NO DISTRESS  HEENT:  NO PALLOR, NO  JVD,  NO   NODES, NECK SUPPLE  CVS: S1 +, S2 +,   RS: AEEB,  OCCASIONAL  RALES +,   RONCHI +  ABD: SOFT, NT, NO, BS +  EXT: PE +   SKIN: WARM,   SKELETAL:  ROM ACCEPTABLE  CNS:  AAO X 3        RADIOLOGY :  RADIOLOGY AND READINGS REVIEWED        ASSESSMENT :     Shortness of breath    HTN (hypertension)    Hearing decreased    CVA (cerebral vascular accident)    Hyperlipemia    Kidney stones    Coronary artery disease    CHF (congestive heart failure)    Type 2 diabetes mellitus    Confusion    S/P medial meniscal repair    H/O lithotripsy    S/P tonsillectomy    Bilateral inguinal hernia        PLAN:  HPI:  74y M with PMHx of CVA, MI, HTN, T2DM, HLD, CAD s/p cardiac stents, HFrEF (EF 20-25% 10/2024), hx of malignant melanoma/wide excision, with recent elective bioprosthetic aortic valve replacement and ascending aortic aneurysm repair with transverse hemiarch and CABG x2 (24) with post op course complicated by cardiogenic shock requiring inotropic support with IV dobutamine and milrinone, IABP and also on amiodarone for atrial fibrillation prophylaxis presenting with difficulty breathing for the past 4 days preceded by cough for the past 4 to 5 weeks. Patient was recently admitted to [facility] 10/5-10/8 for pneumonia. Patient reports completion of full course of antibiotics prescribed on discharge. Patient states that he developed cough around a month ago that has been persistent and now causing abdominal muscle pain. Patient developed shortness of breath 4 days ago that got progressively worse prompting him to come to the ED today. Since treatment in ED, patient reports improvement of dyspnea but cough is still present but less frequent. Endorses constipation with no BM for last 3 days. Denies fever, chills, headache, dizziness, chest pain, palpitations, nausea, vomiting, diarrhea, and dysuria.   (2024 18:29)    # CASE D/W PATIENT AT BEDSIDE. CASE REVIEWED AT LENGTH, ALL QUESTIONS ANSWERED. DISCUSSED REGARDING GOC - PATIENT CONFIRMS GOC OF DNR/DNI/NO PEG.     # [] CASE D/W WIFE AT BEDSIDE, ALL QUESTIONS ANSWERED    # [] CASE DISCUSSED AT LENGTH WITH PATIENT, WIFE, DAUGHTER, SON, SON-IN-LAW - ALL QUESTIONS ANSWERED. RECOMMENDATIONS AS MADE BY MULTIDISCIPLINARY TEAM REVIEWED. DISCUSSED THAT PROGNOSIS IS GUARDED/POOR. PATIENT AND FAMILY VERBALIZED UNDERSTANDING. IN DISCUSSION REGARDING GOC - PATIENT CONVEYS THAT HE HAS PREVIOUSLY WISHED TO BE DNR/DNI - BUT FAMILY AND PATIENT PLAN TO DISCUSS AND WILL RECONVENE WITH TEAM TO CONFIRM GOC    # ACUTE HYPOXIC RESPIRATORY FAILURE MULTIFACTORIAL - LIKELY S/T PNA + DECOMPENSATED HF  # HX OF HFrEF [20-25%, 10/24], HX OF RECENT ELECTIVE BIOPROSTHETIC AORTIC VALVE REPLACEMENT, ASCENDING AORTIC ANEURYSM REPAIR W/ TRANSVERSE HEMIARCH AND CABG X 2  [2024] - C/B POST-OP CARDIOGENIC SHOCK REQUIRING IONOTROPIC SUPPORT, IABP   # HX OF CAD S/P STENT  - TELEMETRY  - CEFEPIME + AZITHROMYCIN, F/U BCX  - ANTITUSSIVE  - CHEST PT  - IV LASIX [ DOWNTITRATED - DUE TO HYPOTENSION]  - NOTED CT CHEST  - D/C AMIODARONE  - HOLD ENTRESTO, GIVEN LOW-NORMAL BP  - PER CARDIOLOGY START VALSARTAN  - CARDIOLOGY CONSULT  - ID CONSULT  - PULMONOLOGY CONSULT    - COUNSELLED PATIENT AND FAMILY ON CLOSE OUTPATIENT F/U WITH MULTIDISCIPLINARY TEAM. COUNSELLED ON S & S TO MONITOR. ALSO COUNSELLED TO MONITOR DAILY WEIGHTS AND BP    # ? POST-OP A.FIB PPX - ON AMIODARONE - RECOMMENDED D/C PER CARDIOLOGY    # DM  # HBA1C - 10.2  - LANTUS  - SSI + FS  - ENDOCRINOLOGY CONSULT    # BPH  - FLOMAX    # HX OF CVA  # HX OF MI  # HTN  # HLD  # HX OF MALIGNANT MELANOMA/WIDE EXCISION  # GI PPX

## 2024-12-02 NOTE — PROGRESS NOTE ADULT - NS ATTEND AMEND GEN_ALL_CORE FT
Patient seen and examined.  Agree with above.   Continue with iv diuresis to keep O > I   Monitor Cr, lytes, I/O     Sandra Negro MD
Impression : This is a 73 Y/O Male with Hx of Malignant Melanoma with Wide Excision . Presented to ED with difficulty breathing for the past 4 days preceded by cough for the past 4 to 5 weeks. Patient was recently admitted to [facility] 10/5-10/8 for pneumonia. Patient reports completion of full course of antibiotics prescribed on discharge. For pulmonary follow up of Chronic cough with episode of SOB due to Acute Hypoxic Respiratory Failure secondary to Acute exacerbation of HF and Pneumonia   . CT chest with stable B/L Small Pleural Effusions .      Suggestion:   O2 saturation 96% room air. So far saturating good room air. Monitor O2 saturation trend.   On Duoneb via nebulization Q 6 Hours.   On Azithromycin 500 mg IVPB Daily .  On Cefepime 2 Gm IVPB Q 12 Hours.   On Fluticasone nasal spray Twice  daily.   On Mucinex 600 mg Oral Q 12 Hours.   DVT/ GI prophylactic. On Lovenox 40 mg SQ Daily.   Strict I & O .   Outpatient ENT Eval.  Pulmonary follow up outpatient .
Impression : This is a 73 Y/O Male with Hx of Malignant Melanoma with Wide Excision . Presented to ED with difficulty breathing for the past 4 days preceded by cough for the past 4 to 5 weeks. Patient was recently admitted to [facility] 10/5-10/8 for pneumonia. Patient reports completion of full course of antibiotics prescribed on discharge. For pulmonary follow up of Chronic cough with episode of SOB due to Acute Hypoxic Respiratory Failure secondary to Acute exacerbation of HF and Pneumonia   . CT chest with stable B/L Small Pleural Effusions .      Suggestion:   O2 saturation 97% room air. So far saturating good room air.    Continue Duoneb via nebulization Q 6 Hours.   Completed  Azithromycin 500 mg IVPB Daily .  On Cefepime 2 Gm IVPB Q 12 Hours.   On Fluticasone nasal spray Twice  daily.   On Mucinex 600 mg Oral Q 12 Hours.   DVT/ GI prophylactic. On Lovenox 40 mg SQ Daily.   Strict I & O .   Outpatient ENT Eval.  Pulmonary follow up outpatient .
Patient seen and examined.  Agree with above.   Pt. with atypical chest pain last night (sharp, lasting a few minutes and resolving on its own)  ECG with IVCD, no acute ischemic changes, troponin negative thus far  Pt. currently chest pain free  Continue to trend CE including CPK   Continue with IV diuresis to keep O > I   Further workup pending above    Sandra Negro MD
Patient seen and examined.  Agree with above.   Atypical chest pain from yesterday resolved, pt. feels better today with no anginal symptoms   Continue with diuresis   Monitor tele    Sandra Negro MD
I agree with above
I agree with above

## 2024-12-02 NOTE — PROGRESS NOTE ADULT - SUBJECTIVE AND OBJECTIVE BOX
*-*- INCOMPLETE  NP Note discussed with  primary attending    Patient is a 74y old  Male who presents with a chief complaint of acute CHF/sepsis 2/2 PNA (02 Dec 2024 11:38)      INTERVAL HPI/OVERNIGHT EVENTS: no acute medical events overnight     MEDICATIONS  (STANDING):  albuterol/ipratropium for Nebulization 3 milliLiter(s) Nebulizer every 6 hours  aspirin enteric coated 81 milliGRAM(s) Oral daily  atorvastatin 80 milliGRAM(s) Oral at bedtime  benzonatate 100 milliGRAM(s) Oral three times a day  cefepime   IVPB      cefepime   IVPB 2000 milliGRAM(s) IV Intermittent every 12 hours  clopidogrel Tablet 75 milliGRAM(s) Oral daily  enoxaparin Injectable 40 milliGRAM(s) SubCutaneous every 24 hours  fluticasone propionate 50 MICROgram(s)/spray Nasal Spray 2 Spray(s) Both Nostrils two times a day  furosemide   Injectable 20 milliGRAM(s) IV Push every 12 hours  guaiFENesin  milliGRAM(s) Oral every 12 hours  insulin glargine Injectable (LANTUS) 5 Unit(s) SubCutaneous at bedtime  insulin lispro (ADMELOG) corrective regimen sliding scale   SubCutaneous three times a day before meals  insulin lispro (ADMELOG) corrective regimen sliding scale   SubCutaneous at bedtime  insulin lispro Injectable (ADMELOG) 2 Unit(s) SubCutaneous three times a day before meals  multivitamin 1 Tablet(s) Oral daily  polyethylene glycol 3350 17 Gram(s) Oral daily  senna 2 Tablet(s) Oral at bedtime  tamsulosin 0.4 milliGRAM(s) Oral at bedtime  valsartan 40 milliGRAM(s) Oral daily    MEDICATIONS  (PRN):  acetaminophen     Tablet .. 650 milliGRAM(s) Oral every 6 hours PRN Temp greater or equal to 38C (100.4F), Mild Pain (1 - 3)  bisacodyl 5 milliGRAM(s) Oral every 12 hours PRN Constipation  bisacodyl Suppository 10 milliGRAM(s) Rectal daily PRN Constipation      __________________________________________________  REVIEW OF SYSTEMS:    CONSTITUTIONAL: No fever,   EYES: no acute visual disturbances  NECK: No pain or stiffness  RESPIRATORY: No cough; No shortness of breath  CARDIOVASCULAR: No chest pain, no palpitations  GASTROINTESTINAL: No pain. No nausea or vomiting; No diarrhea   NEUROLOGICAL: No headache or numbness, no tremors  MUSCULOSKELETAL: No joint pain, no muscle pain  GENITOURINARY: no dysuria, no frequency, no hesitancy  PSYCHIATRY: no depression , no anxiety  ALL OTHER  ROS negative        Vital Signs Last 24 Hrs  T(C): 36.4 (02 Dec 2024 11:13), Max: 36.6 (01 Dec 2024 15:45)  T(F): 97.5 (02 Dec 2024 11:13), Max: 97.9 (01 Dec 2024 15:45)  HR: 93 (02 Dec 2024 11:13) (92 - 100)  BP: 106/76 (02 Dec 2024 11:13) (104/76 - 123/87)  BP(mean): 96 (02 Dec 2024 04:17) (93 - 96)  RR: 19 (02 Dec 2024 11:13) (18 - 19)  SpO2: 95% (02 Dec 2024 11:13) (95% - 98%)    Parameters below as of 02 Dec 2024 11:13  Patient On (Oxygen Delivery Method): room air        ________________________________________________  PHYSICAL EXAM:  GENERAL: NAD  HEENT: Normocephalic;  conjunctivae and sclerae clear  NECK : supple  CHEST/LUNG: Clear to ausculitation bilaterally with good air entry   HEART: S1 S2  regular; no murmurs, gallops or rubs  ABDOMEN: Soft, Nontender, Nondistended; Bowel sounds present  EXTREMITIES: no cyanosis; no edema; no calf tenderness  SKIN: warm and dry; no rash  NERVOUS SYSTEM:  Awake and alert; Oriented  to place, person and time  _________________________________________________  LABS:                        13.5   7.61  )-----------( 333      ( 02 Dec 2024 06:05 )             41.6     12-02    141  |  106  |  22[H]  ----------------------------<  153[H]  4.1   |  28  |  1.22    Ca    8.8      02 Dec 2024 06:05  Phos  2.5     12-02  Mg     2.2     12-02        Urinalysis Basic - ( 02 Dec 2024 06:05 )    Color: x / Appearance: x / SG: x / pH: x  Gluc: 153 mg/dL / Ketone: x  / Bili: x / Urobili: x   Blood: x / Protein: x / Nitrite: x   Leuk Esterase: x / RBC: x / WBC x   Sq Epi: x / Non Sq Epi: x / Bacteria: x      CAPILLARY BLOOD GLUCOSE      POCT Blood Glucose.: 146 mg/dL (02 Dec 2024 11:23)  POCT Blood Glucose.: 161 mg/dL (02 Dec 2024 07:44)  POCT Blood Glucose.: 146 mg/dL (01 Dec 2024 16:53)        RADIOLOGY & ADDITIONAL TESTS: < from: US Duplex Venous Lower Ext Complete, Bilateral (11.29.24 @ 12:08) >  IMPRESSION:  No evidence of deep venous thrombosis in either lower extremity.            --- End of Report ---    < end of copied text >      < from: CT Chest No Cont (11.27.24 @ 09:41) >  IMPRESSION:  CT findings of pulmonary edema have progressed since October 5, 2024.    Stable small bilateral pleural effusions.    Stable 1.8 cm right chest wall cutaneous lesion.    --- End of Report ---    < end of copied text >< from: Xray Chest 1 View- PORTABLE-Urgent (Xray Chest 1 View- PORTABLE-Urgent .) (11.26.24 @ 15:08) >    IMPRESSION: Significant bibasilar infiltrates improved on the right but   increased on the left.    --- End of Report ---    < end of copied text >    Imaging Personally Reviewed:  YES/    Consultant(s) Notes Reviewed:   YES/    Plan of care was discussed with patient and /or primary care giver; all questions and concerns were addressed and care was aligned with patient's wishes.

## 2024-12-02 NOTE — PROGRESS NOTE ADULT - SUBJECTIVE AND OBJECTIVE BOX
Subjective:  Chart Notes, Work list Manager, and fingersticks reviewed.    Review of Systems:  Constitutional: No fever  Eyes: No blurry vision  Neuro: No tremors  HEENT: No pain  Cardiovascular: No chest pain, palpitations  Respiratory: No SOB, no cough  GI: No nausea, vomiting, abdominal pain  : No dysuria  Skin: no rash  Psych: no depression  Endocrine: no polyuria, polydipsia  Hem/lymph: no swelling  Osteoporosis: no fractures    ALL OTHER SYSTEMS REVIEWED AND NEGATIVE    UNABLE TO OBTAIN    MEDICATIONS  (STANDING):  albuterol/ipratropium for Nebulization 3 milliLiter(s) Nebulizer every 6 hours  aspirin enteric coated 81 milliGRAM(s) Oral daily  atorvastatin 80 milliGRAM(s) Oral at bedtime  benzonatate 100 milliGRAM(s) Oral three times a day  cefepime   IVPB      cefepime   IVPB 2000 milliGRAM(s) IV Intermittent every 12 hours  clopidogrel Tablet 75 milliGRAM(s) Oral daily  enoxaparin Injectable 40 milliGRAM(s) SubCutaneous every 24 hours  fluticasone propionate 50 MICROgram(s)/spray Nasal Spray 2 Spray(s) Both Nostrils two times a day  furosemide   Injectable 20 milliGRAM(s) IV Push every 12 hours  guaiFENesin  milliGRAM(s) Oral every 12 hours  insulin glargine Injectable (LANTUS) 5 Unit(s) SubCutaneous at bedtime  insulin lispro (ADMELOG) corrective regimen sliding scale   SubCutaneous three times a day before meals  insulin lispro (ADMELOG) corrective regimen sliding scale   SubCutaneous at bedtime  insulin lispro Injectable (ADMELOG) 2 Unit(s) SubCutaneous three times a day before meals  multivitamin 1 Tablet(s) Oral daily  polyethylene glycol 3350 17 Gram(s) Oral daily  senna 2 Tablet(s) Oral at bedtime  tamsulosin 0.4 milliGRAM(s) Oral at bedtime  valsartan 40 milliGRAM(s) Oral daily    MEDICATIONS  (PRN):  acetaminophen     Tablet .. 650 milliGRAM(s) Oral every 6 hours PRN Temp greater or equal to 38C (100.4F), Mild Pain (1 - 3)  bisacodyl 5 milliGRAM(s) Oral every 12 hours PRN Constipation  bisacodyl Suppository 10 milliGRAM(s) Rectal daily PRN Constipation      PHYSICAL EXAM:  VITALS: T(C): 36.4 (12-02-24 @ 11:13)  T(F): 97.5 (12-02-24 @ 11:13), Max: 97.9 (12-01-24 @ 15:45)  HR: 93 (12-02-24 @ 11:13) (92 - 100)  BP: 106/76 (12-02-24 @ 11:13) (104/76 - 123/87)  RR:  (18 - 19)  SpO2:  (95% - 98%)  Wt(kg): --  GENERAL: NAD,   HEENT:  Atraumatic, Normocephalic, drymucous membranes  THYROID: Normal size, no palpable nodules  RESPIRATORY: Clear to auscultation bilaterally; No rales, rhonchi, wheezing  CARDIOVASCULAR: Regular rate and rhythm; No murmurs; no peripheral edema  GI: Soft, nontender, non distended, +ve abdominal obesity  EXTREMITIES: +ve peripheral pulses, -ve pedal edema  SKIN: Dry, intact, No rashes or lesions  PSYCH: Alert and oriented x 3    CAPILLARY BLOOD GLUCOSE      POCT Blood Glucose.: 146 mg/dL (02 Dec 2024 11:23)  POCT Blood Glucose.: 161 mg/dL (02 Dec 2024 07:44)  POCT Blood Glucose.: 146 mg/dL (01 Dec 2024 16:53)    12-02    141  |  106  |  22[H]  ----------------------------<  153[H]  4.1   |  28  |  1.22    eGFR: 61    Ca    8.8      12-02  Mg     2.2     12-02  Phos  2.5     12-02    TPro  6.1  /  Alb  2.5[L]  /  TBili  0.6  /  DBili  x   /  AST  15  /  ALT  23  /  AlkPhos  111  11-30    Thyroid Function Tests:  11-27 @ 05:50 TSH 0.86 FreeT4 -- T3 -- Anti TPO -- Anti Thyroglobulin Ab -- TSI --        Assessment and Plan:    1) Type 2 diabetes:      Recommendations:  - Basal Insulin:   Glargine  (Lantus) units once daily    - Nutritional Insulin:  Lispro (Admelog) units with breakfast, hold if NPO or eating <50% of meals  Lispro (Admelog) units with lunch, hold if NPO or eating <50% of meals  Lispro (Admelog) units with dinner, hold if NPO or eating <50% of meals    - Correctional (Sliding) Insulin:  Low Lispro (Admelog) sliding scale with meals and bedtime    - Oral Medications:  None in the hospital               Subjective:  Chart Notes, Work list Manager, and fingersticks reviewed. Patient reports feeling OK , eating better    Review of Systems:  Constitutional: No fever  Cardiovascular: No chest pain, palpitations  Respiratory: No SOB, no cough  GI: No nausea, vomiting, abdominal pain  Endocrine: no polyuria, polydipsia    Medications (Standing):  albuterol/ipratropium for Nebulization 3 milliLiter(s) Nebulizer every 6 hours  aspirin enteric coated 81 milliGRAM(s) Oral daily  atorvastatin 80 milliGRAM(s) Oral at bedtime  benzonatate 100 milliGRAM(s) Oral three times a day  cefepime   IVPB      cefepime   IVPB 2000 milliGRAM(s) IV Intermittent every 12 hours  clopidogrel Tablet 75 milliGRAM(s) Oral daily  enoxaparin Injectable 40 milliGRAM(s) SubCutaneous every 24 hours  fluticasone propionate 50 MICROgram(s)/spray Nasal Spray 2 Spray(s) Both Nostrils two times a day  furosemide   Injectable 20 milliGRAM(s) IV Push every 12 hours  guaiFENesin  milliGRAM(s) Oral every 12 hours  insulin glargine Injectable (LANTUS) 5 Unit(s) SubCutaneous at bedtime  insulin lispro (ADMELOG) corrective regimen sliding scale   SubCutaneous three times a day before meals  insulin lispro (ADMELOG) corrective regimen sliding scale   SubCutaneous at bedtime  insulin lispro Injectable (ADMELOG) 2 Unit(s) SubCutaneous three times a day before meals  multivitamin 1 Tablet(s) Oral daily  polyethylene glycol 3350 17 Gram(s) Oral daily  senna 2 Tablet(s) Oral at bedtime  tamsulosin 0.4 milliGRAM(s) Oral at bedtime  valsartan 40 milliGRAM(s) Oral daily    Medications  (PRN):  acetaminophen     Tablet .. 650 milliGRAM(s) Oral every 6 hours PRN Temp greater or equal to 38C (100.4F), Mild Pain (1 - 3)  bisacodyl 5 milliGRAM(s) Oral every 12 hours PRN Constipation  bisacodyl Suppository 10 milliGRAM(s) Rectal daily PRN Constipation      Physical examination:  VITALS: T(C): 36.4 (12-02-24 @ 11:13)  T(F): 97.5 (12-02-24 @ 11:13), Max: 97.9 (12-01-24 @ 15:45)  HR: 93 (12-02-24 @ 11:13) (92 - 100)  BP: 106/76 (12-02-24 @ 11:13) (104/76 - 123/87)  RR:  (18 - 19)  SpO2:  (95% - 98%)    GENERAL: NAD,   HEENT: Dry mucous membranes  GI: Soft, non distended, +ve abdominal obesity  PSYCH: Alert and oriented x 3    Labs:  Capillary Blood Glucose:  POCT Blood Glucose.: 146 mg/dL (02 Dec 2024 11:23)  POCT Blood Glucose.: 161 mg/dL (02 Dec 2024 07:44)  POCT Blood Glucose.: 146 mg/dL (01 Dec 2024 16:53)    12-02  141  |  106  |  22[H]  ----------------------------<  153[H]  4.1   |  28  |  1.22  eGFR: 61  Ca    8.8      12-02  Mg     2.2     12-02  Phos  2.5     12-02  A1C with Estimated Average Glucose Result: 10.2 % (11.27.24 @ 05:50)      Assessment and Plan:  74y Male 74y M with PMHx of CVA, MI, HTN, T2DM, HLD, CAD s/p cardiac stents, HFrEF (EF 20-25% 10/2024), hx of malignant melanoma/wide excision, with recent elective bioprosthetic aortic valve replacement and ascending aortic aneurysm repair with transverse hemiarch and CABG x2 (4/8/24) with post op course complicated by cardiogenic shock requiring inotropic support with IV dobutamine and milrinone, IABP and also on amiodarone for atrial fibrillation prophylaxis presenting with difficulty breathing for the past 4 days.   Endocrinology is following for glycemic management    1) Type 2 diabetes:  A1C is above goal.   Patient reports eating well  Inpatient BS are at goal  Continue the same insulin doses    Recommendations:  Basal Insulin:   Continue Glargine ( Lantus) to 5 units once daily    Nutritional Insulin:  Continue 2 units of Lispro with meals. Hold off if NPO or eating <50% of meals    Correctional Insulin:  Continue Low sliding scale with meals and bedtime    Oral Diabetes Medications:  On discharge, patient will need increased dose of metformin if renal function is good

## 2024-12-02 NOTE — PROGRESS NOTE ADULT - PROBLEM SELECTOR PLAN 3
h/o DM linagliptin and faxiga   -hold oral dm meds  -A1C 10.2  -c/w  moderate sliding scale  -Adjust insulin as indicated  -FS ACHS  -Endo dr. Jimenez following  -Increase Glargine ( Lantus) to 5 units once daily and premeal Insulin: 2 units Lispro with meals. Hold off if NPO or eating <50% of meals h/o DM linagliptin and faxiga   -hold oral dm meds  -A1C 10.2  -c/w  moderate sliding scale  -Adjust insulin as indicated  -FS ACHS  -Endo dr. Jimenez following  -Increase Glargine ( Lantus) to 5 units once daily and premeal Insulin: 2 units Lispro with meals.

## 2024-12-02 NOTE — PROGRESS NOTE ADULT - PROBLEM SELECTOR PLAN 2
p/w sob and  3+ pitting LE edema x 4 days  -CXR shows Significant bibasilar infiltrates improved on the right but increased on the left.  -BNP 21281 -- (8193 on 10/8)  -takes metoprolol, entresto, farxiga, lasix and amiodarone for Afib ppx  -TTE 10/8:  EF 20-25%  -hold  metoprolol per card  -hold entresto in s/o sepsis  -s/p  IV lasix 40mg IV BID---> Decreased 20mg BID in the setting BP soft.   -Troponin 27.7-->24.6  -c/w Telemetry   -daily weights, strict I&Os  -Cardio Consulted: Dr. Ward  -Out pt f/u with Dr. Berna Ramon  -leg edema with tightness, DVT negative on US b/l LEs p/w sob and  3+ pitting LE edema x 4 days  -CXR shows Significant bibasilar infiltrates improved on the right but increased on the left.  -BNP 98727 -- (8193 on 10/8)  -takes metoprolol, entresto, farxiga, lasix and amiodarone for Afib ppx  -TTE 10/8:  EF 20-25%  -hold  metoprolol per card  -hold entresto in s/o sepsis  -s/p  IV lasix 40mg IV BID---> Decreased 20mg BID in the setting BP soft.   plan to transition to PO lasix qd in am   -Troponin 27.7-->24.6  -c/w Telemetry   -daily weights, strict I&Os  -Cardio Consulted: Dr. Ward  -Out pt f/u with Dr. Berna Ramon  -leg edema with tightness, DVT negative on US b/l LEs

## 2024-12-02 NOTE — PROGRESS NOTE ADULT - SUBJECTIVE AND OBJECTIVE BOX
DATE OF SERVICE: 12-02-24    Patient denies chest pain or shortness of breath.   Review of symptoms otherwise negative.    acetaminophen     Tablet .. 650 milliGRAM(s) Oral every 6 hours PRN  albuterol/ipratropium for Nebulization 3 milliLiter(s) Nebulizer every 6 hours  aspirin enteric coated 81 milliGRAM(s) Oral daily  atorvastatin 80 milliGRAM(s) Oral at bedtime  benzonatate 100 milliGRAM(s) Oral three times a day  bisacodyl 5 milliGRAM(s) Oral every 12 hours PRN  bisacodyl Suppository 10 milliGRAM(s) Rectal daily PRN  cefepime   IVPB      cefepime   IVPB 2000 milliGRAM(s) IV Intermittent every 12 hours  clopidogrel Tablet 75 milliGRAM(s) Oral daily  enoxaparin Injectable 40 milliGRAM(s) SubCutaneous every 24 hours  fluticasone propionate 50 MICROgram(s)/spray Nasal Spray 2 Spray(s) Both Nostrils two times a day  furosemide   Injectable 20 milliGRAM(s) IV Push every 12 hours  guaiFENesin  milliGRAM(s) Oral every 12 hours  insulin glargine Injectable (LANTUS) 5 Unit(s) SubCutaneous at bedtime  insulin lispro (ADMELOG) corrective regimen sliding scale   SubCutaneous three times a day before meals  insulin lispro (ADMELOG) corrective regimen sliding scale   SubCutaneous at bedtime  insulin lispro Injectable (ADMELOG) 2 Unit(s) SubCutaneous three times a day before meals  multivitamin 1 Tablet(s) Oral daily  polyethylene glycol 3350 17 Gram(s) Oral daily  senna 2 Tablet(s) Oral at bedtime  tamsulosin 0.4 milliGRAM(s) Oral at bedtime                              13.5   7.61  )-----------( 333      ( 02 Dec 2024 06:05 )             41.6       Hemoglobin: 13.5 g/dL (12-02 @ 06:05)  Hemoglobin: 12.9 g/dL (12-01 @ 06:55)  Hemoglobin: 13.1 g/dL (11-30 @ 05:45)  Hemoglobin: 12.8 g/dL (11-29 @ 05:27)  Hemoglobin: 13.1 g/dL (11-28 @ 07:35)      12-02    141  |  106  |  22[H]  ----------------------------<  153[H]  4.1   |  28  |  1.22    Ca    8.8      02 Dec 2024 06:05  Phos  2.5     12-02  Mg     2.2     12-02      Creatinine Trend: 1.22<--, 1.21<--, 1.20<--, 1.23<--, 1.24<--, 1.11<--    COAGS:     CARDIAC MARKERS ( 30 Nov 2024 01:14 )  x     / x     / x     / x     / 1.9 ng/mL        T(C): 36.4 (12-02-24 @ 11:13), Max: 36.6 (12-01-24 @ 15:45)  HR: 93 (12-02-24 @ 11:13) (92 - 100)  BP: 106/76 (12-02-24 @ 11:13) (104/76 - 123/87)  RR: 19 (12-02-24 @ 11:13) (18 - 19)  SpO2: 95% (12-02-24 @ 11:13) (95% - 98%)  Wt(kg): --    I&O's Summary        HEENT:  (-)icterus (-)pallor  CV: N S1 S2 1/6 GAEL (+)2 Pulses B/l  Resp:  Clear to ausculatation B/L, normal effort  GI: (+) BS Soft, NT, ND  Lymph:  (-)Edema, (-)obvious lymphadenopathy  Skin: Warm to touch, Normal turgor  Psych: Appropriate mood and affect      Tele Sinus     ASSESSMENT/PLAN: 	74y Male PMHx of CVA, MI, HTN, T2DM, HLD, CAD s/p cardiac stents, HFrEF (EF 20-25% 10/2024), hx of malignant melanoma/wide excision, with recent elective bioprosthetic aortic valve replacement and ascending aortic aneurysm repair with transverse hemiarch and CABG x2 (4/8/24) with post op course complicated by cardiogenic shock requiring inotropic support with IV dobutamine and milrinone, IABP and also on amiodarone for atrial fibrillation prophylaxis presenting with difficulty breathing for the past 4 days preceded by cough for the past 4 to 5 weeks.    # CHF  - Acute on chronic systolic heart failure  - volume status   - BP improved  - cont to hold Beta Blocker  - Start Valsartan  - Tramnsition to Lasix 40 mg PO Daily in AM  - He need s daily weights, should he gain more tahn 3 lbs in 1 day he needs to increase lasix 40 mg PO BID  - Out pt f/u with Dr. Berna Ramon    # CAD   - cont asa and statin    # Post op Afib prophylaxis  - Doesnt appear he has demonstrated PAF as he is not on AC currently  - now off  amio    # PNA  - Abx per medical team      Rogelio Ward MD, [facility]  BEEPER [phone]

## 2024-12-02 NOTE — PROGRESS NOTE ADULT - ASSESSMENT
74y M with PMHx of CVA, MI, HTN, T2DM, HLD, CAD s/p cardiac stents, HFrEF (EF 20-25% 10/2024), hx of malignant melanoma/wide excision, with recent elective bioprosthetic aortic valve replacement and ascending aortic aneurysm repair with transverse hemiarch and CABG x2 (4/8/24) with post op course complicated by cardiogenic shock requiring inotropic support with IV dobutamine and milrinone, IABP and also on amiodarone for atrial fibrillation prophylaxis presenting with difficulty breathing for the past 4 days preceded by cough for the past 4 to 5 weeks.  Admitted to telemetry for acute on chronic CHF and sepsis 2/2 PNA. ID, Cardiology and pulmonary following. Started Cefepime and completed 3 days Azithromycin.   CT chest  findings of pulmonary edema have progressed since October 5, 2024. Stable small bilateral pleural effusions. Stable 1.8 cm right chest wall cutaneous lesion.  started IV lasix, held Entresto and Beta blocker per Card.  DVT negative on b/l LE doppler US.   Endocrine consulted for uncontrolled DM. A1C 10.2. Insulin adjusted.   Pt requires oxygen 2L NC for dyspnea, will monitor for home O2 needs.     PT recs no Skilled PT needed.

## 2024-12-02 NOTE — PROGRESS NOTE ADULT - SUBJECTIVE AND OBJECTIVE BOX
74y Male is under our care for     MEDS:  cefepime   IVPB      cefepime   IVPB 2000 milliGRAM(s) IV Intermittent every 12 hours    ALLERGIES: Allergies    No Known Allergies    Intolerances    REVIEW OF SYSTEMS:  [  ] Not able to elicit  General:	  Chest:	  GI:	  :  Skin:	  Musculoskeletal:	  Neuro:	    VITALS:  Vital Signs Last 24 Hrs  T(C): 36.4 (02 Dec 2024 11:13), Max: 36.6 (01 Dec 2024 15:45)  T(F): 97.5 (02 Dec 2024 11:13), Max: 97.9 (01 Dec 2024 15:45)  HR: 93 (02 Dec 2024 11:13) (92 - 100)  BP: 106/76 (02 Dec 2024 11:13) (104/76 - 123/87)  BP(mean): 96 (02 Dec 2024 04:17) (93 - 96)  RR: 19 (02 Dec 2024 11:13) (18 - 19)  SpO2: 95% (02 Dec 2024 11:13) (95% - 98%)    Parameters below as of 02 Dec 2024 11:13  Patient On (Oxygen Delivery Method): room air    PHYSICAL EXAM:  HEENT:  Neck:  Respiratory:  Cardiovascular:  Gastrointestinal:  :  Extremities:  Skin:  Ortho:  Neuro:    LABS/DIAGNOSTIC TESTS:                        13.5   7.61  )-----------( 333      ( 02 Dec 2024 06:05 )             41.6     WBC Count: 7.61 K/uL (12-02 @ 06:05)  WBC Count: 7.46 K/uL (12-01 @ 06:55)  WBC Count: 7.07 K/uL (11-30 @ 05:45)  WBC Count: 6.60 K/uL (11-29 @ 05:27)  WBC Count: 7.80 K/uL (11-28 @ 07:35)    12-02    141  |  106  |  22[H]  ----------------------------<  153[H]  4.1   |  28  |  1.22    Ca    8.8      02 Dec 2024 06:05  Phos  2.5     12-02  Mg     2.2     12-02    CULTURES:   .Blood BLOOD  11-26 @ 15:35   No growth at 5 days  --  --    .Blood BLOOD  11-26 @ 15:10   No growth at 5 days  --  --    RADIOLOGY:  no new studies 74y Male sleeping in bed but easily arousable. Endorses he still has a mild cough but overall improving. Denies nausea, vomiting and diarrhea. No fevers or chills.     MEDS:  cefepime   IVPB      cefepime   IVPB 2000 milliGRAM(s) IV Intermittent every 12 hours    ALLERGIES: Allergies    No Known Allergies    Intolerances    REVIEW OF SYSTEMS:  [  ] Not able to elicit  General: no fevers no malaise  Chest: + dry cough ; no sob  GI: no nvd  : no urinary sxs   Skin: no rashes  Musculoskeletal: no trauma no LBP  Neuro: no ha's no dizziness     VITALS:  Vital Signs Last 24 Hrs  T(C): 36.4 (02 Dec 2024 11:13), Max: 36.6 (01 Dec 2024 15:45)  T(F): 97.5 (02 Dec 2024 11:13), Max: 97.9 (01 Dec 2024 15:45)  HR: 93 (02 Dec 2024 11:13) (92 - 100)  BP: 106/76 (02 Dec 2024 11:13) (104/76 - 123/87)  BP(mean): 96 (02 Dec 2024 04:17) (93 - 96)  RR: 19 (02 Dec 2024 11:13) (18 - 19)  SpO2: 95% (02 Dec 2024 11:13) (95% - 98%)    Parameters below as of 02 Dec 2024 11:13  Patient On (Oxygen Delivery Method): room air    PHYSICAL EXAM:  HEENT: normocephalic, conjunctivae and sclerae clear; moist mucous membranes  Neck: supple no LN's   Respiratory: lungs clear no rales  Cardiovascular: S1 S2 reg no murmurs  Gastrointestinal: +BS with soft, nondistended abdomen; nontender  Extremities: no edema  Skin: no rashes  Ortho: no erythema or joint swelling  Neuro: AAO x 3    LABS/DIAGNOSTIC TESTS:                        13.5   7.61  )-----------( 333      ( 02 Dec 2024 06:05 )             41.6     WBC Count: 7.61 K/uL (12-02 @ 06:05)  WBC Count: 7.46 K/uL (12-01 @ 06:55)  WBC Count: 7.07 K/uL (11-30 @ 05:45)  WBC Count: 6.60 K/uL (11-29 @ 05:27)  WBC Count: 7.80 K/uL (11-28 @ 07:35)    12-02    141  |  106  |  22[H]  ----------------------------<  153[H]  4.1   |  28  |  1.22    Ca    8.8      02 Dec 2024 06:05  Phos  2.5     12-02  Mg     2.2     12-02    CULTURES:   .Blood BLOOD  11-26 @ 15:35   No growth at 5 days  --  --    .Blood BLOOD  11-26 @ 15:10   No growth at 5 days  --  --    RADIOLOGY:

## 2024-12-02 NOTE — PROGRESS NOTE ADULT - PROBLEM SELECTOR PLAN 4
h/o HTN   -hold home entresto in s/o sepsis  -hold metoprolol per card  -monitor BP  -adjust antihypertensive meds as appropriate home meds held in the setting of hypotension   resumed valsartan today 12/2  - to hold home dose entresto and metoprolol per card  -monitor BP

## 2024-12-02 NOTE — PROGRESS NOTE ADULT - PROBLEM SELECTOR PLAN 1
p/w cough and shortness of breath  -CXR: Significant bibasilar infiltrates improved on the right but increased on the left  -Lactate 1.7  -c/w cefepime 2g q12 D#3  -s/p zithromycin 500mg IV qd for atypical coverage x 3d  -Bcx, Ucx NGTD  -ID consult: Dr. Armenta  -trudi Boyer, appreciated reccs.   -OOB daily.   -monitor O2 sat to evaluate home O2 needs. p/w cough and shortness of breath  -CXR: Significant bibasilar infiltrates improved on the right but increased on the left  -Lactate 1.7  -c/w cefepime last day abx today 12/2  -s/p zithromycin for atypical coverage x 3d  -Bcx, Ucx NGTD  -ID consult: Dr. Armenta  -trudi Boyer, appreciated reccs.   -OOB daily.   -monitor O2 sat to evaluate home O2 needs.

## 2024-12-02 NOTE — PROGRESS NOTE ADULT - PROBLEM SELECTOR PLAN 8
From home  pt still requires IV lasix 20mg, decreased due to hypotension  monitor I/O. daily weight  monitor home O2 needs.  on Cefepime #3, f/u ID reccs for duration abt From home  plan to transition from IV lasix to PO tomorrow   closely monitor BP  monitor I/O. daily weight  will likely D/C home tomorrow   last day ABX today 12/2

## 2024-12-03 LAB
ANION GAP SERPL CALC-SCNC: 7 MMOL/L — SIGNIFICANT CHANGE UP (ref 5–17)
BUN SERPL-MCNC: 25 MG/DL — HIGH (ref 7–18)
CALCIUM SERPL-MCNC: 8.9 MG/DL — SIGNIFICANT CHANGE UP (ref 8.4–10.5)
CHLORIDE SERPL-SCNC: 105 MMOL/L — SIGNIFICANT CHANGE UP (ref 96–108)
CO2 SERPL-SCNC: 29 MMOL/L — SIGNIFICANT CHANGE UP (ref 22–31)
CREAT SERPL-MCNC: 1.27 MG/DL — SIGNIFICANT CHANGE UP (ref 0.5–1.3)
EGFR: 59 ML/MIN/1.73M2 — LOW
GLUCOSE BLDC GLUCOMTR-MCNC: 105 MG/DL — HIGH (ref 70–99)
GLUCOSE BLDC GLUCOMTR-MCNC: 107 MG/DL — HIGH (ref 70–99)
GLUCOSE BLDC GLUCOMTR-MCNC: 131 MG/DL — HIGH (ref 70–99)
GLUCOSE BLDC GLUCOMTR-MCNC: 95 MG/DL — SIGNIFICANT CHANGE UP (ref 70–99)
GLUCOSE SERPL-MCNC: 94 MG/DL — SIGNIFICANT CHANGE UP (ref 70–99)
HCT VFR BLD CALC: 43.4 % — SIGNIFICANT CHANGE UP (ref 39–50)
HGB BLD-MCNC: 14.4 G/DL — SIGNIFICANT CHANGE UP (ref 13–17)
MCHC RBC-ENTMCNC: 27.2 PG — SIGNIFICANT CHANGE UP (ref 27–34)
MCHC RBC-ENTMCNC: 33.2 G/DL — SIGNIFICANT CHANGE UP (ref 32–36)
MCV RBC AUTO: 82 FL — SIGNIFICANT CHANGE UP (ref 80–100)
NRBC # BLD: 0 /100 WBCS — SIGNIFICANT CHANGE UP (ref 0–0)
PLATELET # BLD AUTO: 341 K/UL — SIGNIFICANT CHANGE UP (ref 150–400)
POTASSIUM SERPL-MCNC: 4.3 MMOL/L — SIGNIFICANT CHANGE UP (ref 3.5–5.3)
POTASSIUM SERPL-SCNC: 4.3 MMOL/L — SIGNIFICANT CHANGE UP (ref 3.5–5.3)
RBC # BLD: 5.29 M/UL — SIGNIFICANT CHANGE UP (ref 4.2–5.8)
RBC # FLD: 14.4 % — SIGNIFICANT CHANGE UP (ref 10.3–14.5)
SODIUM SERPL-SCNC: 141 MMOL/L — SIGNIFICANT CHANGE UP (ref 135–145)
WBC # BLD: 8.2 K/UL — SIGNIFICANT CHANGE UP (ref 3.8–10.5)
WBC # FLD AUTO: 8.2 K/UL — SIGNIFICANT CHANGE UP (ref 3.8–10.5)

## 2024-12-03 PROCEDURE — 99232 SBSQ HOSP IP/OBS MODERATE 35: CPT

## 2024-12-03 RX ORDER — METOPROLOL TARTRATE 100 MG/1
12.5 TABLET, FILM COATED ORAL DAILY
Refills: 0 | Status: DISCONTINUED | OUTPATIENT
Start: 2024-12-03 | End: 2024-12-04

## 2024-12-03 RX ORDER — METOPROLOL TARTRATE 100 MG/1
12.5 TABLET, FILM COATED ORAL DAILY
Refills: 0 | Status: DISCONTINUED | OUTPATIENT
Start: 2024-12-03 | End: 2024-12-03

## 2024-12-03 RX ORDER — VALSARTAN 320 MG/1
40 TABLET ORAL DAILY
Refills: 0 | Status: DISCONTINUED | OUTPATIENT
Start: 2024-12-03 | End: 2024-12-05

## 2024-12-03 RX ADMIN — ENOXAPARIN SODIUM 40 MILLIGRAM(S): 30 INJECTION SUBCUTANEOUS at 21:22

## 2024-12-03 RX ADMIN — POLYETHYLENE GLYCOL 3350 17 GRAM(S): 17 POWDER, FOR SOLUTION ORAL at 11:54

## 2024-12-03 RX ADMIN — IPRATROPIUM BROMIDE AND ALBUTEROL SULFATE 3 MILLILITER(S): 2.5; .5 SOLUTION RESPIRATORY (INHALATION) at 16:01

## 2024-12-03 RX ADMIN — CLOPIDOGREL 75 MILLIGRAM(S): 75 TABLET, FILM COATED ORAL at 11:55

## 2024-12-03 RX ADMIN — BENZONATATE 100 MILLIGRAM(S): 100 CAPSULE ORAL at 05:32

## 2024-12-03 RX ADMIN — METOPROLOL TARTRATE 12.5 MILLIGRAM(S): 100 TABLET, FILM COATED ORAL at 10:54

## 2024-12-03 RX ADMIN — Medication 1 TABLET(S): at 11:56

## 2024-12-03 RX ADMIN — Medication 80 MILLIGRAM(S): at 21:19

## 2024-12-03 RX ADMIN — Medication 600 MILLIGRAM(S): at 05:32

## 2024-12-03 RX ADMIN — Medication 81 MILLIGRAM(S): at 11:55

## 2024-12-03 RX ADMIN — FUROSEMIDE 40 MILLIGRAM(S): 40 TABLET ORAL at 07:41

## 2024-12-03 RX ADMIN — VALSARTAN 40 MILLIGRAM(S): 320 TABLET ORAL at 10:54

## 2024-12-03 RX ADMIN — Medication 2 UNIT(S): at 11:53

## 2024-12-03 RX ADMIN — TAMSULOSIN HYDROCHLORIDE 0.4 MILLIGRAM(S): 0.4 CAPSULE ORAL at 21:18

## 2024-12-03 RX ADMIN — Medication 2 UNIT(S): at 08:07

## 2024-12-03 RX ADMIN — BENZONATATE 100 MILLIGRAM(S): 100 CAPSULE ORAL at 12:15

## 2024-12-03 RX ADMIN — BENZONATATE 100 MILLIGRAM(S): 100 CAPSULE ORAL at 21:19

## 2024-12-03 RX ADMIN — INSULIN GLARGINE 5 UNIT(S): 100 INJECTION, SOLUTION SUBCUTANEOUS at 21:21

## 2024-12-03 RX ADMIN — Medication 600 MILLIGRAM(S): at 17:05

## 2024-12-03 RX ADMIN — Medication 2 UNIT(S): at 17:05

## 2024-12-03 RX ADMIN — Medication 2 TABLET(S): at 21:19

## 2024-12-03 NOTE — PROGRESS NOTE ADULT - ASSESSMENT
74y M with PMHx of CVA, MI, HTN, T2DM, HLD, CAD s/p cardiac stents, HFrEF (EF 20-25% 10/2024), hx of malignant melanoma/wide excision, with recent elective bioprosthetic aortic valve replacement and ascending aortic aneurysm repair with transverse hemiarch and CABG x2 (4/8/24) with post op course complicated by cardiogenic shock requiring inotropic support with IV dobutamine and milrinone, IABP and also on amiodarone for atrial fibrillation prophylaxis presenting with difficulty breathing for the past 4 days preceded by cough for the past 4 to 5 weeks.  Admitted to telemetry for acute on chronic CHF and sepsis 2/2 PNA. ID, Cardiology and pulmonary following. Started Cefepime and completed 3 days Azithromycin.   CT chest  findings of pulmonary edema have progressed since October 5, 2024. Stable small bilateral pleural effusions. Stable 1.8 cm right chest wall cutaneous lesion.  started IV lasix, held Entresto and Beta blocker per Card.  DVT negative on b/l LE doppler US.   Endocrine consulted for uncontrolled DM. A1C 10.2. Insulin adjusted.   Pt requires oxygen 2L NC for dyspnea, will monitor for home O2 needs.     PT recs no Skilled PT needed.           74y M with PMHx of CVA, MI, HTN, T2DM, HLD, CAD s/p cardiac stents, HFrEF (EF 20-25% 10/2024), hx of malignant melanoma/wide excision, with recent elective bioprosthetic aortic valve replacement and ascending aortic aneurysm repair with transverse hemiarch and CABG x2 (4/8/24) with post op course complicated by cardiogenic shock requiring inotropic support with IV dobutamine and milrinone, IABP and also on amiodarone for atrial fibrillation prophylaxis presenting with difficulty breathing for the past 4 days preceded by cough for the past 4 to 5 weeks.  Admitted to telemetry for acute on chronic CHF and sepsis 2/2 PNA. ID, Cardiology and pulmonary following. Started Cefepime and completed 3 days Azithromycin.   CT chest  findings of pulmonary edema have progressed since October 5, 2024. Stable small bilateral pleural effusions. Stable 1.8 cm right chest wall cutaneous lesion.  started IV lasix, held Entresto and Beta blocker per Card.    DVT negative on b/l LE doppler US.   Endocrine consulted for uncontrolled DM. A1C 10.2. Insulin adjusted.   Pt requires oxygen 2L NC for dyspnea, will monitor for home O2 needs  -     Patient is now on room air  PT recommends  no Skilled PT needed.   Patient now on oral lasix,  Entresto switched to Diovan and low dose Metoprolol          74y M with PMHx of CVA, MI, HTN, T2DM, HLD, CAD s/p cardiac stents, HFrEF (EF 20-25% 10/2024), hx of malignant melanoma/wide excision, with recent elective bioprosthetic aortic valve replacement and ascending aortic aneurysm repair with transverse hemiarch and CABG x2 (4/8/24) with post op course complicated by cardiogenic shock requiring inotropic support with IV dobutamine and milrinone, IABP and also on amiodarone for atrial fibrillation prophylaxis presenting with difficulty breathing for the past 4 days preceded by cough for the past 4 to 5 weeks.  Admitted to telemetry for acute on chronic CHF and sepsis 2/2 PNA. ID, Cardiology and pulmonary following. Started Cefepime and completed 3 days Azithromycin.   CT chest  findings of pulmonary edema have progressed since October 5, 2024. Stable small bilateral pleural effusions. Stable 1.8 cm right chest wall cutaneous lesion.  started IV lasix, held Entresto and Beta blocker per Card.    DVT negative on b/l LE doppler US.   Endocrine consulted for uncontrolled DM. A1C 10.2. Insulin adjusted.   Pt requires oxygen 2L NC for dyspnea, will monitor for home O2 needs  -     Patient is now on room air  PT recommends  no Skilled PT needed.   Patient now on oral lasix,  Entresto discontinued     Continue with  Diovan and Toprol 12.5mg daily

## 2024-12-03 NOTE — PROGRESS NOTE ADULT - PROBLEM SELECTOR PLAN 8
-  Patient admitted from home  -  Plan to discharge tomorrow  -  after initial doses of Valsartan and Metoprolol  -  Discontinue Farxiga, Entresto and  Amiodarone,  Increase Lasix to 40mg   -  Continue Valsartan 40 mg and Metoprolol 12.5mg  -  Continue to monitor BP routeinely  -  Continue to monitor I/O. daily weight  -  will likely D/C home tomorrow   -  Antibiotics completed -  Patient admitted from home  -  Plan to discharge tomorrow  -  after initial doses of Valsartan and Metoprolol  -  Discontinue Farxiga, Entresto and  Amiodarone,  Increase Lasix to 40mg   -  Continue Valsartan 40 mg and Toprol 12.5mg  -  Continue to monitor BP routeinely  -  Continue to monitor I/O. daily weight  -  will likely D/C home tomorrow   -  Antibiotics completed -  Patient admitted from home  -  Plan to discharge tomorrow  -  after doses of Valsartan and Metoprolol today.    DISCHARGE RECS PER DR BERTRAND:  -  Discontinue Farxiga, Entresto and  Amiodarone   -  Increase Lasix to 40mg   -  Continue Valsartan 40 mg and Toprol 12.5mg    DISCHARGE RECS PER DR ENCISO  Discharge on Metformin 500 mg with dinner for one week , if feeling better then increase to metformin 1000 mg daily with dinner from following week and from the third week onwards, patient needs metformin 1000 mg BID    PLAN FOR DISCHARGE TOMORROW  12/4

## 2024-12-03 NOTE — PROGRESS NOTE ADULT - TIME-BASED BILLING (NON-CRITICAL CARE)
Pt thrashing in bed, yelling and cursing at staff, attempting to remove restraints. Kicking staff and attempting to bite staff when close.   
Time-based billing (NON-critical care)
Time-based billing (NON-critical care)

## 2024-12-03 NOTE — PROGRESS NOTE ADULT - PROBLEM SELECTOR PLAN 6
h/o HLD on atorvastatin  -c/w home med  -, TG 83.   -DASH diet -   History of Hyperlipidemia on Atorvastatin  -   Continue with Atorvastatin 80mg QHS  -   , TG 83.   -   DASH diet

## 2024-12-03 NOTE — PROGRESS NOTE ADULT - PROBLEM SELECTOR PLAN 1
p/w cough and shortness of breath  -CXR: Significant bibasilar infiltrates improved on the right but increased on the left  -Lactate 1.7  -c/w cefepime last day abx today 12/2  -s/p zithromycin for atypical coverage x 3d  -Bcx, Ucx NGTD  -ID consult: Dr. Armenta  -trudi Boyer, appreciated reccs.   -OOB daily.   -monitor O2 sat to evaluate home O2 needs. -   Patient presented with cough and shortness of breath  -   Chest x-ray Significant bibasilar infiltrates improved on the right but increased on the left  -   Lactate 1.7  -   Treated with cefepime last day abx today 12/2  -   s/p Azithromycin for atypical coverage x 3d  -   Blood and urine cultures all negative  -   ID consult: Dr. Armenta  -   Pulmonary Dr Hussein   -   OOB daily.   -   Stable on room air

## 2024-12-03 NOTE — PROGRESS NOTE ADULT - TIME BILLING
minutes spent the time noted on the day of this patient encounter preparing for, reviewing records/charts/labs, interview and physical examination, coordination of care with patient and primary team, providing and documenting the above E/M service and 50% of time spent face to face counseling and education provided to patient on disease course, and treatment/management. All questions and concerns were answered and addressed in detail.
minutes spent the time noted on the day of this patient encounter preparing for, reviewing records/charts/labs, interview and physical examination, coordination of care with patient and primary team, providing and documenting the above E/M service and 50% of time spent face to face counseling and education provided to patient on disease course, and treatment/management. All questions and concerns were answered and addressed in detail.

## 2024-12-03 NOTE — PROGRESS NOTE ADULT - PROBLEM SELECTOR PLAN 3
h/o DM linagliptin and faxiga   -hold oral dm meds  -A1C 10.2  -c/w  moderate sliding scale  -Adjust insulin as indicated  -FS ACHS  -Endo dr. Jimenez following  -Increase Glargine ( Lantus) to 5 units once daily and premeal Insulin: 2 units Lispro with meals. -   Patient has history of Diabetes on  linagliptin and farxiga   -   Hold oral medications while admitted  -   A1C 10.2  -   Monitor finger sticks AC and HS  -   Continue with Moderate sliding scale  -   Endo Dr. Velasquez following  -   Increase Glargine ( Lantus) to 5 units once daily and premeal Insulin: 2 units Lispro with meals. -   Patient has history of Diabetes on  linagliptin and farxiga   -   Hold oral medications while admitted  -   A1C 10.2  -   Monitor finger sticks AC and HS  -   Continue with Moderate sliding scale  -   Endo Dr. Velasquez following  -   Increase Glargine ( Lantus) to 5 units once daily and premeal Insulin: 2 units Lispro with meals.    Discharge recommendations per Endocrine:  Discharge on Metformin 500 mg with dinner for one week , if feeling better then increase to metformin 1000 mg daily with dinner from following week and from the third week onwards, patient needs metformin 1000 mg BID

## 2024-12-03 NOTE — PROGRESS NOTE ADULT - SUBJECTIVE AND OBJECTIVE BOX
NP Note     HPI:  74y M with PMHx of CVA, MI, HTN, T2DM, HLD, CAD s/p cardiac stents, HFrEF (EF 20-25% 10/2024), hx of malignant melanoma/wide excision, with recent elective bioprosthetic aortic valve replacement and ascending aortic aneurysm repair with transverse hemiarch and CABG x2 (4/8/24) with post op course complicated by cardiogenic shock requiring inotropic support with IV dobutamine and milrinone, IABP and also on amiodarone for atrial fibrillation prophylaxis presenting with difficulty breathing for the past 4 days preceded by cough for the past 4 to 5 weeks. Patient was recently admitted to [facility] 10/5-10/8 for pneumonia. Patient reports completion of full course of antibiotics prescribed on discharge. Patient states that he developed cough around a month ago that has been persistent and now causing abdominal muscle pain. Patient developed shortness of breath 4 days ago that got progressively worse prompting him to come to the ED today. Since treatment in ED, patient reports improvement of dyspnea but cough is still present but less frequent. Endorses constipation with no BM for last 3 days. Denies fever, chills, headache, dizziness, chest pain, palpitations, nausea, vomiting, diarrhea, and dysuria.   (26 Nov 2024 18:29)      Patient is a 74y old  Male who presents with a chief complaint of acute CHF/sepsis 2/2 PNA (02 Dec 2024 11:38)      INTERVAL HPI/OVERNIGHT EVENTS: no new complaints    MEDICATIONS  (STANDING):  albuterol/ipratropium for Nebulization 3 milliLiter(s) Nebulizer every 6 hours  aspirin enteric coated 81 milliGRAM(s) Oral daily  atorvastatin 80 milliGRAM(s) Oral at bedtime  benzonatate 100 milliGRAM(s) Oral three times a day  clopidogrel Tablet 75 milliGRAM(s) Oral daily  enoxaparin Injectable 40 milliGRAM(s) SubCutaneous every 24 hours  fluticasone propionate 50 MICROgram(s)/spray Nasal Spray 2 Spray(s) Both Nostrils two times a day  furosemide    Tablet 40 milliGRAM(s) Oral daily  guaiFENesin  milliGRAM(s) Oral every 12 hours  insulin glargine Injectable (LANTUS) 5 Unit(s) SubCutaneous at bedtime  insulin lispro (ADMELOG) corrective regimen sliding scale   SubCutaneous three times a day before meals  insulin lispro (ADMELOG) corrective regimen sliding scale   SubCutaneous at bedtime  insulin lispro Injectable (ADMELOG) 2 Unit(s) SubCutaneous three times a day before meals  metoprolol tartrate 12.5 milliGRAM(s) Oral daily  multivitamin 1 Tablet(s) Oral daily  polyethylene glycol 3350 17 Gram(s) Oral daily  senna 2 Tablet(s) Oral at bedtime  tamsulosin 0.4 milliGRAM(s) Oral at bedtime  valsartan 40 milliGRAM(s) Oral daily    MEDICATIONS  (PRN):  acetaminophen     Tablet .. 650 milliGRAM(s) Oral every 6 hours PRN Temp greater or equal to 38C (100.4F), Mild Pain (1 - 3)  bisacodyl 5 milliGRAM(s) Oral every 12 hours PRN Constipation  bisacodyl Suppository 10 milliGRAM(s) Rectal daily PRN Constipation      __________________________________________________  REVIEW OF SYSTEMS:    CONSTITUTIONAL: No fever,   EYES: no acute visual disturbances  NECK: No pain or stiffness  RESPIRATORY: No cough; No shortness of breath  CARDIOVASCULAR: No chest pain, no palpitations  GASTROINTESTINAL: No pain. No nausea or vomiting; No diarrhea   NEUROLOGICAL: No headache or numbness, no tremors  MUSCULOSKELETAL: No joint pain, no muscle pain  GENITOURINARY: no dysuria, no frequency, no hesitancy  PSYCHIATRY: no depression , no anxiety  ALL OTHER  ROS negative        Vital Signs Last 24 Hrs  T(C): 36.9 (03 Dec 2024 08:20), Max: 36.9 (03 Dec 2024 08:20)  T(F): 98.4 (03 Dec 2024 08:20), Max: 98.4 (03 Dec 2024 08:20)  HR: 97 (03 Dec 2024 08:20) (93 - 100)  BP: 111/84 (03 Dec 2024 08:20) (97/66 - 111/84)  BP(mean): 93 (03 Dec 2024 08:20) (93 - 93)  RR: 18 (03 Dec 2024 08:20) (18 - 19)  SpO2: 95% (03 Dec 2024 08:20) (93% - 99%)    Parameters below as of 03 Dec 2024 08:20  Patient On (Oxygen Delivery Method): room air        ________________________________________________  PHYSICAL EXAM:  GENERAL: NAD  HEENT: Normocephalic;  conjunctivae and sclerae clear; moist mucous membranes;   NECK : supple  CHEST/LUNG: Clear to auscultation bilaterally with good air entry   HEART: S1 S2  regular; no murmurs, gallops or rubs  ABDOMEN: Soft, Nontender, Nondistended; Bowel sounds present  EXTREMITIES: no cyanosis; no edema; no calf tenderness  SKIN: warm and dry; no rash  NERVOUS SYSTEM:  Awake and alert; Oriented  to place, person and time ; no new deficits    _________________________________________________  LABS:                        14.4   8.20  )-----------( 341      ( 03 Dec 2024 05:15 )             43.4     12-03    141  |  105  |  25[H]  ----------------------------<  94  4.3   |  29  |  1.27    Ca    8.9      03 Dec 2024 05:15  Phos  2.5     12-02  Mg     2.2     12-02        Urinalysis Basic - ( 03 Dec 2024 05:15 )    Color: x / Appearance: x / SG: x / pH: x  Gluc: 94 mg/dL / Ketone: x  / Bili: x / Urobili: x   Blood: x / Protein: x / Nitrite: x   Leuk Esterase: x / RBC: x / WBC x   Sq Epi: x / Non Sq Epi: x / Bacteria: x      CAPILLARY BLOOD GLUCOSE      POCT Blood Glucose.: 95 mg/dL (03 Dec 2024 08:02)  POCT Blood Glucose.: 101 mg/dL (02 Dec 2024 21:08)  POCT Blood Glucose.: 164 mg/dL (02 Dec 2024 17:16)  POCT Blood Glucose.: 146 mg/dL (02 Dec 2024 11:23)        RADIOLOGY & ADDITIONAL TESTS:    Imaging  Reviewed:  YES/NO    Consultant(s) Notes Reviewed:   YES/ No      Plan of care was discussed with patient and /or primary care giver; all questions and concerns were addressed  NP Note     Patient is a 74y old  Male who presents with a chief complaint of acute CHF/sepsis 2/2 PNA (02 Dec 2024 11:38)    INTERVAL HPI / OVERNIGHT EVENTS: no new complaints    MEDICATIONS  (STANDING):  albuterol/ipratropium for Nebulization 3 milliLiter(s) Nebulizer every 6 hours  aspirin enteric coated 81 milliGRAM(s) Oral daily  atorvastatin 80 milliGRAM(s) Oral at bedtime  benzonatate 100 milliGRAM(s) Oral three times a day  clopidogrel Tablet 75 milliGRAM(s) Oral daily  enoxaparin Injectable 40 milliGRAM(s) SubCutaneous every 24 hours  fluticasone propionate 50 MICROgram(s)/spray Nasal Spray 2 Spray(s) Both Nostrils two times a day  furosemide    Tablet 40 milliGRAM(s) Oral daily  guaiFENesin  milliGRAM(s) Oral every 12 hours  insulin glargine Injectable (LANTUS) 5 Unit(s) SubCutaneous at bedtime  insulin lispro (ADMELOG) corrective regimen sliding scale   SubCutaneous three times a day before meals  insulin lispro (ADMELOG) corrective regimen sliding scale   SubCutaneous at bedtime  insulin lispro Injectable (ADMELOG) 2 Unit(s) SubCutaneous three times a day before meals  metoprolol tartrate 12.5 milliGRAM(s) Oral daily  multivitamin 1 Tablet(s) Oral daily  polyethylene glycol 3350 17 Gram(s) Oral daily  senna 2 Tablet(s) Oral at bedtime  tamsulosin 0.4 milliGRAM(s) Oral at bedtime  valsartan 40 milliGRAM(s) Oral daily    MEDICATIONS  (PRN):  acetaminophen     Tablet .. 650 milliGRAM(s) Oral every 6 hours PRN Temp greater or equal to 38C (100.4F), Mild Pain (1 - 3)  bisacodyl 5 milliGRAM(s) Oral every 12 hours PRN Constipation  bisacodyl Suppository 10 milliGRAM(s) Rectal daily PRN Constipation      __________________________________________________  REVIEW OF SYSTEMS:    CONSTITUTIONAL: No fever, chills  EYES: no acute visual disturbances  NECK: No pain or stiffness  RESPIRATORY:  cough subsiding; No shortness of breath  CARDIOVASCULAR: No chest pain, no palpitations  GASTROINTESTINAL: No pain. No nausea or vomiting; No diarrhea   NEUROLOGICAL: No headache or numbness, no tremors  MUSCULOSKELETAL: No joint pain, no muscle pain  GENITOURINARY: no dysuria, no frequency, no hesitancy  PSYCHIATRY: no depression , no anxiety  ALL OTHER  ROS negative        Vital Signs Last 24 Hrs  T(C): 36.9 (03 Dec 2024 08:20), Max: 36.9 (03 Dec 2024 08:20)  T(F): 98.4 (03 Dec 2024 08:20), Max: 98.4 (03 Dec 2024 08:20)  HR: 97 (03 Dec 2024 08:20) (93 - 100)  BP: 111/84 (03 Dec 2024 08:20) (97/66 - 111/84)  BP(mean): 93 (03 Dec 2024 08:20) (93 - 93)  RR: 18 (03 Dec 2024 08:20) (18 - 19)  SpO2: 95% (03 Dec 2024 08:20) (93% - 99%)    Parameters below as of 03 Dec 2024 08:20  Patient On (Oxygen Delivery Method): room air        ________________________________________________  PHYSICAL EXAM:  GENERAL: No acute distress.  cachectic in appearance  HEENT: Normocephalic;  conjunctivae and sclerae clear; moist mucous membranes;   NECK : supple  CHEST/LUNG: Clear to auscultation bilaterally with good air entry   HEART: S1 S2  regular; no murmurs, gallops or rubs  ABDOMEN: Soft, Nontender, Nondistended; Bowel sounds present  EXTREMITIES: no cyanosis; no edema; no calf tenderness  SKIN: warm and dry; no rash  NERVOUS SYSTEM:  Awake and alert; Oriented  to place, person and time ; no new deficits    _________________________________________________  LABS:                        14.4   8.20  )-----------( 341      ( 03 Dec 2024 05:15 )             43.4     12-03    141  |  105  |  25[H]  ----------------------------<  94  4.3   |  29  |  1.27    Ca    8.9      03 Dec 2024 05:15  Phos  2.5     12-02  Mg     2.2     12-02        Urinalysis Basic - ( 03 Dec 2024 05:15 )    Color: x / Appearance: x / SG: x / pH: x  Gluc: 94 mg/dL / Ketone: x  / Bili: x / Urobili: x   Blood: x / Protein: x / Nitrite: x   Leuk Esterase: x / RBC: x / WBC x   Sq Epi: x / Non Sq Epi: x / Bacteria: x      CAPILLARY BLOOD GLUCOSE  POCT Blood Glucose.: 95 mg/dL (03 Dec 2024 08:02)  POCT Blood Glucose.: 101 mg/dL (02 Dec 2024 21:08)  POCT Blood Glucose.: 164 mg/dL (02 Dec 2024 17:16)  POCT Blood Glucose.: 146 mg/dL (02 Dec 2024 11:23)        RADIOLOGY & ADDITIONAL TESTS:    Imaging  Reviewed:  YES/NO    Consultant(s) Notes Reviewed:   YES/ No      Plan of care was discussed with patient and /or primary care giver; all questions and concerns were addressed  NP Note     Patient is a 74y old  Male who presents with a chief complaint of acute CHF/sepsis 2/2 PNA (02 Dec 2024 11:38)    INTERVAL HPI / OVERNIGHT EVENTS: no new complaints    MEDICATIONS  (STANDING):  albuterol/ipratropium for Nebulization 3 milliLiter(s) Nebulizer every 6 hours  aspirin enteric coated 81 milliGRAM(s) Oral daily  atorvastatin 80 milliGRAM(s) Oral at bedtime  benzonatate 100 milliGRAM(s) Oral three times a day  clopidogrel Tablet 75 milliGRAM(s) Oral daily  enoxaparin Injectable 40 milliGRAM(s) SubCutaneous every 24 hours  fluticasone propionate 50 MICROgram(s)/spray Nasal Spray 2 Spray(s) Both Nostrils two times a day  furosemide    Tablet 40 milliGRAM(s) Oral daily  guaiFENesin  milliGRAM(s) Oral every 12 hours  insulin glargine Injectable (LANTUS) 5 Unit(s) SubCutaneous at bedtime  insulin lispro (ADMELOG) corrective regimen sliding scale   SubCutaneous three times a day before meals  insulin lispro (ADMELOG) corrective regimen sliding scale   SubCutaneous at bedtime  insulin lispro Injectable (ADMELOG) 2 Unit(s) SubCutaneous three times a day before meals  metoprolol tartrate 12.5 milliGRAM(s) Oral daily  multivitamin 1 Tablet(s) Oral daily  polyethylene glycol 3350 17 Gram(s) Oral daily  senna 2 Tablet(s) Oral at bedtime  tamsulosin 0.4 milliGRAM(s) Oral at bedtime  valsartan 40 milliGRAM(s) Oral daily    MEDICATIONS  (PRN):  acetaminophen     Tablet .. 650 milliGRAM(s) Oral every 6 hours PRN Temp greater or equal to 38C (100.4F), Mild Pain (1 - 3)  bisacodyl 5 milliGRAM(s) Oral every 12 hours PRN Constipation  bisacodyl Suppository 10 milliGRAM(s) Rectal daily PRN Constipation      __________________________________________________  REVIEW OF SYSTEMS:    CONSTITUTIONAL: No fever, chills  EYES: no acute visual disturbances  NECK: No pain or stiffness  RESPIRATORY:  cough subsiding; No shortness of breath  CARDIOVASCULAR: No chest pain, no palpitations  GASTROINTESTINAL: No pain. No nausea or vomiting; No diarrhea   NEUROLOGICAL: No headache or numbness, no tremors  MUSCULOSKELETAL: No joint pain, no muscle pain  GENITOURINARY: no dysuria, no frequency, no hesitancy  PSYCHIATRY: no depression , no anxiety  ALL OTHER  ROS negative        Vital Signs Last 24 Hrs  T(C): 36.9 (03 Dec 2024 08:20), Max: 36.9 (03 Dec 2024 08:20)  T(F): 98.4 (03 Dec 2024 08:20), Max: 98.4 (03 Dec 2024 08:20)  HR: 97 (03 Dec 2024 08:20) (93 - 100)  BP: 111/84 (03 Dec 2024 08:20) (97/66 - 111/84)  BP(mean): 93 (03 Dec 2024 08:20) (93 - 93)  RR: 18 (03 Dec 2024 08:20) (18 - 19)  SpO2: 95% (03 Dec 2024 08:20) (93% - 99%)    Parameters below as of 03 Dec 2024 08:20  Patient On (Oxygen Delivery Method): room air        ________________________________________________  PHYSICAL EXAM:  GENERAL: No acute distress.  cachectic in appearance  HEENT: Normocephalic;  conjunctivae and sclerae clear; moist mucous membranes;   NECK : supple  CHEST/LUNG: Clear to auscultation bilaterally with good air entry   HEART: S1 S2  regular; no murmurs, gallops or rubs  ABDOMEN: Soft, Nontender, Nondistended; Bowel sounds present  EXTREMITIES: no cyanosis; no edema; no calf tenderness  SKIN: warm and dry; no rash  NERVOUS SYSTEM:  Awake and alert; Oriented  to place, person and time ; no new deficits    _________________________________________________      LABS:                        14.4   8.20  )-----------( 341      ( 03 Dec 2024 05:15 )             43.4     12-03    141  |  105  |  25[H]  ----------------------------<  94  4.3   |  29  |  1.27    Ca    8.9      03 Dec 2024 05:15  Phos  2.5     12-02  Mg     2.2     12-02        Urinalysis Basic - ( 03 Dec 2024 05:15 )    Color: x / Appearance: x / SG: x / pH: x  Gluc: 94 mg/dL / Ketone: x  / Bili: x / Urobili: x   Blood: x / Protein: x / Nitrite: x   Leuk Esterase: x / RBC: x / WBC x   Sq Epi: x / Non Sq Epi: x / Bacteria: x      CAPILLARY BLOOD GLUCOSE  POCT Blood Glucose.: 95 mg/dL (03 Dec 2024 08:02)  POCT Blood Glucose.: 101 mg/dL (02 Dec 2024 21:08)  POCT Blood Glucose.: 164 mg/dL (02 Dec 2024 17:16)  POCT Blood Glucose.: 146 mg/dL (02 Dec 2024 11:23)        RADIOLOGY & ADDITIONAL TESTS:    Imaging  Reviewed:  YES/NO    Consultant(s) Notes Reviewed:   YES/ No      Plan of care was discussed with patient and /or primary care giver; all questions and concerns were addressed

## 2024-12-03 NOTE — PROGRESS NOTE ADULT - SUBJECTIVE AND OBJECTIVE BOX
DATE OF SERVICE: 12-03-24    Patient denies chest pain or shortness of breath.   Review of symptoms otherwise negative.    acetaminophen     Tablet .. 650 milliGRAM(s) Oral every 6 hours PRN  albuterol/ipratropium for Nebulization 3 milliLiter(s) Nebulizer every 6 hours  aspirin enteric coated 81 milliGRAM(s) Oral daily  atorvastatin 80 milliGRAM(s) Oral at bedtime  benzonatate 100 milliGRAM(s) Oral three times a day  bisacodyl 5 milliGRAM(s) Oral every 12 hours PRN  bisacodyl Suppository 10 milliGRAM(s) Rectal daily PRN  clopidogrel Tablet 75 milliGRAM(s) Oral daily  enoxaparin Injectable 40 milliGRAM(s) SubCutaneous every 24 hours  fluticasone propionate 50 MICROgram(s)/spray Nasal Spray 2 Spray(s) Both Nostrils two times a day  furosemide    Tablet 40 milliGRAM(s) Oral daily  guaiFENesin  milliGRAM(s) Oral every 12 hours  insulin glargine Injectable (LANTUS) 5 Unit(s) SubCutaneous at bedtime  insulin lispro (ADMELOG) corrective regimen sliding scale   SubCutaneous three times a day before meals  insulin lispro (ADMELOG) corrective regimen sliding scale   SubCutaneous at bedtime  insulin lispro Injectable (ADMELOG) 2 Unit(s) SubCutaneous three times a day before meals  multivitamin 1 Tablet(s) Oral daily  polyethylene glycol 3350 17 Gram(s) Oral daily  senna 2 Tablet(s) Oral at bedtime  tamsulosin 0.4 milliGRAM(s) Oral at bedtime  valsartan 40 milliGRAM(s) Oral daily                            14.4   8.20  )-----------( 341      ( 03 Dec 2024 05:15 )             43.4       Hemoglobin: 14.4 g/dL (12-03 @ 05:15)  Hemoglobin: 13.5 g/dL (12-02 @ 06:05)  Hemoglobin: 12.9 g/dL (12-01 @ 06:55)  Hemoglobin: 13.1 g/dL (11-30 @ 05:45)  Hemoglobin: 12.8 g/dL (11-29 @ 05:27)      12-03    141  |  105  |  25[H]  ----------------------------<  94  4.3   |  29  |  1.27    Ca    8.9      03 Dec 2024 05:15  Phos  2.5     12-02  Mg     2.2     12-02      Creatinine Trend: 1.27<--, 1.22<--, 1.21<--, 1.20<--, 1.23<--, 1.24<--    COAGS:           T(C): 36.9 (12-03-24 @ 08:20), Max: 36.9 (12-03-24 @ 08:20)  HR: 97 (12-03-24 @ 08:20) (93 - 100)  BP: 111/84 (12-03-24 @ 08:20) (97/66 - 111/84)  RR: 18 (12-03-24 @ 08:20) (18 - 19)  SpO2: 95% (12-03-24 @ 08:20) (93% - 99%)  Wt(kg): --    I&O's Summary    02 Dec 2024 07:01  -  03 Dec 2024 07:00  --------------------------------------------------------  IN: 0 mL / OUT: 400 mL / NET: -400 mL          HEENT:  (-)icterus (-)pallor  CV: N S1 S2 1/6 GAEL (+)2 Pulses B/l  Resp:  Clear to ausculatation B/L, normal effort  GI: (+) BS Soft, NT, ND  Lymph:  (-)Edema, (-)obvious lymphadenopathy  Skin: Warm to touch, Normal turgor  Psych: Appropriate mood and affect      Tele Sinus     ASSESSMENT/PLAN: 	74y Male PMHx of CVA, MI, HTN, T2DM, HLD, CAD s/p cardiac stents, HFrEF (EF 20-25% 10/2024), hx of malignant melanoma/wide excision, with recent elective bioprosthetic aortic valve replacement and ascending aortic aneurysm repair with transverse hemiarch and CABG x2 (4/8/24) with post op course complicated by cardiogenic shock requiring inotropic support with IV dobutamine and milrinone, IABP and also on amiodarone for atrial fibrillation prophylaxis presenting with difficulty breathing for the past 4 days preceded by cough for the past 4 to 5 weeks.    # CHF  - Acute on chronic systolic heart failure  - volume status improved   - BP improved  - Start Low dose toprol XL 12.5 mg PO daily  - cont  Valsartan  - Transition to Lasix 40 mg PO Daily today  - He need s daily weights, should he gain more tahn 3 lbs in 1 day he needs to increase lasix 40 mg PO BID  - Out pt f/u with Dr. Berna Ramon    # CAD   - cont asa and statin    # Post op Afib prophylaxis  - Doesnt appear he has demonstrated PAF as he is not on AC currently  - now off  amio    # PNA  - completed  Abx per medical team      Rogelio Ward MD, [facility]  BEEPER [phone]

## 2024-12-03 NOTE — PROGRESS NOTE ADULT - PROBLEM SELECTOR PLAN 4
home meds held in the setting of hypotension   resumed valsartan today 12/2  - to hold home dose entresto and metoprolol per card  -monitor BP -   Home regimen initially held due to hypotension  -   Valsartan resumed 12/2  -   Entresto to be stopped.   Patient started on Valsartan and Toprol 12.5mg daily  -   Continue to monitor BP routinely.

## 2024-12-03 NOTE — PROGRESS NOTE ADULT - PROBLEM SELECTOR PLAN 5
hx of CAD on plavix   -c/w home meds -   History of CAD  -   Continue with ASA  -   Continue with Plavix

## 2024-12-03 NOTE — PROGRESS NOTE ADULT - SUBJECTIVE AND OBJECTIVE BOX
Patient is a 74y old  Male who presents with a chief complaint of acute CHF/sepsis 2/2 PNA (03 Dec 2024 09:56)    PATIENT IS SEEN AND EXAMINED IN MEDICAL FLOOR.  LEISA [    ]    JONAS [   ]      GT [   ]    ALLERGIES:  No Known Allergies      Daily     Daily Weight in k (03 Dec 2024 04:56)    VITALS:    Vital Signs Last 24 Hrs  T(C): 36.9 (03 Dec 2024 08:20), Max: 36.9 (03 Dec 2024 08:20)  T(F): 98.4 (03 Dec 2024 08:20), Max: 98.4 (03 Dec 2024 08:20)  HR: 97 (03 Dec 2024 08:20) (93 - 100)  BP: 111/84 (03 Dec 2024 08:20) (97/66 - 111/84)  BP(mean): 93 (03 Dec 2024 08:20) (93 - 93)  RR: 18 (03 Dec 2024 08:20) (18 - 19)  SpO2: 95% (03 Dec 2024 08:20) (93% - 99%)    Parameters below as of 03 Dec 2024 08:20  Patient On (Oxygen Delivery Method): room air        LABS:    CBC Full  -  ( 03 Dec 2024 05:15 )  WBC Count : 8.20 K/uL  RBC Count : 5.29 M/uL  Hemoglobin : 14.4 g/dL  Hematocrit : 43.4 %  Platelet Count - Automated : 341 K/uL  Mean Cell Volume : 82.0 fl  Mean Cell Hemoglobin : 27.2 pg  Mean Cell Hemoglobin Concentration : 33.2 g/dL  Auto Neutrophil # : x  Auto Lymphocyte # : x  Auto Monocyte # : x  Auto Eosinophil # : x  Auto Basophil # : x  Auto Neutrophil % : x  Auto Lymphocyte % : x  Auto Monocyte % : x  Auto Eosinophil % : x  Auto Basophil % : x      12-03    141  |  105  |  25[H]  ----------------------------<  94  4.3   |  29  |  1.27    Ca    8.9      03 Dec 2024 05:15  Phos  2.5     12-02  Mg     2.2     12-02      CAPILLARY BLOOD GLUCOSE      POCT Blood Glucose.: 95 mg/dL (03 Dec 2024 08:02)  POCT Blood Glucose.: 101 mg/dL (02 Dec 2024 21:08)  POCT Blood Glucose.: 164 mg/dL (02 Dec 2024 17:16)  POCT Blood Glucose.: 146 mg/dL (02 Dec 2024 11:23)          Creatinine Trend: 1.27<--, 1.22<--, 1.21<--, 1.20<--, 1.23<--, 1.24<--  I&O's Summary    02 Dec 2024 07:01  -  03 Dec 2024 07:00  --------------------------------------------------------  IN: 0 mL / OUT: 400 mL / NET: -400 mL            .Blood BLOOD  11- @ 15:35   No growth at 5 days  --  --      .Blood BLOOD  11- @ 15:10   No growth at 5 days  --  --          MEDICATIONS:    MEDICATIONS  (STANDING):  albuterol/ipratropium for Nebulization 3 milliLiter(s) Nebulizer every 6 hours  aspirin enteric coated 81 milliGRAM(s) Oral daily  atorvastatin 80 milliGRAM(s) Oral at bedtime  benzonatate 100 milliGRAM(s) Oral three times a day  clopidogrel Tablet 75 milliGRAM(s) Oral daily  enoxaparin Injectable 40 milliGRAM(s) SubCutaneous every 24 hours  fluticasone propionate 50 MICROgram(s)/spray Nasal Spray 2 Spray(s) Both Nostrils two times a day  furosemide    Tablet 40 milliGRAM(s) Oral daily  guaiFENesin  milliGRAM(s) Oral every 12 hours  insulin glargine Injectable (LANTUS) 5 Unit(s) SubCutaneous at bedtime  insulin lispro (ADMELOG) corrective regimen sliding scale   SubCutaneous three times a day before meals  insulin lispro (ADMELOG) corrective regimen sliding scale   SubCutaneous at bedtime  insulin lispro Injectable (ADMELOG) 2 Unit(s) SubCutaneous three times a day before meals  metoprolol tartrate 12.5 milliGRAM(s) Oral daily  multivitamin 1 Tablet(s) Oral daily  polyethylene glycol 3350 17 Gram(s) Oral daily  senna 2 Tablet(s) Oral at bedtime  tamsulosin 0.4 milliGRAM(s) Oral at bedtime  valsartan 40 milliGRAM(s) Oral daily      MEDICATIONS  (PRN):  acetaminophen     Tablet .. 650 milliGRAM(s) Oral every 6 hours PRN Temp greater or equal to 38C (100.4F), Mild Pain (1 - 3)  bisacodyl 5 milliGRAM(s) Oral every 12 hours PRN Constipation  bisacodyl Suppository 10 milliGRAM(s) Rectal daily PRN Constipation      REVIEW OF SYSTEMS:                           ALL ROS DONE [ X   ]    CONSTITUTIONAL:  LETHARGIC [   ], FEVER [   ], UNRESPONSIVE [   ]  CVS:  CP  [   ], SOB, [   ], PALPITATIONS [   ], DIZZYNESS [   ]  RS: COUGH [   ], SPUTUM [   ]  GI: ABDOMINAL PAIN [   ], NAUSEA [   ], VOMITINGS [   ], DIARRHEA [   ], CONSTIPATION [   ]  :  DYSURIA [   ], NOCTURIA [   ], INCREASED FREQUENCY [   ], DRIBLING [   ],  SKELETAL: PAINFUL JOINTS [   ], SWOLLEN JOINTS [   ], NECK ACHE [   ], LOW BACK ACHE [   ],  SKIN : ULCERS [   ], RASH [   ], ITCHING [   ]  CNS: HEAD ACHE [   ], DOUBLE VISION [   ], BLURRED VISION [   ], AMS / CONFUSION [   ], SEIZURES [   ], WEAKNESS [   ],TINGLING / NUMBNESS [   ]      PHYSICAL EXAMINATION:  GENERAL APPEARANCE: NO DISTRESS  HEENT:  NO PALLOR, NO  JVD,  NO   NODES, NECK SUPPLE  CVS: S1 +, S2 +,   RS: AEEB,  OCCASIONAL  RALES +,   RONCHI +  ABD: SOFT, NT, NO, BS +  EXT: PE +   SKIN: WARM,   SKELETAL:  ROM ACCEPTABLE  CNS:  AAO X 3        RADIOLOGY :  RADIOLOGY AND READINGS REVIEWED        ASSESSMENT :     Shortness of breath    HTN (hypertension)    Hearing decreased    CVA (cerebral vascular accident)    Hyperlipemia    Kidney stones    Coronary artery disease    CHF (congestive heart failure)    Type 2 diabetes mellitus    Confusion    S/P medial meniscal repair    H/O lithotripsy    S/P tonsillectomy    Bilateral inguinal hernia        PLAN:  HPI:  74y M with PMHx of CVA, MI, HTN, T2DM, HLD, CAD s/p cardiac stents, HFrEF (EF 20-25% 10/2024), hx of malignant melanoma/wide excision, with recent elective bioprosthetic aortic valve replacement and ascending aortic aneurysm repair with transverse hemiarch and CABG x2 (24) with post op course complicated by cardiogenic shock requiring inotropic support with IV dobutamine and milrinone, IABP and also on amiodarone for atrial fibrillation prophylaxis presenting with difficulty breathing for the past 4 days preceded by cough for the past 4 to 5 weeks. Patient was recently admitted to [facility] 10/5-10/8 for pneumonia. Patient reports completion of full course of antibiotics prescribed on discharge. Patient states that he developed cough around a month ago that has been persistent and now causing abdominal muscle pain. Patient developed shortness of breath 4 days ago that got progressively worse prompting him to come to the ED today. Since treatment in ED, patient reports improvement of dyspnea but cough is still present but less frequent. Endorses constipation with no BM for last 3 days. Denies fever, chills, headache, dizziness, chest pain, palpitations, nausea, vomiting, diarrhea, and dysuria.   (2024 18:29)    # CASE D/W PATIENT AT BEDSIDE. CASE REVIEWED AT LENGTH, ALL QUESTIONS ANSWERED. DISCUSSED REGARDING GOC - PATIENT CONFIRMS GOC OF DNR/DNI/NO PEG.     # [] CASE D/W WIFE AT BEDSIDE, ALL QUESTIONS ANSWERED    # [] CASE DISCUSSED AT LENGTH WITH PATIENT, WIFE, DAUGHTER, SON, SON-IN-LAW - ALL QUESTIONS ANSWERED. RECOMMENDATIONS AS MADE BY MULTIDISCIPLINARY TEAM REVIEWED. DISCUSSED THAT PROGNOSIS IS GUARDED/POOR. PATIENT AND FAMILY VERBALIZED UNDERSTANDING. IN DISCUSSION REGARDING GOC - PATIENT CONVEYS THAT HE HAS PREVIOUSLY WISHED TO BE DNR/DNI - BUT FAMILY AND PATIENT PLAN TO DISCUSS AND WILL RECONVENE WITH TEAM TO CONFIRM GOC    # ACUTE HYPOXIC RESPIRATORY FAILURE MULTIFACTORIAL - LIKELY S/T PNA + DECOMPENSATED HF  # HX OF HFrEF [20-25%, 10/24], HX OF RECENT ELECTIVE BIOPROSTHETIC AORTIC VALVE REPLACEMENT, ASCENDING AORTIC ANEURYSM REPAIR W/ TRANSVERSE HEMIARCH AND CABG X 2  [2024] - C/B POST-OP CARDIOGENIC SHOCK REQUIRING IONOTROPIC SUPPORT, IABP   # HX OF CAD S/P STENT  - TELEMETRY  - CEFEPIME + AZITHROMYCIN, F/U BCX  - ANTITUSSIVE  - CHEST PT  - IV LASIX [ DOWNTITRATED - DUE TO HYPOTENSION]  - NOTED CT CHEST  - D/C AMIODARONE  - HOLD ENTRESTO, GIVEN LOW-NORMAL BP  - PER CARDIOLOGY START VALSARTAN  - CARDIOLOGY CONSULT  - ID CONSULT  - PULMONOLOGY CONSULT    - COUNSELLED PATIENT AND FAMILY ON CLOSE OUTPATIENT F/U WITH MULTIDISCIPLINARY TEAM. COUNSELLED ON S & S TO MONITOR. ALSO COUNSELLED TO MONITOR DAILY WEIGHTS AND BP    # ? POST-OP A.FIB PPX - ON AMIODARONE - RECOMMENDED D/C PER CARDIOLOGY    # DM  # HBA1C - 10.2  - LANTUS  - SSI + FS  - ENDOCRINOLOGY CONSULT    # BPH  - FLOMAX    # HX OF CVA  # HX OF MI  # HTN  # HLD  # HX OF MALIGNANT MELANOMA/WIDE EXCISION  # GI PPX    Patient is a 74y old  Male who presents with a chief complaint of acute CHF/sepsis 2/2 PNA (03 Dec 2024 09:56)    PATIENT IS SEEN AND EXAMINED IN MEDICAL FLOOR.      ALLERGIES:  No Known Allergies      Daily     Daily Weight in k (03 Dec 2024 04:56)    VITALS:    Vital Signs Last 24 Hrs  T(C): 36.9 (03 Dec 2024 08:20), Max: 36.9 (03 Dec 2024 08:20)  T(F): 98.4 (03 Dec 2024 08:20), Max: 98.4 (03 Dec 2024 08:20)  HR: 97 (03 Dec 2024 08:20) (93 - 100)  BP: 111/84 (03 Dec 2024 08:20) (97/66 - 111/84)  BP(mean): 93 (03 Dec 2024 08:20) (93 - 93)  RR: 18 (03 Dec 2024 08:20) (18 - 19)  SpO2: 95% (03 Dec 2024 08:20) (93% - 99%)    Parameters below as of 03 Dec 2024 08:20  Patient On (Oxygen Delivery Method): room air        LABS:    CBC Full  -  ( 03 Dec 2024 05:15 )  WBC Count : 8.20 K/uL  RBC Count : 5.29 M/uL  Hemoglobin : 14.4 g/dL  Hematocrit : 43.4 %  Platelet Count - Automated : 341 K/uL  Mean Cell Volume : 82.0 fl  Mean Cell Hemoglobin : 27.2 pg  Mean Cell Hemoglobin Concentration : 33.2 g/dL  Auto Neutrophil # : x  Auto Lymphocyte # : x  Auto Monocyte # : x  Auto Eosinophil # : x  Auto Basophil # : x  Auto Neutrophil % : x  Auto Lymphocyte % : x  Auto Monocyte % : x  Auto Eosinophil % : x  Auto Basophil % : x      12-03    141  |  105  |  25[H]  ----------------------------<  94  4.3   |  29  |  1.27    Ca    8.9      03 Dec 2024 05:15  Phos  2.5     12-02  Mg     2.2     12-02      CAPILLARY BLOOD GLUCOSE      POCT Blood Glucose.: 95 mg/dL (03 Dec 2024 08:02)  POCT Blood Glucose.: 101 mg/dL (02 Dec 2024 21:08)  POCT Blood Glucose.: 164 mg/dL (02 Dec 2024 17:16)  POCT Blood Glucose.: 146 mg/dL (02 Dec 2024 11:23)          Creatinine Trend: 1.27<--, 1.22<--, 1.21<--, 1.20<--, 1.23<--, 1.24<--  I&O's Summary    02 Dec 2024 07:01  -  03 Dec 2024 07:00  --------------------------------------------------------  IN: 0 mL / OUT: 400 mL / NET: -400 mL            .Blood BLOOD   @ 15:35   No growth at 5 days  --  --      .Blood BLOOD  - @ 15:10   No growth at 5 days  --  --          MEDICATIONS:    MEDICATIONS  (STANDING):  albuterol/ipratropium for Nebulization 3 milliLiter(s) Nebulizer every 6 hours  aspirin enteric coated 81 milliGRAM(s) Oral daily  atorvastatin 80 milliGRAM(s) Oral at bedtime  benzonatate 100 milliGRAM(s) Oral three times a day  clopidogrel Tablet 75 milliGRAM(s) Oral daily  enoxaparin Injectable 40 milliGRAM(s) SubCutaneous every 24 hours  fluticasone propionate 50 MICROgram(s)/spray Nasal Spray 2 Spray(s) Both Nostrils two times a day  furosemide    Tablet 40 milliGRAM(s) Oral daily  guaiFENesin  milliGRAM(s) Oral every 12 hours  insulin glargine Injectable (LANTUS) 5 Unit(s) SubCutaneous at bedtime  insulin lispro (ADMELOG) corrective regimen sliding scale   SubCutaneous three times a day before meals  insulin lispro (ADMELOG) corrective regimen sliding scale   SubCutaneous at bedtime  insulin lispro Injectable (ADMELOG) 2 Unit(s) SubCutaneous three times a day before meals  metoprolol tartrate 12.5 milliGRAM(s) Oral daily  multivitamin 1 Tablet(s) Oral daily  polyethylene glycol 3350 17 Gram(s) Oral daily  senna 2 Tablet(s) Oral at bedtime  tamsulosin 0.4 milliGRAM(s) Oral at bedtime  valsartan 40 milliGRAM(s) Oral daily      MEDICATIONS  (PRN):  acetaminophen     Tablet .. 650 milliGRAM(s) Oral every 6 hours PRN Temp greater or equal to 38C (100.4F), Mild Pain (1 - 3)  bisacodyl 5 milliGRAM(s) Oral every 12 hours PRN Constipation  bisacodyl Suppository 10 milliGRAM(s) Rectal daily PRN Constipation      REVIEW OF SYSTEMS:                           ALL ROS DONE [ X   ]    CONSTITUTIONAL:  LETHARGIC [   ], FEVER [   ], UNRESPONSIVE [   ]  CVS:  CP  [   ], SOB, [   ], PALPITATIONS [   ], DIZZYNESS [   ]  RS: COUGH [   ], SPUTUM [   ]  GI: ABDOMINAL PAIN [   ], NAUSEA [   ], VOMITINGS [   ], DIARRHEA [   ], CONSTIPATION [   ]  :  DYSURIA [   ], NOCTURIA [   ], INCREASED FREQUENCY [   ], DRIBLING [   ],  SKELETAL: PAINFUL JOINTS [   ], SWOLLEN JOINTS [   ], NECK ACHE [   ], LOW BACK ACHE [   ],  SKIN : ULCERS [   ], RASH [   ], ITCHING [   ]  CNS: HEAD ACHE [   ], DOUBLE VISION [   ], BLURRED VISION [   ], AMS / CONFUSION [   ], SEIZURES [   ], WEAKNESS [   ],TINGLING / NUMBNESS [   ]      PHYSICAL EXAMINATION:  GENERAL APPEARANCE: NO DISTRESS  HEENT:  NO PALLOR, NO  JVD,  NO   NODES, NECK SUPPLE  CVS: S1 +, S2 +,   RS: AEEB,  OCCASIONAL  RALES +,   RONCHI +  ABD: SOFT, NT, NO, BS +  EXT: PE +   SKIN: WARM,   SKELETAL:  ROM ACCEPTABLE  CNS:  AAO X 3        RADIOLOGY :  RADIOLOGY AND READINGS REVIEWED        ASSESSMENT :     Shortness of breath    HTN (hypertension)    Hearing decreased    CVA (cerebral vascular accident)    Hyperlipemia    Kidney stones    Coronary artery disease    CHF (congestive heart failure)    Type 2 diabetes mellitus    Confusion    S/P medial meniscal repair    H/O lithotripsy    S/P tonsillectomy    Bilateral inguinal hernia        PLAN:  HPI:  74y M with PMHx of CVA, MI, HTN, T2DM, HLD, CAD s/p cardiac stents, HFrEF (EF 20-25% 10/2024), hx of malignant melanoma/wide excision, with recent elective bioprosthetic aortic valve replacement and ascending aortic aneurysm repair with transverse hemiarch and CABG x2 (24) with post op course complicated by cardiogenic shock requiring inotropic support with IV dobutamine and milrinone, IABP and also on amiodarone for atrial fibrillation prophylaxis presenting with difficulty breathing for the past 4 days preceded by cough for the past 4 to 5 weeks. Patient was recently admitted to [facility] 10/5-10/8 for pneumonia. Patient reports completion of full course of antibiotics prescribed on discharge. Patient states that he developed cough around a month ago that has been persistent and now causing abdominal muscle pain. Patient developed shortness of breath 4 days ago that got progressively worse prompting him to come to the ED today. Since treatment in ED, patient reports improvement of dyspnea but cough is still present but less frequent. Endorses constipation with no BM for last 3 days. Denies fever, chills, headache, dizziness, chest pain, palpitations, nausea, vomiting, diarrhea, and dysuria.   (2024 18:29)    # CASE D/W PATIENT AT BEDSIDE. CASE REVIEWED AT LENGTH, ALL QUESTIONS ANSWERED. DISCUSSED REGARDING GOC - PATIENT CONFIRMS GOC OF DNR/DNI/NO PEG.     # [] CASE D/W WIFE AT BEDSIDE, ALL QUESTIONS ANSWERED    # [] CASE DISCUSSED AT LENGTH WITH PATIENT, WIFE, DAUGHTER, SON, SON-IN-LAW - ALL QUESTIONS ANSWERED. RECOMMENDATIONS AS MADE BY MULTIDISCIPLINARY TEAM REVIEWED. DISCUSSED THAT PROGNOSIS IS GUARDED/POOR. PATIENT AND FAMILY VERBALIZED UNDERSTANDING. IN DISCUSSION REGARDING GOC - PATIENT CONVEYS THAT HE HAS PREVIOUSLY WISHED TO BE DNR/DNI - BUT FAMILY AND PATIENT PLAN TO DISCUSS AND WILL RECONVENE WITH TEAM TO CONFIRM GOC    # ACUTE HYPOXIC RESPIRATORY FAILURE MULTIFACTORIAL - LIKELY S/T PNA + DECOMPENSATED HF  # HX OF HFrEF [20-25%, 10/24], HX OF RECENT ELECTIVE BIOPROSTHETIC AORTIC VALVE REPLACEMENT, ASCENDING AORTIC ANEURYSM REPAIR W/ TRANSVERSE HEMIARCH AND CABG X 2  [2024] - C/B POST-OP CARDIOGENIC SHOCK REQUIRING IONOTROPIC SUPPORT, IABP   # HX OF CAD S/P STENT  - TELEMETRY  - CEFEPIME + AZITHROMYCIN, F/U BCX  - ANTITUSSIVE  - CHEST PT  - IV LASIX [ DOWNTITRATED - DUE TO HYPOTENSION], SWITCHED TO PO LASIX  - NOTED CT CHEST  - D/C AMIODARONE  - HOLD ENTRESTO, GIVEN LOW-NORMAL BP  - PER CARDIOLOGY START VALSARTAN  - STARTED ON LOW-DOSE BB  - CARDIOLOGY CONSULT  - ID CONSULT  - PULMONOLOGY CONSULT    - COUNSELLED PATIENT AND FAMILY ON CLOSE OUTPATIENT F/U WITH MULTIDISCIPLINARY TEAM. COUNSELLED ON S & S TO MONITOR. ALSO COUNSELLED TO MONITOR DAILY WEIGHTS AND BP    # ? POST-OP A.FIB PPX - ON AMIODARONE - RECOMMENDED D/C PER CARDIOLOGY    # DM  # HBA1C - 10.2  - LANTUS  - SSI + FS  - ENDOCRINOLOGY CONSULT    - DIABETIC EDUCATION PROVIDED    # BPH  - FLOMAX    # HX OF CVA  # HX OF MI  # HTN  # HLD  # HX OF MALIGNANT MELANOMA/WIDE EXCISION  # GI PPX

## 2024-12-03 NOTE — PROGRESS NOTE ADULT - PROBLEM SELECTOR PLAN 2
p/w sob and  3+ pitting LE edema x 4 days  -CXR shows Significant bibasilar infiltrates improved on the right but increased on the left.  -BNP 57763 -- (8193 on 10/8)  -takes metoprolol, entresto, farxiga, lasix and amiodarone for Afib ppx  -TTE 10/8:  EF 20-25%  -hold  metoprolol per card  -hold entresto in s/o sepsis  -s/p  IV lasix 40mg IV BID---> Decreased 20mg BID in the setting BP soft.   plan to transition to PO lasix qd in am   -Troponin 27.7-->24.6  -c/w Telemetry   -daily weights, strict I&Os  -Cardio Consulted: Dr. Ward  -Out pt f/u with Dr. Berna Ramon  -leg edema with tightness, DVT negative on US b/l LEs -   Presented with SOB and  3+ pitting LE edema x 4 days  -   Chest x-ray shows Significant bibasilar infiltrates improved on the right but increased on the left.  -   BNP 15368 -- (8193 on 10/8)  -   Takes metoprolol, entresto, farxiga, lasix and amiodarone for Afib   -   TTE 10/8:  EF 20-25%  -   Metoprolol restarted today  -   Entresto to be discontinued  -   Lasix 40mg daily,  continue on discharge  -   Continue to monitor on telemetry  -   Daily weight and strict I &Os  -   Cardiology:  Consulted: Dr. Ward  -   Outpatient follow up with Dr. Berna Ramon  -   leg edema with tightness, DVT negative on US

## 2024-12-03 NOTE — PROGRESS NOTE ADULT - SUBJECTIVE AND OBJECTIVE BOX
Subjective:  Chart Notes, Work list Manager, and fingersticks reviewed. Reports feeling better    Review of Systems:  Constitutional: No fever  Cardiovascular: No chest pain, palpitations  Respiratory: No SOB, no cough  GI: No nausea, vomiting, abdominal pain    Medications  (Standing):  albuterol/ipratropium for Nebulization 3 milliLiter(s) Nebulizer every 6 hours  aspirin enteric coated 81 milliGRAM(s) Oral daily  atorvastatin 80 milliGRAM(s) Oral at bedtime  benzonatate 100 milliGRAM(s) Oral three times a day  clopidogrel Tablet 75 milliGRAM(s) Oral daily  enoxaparin Injectable 40 milliGRAM(s) SubCutaneous every 24 hours  fluticasone propionate 50 MICROgram(s)/spray Nasal Spray 2 Spray(s) Both Nostrils two times a day  furosemide    Tablet 40 milliGRAM(s) Oral daily  guaiFENesin  milliGRAM(s) Oral every 12 hours  insulin glargine Injectable (LANTUS) 5 Unit(s) SubCutaneous at bedtime  insulin lispro (ADMELOG) corrective regimen sliding scale   SubCutaneous three times a day before meals  insulin lispro (ADMELOG) corrective regimen sliding scale   SubCutaneous at bedtime  insulin lispro Injectable (ADMELOG) 2 Unit(s) SubCutaneous three times a day before meals  metoprolol succinate ER 12.5 milliGRAM(s) Oral daily  multivitamin 1 Tablet(s) Oral daily  polyethylene glycol 3350 17 Gram(s) Oral daily  senna 2 Tablet(s) Oral at bedtime  tamsulosin 0.4 milliGRAM(s) Oral at bedtime  valsartan 40 milliGRAM(s) Oral daily    Medications (PRN):  acetaminophen     Tablet .. 650 milliGRAM(s) Oral every 6 hours PRN Temp greater or equal to 38C (100.4F), Mild Pain (1 - 3)  bisacodyl 5 milliGRAM(s) Oral every 12 hours PRN Constipation  bisacodyl Suppository 10 milliGRAM(s) Rectal daily PRN Constipation    Physical examination:  VITALS: T(C): 36.4 (12-03-24 @ 11:10)  T(F): 97.5 (12-03-24 @ 11:10), Max: 98.4 (12-03-24 @ 08:20)  HR: 92 (12-03-24 @ 11:10) (92 - 100)  BP: 98/73 (12-03-24 @ 11:10) (97/66 - 111/84)  RR:  (18 - 18)  SpO2:  (93% - 99%)    GENERAL: NAD,   HEENT: Dry mucous membranes  GI: Soft, non distended, +ve abdominal obesity  PSYCH: Alert and oriented x 3    Labs:  Capillary Blood sugars:  POCT Blood Glucose.: 107 mg/dL (03 Dec 2024 11:26)  POCT Blood Glucose.: 95 mg/dL (03 Dec 2024 08:02)  POCT Blood Glucose.: 101 mg/dL (02 Dec 2024 21:08)  POCT Blood Glucose.: 164 mg/dL (02 Dec 2024 17:16)    12-03  141  |  105  |  25[H]  ----------------------------<  94  4.3   |  29  |  1.27  eGFR: 59[L]  Ca    8.9      12-03  Mg     2.2     12-02  Phos  2.5     12-02  A1C with Estimated Average Glucose Result: 10.2 % (11.27.24 @ 05:50)      Assessment and Plan:  74y Male 74y M with PMHx of CVA, MI, HTN, T2DM, HLD, CAD s/p cardiac stents, HFrEF (EF 20-25% 10/2024), hx of malignant melanoma/wide excision, with recent elective bioprosthetic aortic valve replacement and ascending aortic aneurysm repair with transverse hemiarch and CABG x2 (4/8/24) with post op course complicated by cardiogenic shock requiring inotropic support with IV dobutamine and milrinone, IABP and also on amiodarone for atrial fibrillation prophylaxis presenting with difficulty breathing for the past 4 days.   Endocrinology is following for glycemic management    1) Type 2 diabetes:  A1C is above goal.   Patient reports eating well  Inpatient BS are tightly controlled  Continue the same insulin doses    Inpatient Recommendations:  Basal Insulin:   Discontinue Glargine ( Lantus)  5 units once daily    Nutritional Insulin:  Continue 2 units of Lispro with meals. Hold off if NPO or eating <50% of meals    Correctional Insulin:  Continue Low sliding scale with meals and bedtime    Oral Diabetes Medications:    Discharge Recommendations:  As patient is receiving minimal doses of insulin in house and renal function is intact recommend not to dc on insulin     Discharge on Metformin 500 mg with dinner for one week , if feeling better then increase to metformin 1000 mg daily with dinner from following week and from the third week onwards, patient needs metformin 1000 mg BID      Please ensure patient has all  supplies including glucometer, test strips, lancets, alcohol pads, metformin on discharge             Subjective:  Chart Notes, Work list Manager, and fingersticks reviewed. Reports feeling better, eating well    Review of Systems:  Constitutional: No fever  Cardiovascular: No chest pain, palpitations  Respiratory: No SOB, no cough  GI: No nausea, vomiting, abdominal pain    Medications  (Standing):  albuterol/ipratropium for Nebulization 3 milliLiter(s) Nebulizer every 6 hours  aspirin enteric coated 81 milliGRAM(s) Oral daily  atorvastatin 80 milliGRAM(s) Oral at bedtime  benzonatate 100 milliGRAM(s) Oral three times a day  clopidogrel Tablet 75 milliGRAM(s) Oral daily  enoxaparin Injectable 40 milliGRAM(s) SubCutaneous every 24 hours  fluticasone propionate 50 MICROgram(s)/spray Nasal Spray 2 Spray(s) Both Nostrils two times a day  furosemide    Tablet 40 milliGRAM(s) Oral daily  guaiFENesin  milliGRAM(s) Oral every 12 hours  insulin glargine Injectable (LANTUS) 5 Unit(s) SubCutaneous at bedtime  insulin lispro (ADMELOG) corrective regimen sliding scale   SubCutaneous three times a day before meals  insulin lispro (ADMELOG) corrective regimen sliding scale   SubCutaneous at bedtime  insulin lispro Injectable (ADMELOG) 2 Unit(s) SubCutaneous three times a day before meals  metoprolol succinate ER 12.5 milliGRAM(s) Oral daily  multivitamin 1 Tablet(s) Oral daily  polyethylene glycol 3350 17 Gram(s) Oral daily  senna 2 Tablet(s) Oral at bedtime  tamsulosin 0.4 milliGRAM(s) Oral at bedtime  valsartan 40 milliGRAM(s) Oral daily    Medications (PRN):  acetaminophen     Tablet .. 650 milliGRAM(s) Oral every 6 hours PRN Temp greater or equal to 38C (100.4F), Mild Pain (1 - 3)  bisacodyl 5 milliGRAM(s) Oral every 12 hours PRN Constipation  bisacodyl Suppository 10 milliGRAM(s) Rectal daily PRN Constipation    Physical examination:  VITALS: T(C): 36.4 (12-03-24 @ 11:10)  T(F): 97.5 (12-03-24 @ 11:10), Max: 98.4 (12-03-24 @ 08:20)  HR: 92 (12-03-24 @ 11:10) (92 - 100)  BP: 98/73 (12-03-24 @ 11:10) (97/66 - 111/84)  RR:  (18 - 18)  SpO2:  (93% - 99%)    GENERAL: NAD,   HEENT: Dry mucous membranes  GI: Soft, non distended, +ve abdominal obesity  PSYCH: Alert and oriented x 3    Labs:  Capillary Blood sugars:  POCT Blood Glucose.: 107 mg/dL (03 Dec 2024 11:26)  POCT Blood Glucose.: 95 mg/dL (03 Dec 2024 08:02)  POCT Blood Glucose.: 101 mg/dL (02 Dec 2024 21:08)  POCT Blood Glucose.: 164 mg/dL (02 Dec 2024 17:16)    12-03  141  |  105  |  25[H]  ----------------------------<  94  4.3   |  29  |  1.27  eGFR: 59[L]  Ca    8.9      12-03  Mg     2.2     12-02  Phos  2.5     12-02  A1C with Estimated Average Glucose Result: 10.2 % (11.27.24 @ 05:50)      Assessment and Plan:  74y Male 74y M with PMHx of CVA, MI, HTN, T2DM, HLD, CAD s/p cardiac stents, HFrEF (EF 20-25% 10/2024), hx of malignant melanoma/wide excision, with recent elective bioprosthetic aortic valve replacement and ascending aortic aneurysm repair with transverse hemiarch and CABG x2 (4/8/24) with post op course complicated by cardiogenic shock requiring inotropic support with IV dobutamine and milrinone, IABP and also on amiodarone for atrial fibrillation prophylaxis presenting with difficulty breathing for the past 4 days.   Endocrinology is following for glycemic management    1) Type 2 diabetes:  A1C is above goal.   Patient reports eating well  Inpatient BS are tightly controlled  Continue the same insulin doses    Inpatient Recommendations:  Basal Insulin:   Discontinue Glargine ( Lantus)  5 units once daily    Nutritional Insulin:  Continue 2 units of Lispro with meals. Hold off if NPO or eating <50% of meals    Correctional Insulin:  Continue Low sliding scale with meals and bedtime    Oral Diabetes Medications:    Discharge Recommendations:  As patient is receiving minimal doses of insulin in house and renal function is intact recommend not to dc on insulin     Discharge on Metformin 500 mg with dinner for one week , if feeling better then increase to metformin 1000 mg daily with dinner from following week and from the third week onwards, patient needs metformin 1000 mg BID      Please ensure patient has all supplies including glucometer, test strips, lancets, alcohol pads, metformin on discharge

## 2024-12-04 LAB
ALBUMIN SERPL ELPH-MCNC: 2.5 G/DL — LOW (ref 3.5–5)
ALP SERPL-CCNC: 105 U/L — SIGNIFICANT CHANGE UP (ref 40–120)
ALT FLD-CCNC: 28 U/L DA — SIGNIFICANT CHANGE UP (ref 10–60)
ANION GAP SERPL CALC-SCNC: 6 MMOL/L — SIGNIFICANT CHANGE UP (ref 5–17)
AST SERPL-CCNC: 17 U/L — SIGNIFICANT CHANGE UP (ref 10–40)
BASOPHILS # BLD AUTO: 0.07 K/UL — SIGNIFICANT CHANGE UP (ref 0–0.2)
BASOPHILS NFR BLD AUTO: 1 % — SIGNIFICANT CHANGE UP (ref 0–2)
BILIRUB SERPL-MCNC: 0.5 MG/DL — SIGNIFICANT CHANGE UP (ref 0.2–1.2)
BUN SERPL-MCNC: 25 MG/DL — HIGH (ref 7–18)
CALCIUM SERPL-MCNC: 8.6 MG/DL — SIGNIFICANT CHANGE UP (ref 8.4–10.5)
CHLORIDE SERPL-SCNC: 106 MMOL/L — SIGNIFICANT CHANGE UP (ref 96–108)
CO2 SERPL-SCNC: 29 MMOL/L — SIGNIFICANT CHANGE UP (ref 22–31)
CREAT SERPL-MCNC: 1.23 MG/DL — SIGNIFICANT CHANGE UP (ref 0.5–1.3)
EGFR: 61 ML/MIN/1.73M2 — SIGNIFICANT CHANGE UP
EOSINOPHIL # BLD AUTO: 0.18 K/UL — SIGNIFICANT CHANGE UP (ref 0–0.5)
EOSINOPHIL NFR BLD AUTO: 2.6 % — SIGNIFICANT CHANGE UP (ref 0–6)
GLUCOSE BLDC GLUCOMTR-MCNC: 119 MG/DL — HIGH (ref 70–99)
GLUCOSE BLDC GLUCOMTR-MCNC: 121 MG/DL — HIGH (ref 70–99)
GLUCOSE BLDC GLUCOMTR-MCNC: 133 MG/DL — HIGH (ref 70–99)
GLUCOSE BLDC GLUCOMTR-MCNC: 90 MG/DL — SIGNIFICANT CHANGE UP (ref 70–99)
GLUCOSE SERPL-MCNC: 107 MG/DL — HIGH (ref 70–99)
HCT VFR BLD CALC: 42.1 % — SIGNIFICANT CHANGE UP (ref 39–50)
HGB BLD-MCNC: 13.8 G/DL — SIGNIFICANT CHANGE UP (ref 13–17)
IMM GRANULOCYTES NFR BLD AUTO: 0.3 % — SIGNIFICANT CHANGE UP (ref 0–0.9)
LYMPHOCYTES # BLD AUTO: 2.1 K/UL — SIGNIFICANT CHANGE UP (ref 1–3.3)
LYMPHOCYTES # BLD AUTO: 30 % — SIGNIFICANT CHANGE UP (ref 13–44)
MAGNESIUM SERPL-MCNC: 2.1 MG/DL — SIGNIFICANT CHANGE UP (ref 1.6–2.6)
MCHC RBC-ENTMCNC: 26.5 PG — LOW (ref 27–34)
MCHC RBC-ENTMCNC: 32.8 G/DL — SIGNIFICANT CHANGE UP (ref 32–36)
MCV RBC AUTO: 80.8 FL — SIGNIFICANT CHANGE UP (ref 80–100)
MONOCYTES # BLD AUTO: 0.73 K/UL — SIGNIFICANT CHANGE UP (ref 0–0.9)
MONOCYTES NFR BLD AUTO: 10.4 % — SIGNIFICANT CHANGE UP (ref 2–14)
NEUTROPHILS # BLD AUTO: 3.89 K/UL — SIGNIFICANT CHANGE UP (ref 1.8–7.4)
NEUTROPHILS NFR BLD AUTO: 55.7 % — SIGNIFICANT CHANGE UP (ref 43–77)
NRBC # BLD: 0 /100 WBCS — SIGNIFICANT CHANGE UP (ref 0–0)
PHOSPHATE SERPL-MCNC: 2.4 MG/DL — LOW (ref 2.5–4.5)
PLATELET # BLD AUTO: 341 K/UL — SIGNIFICANT CHANGE UP (ref 150–400)
POTASSIUM SERPL-MCNC: 3.5 MMOL/L — SIGNIFICANT CHANGE UP (ref 3.5–5.3)
POTASSIUM SERPL-SCNC: 3.5 MMOL/L — SIGNIFICANT CHANGE UP (ref 3.5–5.3)
PROT SERPL-MCNC: 6.3 G/DL — SIGNIFICANT CHANGE UP (ref 6–8.3)
RBC # BLD: 5.21 M/UL — SIGNIFICANT CHANGE UP (ref 4.2–5.8)
RBC # FLD: 14.6 % — HIGH (ref 10.3–14.5)
SODIUM SERPL-SCNC: 141 MMOL/L — SIGNIFICANT CHANGE UP (ref 135–145)
WBC # BLD: 6.99 K/UL — SIGNIFICANT CHANGE UP (ref 3.8–10.5)
WBC # FLD AUTO: 6.99 K/UL — SIGNIFICANT CHANGE UP (ref 3.8–10.5)

## 2024-12-04 RX ORDER — SODIUM,POTASSIUM PHOSPHATES 278-250MG
2 POWDER IN PACKET (EA) ORAL ONCE
Refills: 0 | Status: COMPLETED | OUTPATIENT
Start: 2024-12-04 | End: 2024-12-04

## 2024-12-04 RX ORDER — DIGOXIN 250 MCG
125 TABLET ORAL DAILY
Refills: 0 | Status: DISCONTINUED | OUTPATIENT
Start: 2024-12-04 | End: 2024-12-05

## 2024-12-04 RX ORDER — SODIUM CHLORIDE 9 MG/ML
250 INJECTION, SOLUTION INTRAMUSCULAR; INTRAVENOUS; SUBCUTANEOUS ONCE
Refills: 0 | Status: COMPLETED | OUTPATIENT
Start: 2024-12-04 | End: 2024-12-04

## 2024-12-04 RX ADMIN — METOPROLOL TARTRATE 12.5 MILLIGRAM(S): 100 TABLET, FILM COATED ORAL at 07:03

## 2024-12-04 RX ADMIN — IPRATROPIUM BROMIDE AND ALBUTEROL SULFATE 3 MILLILITER(S): 2.5; .5 SOLUTION RESPIRATORY (INHALATION) at 20:13

## 2024-12-04 RX ADMIN — ACETAMINOPHEN 500MG 650 MILLIGRAM(S): 500 TABLET, COATED ORAL at 08:00

## 2024-12-04 RX ADMIN — ENOXAPARIN SODIUM 40 MILLIGRAM(S): 30 INJECTION SUBCUTANEOUS at 22:00

## 2024-12-04 RX ADMIN — Medication 2 UNIT(S): at 17:11

## 2024-12-04 RX ADMIN — Medication 2 TABLET(S): at 14:15

## 2024-12-04 RX ADMIN — VALSARTAN 40 MILLIGRAM(S): 320 TABLET ORAL at 07:03

## 2024-12-04 RX ADMIN — TAMSULOSIN HYDROCHLORIDE 0.4 MILLIGRAM(S): 0.4 CAPSULE ORAL at 21:02

## 2024-12-04 RX ADMIN — BENZONATATE 100 MILLIGRAM(S): 100 CAPSULE ORAL at 21:01

## 2024-12-04 RX ADMIN — Medication 125 MICROGRAM(S): at 11:55

## 2024-12-04 RX ADMIN — Medication 600 MILLIGRAM(S): at 17:13

## 2024-12-04 RX ADMIN — ACETAMINOPHEN 500MG 650 MILLIGRAM(S): 500 TABLET, COATED ORAL at 07:06

## 2024-12-04 RX ADMIN — BENZONATATE 100 MILLIGRAM(S): 100 CAPSULE ORAL at 14:15

## 2024-12-04 RX ADMIN — FUROSEMIDE 40 MILLIGRAM(S): 40 TABLET ORAL at 07:03

## 2024-12-04 RX ADMIN — BENZONATATE 100 MILLIGRAM(S): 100 CAPSULE ORAL at 07:02

## 2024-12-04 RX ADMIN — Medication 1 TABLET(S): at 11:56

## 2024-12-04 RX ADMIN — CLOPIDOGREL 75 MILLIGRAM(S): 75 TABLET, FILM COATED ORAL at 11:55

## 2024-12-04 RX ADMIN — Medication 2 TABLET(S): at 21:01

## 2024-12-04 RX ADMIN — SODIUM CHLORIDE 500 MILLILITER(S): 9 INJECTION, SOLUTION INTRAMUSCULAR; INTRAVENOUS; SUBCUTANEOUS at 10:10

## 2024-12-04 RX ADMIN — Medication 81 MILLIGRAM(S): at 11:56

## 2024-12-04 RX ADMIN — Medication 2 UNIT(S): at 08:13

## 2024-12-04 RX ADMIN — Medication 80 MILLIGRAM(S): at 21:01

## 2024-12-04 NOTE — PROGRESS NOTE ADULT - SUBJECTIVE AND OBJECTIVE BOX
Patient is a 74y old  Male who presents with a chief complaint of acute CHF/sepsis 2/2 PNA (03 Dec 2024 13:33)    PATIENT IS SEEN AND EXAMINED IN MEDICAL FLOOR.  LEISA [    ]    JONAS [   ]      GT [   ]    ALLERGIES:  No Known Allergies      Daily     Daily     VITALS:    Vital Signs Last 24 Hrs  T(C): 36.4 (04 Dec 2024 08:05), Max: 36.6 (03 Dec 2024 23:37)  T(F): 97.5 (04 Dec 2024 08:05), Max: 97.8 (03 Dec 2024 23:37)  HR: 84 (04 Dec 2024 09:34) (76 - 92)  BP: 76/56 (04 Dec 2024 09:36) (71/47 - 100/68)  BP(mean): 83 (03 Dec 2024 11:10) (83 - 83)  RR: 17 (04 Dec 2024 09:34) (17 - 19)  SpO2: 96% (04 Dec 2024 09:34) (95% - 97%)    Parameters below as of 04 Dec 2024 09:34  Patient On (Oxygen Delivery Method): room air        LABS:    CBC Full  -  ( 03 Dec 2024 05:15 )  WBC Count : 8.20 K/uL  RBC Count : 5.29 M/uL  Hemoglobin : 14.4 g/dL  Hematocrit : 43.4 %  Platelet Count - Automated : 341 K/uL  Mean Cell Volume : 82.0 fl  Mean Cell Hemoglobin : 27.2 pg  Mean Cell Hemoglobin Concentration : 33.2 g/dL  Auto Neutrophil # : x  Auto Lymphocyte # : x  Auto Monocyte # : x  Auto Eosinophil # : x  Auto Basophil # : x  Auto Neutrophil % : x  Auto Lymphocyte % : x  Auto Monocyte % : x  Auto Eosinophil % : x  Auto Basophil % : x      12-03    141  |  105  |  25[H]  ----------------------------<  94  4.3   |  29  |  1.27    Ca    8.9      03 Dec 2024 05:15      CAPILLARY BLOOD GLUCOSE      POCT Blood Glucose.: 119 mg/dL (04 Dec 2024 07:40)  POCT Blood Glucose.: 131 mg/dL (03 Dec 2024 21:11)  POCT Blood Glucose.: 105 mg/dL (03 Dec 2024 16:53)  POCT Blood Glucose.: 107 mg/dL (03 Dec 2024 11:26)          Creatinine Trend: 1.27<--, 1.22<--, 1.21<--, 1.20<--, 1.23<--, 1.24<--  I&O's Summary    03 Dec 2024 07:01  -  04 Dec 2024 07:00  --------------------------------------------------------  IN: 200 mL / OUT: 0 mL / NET: 200 mL            .Blood BLOOD  11-26 @ 15:35   No growth at 5 days  --  --      .Blood BLOOD  11-26 @ 15:10   No growth at 5 days  --  --          MEDICATIONS:    MEDICATIONS  (STANDING):  albuterol/ipratropium for Nebulization 3 milliLiter(s) Nebulizer every 6 hours  aspirin enteric coated 81 milliGRAM(s) Oral daily  atorvastatin 80 milliGRAM(s) Oral at bedtime  benzonatate 100 milliGRAM(s) Oral three times a day  clopidogrel Tablet 75 milliGRAM(s) Oral daily  enoxaparin Injectable 40 milliGRAM(s) SubCutaneous every 24 hours  fluticasone propionate 50 MICROgram(s)/spray Nasal Spray 2 Spray(s) Both Nostrils two times a day  guaiFENesin  milliGRAM(s) Oral every 12 hours  insulin lispro (ADMELOG) corrective regimen sliding scale   SubCutaneous three times a day before meals  insulin lispro (ADMELOG) corrective regimen sliding scale   SubCutaneous at bedtime  insulin lispro Injectable (ADMELOG) 2 Unit(s) SubCutaneous three times a day before meals  multivitamin 1 Tablet(s) Oral daily  polyethylene glycol 3350 17 Gram(s) Oral daily  senna 2 Tablet(s) Oral at bedtime  sodium chloride 0.9% Bolus 250 milliLiter(s) IV Bolus once  tamsulosin 0.4 milliGRAM(s) Oral at bedtime  valsartan 40 milliGRAM(s) Oral daily      MEDICATIONS  (PRN):  acetaminophen     Tablet .. 650 milliGRAM(s) Oral every 6 hours PRN Temp greater or equal to 38C (100.4F), Mild Pain (1 - 3)  bisacodyl 5 milliGRAM(s) Oral every 12 hours PRN Constipation  bisacodyl Suppository 10 milliGRAM(s) Rectal daily PRN Constipation      REVIEW OF SYSTEMS:                           ALL ROS DONE [ X   ]    CONSTITUTIONAL:  LETHARGIC [   ], FEVER [   ], UNRESPONSIVE [   ]  CVS:  CP  [   ], SOB, [   ], PALPITATIONS [   ], DIZZYNESS [   ]  RS: COUGH [   ], SPUTUM [   ]  GI: ABDOMINAL PAIN [   ], NAUSEA [   ], VOMITINGS [   ], DIARRHEA [   ], CONSTIPATION [   ]  :  DYSURIA [   ], NOCTURIA [   ], INCREASED FREQUENCY [   ], DRIBLING [   ],  SKELETAL: PAINFUL JOINTS [   ], SWOLLEN JOINTS [   ], NECK ACHE [   ], LOW BACK ACHE [   ],  SKIN : ULCERS [   ], RASH [   ], ITCHING [   ]  CNS: HEAD ACHE [   ], DOUBLE VISION [   ], BLURRED VISION [   ], AMS / CONFUSION [   ], SEIZURES [   ], WEAKNESS [   ],TINGLING / NUMBNESS [   ]      PHYSICAL EXAMINATION:  GENERAL APPEARANCE: NO DISTRESS  HEENT:  NO PALLOR, NO  JVD,  NO   NODES, NECK SUPPLE  CVS: S1 +, S2 +,   RS: AEEB,  OCCASIONAL  RALES +,   RONCHI +  ABD: SOFT, NT, NO, BS +  EXT: PE +   SKIN: WARM,   SKELETAL:  ROM ACCEPTABLE  CNS:  AAO X 3        RADIOLOGY :  RADIOLOGY AND READINGS REVIEWED        ASSESSMENT :     Shortness of breath    HTN (hypertension)    Hearing decreased    CVA (cerebral vascular accident)    Hyperlipemia    Kidney stones    Coronary artery disease    CHF (congestive heart failure)    Type 2 diabetes mellitus    Confusion    S/P medial meniscal repair    H/O lithotripsy    S/P tonsillectomy    Bilateral inguinal hernia        PLAN:  HPI:  74y M with PMHx of CVA, MI, HTN, T2DM, HLD, CAD s/p cardiac stents, HFrEF (EF 20-25% 10/2024), hx of malignant melanoma/wide excision, with recent elective bioprosthetic aortic valve replacement and ascending aortic aneurysm repair with transverse hemiarch and CABG x2 (4/8/24) with post op course complicated by cardiogenic shock requiring inotropic support with IV dobutamine and milrinone, IABP and also on amiodarone for atrial fibrillation prophylaxis presenting with difficulty breathing for the past 4 days preceded by cough for the past 4 to 5 weeks. Patient was recently admitted to [facility] 10/5-10/8 for pneumonia. Patient reports completion of full course of antibiotics prescribed on discharge. Patient states that he developed cough around a month ago that has been persistent and now causing abdominal muscle pain. Patient developed shortness of breath 4 days ago that got progressively worse prompting him to come to the ED today. Since treatment in ED, patient reports improvement of dyspnea but cough is still present but less frequent. Endorses constipation with no BM for last 3 days. Denies fever, chills, headache, dizziness, chest pain, palpitations, nausea, vomiting, diarrhea, and dysuria.   (26 Nov 2024 18:29)    # CASE D/W PATIENT AT BEDSIDE. CASE REVIEWED AT LENGTH, ALL QUESTIONS ANSWERED. DISCUSSED REGARDING GOC - PATIENT CONFIRMS GOC OF DNR/DNI/NO PEG.     # [12/2] CASE D/W WIFE AT BEDSIDE, ALL QUESTIONS ANSWERED    # [11/30] CASE DISCUSSED AT LENGTH WITH PATIENT, WIFE, DAUGHTER, SON, SON-IN-LAW - ALL QUESTIONS ANSWERED. RECOMMENDATIONS AS MADE BY MULTIDISCIPLINARY TEAM REVIEWED. DISCUSSED THAT PROGNOSIS IS GUARDED/POOR. PATIENT AND FAMILY VERBALIZED UNDERSTANDING. IN DISCUSSION REGARDING GOC - PATIENT CONVEYS THAT HE HAS PREVIOUSLY WISHED TO BE DNR/DNI - BUT FAMILY AND PATIENT PLAN TO DISCUSS AND WILL RECONVENE WITH TEAM TO CONFIRM GOC    # ACUTE HYPOXIC RESPIRATORY FAILURE MULTIFACTORIAL - LIKELY S/T PNA + DECOMPENSATED HF  # HX OF HFrEF [20-25%, 10/24], HX OF RECENT ELECTIVE BIOPROSTHETIC AORTIC VALVE REPLACEMENT, ASCENDING AORTIC ANEURYSM REPAIR W/ TRANSVERSE HEMIARCH AND CABG X 2  [4/2024] - C/B POST-OP CARDIOGENIC SHOCK REQUIRING IONOTROPIC SUPPORT, IABP   # HX OF CAD S/P STENT  - TELEMETRY  - CEFEPIME + AZITHROMYCIN, F/U BCX  - ANTITUSSIVE  - CHEST PT  - IV LASIX [ DOWNTITRATED - DUE TO HYPOTENSION], SWITCHED TO PO LASIX  --- HOLD  - NOTED CT CHEST  - D/C AMIODARONE  - HOLD ENTRESTO, GIVEN LOW-NORMAL BP  - PER CARDIOLOGY START VALSARTAN  - STARTED ON LOW-DOSE BB  --- HOLD  - CARDIOLOGY CONSULT  - ID CONSULT  - PULMONOLOGY CONSULT    - COUNSELLED PATIENT AND FAMILY ON CLOSE OUTPATIENT F/U WITH MULTIDISCIPLINARY TEAM. COUNSELLED ON S & S TO MONITOR. ALSO COUNSELLED TO MONITOR DAILY WEIGHTS AND BP    # ? POST-OP A.FIB PPX - ON AMIODARONE - RECOMMENDED D/C PER CARDIOLOGY    # DM  # HBA1C - 10.2  - LANTUS  - SSI + FS  - ENDOCRINOLOGY CONSULT    - DIABETIC EDUCATION PROVIDED    # BPH  - FLOMAX    # HX OF CVA  # HX OF MI  # HTN  # HLD  # HX OF MALIGNANT MELANOMA/WIDE EXCISION  # GI PPX    Patient is a 74y old  Male who presents with a chief complaint of acute CHF/sepsis 2/2 PNA (03 Dec 2024 13:33)    PATIENT IS SEEN AND EXAMINED IN MEDICAL FLOOR.      ALLERGIES:  No Known Allergies      VITALS:    Vital Signs Last 24 Hrs  T(C): 36.4 (04 Dec 2024 08:05), Max: 36.6 (03 Dec 2024 23:37)  T(F): 97.5 (04 Dec 2024 08:05), Max: 97.8 (03 Dec 2024 23:37)  HR: 84 (04 Dec 2024 09:34) (76 - 92)  BP: 76/56 (04 Dec 2024 09:36) (71/47 - 100/68)  BP(mean): 83 (03 Dec 2024 11:10) (83 - 83)  RR: 17 (04 Dec 2024 09:34) (17 - 19)  SpO2: 96% (04 Dec 2024 09:34) (95% - 97%)    Parameters below as of 04 Dec 2024 09:34  Patient On (Oxygen Delivery Method): room air        LABS:    CBC Full  -  ( 03 Dec 2024 05:15 )  WBC Count : 8.20 K/uL  RBC Count : 5.29 M/uL  Hemoglobin : 14.4 g/dL  Hematocrit : 43.4 %  Platelet Count - Automated : 341 K/uL  Mean Cell Volume : 82.0 fl  Mean Cell Hemoglobin : 27.2 pg  Mean Cell Hemoglobin Concentration : 33.2 g/dL  Auto Neutrophil # : x  Auto Lymphocyte # : x  Auto Monocyte # : x  Auto Eosinophil # : x  Auto Basophil # : x  Auto Neutrophil % : x  Auto Lymphocyte % : x  Auto Monocyte % : x  Auto Eosinophil % : x  Auto Basophil % : x      12-03    141  |  105  |  25[H]  ----------------------------<  94  4.3   |  29  |  1.27    Ca    8.9      03 Dec 2024 05:15      CAPILLARY BLOOD GLUCOSE      POCT Blood Glucose.: 119 mg/dL (04 Dec 2024 07:40)  POCT Blood Glucose.: 131 mg/dL (03 Dec 2024 21:11)  POCT Blood Glucose.: 105 mg/dL (03 Dec 2024 16:53)  POCT Blood Glucose.: 107 mg/dL (03 Dec 2024 11:26)          Creatinine Trend: 1.27<--, 1.22<--, 1.21<--, 1.20<--, 1.23<--, 1.24<--  I&O's Summary    03 Dec 2024 07:01  -  04 Dec 2024 07:00  --------------------------------------------------------  IN: 200 mL / OUT: 0 mL / NET: 200 mL            .Blood BLOOD  11-26 @ 15:35   No growth at 5 days  --  --      .Blood BLOOD  11-26 @ 15:10   No growth at 5 days  --  --          MEDICATIONS:    MEDICATIONS  (STANDING):  albuterol/ipratropium for Nebulization 3 milliLiter(s) Nebulizer every 6 hours  aspirin enteric coated 81 milliGRAM(s) Oral daily  atorvastatin 80 milliGRAM(s) Oral at bedtime  benzonatate 100 milliGRAM(s) Oral three times a day  clopidogrel Tablet 75 milliGRAM(s) Oral daily  enoxaparin Injectable 40 milliGRAM(s) SubCutaneous every 24 hours  fluticasone propionate 50 MICROgram(s)/spray Nasal Spray 2 Spray(s) Both Nostrils two times a day  guaiFENesin  milliGRAM(s) Oral every 12 hours  insulin lispro (ADMELOG) corrective regimen sliding scale   SubCutaneous three times a day before meals  insulin lispro (ADMELOG) corrective regimen sliding scale   SubCutaneous at bedtime  insulin lispro Injectable (ADMELOG) 2 Unit(s) SubCutaneous three times a day before meals  multivitamin 1 Tablet(s) Oral daily  polyethylene glycol 3350 17 Gram(s) Oral daily  senna 2 Tablet(s) Oral at bedtime  sodium chloride 0.9% Bolus 250 milliLiter(s) IV Bolus once  tamsulosin 0.4 milliGRAM(s) Oral at bedtime  valsartan 40 milliGRAM(s) Oral daily      MEDICATIONS  (PRN):  acetaminophen     Tablet .. 650 milliGRAM(s) Oral every 6 hours PRN Temp greater or equal to 38C (100.4F), Mild Pain (1 - 3)  bisacodyl 5 milliGRAM(s) Oral every 12 hours PRN Constipation  bisacodyl Suppository 10 milliGRAM(s) Rectal daily PRN Constipation      REVIEW OF SYSTEMS:                           ALL ROS DONE [ X   ]    CONSTITUTIONAL:  LETHARGIC [   ], FEVER [   ], UNRESPONSIVE [   ]  CVS:  CP  [   ], SOB, [   ], PALPITATIONS [   ], DIZZYNESS [   ]  RS: COUGH [   ], SPUTUM [   ]  GI: ABDOMINAL PAIN [   ], NAUSEA [   ], VOMITINGS [   ], DIARRHEA [   ], CONSTIPATION [   ]  :  DYSURIA [   ], NOCTURIA [   ], INCREASED FREQUENCY [   ], DRIBLING [   ],  SKELETAL: PAINFUL JOINTS [   ], SWOLLEN JOINTS [   ], NECK ACHE [   ], LOW BACK ACHE [   ],  SKIN : ULCERS [   ], RASH [   ], ITCHING [   ]  CNS: HEAD ACHE [   ], DOUBLE VISION [   ], BLURRED VISION [   ], AMS / CONFUSION [   ], SEIZURES [   ], WEAKNESS [   ],TINGLING / NUMBNESS [   ]      PHYSICAL EXAMINATION:  GENERAL APPEARANCE: NO DISTRESS  HEENT:  NO PALLOR, NO  JVD,  NO   NODES, NECK SUPPLE  CVS: S1 +, S2 +,   RS: AEEB,  OCCASIONAL  RALES +,   RONCHI +  ABD: SOFT, NT, NO, BS +  EXT: PE +   SKIN: WARM,   SKELETAL:  ROM ACCEPTABLE  CNS:  AAO X 3        RADIOLOGY :  RADIOLOGY AND READINGS REVIEWED        ASSESSMENT :     Shortness of breath    HTN (hypertension)    Hearing decreased    CVA (cerebral vascular accident)    Hyperlipemia    Kidney stones    Coronary artery disease    CHF (congestive heart failure)    Type 2 diabetes mellitus    Confusion    S/P medial meniscal repair    H/O lithotripsy    S/P tonsillectomy    Bilateral inguinal hernia        PLAN:  HPI:  74y M with PMHx of CVA, MI, HTN, T2DM, HLD, CAD s/p cardiac stents, HFrEF (EF 20-25% 10/2024), hx of malignant melanoma/wide excision, with recent elective bioprosthetic aortic valve replacement and ascending aortic aneurysm repair with transverse hemiarch and CABG x2 (4/8/24) with post op course complicated by cardiogenic shock requiring inotropic support with IV dobutamine and milrinone, IABP and also on amiodarone for atrial fibrillation prophylaxis presenting with difficulty breathing for the past 4 days preceded by cough for the past 4 to 5 weeks. Patient was recently admitted to [facility] 10/5-10/8 for pneumonia. Patient reports completion of full course of antibiotics prescribed on discharge. Patient states that he developed cough around a month ago that has been persistent and now causing abdominal muscle pain. Patient developed shortness of breath 4 days ago that got progressively worse prompting him to come to the ED today. Since treatment in ED, patient reports improvement of dyspnea but cough is still present but less frequent. Endorses constipation with no BM for last 3 days. Denies fever, chills, headache, dizziness, chest pain, palpitations, nausea, vomiting, diarrhea, and dysuria.   (26 Nov 2024 18:29)    # CASE D/W PATIENT AT BEDSIDE. CASE REVIEWED AT LENGTH, ALL QUESTIONS ANSWERED. DISCUSSED REGARDING GOC - PATIENT CONFIRMS GOC OF DNR/DNI/NO PEG.     # [12/2] CASE D/W WIFE AT BEDSIDE, ALL QUESTIONS ANSWERED    # [11/30] CASE DISCUSSED AT LENGTH WITH PATIENT, WIFE, DAUGHTER, SON, SON-IN-LAW - ALL QUESTIONS ANSWERED. RECOMMENDATIONS AS MADE BY MULTIDISCIPLINARY TEAM REVIEWED. DISCUSSED THAT PROGNOSIS IS GUARDED/POOR. PATIENT AND FAMILY VERBALIZED UNDERSTANDING. IN DISCUSSION REGARDING GOC - PATIENT CONVEYS THAT HE HAS PREVIOUSLY WISHED TO BE DNR/DNI - BUT FAMILY AND PATIENT PLAN TO DISCUSS AND WILL RECONVENE WITH TEAM TO CONFIRM GOC    # ACUTE HYPOXIC RESPIRATORY FAILURE MULTIFACTORIAL - LIKELY S/T PNA + DECOMPENSATED HF  # HX OF HFrEF [20-25%, 10/24], HX OF RECENT ELECTIVE BIOPROSTHETIC AORTIC VALVE REPLACEMENT, ASCENDING AORTIC ANEURYSM REPAIR W/ TRANSVERSE HEMIARCH AND CABG X 2  [4/2024] - C/B POST-OP CARDIOGENIC SHOCK REQUIRING IONOTROPIC SUPPORT, IABP   # HX OF CAD S/P STENT  - TELEMETRY  - CEFEPIME + AZITHROMYCIN, F/U BCX  - ANTITUSSIVE  - CHEST PT  - IV LASIX [ DOWNTITRATED - DUE TO HYPOTENSION], SWITCHED TO PO LASIX  --- HOLD  - NOTED CT CHEST  - D/C AMIODARONE  - HOLD ENTRESTO, GIVEN LOW-NORMAL BP  -  VALSARTAN  - STARTED ON LOW-DOSE BB  --- HOLD  - CARDIOLOGY CONSULT  - ID CONSULT  - PULMONOLOGY CONSULT    - COUNSELLED PATIENT AND FAMILY ON CLOSE OUTPATIENT F/U WITH MULTIDISCIPLINARY TEAM. COUNSELLED ON S & S TO MONITOR. ALSO COUNSELLED TO MONITOR DAILY WEIGHTS AND BP    # ? POST-OP A.FIB PPX - ON AMIODARONE - RECOMMENDED D/C PER CARDIOLOGY    # DM  # HBA1C - 10.2  - LANTUS  - SSI + FS  - ENDOCRINOLOGY CONSULT    - DIABETIC EDUCATION PROVIDED    # BPH  - FLOMAX    # HX OF CVA  # HX OF MI  # HTN  # HLD  # HX OF MALIGNANT MELANOMA/WIDE EXCISION  # GI PPX

## 2024-12-04 NOTE — CHART NOTE - NSCHARTNOTEFT_GEN_A_CORE
Assessment:     Factors impacting intake: [ ] none [ ] nausea  [ ] vomiting [ ] diarrhea [ ] constipation  [ ]chewing problems [ ] swallowing issues  [ ] other:     Diet Presciption: Diet, Regular:   Consistent Carbohydrate {No Snacks}  DASH/TLC {Sodium & Cholesterol Restricted} (24 @ 18:25)    Intake:     Daily Weight in k (03 Dec 2024 04:56)  Weight in k.6 (02 Dec 2024 04:17)  Weight in k (2024 04:36)  Weight in k.8 (2024 05:28)  Weight in k.1 (2024 05:12)    % Weight Change    Pertinent Medications: MEDICATIONS  (STANDING):  albuterol/ipratropium for Nebulization 3 milliLiter(s) Nebulizer every 6 hours  aspirin enteric coated 81 milliGRAM(s) Oral daily  atorvastatin 80 milliGRAM(s) Oral at bedtime  benzonatate 100 milliGRAM(s) Oral three times a day  clopidogrel Tablet 75 milliGRAM(s) Oral daily  digoxin     Tablet 125 MICROGram(s) Oral daily  enoxaparin Injectable 40 milliGRAM(s) SubCutaneous every 24 hours  fluticasone propionate 50 MICROgram(s)/spray Nasal Spray 2 Spray(s) Both Nostrils two times a day  guaiFENesin  milliGRAM(s) Oral every 12 hours  insulin lispro (ADMELOG) corrective regimen sliding scale   SubCutaneous three times a day before meals  insulin lispro (ADMELOG) corrective regimen sliding scale   SubCutaneous at bedtime  insulin lispro Injectable (ADMELOG) 2 Unit(s) SubCutaneous three times a day before meals  multivitamin 1 Tablet(s) Oral daily  polyethylene glycol 3350 17 Gram(s) Oral daily  potassium phosphate / sodium phosphate Tablet (K-PHOS No. 2) 2 Tablet(s) Oral once  senna 2 Tablet(s) Oral at bedtime  tamsulosin 0.4 milliGRAM(s) Oral at bedtime  valsartan 40 milliGRAM(s) Oral daily    MEDICATIONS  (PRN):  acetaminophen     Tablet .. 650 milliGRAM(s) Oral every 6 hours PRN Temp greater or equal to 38C (100.4F), Mild Pain (1 - 3)  bisacodyl 5 milliGRAM(s) Oral every 12 hours PRN Constipation  bisacodyl Suppository 10 milliGRAM(s) Rectal daily PRN Constipation    Pertinent Labs:  Na141 mmol/L Glu 107 mg/dL[H] K+ 3.5 mmol/L Cr  1.23 mg/dL BUN 25 mg/dL[H]  Phos 2.4 mg/dL[L]  Alb 2.5 g/dL[L]  Chol 188 mg/dL LDL --    HDL 45 mg/dL Trig 83 mg/dL     CAPILLARY BLOOD GLUCOSE      POCT Blood Glucose.: 90 mg/dL (04 Dec 2024 11:41)  POCT Blood Glucose.: 119 mg/dL (04 Dec 2024 07:40)  POCT Blood Glucose.: 131 mg/dL (03 Dec 2024 21:11)  POCT Blood Glucose.: 105 mg/dL (03 Dec 2024 16:53)      Skin:     Estimated Needs:   [ ] no change since previous assessment  [ ] recalculated:     Previous Nutrition Diagnosis:   [ ] Inadequate Energy Intake [ ]Inadequate Oral Intake [ ] Excessive Energy Intake   [ ] Underweight [ ] Increased Nutrient Needs [ ] Overweight/Obesity  [ ] Swallowing Difficult   [ ] Altered GI Function [ ] Unintended Weight Loss [ ] Food & Nutrition Related Knowledge Deficit [ ] Malnutrition   [ ] Not Ready for Diet/Life Style Changes     Nutrition Diagnosis is [ ] ongoing  [ ] Improving   [ ] resolved [ ] not applicable     New Nutrition Diagnosis: [ ] not applicable       Interventions:   Recommend  [ ] Change Diet To:  [ ] Nutrition Supplement  [ ] Nutrition Support  [ ] Other:     Monitoring and Evaluation:   [ ] PO intake [ x ] Tolerance to diet prescription [ x ] weights [ x ] labs[ x ] follow up per protocol  [ ] other: Assessment:       Nutrition consult verbally requested by RN for education on DM and CHF. Chart reviewed, Initial Dietitian Evaluation done 24 with education noted; Pt visited, alert oriented, hearing impaired, with well-communicated with hearing aide, wife at bedside also very engaged; HgbA1C=10.2 24, finger stick controlled, Endocrinology on the case;        Reinforced Low Na, DM meal planning, encouraged heart healthy diet with portion control, low Na intake, DM self-management ( to be on continuos Glucose monitoring device reported), nutrition related concern/questions reviewed, tips discussed, additional written copy given ( see Plan of Care), very receptive, verbalized understanding, RD business card given for contact as needed, also [facility] contact # given as requested    Factors impacting intake: [ x ] other: acute on chronic comorbidities; cultural/ethnical/individual food preferences     Diet Prescription: Diet, Regular:   Consistent Carbohydrate {No Snacks}  DASH/TLC {Sodium & Cholesterol Restricted} (24 @ 18:25)    Daily Weight in k (03 Dec 2024 04:56)  Weight in k.6 (02 Dec 2024 04:17)  Weight in k (2024 04:36)  Weight in k.8 (2024 05:28)  Weight in k.1 (2024 05:12)    % Weight Change: a bit fluctuated, may due to scale/fluid variance, diuretic Rx     Pertinent Medications: MEDICATIONS  (STANDING):  albuterol/ipratropium for Nebulization 3 milliLiter(s) Nebulizer every 6 hours  aspirin enteric coated 81 milliGRAM(s) Oral daily  atorvastatin 80 milliGRAM(s) Oral at bedtime  benzonatate 100 milliGRAM(s) Oral three times a day  clopidogrel Tablet 75 milliGRAM(s) Oral daily  digoxin     Tablet 125 MICROGram(s) Oral daily  enoxaparin Injectable 40 milliGRAM(s) SubCutaneous every 24 hours  fluticasone propionate 50 MICROgram(s)/spray Nasal Spray 2 Spray(s) Both Nostrils two times a day  guaiFENesin  milliGRAM(s) Oral every 12 hours  insulin lispro (ADMELOG) corrective regimen sliding scale   SubCutaneous three times a day before meals  insulin lispro (ADMELOG) corrective regimen sliding scale   SubCutaneous at bedtime  insulin lispro Injectable (ADMELOG) 2 Unit(s) SubCutaneous three times a day before meals  multivitamin 1 Tablet(s) Oral daily  polyethylene glycol 3350 17 Gram(s) Oral daily  potassium phosphate / sodium phosphate Tablet (K-PHOS No. 2) 2 Tablet(s) Oral once  senna 2 Tablet(s) Oral at bedtime  tamsulosin 0.4 milliGRAM(s) Oral at bedtime  valsartan 40 milliGRAM(s) Oral daily    MEDICATIONS  (PRN):  acetaminophen     Tablet .. 650 milliGRAM(s) Oral every 6 hours PRN Temp greater or equal to 38C (100.4F), Mild Pain (1 - 3)  bisacodyl 5 milliGRAM(s) Oral every 12 hours PRN Constipation  bisacodyl Suppository 10 milliGRAM(s) Rectal daily PRN Constipation    Pertinent Labs:  Na141 mmol/L Glu 107 mg/dL[H] K+ 3.5 mmol/L Cr  1.23 mg/dL BUN 25 mg/dL[H]  Phos 2.4 mg/dL[L]  Alb 2.5 g/dL[L]  Chol 188 mg/dL LDL --    HDL 45 mg/dL Trig 83 mg/dL     CAPILLARY BLOOD GLUCOSE    POCT Blood Glucose.: 90 mg/dL (04 Dec 2024 11:41)  POCT Blood Glucose.: 119 mg/dL (04 Dec 2024 07:40)  POCT Blood Glucose.: 131 mg/dL (03 Dec 2024 21:11)  POCT Blood Glucose.: 105 mg/dL (03 Dec 2024 16:53)    Skin: skin intact     Estimated Needs:   [ x ] no change since previous assessment  [ ] recalculated:     Previous Nutrition Diagnosis:   [ x ] Limited adherence to nutrition-related recommendations    Nutrition Diagnosis is [ x ] ongoing  [ x ] Improving   [ ] resolved [ ] not applicable     New Nutrition Diagnosis: [ x ] not applicable     Interventions/Recommend  [ x ] Continue diet Rx as ordered   [ ] Nutrition Supplement  [ ] Nutrition Support  [ x ] Other: Discussed with RN                   Nutrition education reinforced ( see above and Plan of Care)                  RD business card given for contact as needed     Monitoring and Evaluation:   [ x ] PO intake [ x ] Tolerance to diet prescription [ x ] weights [ x ] labs[ x ] follow up per protocol  [ ] other:

## 2024-12-04 NOTE — PROGRESS NOTE ADULT - ASSESSMENT
74y M with PMHx of CVA, MI, HTN, T2DM, HLD, CAD s/p cardiac stents, HFrEF (EF 20-25% 10/2024), hx of malignant melanoma/wide excision, with recent elective bioprosthetic aortic valve replacement and ascending aortic aneurysm repair with transverse hemiarch and CABG x2 (4/8/24) with post op course complicated by cardiogenic shock requiring inotropic support with IV dobutamine and milrinone, IABP and also on amiodarone for atrial fibrillation prophylaxis presenting with difficulty breathing for the past 4 days preceded by cough for the past 4 to 5 weeks.  Admitted to telemetry for acute on chronic CHF and sepsis 2/2 PNA.

## 2024-12-04 NOTE — PROGRESS NOTE ADULT - SUBJECTIVE AND OBJECTIVE BOX
NP Note discussed with  Primary Attending    Patient is a 74y old  Male who presents with a chief complaint of acute CHF/sepsis 2/2 PNA (04 Dec 2024 09:57)      INTERVAL HPI/OVERNIGHT EVENTS:  74y M with PMHx of CVA, MI, HTN, T2DM, HLD, CAD s/p cardiac stents, HFrEF (EF 20-25% 10/2024), hx of malignant melanoma/wide excision, with recent elective bioprosthetic aortic valve replacement and ascending aortic aneurysm repair with transverse hemiarch and CABG x2 (4/8/24) with post op course complicated by cardiogenic shock requiring inotropic support with IV dobutamine and milrinone, IABP and also on amiodarone for atrial fibrillation prophylaxis presenting with difficulty breathing for the past 4 days preceded by cough for the past 4 to 5 weeks.  Admitted to telemetry for acute on chronic CHF and sepsis 2/2 PNA. CT chest  findings of pulmonary edema have progressed since October 5, 2024. Stable small bilateral pleural effusions. Stable 1.8 cm right chest wall cutaneous lesion. DVT negative on b/l LE doppler US.  Pulm following, initially requiring nasal cannula on admission, now on room air. Started on duonebs  ID following, completed course of cefepime and azithromycin for PNA.  Cardiology following, pt started on IV Lasix initially holding BB and Entresto 2/2 soft BP. On 12/3, re-introduced metoprolol but hypotensive today with SBP 70's. DC Lasix and BB, started on dig for inotrope support  Endocrine following for uncontrolled DM. A1C 10.2. Discharge on Metformin 500 mg with dinner for one week , if feeling better then increase to metformin 1000 mg daily with dinner from following week and from the third week onwards, patient needs metformin 1000 mg BID  Discharge held today 2/2 hypotension, cardiac meds re-adjusted as pre cardiology recommendation. Plan discussed with the wife    PT recommends no Skilled PT needed.      MEDICATIONS  (STANDING):  albuterol/ipratropium for Nebulization 3 milliLiter(s) Nebulizer every 6 hours  aspirin enteric coated 81 milliGRAM(s) Oral daily  atorvastatin 80 milliGRAM(s) Oral at bedtime  benzonatate 100 milliGRAM(s) Oral three times a day  clopidogrel Tablet 75 milliGRAM(s) Oral daily  digoxin     Tablet 125 MICROGram(s) Oral daily  enoxaparin Injectable 40 milliGRAM(s) SubCutaneous every 24 hours  fluticasone propionate 50 MICROgram(s)/spray Nasal Spray 2 Spray(s) Both Nostrils two times a day  guaiFENesin  milliGRAM(s) Oral every 12 hours  insulin lispro (ADMELOG) corrective regimen sliding scale   SubCutaneous three times a day before meals  insulin lispro (ADMELOG) corrective regimen sliding scale   SubCutaneous at bedtime  insulin lispro Injectable (ADMELOG) 2 Unit(s) SubCutaneous three times a day before meals  multivitamin 1 Tablet(s) Oral daily  polyethylene glycol 3350 17 Gram(s) Oral daily  senna 2 Tablet(s) Oral at bedtime  tamsulosin 0.4 milliGRAM(s) Oral at bedtime  valsartan 40 milliGRAM(s) Oral daily    MEDICATIONS  (PRN):  acetaminophen     Tablet .. 650 milliGRAM(s) Oral every 6 hours PRN Temp greater or equal to 38C (100.4F), Mild Pain (1 - 3)  bisacodyl 5 milliGRAM(s) Oral every 12 hours PRN Constipation  bisacodyl Suppository 10 milliGRAM(s) Rectal daily PRN Constipation      __________________________________________________  REVIEW OF SYSTEMS:    CONSTITUTIONAL: No fever, + lightheadedness  EYES: no acute visual disturbances  NECK: No pain or stiffness  RESPIRATORY: No cough; No shortness of breath  CARDIOVASCULAR: No chest pain, no palpitations  GASTROINTESTINAL: No pain. No nausea or vomiting; No diarrhea   NEUROLOGICAL: No headache or numbness, no tremors  MUSCULOSKELETAL: No joint pain, no muscle pain  GENITOURINARY: no dysuria, no frequency, no hesitancy  ALL OTHER  ROS negative        Vital Signs Last 24 Hrs  T(C): 36.4 (04 Dec 2024 08:05), Max: 36.6 (03 Dec 2024 23:37)  T(F): 97.5 (04 Dec 2024 08:05), Max: 97.8 (03 Dec 2024 23:37)  HR: 81 (04 Dec 2024 11:04) (76 - 88)  BP: 71/61 (04 Dec 2024 11:04) (71/47 - 100/68)  BP(mean): --  RR: 18 (04 Dec 2024 11:04) (17 - 19)  SpO2: 98% (04 Dec 2024 11:04) (95% - 98%)    Parameters below as of 04 Dec 2024 11:04  Patient On (Oxygen Delivery Method): room air        ________________________________________________  PHYSICAL EXAM:  GENERAL: NAD, frail, cachectic  HEENT: Normocephalic; moist mucous membranes;   NECK : supple  CHEST/LUNG: Diminished   HEART: S1 S2  regular;  ABDOMEN: Soft, Nontender, Nondistended; Bowel sounds present  EXTREMITIES: no cyanosis; no edema; no calf tenderness  SKIN: warm and dry; no rash  NERVOUS SYSTEM:  Awake and alert; Oriented to place, person and time    _________________________________________________  LABS:                        13.8   6.99  )-----------( 341      ( 04 Dec 2024 10:20 )             42.1     12-04    141  |  106  |  25[H]  ----------------------------<  107[H]  3.5   |  29  |  1.23    Ca    8.6      04 Dec 2024 10:20  Phos  2.4     12-04  Mg     2.1     12-04    TPro  6.3  /  Alb  2.5[L]  /  TBili  0.5  /  DBili  x   /  AST  17  /  ALT  28  /  AlkPhos  105  12-04      Urinalysis Basic - ( 04 Dec 2024 10:20 )    Color: x / Appearance: x / SG: x / pH: x  Gluc: 107 mg/dL / Ketone: x  / Bili: x / Urobili: x   Blood: x / Protein: x / Nitrite: x   Leuk Esterase: x / RBC: x / WBC x   Sq Epi: x / Non Sq Epi: x / Bacteria: x      CAPILLARY BLOOD GLUCOSE      POCT Blood Glucose.: 119 mg/dL (04 Dec 2024 07:40)  POCT Blood Glucose.: 131 mg/dL (03 Dec 2024 21:11)  POCT Blood Glucose.: 105 mg/dL (03 Dec 2024 16:53)        RADIOLOGY & ADDITIONAL TESTS:  < from: CT Chest No Cont (11.27.24 @ 09:41) >    ACC: [id number] EXAM:  CT CHEST   ORDERED BY: JODEE PADILLA     PROCEDURE DATE:  11/27/2024          INTERPRETATION:  CLINICAL INFORMATION: Shortness of breath. Hx HFrEF,   malignant melanoma, CABG x2, ascending aortic repair    COMPARISON: CT chest/abdomen/pelvis from 10/5/2024    CONTRAST/COMPLICATIONS:  IV Contrast: NONE  Oral Contrast: NONE      PROCEDURE:  CT of the Chest was performed.  Sagittal and coronal reformats were performed.    FINDINGS:    LUNGS AND LARGE AIRWAYS: Patent central airways. Bilateral central   groundglass opacities have progressed since 10/5/2024. Bilateral diffuse   interlobular septal thickening.  PLEURA: Stable small bilateral pleural effusions.  VESSELS: Status post aortic valve and ascending aortic replacement.   Aortic calcifications.  HEART: Coronary artery stents and calcifications. Cardiomegaly. No   pericardial effusion.  MEDIASTINUM AND AVEL: Stable prominent mediastinal lymph nodes.   Mediastinal surgical clips.  CHEST WALL AND LOWER NECK: Stable 1.8 cm right chest wall cutaneous   lesion.  VISUALIZED UPPER ABDOMEN: Partially visualized left renal cyst.  BONES: Degenerative changes. Median sternotomy wires.    IMPRESSION:  CT findings of pulmonary edema have progressed since October 5, 2024.    Stable small bilateral pleural effusions.    Stable 1.8 cm right chest wall cutaneous lesion.    --- End of Report ---           VERO SIMMS DO; Resident Radiologist  This document has been electronically signed.  SIMÓN CORMIER DO; Attending Radiologist  This document has been electronically signed. Nov 27 2024 11:52AM    < end of copied text >    Imaging Personally Reviewed:  YES/NO    Consultant(s) Notes Reviewed:   YES/ No    Care Discussed with Consultants :     Plan of care was discussed with patient and /or primary care giver; all questions and concerns were addressed and care was aligned with patient's wishes.

## 2024-12-04 NOTE — PROGRESS NOTE ADULT - NUTRITIONAL ASSESSMENT
Diet, Regular:   Consistent Carbohydrate {No Snacks}  DASH/TLC {Sodium & Cholesterol Restricted} (11-26-24 @ 18:25) [Active]
Diet, Regular:   Consistent Carbohydrate {No Snacks}  DASH/TLC {Sodium & Cholesterol Restricted} (11-26-24 @ 18:25) [Active]

## 2024-12-04 NOTE — PROGRESS NOTE ADULT - SUBJECTIVE AND OBJECTIVE BOX
DATE OF SERVICE: 12-04-24    Patient denies chest pain some SOB today and light headed.  Noted with hypotension, getting 250 cc NS   Review of symptoms otherwise negative.    acetaminophen     Tablet .. 650 milliGRAM(s) Oral every 6 hours PRN  albuterol/ipratropium for Nebulization 3 milliLiter(s) Nebulizer every 6 hours  aspirin enteric coated 81 milliGRAM(s) Oral daily  atorvastatin 80 milliGRAM(s) Oral at bedtime  benzonatate 100 milliGRAM(s) Oral three times a day  bisacodyl 5 milliGRAM(s) Oral every 12 hours PRN  bisacodyl Suppository 10 milliGRAM(s) Rectal daily PRN  clopidogrel Tablet 75 milliGRAM(s) Oral daily  enoxaparin Injectable 40 milliGRAM(s) SubCutaneous every 24 hours  fluticasone propionate 50 MICROgram(s)/spray Nasal Spray 2 Spray(s) Both Nostrils two times a day  guaiFENesin  milliGRAM(s) Oral every 12 hours  insulin lispro (ADMELOG) corrective regimen sliding scale   SubCutaneous three times a day before meals  insulin lispro (ADMELOG) corrective regimen sliding scale   SubCutaneous at bedtime  insulin lispro Injectable (ADMELOG) 2 Unit(s) SubCutaneous three times a day before meals  multivitamin 1 Tablet(s) Oral daily  polyethylene glycol 3350 17 Gram(s) Oral daily  potassium phosphate / sodium phosphate Tablet (K-PHOS No. 2) 2 Tablet(s) Oral once  senna 2 Tablet(s) Oral at bedtime  tamsulosin 0.4 milliGRAM(s) Oral at bedtime  valsartan 40 milliGRAM(s) Oral daily   Lasix 40 mg PO daily   Toprol XL 12.5 mg PO daily                             13.8   6.99  )-----------( 341      ( 04 Dec 2024 10:20 )             42.1       Hemoglobin: 13.8 g/dL (12-04 @ 10:20)  Hemoglobin: 14.4 g/dL (12-03 @ 05:15)  Hemoglobin: 13.5 g/dL (12-02 @ 06:05)  Hemoglobin: 12.9 g/dL (12-01 @ 06:55)  Hemoglobin: 13.1 g/dL (11-30 @ 05:45)      12-04    141  |  106  |  25[H]  ----------------------------<  107[H]  3.5   |  29  |  1.23    Ca    8.6      04 Dec 2024 10:20  Phos  2.4     12-04  Mg     2.1     12-04    TPro  6.3  /  Alb  2.5[L]  /  TBili  0.5  /  DBili  x   /  AST  17  /  ALT  28  /  AlkPhos  105  12-04    Creatinine Trend: 1.23<--, 1.27<--, 1.22<--, 1.21<--, 1.20<--, 1.23<--    COAGS:           T(C): 36.4 (12-04-24 @ 08:05), Max: 36.6 (12-03-24 @ 23:37)  HR: 82 (12-04-24 @ 12:02) (76 - 88)  BP: 102/68 (12-04-24 @ 12:02) (71/47 - 102/68)  RR: 18 (12-04-24 @ 11:04) (17 - 19)  SpO2: 98% (12-04-24 @ 11:04) (95% - 98%)  Wt(kg): --    I&O's Summary    03 Dec 2024 07:01  -  04 Dec 2024 07:00  --------------------------------------------------------  IN: 200 mL / OUT: 0 mL / NET: 200 mL          HEENT:  (-)icterus (-)pallor  CV: N S1 S2 1/6 GAEL (+)2 Pulses B/l  Resp:  Clear to ausculatation B/L, normal effort  GI: (+) BS Soft, NT, ND  Lymph:  (-)Edema, (-)obvious lymphadenopathy  Skin: Warm to touch, Normal turgor  Psych: Appropriate mood and affect      Tele Sinus     ASSESSMENT/PLAN: 	74y Male PMHx of CVA, MI, HTN, T2DM, HLD, CAD s/p cardiac stents, HFrEF (EF 20-25% 10/2024), hx of malignant melanoma/wide excision, with recent elective bioprosthetic aortic valve replacement and ascending aortic aneurysm repair with transverse hemiarch and CABG x2 (4/8/24) with post op course complicated by cardiogenic shock requiring inotropic support with IV dobutamine and milrinone, IABP and also on amiodarone for atrial fibrillation prophylaxis presenting with difficulty breathing for the past 4 days preceded by cough for the past 4 to 5 weeks.    # CHF  - Acute on chronic systolic heart failure  - possibly low intravascular volume at this point.  Gentle hydration with 250 cc NS  - D/C lasix  - D/C lopressor  - Start Digoxin for RV and LV support and management of chronic heart failure   - cont  Valsartan  - He need s daily weights, should he gain more tahn 3 lbs in 1 day he needs to resume lasix   - Out pt f/u with Dr. Berna Ramon  - Will likely need to establish care with CHF team at [facility]     # CAD   - cont asa and statin    # Post op Afib prophylaxis  - Doesnt appear he has demonstrated PAF as he is not on AC currently  - now off  amio    # PNA  - completed  Abx per medical team      Rogelio Ward MD, [facility]  BEEPER [phone]

## 2024-12-04 NOTE — PROGRESS NOTE ADULT - PROBLEM SELECTOR PLAN 3
- hx of Diabetes on linagliptin and Farxiga   - A1C 10.2  - continue Lispro 2U TID + low dose ISS  - DC Lantus  - Endo Dr. Velasquez following  - Discharge recommendations per Endocrine: not to dc on insulin   Discharge on Metformin 500 mg with dinner for one week , if feeling better then increase to metformin 1000 mg daily with dinner from following week and from the third week onwards, patient needs metformin 1000 mg BID

## 2024-12-04 NOTE — PROGRESS NOTE ADULT - PROBLEM SELECTOR PLAN 8
- Patient admitted from home  - DC held today 2/2 hypotension. F/U with cards if Lasix needs to be continued on discharge    DISCHARGE RECS PER DR BERTRAND:  - Discontinue Farxiga, Entresto and Amiodarone   - Continue Valsartan 40 mg

## 2024-12-04 NOTE — PROGRESS NOTE ADULT - PROBLEM SELECTOR PROBLEM 2
CHF, acute on chronic

## 2024-12-04 NOTE — PROGRESS NOTE ADULT - PROBLEM SELECTOR PLAN 4
- Home regimen initially held due to hypotension  - continue Diovan  - Entresto to be stopped on discharge  - hypotensive today: ALY IVERSON and Lasix.

## 2024-12-04 NOTE — PROGRESS NOTE ADULT - PROBLEM SELECTOR PLAN 2
- p/w SOB and  3+ pitting LE edema x 4 days, takes metoprolol, Entresto, Farxiga, Lasix  - CT findings of pulmonary edema have progressed since October 5, 2024. Stable small bilateral pleural effusions.  - BNP 76853V, now down to 8K  - TTE 10/8: EF 20-25%, Multiple segmental abnormalities exist.  - unable to tolerate reintroduction of BB, lasix DC today 2/2 hypotension  - DC Entresto on discharge  - started on digoxin today, no loading dose  - Daily weight and strict I &Os  - Cardiology: Dr Ward following  - Outpatient follow up with Dr. Berna Ramon

## 2024-12-04 NOTE — PROGRESS NOTE ADULT - PROBLEM SELECTOR PLAN 1
- p/w cough and shortness of breath, (met SIRS criteria: tachy >100, mild leukocytosis 12K)  - CT findings of pulmonary edema have progressed since October 5, 2024. Stable small bilateral pleural effusions.  - completed cefepime + azithromycin course  - continue duonebs, mucinex   - Blood and urine cultures all negative  - ID following  - Pulmonary Dr Hussein following

## 2024-12-05 ENCOUNTER — TRANSCRIPTION ENCOUNTER (OUTPATIENT)
Age: 74
End: 2024-12-05

## 2024-12-05 VITALS — WEIGHT: 138.89 LBS

## 2024-12-05 LAB
ALBUMIN SERPL ELPH-MCNC: 2.6 G/DL — LOW (ref 3.5–5)
ALBUMIN SERPL ELPH-MCNC: 2.6 G/DL — LOW (ref 3.5–5)
ALP SERPL-CCNC: 101 U/L — SIGNIFICANT CHANGE UP (ref 40–120)
ALP SERPL-CCNC: 94 U/L — SIGNIFICANT CHANGE UP (ref 40–120)
ALT FLD-CCNC: 23 U/L DA — SIGNIFICANT CHANGE UP (ref 10–60)
ALT FLD-CCNC: 24 U/L DA — SIGNIFICANT CHANGE UP (ref 10–60)
ANION GAP SERPL CALC-SCNC: 10 MMOL/L — SIGNIFICANT CHANGE UP (ref 5–17)
ANION GAP SERPL CALC-SCNC: 2 MMOL/L — LOW (ref 5–17)
AST SERPL-CCNC: 14 U/L — SIGNIFICANT CHANGE UP (ref 10–40)
AST SERPL-CCNC: 16 U/L — SIGNIFICANT CHANGE UP (ref 10–40)
BASOPHILS # BLD AUTO: 0.07 K/UL — SIGNIFICANT CHANGE UP (ref 0–0.2)
BASOPHILS NFR BLD AUTO: 1 % — SIGNIFICANT CHANGE UP (ref 0–2)
BILIRUB SERPL-MCNC: 0.4 MG/DL — SIGNIFICANT CHANGE UP (ref 0.2–1.2)
BILIRUB SERPL-MCNC: 0.5 MG/DL — SIGNIFICANT CHANGE UP (ref 0.2–1.2)
BUN SERPL-MCNC: 27 MG/DL — HIGH (ref 7–18)
BUN SERPL-MCNC: 32 MG/DL — HIGH (ref 7–18)
CALCIUM SERPL-MCNC: 8.6 MG/DL — SIGNIFICANT CHANGE UP (ref 8.4–10.5)
CALCIUM SERPL-MCNC: 8.7 MG/DL — SIGNIFICANT CHANGE UP (ref 8.4–10.5)
CHLORIDE SERPL-SCNC: 107 MMOL/L — SIGNIFICANT CHANGE UP (ref 96–108)
CHLORIDE SERPL-SCNC: 110 MMOL/L — HIGH (ref 96–108)
CO2 SERPL-SCNC: 24 MMOL/L — SIGNIFICANT CHANGE UP (ref 22–31)
CO2 SERPL-SCNC: 25 MMOL/L — SIGNIFICANT CHANGE UP (ref 22–31)
CREAT SERPL-MCNC: 1.18 MG/DL — SIGNIFICANT CHANGE UP (ref 0.5–1.3)
CREAT SERPL-MCNC: 1.43 MG/DL — HIGH (ref 0.5–1.3)
EGFR: 51 ML/MIN/1.73M2 — LOW
EGFR: 64 ML/MIN/1.73M2 — SIGNIFICANT CHANGE UP
EOSINOPHIL # BLD AUTO: 0.21 K/UL — SIGNIFICANT CHANGE UP (ref 0–0.5)
EOSINOPHIL NFR BLD AUTO: 3.1 % — SIGNIFICANT CHANGE UP (ref 0–6)
GLUCOSE BLDC GLUCOMTR-MCNC: 128 MG/DL — HIGH (ref 70–99)
GLUCOSE BLDC GLUCOMTR-MCNC: 130 MG/DL — HIGH (ref 70–99)
GLUCOSE BLDC GLUCOMTR-MCNC: 130 MG/DL — HIGH (ref 70–99)
GLUCOSE SERPL-MCNC: 140 MG/DL — HIGH (ref 70–99)
GLUCOSE SERPL-MCNC: 148 MG/DL — HIGH (ref 70–99)
HCT VFR BLD CALC: 40.3 % — SIGNIFICANT CHANGE UP (ref 39–50)
HGB BLD-MCNC: 13.2 G/DL — SIGNIFICANT CHANGE UP (ref 13–17)
IMM GRANULOCYTES NFR BLD AUTO: 0.3 % — SIGNIFICANT CHANGE UP (ref 0–0.9)
LYMPHOCYTES # BLD AUTO: 2.14 K/UL — SIGNIFICANT CHANGE UP (ref 1–3.3)
LYMPHOCYTES # BLD AUTO: 31.7 % — SIGNIFICANT CHANGE UP (ref 13–44)
MAGNESIUM SERPL-MCNC: 2.2 MG/DL — SIGNIFICANT CHANGE UP (ref 1.6–2.6)
MCHC RBC-ENTMCNC: 26.7 PG — LOW (ref 27–34)
MCHC RBC-ENTMCNC: 32.8 G/DL — SIGNIFICANT CHANGE UP (ref 32–36)
MCV RBC AUTO: 81.4 FL — SIGNIFICANT CHANGE UP (ref 80–100)
MONOCYTES # BLD AUTO: 0.75 K/UL — SIGNIFICANT CHANGE UP (ref 0–0.9)
MONOCYTES NFR BLD AUTO: 11.1 % — SIGNIFICANT CHANGE UP (ref 2–14)
NEUTROPHILS # BLD AUTO: 3.56 K/UL — SIGNIFICANT CHANGE UP (ref 1.8–7.4)
NEUTROPHILS NFR BLD AUTO: 52.8 % — SIGNIFICANT CHANGE UP (ref 43–77)
NRBC # BLD: 0 /100 WBCS — SIGNIFICANT CHANGE UP (ref 0–0)
PHOSPHATE SERPL-MCNC: 3.1 MG/DL — SIGNIFICANT CHANGE UP (ref 2.5–4.5)
PLATELET # BLD AUTO: 304 K/UL — SIGNIFICANT CHANGE UP (ref 150–400)
POTASSIUM SERPL-MCNC: 3.9 MMOL/L — SIGNIFICANT CHANGE UP (ref 3.5–5.3)
POTASSIUM SERPL-MCNC: 4.1 MMOL/L — SIGNIFICANT CHANGE UP (ref 3.5–5.3)
POTASSIUM SERPL-SCNC: 3.9 MMOL/L — SIGNIFICANT CHANGE UP (ref 3.5–5.3)
POTASSIUM SERPL-SCNC: 4.1 MMOL/L — SIGNIFICANT CHANGE UP (ref 3.5–5.3)
PROT SERPL-MCNC: 6 G/DL — SIGNIFICANT CHANGE UP (ref 6–8.3)
PROT SERPL-MCNC: 6.1 G/DL — SIGNIFICANT CHANGE UP (ref 6–8.3)
RBC # BLD: 4.95 M/UL — SIGNIFICANT CHANGE UP (ref 4.2–5.8)
RBC # FLD: 14.8 % — HIGH (ref 10.3–14.5)
SODIUM SERPL-SCNC: 137 MMOL/L — SIGNIFICANT CHANGE UP (ref 135–145)
SODIUM SERPL-SCNC: 141 MMOL/L — SIGNIFICANT CHANGE UP (ref 135–145)
WBC # BLD: 6.75 K/UL — SIGNIFICANT CHANGE UP (ref 3.8–10.5)
WBC # FLD AUTO: 6.75 K/UL — SIGNIFICANT CHANGE UP (ref 3.8–10.5)

## 2024-12-05 PROCEDURE — 85027 COMPLETE CBC AUTOMATED: CPT

## 2024-12-05 PROCEDURE — 82553 CREATINE MB FRACTION: CPT

## 2024-12-05 PROCEDURE — 84443 ASSAY THYROID STIM HORMONE: CPT

## 2024-12-05 PROCEDURE — 85025 COMPLETE CBC W/AUTO DIFF WBC: CPT

## 2024-12-05 PROCEDURE — 87040 BLOOD CULTURE FOR BACTERIA: CPT

## 2024-12-05 PROCEDURE — 83605 ASSAY OF LACTIC ACID: CPT

## 2024-12-05 PROCEDURE — 71045 X-RAY EXAM CHEST 1 VIEW: CPT

## 2024-12-05 PROCEDURE — 83036 HEMOGLOBIN GLYCOSYLATED A1C: CPT

## 2024-12-05 PROCEDURE — 82962 GLUCOSE BLOOD TEST: CPT

## 2024-12-05 PROCEDURE — 93005 ELECTROCARDIOGRAM TRACING: CPT

## 2024-12-05 PROCEDURE — 87899 AGENT NOS ASSAY W/OPTIC: CPT

## 2024-12-05 PROCEDURE — 81003 URINALYSIS AUTO W/O SCOPE: CPT

## 2024-12-05 PROCEDURE — 99285 EMERGENCY DEPT VISIT HI MDM: CPT | Mod: 25

## 2024-12-05 PROCEDURE — 85730 THROMBOPLASTIN TIME PARTIAL: CPT

## 2024-12-05 PROCEDURE — 85610 PROTHROMBIN TIME: CPT

## 2024-12-05 PROCEDURE — 80061 LIPID PANEL: CPT

## 2024-12-05 PROCEDURE — 97162 PT EVAL MOD COMPLEX 30 MIN: CPT

## 2024-12-05 PROCEDURE — 83735 ASSAY OF MAGNESIUM: CPT

## 2024-12-05 PROCEDURE — 94640 AIRWAY INHALATION TREATMENT: CPT

## 2024-12-05 PROCEDURE — 94760 N-INVAS EAR/PLS OXIMETRY 1: CPT

## 2024-12-05 PROCEDURE — 82550 ASSAY OF CK (CPK): CPT

## 2024-12-05 PROCEDURE — 84100 ASSAY OF PHOSPHORUS: CPT

## 2024-12-05 PROCEDURE — 71250 CT THORAX DX C-: CPT | Mod: MC

## 2024-12-05 PROCEDURE — 97530 THERAPEUTIC ACTIVITIES: CPT

## 2024-12-05 PROCEDURE — 80053 COMPREHEN METABOLIC PANEL: CPT

## 2024-12-05 PROCEDURE — 96374 THER/PROPH/DIAG INJ IV PUSH: CPT

## 2024-12-05 PROCEDURE — 84484 ASSAY OF TROPONIN QUANT: CPT

## 2024-12-05 PROCEDURE — 97116 GAIT TRAINING THERAPY: CPT

## 2024-12-05 PROCEDURE — 36415 COLL VENOUS BLD VENIPUNCTURE: CPT

## 2024-12-05 PROCEDURE — 87449 NOS EACH ORGANISM AG IA: CPT

## 2024-12-05 PROCEDURE — 93970 EXTREMITY STUDY: CPT

## 2024-12-05 PROCEDURE — 80048 BASIC METABOLIC PNL TOTAL CA: CPT

## 2024-12-05 PROCEDURE — 83880 ASSAY OF NATRIURETIC PEPTIDE: CPT

## 2024-12-05 PROCEDURE — 0241U: CPT

## 2024-12-05 PROCEDURE — 87637 SARSCOV2&INF A&B&RSV AMP PRB: CPT

## 2024-12-05 RX ORDER — VALSARTAN 320 MG/1
1 TABLET ORAL
Qty: 30 | Refills: 0
Start: 2024-12-05 | End: 2025-01-03

## 2024-12-05 RX ORDER — SODIUM CHLORIDE 9 MG/ML
1000 INJECTION, SOLUTION INTRAMUSCULAR; INTRAVENOUS; SUBCUTANEOUS
Refills: 0 | Status: DISCONTINUED | OUTPATIENT
Start: 2024-12-05 | End: 2024-12-05

## 2024-12-05 RX ORDER — FLUTICASONE PROPIONATE 50 MCG
2 SPRAY, SUSPENSION NASAL
Qty: 1 | Refills: 0
Start: 2024-12-05 | End: 2025-01-03

## 2024-12-05 RX ORDER — DIGOXIN 250 MCG
1 TABLET ORAL
Qty: 30 | Refills: 0
Start: 2024-12-05 | End: 2025-01-03

## 2024-12-05 RX ORDER — SODIUM CHLORIDE 9 MG/ML
250 INJECTION, SOLUTION INTRAMUSCULAR; INTRAVENOUS; SUBCUTANEOUS ONCE
Refills: 0 | Status: COMPLETED | OUTPATIENT
Start: 2024-12-05 | End: 2024-12-05

## 2024-12-05 RX ADMIN — Medication 2 UNIT(S): at 08:16

## 2024-12-05 RX ADMIN — VALSARTAN 40 MILLIGRAM(S): 320 TABLET ORAL at 06:22

## 2024-12-05 RX ADMIN — Medication 600 MILLIGRAM(S): at 06:21

## 2024-12-05 RX ADMIN — Medication 81 MILLIGRAM(S): at 11:39

## 2024-12-05 RX ADMIN — BENZONATATE 100 MILLIGRAM(S): 100 CAPSULE ORAL at 13:57

## 2024-12-05 RX ADMIN — Medication 2 UNIT(S): at 11:59

## 2024-12-05 RX ADMIN — SODIUM CHLORIDE 250 MILLILITER(S): 9 INJECTION, SOLUTION INTRAMUSCULAR; INTRAVENOUS; SUBCUTANEOUS at 10:37

## 2024-12-05 RX ADMIN — Medication 1 TABLET(S): at 11:39

## 2024-12-05 RX ADMIN — SODIUM CHLORIDE 65 MILLILITER(S): 9 INJECTION, SOLUTION INTRAMUSCULAR; INTRAVENOUS; SUBCUTANEOUS at 11:38

## 2024-12-05 RX ADMIN — CLOPIDOGREL 75 MILLIGRAM(S): 75 TABLET, FILM COATED ORAL at 11:39

## 2024-12-05 RX ADMIN — Medication 600 MILLIGRAM(S): at 17:28

## 2024-12-05 RX ADMIN — BENZONATATE 100 MILLIGRAM(S): 100 CAPSULE ORAL at 06:21

## 2024-12-05 RX ADMIN — Medication 125 MICROGRAM(S): at 06:22

## 2024-12-05 NOTE — DISCHARGE NOTE NURSING/CASE MANAGEMENT/SOCIAL WORK - NSDCFUADDAPPT_GEN_ALL_CORE_FT
APPTS ARE READY TO BE MADE: [X] YES    Best Family or Patient Contact (if needed):    Additional Information about above appointments (if needed):    1: Cardiologist: Dr Rosaline Ramon   2: PCP Dr. Pacheco  3:     Other comments or requests:     Appointment was scheduled in [place] for Internal Medicine with Dr. Bruno on 12/6/2024 at 9:30am, [address] [place] [address]  [id number]. [phone]    Appointment was scheduled in [place] for Pulmonary with Dr. Lockhart on 1/14/2025 at 2pm, [address] [place] [address]  [id number]. [phone]    Appointment was scheduled in [place] for Cardiology with Dr. Marie on 1/31/2025 at 8:30am, [address] [place] [address]  [id number]. [phone]    Appointment was scheduled in [place] for Pulmonary with Dr. Canela on 12/9/2024 at 11am, [address] [place] [address]  [id number]. [phone]    Appointment was scheduled in [place] for Endocrinology with Dr. Velasquez on 5/1/2025 at 9am, [address] [place] [phone]. [phone]

## 2024-12-05 NOTE — PROGRESS NOTE ADULT - SUBJECTIVE AND OBJECTIVE BOX
Patient is a 74y old  Male who presents with a chief complaint of acute CHF/sepsis 2/2 PNA (04 Dec 2024 12:25)    PATIENT IS SEEN AND EXAMINED IN MEDICAL FLOOR.  LEISA [    ]    JONAS [   ]      GT [   ]    ALLERGIES:  No Known Allergies      Daily     Daily     VITALS:    Vital Signs Last 24 Hrs  T(C): 36.7 (05 Dec 2024 06:29), Max: 36.7 (05 Dec 2024 06:29)  T(F): 98.1 (05 Dec 2024 06:29), Max: 98.1 (05 Dec 2024 06:29)  HR: 88 (05 Dec 2024 08:04) (81 - 91)  BP: 111/80 (05 Dec 2024 08:04) (71/61 - 112/76)  BP(mean): --  RR: 17 (05 Dec 2024 08:04) (17 - 19)  SpO2: 94% (05 Dec 2024 08:04) (94% - 98%)    Parameters below as of 05 Dec 2024 08:04  Patient On (Oxygen Delivery Method): room air        LABS:    CBC Full  -  ( 05 Dec 2024 06:18 )  WBC Count : 6.75 K/uL  RBC Count : 4.95 M/uL  Hemoglobin : 13.2 g/dL  Hematocrit : 40.3 %  Platelet Count - Automated : 304 K/uL  Mean Cell Volume : 81.4 fl  Mean Cell Hemoglobin : 26.7 pg  Mean Cell Hemoglobin Concentration : 32.8 g/dL  Auto Neutrophil # : 3.56 K/uL  Auto Lymphocyte # : 2.14 K/uL  Auto Monocyte # : 0.75 K/uL  Auto Eosinophil # : 0.21 K/uL  Auto Basophil # : 0.07 K/uL  Auto Neutrophil % : 52.8 %  Auto Lymphocyte % : 31.7 %  Auto Monocyte % : 11.1 %  Auto Eosinophil % : 3.1 %  Auto Basophil % : 1.0 %      12-05    141  |  107  |  32[H]  ----------------------------<  148[H]  4.1   |  24  |  1.43[H]    Ca    8.6      05 Dec 2024 06:18  Phos  3.1     12-05  Mg     2.2     12-05    TPro  6.0  /  Alb  2.6[L]  /  TBili  0.4  /  DBili  x   /  AST  14  /  ALT  24  /  AlkPhos  94  12-05    CAPILLARY BLOOD GLUCOSE      POCT Blood Glucose.: 128 mg/dL (05 Dec 2024 08:07)  POCT Blood Glucose.: 133 mg/dL (04 Dec 2024 21:07)  POCT Blood Glucose.: 121 mg/dL (04 Dec 2024 16:53)  POCT Blood Glucose.: 90 mg/dL (04 Dec 2024 11:41)        LIVER FUNCTIONS - ( 05 Dec 2024 06:18 )  Alb: 2.6 g/dL / Pro: 6.0 g/dL / ALK PHOS: 94 U/L / ALT: 24 U/L DA / AST: 14 U/L / GGT: x           Creatinine Trend: 1.43<--, 1.23<--, 1.27<--, 1.22<--, 1.21<--, 1.20<--  I&O's Summary    04 Dec 2024 07:01  -  05 Dec 2024 07:00  --------------------------------------------------------  IN: 0 mL / OUT: 100 mL / NET: -100 mL            .Blood BLOOD  11-26 @ 15:35   No growth at 5 days  --  --      .Blood BLOOD  11-26 @ 15:10   No growth at 5 days  --  --          MEDICATIONS:    MEDICATIONS  (STANDING):  albuterol/ipratropium for Nebulization 3 milliLiter(s) Nebulizer every 6 hours  aspirin enteric coated 81 milliGRAM(s) Oral daily  atorvastatin 80 milliGRAM(s) Oral at bedtime  benzonatate 100 milliGRAM(s) Oral three times a day  clopidogrel Tablet 75 milliGRAM(s) Oral daily  digoxin     Tablet 125 MICROGram(s) Oral daily  enoxaparin Injectable 40 milliGRAM(s) SubCutaneous every 24 hours  fluticasone propionate 50 MICROgram(s)/spray Nasal Spray 2 Spray(s) Both Nostrils two times a day  guaiFENesin  milliGRAM(s) Oral every 12 hours  insulin lispro (ADMELOG) corrective regimen sliding scale   SubCutaneous three times a day before meals  insulin lispro (ADMELOG) corrective regimen sliding scale   SubCutaneous at bedtime  insulin lispro Injectable (ADMELOG) 2 Unit(s) SubCutaneous three times a day before meals  multivitamin 1 Tablet(s) Oral daily  polyethylene glycol 3350 17 Gram(s) Oral daily  senna 2 Tablet(s) Oral at bedtime  tamsulosin 0.4 milliGRAM(s) Oral at bedtime  valsartan 40 milliGRAM(s) Oral daily      MEDICATIONS  (PRN):  acetaminophen     Tablet .. 650 milliGRAM(s) Oral every 6 hours PRN Temp greater or equal to 38C (100.4F), Mild Pain (1 - 3)  bisacodyl 5 milliGRAM(s) Oral every 12 hours PRN Constipation  bisacodyl Suppository 10 milliGRAM(s) Rectal daily PRN Constipation      REVIEW OF SYSTEMS:                           ALL ROS DONE [ X   ]    CONSTITUTIONAL:  LETHARGIC [   ], FEVER [   ], UNRESPONSIVE [   ]  CVS:  CP  [   ], SOB, [   ], PALPITATIONS [   ], DIZZYNESS [   ]  RS: COUGH [   ], SPUTUM [   ]  GI: ABDOMINAL PAIN [   ], NAUSEA [   ], VOMITINGS [   ], DIARRHEA [   ], CONSTIPATION [   ]  :  DYSURIA [   ], NOCTURIA [   ], INCREASED FREQUENCY [   ], DRIBLING [   ],  SKELETAL: PAINFUL JOINTS [   ], SWOLLEN JOINTS [   ], NECK ACHE [   ], LOW BACK ACHE [   ],  SKIN : ULCERS [   ], RASH [   ], ITCHING [   ]  CNS: HEAD ACHE [   ], DOUBLE VISION [   ], BLURRED VISION [   ], AMS / CONFUSION [   ], SEIZURES [   ], WEAKNESS [   ],TINGLING / NUMBNESS [   ]      PHYSICAL EXAMINATION:  GENERAL APPEARANCE: NO DISTRESS  HEENT:  NO PALLOR, NO  JVD,  NO   NODES, NECK SUPPLE  CVS: S1 +, S2 +,   RS: AEEB,  OCCASIONAL  RALES +,   RONCHI +  ABD: SOFT, NT, NO, BS +  EXT: PE +   SKIN: WARM,   SKELETAL:  ROM ACCEPTABLE  CNS:  AAO X 3        RADIOLOGY :  RADIOLOGY AND READINGS REVIEWED        ASSESSMENT :     Shortness of breath    HTN (hypertension)    Hearing decreased    CVA (cerebral vascular accident)    Hyperlipemia    Kidney stones    Coronary artery disease    CHF (congestive heart failure)    Type 2 diabetes mellitus    Confusion    S/P medial meniscal repair    H/O lithotripsy    S/P tonsillectomy    Bilateral inguinal hernia        PLAN:  HPI:  74y M with PMHx of CVA, MI, HTN, T2DM, HLD, CAD s/p cardiac stents, HFrEF (EF 20-25% 10/2024), hx of malignant melanoma/wide excision, with recent elective bioprosthetic aortic valve replacement and ascending aortic aneurysm repair with transverse hemiarch and CABG x2 (4/8/24) with post op course complicated by cardiogenic shock requiring inotropic support with IV dobutamine and milrinone, IABP and also on amiodarone for atrial fibrillation prophylaxis presenting with difficulty breathing for the past 4 days preceded by cough for the past 4 to 5 weeks. Patient was recently admitted to [facility] 10/5-10/8 for pneumonia. Patient reports completion of full course of antibiotics prescribed on discharge. Patient states that he developed cough around a month ago that has been persistent and now causing abdominal muscle pain. Patient developed shortness of breath 4 days ago that got progressively worse prompting him to come to the ED today. Since treatment in ED, patient reports improvement of dyspnea but cough is still present but less frequent. Endorses constipation with no BM for last 3 days. Denies fever, chills, headache, dizziness, chest pain, palpitations, nausea, vomiting, diarrhea, and dysuria.   (26 Nov 2024 18:29)    # CASE D/W PATIENT AT BEDSIDE. CASE REVIEWED AT LENGTH, ALL QUESTIONS ANSWERED. DISCUSSED REGARDING GOC - PATIENT CONFIRMS GOC OF DNR/DNI/NO PEG.     # [12/2] CASE D/W WIFE AT BEDSIDE, ALL QUESTIONS ANSWERED    # [11/30] CASE DISCUSSED AT LENGTH WITH PATIENT, WIFE, DAUGHTER, SON, SON-IN-LAW - ALL QUESTIONS ANSWERED. RECOMMENDATIONS AS MADE BY MULTIDISCIPLINARY TEAM REVIEWED. DISCUSSED THAT PROGNOSIS IS GUARDED/POOR. PATIENT AND FAMILY VERBALIZED UNDERSTANDING. IN DISCUSSION REGARDING GOC - PATIENT CONVEYS THAT HE HAS PREVIOUSLY WISHED TO BE DNR/DNI - BUT FAMILY AND PATIENT PLAN TO DISCUSS AND WILL RECONVENE WITH TEAM TO CONFIRM GOC    # ACUTE HYPOXIC RESPIRATORY FAILURE MULTIFACTORIAL - LIKELY S/T PNA + DECOMPENSATED HF  # HX OF HFrEF [20-25%, 10/24], HX OF RECENT ELECTIVE BIOPROSTHETIC AORTIC VALVE REPLACEMENT, ASCENDING AORTIC ANEURYSM REPAIR W/ TRANSVERSE HEMIARCH AND CABG X 2  [4/2024] - C/B POST-OP CARDIOGENIC SHOCK REQUIRING IONOTROPIC SUPPORT, IABP   # HX OF CAD S/P STENT  - TELEMETRY  - CEFEPIME + AZITHROMYCIN, F/U BCX  - ANTITUSSIVE  - CHEST PT  - IV LASIX [ DOWNTITRATED - DUE TO HYPOTENSION], SWITCHED TO PO LASIX  --- HOLD  - NOTED CT CHEST  - D/C AMIODARONE  - HOLD ENTRESTO, GIVEN LOW-NORMAL BP  -  VALSARTAN  - STARTED ON LOW-DOSE BB  --- HOLD  - CARDIOLOGY CONSULT  - ID CONSULT  - PULMONOLOGY CONSULT    - COUNSELLED PATIENT AND FAMILY ON CLOSE OUTPATIENT F/U WITH MULTIDISCIPLINARY TEAM. COUNSELLED ON S & S TO MONITOR. ALSO COUNSELLED TO MONITOR DAILY WEIGHTS AND BP    # ? POST-OP A.FIB PPX - ON AMIODARONE - RECOMMENDED D/C PER CARDIOLOGY    # DM  # HBA1C - 10.2  - LANTUS  - SSI + FS  - ENDOCRINOLOGY CONSULT    - DIABETIC EDUCATION PROVIDED    # BPH  - FLOMAX    # HX OF CVA  # HX OF MI  # HTN  # HLD  # HX OF MALIGNANT MELANOMA/WIDE EXCISION  # GI PPX

## 2024-12-05 NOTE — PROGRESS NOTE ADULT - SUBJECTIVE AND OBJECTIVE BOX
DATE OF SERVICE: 12-05-24    Patient denies chest pain or shortness of breath.  Feeling much better    Review of symptoms otherwise negative.    acetaminophen     Tablet .. 650 milliGRAM(s) Oral every 6 hours PRN  albuterol/ipratropium for Nebulization 3 milliLiter(s) Nebulizer every 6 hours  aspirin enteric coated 81 milliGRAM(s) Oral daily  atorvastatin 80 milliGRAM(s) Oral at bedtime  benzonatate 100 milliGRAM(s) Oral three times a day  bisacodyl 5 milliGRAM(s) Oral every 12 hours PRN  bisacodyl Suppository 10 milliGRAM(s) Rectal daily PRN  clopidogrel Tablet 75 milliGRAM(s) Oral daily  digoxin     Tablet 125 MICROGram(s) Oral daily  enoxaparin Injectable 40 milliGRAM(s) SubCutaneous every 24 hours  fluticasone propionate 50 MICROgram(s)/spray Nasal Spray 2 Spray(s) Both Nostrils two times a day  guaiFENesin  milliGRAM(s) Oral every 12 hours  insulin lispro (ADMELOG) corrective regimen sliding scale   SubCutaneous three times a day before meals  insulin lispro (ADMELOG) corrective regimen sliding scale   SubCutaneous at bedtime  insulin lispro Injectable (ADMELOG) 2 Unit(s) SubCutaneous three times a day before meals  multivitamin 1 Tablet(s) Oral daily  polyethylene glycol 3350 17 Gram(s) Oral daily  senna 2 Tablet(s) Oral at bedtime  sodium chloride 0.9%. 1000 milliLiter(s) IV Continuous <Continuous>  tamsulosin 0.4 milliGRAM(s) Oral at bedtime  valsartan 40 milliGRAM(s) Oral daily                            13.2   6.75  )-----------( 304      ( 05 Dec 2024 06:18 )             40.3       Hemoglobin: 13.2 g/dL (12-05 @ 06:18)  Hemoglobin: 13.8 g/dL (12-04 @ 10:20)  Hemoglobin: 14.4 g/dL (12-03 @ 05:15)  Hemoglobin: 13.5 g/dL (12-02 @ 06:05)  Hemoglobin: 12.9 g/dL (12-01 @ 06:55)      12-05    141  |  107  |  32[H]  ----------------------------<  148[H]  4.1   |  24  |  1.43[H]    Ca    8.6      05 Dec 2024 06:18  Phos  3.1     12-05  Mg     2.2     12-05    TPro  6.0  /  Alb  2.6[L]  /  TBili  0.4  /  DBili  x   /  AST  14  /  ALT  24  /  AlkPhos  94  12-05    Creatinine Trend: 1.43<--, 1.23<--, 1.27<--, 1.22<--, 1.21<--, 1.20<--    COAGS:           T(C): 36.7 (12-05-24 @ 06:29), Max: 36.7 (12-05-24 @ 06:29)  HR: 88 (12-05-24 @ 08:04) (82 - 91)  BP: 111/80 (12-05-24 @ 08:04) (102/68 - 112/76)  RR: 17 (12-05-24 @ 08:04) (17 - 19)  SpO2: 94% (12-05-24 @ 08:04) (94% - 98%)  Wt(kg): --    I&O's Summary    04 Dec 2024 07:01  -  05 Dec 2024 07:00  --------------------------------------------------------  IN: 0 mL / OUT: 100 mL / NET: -100 mL          HEENT:  (-)icterus (-)pallor  CV: N S1 S2 1/6 GAEL (+)2 Pulses B/l  Resp:  Clear to ausculatation B/L, normal effort  GI: (+) BS Soft, NT, ND  Lymph:  (-)Edema, (-)obvious lymphadenopathy  Skin: Warm to touch, Normal turgor  Psych: Appropriate mood and affect      Tele Sinus  4 beats of NSVT    ASSESSMENT/PLAN: 	74y Male PMHx of CVA, MI, HTN, T2DM, HLD, CAD s/p cardiac stents, HFrEF (EF 20-25% 10/2024), hx of malignant melanoma/wide excision, with recent elective bioprosthetic aortic valve replacement and ascending aortic aneurysm repair with transverse hemiarch and CABG x2 (4/8/24) with post op course complicated by cardiogenic shock requiring inotropic support with IV dobutamine and milrinone, IABP and also on amiodarone for atrial fibrillation prophylaxis presenting with difficulty breathing for the past 4 days preceded by cough for the past 4 to 5 weeks.    # CHF  - Acute on chronic systolic heart failure  - He did not tolerate Beta blockers yesterday, perhaps too soon.  became short of breath, hypotensive and crt bumped.  He may be intolerant of BB will need CHF team refferral  - Doing better on  Digoxin for RV and LV support and management of chronic heart failure   - cont  Valsartan  - He need s daily weights, should he gain more tahn 3 lbs in 1 day he needs to resume lasix 40 mg Daily.  Today's Weight should be his dry weight  - Out pt f/u with Dr. Berna Ramon  - Will likely need to establish care with CHF team at [facility]   - Will also need Referral to establish care with EP for ICD once he fully recovers from this PNA , if his EF remains severely reduced    # CAD   - cont asa and statin    # Post op Afib prophylaxis  - Doesnt appear he has demonstrated PAF as he is not on AC currently  - now off  amio    # PNA  - completed  Abx per medical team      Rogelio Ward MD, [facility]  BEEPER [phone]

## 2024-12-05 NOTE — PROGRESS NOTE ADULT - REASON FOR ADMISSION
acute CHF/sepsis 2/2 PNA

## 2024-12-05 NOTE — DISCHARGE NOTE NURSING/CASE MANAGEMENT/SOCIAL WORK - PATIENT PORTAL LINK FT
You can access the FollowMyHealth Patient Portal offered by [facility] by registering at the following website: [email or web address]. By joining [employer]’s FollowMyHealth portal, you will also be able to view your health information using other applications (apps) compatible with our system.

## 2024-12-05 NOTE — DISCHARGE NOTE NURSING/CASE MANAGEMENT/SOCIAL WORK - FINANCIAL ASSISTANCE
[facility] provides services at a reduced cost to those who are determined to be eligible through [facility]’s financial assistance program. Information regarding [facility]’s financial assistance program can be found by going to [email or web address] or by calling [phone].

## 2024-12-05 NOTE — PROGRESS NOTE ADULT - PROVIDER SPECIALTY LIST ADULT
Cardiology
Endocrinology
Endocrinology
Internal Medicine
Cardiology
Endocrinology
Infectious Disease
Internal Medicine
Pulmonology
Cardiology
Cardiology
Infectious Disease
Internal Medicine
Internal Medicine
Pulmonology
Pulmonology
Internal Medicine

## 2024-12-06 ENCOUNTER — NON-APPOINTMENT (OUTPATIENT)
Age: 74
End: 2024-12-06

## 2024-12-09 ENCOUNTER — NON-APPOINTMENT (OUTPATIENT)
Age: 74
End: 2024-12-09

## 2024-12-09 ENCOUNTER — APPOINTMENT (OUTPATIENT)
Dept: PULMONOLOGY | Facility: CLINIC | Age: 74
End: 2024-12-09

## 2024-12-10 ENCOUNTER — NON-APPOINTMENT (OUTPATIENT)
Age: 74
End: 2024-12-10

## 2024-12-10 ENCOUNTER — APPOINTMENT (OUTPATIENT)
Dept: INTERNAL MEDICINE | Facility: CLINIC | Age: 74
End: 2024-12-10
Payer: MEDICARE

## 2024-12-10 ENCOUNTER — OUTPATIENT (OUTPATIENT)
Dept: OUTPATIENT SERVICES | Facility: HOSPITAL | Age: 74
LOS: 1 days | End: 2024-12-10
Payer: COMMERCIAL

## 2024-12-10 VITALS
RESPIRATION RATE: 16 BRPM | DIASTOLIC BLOOD PRESSURE: 76 MMHG | HEART RATE: 95 BPM | TEMPERATURE: 97.2 F | OXYGEN SATURATION: 96 % | WEIGHT: 144 LBS | BODY MASS INDEX: 22.6 KG/M2 | SYSTOLIC BLOOD PRESSURE: 120 MMHG | HEIGHT: 67 IN

## 2024-12-10 DIAGNOSIS — F32.2 MAJOR DEPRESSIVE DISORDER, SINGLE EPISODE, SEVERE W/OUT PSYCHOTIC FEATURES: ICD-10-CM

## 2024-12-10 DIAGNOSIS — E11.9 TYPE 2 DIABETES MELLITUS W/OUT COMPLICATIONS: ICD-10-CM

## 2024-12-10 DIAGNOSIS — Z00.00 ENCOUNTER FOR GENERAL ADULT MEDICAL EXAMINATION WITHOUT ABNORMAL FINDINGS: ICD-10-CM

## 2024-12-10 DIAGNOSIS — I50.42 CHRONIC COMBINED SYSTOLIC (CONGESTIVE) AND DIASTOLIC (CONGESTIVE) HEART FAILURE: ICD-10-CM

## 2024-12-10 DIAGNOSIS — Z98.89 OTHER SPECIFIED POSTPROCEDURAL STATES: Chronic | ICD-10-CM

## 2024-12-10 DIAGNOSIS — Z90.89 ACQUIRED ABSENCE OF OTHER ORGANS: Chronic | ICD-10-CM

## 2024-12-10 DIAGNOSIS — K40.20 BILATERAL INGUINAL HERNIA, WITHOUT OBSTRUCTION OR GANGRENE, NOT SPECIFIED AS RECURRENT: Chronic | ICD-10-CM

## 2024-12-10 DIAGNOSIS — E11.65 TYPE 2 DIABETES MELLITUS WITH HYPERGLYCEMIA: ICD-10-CM

## 2024-12-10 DIAGNOSIS — E78.5 HYPERLIPIDEMIA, UNSPECIFIED: ICD-10-CM

## 2024-12-10 DIAGNOSIS — R60.0 LOCALIZED EDEMA: ICD-10-CM

## 2024-12-10 DIAGNOSIS — B35.1 TINEA UNGUIUM: ICD-10-CM

## 2024-12-10 DIAGNOSIS — Z78.9 OTHER SPECIFIED HEALTH STATUS: ICD-10-CM

## 2024-12-10 PROCEDURE — 36415 COLL VENOUS BLD VENIPUNCTURE: CPT

## 2024-12-10 PROCEDURE — 80053 COMPREHEN METABOLIC PANEL: CPT

## 2024-12-10 PROCEDURE — G0444 DEPRESSION SCREEN ANNUAL: CPT | Mod: GC

## 2024-12-10 PROCEDURE — G0463: CPT

## 2024-12-10 PROCEDURE — 83735 ASSAY OF MAGNESIUM: CPT

## 2024-12-10 PROCEDURE — 99496 TRANSJ CARE MGMT HIGH F2F 7D: CPT | Mod: GC

## 2024-12-10 PROCEDURE — 84100 ASSAY OF PHOSPHORUS: CPT

## 2024-12-10 RX ORDER — BENZONATATE 100 MG/1
100 CAPSULE ORAL
Refills: 0 | Status: ACTIVE | COMMUNITY

## 2024-12-10 RX ORDER — VALSARTAN 40 MG/1
40 TABLET, COATED ORAL
Refills: 0 | Status: ACTIVE | COMMUNITY

## 2024-12-10 RX ORDER — CHLORHEXIDINE GLUCONATE 4 %
LIQUID (ML) TOPICAL
Refills: 0 | Status: ACTIVE | COMMUNITY

## 2024-12-10 RX ORDER — GLUCOSAMINE HCL/CHONDROITIN SU 500-400 MG
3 CAPSULE ORAL
Refills: 0 | Status: ACTIVE | COMMUNITY

## 2024-12-10 RX ORDER — ATORVASTATIN CALCIUM 80 MG/1
80 TABLET, FILM COATED ORAL
Qty: 30 | Refills: 3 | Status: ACTIVE | COMMUNITY
Start: 2024-12-10 | End: 1900-01-01

## 2024-12-10 RX ORDER — DIGOXIN 125 UG/1
125 TABLET ORAL
Refills: 0 | Status: ACTIVE | COMMUNITY

## 2024-12-10 RX ORDER — METFORMIN HYDROCHLORIDE 1000 MG/1
1000 TABLET, COATED ORAL TWICE DAILY
Qty: 60 | Refills: 3 | Status: ACTIVE | COMMUNITY
Start: 2024-12-10 | End: 1900-01-01

## 2024-12-10 RX ORDER — LORAZEPAM 0.5 MG/1
0.5 TABLET ORAL
Refills: 0 | Status: ACTIVE | COMMUNITY

## 2024-12-13 ENCOUNTER — NON-APPOINTMENT (OUTPATIENT)
Age: 74
End: 2024-12-13

## 2024-12-14 ENCOUNTER — INPATIENT (INPATIENT)
Facility: HOSPITAL | Age: 74
LOS: 3 days | Discharge: OTHER INSTITUTION | DRG: 312 | End: 2024-12-18
Attending: INTERNAL MEDICINE | Admitting: INTERNAL MEDICINE
Payer: MEDICARE

## 2024-12-14 VITALS
DIASTOLIC BLOOD PRESSURE: 86 MMHG | OXYGEN SATURATION: 97 % | HEIGHT: 66 IN | HEART RATE: 97 BPM | TEMPERATURE: 97 F | WEIGHT: 164.91 LBS | SYSTOLIC BLOOD PRESSURE: 126 MMHG | RESPIRATION RATE: 18 BRPM

## 2024-12-14 DIAGNOSIS — Z90.89 ACQUIRED ABSENCE OF OTHER ORGANS: Chronic | ICD-10-CM

## 2024-12-14 DIAGNOSIS — R55 SYNCOPE AND COLLAPSE: ICD-10-CM

## 2024-12-14 DIAGNOSIS — Z98.89 OTHER SPECIFIED POSTPROCEDURAL STATES: Chronic | ICD-10-CM

## 2024-12-14 DIAGNOSIS — K40.20 BILATERAL INGUINAL HERNIA, WITHOUT OBSTRUCTION OR GANGRENE, NOT SPECIFIED AS RECURRENT: Chronic | ICD-10-CM

## 2024-12-14 LAB
ALBUMIN SERPL ELPH-MCNC: 3 G/DL — LOW (ref 3.5–5)
ALBUMIN SERPL ELPH-MCNC: 3.5 G/DL
ALP BLD-CCNC: 116 U/L
ALP SERPL-CCNC: 107 U/L — SIGNIFICANT CHANGE UP (ref 40–120)
ALT FLD-CCNC: 29 U/L DA — SIGNIFICANT CHANGE UP (ref 10–60)
ALT SERPL-CCNC: 18 U/L
ANION GAP SERPL CALC-SCNC: 12 MMOL/L
ANION GAP SERPL CALC-SCNC: 7 MMOL/L — SIGNIFICANT CHANGE UP (ref 5–17)
AST SERPL-CCNC: 20 U/L
AST SERPL-CCNC: 20 U/L — SIGNIFICANT CHANGE UP (ref 10–40)
BASOPHILS # BLD AUTO: 0.07 K/UL — SIGNIFICANT CHANGE UP (ref 0–0.2)
BASOPHILS NFR BLD AUTO: 0.8 % — SIGNIFICANT CHANGE UP (ref 0–2)
BILIRUB SERPL-MCNC: 0.4 MG/DL
BILIRUB SERPL-MCNC: 0.6 MG/DL — SIGNIFICANT CHANGE UP (ref 0.2–1.2)
BUN SERPL-MCNC: 18 MG/DL
BUN SERPL-MCNC: 24 MG/DL — HIGH (ref 7–18)
CALCIUM SERPL-MCNC: 8.8 MG/DL
CALCIUM SERPL-MCNC: 9 MG/DL — SIGNIFICANT CHANGE UP (ref 8.4–10.5)
CHLORIDE SERPL-SCNC: 104 MMOL/L
CHLORIDE SERPL-SCNC: 111 MMOL/L — HIGH (ref 96–108)
CK SERPL-CCNC: 91 U/L — SIGNIFICANT CHANGE UP (ref 35–232)
CO2 SERPL-SCNC: 22 MMOL/L
CO2 SERPL-SCNC: 22 MMOL/L — SIGNIFICANT CHANGE UP (ref 22–31)
CREAT SERPL-MCNC: 1.02 MG/DL
CREAT SERPL-MCNC: 1.02 MG/DL — SIGNIFICANT CHANGE UP (ref 0.5–1.3)
EGFR: 76 ML/MIN/1.73M2
EGFR: 76 ML/MIN/1.73M2 — SIGNIFICANT CHANGE UP
EOSINOPHIL # BLD AUTO: 0.04 K/UL — SIGNIFICANT CHANGE UP (ref 0–0.5)
EOSINOPHIL NFR BLD AUTO: 0.4 % — SIGNIFICANT CHANGE UP (ref 0–6)
GLUCOSE SERPL-MCNC: 146 MG/DL
GLUCOSE SERPL-MCNC: 203 MG/DL — HIGH (ref 70–99)
HCT VFR BLD CALC: 40 % — SIGNIFICANT CHANGE UP (ref 39–50)
HGB BLD-MCNC: 13 G/DL — SIGNIFICANT CHANGE UP (ref 13–17)
IMM GRANULOCYTES NFR BLD AUTO: 0.3 % — SIGNIFICANT CHANGE UP (ref 0–0.9)
LIDOCAIN IGE QN: 14 U/L — SIGNIFICANT CHANGE UP (ref 13–75)
LYMPHOCYTES # BLD AUTO: 1.21 K/UL — SIGNIFICANT CHANGE UP (ref 1–3.3)
LYMPHOCYTES # BLD AUTO: 13.4 % — SIGNIFICANT CHANGE UP (ref 13–44)
MAGNESIUM SERPL-MCNC: 1.9 MG/DL — SIGNIFICANT CHANGE UP (ref 1.6–2.6)
MAGNESIUM SERPL-MCNC: 2 MG/DL
MCHC RBC-ENTMCNC: 27.1 PG — SIGNIFICANT CHANGE UP (ref 27–34)
MCHC RBC-ENTMCNC: 32.5 G/DL — SIGNIFICANT CHANGE UP (ref 32–36)
MCV RBC AUTO: 83.3 FL — SIGNIFICANT CHANGE UP (ref 80–100)
MONOCYTES # BLD AUTO: 0.55 K/UL — SIGNIFICANT CHANGE UP (ref 0–0.9)
MONOCYTES NFR BLD AUTO: 6.1 % — SIGNIFICANT CHANGE UP (ref 2–14)
NEUTROPHILS # BLD AUTO: 7.14 K/UL — SIGNIFICANT CHANGE UP (ref 1.8–7.4)
NEUTROPHILS NFR BLD AUTO: 79 % — HIGH (ref 43–77)
NRBC # BLD: 0 /100 WBCS — SIGNIFICANT CHANGE UP (ref 0–0)
NT-PROBNP SERPL-SCNC: HIGH PG/ML (ref 0–450)
PHOSPHATE SERPL-MCNC: 2.6 MG/DL
PLATELET # BLD AUTO: 265 K/UL — SIGNIFICANT CHANGE UP (ref 150–400)
POTASSIUM SERPL-MCNC: 4.7 MMOL/L — SIGNIFICANT CHANGE UP (ref 3.5–5.3)
POTASSIUM SERPL-SCNC: 4.7 MMOL/L — SIGNIFICANT CHANGE UP (ref 3.5–5.3)
POTASSIUM SERPL-SCNC: 4.9 MMOL/L
PROT SERPL-MCNC: 6.1 G/DL
PROT SERPL-MCNC: 6.4 G/DL — SIGNIFICANT CHANGE UP (ref 6–8.3)
RBC # BLD: 4.8 M/UL — SIGNIFICANT CHANGE UP (ref 4.2–5.8)
RBC # FLD: 15.2 % — HIGH (ref 10.3–14.5)
SODIUM SERPL-SCNC: 138 MMOL/L
SODIUM SERPL-SCNC: 140 MMOL/L — SIGNIFICANT CHANGE UP (ref 135–145)
TROPONIN I, HIGH SENSITIVITY RESULT: 64.7 NG/L — SIGNIFICANT CHANGE UP
TROPONIN I, HIGH SENSITIVITY RESULT: 66.9 NG/L — SIGNIFICANT CHANGE UP
WBC # BLD: 9.04 K/UL — SIGNIFICANT CHANGE UP (ref 3.8–10.5)
WBC # FLD AUTO: 9.04 K/UL — SIGNIFICANT CHANGE UP (ref 3.8–10.5)

## 2024-12-14 PROCEDURE — 70450 CT HEAD/BRAIN W/O DYE: CPT | Mod: 26,MC

## 2024-12-14 PROCEDURE — 93010 ELECTROCARDIOGRAM REPORT: CPT

## 2024-12-14 PROCEDURE — 72125 CT NECK SPINE W/O DYE: CPT | Mod: 26,MC

## 2024-12-14 PROCEDURE — 71250 CT THORAX DX C-: CPT | Mod: 26,MC

## 2024-12-14 PROCEDURE — 99285 EMERGENCY DEPT VISIT HI MDM: CPT

## 2024-12-14 PROCEDURE — 71045 X-RAY EXAM CHEST 1 VIEW: CPT | Mod: 26

## 2024-12-14 RX ORDER — ACETAMINOPHEN 500MG 500 MG/1
650 TABLET, COATED ORAL EVERY 6 HOURS
Refills: 0 | Status: DISCONTINUED | OUTPATIENT
Start: 2024-12-14 | End: 2024-12-18

## 2024-12-14 RX ORDER — FUROSEMIDE 40 MG/1
40 TABLET ORAL ONCE
Refills: 0 | Status: COMPLETED | OUTPATIENT
Start: 2024-12-14 | End: 2024-12-14

## 2024-12-14 RX ADMIN — Medication 162 MILLIGRAM(S): at 18:33

## 2024-12-14 NOTE — H&P ADULT - PROBLEM SELECTOR PLAN 4
hx of HTN now on valsartan 40mg qd  - previously on Entresto, Lasix, and metoprolol recently discontinued due to hypotension on recent hospital admission  -c/w home valsartan with holding parameters  -monitor BP  -adjust antihypertensive meds as appropriate Cimzia Counseling:  I discussed with the patient the risks of Cimzia including but not limited to immunosuppression, allergic reactions and infections.  The patient understands that monitoring is required including a PPD at baseline and must alert us or the primary physician if symptoms of infection or other concerning signs are noted.

## 2024-12-14 NOTE — ED ADULT NURSE NOTE - OBJECTIVE STATEMENT
Pt reports feeling dizzy while in the garage and passing out hitting his head on the garage concrete floor

## 2024-12-14 NOTE — H&P ADULT - NSHPPHYSICALEXAM_GEN_ALL_CORE
LOS:     VITALS:   T(C): 36.7 (12-14-24 @ 23:40), Max: 37.1 (12-14-24 @ 19:42)  HR: 100 (12-14-24 @ 23:40) (97 - 100)  BP: 126/79 (12-14-24 @ 23:40) (126/79 - 131/80)  RR: 17 (12-14-24 @ 23:40) (17 - 18)  SpO2: 95% (12-14-24 @ 23:40) (95% - 97%)    GENERAL: NAD, lying in bed comfortably  HEAD:  Atraumatic, Normocephalic  EYES: EOMI, PERRLA, conjunctiva and sclera clear  ENT: Moist mucous membranes  NECK: Supple, No JVD  CHEST/LUNG: Clear to auscultation bilaterally; No rales, rhonchi, wheezing, or rubs. Unlabored respirations  HEART: Regular rate and rhythm; No murmurs, rubs, or gallops  ABDOMEN: BSx4; Soft, nontender, nondistended  EXTREMITIES:  2+ Peripheral Pulses, brisk capillary refill. No clubbing, cyanosis, or edema  NERVOUS SYSTEM:  A&Ox3, no focal deficits   SKIN: No rashes or lesions LOS:     VITALS:   T(C): 36.7 (12-14-24 @ 23:40), Max: 37.1 (12-14-24 @ 19:42)  HR: 100 (12-14-24 @ 23:40) (97 - 100)  BP: 126/79 (12-14-24 @ 23:40) (126/79 - 131/80)  RR: 17 (12-14-24 @ 23:40) (17 - 18)  SpO2: 95% (12-14-24 @ 23:40) (95% - 97%)    GENERAL: NAD, lying in bed comfortably  HEAD:  Left parietal abrasion slightly TTP, Normocephalic  EYES: EOMI, PERRLA, conjunctiva and sclera clear  ENT: Moist mucous membranes  NECK: Supple, No JVD  CHEST/LUNG: bibasilar rales; No rhonchi, wheezing, or rubs. Unlabored respirations  HEART: Regular rate and rhythm; No murmurs, rubs, or gallops  ABDOMEN: BSx4; Soft, nontender, nondistended  EXTREMITIES:  2+ Peripheral Pulses, brisk capillary refill. No clubbing, cyanosis, or edema  NERVOUS SYSTEM:  A&Ox3, no focal deficits   SKIN: Dry and intact, No rashes, dressed L dorsal hand skin avulsion with some bloody strikethrough

## 2024-12-14 NOTE — ED PROVIDER NOTE - CLINICAL SUMMARY MEDICAL DECISION MAKING FREE TEXT BOX
74-year-old male who was recently discharged for pneumonia 9 days ago, complaining of feeling weak, no appetite.  Today while working on his cabin, suddenly felt dizzy after bending down with syncope.  Unlikely patient with ACS, concern for electrolyte imbalance, CHF.  Will get labs, CT head, EKG, admission

## 2024-12-14 NOTE — ED ADULT TRIAGE NOTE - CHIEF COMPLAINT QUOTE
BIBA  for  c/o  syncopal episode  @  home  w/  neck pain and  nausea  . pt  refused  c-collar. +  head  trauma  npted

## 2024-12-14 NOTE — ED PROVIDER NOTE - OBJECTIVE STATEMENT
74-year-old male with history of DM, CAD, status post CABG x 2 vessels disease, AVR, HLD, HTN, CVA with mild left-sided weakness, CHF, kidney stone.  Patient was admitted from 11/26 to 12/5 for pneumonia.  He was brought in by ambulance, patient claims he has been doing well till today.  he complaining of feeling dizzy when bending down while building his cabin, he felt on concrete floor with LOC.  Patient found himself on the floor, denies incontinence, tongue biting.  It hit left side of his head, also complaining of left-sided neck pain.  He sustained skin devotion to his left hand and abrasion to his left leg.  Last tetanus unknown.  Patient endorses has been feeling weak since his discharge, decreased appetite, he denies chest pain, SOB, palpitation

## 2024-12-14 NOTE — ED PROVIDER NOTE - PHYSICAL EXAMINATION
head-Lt parietal-abrasion, sl tenderness to palp.,  neck-NT to palp.,  Lt hand-dorsal aspect, mid ant leg-skin avulsion, no active bleeding

## 2024-12-14 NOTE — H&P ADULT - ASSESSMENT
74y M with PMHx of CVA, MI, HTN, T2DM, HLD, CAD s/p cardiac stents, HFrEF (EF 20-25% 10/2024), hx of malignant melanoma/wide excision, with recent elective bioprosthetic aortic valve replacement and ascending aortic aneurysm repair with transverse hemiarch and CABG x2 (4/8/24) with post op course complicated by cardiogenic shock requiring inotropic support with IV dobutamine and milrinone as well as IABP, previously on amiodarone for atrial fibrillation prophylaxis (switched to digoxin on recent admission 11/26-12/5 for acute on chronic CHF with sepsis 2/2 pna)  presenting after episode of dizziness followed by syncope and collapse. Admitted to telemetry for syncope, specifically for electrocardiographic monitoring of unstable arrhythmia in the s/o heart failure with severely reduced ejection fraction.

## 2024-12-14 NOTE — ED ADULT NURSE NOTE - NSFALLHARMRISKINTERV_ED_ALL_ED
refused yellow gown/Communicate risk of Fall with Harm to all staff, patient, and family/Provide visual cue: red socks, yellow wristband, yellow gown, etc/Reinforce activity limits and safety measures with patient and family/Bed in lowest position, wheels locked, appropriate side rails in place/Call bell, personal items and telephone in reach/Instruct patient to call for assistance before getting out of bed/chair/stretcher/Non-slip footwear applied when patient is off stretcher/[place] to call system/Physically safe environment - no spills, clutter or unnecessary equipment/Purposeful Proactive Rounding/Room/bathroom lighting operational, light cord in reach

## 2024-12-14 NOTE — ED PROVIDER NOTE - EYES, MLM
January 24, 2019             [facility] - Chemo Infusion  Chemotherapy  [address]  [place] [address]  Phone: [phone]  Fax: [phone]   January 24, 2019     Patient: Makayla Faulkner (Trini Faulkner)   YOB: 1994   Date of Visit: 1/24/2019       To Whom it May Concern:    Makayla Faulkner was seen in my clinic on 1/24/2019. Trini Faulkner with patient for infusion. She may return to work tomorrow, 1/25/2019.  If you have any questions or concerns, please don't hesitate to call.    Sincerely,           Kayla Cintron RN     
Clear bilaterally, pupils equal, round and reactive to light. EOMI, no nystagmus

## 2024-12-14 NOTE — ED PROVIDER NOTE - PROGRESS NOTE DETAILS
Labs/CT result explained to patient   patient with syncope, appears to be very weak, will admit patient   Case discussed with Dr. Guillen pt CT chest showed hydrostatic interstitial pulmonary edema/CHF.  Multifocal bilateral pulmonary fibrosis and pulmonary scarring.  Fairly extensive multifocal bilateral pneumonia.  Small volume bilateral pleural effusions   Case discussed with attending Dr. Guillen, since patient is asymptomatic, no coughing, SOB, and was recently treated for pneumonia, will hold antibiotics.    Will give Lasix for CHF

## 2024-12-14 NOTE — H&P ADULT - HISTORY OF PRESENT ILLNESS
74y M with PMHx of CVA, MI, HTN, T2DM, HLD, CAD s/p cardiac stents, HFrEF (EF 20-25% 10/2024), hx of malignant melanoma/wide excision, with recent elective bioprosthetic aortic valve replacement and ascending aortic aneurysm repair with transverse hemiarch and CABG x2 (4/8/24) with post op course complicated by cardiogenic shock requiring inotropic support with IV dobutamine and milrinone as well as IABP, previously on amiodarone for atrial fibrillation prophylaxis  presenting after episode of dizziness followed by syncope and collapse. Patient recently admitted to [facility] from 11/26 to 12/5 for acute on chronic CHF and sepsis due to pneumonia. Patient states that today his strength and breathing had finally improved enough for him to return to some of his normal activities/hobbies. Patient states that he was excited to be able to go out to his garage and work on building a cabinet. Reports that he likely overextended himself by standing for too long and lifting sheet wood while working on this project for over 2 hours. Patient states that he bent over to pick something up of the floor and when he stood back up straight he felt very dizzy and the next thing he knew he woke up on the floor. Patient says that prior to loss of consciousness he felt light headed and like he was spinning. Denies any chest pain, palpitations, or shortness of breath prior to syncopal episode. When patient regained consciousness he realized he had struck his head on the concrete floor of the garage and somehow cut his hand during collapse. Patient was unable to lift himself up off the floor under his own strength and had to call for help and family called EMS. Patient currently complaining of generalized weakness and expresses disappointment that he is back in the hospital after finally feeling better. Denies fever, chills, headache, chest pain, palpitations cough,, shortness of breath, abdominal pain, nausea, vomiting, diarrhea, constipation, and dysuria. Reports he has an upcoming telehealth appointment with cardiologist to discuss possible AICD placement.

## 2024-12-14 NOTE — ED PROVIDER NOTE - MUSCULOSKELETAL, MLM
Spine appears normal, range of motion is not limited, no muscle or joint tenderness, LLE>RLE-chronic, 1+ pitting edema-RLE, no calves tenderness

## 2024-12-14 NOTE — H&P ADULT - PROBLEM SELECTOR PLAN 3
h/o DM on metformin 1000mg BID  -previously on linagliptin and faxiga    -hold oral dm meds  -last A1c 10.2 on 11/27  -start low sliding scale  -Adjust insulin as indicated  -FS ACHS.  -carb steady diet

## 2024-12-14 NOTE — H&P ADULT - PROBLEM SELECTOR PLAN 1
hx of HFrEF presenting after episode of syncope and collapse with prodrome of dizziness   -patient reports episode was after bending over and standing up in s/o likely overexertion and possible dehydration (still taking lasix ?every other day and not drinking much water), likely orthostatic however r/o unstable arrhythmia given severe HFrEF and recent changes to rate and rhythm control meds.   -CTH/C-spine: negative for acute pathology  --EKG shows sinus tach with 1st degree AVB, old LBBB  -TTE 10/8:  EF 20-25%  -holding of on repeat TTE for now pending cardiology/EP recommendations  -check orthostatic bp  -s/p lasix 40mg IV in ED-->hold off on further diuresis for now (not short of breath, trace basilar rales, no edema)  -PT consult  -EP/Cardiology consulted (Dr. Burroughs/Dr. Ward)    UA ___  CT head   Neuro consulted:  Cardio consulted: hx of HFrEF presenting after episode of syncope and collapse with prodrome of dizziness   -patient reports episode was after bending over and standing up in s/o likely overexertion and possible dehydration (still taking lasix ?every other day and not drinking much water), likely orthostatic however r/o unstable arrhythmia given severe HFrEF and recent changes to rate and rhythm control meds.   -CTH/C-spine: negative for acute pathology  --EKG shows sinus tach with 1st degree AVB, old LBBB  -TTE 10/8:  EF 20-25%  -holding of on repeat TTE for now pending cardiology/EP recommendations  -check orthostatic bp  -s/p lasix 40mg IV in ED-->hold off on further diuresis for now (not short of breath, trace basilar rales, no edema)  -PT consult  -EP/Cardiology consulted (Dr. Burroughs/Dr. Ward)  -Fall precautions, ambulate with assistance

## 2024-12-14 NOTE — H&P ADULT - PROBLEM SELECTOR PLAN 2
-CXR shows Significant bibasilar infiltrates improved on the right but increased on the left.  -BNP (93304 on 11/26 and  8193 on 10/8)  -previously on metoprolol, entresto, farxiga, lasix for GDMT and amiodarone for Afib ppx-->switched to valsartan and digoxin last admission  -c/w valsartan with parameters and digoxin  -TTE 10/8:  EF 20-25%  -s/p lasix 40mg IV in ED-->hold off on further diuresis for now (not short of breath, trace basilar rales, no edema)  -pBNP Troponin 64.7-->66.9  -f/u repeat troponin  -daily weights, strict I&Os  -Cardio Consulted: Dr. Ward HFrEF (EF 20-25%) with recent elective bioprosthetic aortic valve replacement and ascending aortic aneurysm repair with transverse hemiarch and CABG x2 (4/8/24) with post op course complicated by cardiogenic shock requiring inotropic support with IV dobutamine and milrinone as well as IABP  -CT chest: 1. Hydrostatic interstitial pulmonary edema/CHF. 2.  Multifocal bilateral pulmonary fibrosis and pulmonary scarring. 3. Fairly extensive multifocal bilateral pneumonia 4. Small volume bilateral pleural effusions.  -BNP [id number] (previously 52397 on 11/26 and  8193 on 10/8)  -Troponin 64.7-->66.9  -previously on metoprolol, entresto, farxiga, lasix for GDMT and amiodarone for Afib ppx-->switched to valsartan and digoxin last admission  -c/w valsartan with parameters and digoxin  -s/p lasix 40mg IV in ED-->hold off on further diuresis for now (not short of breath, trace basilar rales, no edema)  -daily weights, strict I&Os  -Cardio/EP Consulted: Dr. Ward/Dr. Burroughs

## 2024-12-15 DIAGNOSIS — I25.10 ATHEROSCLEROTIC HEART DISEASE OF NATIVE CORONARY ARTERY WITHOUT ANGINA PECTORIS: ICD-10-CM

## 2024-12-15 DIAGNOSIS — I10 ESSENTIAL (PRIMARY) HYPERTENSION: ICD-10-CM

## 2024-12-15 DIAGNOSIS — E78.5 HYPERLIPIDEMIA, UNSPECIFIED: ICD-10-CM

## 2024-12-15 DIAGNOSIS — R55 SYNCOPE AND COLLAPSE: ICD-10-CM

## 2024-12-15 DIAGNOSIS — I50.22 CHRONIC SYSTOLIC (CONGESTIVE) HEART FAILURE: ICD-10-CM

## 2024-12-15 DIAGNOSIS — E11.9 TYPE 2 DIABETES MELLITUS WITHOUT COMPLICATIONS: ICD-10-CM

## 2024-12-15 DIAGNOSIS — Z29.9 ENCOUNTER FOR PROPHYLACTIC MEASURES, UNSPECIFIED: ICD-10-CM

## 2024-12-15 LAB
ALBUMIN SERPL ELPH-MCNC: 3.3 G/DL — LOW (ref 3.5–5)
ALP SERPL-CCNC: 111 U/L — SIGNIFICANT CHANGE UP (ref 40–120)
ALT FLD-CCNC: 28 U/L DA — SIGNIFICANT CHANGE UP (ref 10–60)
ANION GAP SERPL CALC-SCNC: 4 MMOL/L — LOW (ref 5–17)
APPEARANCE UR: CLEAR — SIGNIFICANT CHANGE UP
AST SERPL-CCNC: 16 U/L — SIGNIFICANT CHANGE UP (ref 10–40)
BASOPHILS # BLD AUTO: 0.05 K/UL — SIGNIFICANT CHANGE UP (ref 0–0.2)
BASOPHILS NFR BLD AUTO: 0.7 % — SIGNIFICANT CHANGE UP (ref 0–2)
BILIRUB SERPL-MCNC: 1 MG/DL — SIGNIFICANT CHANGE UP (ref 0.2–1.2)
BILIRUB UR-MCNC: NEGATIVE — SIGNIFICANT CHANGE UP
BUN SERPL-MCNC: 21 MG/DL — HIGH (ref 7–18)
CALCIUM SERPL-MCNC: 9.6 MG/DL — SIGNIFICANT CHANGE UP (ref 8.4–10.5)
CHLORIDE SERPL-SCNC: 109 MMOL/L — HIGH (ref 96–108)
CO2 SERPL-SCNC: 28 MMOL/L — SIGNIFICANT CHANGE UP (ref 22–31)
COLOR SPEC: YELLOW — SIGNIFICANT CHANGE UP
CREAT SERPL-MCNC: 1.08 MG/DL — SIGNIFICANT CHANGE UP (ref 0.5–1.3)
DIFF PNL FLD: NEGATIVE — SIGNIFICANT CHANGE UP
EGFR: 71 ML/MIN/1.73M2 — SIGNIFICANT CHANGE UP
EOSINOPHIL # BLD AUTO: 0.14 K/UL — SIGNIFICANT CHANGE UP (ref 0–0.5)
EOSINOPHIL NFR BLD AUTO: 1.8 % — SIGNIFICANT CHANGE UP (ref 0–6)
GLUCOSE BLDC GLUCOMTR-MCNC: 108 MG/DL — HIGH (ref 70–99)
GLUCOSE BLDC GLUCOMTR-MCNC: 117 MG/DL — HIGH (ref 70–99)
GLUCOSE BLDC GLUCOMTR-MCNC: 145 MG/DL — HIGH (ref 70–99)
GLUCOSE BLDC GLUCOMTR-MCNC: 151 MG/DL — HIGH (ref 70–99)
GLUCOSE BLDC GLUCOMTR-MCNC: 167 MG/DL — HIGH (ref 70–99)
GLUCOSE BLDC GLUCOMTR-MCNC: 243 MG/DL — HIGH (ref 70–99)
GLUCOSE SERPL-MCNC: 135 MG/DL — HIGH (ref 70–99)
GLUCOSE UR QL: NEGATIVE MG/DL — SIGNIFICANT CHANGE UP
HCT VFR BLD CALC: 40.5 % — SIGNIFICANT CHANGE UP (ref 39–50)
HGB BLD-MCNC: 13.4 G/DL — SIGNIFICANT CHANGE UP (ref 13–17)
IMM GRANULOCYTES NFR BLD AUTO: 0.3 % — SIGNIFICANT CHANGE UP (ref 0–0.9)
KETONES UR-MCNC: NEGATIVE MG/DL — SIGNIFICANT CHANGE UP
LACTATE SERPL-SCNC: 1.2 MMOL/L — SIGNIFICANT CHANGE UP (ref 0.7–2)
LEUKOCYTE ESTERASE UR-ACNC: NEGATIVE — SIGNIFICANT CHANGE UP
LYMPHOCYTES # BLD AUTO: 1.97 K/UL — SIGNIFICANT CHANGE UP (ref 1–3.3)
LYMPHOCYTES # BLD AUTO: 26 % — SIGNIFICANT CHANGE UP (ref 13–44)
MCHC RBC-ENTMCNC: 26.6 PG — LOW (ref 27–34)
MCHC RBC-ENTMCNC: 33.1 G/DL — SIGNIFICANT CHANGE UP (ref 32–36)
MCV RBC AUTO: 80.5 FL — SIGNIFICANT CHANGE UP (ref 80–100)
MONOCYTES # BLD AUTO: 0.57 K/UL — SIGNIFICANT CHANGE UP (ref 0–0.9)
MONOCYTES NFR BLD AUTO: 7.5 % — SIGNIFICANT CHANGE UP (ref 2–14)
NEUTROPHILS # BLD AUTO: 4.82 K/UL — SIGNIFICANT CHANGE UP (ref 1.8–7.4)
NEUTROPHILS NFR BLD AUTO: 63.7 % — SIGNIFICANT CHANGE UP (ref 43–77)
NITRITE UR-MCNC: NEGATIVE — SIGNIFICANT CHANGE UP
NRBC # BLD: 0 /100 WBCS — SIGNIFICANT CHANGE UP (ref 0–0)
PH UR: 5 — SIGNIFICANT CHANGE UP (ref 5–8)
PLATELET # BLD AUTO: 305 K/UL — SIGNIFICANT CHANGE UP (ref 150–400)
POTASSIUM SERPL-MCNC: 4.7 MMOL/L — SIGNIFICANT CHANGE UP (ref 3.5–5.3)
POTASSIUM SERPL-SCNC: 4.7 MMOL/L — SIGNIFICANT CHANGE UP (ref 3.5–5.3)
PROT SERPL-MCNC: 6.9 G/DL — SIGNIFICANT CHANGE UP (ref 6–8.3)
PROT UR-MCNC: NEGATIVE MG/DL — SIGNIFICANT CHANGE UP
RBC # BLD: 5.03 M/UL — SIGNIFICANT CHANGE UP (ref 4.2–5.8)
RBC # FLD: 15.7 % — HIGH (ref 10.3–14.5)
SODIUM SERPL-SCNC: 141 MMOL/L — SIGNIFICANT CHANGE UP (ref 135–145)
SP GR SPEC: 1.01 — SIGNIFICANT CHANGE UP (ref 1–1.03)
UROBILINOGEN FLD QL: 0.2 MG/DL — SIGNIFICANT CHANGE UP (ref 0.2–1)
WBC # BLD: 7.57 K/UL — SIGNIFICANT CHANGE UP (ref 3.8–10.5)
WBC # FLD AUTO: 7.57 K/UL — SIGNIFICANT CHANGE UP (ref 3.8–10.5)

## 2024-12-15 RX ORDER — CYANOCOBALAMIN/FOLIC AC/VIT B6 1-2.2-25MG
1 TABLET ORAL DAILY
Refills: 0 | Status: DISCONTINUED | OUTPATIENT
Start: 2024-12-15 | End: 2024-12-18

## 2024-12-15 RX ORDER — CLOPIDOGREL 75 MG/1
75 TABLET, FILM COATED ORAL DAILY
Refills: 0 | Status: DISCONTINUED | OUTPATIENT
Start: 2024-12-15 | End: 2024-12-18

## 2024-12-15 RX ORDER — TAMSULOSIN HYDROCHLORIDE 0.4 MG/1
0.4 CAPSULE ORAL AT BEDTIME
Refills: 0 | Status: DISCONTINUED | OUTPATIENT
Start: 2024-12-15 | End: 2024-12-18

## 2024-12-15 RX ORDER — VALSARTAN 320 MG/1
40 TABLET ORAL DAILY
Refills: 0 | Status: DISCONTINUED | OUTPATIENT
Start: 2024-12-15 | End: 2024-12-18

## 2024-12-15 RX ORDER — DIGOXIN 250 MCG
125 TABLET ORAL DAILY
Refills: 0 | Status: DISCONTINUED | OUTPATIENT
Start: 2024-12-15 | End: 2024-12-18

## 2024-12-15 RX ORDER — ACETAMINOPHEN, DIPHENHYDRAMINE HCL, PHENYLEPHRINE HCL 325; 25; 5 MG/1; MG/1; MG/1
3 TABLET ORAL AT BEDTIME
Refills: 0 | Status: DISCONTINUED | OUTPATIENT
Start: 2024-12-15 | End: 2024-12-18

## 2024-12-15 RX ADMIN — Medication 1: at 14:39

## 2024-12-15 RX ADMIN — VALSARTAN 40 MILLIGRAM(S): 320 TABLET ORAL at 07:05

## 2024-12-15 RX ADMIN — Medication 125 MICROGRAM(S): at 07:05

## 2024-12-15 RX ADMIN — Medication 1 TABLET(S): at 13:17

## 2024-12-15 RX ADMIN — Medication 80 MILLIGRAM(S): at 22:40

## 2024-12-15 RX ADMIN — Medication 2: at 08:34

## 2024-12-15 RX ADMIN — CLOPIDOGREL 75 MILLIGRAM(S): 75 TABLET, FILM COATED ORAL at 13:17

## 2024-12-15 RX ADMIN — Medication 81 MILLIGRAM(S): at 13:17

## 2024-12-15 RX ADMIN — TAMSULOSIN HYDROCHLORIDE 0.4 MILLIGRAM(S): 0.4 CAPSULE ORAL at 22:40

## 2024-12-15 RX ADMIN — Medication 80 MILLIGRAM(S): at 02:50

## 2024-12-15 RX ADMIN — TAMSULOSIN HYDROCHLORIDE 0.4 MILLIGRAM(S): 0.4 CAPSULE ORAL at 02:50

## 2024-12-15 RX ADMIN — FUROSEMIDE 40 MILLIGRAM(S): 40 TABLET ORAL at 02:50

## 2024-12-15 RX ADMIN — ACETAMINOPHEN, DIPHENHYDRAMINE HCL, PHENYLEPHRINE HCL 3 MILLIGRAM(S): 325; 25; 5 TABLET ORAL at 22:41

## 2024-12-15 NOTE — PATIENT PROFILE ADULT - FALL HARM RISK - HARM RISK INTERVENTIONS

## 2024-12-15 NOTE — CONSULT NOTE ADULT - SUBJECTIVE AND OBJECTIVE BOX
Time of visit:    CHIEF COMPLAINT: Patient is a 74y old  Male who presents with a chief complaint of     HPI:  74y M with PMHx of CVA, MI, HTN, T2DM, HLD, CAD s/p cardiac stents, HFrEF (EF 20-25% 10/2024), hx of malignant melanoma/wide excision, with recent elective bioprosthetic aortic valve replacement and ascending aortic aneurysm repair with transverse hemiarch and CABG x2 (24) with post op course complicated by cardiogenic shock requiring inotropic support with IV dobutamine and milrinone as well as IABP, previously on amiodarone for atrial fibrillation prophylaxis  presenting after episode of dizziness followed by syncope and collapse. Patient recently admitted to [facility] from  to  for acute on chronic CHF and sepsis due to pneumonia. Patient states that today his strength and breathing had finally improved enough for him to return to some of his normal activities/hobbies. Patient states that he was excited to be able to go out to his garage and work on building a cabinet. Reports that he likely overextended himself by standing for too long and lifting sheet wood while working on this project for over 2 hours. Patient states that he bent over to pick something up of the floor and when he stood back up straight he felt very dizzy and the next thing he knew he woke up on the floor. Patient says that prior to loss of consciousness he felt light headed and like he was spinning. Denies any chest pain, palpitations, or shortness of breath prior to syncopal episode. When patient regained consciousness he realized he had struck his head on the concrete floor of the garage and somehow cut his hand during collapse. Patient was unable to lift himself up off the floor under his own strength and had to call for help and family called EMS. Patient currently complaining of generalized weakness and expresses disappointment that he is back in the hospital after finally feeling better. Denies fever, chills, headache, chest pain, palpitations cough,, shortness of breath, abdominal pain, nausea, vomiting, diarrhea, constipation, and dysuria. Reports he has an upcoming telehealth appointment with cardiologist to discuss possible AICD placement.   (14 Dec 2024 23:48)   Patient seen and examined.     PAST MEDICAL & SURGICAL HISTORY:  HTN (hypertension)      Hearing decreased      CVA (cerebral vascular accident)  2016      Hyperlipemia      Kidney stones      Coronary artery disease  MI 2016      CHF (congestive heart failure)  2017 stents x3      Type 2 diabetes mellitus        Confusion  from stroke      S/P medial meniscal repair  and ligament surgery      H/O lithotripsy      S/P tonsillectomy      Bilateral inguinal hernia          Allergies    No Known Allergies    Intolerances        MEDICATIONS  (STANDING):  aspirin enteric coated 81 milliGRAM(s) Oral daily  atorvastatin 80 milliGRAM(s) Oral at bedtime  clopidogrel Tablet 75 milliGRAM(s) Oral daily  digoxin     Tablet 125 MICROGram(s) Oral daily  insulin lispro (ADMELOG) corrective regimen sliding scale   SubCutaneous three times a day before meals  insulin lispro (ADMELOG) corrective regimen sliding scale   SubCutaneous at bedtime  melatonin 3 milliGRAM(s) Oral at bedtime  multivitamin 1 Tablet(s) Oral daily  tamsulosin 0.4 milliGRAM(s) Oral at bedtime  valsartan 40 milliGRAM(s) Oral daily      MEDICATIONS  (PRN):  acetaminophen     Tablet .. 650 milliGRAM(s) Oral every 6 hours PRN Temp greater or equal to 38C (100.4F), Mild Pain (1 - 3)   Medications up to date at time of exam.    Medications up to date at time of exam.    FAMILY HISTORY:  Family history of diabetes mellitus    Family history of stroke        SOCIAL HISTORY  Smoking History: [   ] smoking/smoke exposure, [   ] former smoker  Living Condition: [   ] apartment, [   ] private house  Work History:   Travel History: denies recent travel  Illicit Substance Use: denies  Alcohol Use: denies    REVIEW OF SYSTEMS:    CONSTITUTIONAL:  denies fevers, chills, sweats, weight loss    HEENT:  denies diplopia or blurred vision, sore throat or runny nose.    CARDIOVASCULAR:  denies pressure, squeezing, tightness, or heaviness about the chest; no palpitations.    RESPIRATORY:  denies SOB, cough, GREENE, wheezing.    GASTROINTESTINAL:  denies abdominal pain, nausea, vomiting or diarrhea.    GENITOURINARY: denies dysuria, frequency or urgency.    NEUROLOGIC:  denies numbness, tingling, seizures or weakness.    PSYCHIATRIC:  denies disorder of thought or mood.    MSK: denies swelling, redness      PHYSICAL EXAMINATION:    GENERAL: The patient  in no apparent distress.     Vital Signs Last 24 Hrs  T(C): 36.6 (15 Dec 2024 16:27), Max: 37.1 (14 Dec 2024 19:42)  T(F): 97.9 (15 Dec 2024 16:27), Max: 98.7 (14 Dec 2024 19:42)  HR: 91 (15 Dec 2024 16:27) (90 - 100)  BP: 116/71 (15 Dec 2024 16:27) (106/70 - 131/80)  BP(mean): 95 (14 Dec 2024 23:40) (95 - 97)  RR: 18 (15 Dec 2024 16:27) (15 - 18)  SpO2: 97% (15 Dec 2024 16:27) (95% - 100%)    Parameters below as of 15 Dec 2024 16:  Patient On (Oxygen Delivery Method): room air       (if applicable)    Chest Tube (if applicable)    HEENT: Head is normocephalic and atraumatic. Extraocular muscles are intact. Mucous membranes are moist.     NECK: Supple, no palpable adenopathy.    LUNGS: Fair b/l breath sounds, no wheezing, rales, or rhonchi.    HEART: Regular rate and rhythm without murmur.    ABDOMEN: Soft, nontender, and nondistended.  No hepatosplenomegaly is noted.    RENAL: No difficulty voiding, no pelvic pain    EXTREMITIES: Without any cyanosis, clubbing, rash, lesions or edema.    NEUROLOGIC: Awake, alert, oriented, grossly intact    SKIN: Warm, dry, good turgor.      LABS:                        13.4   7.57  )-----------( 305      ( 15 Dec 2024 05:25 )             40.5     12-15    141  |  109[H]  |  21[H]  ----------------------------<  135[H]  4.7   |  28  |  1.08    Ca    9.6      15 Dec 2024 05:25  Mg     1.9     12-14    TPro  6.9  /  Alb  3.3[L]  /  TBili  1.0  /  DBili  x   /  AST  16  /  ALT  28  /  AlkPhos  111  12-15      Urinalysis Basic - ( 15 Dec 2024 08:37 )    Color: Yellow / Appearance: Clear / S.008 / pH: x  Gluc: x / Ketone: Negative mg/dL  / Bili: Negative / Urobili: 0.2 mg/dL   Blood: x / Protein: Negative mg/dL / Nitrite: Negative   Leuk Esterase: Negative / RBC: x / WBC x   Sq Epi: x / Non Sq Epi: x / Bacteria: x                Lactate, Blood: 1.2 mmol/L (12-15-24 @ 05:15)        MICROBIOLOGY: (if applicable)    RADIOLOGY & ADDITIONAL STUDIES:  EKG:   CXR:  ECHO:    IMPRESSION: 74y Male PAST MEDICAL & SURGICAL HISTORY:  HTN (hypertension)      Hearing decreased      CVA (cerebral vascular accident)  2016      Hyperlipemia      Kidney stones      Coronary artery disease  MI 2016      CHF (congestive heart failure)  2017 stents x3      Type 2 diabetes mellitus        Confusion  from stroke      S/P medial meniscal repair  and ligament surgery      H/O lithotripsy      S/P tonsillectomy      Bilateral inguinal hernia       p/w                  Time of visit:    CHIEF COMPLAINT: Patient is a 74y old  Male who presents with a chief complaint of     HPI: This is a 74 yr old man , non smoker  with  CVA, MI, HTN, T2DM, HLD, CAD s/p cardiac stents, HFrEF (EF 20-25% 10/2024), hx of malignant melanoma/wide excision, with recent elective bioprosthetic aortic valve replacement and ascending aortic aneurysm repair with transverse hemiarch and CABG x2 (24) with post op course complicated by cardiogenic shock requiring inotropic support with IV dobutamine and milrinone as well as IABP, previously on amiodarone for atrial fibrillation prophylaxis  presenting after episode of dizziness followed by syncope and collapse. Patient recently admitted to [facility] from  to  for acute on chronic CHF and sepsis due to pneumonia. Patient states that today his strength and breathing had finally improved enough for him to return to some of his normal activities/hobbies. Patient states that he was excited to be able to go out to his garage and work on building a cabinet. Reports that he likely overextended himself by standing for too long and lifting sheet wood while working on this project for over 2 hours. Patient states that he bent over to pick something up of the floor and when he stood back up straight he felt very dizzy and the next thing he knew he woke up on the floor. Patient says that prior to loss of consciousness he felt light headed and like he was spinning. Denies any chest pain, palpitations, or shortness of breath prior to syncopal episode. When patient regained consciousness he realized he had struck his head on the concrete floor of the garage and somehow cut his hand during collapse. Patient was unable to lift himself up off the floor under his own strength and had to call for help and family called EMS. Patient currently complaining of generalized weakness and expresses disappointment that he is back in the hospital after finally feeling better. Denies fever, chills, headache, chest pain, palpitations cough,, shortness of breath, abdominal pain, nausea, vomiting, diarrhea, constipation, and dysuria. Reports he has an upcoming telehealth appointment with cardiologist to discuss possible AICD placement.   (14 Dec 2024 23:48)   Patient seen and examined.     PAST MEDICAL & SURGICAL HISTORY:  HTN (hypertension)  Hearing decreased  CVA (cerebral vascular accident)  2016  Hyperlipemia  Kidney stones  Coronary artery disease  MI 2016  CHF (congestive heart failure)  2017 stents x3  Type 2 diabetes mellitus    Confusion  from stroke  S/P medial meniscal repair  and ligament surgery  H/O lithotripsy  S/P tonsillectomy  Bilateral inguinal hernia    Allergies  No Known Allergies    MEDICATIONS  (STANDING):  aspirin enteric coated 81 milliGRAM(s) Oral daily  atorvastatin 80 milliGRAM(s) Oral at bedtime  clopidogrel Tablet 75 milliGRAM(s) Oral daily  digoxin     Tablet 125 MICROGram(s) Oral daily  insulin lispro (ADMELOG) corrective regimen sliding scale   SubCutaneous three times a day before meals  insulin lispro (ADMELOG) corrective regimen sliding scale   SubCutaneous at bedtime  melatonin 3 milliGRAM(s) Oral at bedtime  multivitamin 1 Tablet(s) Oral daily  tamsulosin 0.4 milliGRAM(s) Oral at bedtime  valsartan 40 milliGRAM(s) Oral daily      MEDICATIONS  (PRN):  acetaminophen     Tablet .. 650 milliGRAM(s) Oral every 6 hours PRN Temp greater or equal to 38C (100.4F), Mild Pain (1 - 3)   Medications up to date at time of exam.    Medications up to date at time of exam.    FAMILY HISTORY:  Family history of diabetes mellitus    Family history of stroke        SOCIAL HISTORY  Smoking History: [x   ] none smoking/smoke exposure, [   ] former smoker  Living Condition: [   ] apartment, [   ] private house  Work History:  wood worker   Travel History: denies recent travel  Illicit Substance Use: denies  Alcohol Use: denies    REVIEW OF SYSTEMS:    CONSTITUTIONAL:  denies fevers, chills, sweats, weight loss    HEENT:  denies diplopia or blurred vision, sore throat or runny nose.    CARDIOVASCULAR:  denies pressure, squeezing, tightness, or heaviness about the chest; no palpitations.    RESPIRATORY:  denies SOB, cough, GREENE, wheezing.    GASTROINTESTINAL:  denies abdominal pain, nausea, vomiting or diarrhea.    GENITOURINARY: denies dysuria, frequency or urgency.    NEUROLOGIC:  denies numbness, tingling, seizures or weakness.    PSYCHIATRIC:  denies disorder of thought or mood.    MSK: denies swelling, redness      PHYSICAL EXAMINATION:    GENERAL: The patient  in no apparent distress.     Vital Signs Last 24 Hrs  T(C): 36.6 (15 Dec 2024 16:27), Max: 37.1 (14 Dec 2024 19:42)  T(F): 97.9 (15 Dec 2024 16:27), Max: 98.7 (14 Dec 2024 19:42)  HR: 91 (15 Dec 2024 16:27) (90 - 100)  BP: 116/71 (15 Dec 2024 16:27) (106/70 - 131/80)  BP(mean): 95 (14 Dec 2024 23:40) (95 - 97)  RR: 18 (15 Dec 2024 16:27) (15 - 18)  SpO2: 97% (15 Dec 2024 16:) (95% - 100%)    Parameters below as of 15 Dec 2024 16:  Patient On (Oxygen Delivery Method): room air       (if applicable)    Chest Tube (if applicable)    HEENT: Head is normocephalic and atraumatic. Extraocular muscles are intact. Mucous membranes are moist.     NECK: Supple, no palpable adenopathy.    LUNGS: Fair b/l breath sounds, no wheezing, rales, or rhonchi.    HEART: Regular rate and rhythm without murmur.    ABDOMEN: Soft, nontender, and nondistended.  No hepatosplenomegaly is noted.    RENAL: No difficulty voiding, no pelvic pain    EXTREMITIES: Without any cyanosis, clubbing, rash, lesions or edema.    NEUROLOGIC: Awake, alert, oriented, grossly intact    SKIN: Warm, dry, good turgor.      LABS:                        13.4   7.57  )-----------( 305      ( 15 Dec 2024 05:25 )             40.5     12-15    141  |  109[H]  |  21[H]  ----------------------------<  135[H]  4.7   |  28  |  1.08    Ca    9.6      15 Dec 2024 05:25  Mg     1.9     12-14    TPro  6.9  /  Alb  3.3[L]  /  TBili  1.0  /  DBili  x   /  AST  16  /  ALT  28  /  AlkPhos  111  12-15      Urinalysis Basic - ( 15 Dec 2024 08:37 )    Color: Yellow / Appearance: Clear / S.008 / pH: x  Gluc: x / Ketone: Negative mg/dL  / Bili: Negative / Urobili: 0.2 mg/dL   Blood: x / Protein: Negative mg/dL / Nitrite: Negative   Leuk Esterase: Negative / RBC: x / WBC x   Sq Epi: x / Non Sq Epi: x / Bacteria: x                Lactate, Blood: 1.2 mmol/L (12-15-24 @ 05:15)        MICROBIOLOGY: (if applicable)    RADIOLOGY & ADDITIONAL STUDIES:  EKG:   CXR:  ECHO:    IMPRESSION: 74y Male PAST MEDICAL & SURGICAL HISTORY:  HTN (hypertension)      Hearing decreased      CVA (cerebral vascular accident)  2016      Hyperlipemia      Kidney stones      Coronary artery disease  MI 2016      CHF (congestive heart failure)  2017 stents x3      Type 2 diabetes mellitus  2016      Confusion  from stroke      S/P medial meniscal repair  and ligament surgery      H/O lithotripsy      S/P tonsillectomy      Bilateral inguinal hernia       p/w                  Time of visit:    CHIEF COMPLAINT: Patient is a 74y old  Male who presents with a chief complaint of     HPI: This is a 74 yr old man , non smoker  with  CVA, MI, HTN, T2DM, HLD, CAD s/p cardiac stents, HFrEF (EF 20-25% 10/2024), hx of malignant melanoma/wide excision, with recent elective bioprosthetic aortic valve replacement and ascending aortic aneurysm repair with transverse hemiarch and CABG x2 (24) with post op course complicated by cardiogenic shock requiring inotropic support with IV dobutamine and milrinone as well as IABP, previously on amiodarone for atrial fibrillation prophylaxis  presenting after episode of dizziness followed by syncope and collapse. Patient recently admitted to [facility] from  to  for acute on chronic CHF and sepsis due to pneumonia. Patient states that today his strength and breathing had finally improved enough for him to return to some of his normal activities/hobbies. Patient states that he was excited to be able to go out to his garage and work on building a cabinet. Reports that he likely overextended himself by standing for too long and lifting sheet wood while working on this project for over 2 hours. Patient states that he bent over to pick something up of the floor and when he stood back up straight he felt very dizzy and the next thing he knew he woke up on the floor. Patient says that prior to loss of consciousness he felt light headed and like he was spinning. Denies any chest pain, palpitations, or shortness of breath prior to syncopal episode. When patient regained consciousness he realized he had struck his head on the concrete floor of the garage and somehow cut his hand during collapse. Patient was unable to lift himself up off the floor under his own strength and had to call for help and family called EMS. Patient currently complaining of generalized weakness and expresses disappointment that he is back in the hospital after finally feeling better. Denies fever, chills, headache, chest pain, palpitations cough,, shortness of breath, abdominal pain, nausea, vomiting, diarrhea, constipation, and dysuria. Reports he has an upcoming telehealth appointment with cardiologist to discuss possible AICD placement.   (14 Dec 2024 23:48)   Patient seen and examined.     PAST MEDICAL & SURGICAL HISTORY:  HTN (hypertension)  Hearing decreased  CVA (cerebral vascular accident)  2016  Hyperlipemia  Kidney stones  Coronary artery disease  MI 2016  CHF (congestive heart failure)  2017 stents x3  Type 2 diabetes mellitus    Confusion  from stroke  S/P medial meniscal repair  and ligament surgery  H/O lithotripsy  S/P tonsillectomy  Bilateral inguinal hernia    Allergies  No Known Allergies    MEDICATIONS  (STANDING):  aspirin enteric coated 81 milliGRAM(s) Oral daily  atorvastatin 80 milliGRAM(s) Oral at bedtime  clopidogrel Tablet 75 milliGRAM(s) Oral daily  digoxin     Tablet 125 MICROGram(s) Oral daily  insulin lispro (ADMELOG) corrective regimen sliding scale   SubCutaneous three times a day before meals  insulin lispro (ADMELOG) corrective regimen sliding scale   SubCutaneous at bedtime  melatonin 3 milliGRAM(s) Oral at bedtime  multivitamin 1 Tablet(s) Oral daily  tamsulosin 0.4 milliGRAM(s) Oral at bedtime  valsartan 40 milliGRAM(s) Oral daily      MEDICATIONS  (PRN):  acetaminophen     Tablet .. 650 milliGRAM(s) Oral every 6 hours PRN Temp greater or equal to 38C (100.4F), Mild Pain (1 - 3)   Medications up to date at time of exam.    Medications up to date at time of exam.    FAMILY HISTORY:  Family history of diabetes mellitus    Family history of stroke        SOCIAL HISTORY  Smoking History: [x   ] none smoking/smoke exposure, [   ] former smoker  Living Condition: [   ] apartment, [   ] private house  Work History:  wood worker   Travel History: denies recent travel  Illicit Substance Use: denies  Alcohol Use: denies    REVIEW OF SYSTEMS:    CONSTITUTIONAL:  denies fevers, chills, sweats, weight loss    HEENT:  denies diplopia or blurred vision, sore throat or runny nose.    CARDIOVASCULAR:  denies pressure, squeezing, tightness, or heaviness about the chest; no palpitations.    RESPIRATORY:  denies SOB, cough, GREENE, wheezing.    GASTROINTESTINAL:  denies abdominal pain, nausea, vomiting or diarrhea.    GENITOURINARY: denies dysuria, frequency or urgency.    NEUROLOGIC:  denies numbness, tingling, seizures or weakness.    PSYCHIATRIC:  denies disorder of thought or mood.    MSK: denies swelling, redness      PHYSICAL EXAMINATION:    GENERAL: The patient  in no apparent distress.     Vital Signs Last 24 Hrs  T(C): 36.6 (15 Dec 2024 16:27), Max: 37.1 (14 Dec 2024 19:42)  T(F): 97.9 (15 Dec 2024 16:27), Max: 98.7 (14 Dec 2024 19:42)  HR: 91 (15 Dec 2024 16:27) (90 - 100)  BP: 116/71 (15 Dec 2024 16:27) (106/70 - 131/80)  BP(mean): 95 (14 Dec 2024 23:40) (95 - 97)  RR: 18 (15 Dec 2024 16:27) (15 - 18)  SpO2: 97% (15 Dec 2024 16:) (95% - 100%)    Parameters below as of 15 Dec 2024 16:  Patient On (Oxygen Delivery Method): room air       (if applicable)    Chest Tube (if applicable)    HEENT: Head is normocephalic and atraumatic. Extraocular muscles are intact. Mucous membranes are moist.     NECK: Supple, no palpable adenopathy.    LUNGS: Fair b/l breath sounds, no wheezing, rales, or rhonchi.    HEART: Regular rate and rhythm without murmur.    ABDOMEN: Soft, nontender, and nondistended.  No hepatosplenomegaly is noted.    RENAL: No difficulty voiding, no pelvic pain    EXTREMITIES: Without any cyanosis, clubbing, rash, lesions or edema.    NEUROLOGIC: Awake, alert, oriented, grossly intact    SKIN: Warm, dry, good turgor.      LABS:                        13.4   7.57  )-----------( 305      ( 15 Dec 2024 05:25 )             40.5     12-15    141  |  109[H]  |  21[H]  ----------------------------<  135[H]  4.7   |  28  |  1.08    Ca    9.6      15 Dec 2024 05:25  Mg     1.9     12-14    TPro  6.9  /  Alb  3.3[L]  /  TBili  1.0  /  DBili  x   /  AST  16  /  ALT  28  /  AlkPhos  111  12-15      Urinalysis Basic - ( 15 Dec 2024 08:37 )    Color: Yellow / Appearance: Clear / S.008 / pH: x  Gluc: x / Ketone: Negative mg/dL  / Bili: Negative / Urobili: 0.2 mg/dL   Blood: x / Protein: Negative mg/dL / Nitrite: Negative   Leuk Esterase: Negative / RBC: x / WBC x   Sq Epi: x / Non Sq Epi: x / Bacteria: x  Lactate, Blood: 1.2 mmol/L (12-15-24 @ 05:15)    MICROBIOLOGY: (if applicable)    RADIOLOGY & ADDITIONAL STUDIES:  EKG:   CXR:< from: CT Chest No Cont (24 @ 21:55) >  IMPRESSION:  1.   Hydrostatic interstitial pulmonary edema/CHF.  2.   Multifocal bilateral pulmonary fibrosis and pulmonary scarring.  3.   Fairly extensive multifocal bilateral pneumonia.  4.   Small volume bilateral pleural effusions.    < end of copied text >    ECHO:    IMPRESSION: 74y Male PAST MEDICAL & SURGICAL HISTORY:  HTN (hypertension)      Hearing decreased      CVA (cerebral vascular accident)  2016      Hyperlipemia      Kidney stones      Coronary artery disease  MI 2016      CHF (congestive heart failure)  2017 stents x3      Type 2 diabetes mellitus        Confusion  from stroke      S/P medial meniscal repair  and ligament surgery      H/O lithotripsy      S/P tonsillectomy      Bilateral inguinal hernia       p/w       IMP:  This is a 74 yr old man , non smoker  with  CVA, MI, HTN, T2DM, HLD, CAD s/p cardiac stents, HFrEF (EF 20-25% 10/2024), hx of malignant melanoma/wide excision, with recent elective bioprosthetic aortic valve replacement and ascending aortic aneurysm repair with transverse hemiarch and CABG x2 (24) with post op course complicated by cardiogenic shock requiring inotropic support with IV dobutamine and milrinone as well as IABP, previously on amiodarone for atrial fibrillation prophylaxis  presenting after episode of dizziness followed by syncope and collapse. CT chest with ILD ? etiology   Pat is asymptomatic from a pulmonary point of view      ASSESSMENT     - Syncope   - HTN HLD   - T2DM   - CAD   - HFrEF 20-25 %   - ILD   - Hx of malignant melanoma       [place]ugg    - Continue syncope work up   - O2 supp as needed  - Fall precaution   - Duonebs Q6h prn   - Advair 250 / 50 Q12h   - No steroid at this time since no SOB   - Monitor blood glucose with coverage   - ILD work up as out pat  - Optimize HF meds   - Out pat pulmo f/u

## 2024-12-15 NOTE — CONSULT NOTE ADULT - SUBJECTIVE AND OBJECTIVE BOX
EP Attending    HISTORY OF PRESENT ILLNESS: HPI:  74y M with PMHx of CVA, MI, HTN, T2DM, HLD, CAD s/p cardiac stents, HFrEF (EF 20-25% 10/2024), hx of malignant melanoma/wide excision, with recent elective bioprosthetic aortic valve replacement and ascending aortic aneurysm repair with transverse hemiarch and CABG x2 (24) with post op course complicated by cardiogenic shock requiring inotropic support with IV dobutamine and milrinone as well as IABP, previously on amiodarone for atrial fibrillation prophylaxis  presenting after episode of dizziness followed by syncope and collapse. Patient recently admitted to [facility] by [facility] from  to  for acute on chronic CHF and sepsis due to pneumonia. Patient states that today his strength and breathing had finally improved enough for him to return to some of his normal activities/hobbies. Patient states that he was excited to be able to go out to his garage and work on building a cabinet. Reports that he likely overextended himself by standing for too long and lifting sheet wood while working on this project for over 2 hours. Patient states that he bent over to pick something up of the floor and when he stood back up straight he felt very dizzy and the next thing he knew he woke up on the floor. Patient says that prior to loss of consciousness he felt light headed and like he was spinning. Denies any chest pain, palpitations, or shortness of breath prior to syncopal episode. When patient regained consciousness he realized he had struck his head on the concrete floor of the garage and somehow cut his hand during collapse. Patient was unable to lift himself up off the floor under his own strength and had to call for help and family called EMS. Patient currently complaining of generalized weakness and expresses disappointment that he is back in the hospital after finally feeling better. Denies fever, chills, headache, chest pain, palpitations cough,, shortness of breath, abdominal pain, nausea, vomiting, diarrhea, constipation, and dysuria. Reports he has an upcoming telehealth appointment with cardiologist to discuss possible AICD placement.   (14 Dec 2024 23:48)    Mr Corey is a very pleasant 74yoM here with fainting (and subsequent forearm laceration) while working in his garage doing some woodworking.  He was doing physical labor for 2 hours, slowly, but without angina palpitations or lightheadedness.  After bending to  some materials, he stood up, felt lightheaded and collapsed.  No angina afterwards.  Was not witnessed, so unclear how long he had been down on the floor.  His PMH is notable for CAD/MI/multivessel PCI ~7yrs ago.  He underwent sternotomy for CABG x2 + BioAVR/Bentall/Jose-Arch repair with Dr Ortiz in 2024 with apparently a complicated post op course.    In the interim, he's been hospitalized with pneumonia, was treated, and was relieved to be home to get back to a 'normal lifestyle'.  On prior hospitalization in -2024, did not tolerate beta blockers (hypotensive, acute kidney injury)  He sees Dr Della Joyce at [facility].  Does not believe he's seen an EP physician yet, and was planning a phonecall with her this week to discuss ICD implant.  At this time, complains of feeling cold in the ED, but otherwise no angina, palpitations, nausea, sweating or lightheadedness. A 10 pt ROS is otherwise negative.    PAST MEDICAL & SURGICAL HISTORY:  HTN (hypertension)  Hearing decreased  CVA (cerebral vascular accident)  2016  Hyperlipemia  Kidney stones  Coronary artery disease  MI 2016  CHF (congestive heart failure)  2017 stents x3  Type 2 diabetes mellitus  2016  Confusion  from stroke  S/P medial meniscal repair  and ligament surgery  H/O lithotripsy  s/P tonsillectomy  Bilateral inguinal hernia        MEDICATIONS  (STANDING):  aspirin enteric coated 81 milliGRAM(s) Oral daily  atorvastatin 80 milliGRAM(s) Oral at bedtime  clopidogrel Tablet 75 milliGRAM(s) Oral daily  digoxin     Tablet 125 MICROGram(s) Oral daily  insulin lispro (ADMELOG) corrective regimen sliding scale   SubCutaneous three times a day before meals  insulin lispro (ADMELOG) corrective regimen sliding scale   SubCutaneous at bedtime  melatonin 3 milliGRAM(s) Oral at bedtime  multivitamin 1 Tablet(s) Oral daily  tamsulosin 0.4 milliGRAM(s) Oral at bedtime  valsartan 40 milliGRAM(s) Oral daily    Allergies    No Known Allergies    Intolerances    FAMILY HISTORY:  Family history of diabetes mellitus    Family history of stroke      Non-contributary for premature coronary disease or sudden cardiac death    SOCIAL HISTORY:    [x ] Non-smoker  [ ] Smoker  [ ] Alcohol      PHYSICAL EXAM:  T(C): 36.4 (12-15-24 @ 12:17), Max: 37.1 (24 @ 19:42)  HR: 90 (12-15-24 @ 12:17) (90 - 100)  BP: 111/74 (12-15-24 @ 12:17) (106/70 - 131/80)  RR: 18 (12-15-24 @ 12:17) (15 - 18)  SpO2: 96% (12-15-24 @ 12:17) (95% - 100%)  Wt(kg): --    Appearance: low muscle mass adult male in no acute distress, nondiaphoretic.	  HEENT:   Normal oral mucosa, PERRL, EOMI	  Lymphatic: No lymphadenopathy , no edema  Cardiovascular: Normal S1 S2, No JVD, No murmurs , Peripheral pulses palpable 2+ bilaterally, well-healed sternotomy.  Respiratory: Lungs clear to auscultation, normal effort 	  Gastrointestinal:  Soft, Non-tender, + BS	  Skin: No rashes, No ecchymoses, No cyanosis, warm to touch  Musculoskeletal: Normal range of motion, normal strength  Psychiatry:  Mood & affect appropriate    TELEMETRY: NSR	    EC2024- NSR, OR prolongation, LBBB 140ms.  	    Echo:  NST:  Cath:  	  	  LABS:	 	                          13.4   7.57  )-----------( 305      ( 15 Dec 2024 05:25 )             40.5     12-15    141  |  109[H]  |  21[H]  ----------------------------<  135[H]  4.7   |  28  |  1.08    Ca    9.6      15 Dec 2024 05:25  Mg     1.9     -    TPro  6.9  /  Alb  3.3[L]  /  TBili  1.0  /  DBili  x   /  AST  16  /  ALT  28  /  AlkPhos  111  12-15         ASSESSMENT/PLAN: 	Cathy Burroughs M.D.  Cardiac Electrophysiology    office [phone]  pager [phone] EP Attending    HISTORY OF PRESENT ILLNESS: HPI:  74y M with PMHx of CVA, MI, HTN, T2DM, HLD, CAD s/p cardiac stents, HFrEF (EF 20-25% 10/2024), hx of malignant melanoma/wide excision, with recent elective bioprosthetic aortic valve replacement and ascending aortic aneurysm repair with transverse hemiarch and CABG x2 (24) with post op course complicated by cardiogenic shock requiring inotropic support with IV dobutamine and milrinone as well as IABP, previously on amiodarone for atrial fibrillation prophylaxis  presenting after episode of dizziness followed by syncope and collapse. Patient recently admitted to [facility] from  to  for acute on chronic CHF and sepsis due to pneumonia. Patient states that today his strength and breathing had finally improved enough for him to return to some of his normal activities/hobbies. Patient states that he was excited to be able to go out to his garage and work on building a cabinet. Reports that he likely overextended himself by standing for too long and lifting sheet wood while working on this project for over 2 hours. Patient states that he bent over to pick something up of the floor and when he stood back up straight he felt very dizzy and the next thing he knew he woke up on the floor. Patient says that prior to loss of consciousness he felt light headed and like he was spinning. Denies any chest pain, palpitations, or shortness of breath prior to syncopal episode. When patient regained consciousness he realized he had struck his head on the concrete floor of the garage and somehow cut his hand during collapse. Patient was unable to lift himself up off the floor under his own strength and had to call for help and family called EMS. Patient currently complaining of generalized weakness and expresses disappointment that he is back in the hospital after finally feeling better. Denies fever, chills, headache, chest pain, palpitations cough,, shortness of breath, abdominal pain, nausea, vomiting, diarrhea, constipation, and dysuria. Reports he has an upcoming telehealth appointment with cardiologist to discuss possible AICD placement.   (14 Dec 2024 23:48)    Mr Corey is a very pleasant 74yoM here with fainting (and subsequent forearm laceration) while working in his garage doing some woodworking.  He was doing physical labor for 2 hours, slowly, but without angina palpitations or lightheadedness.  After bending to  some materials, he stood up, felt lightheaded and collapsed.  No angina afterwards.  Was not witnessed, so unclear how long he had been down on the floor.  His PMH is notable for CAD/MI/multivessel PCI ~7yrs ago.  He underwent sternotomy for CABG x2 + BioAVR/Bentall/Jose-Arch repair with Dr Ortiz in 2024 with apparently a complicated post op course.    In the interim, he's been hospitalized with pneumonia, was treated, and was relieved to be home to get back to a 'normal lifestyle'.  On prior hospitalization in -2024, did not tolerate beta blockers (hypotensive, acute kidney injury)  He sees Dr Della Joyce at [facility].  Does not believe he's seen an EP physician yet, and was planning a phonecall with her this week to discuss ICD implant.  At this time, complains of feeling cold in the ED, but otherwise no angina, palpitations, nausea, sweating or lightheadedness. A 10 pt ROS is otherwise negative.    PAST MEDICAL & SURGICAL HISTORY:  HTN (hypertension)  Hearing decreased  CVA (cerebral vascular accident)  2016  Hyperlipemia  Kidney stones  Coronary artery disease  MI 2016  CHF (congestive heart failure)  2017 stents x3  Type 2 diabetes mellitus  2016  Confusion  from stroke  S/P medial meniscal repair  and ligament surgery  H/O lithotripsy  s/P tonsillectomy  Bilateral inguinal hernia        MEDICATIONS  (STANDING):  aspirin enteric coated 81 milliGRAM(s) Oral daily  atorvastatin 80 milliGRAM(s) Oral at bedtime  clopidogrel Tablet 75 milliGRAM(s) Oral daily  digoxin     Tablet 125 MICROGram(s) Oral daily  insulin lispro (ADMELOG) corrective regimen sliding scale   SubCutaneous three times a day before meals  insulin lispro (ADMELOG) corrective regimen sliding scale   SubCutaneous at bedtime  melatonin 3 milliGRAM(s) Oral at bedtime  multivitamin 1 Tablet(s) Oral daily  tamsulosin 0.4 milliGRAM(s) Oral at bedtime  valsartan 40 milliGRAM(s) Oral daily    Allergies    No Known Allergies    Intolerances    FAMILY HISTORY:  Family history of diabetes mellitus    Family history of stroke      Non-contributary for premature coronary disease or sudden cardiac death    SOCIAL HISTORY:    [x ] Non-smoker  [ ] Smoker  [ ] Alcohol      PHYSICAL EXAM:  T(C): 36.4 (12-15-24 @ 12:17), Max: 37.1 (24 @ 19:42)  HR: 90 (12-15-24 @ 12:17) (90 - 100)  BP: 111/74 (12-15-24 @ 12:17) (106/70 - 131/80)  RR: 18 (12-15-24 @ 12:17) (15 - 18)  SpO2: 96% (12-15-24 @ 12:17) (95% - 100%)  Wt(kg): --    Appearance: low muscle mass adult male in no acute distress, nondiaphoretic.	  HEENT:   Normal oral mucosa, PERRL, EOMI	  Lymphatic: No lymphadenopathy , no edema  Cardiovascular: Normal S1 S2, No JVD, No murmurs , Peripheral pulses palpable 2+ bilaterally, well-healed sternotomy.  Respiratory: Lungs clear to auscultation, normal effort 	  Gastrointestinal:  Soft, Non-tender, + BS	  Skin: No rashes, No ecchymoses, No cyanosis, warm to touch  Musculoskeletal: Normal range of motion, normal strength  Psychiatry:  Mood & affect appropriate    TELEMETRY: NSR	    EC2024- NSR, OR prolongation, LBBB 140ms.  	24- same    Echo: 10/2024- LVEF 20-25%, mildly dilated LV, multiple wall motion abnormalities c/w ischemic cardiomyopathy, BioAVR in good working order.	  	  LABS:	 	                          13.4   7.57  )-----------( 305      ( 15 Dec 2024 05:25 )             40.5     12-15    141  |  109[H]  |  21[H]  ----------------------------<  135[H]  4.7   |  28  |  1.08    Ca    9.6      15 Dec 2024 05:25  Mg     1.9         TPro  6.9  /  Alb  3.3[L]  /  TBili  1.0  /  DBili  x   /  AST  16  /  ALT  28  /  AlkPhos  111  12-15       ASSESSMENT/PLAN: Mr Corey is a very pleasant 74y Male here after fainting at home.  He has history of ischemic cardiomyopathy, remote hx PCI's, and recent history of CABG x 2, Bio AVR and aortic jose-arch replacement by Dr Ortiz (2024).  His syncopal episode, at best, could be vasovagal if it happened to a healthier individual.  His PMH is complicated and makes spontaneous ventricular arrhythmia more likely.  Had NSVT on telemetry on prior hospitalizations here at [place], and has been intolerant of beta blockers.    He is maintained on an ARB and Digoxin.  At worst, he had a VT/VF arrest and survived.  He is already a candidate for ICD implant.  Based on Echo and ECG criteria, candidate for cardiac resynchronization ICD.  He could get either a conduction system lead or an attempt at coronary sinus pacing if a branch leads to viable myocardium on the lateral LV wall.  A repeat echocardiogram is pending but may not make much difference in the EP plan of care.    Will recommend ICD implant (patient is agreeable), and further attempts to optimize GDMT.  Do not start SGLT2 inhibitors at this time, or re-attempt beta blockers at this time.  Wait until closer to discharge.      Ruel Burroughs M.D.  Cardiac Electrophysiology    office [phone]  pager [phone]

## 2024-12-15 NOTE — CONSULT NOTE ADULT - SUBJECTIVE AND OBJECTIVE BOX
HISTORY OF PRESENT ILLNESS: 74y M with PMHx of CVA, MI, HTN, T2DM, HLD, CAD s/p cardiac stents, HFrEF (EF 20-25% 10/2024), hx of malignant melanoma/wide excision, with recent elective bioprosthetic aortic valve replacement and ascending aortic aneurysm repair with transverse hemiarch and CABG x2 (4/8/24) with post op course complicated by cardiogenic shock requiring inotropic support with IV dobutamine and milrinone as well as IABP, previously on amiodarone for atrial fibrillation prophylaxis  presenting after episode of dizziness followed by syncope and collapse. Patient recently admitted to [facility] from 11/26 to 12/5 for acute on chronic CHF and sepsis due to pneumonia. Patient states that today his strength and breathing had finally improved enough for him to return to some of his normal activities/hobbies. Patient states that he was excited to be able to go out to his garage and work on building a cabinet. Reports that he likely overextended himself by standing for too long and lifting sheet wood while working on this project for over 2 hours. Patient states that he bent over to pick something up of the floor and when he stood back up straight he felt very dizzy and the next thing he knew he woke up on the floor. Patient says that prior to loss of consciousness he felt light headed and like he was spinning. Denies any chest pain, palpitations, or shortness of breath prior to syncopal episode. When patient regained consciousness he realized he had struck his head on the concrete floor of the garage and somehow cut his hand during collapse. Patient was unable to lift himself up off the floor under his own strength and had to call for help and family called EMS. Patient currently complaining of generalized weakness and expresses disappointment that he is back in the hospital after finally feeling better. Denies fever, chills, headache, chest pain, palpitations cough,, shortness of breath, abdominal pain, nausea, vomiting, diarrhea, constipation, and dysuria. Reports he has an upcoming telehealth appointment with cardiologist to discuss possible AICD placement.  cardiology consulted for syncope     PAST MEDICAL & SURGICAL HISTORY:  HTN (hypertension)      Hearing decreased      CVA (cerebral vascular accident)  12/22/2016      Hyperlipemia      Kidney stones      Coronary artery disease  MI 12/22/2016      CHF (congestive heart failure)  1/2017 stents x3      Type 2 diabetes mellitus  2016      Confusion  from stroke      S/P medial meniscal repair  and ligament surgery      H/O lithotripsy      S/P tonsillectomy      Bilateral inguinal hernia          [ ] Diabetes   [ ] Hypertension  [ ] Hyperlipidemia  [ ] CAD  [ ] PCI  [ ] CABG    PREVIOUS DIAGNOSTIC TESTING:    [ ] Echocardiogram:  [ ]  Catheterization:  [ ] Stress Test:  	    MEDICATIONS:  aspirin enteric coated 81 milliGRAM(s) Oral daily  clopidogrel Tablet 75 milliGRAM(s) Oral daily  digoxin     Tablet 125 MICROGram(s) Oral daily  valsartan 40 milliGRAM(s) Oral daily        acetaminophen     Tablet .. 650 milliGRAM(s) Oral every 6 hours PRN  melatonin 3 milliGRAM(s) Oral at bedtime      atorvastatin 80 milliGRAM(s) Oral at bedtime  insulin lispro (ADMELOG) corrective regimen sliding scale   SubCutaneous three times a day before meals  insulin lispro (ADMELOG) corrective regimen sliding scale   SubCutaneous at bedtime    multivitamin 1 Tablet(s) Oral daily  tamsulosin 0.4 milliGRAM(s) Oral at bedtime      Allergies    No Known Allergies    Intolerances        FAMILY HISTORY:  Family history of diabetes mellitus    Family history of stroke        SOCIAL HISTORY:    [ ] Non-smoker  [ ] Smoker  [ ] Alcohol      REVIEW OF SYSTEMS:  [ ]chest pain  [  ]shortness of breath  [  ]palpitations  [  ]syncope  [ ]near syncope [  ]diplopia  [  ]altered mental status   [  ]fevers  [ ]chills [ ]nausea  [ ]vomitting  [ ]abdominal pain  [ ]melena  [ ]BRBPR  [  ]epistaxis  [  ]rash  [  ]lower extremity edema      CONSTITUTIONAL: No fever, weight loss, or fatigue  EYES: No eye pain, visual disturbances, or discharge  ENMT:  No difficulty hearing, tinnitus, vertigo; No sinus or throat pain  NECK: No pain or stiffness  RESPIRATORY: No cough, wheezing, chills or hemoptysis; No Shortness of Breath  CARDIOVASCULAR: No chest pain, palpitations, passing out, dizziness, or leg swelling  GASTROINTESTINAL: No abdominal or epigastric pain. No nausea, vomiting, or hematemesis; No diarrhea or constipation. No melena or hematochezia.  GENITOURINARY: No dysuria, frequency, hematuria, or incontinence  NEUROLOGICAL: No headaches, memory loss, loss of strength, numbness, or tremors  SKIN: No itching, burning, rashes, or lesions   LYMPH Nodes: No enlarged glands  ENDOCRINE: No heat or cold intolerance; No hair loss  MUSCULOSKELETAL: No joint pain or swelling; No muscle, back, or extremity pain  PSYCHIATRIC: No depression, anxiety, mood swings, or difficulty sleeping  HEME/LYMPH: No easy bruising, or bleeding gums  ALLERY AND IMMUNOLOGIC: No hives or eczema	    [ ] All others negative	  [ ] Unable to obtain    PHYSICAL EXAM:  T(C): 36.5 (12-15-24 @ 07:15), Max: 37.1 (12-14-24 @ 19:42)  HR: 93 (12-15-24 @ 07:15) (93 - 100)  BP: 106/70 (12-15-24 @ 07:15) (106/70 - 131/80)  RR: 15 (12-15-24 @ 07:15) (15 - 18)  SpO2: 100% (12-15-24 @ 07:15) (95% - 100%)  Wt(kg): --  I&O's Summary      Appearance: Normal	  HEENT:   Normal oral mucosa, PERRL, EOMI	  Lymphatic: No lymphadenopathy  Cardiovascular: Normal S1 S2, No JVD, No murmurs, No edema  Respiratory: Lungs clear to auscultation	  Psychiatry: A & O x 3, Mood & affect appropriate  Gastrointestinal:  Soft, Non-tender, + BS	  Skin: No rashes, No ecchymoses, No cyanosis	  Neurologic: Non-focal  Extremities: Normal range of motion, No clubbing, cyanosis or edema  Vascular: Peripheral pulses palpable 2+ bilaterally    TELEMETRY: 	NSR     ECG:  	 NSR, LBBB, unchanged from prior ECG   RADIOLOGY:   CT < from: CT Chest No Cont (12.14.24 @ 21:55) >  IMPRESSION:  1.   Hydrostatic interstitial pulmonary edema/CHF.  2.   Multifocal bilateral pulmonary fibrosis and pulmonary scarring.  3.   Fairly extensive multifocal bilateral pneumonia.  4.   Small volume bilateral pleural effusions.    --- End of Report ---    < end of copied text >    OTHER: 	  	  LABS:	 	    CARDIAC MARKERS:                                  13.4   7.57  )-----------( 305      ( 15 Dec 2024 05:25 )             40.5     12-15    141  |  109[H]  |  21[H]  ----------------------------<  135[H]  4.7   |  28  |  1.08    Ca    9.6      15 Dec 2024 05:25  Mg     1.9     12-14    TPro  6.9  /  Alb  3.3[L]  /  TBili  1.0  /  DBili  x   /  AST  16  /  ALT  28  /  AlkPhos  111  12-15    proBNP:   Lipid Profile:   HgA1c:   TSH:     ASSESSMENT/PLAN: 	74y M with PMHx of CVA, MI, HTN, T2DM, HLD, CAD s/p cardiac stents, HFrEF (EF 20-25% 10/2024), hx of malignant melanoma/wide excision, with recent elective bioprosthetic aortic valve replacement and ascending aortic aneurysm repair with transverse hemiarch and CABG x2 (4/8/24). now with Syncope     ECHO pending   Orthostatic VS  ECG with no acute ischemia ( unchanged from prior on sunrise)   CHF- received lasix this am  in ER holding,   cont BP control with ACE   DAPT for CABG  cont dig ,   further management post echo

## 2024-12-15 NOTE — PATIENT PROFILE ADULT - NSPROPOAURINARYCATHETER_GEN_A_NUR
Easily distracted/100% of the time/able to follow multistep instructions able to follow multistep instructions/100% of the time/Easily distracted; +mild dysarthria no

## 2024-12-15 NOTE — PROGRESS NOTE ADULT - SUBJECTIVE AND OBJECTIVE BOX
Patient is a 74y old  Male who presents with a chief complaint of   PATIENT IS SEEN AND EXAMINED IN MEDICAL FLOOR.  JENNIFERT [    ]    JONAS [   ]      GT [   ]    ALLERGIES:  No Known Allergies      Daily     Daily     VITALS:    Vital Signs Last 24 Hrs  T(C): 36.7 (15 Dec 2024 19:26), Max: 36.7 (14 Dec 2024 23:40)  T(F): 98.1 (15 Dec 2024 19:26), Max: 98.1 (14 Dec 2024 23:40)  HR: 93 (15 Dec 2024 19:26) (90 - 100)  BP: 103/64 (15 Dec 2024 19:26) (103/64 - 126/79)  BP(mean): 95 (14 Dec 2024 23:40) (95 - 95)  RR: 17 (15 Dec 2024 19:26) (15 - 18)  SpO2: 95% (15 Dec 2024 19:26) (95% - 100%)    Parameters below as of 15 Dec 2024 19:26  Patient On (Oxygen Delivery Method): room air        LABS:    CBC Full  -  ( 15 Dec 2024 05:25 )  WBC Count : 7.57 K/uL  RBC Count : 5.03 M/uL  Hemoglobin : 13.4 g/dL  Hematocrit : 40.5 %  Platelet Count - Automated : 305 K/uL  Mean Cell Volume : 80.5 fl  Mean Cell Hemoglobin : 26.6 pg  Mean Cell Hemoglobin Concentration : 33.1 g/dL  Auto Neutrophil # : 4.82 K/uL  Auto Lymphocyte # : 1.97 K/uL  Auto Monocyte # : 0.57 K/uL  Auto Eosinophil # : 0.14 K/uL  Auto Basophil # : 0.05 K/uL  Auto Neutrophil % : 63.7 %  Auto Lymphocyte % : 26.0 %  Auto Monocyte % : 7.5 %  Auto Eosinophil % : 1.8 %  Auto Basophil % : 0.7 %      12-15    141  |  109[H]  |  21[H]  ----------------------------<  135[H]  4.7   |  28  |  1.08    Ca    9.6      15 Dec 2024 05:25  Mg     1.9     12-14    TPro  6.9  /  Alb  3.3[L]  /  TBili  1.0  /  DBili  x   /  AST  16  /  ALT  28  /  AlkPhos  111  12-15    CAPILLARY BLOOD GLUCOSE      POCT Blood Glucose.: 167 mg/dL (15 Dec 2024 21:58)  POCT Blood Glucose.: 108 mg/dL (15 Dec 2024 16:55)  POCT Blood Glucose.: 151 mg/dL (15 Dec 2024 14:12)  POCT Blood Glucose.: 145 mg/dL (15 Dec 2024 12:15)  POCT Blood Glucose.: 243 mg/dL (15 Dec 2024 08:13)  POCT Blood Glucose.: 117 mg/dL (15 Dec 2024 01:25)        LIVER FUNCTIONS - ( 15 Dec 2024 05:25 )  Alb: 3.3 g/dL / Pro: 6.9 g/dL / ALK PHOS: 111 U/L / ALT: 28 U/L DA / AST: 16 U/L / GGT: x           Creatinine Trend: 1.08<--, 1.02<--, 1.18<--, 1.43<--, 1.23<--, 1.27<--  I&O's Summary          .Blood BLOOD  11-26 @ 15:35   No growth at 5 days  --  --      .Blood BLOOD  11-26 @ 15:10   No growth at 5 days  --  --          MEDICATIONS:    MEDICATIONS  (STANDING):  aspirin enteric coated 81 milliGRAM(s) Oral daily  atorvastatin 80 milliGRAM(s) Oral at bedtime  clopidogrel Tablet 75 milliGRAM(s) Oral daily  digoxin     Tablet 125 MICROGram(s) Oral daily  insulin lispro (ADMELOG) corrective regimen sliding scale   SubCutaneous three times a day before meals  insulin lispro (ADMELOG) corrective regimen sliding scale   SubCutaneous at bedtime  melatonin 3 milliGRAM(s) Oral at bedtime  multivitamin 1 Tablet(s) Oral daily  tamsulosin 0.4 milliGRAM(s) Oral at bedtime  valsartan 40 milliGRAM(s) Oral daily      MEDICATIONS  (PRN):  acetaminophen     Tablet .. 650 milliGRAM(s) Oral every 6 hours PRN Temp greater or equal to 38C (100.4F), Mild Pain (1 - 3)      REVIEW OF SYSTEMS:                           ALL ROS DONE [ X   ]    CONSTITUTIONAL:  LETHARGIC [   ], FEVER [   ], UNRESPONSIVE [   ]  CVS:  CP  [   ], SOB, [   ], PALPITATIONS [   ], DIZZYNESS [   ]  RS: COUGH [   ], SPUTUM [   ]  GI: ABDOMINAL PAIN [   ], NAUSEA [   ], VOMITINGS [   ], DIARRHEA [   ], CONSTIPATION [   ]  :  DYSURIA [   ], NOCTURIA [   ], INCREASED FREQUENCY [   ], DRIBLING [   ],  SKELETAL: PAINFUL JOINTS [   ], SWOLLEN JOINTS [   ], NECK ACHE [   ], LOW BACK ACHE [   ],  SKIN : ULCERS [   ], RASH [   ], ITCHING [   ]  CNS: HEAD ACHE [   ], DOUBLE VISION [   ], BLURRED VISION [   ], AMS / CONFUSION [   ], SEIZURES [   ], WEAKNESS [   ],TINGLING / NUMBNESS [   ]    PHYSICAL EXAMINATION:  GENERAL APPEARANCE: NO DISTRESS  HEENT:  NO PALLOR, NO  JVD,  NO   NODES, NECK SUPPLE  CVS: S1 +, S2 +,   RS: AEEB,  OCCASIONAL  RALES +,   NO RONCHI  ABD: SOFT, NT, NO, BS +  EXT: ABRASIONS ON LEGS +  SKIN: WARM,   SKELETAL:  ROM ACCEPTABLE  CNS:  AAO X 3    RADIOLOGY :  RADIOLOGY AND READINGS REVIEWED    ASSESSMENT :     Syncope and collapse    HTN (hypertension)    Hearing decreased    CVA (cerebral vascular accident)    Hyperlipemia    Kidney stones    Coronary artery disease    CHF (congestive heart failure)    Type 2 diabetes mellitus    Confusion    S/P medial meniscal repair    H/O lithotripsy    S/P tonsillectomy    Bilateral inguinal hernia        PLAN:  HPI:  74y M with PMHx of CVA, MI, HTN, T2DM, HLD, CAD s/p cardiac stents, HFrEF (EF 20-25% 10/2024), hx of malignant melanoma/wide excision, with recent elective bioprosthetic aortic valve replacement and ascending aortic aneurysm repair with transverse hemiarch and CABG x2 (4/8/24) with post op course complicated by cardiogenic shock requiring inotropic support with IV dobutamine and milrinone as well as IABP, previously on amiodarone for atrial fibrillation prophylaxis  presenting after episode of dizziness followed by syncope and collapse. Patient recently admitted to [facility] from 11/26 to 12/5 for acute on chronic CHF and sepsis due to pneumonia. Patient states that today his strength and breathing had finally improved enough for him to return to some of his normal activities/hobbies. Patient states that he was excited to be able to go out to his garage and work on building a cabinet. Reports that he likely overextended himself by standing for too long and lifting sheet wood while working on this project for over 2 hours. Patient states that he bent over to pick something up of the floor and when he stood back up straight he felt very dizzy and the next thing he knew he woke up on the floor. Patient says that prior to loss of consciousness he felt light headed and like he was spinning. Denies any chest pain, palpitations, or shortness of breath prior to syncopal episode. When patient regained consciousness he realized he had struck his head on the concrete floor of the garage and somehow cut his hand during collapse. Patient was unable to lift himself up off the floor under his own strength and had to call for help and family called EMS. Patient currently complaining of generalized weakness and expresses disappointment that he is back in the hospital after finally feeling better. Denies fever, chills, headache, chest pain, palpitations cough,, shortness of breath, abdominal pain, nausea, vomiting, diarrhea, constipation, and dysuria. Reports he has an upcoming telehealth appointment with cardiologist to discuss possible AICD placement.   (14 Dec 2024 23:48)        PHYSICAL EXAMINATION:  GENERAL APPEARANCE: NO DISTRESS  HEENT:  NO PALLOR, NO  JVD,  NO   NODES, NECK SUPPLE  CVS: S1 +, S2 +,   RS: AEEB,  OCCASIONAL  RALES +,   RONCHI +  ABD: SOFT, NT, NO, BS +  EXT: NO PE    SKIN: WARM,   SKELETAL:  ROM ACCEPTABLE  CNS:  AAO X 3        RADIOLOGY :  RADIOLOGY AND READINGS REVIEWED        ASSESSMENT :     Shortness of breath    HTN (hypertension)    Hearing decreased    CVA (cerebral vascular accident)    Hyperlipemia    Kidney stones    Coronary artery disease    CHF (congestive heart failure)    Type 2 diabetes mellitus    Confusion    S/P medial meniscal repair    H/O lithotripsy    S/P tonsillectomy    Bilateral inguinal hernia        PLAN:  HPI:  74y M with PMHx of CVA, MI, HTN, T2DM, HLD, CAD s/p cardiac stents, HFrEF (EF 20-25% 10/2024), hx of malignant melanoma/wide excision, with recent elective bioprosthetic aortic valve replacement and ascending aortic aneurysm repair with transverse hemiarch and CABG x2 (4/8/24) with post op course complicated by cardiogenic shock requiring inotropic support with IV dobutamine and milrinone, IABP and also on amiodarone for atrial fibrillation prophylaxis presenting with difficulty breathing for the past 4 days preceded by cough for the past 4 to 5 weeks. Patient was recently admitted to [facility] 10/5-10/8 for pneumonia. Patient reports completion of full course of antibiotics prescribed on discharge. Patient states that he developed cough around a month ago that has been persistent and now causing abdominal muscle pain. Patient developed shortness of breath 4 days ago that got progressively worse prompting him to come to the ED today. Since treatment in ED, patient reports improvement of dyspnea but cough is still present but less frequent. Endorses constipation with no BM for last 3 days. Denies fever, chills, headache, dizziness, chest pain, palpitations, nausea, vomiting, diarrhea, and dysuria.   (26 Nov 2024 18:29)    # CASE D/W PATIENT AT BEDSIDE. CASE REVIEWED AT LENGTH, ALL QUESTIONS ANSWERED. DISCUSSED THAT PROGNOSIS IS GUARDED    # SYNCOPE  - TELEMETRY  - NOTED CT HEAD  - F/U ORTHOSTATIC VITALS  - F/U ECHO  - RECOMMENDATION FOR POSSIBLE ICD IMPLANT  - EP CONSULT  - CARDIOLOGY CONSULT    # HX OF HFrEF [20-25%, 10/24], HX OF RECENT ELECTIVE BIOPROSTHETIC AORTIC VALVE REPLACEMENT, ASCENDING AORTIC ANEURYSM REPAIR W/ TRANSVERSE HEMIARCH AND CABG X 2  [4/2024] - C/B POST-OP CARDIOGENIC SHOCK REQUIRING IONOTROPIC SUPPORT, IABP   # HX OF CAD S/P STENT  - ON VALSARTAN  - DIGOXIN  - PRN FUROSEMIDE  - DID NOT TOLERATE BB, ENTRESTO    # RECENT ADMISSION FOR  ACUTE HYPOXIC RESPIRATORY FAILURE MULTIFACTORIAL - LIKELY S/T PNA + DECOMPENSATED HF  - NOTED CT CHEST  - ID CONSULT  - PULMONOLOGY CONSULT    # DM  # HBA1C - 10.2  - LANTUS  - SSI + FS    # BPH  - FLOMAX    # HX OF CVA  # HX OF MI  # HTN  # HLD  # HX OF MALIGNANT MELANOMA/WIDE EXCISION  # GI PPX

## 2024-12-15 NOTE — PATIENT PROFILE ADULT - PATIENT'S PREFERRED PRONOUN
Caller: Mary Lunsford    Relationship: Self    Best call back number: [phone]    What test/procedure requested: PRIOR AUTHORIZATION FOR LINZESS    When is it needed: AS SOON AS POSSIBLE                 Him/He

## 2024-12-15 NOTE — ED ADULT NURSE REASSESSMENT NOTE - NS ED NURSE REASSESS COMMENT FT1
pt placed in yellow gown. pt refuses red socks, pt prefers to keep his shoes on.
pt axo x3/ no apparent distress noted.

## 2024-12-16 ENCOUNTER — APPOINTMENT (OUTPATIENT)
Dept: CARDIOLOGY | Facility: CLINIC | Age: 74
End: 2024-12-16

## 2024-12-16 DIAGNOSIS — I50.42 CHRONIC COMBINED SYSTOLIC (CONGESTIVE) AND DIASTOLIC (CONGESTIVE) HEART FAILURE: ICD-10-CM

## 2024-12-16 DIAGNOSIS — B35.1 TINEA UNGUIUM: ICD-10-CM

## 2024-12-16 DIAGNOSIS — E11.9 TYPE 2 DIABETES MELLITUS WITHOUT COMPLICATIONS: ICD-10-CM

## 2024-12-16 DIAGNOSIS — Z75.8 OTHER PROBLEMS RELATED TO MEDICAL FACILITIES AND OTHER HEALTH CARE: ICD-10-CM

## 2024-12-16 DIAGNOSIS — R60.0 LOCALIZED EDEMA: ICD-10-CM

## 2024-12-16 DIAGNOSIS — F32.2 MAJOR DEPRESSIVE DISORDER, SINGLE EPISODE, SEVERE WITHOUT PSYCHOTIC FEATURES: ICD-10-CM

## 2024-12-16 DIAGNOSIS — E78.5 HYPERLIPIDEMIA, UNSPECIFIED: ICD-10-CM

## 2024-12-16 LAB
ALBUMIN SERPL ELPH-MCNC: 2.8 G/DL — LOW (ref 3.5–5)
ALP SERPL-CCNC: 99 U/L — SIGNIFICANT CHANGE UP (ref 40–120)
ALT FLD-CCNC: 19 U/L DA — SIGNIFICANT CHANGE UP (ref 10–60)
ANION GAP SERPL CALC-SCNC: 6 MMOL/L — SIGNIFICANT CHANGE UP (ref 5–17)
AST SERPL-CCNC: 14 U/L — SIGNIFICANT CHANGE UP (ref 10–40)
BASOPHILS # BLD AUTO: 0.07 K/UL — SIGNIFICANT CHANGE UP (ref 0–0.2)
BASOPHILS NFR BLD AUTO: 1.1 % — SIGNIFICANT CHANGE UP (ref 0–2)
BILIRUB SERPL-MCNC: 0.7 MG/DL — SIGNIFICANT CHANGE UP (ref 0.2–1.2)
BUN SERPL-MCNC: 23 MG/DL — HIGH (ref 7–18)
CALCIUM SERPL-MCNC: 8.7 MG/DL — SIGNIFICANT CHANGE UP (ref 8.4–10.5)
CHLORIDE SERPL-SCNC: 109 MMOL/L — HIGH (ref 96–108)
CO2 SERPL-SCNC: 27 MMOL/L — SIGNIFICANT CHANGE UP (ref 22–31)
CREAT SERPL-MCNC: 1.07 MG/DL — SIGNIFICANT CHANGE UP (ref 0.5–1.3)
EGFR: 72 ML/MIN/1.73M2 — SIGNIFICANT CHANGE UP
EOSINOPHIL # BLD AUTO: 0.18 K/UL — SIGNIFICANT CHANGE UP (ref 0–0.5)
EOSINOPHIL NFR BLD AUTO: 2.9 % — SIGNIFICANT CHANGE UP (ref 0–6)
GLUCOSE BLDC GLUCOMTR-MCNC: 143 MG/DL — HIGH (ref 70–99)
GLUCOSE BLDC GLUCOMTR-MCNC: 180 MG/DL — HIGH (ref 70–99)
GLUCOSE BLDC GLUCOMTR-MCNC: 195 MG/DL — HIGH (ref 70–99)
GLUCOSE BLDC GLUCOMTR-MCNC: 287 MG/DL — HIGH (ref 70–99)
GLUCOSE SERPL-MCNC: 167 MG/DL — HIGH (ref 70–99)
HCT VFR BLD CALC: 37.6 % — LOW (ref 39–50)
HGB BLD-MCNC: 12.1 G/DL — LOW (ref 13–17)
IMM GRANULOCYTES NFR BLD AUTO: 0.2 % — SIGNIFICANT CHANGE UP (ref 0–0.9)
LYMPHOCYTES # BLD AUTO: 1.63 K/UL — SIGNIFICANT CHANGE UP (ref 1–3.3)
LYMPHOCYTES # BLD AUTO: 26.1 % — SIGNIFICANT CHANGE UP (ref 13–44)
MAGNESIUM SERPL-MCNC: 2 MG/DL — SIGNIFICANT CHANGE UP (ref 1.6–2.6)
MCHC RBC-ENTMCNC: 26.4 PG — LOW (ref 27–34)
MCHC RBC-ENTMCNC: 32.2 G/DL — SIGNIFICANT CHANGE UP (ref 32–36)
MCV RBC AUTO: 81.9 FL — SIGNIFICANT CHANGE UP (ref 80–100)
MONOCYTES # BLD AUTO: 0.54 K/UL — SIGNIFICANT CHANGE UP (ref 0–0.9)
MONOCYTES NFR BLD AUTO: 8.6 % — SIGNIFICANT CHANGE UP (ref 2–14)
NEUTROPHILS # BLD AUTO: 3.82 K/UL — SIGNIFICANT CHANGE UP (ref 1.8–7.4)
NEUTROPHILS NFR BLD AUTO: 61.1 % — SIGNIFICANT CHANGE UP (ref 43–77)
NRBC # BLD: 0 /100 WBCS — SIGNIFICANT CHANGE UP (ref 0–0)
PHOSPHATE SERPL-MCNC: 3.5 MG/DL — SIGNIFICANT CHANGE UP (ref 2.5–4.5)
PLATELET # BLD AUTO: 268 K/UL — SIGNIFICANT CHANGE UP (ref 150–400)
POTASSIUM SERPL-MCNC: 3.8 MMOL/L — SIGNIFICANT CHANGE UP (ref 3.5–5.3)
POTASSIUM SERPL-SCNC: 3.8 MMOL/L — SIGNIFICANT CHANGE UP (ref 3.5–5.3)
PROT SERPL-MCNC: 5.8 G/DL — LOW (ref 6–8.3)
RBC # BLD: 4.59 M/UL — SIGNIFICANT CHANGE UP (ref 4.2–5.8)
RBC # FLD: 15.5 % — HIGH (ref 10.3–14.5)
SODIUM SERPL-SCNC: 142 MMOL/L — SIGNIFICANT CHANGE UP (ref 135–145)
WBC # BLD: 6.25 K/UL — SIGNIFICANT CHANGE UP (ref 3.8–10.5)
WBC # FLD AUTO: 6.25 K/UL — SIGNIFICANT CHANGE UP (ref 3.8–10.5)

## 2024-12-16 RX ORDER — FLUTICASONE PROPIONATE AND SALMETEROL XINAFOATE 45; 21 UG/1; UG/1
1 AEROSOL, METERED RESPIRATORY (INHALATION)
Refills: 0 | Status: DISCONTINUED | OUTPATIENT
Start: 2024-12-16 | End: 2024-12-18

## 2024-12-16 RX ADMIN — TAMSULOSIN HYDROCHLORIDE 0.4 MILLIGRAM(S): 0.4 CAPSULE ORAL at 22:17

## 2024-12-16 RX ADMIN — CLOPIDOGREL 75 MILLIGRAM(S): 75 TABLET, FILM COATED ORAL at 12:30

## 2024-12-16 RX ADMIN — Medication 80 MILLIGRAM(S): at 22:17

## 2024-12-16 RX ADMIN — Medication 1 TABLET(S): at 12:30

## 2024-12-16 RX ADMIN — Medication 1: at 08:17

## 2024-12-16 RX ADMIN — ACETAMINOPHEN, DIPHENHYDRAMINE HCL, PHENYLEPHRINE HCL 3 MILLIGRAM(S): 325; 25; 5 TABLET ORAL at 22:17

## 2024-12-16 RX ADMIN — FLUTICASONE PROPIONATE AND SALMETEROL XINAFOATE 1 DOSE(S): 45; 21 AEROSOL, METERED RESPIRATORY (INHALATION) at 20:18

## 2024-12-16 RX ADMIN — Medication 81 MILLIGRAM(S): at 12:30

## 2024-12-16 RX ADMIN — Medication 125 MICROGRAM(S): at 05:57

## 2024-12-16 RX ADMIN — Medication 1: at 22:17

## 2024-12-16 RX ADMIN — Medication 1: at 12:29

## 2024-12-16 NOTE — ACUTE INTERFACILITY TRANSFER NOTE - PLAN OF CARE
·  Problem: Type 2 diabetes mellitus.   ·  Plan: h/o DM on metformin 1000mg BID  -previously on linagliptin and faxiga    -hold oral dm meds  -last A1c 10.2 on 11/27  -start low sliding scale  -Adjust insulin as indicated  -FS ACHS.  -carb steady diet. ·  Problem: Syncope and collapse.   ·  Plan: hx of HFrEF presenting after episode of syncope and collapse with prodrome of dizziness   -patient reports episode was after bending over and standing up in s/o likely overexertion and possible dehydration (still taking lasix ?every other day and not drinking much water), likely orthostatic however r/o unstable arrhythmia given severe HFrEF and recent changes to rate and rhythm control meds.   -CTH/C-spine: negative for acute pathology  --EKG shows sinus tach with 1st degree AVB, old LBBB  -TTE 10/8:  EF 20-25%  -F/U repeat TTE   -check orthostatic bp  -s/p lasix 40mg IV in ED-->hold off on further diuresis for now (not short of breath, trace basilar rales, no edema)  -PT consult: Recs out/pt PT  -EP/Cardiology consulted (Dr. Burroughs/Dr. Ward): Recs ICD implant > [facility] transfer to [facility]  -Fall precautions, ambulate with assistance. ·  Problem: Chronic HFrEF (heart failure with reduced ejection fraction).   ·  Plan: HFrEF (EF 20-25%) with recent elective bioprosthetic aortic valve replacement and ascending aortic aneurysm repair with transverse hemiarch and CABG x2 (4/8/24) with post op course complicated by cardiogenic shock requiring inotropic support with IV dobutamine and milrinone as well as IABP  -CT chest: 1. Hydrostatic interstitial pulmonary edema/CHF. 2.  Multifocal bilateral pulmonary fibrosis and pulmonary scarring. 3. Fairly extensive multifocal bilateral pneumonia 4. Small volume bilateral pleural effusions.  -BNP 93125 (previously 58736 on 11/26 and  8193 on 10/8)  -Troponin 64.7-->66.9  -previously on metoprolol, entresto, farxiga, lasix for GDMT and amiodarone for Afib ppx-->switched to valsartan and digoxin last admission  -c/w valsartan with parameters and digoxin  -s/p lasix 40mg IV in ED-->hold off on further diuresis for now (not short of breath, trace basilar rales, no edema)  -daily weights, strict I&Os  -Cardio/EP Consulted: Dr. Ward/Dr. Burroughs.

## 2024-12-16 NOTE — PROGRESS NOTE ADULT - SUBJECTIVE AND OBJECTIVE BOX
NP Note discussed with  primary attending    Patient is a 74y old  Male who presents with a chief complaint of Syncope (16 Dec 2024 11:08)      INTERVAL HPI/OVERNIGHT EVENTS: no new complaints    MEDICATIONS  (STANDING):  aspirin enteric coated 81 milliGRAM(s) Oral daily  atorvastatin 80 milliGRAM(s) Oral at bedtime  clopidogrel Tablet 75 milliGRAM(s) Oral daily  digoxin     Tablet 125 MICROGram(s) Oral daily  fluticasone propionate/ salmeterol 100-50 MICROgram(s) Diskus 1 Dose(s) Inhalation two times a day  insulin lispro (ADMELOG) corrective regimen sliding scale   SubCutaneous three times a day before meals  insulin lispro (ADMELOG) corrective regimen sliding scale   SubCutaneous at bedtime  melatonin 3 milliGRAM(s) Oral at bedtime  multivitamin 1 Tablet(s) Oral daily  tamsulosin 0.4 milliGRAM(s) Oral at bedtime  valsartan 40 milliGRAM(s) Oral daily    MEDICATIONS  (PRN):  acetaminophen     Tablet .. 650 milliGRAM(s) Oral every 6 hours PRN Temp greater or equal to 38C (100.4F), Mild Pain (1 - 3)      __________________________________________________  REVIEW OF SYSTEMS:    CONSTITUTIONAL: No fever,   EYES: no acute visual disturbances  NECK: No pain or stiffness  RESPIRATORY: No cough; No shortness of breath  CARDIOVASCULAR: No chest pain, no palpitations  GASTROINTESTINAL: No pain. No nausea or vomiting; No diarrhea   NEUROLOGICAL: No headache or numbness, no tremors  MUSCULOSKELETAL: No joint pain, no muscle pain  GENITOURINARY: no dysuria, no frequency, no hesitancy  PSYCHIATRY: no depression , no anxiety  ALL OTHER  ROS negative        Vital Signs Last 24 Hrs  T(C): 36.8 (16 Dec 2024 16:32), Max: 36.8 (16 Dec 2024 16:32)  T(F): 98.2 (16 Dec 2024 16:32), Max: 98.2 (16 Dec 2024 16:32)  HR: 87 (16 Dec 2024 16:32) (71 - 95)  BP: 121/86 (16 Dec 2024 16:32) (100/73 - 134/97)  BP(mean): --  RR: 18 (16 Dec 2024 16:32) (17 - 19)  SpO2: 97% (16 Dec 2024 16:32) (93% - 97%)    Parameters below as of 16 Dec 2024 16:32  Patient On (Oxygen Delivery Method): room air        ________________________________________________  PHYSICAL EXAM:  GENERAL: NAD  HEENT: Normocephalic;  conjunctivae and sclerae clear; moist mucous membranes;   NECK : supple  CHEST/LUNG: Clear to ausculitation bilaterally with good air entry   HEART: S1 S2  regular; no murmurs, gallops or rubs  ABDOMEN: Soft, Nontender, Nondistended; Bowel sounds present  EXTREMITIES: no cyanosis; no edema; no calf tenderness  SKIN: warm and dry; no rash  NERVOUS SYSTEM:  Awake and alert; Oriented  to place, person and time ; no new deficits    _________________________________________________  LABS:                        12.1   6.25  )-----------( 268      ( 16 Dec 2024 06:20 )             37.6     12-16    142  |  109[H]  |  23[H]  ----------------------------<  167[H]  3.8   |  27  |  1.07    Ca    8.7      16 Dec 2024 06:20  Phos  3.5     12-16  Mg     2.0     12-16    TPro  5.8[L]  /  Alb  2.8[L]  /  TBili  0.7  /  DBili  x   /  AST  14  /  ALT  19  /  AlkPhos  99  12-16      Urinalysis Basic - ( 16 Dec 2024 06:20 )    Color: x / Appearance: x / SG: x / pH: x  Gluc: 167 mg/dL / Ketone: x  / Bili: x / Urobili: x   Blood: x / Protein: x / Nitrite: x   Leuk Esterase: x / RBC: x / WBC x   Sq Epi: x / Non Sq Epi: x / Bacteria: x      CAPILLARY BLOOD GLUCOSE      POCT Blood Glucose.: 143 mg/dL (16 Dec 2024 16:41)  POCT Blood Glucose.: 195 mg/dL (16 Dec 2024 11:39)  POCT Blood Glucose.: 180 mg/dL (16 Dec 2024 07:55)  POCT Blood Glucose.: 167 mg/dL (15 Dec 2024 21:58)  POCT Blood Glucose.: 108 mg/dL (15 Dec 2024 16:55)        RADIOLOGY & ADDITIONAL TESTS:  < from: CT Chest No Cont (12.14.24 @ 21:55) >    IMPRESSION:  1.   Hydrostatic interstitial pulmonary edema/CHF.  2.   Multifocal bilateral pulmonary fibrosis and pulmonary scarring.  3.   Fairly extensive multifocal bilateral pneumonia.  4.   Small volume bilateral pleural effusions.    --- End of Report ---    < end of copied text >    Imaging Personally Reviewed:  YES    Consultant(s) Notes Reviewed:   YES    Care Discussed with Consultants :     Plan of care was discussed with patient and /or primary care giver; all questions and concerns were addressed and care was aligned with patient's wishes.

## 2024-12-16 NOTE — PROGRESS NOTE ADULT - NS ATTEND AMEND GEN_ALL_CORE FT
Impression: This is a 73 Y/O Male presented to ED with Dizziness followed by collapse. Was recently discharged from [facility] from 11/26 to 12/5 for acute on chronic CHF and sepsis due to pneumonia. For pulmonary follow up of episodic SOB on exertion likely due to Cardiac etiology from Ischemic cardiomyopathy ( Per EP good candidate for ICD implant ) . CT chest with B/L Small Pleural Effusions with Multifocal B/L Pulmonary Fibrosis , scarring /  Interstitial Lung Disease.     Suggestion:   O2 saturation 96% room air. So far saturating good room air.   Can benefit with outpatient PFT .  Incentive spirometer .   Monitor Blood Glucose with Admelog insulin coverage.

## 2024-12-16 NOTE — PROGRESS NOTE ADULT - SUBJECTIVE AND OBJECTIVE BOX
C A R D I O L O G Y  **********************************     DATE OF SERVICE: 12-16-24    Patient denies chest pain or shortness of breath.   Review of systems otherwise (-)  	  MEDICATIONS:  MEDICATIONS  (STANDING):  aspirin enteric coated 81 milliGRAM(s) Oral daily  atorvastatin 80 milliGRAM(s) Oral at bedtime  clopidogrel Tablet 75 milliGRAM(s) Oral daily  digoxin     Tablet 125 MICROGram(s) Oral daily  insulin lispro (ADMELOG) corrective regimen sliding scale   SubCutaneous three times a day before meals  insulin lispro (ADMELOG) corrective regimen sliding scale   SubCutaneous at bedtime  melatonin 3 milliGRAM(s) Oral at bedtime  multivitamin 1 Tablet(s) Oral daily  tamsulosin 0.4 milliGRAM(s) Oral at bedtime  valsartan 40 milliGRAM(s) Oral daily      LABS:	 	    CARDIAC MARKERS:        Troponin I, High Sensitivity Result: 64.7 ng/L (12-14-24 @ 20:28)  Troponin I, High Sensitivity Result: 66.9 ng/L (12-14-24 @ 18:40)                              12.1   6.25  )-----------( 268      ( 16 Dec 2024 06:20 )             37.6     Hemoglobin: 12.1 g/dL (12-16 @ 06:20)  Hemoglobin: 13.4 g/dL (12-15 @ 05:25)  Hemoglobin: 13.0 g/dL (12-14 @ 18:40)      12-16    142  |  109[H]  |  23[H]  ----------------------------<  167[H]  3.8   |  27  |  1.07    Ca    8.7      16 Dec 2024 06:20  Phos  3.5     12-16  Mg     2.0     12-16    TPro  5.8[L]  /  Alb  2.8[L]  /  TBili  0.7  /  DBili  x   /  AST  14  /  ALT  19  /  AlkPhos  99  12-16    Creatinine Trend: 1.07<--, 1.08<--, 1.02<--, 1.18<--, 1.43<--, 1.23<--      PHYSICAL EXAM:  T(C): 36.5 (12-16-24 @ 11:19), Max: 36.7 (12-15-24 @ 19:26)  HR: 90 (12-16-24 @ 11:19) (71 - 94)  BP: 119/80 (12-16-24 @ 11:19) (100/73 - 119/80)  RR: 18 (12-16-24 @ 11:19) (17 - 19)  SpO2: 96% (12-16-24 @ 11:19) (93% - 97%)  Wt(kg): --  I&O's Summary    16 Dec 2024 07:01  -  16 Dec 2024 12:59  --------------------------------------------------------  IN: 220 mL / OUT: 0 mL / NET: 220 mL      HEENT:  (-)icterus (-)pallor  CV: N S1 S2 1/6 GAEL (+)2 Pulses B/l  Resp:  Clear to ausculatation B/L, normal effort  GI: (+) BS Soft, NT, ND  Lymph:  (-)Edema, (-)obvious lymphadenopathy  Skin: Warm to touch, Normal turgor  Psych: Appropriate mood and affect      TELEMETRY: 	  Sinus         ASSESSMENT/PLAN: 	74y  Male PMHx of CVA, MI, HTN, T2DM, HLD, CAD s/p cardiac stents, HFrEF (EF 20-25% 10/2024), hx of malignant melanoma/wide excision, with recent elective bioprosthetic aortic valve replacement and ascending aortic aneurysm repair with transverse hemiarch and CABG x2 (4/8/24). now with Syncope     # Cardiomyopathy  - Well compensated from a CHF prospective  - tolerating low dose valsartan  - would like to re-try low dose BB   - EP eval appreciated awaiting tx to [facility] for ICD        Rogelio Ward MD, [facility]  BEEPER [phone]

## 2024-12-16 NOTE — PHYSICAL THERAPY INITIAL EVALUATION ADULT - DIAGNOSIS, PT EVAL
(ICF Model) Pt. present w/deficits in Body Structures/Function (Impairments), incl: Strength, endurance leading to deficits in performing the below noted Activities (Limitations).

## 2024-12-16 NOTE — PHYSICAL THERAPY INITIAL EVALUATION ADULT - PERTINENT HX OF CURRENT PROBLEM, REHAB EVAL
Pt admitted 12/14/24 for fall and syncope. Pt was recently admitted to [facility] for PNA. Pt was DC home and was able to recover however on 12/14 pt went to work In his garage and overexerted himself leading to him falling and hitting his head on the concrete floor. Pt states he feels better now and knew that he over exerted himself. Plan is for pt to be transferred to [facility] as per chart. CTH shows no acute pathology. CT Chest shows hydrostatic interstitial pulmonary edema and CHF.

## 2024-12-16 NOTE — PROGRESS NOTE ADULT - SUBJECTIVE AND OBJECTIVE BOX
Patient is a 74y old  Male who presents with a chief complaint of   PATIENT IS SEEN AND EXAMINED IN MEDICAL FLOOR.  LEISA [    ]    JONAS [   ]      GT [   ]    ALLERGIES:  No Known Allergies      Daily     Daily     VITALS:    Vital Signs Last 24 Hrs  T(C): 36.6 (16 Dec 2024 07:31), Max: 36.7 (15 Dec 2024 19:26)  T(F): 97.9 (16 Dec 2024 07:31), Max: 98.1 (15 Dec 2024 19:26)  HR: 71 (16 Dec 2024 07:31) (71 - 94)  BP: 100/73 (16 Dec 2024 07:31) (100/73 - 116/71)  BP(mean): --  RR: 19 (16 Dec 2024 07:31) (17 - 19)  SpO2: 94% (16 Dec 2024 07:31) (93% - 97%)    Parameters below as of 16 Dec 2024 07:31  Patient On (Oxygen Delivery Method): room air        LABS:    CBC Full  -  ( 16 Dec 2024 06:20 )  WBC Count : 6.25 K/uL  RBC Count : 4.59 M/uL  Hemoglobin : 12.1 g/dL  Hematocrit : 37.6 %  Platelet Count - Automated : 268 K/uL  Mean Cell Volume : 81.9 fl  Mean Cell Hemoglobin : 26.4 pg  Mean Cell Hemoglobin Concentration : 32.2 g/dL  Auto Neutrophil # : 3.82 K/uL  Auto Lymphocyte # : 1.63 K/uL  Auto Monocyte # : 0.54 K/uL  Auto Eosinophil # : 0.18 K/uL  Auto Basophil # : 0.07 K/uL  Auto Neutrophil % : 61.1 %  Auto Lymphocyte % : 26.1 %  Auto Monocyte % : 8.6 %  Auto Eosinophil % : 2.9 %  Auto Basophil % : 1.1 %      12-16    142  |  109[H]  |  23[H]  ----------------------------<  167[H]  3.8   |  27  |  1.07    Ca    8.7      16 Dec 2024 06:20  Phos  3.5     12-16  Mg     2.0     12-16    TPro  5.8[L]  /  Alb  2.8[L]  /  TBili  0.7  /  DBili  x   /  AST  14  /  ALT  19  /  AlkPhos  99  12-16    CAPILLARY BLOOD GLUCOSE      POCT Blood Glucose.: 180 mg/dL (16 Dec 2024 07:55)  POCT Blood Glucose.: 167 mg/dL (15 Dec 2024 21:58)  POCT Blood Glucose.: 108 mg/dL (15 Dec 2024 16:55)  POCT Blood Glucose.: 151 mg/dL (15 Dec 2024 14:12)  POCT Blood Glucose.: 145 mg/dL (15 Dec 2024 12:15)        LIVER FUNCTIONS - ( 16 Dec 2024 06:20 )  Alb: 2.8 g/dL / Pro: 5.8 g/dL / ALK PHOS: 99 U/L / ALT: 19 U/L DA / AST: 14 U/L / GGT: x           Creatinine Trend: 1.07<--, 1.08<--, 1.02<--, 1.18<--, 1.43<--, 1.23<--  I&O's Summary    16 Dec 2024 07:01  -  16 Dec 2024 10:24  --------------------------------------------------------  IN: 220 mL / OUT: 0 mL / NET: 220 mL            .Blood BLOOD  11-26 @ 15:35   No growth at 5 days  --  --      .Blood BLOOD  11-26 @ 15:10   No growth at 5 days  --  --          MEDICATIONS:    MEDICATIONS  (STANDING):  aspirin enteric coated 81 milliGRAM(s) Oral daily  atorvastatin 80 milliGRAM(s) Oral at bedtime  clopidogrel Tablet 75 milliGRAM(s) Oral daily  digoxin     Tablet 125 MICROGram(s) Oral daily  insulin lispro (ADMELOG) corrective regimen sliding scale   SubCutaneous three times a day before meals  insulin lispro (ADMELOG) corrective regimen sliding scale   SubCutaneous at bedtime  melatonin 3 milliGRAM(s) Oral at bedtime  multivitamin 1 Tablet(s) Oral daily  tamsulosin 0.4 milliGRAM(s) Oral at bedtime  valsartan 40 milliGRAM(s) Oral daily      MEDICATIONS  (PRN):  acetaminophen     Tablet .. 650 milliGRAM(s) Oral every 6 hours PRN Temp greater or equal to 38C (100.4F), Mild Pain (1 - 3)      REVIEW OF SYSTEMS:                           ALL ROS DONE [ X   ]    CONSTITUTIONAL:  LETHARGIC [   ], FEVER [   ], UNRESPONSIVE [   ]  CVS:  CP  [   ], SOB, [   ], PALPITATIONS [   ], DIZZYNESS [   ]  RS: COUGH [   ], SPUTUM [   ]  GI: ABDOMINAL PAIN [   ], NAUSEA [   ], VOMITINGS [   ], DIARRHEA [   ], CONSTIPATION [   ]  :  DYSURIA [   ], NOCTURIA [   ], INCREASED FREQUENCY [   ], DRIBLING [   ],  SKELETAL: PAINFUL JOINTS [   ], SWOLLEN JOINTS [   ], NECK ACHE [   ], LOW BACK ACHE [   ],  SKIN : ULCERS [   ], RASH [   ], ITCHING [   ]  CNS: HEAD ACHE [   ], DOUBLE VISION [   ], BLURRED VISION [   ], AMS / CONFUSION [   ], SEIZURES [   ], WEAKNESS [   ],TINGLING / NUMBNESS [   ]      PHYSICAL EXAMINATION:  GENERAL APPEARANCE: NO DISTRESS  HEENT:  NO PALLOR, NO  JVD,  NO   NODES, NECK SUPPLE  CVS: S1 +, S2 +,   RS: AEEB,  OCCASIONAL  RALES +,   RONCHI +  ABD: SOFT, NT, NO, BS +  EXT: NO PE    SKIN: WARM,   SKELETAL:  ROM ACCEPTABLE  CNS:  AAO X 3        RADIOLOGY :  RADIOLOGY AND READINGS REVIEWED        ASSESSMENT :     Shortness of breath    HTN (hypertension)    Hearing decreased    CVA (cerebral vascular accident)    Hyperlipemia    Kidney stones    Coronary artery disease    CHF (congestive heart failure)    Type 2 diabetes mellitus    Confusion    S/P medial meniscal repair    H/O lithotripsy    S/P tonsillectomy    Bilateral inguinal hernia        PLAN:  HPI:  74y M with PMHx of CVA, MI, HTN, T2DM, HLD, CAD s/p cardiac stents, HFrEF (EF 20-25% 10/2024), hx of malignant melanoma/wide excision, with recent elective bioprosthetic aortic valve replacement and ascending aortic aneurysm repair with transverse hemiarch and CABG x2 (4/8/24) with post op course complicated by cardiogenic shock requiring inotropic support with IV dobutamine and milrinone, IABP and also on amiodarone for atrial fibrillation prophylaxis presenting with difficulty breathing for the past 4 days preceded by cough for the past 4 to 5 weeks. Patient was recently admitted to [facility] 10/5-10/8 for pneumonia. Patient reports completion of full course of antibiotics prescribed on discharge. Patient states that he developed cough around a month ago that has been persistent and now causing abdominal muscle pain. Patient developed shortness of breath 4 days ago that got progressively worse prompting him to come to the ED today. Since treatment in ED, patient reports improvement of dyspnea but cough is still present but less frequent. Endorses constipation with no BM for last 3 days. Denies fever, chills, headache, dizziness, chest pain, palpitations, nausea, vomiting, diarrhea, and dysuria.   (26 Nov 2024 18:29)    # CASE D/W PATIENT AT BEDSIDE. CASE REVIEWED AT LENGTH, ALL QUESTIONS ANSWERED. DISCUSSED THAT PROGNOSIS IS GUARDED    # SYNCOPE  - TELEMETRY  - NOTED CT HEAD  - F/U ORTHOSTATIC VITALS  - F/U ECHO  - RECOMMENDATION FOR POSSIBLE ICD IMPLANT  - EP CONSULT  - CARDIOLOGY CONSULT    # HX OF HFrEF [20-25%, 10/24], HX OF RECENT ELECTIVE BIOPROSTHETIC AORTIC VALVE REPLACEMENT, ASCENDING AORTIC ANEURYSM REPAIR W/ TRANSVERSE HEMIARCH AND CABG X 2  [4/2024] - C/B POST-OP CARDIOGENIC SHOCK REQUIRING IONOTROPIC SUPPORT, IABP   # HX OF CAD S/P STENT  - ON VALSARTAN  - DIGOXIN  - PRN FUROSEMIDE  - DID NOT TOLERATE BB, ENTRESTO    # RECENT ADMISSION FOR  ACUTE HYPOXIC RESPIRATORY FAILURE MULTIFACTORIAL - LIKELY S/T PNA + DECOMPENSATED HF  - NOTED CT CHEST  - ID CONSULT  - PULMONOLOGY CONSULT    # DM  # HBA1C - 10.2  - LANTUS  - SSI + FS    # BPH  - FLOMAX    # HX OF CVA  # HX OF MI  # HTN  # HLD  # HX OF MALIGNANT MELANOMA/WIDE EXCISION  # GI PPX      Patient is a 74y old  Male who presents with a chief complaint of SYNCOPE.    PATIENT IS SEEN AND EXAMINED IN MEDICAL FLOOR.      ALLERGIES:  No Known Allergies      VITALS:    Vital Signs Last 24 Hrs  T(C): 36.6 (16 Dec 2024 07:31), Max: 36.7 (15 Dec 2024 19:26)  T(F): 97.9 (16 Dec 2024 07:31), Max: 98.1 (15 Dec 2024 19:26)  HR: 71 (16 Dec 2024 07:31) (71 - 94)  BP: 100/73 (16 Dec 2024 07:31) (100/73 - 116/71)  BP(mean): --  RR: 19 (16 Dec 2024 07:31) (17 - 19)  SpO2: 94% (16 Dec 2024 07:31) (93% - 97%)    Parameters below as of 16 Dec 2024 07:31  Patient On (Oxygen Delivery Method): room air        LABS:    CBC Full  -  ( 16 Dec 2024 06:20 )  WBC Count : 6.25 K/uL  RBC Count : 4.59 M/uL  Hemoglobin : 12.1 g/dL  Hematocrit : 37.6 %  Platelet Count - Automated : 268 K/uL  Mean Cell Volume : 81.9 fl  Mean Cell Hemoglobin : 26.4 pg  Mean Cell Hemoglobin Concentration : 32.2 g/dL  Auto Neutrophil # : 3.82 K/uL  Auto Lymphocyte # : 1.63 K/uL  Auto Monocyte # : 0.54 K/uL  Auto Eosinophil # : 0.18 K/uL  Auto Basophil # : 0.07 K/uL  Auto Neutrophil % : 61.1 %  Auto Lymphocyte % : 26.1 %  Auto Monocyte % : 8.6 %  Auto Eosinophil % : 2.9 %  Auto Basophil % : 1.1 %      12-16    142  |  109[H]  |  23[H]  ----------------------------<  167[H]  3.8   |  27  |  1.07    Ca    8.7      16 Dec 2024 06:20  Phos  3.5     12-16  Mg     2.0     12-16    TPro  5.8[L]  /  Alb  2.8[L]  /  TBili  0.7  /  DBili  x   /  AST  14  /  ALT  19  /  AlkPhos  99  12-16    CAPILLARY BLOOD GLUCOSE      POCT Blood Glucose.: 180 mg/dL (16 Dec 2024 07:55)  POCT Blood Glucose.: 167 mg/dL (15 Dec 2024 21:58)  POCT Blood Glucose.: 108 mg/dL (15 Dec 2024 16:55)  POCT Blood Glucose.: 151 mg/dL (15 Dec 2024 14:12)  POCT Blood Glucose.: 145 mg/dL (15 Dec 2024 12:15)        LIVER FUNCTIONS - ( 16 Dec 2024 06:20 )  Alb: 2.8 g/dL / Pro: 5.8 g/dL / ALK PHOS: 99 U/L / ALT: 19 U/L DA / AST: 14 U/L / GGT: x           Creatinine Trend: 1.07<--, 1.08<--, 1.02<--, 1.18<--, 1.43<--, 1.23<--  I&O's Summary    16 Dec 2024 07:01  -  16 Dec 2024 10:24  --------------------------------------------------------  IN: 220 mL / OUT: 0 mL / NET: 220 mL            .Blood BLOOD  11-26 @ 15:35   No growth at 5 days  --  --      .Blood BLOOD  11-26 @ 15:10   No growth at 5 days  --  --          MEDICATIONS:    MEDICATIONS  (STANDING):  aspirin enteric coated 81 milliGRAM(s) Oral daily  atorvastatin 80 milliGRAM(s) Oral at bedtime  clopidogrel Tablet 75 milliGRAM(s) Oral daily  digoxin     Tablet 125 MICROGram(s) Oral daily  insulin lispro (ADMELOG) corrective regimen sliding scale   SubCutaneous three times a day before meals  insulin lispro (ADMELOG) corrective regimen sliding scale   SubCutaneous at bedtime  melatonin 3 milliGRAM(s) Oral at bedtime  multivitamin 1 Tablet(s) Oral daily  tamsulosin 0.4 milliGRAM(s) Oral at bedtime  valsartan 40 milliGRAM(s) Oral daily      MEDICATIONS  (PRN):  acetaminophen     Tablet .. 650 milliGRAM(s) Oral every 6 hours PRN Temp greater or equal to 38C (100.4F), Mild Pain (1 - 3)      REVIEW OF SYSTEMS:                           ALL ROS DONE [ X   ]    CONSTITUTIONAL:  LETHARGIC [   ], FEVER [   ], UNRESPONSIVE [   ]  CVS:  CP  [   ], SOB, [   ], PALPITATIONS [   ], DIZZYNESS [   ]  RS: COUGH [   ], SPUTUM [   ]  GI: ABDOMINAL PAIN [   ], NAUSEA [   ], VOMITINGS [   ], DIARRHEA [   ], CONSTIPATION [   ]  :  DYSURIA [   ], NOCTURIA [   ], INCREASED FREQUENCY [   ], DRIBLING [   ],  SKELETAL: PAINFUL JOINTS [   ], SWOLLEN JOINTS [   ], NECK ACHE [   ], LOW BACK ACHE [   ],  SKIN : ULCERS [   ], RASH [   ], ITCHING [   ]  CNS: HEAD ACHE [   ], DOUBLE VISION [   ], BLURRED VISION [   ], AMS / CONFUSION [   ], SEIZURES [   ], WEAKNESS [   ],TINGLING / NUMBNESS [   ]      PHYSICAL EXAMINATION:  GENERAL APPEARANCE: NO DISTRESS  HEENT:  NO PALLOR, NO  JVD,  NO   NODES, NECK SUPPLE  CVS: S1 +, S2 +,   RS: AEEB,  OCCASIONAL  RALES +,   RONCHI +  ABD: SOFT, NT, NO, BS +  EXT: NO PE    SKIN: WARM,   SKELETAL:  ROM ACCEPTABLE  CNS:  AAO X 3        RADIOLOGY :  RADIOLOGY AND READINGS REVIEWED        ASSESSMENT :     Shortness of breath    HTN (hypertension)    Hearing decreased    CVA (cerebral vascular accident)    Hyperlipemia    Kidney stones    Coronary artery disease    CHF (congestive heart failure)    Type 2 diabetes mellitus    Confusion    S/P medial meniscal repair    H/O lithotripsy    S/P tonsillectomy    Bilateral inguinal hernia        PLAN:  HPI:  74y M with PMHx of CVA, MI, HTN, T2DM, HLD, CAD s/p cardiac stents, HFrEF (EF 20-25% 10/2024), hx of malignant melanoma/wide excision, with recent elective bioprosthetic aortic valve replacement and ascending aortic aneurysm repair with transverse hemiarch and CABG x2 (4/8/24) with post op course complicated by cardiogenic shock requiring inotropic support with IV dobutamine and milrinone, IABP and also on amiodarone for atrial fibrillation prophylaxis presenting with difficulty breathing for the past 4 days preceded by cough for the past 4 to 5 weeks. Patient was recently admitted to [facility] 10/5-10/8 for pneumonia. Patient reports completion of full course of antibiotics prescribed on discharge. Patient states that he developed cough around a month ago that has been persistent and now causing abdominal muscle pain. Patient developed shortness of breath 4 days ago that got progressively worse prompting him to come to the ED today. Since treatment in ED, patient reports improvement of dyspnea but cough is still present but less frequent. Endorses constipation with no BM for last 3 days. Denies fever, chills, headache, dizziness, chest pain, palpitations, nausea, vomiting, diarrhea, and dysuria.   (26 Nov 2024 18:29)    # CASE D/W PATIENT AT BEDSIDE. CASE REVIEWED AT LENGTH, ALL QUESTIONS ANSWERED. DISCUSSED THAT PROGNOSIS IS GUARDED  - ALSO DISCUSSED CASE AT LENGTH WITH WIFE HANNAH @ [phone]     # SYNCOPE  - TELEMETRY  - NOTED CT HEAD  - ORTHOSTATIC VITALS - NEGATIVE  - F/U ECHO   - RECOMMENDATION FOR POSSIBLE ICD IMPLANT  - EP CONSULT  - CARDIOLOGY CONSULT    - PLANNED FOR TRANSFER TO [facility] FOR ICD PLACEMENT    # HX OF HFrEF [20-25%, 10/24], HX OF RECENT ELECTIVE BIOPROSTHETIC AORTIC VALVE REPLACEMENT, ASCENDING AORTIC ANEURYSM REPAIR W/ TRANSVERSE HEMIARCH AND CABG X 2  [4/2024] - C/B POST-OP CARDIOGENIC SHOCK REQUIRING IONOTROPIC SUPPORT, IABP   # HX OF CAD S/P STENT  - ON VALSARTAN  - DIGOXIN  - PRN FUROSEMIDE  - DID NOT TOLERATE BB, ENTRESTO    # RECENT ADMISSION FOR  ACUTE HYPOXIC RESPIRATORY FAILURE MULTIFACTORIAL - LIKELY S/T PNA + DECOMPENSATED HF  - NOTED CT CHEST  - ID CONSULT  - PULMONOLOGY CONSULT    # DM  # HBA1C - 10.2  - LANTUS  - SSI + FS    # BPH  - FLOMAX    # HX OF CVA  # HX OF MI  # HTN  # HLD  # HX OF MALIGNANT MELANOMA/WIDE EXCISION  # GI PPX

## 2024-12-16 NOTE — PROGRESS NOTE ADULT - SUBJECTIVE AND OBJECTIVE BOX
Time of Visit:  Patient seen and examined.     MEDICATIONS  (STANDING):  aspirin enteric coated 81 milliGRAM(s) Oral daily  atorvastatin 80 milliGRAM(s) Oral at bedtime  clopidogrel Tablet 75 milliGRAM(s) Oral daily  digoxin     Tablet 125 MICROGram(s) Oral daily  insulin lispro (ADMELOG) corrective regimen sliding scale   SubCutaneous three times a day before meals  insulin lispro (ADMELOG) corrective regimen sliding scale   SubCutaneous at bedtime  melatonin 3 milliGRAM(s) Oral at bedtime  multivitamin 1 Tablet(s) Oral daily  tamsulosin 0.4 milliGRAM(s) Oral at bedtime  valsartan 40 milliGRAM(s) Oral daily      MEDICATIONS  (PRN):  acetaminophen     Tablet .. 650 milliGRAM(s) Oral every 6 hours PRN Temp greater or equal to 38C (100.4F), Mild Pain (1 - 3)       Medications up to date at time of exam.    ROS; No fever, hcills, cough, congestion on exam. No SOB at rest .   PHYSICAL EXAMINATION:    Vital Signs Last 24 Hrs  T(C): 36.5 (16 Dec 2024 11:19), Max: 36.7 (15 Dec 2024 19:26)  T(F): 97.7 (16 Dec 2024 11:19), Max: 98.1 (15 Dec 2024 19:26)  HR: 90 (16 Dec 2024 11:19) (71 - 94)  BP: 119/80 (16 Dec 2024 11:19) (100/73 - 119/80)  BP(mean): --  RR: 18 (16 Dec 2024 11:19) (17 - 19)  SpO2: 96% (16 Dec 2024 11:19) (93% - 97%)    Parameters below as of 16 Dec 2024 11:19  Patient On (Oxygen Delivery Method): room air       (if applicable)    General: Alert and oriented. Able to answer question with no SOB. No acute distress.     HEENT: Head is normocephalic and atraumatic. Extraocular muscles are intact. Mucous membranes are moist.     NECK: Supple, no palpable adenopathy.    LUNGS: Non labored. No wheezing. No use of accessory muscle.     HEART: S1 S2 Regular rate and rhythm . No JVD.     ABDOMEN: Soft, nontender, and nondistended. Active bowel sounds.     EXTREMITIES: Without any cyanosis, clubbing, rash, lesions or edema.    NEUROLOGIC: Awake, alert, oriented.     SKIN: Warm, dry, good turgor.      LABS:                        12.1   6.25  )-----------( 268      ( 16 Dec 2024 06:20 )             37.6     12-16    142  |  109[H]  |  23[H]  ----------------------------<  167[H]  3.8   |  27  |  1.07    Ca    8.7      16 Dec 2024 06:20  Phos  3.5     12-16  Mg     2.0     12-16    TPro  5.8[L]  /  Alb  2.8[L]  /  TBili  0.7  /  DBili  x   /  AST  14  /  ALT  19  /  AlkPhos  99  12-16      Urinalysis Basic - ( 16 Dec 2024 06:20 )    Color: x / Appearance: x / SG: x / pH: x  Gluc: 167 mg/dL / Ketone: x  / Bili: x / Urobili: x   Blood: x / Protein: x / Nitrite: x   Leuk Esterase: x / RBC: x / WBC x   Sq Epi: x / Non Sq Epi: x / Bacteria: x    MICROBIOLOGY: (if applicable)    RADIOLOGY & ADDITIONAL STUDIES:  EKG:   CXR: < from: CT Chest No Cont (12.14.24 @ 21:55) >    ACC: [id number] EXAM:  CT CHEST   ORDERED BY: RONN HARMON     PROCEDURE DATE:  12/14/2024          INTERPRETATION:  Clinical indication: SOB    TECHNIQUE:  Imaging protocol: Diagnostic computed tomography of the chest without   contrast.  3D rendering (Not supervised by radiologist): MIP and/or 3D reconstructed  images were created by the technologist.    COMPARISON:  CT CHEST 11/27/2024 9:33 AM    FINDINGS:  Lungs: Smooth interlobular septal thickening compatible with hydrostatic  interstitialpulmonary edema/CHF. Multifocal bilateral pulmonary fibrosis   and  pulmonary scarring. Fairly extensive multifocal bilateral pneumonia.  Pleural spaces: Small volume bilateral pleural effusions.  Heart: Prosthetic aortic valve. Postsurgical/post intervention changes of   the  ascending aorta. Aorta is normal in caliber. Cardiomegaly.  Esophagus: Esophagus is unremarkable.  Lymph nodes: Mildly prominent mediastinal and bilateral hilar lymph nodes   are  likely reactive.      Kidneys: Left renal cyst.  Bones/joints: Unremarkable. No acute fracture.  Soft tissues: Stable 1.8 cm right chest wall cutaneous lesion.    IMPRESSION:  1.   Hydrostatic interstitial pulmonary edema/CHF.  2.   Multifocal bilateral pulmonary fibrosis and pulmonary scarring.  3.   Fairly extensive multifocal bilateral pneumonia.  4.   Small volume bilateral pleural effusions.    --- End of Report ---            MARIO VUONG MD; Attending Radiologist  This document has been electronically signed. Dec 14 2024 11:20PM    < end of copied text >    ECHO:    IMPRESSION: 74y Male PAST MEDICAL & SURGICAL HISTORY:  HTN (hypertension)      Hearing decreased      CVA (cerebral vascular accident)  12/22/2016      Hyperlipemia      Kidney stones      Coronary artery disease  MI 12/22/2016      CHF (congestive heart failure)  1/2017 stents x3      Type 2 diabetes mellitus  2016      Confusion  from stroke      S/P medial meniscal repair  and ligament surgery      H/O lithotripsy      S/P tonsillectomy      Bilateral inguinal hernia       Impression: This is a 73 Y/O Male presented to ED with Dizziness followed by collapse. Was recently discharged from [facility] from 11/26 to 12/5 for acute on chronic CHF and sepsis due to pneumonia. For pulmonary follow up of episodic SOB on exertion likely due to Cardiac etiology from Ischemic cardiomyopathy ( Per EP good candidate for ICD implant ) . CT chest with B/L Small Pleural Effusions with Multifocal B/L Pulmonary Fibrosis , scarring /  Interstitial Lung Disease.     Suggestion:   O2 saturation 96% room air. So far saturating good room air.   Can benefit with outpatient PFT .  Incentive spirometer .   Monitor Blood Glucose with Admelog insulin coverage.

## 2024-12-16 NOTE — PROGRESS NOTE ADULT - CONVERSATION DETAILS
CASE DISCUSSED AT LENGTH WITH PATIENT. ALL QUESTIONS ANSWERED. CONVEYED THAT PROGNOSIS IS GUARDED. IN DISCUSSION REGARDING GOC - PATIENT WISHES FOR GOC OF DNR/DNI.

## 2024-12-16 NOTE — PHYSICAL THERAPY INITIAL EVALUATION ADULT - ADDITIONAL COMMENTS
Pt lives in a private house with 4 annabel. Pt was fully independent with ADLs and ambulation prior to admission but claimed he occasionally would use a SC especially since his last DC from [facility].

## 2024-12-17 ENCOUNTER — TRANSCRIPTION ENCOUNTER (OUTPATIENT)
Age: 74
End: 2024-12-17

## 2024-12-17 ENCOUNTER — RESULT REVIEW (OUTPATIENT)
Age: 74
End: 2024-12-17

## 2024-12-17 ENCOUNTER — APPOINTMENT (OUTPATIENT)
Dept: CARDIOLOGY | Facility: CLINIC | Age: 74
End: 2024-12-17

## 2024-12-17 VITALS
RESPIRATION RATE: 18 BRPM | OXYGEN SATURATION: 99 % | SYSTOLIC BLOOD PRESSURE: 122 MMHG | DIASTOLIC BLOOD PRESSURE: 83 MMHG | HEART RATE: 83 BPM

## 2024-12-17 LAB
GLUCOSE BLDC GLUCOMTR-MCNC: 129 MG/DL — HIGH (ref 70–99)
GLUCOSE BLDC GLUCOMTR-MCNC: 139 MG/DL — HIGH (ref 70–99)
GLUCOSE BLDC GLUCOMTR-MCNC: 218 MG/DL — HIGH (ref 70–99)

## 2024-12-17 PROCEDURE — 94640 AIRWAY INHALATION TREATMENT: CPT

## 2024-12-17 PROCEDURE — 87040 BLOOD CULTURE FOR BACTERIA: CPT

## 2024-12-17 PROCEDURE — 97162 PT EVAL MOD COMPLEX 30 MIN: CPT

## 2024-12-17 PROCEDURE — 71250 CT THORAX DX C-: CPT | Mod: MC

## 2024-12-17 PROCEDURE — C8929: CPT

## 2024-12-17 PROCEDURE — 82550 ASSAY OF CK (CPK): CPT

## 2024-12-17 PROCEDURE — 82962 GLUCOSE BLOOD TEST: CPT

## 2024-12-17 PROCEDURE — 36415 COLL VENOUS BLD VENIPUNCTURE: CPT

## 2024-12-17 PROCEDURE — 83880 ASSAY OF NATRIURETIC PEPTIDE: CPT

## 2024-12-17 PROCEDURE — 71045 X-RAY EXAM CHEST 1 VIEW: CPT

## 2024-12-17 PROCEDURE — 70450 CT HEAD/BRAIN W/O DYE: CPT | Mod: MC

## 2024-12-17 PROCEDURE — 84484 ASSAY OF TROPONIN QUANT: CPT

## 2024-12-17 PROCEDURE — 83735 ASSAY OF MAGNESIUM: CPT

## 2024-12-17 PROCEDURE — 72125 CT NECK SPINE W/O DYE: CPT | Mod: MC

## 2024-12-17 PROCEDURE — 80053 COMPREHEN METABOLIC PANEL: CPT

## 2024-12-17 PROCEDURE — 99285 EMERGENCY DEPT VISIT HI MDM: CPT | Mod: 25

## 2024-12-17 PROCEDURE — 83605 ASSAY OF LACTIC ACID: CPT

## 2024-12-17 PROCEDURE — 85025 COMPLETE CBC W/AUTO DIFF WBC: CPT

## 2024-12-17 PROCEDURE — 83690 ASSAY OF LIPASE: CPT

## 2024-12-17 PROCEDURE — 93005 ELECTROCARDIOGRAM TRACING: CPT

## 2024-12-17 PROCEDURE — 81003 URINALYSIS AUTO W/O SCOPE: CPT

## 2024-12-17 PROCEDURE — 84100 ASSAY OF PHOSPHORUS: CPT

## 2024-12-17 RX ORDER — METOPROLOL TARTRATE 100 MG/1
12.5 TABLET, FILM COATED ORAL DAILY
Refills: 0 | Status: DISCONTINUED | OUTPATIENT
Start: 2024-12-17 | End: 2024-12-18

## 2024-12-17 RX ORDER — CLOPIDOGREL 75 MG/1
1 TABLET, FILM COATED ORAL
Qty: 0 | Refills: 0 | DISCHARGE
Start: 2024-12-17

## 2024-12-17 RX ADMIN — Medication 125 MICROGRAM(S): at 06:12

## 2024-12-17 RX ADMIN — ACETAMINOPHEN, DIPHENHYDRAMINE HCL, PHENYLEPHRINE HCL 3 MILLIGRAM(S): 325; 25; 5 TABLET ORAL at 21:36

## 2024-12-17 RX ADMIN — FLUTICASONE PROPIONATE AND SALMETEROL XINAFOATE 1 DOSE(S): 45; 21 AEROSOL, METERED RESPIRATORY (INHALATION) at 12:22

## 2024-12-17 RX ADMIN — TAMSULOSIN HYDROCHLORIDE 0.4 MILLIGRAM(S): 0.4 CAPSULE ORAL at 21:36

## 2024-12-17 RX ADMIN — CLOPIDOGREL 75 MILLIGRAM(S): 75 TABLET, FILM COATED ORAL at 12:22

## 2024-12-17 RX ADMIN — Medication 80 MILLIGRAM(S): at 21:36

## 2024-12-17 RX ADMIN — Medication 81 MILLIGRAM(S): at 12:22

## 2024-12-17 RX ADMIN — Medication 1 TABLET(S): at 12:22

## 2024-12-17 NOTE — DISCHARGE NOTE NURSING/CASE MANAGEMENT/SOCIAL WORK - FINANCIAL ASSISTANCE
[facility] provides services at a reduced cost to those who are determined to be eligible through [facility]’s financial assistance program. Information regarding [facility]’s financial assistance program can be found by going to [email or web address] or by calling [phone].

## 2024-12-17 NOTE — PROGRESS NOTE ADULT - PROBLEM SELECTOR PLAN 3
Patient has been A&O x4 this morning she is aware of where she is and what's going on, she states she is ready to go back to [facility]. No complaints of pain at this time. Patient is in bed resting with call light in reach.
h/o DM on metformin 1000mg BID  -previously on linagliptin and faxiga    -hold oral dm meds  -last A1c 10.2 on 11/27  -start low sliding scale  -Adjust insulin as indicated  -FS ACHS.  -carb steady diet
h/o DM on metformin 1000mg BID  -previously on linagliptin and faxiga    -hold oral dm meds  -last A1c 10.2 on 11/27  -start low sliding scale  -Adjust insulin as indicated  -FS ACHS.  -carb steady diet

## 2024-12-17 NOTE — PROGRESS NOTE ADULT - SUBJECTIVE AND OBJECTIVE BOX
Patient is a 74y old  Male who presents with a chief complaint of Syncope (16 Dec 2024 17:06)    PATIENT IS SEEN AND EXAMINED IN MEDICAL FLOOR.  LEISA [    ]    JONAS [   ]      GT [   ]    ALLERGIES:  No Known Allergies      Daily     Daily     VITALS:    Vital Signs Last 24 Hrs  T(C): 36.8 (17 Dec 2024 00:01), Max: 36.8 (16 Dec 2024 16:32)  T(F): 98.2 (17 Dec 2024 00:01), Max: 98.2 (16 Dec 2024 16:32)  HR: 90 (17 Dec 2024 07:50) (81 - 95)  BP: 127/82 (17 Dec 2024 07:50) (100/64 - 134/97)  BP(mean): --  RR: 18 (17 Dec 2024 07:50) (18 - 19)  SpO2: 98% (17 Dec 2024 07:50) (95% - 98%)    Parameters below as of 17 Dec 2024 07:50  Patient On (Oxygen Delivery Method): room air        LABS:    CBC Full  -  ( 16 Dec 2024 06:20 )  WBC Count : 6.25 K/uL  RBC Count : 4.59 M/uL  Hemoglobin : 12.1 g/dL  Hematocrit : 37.6 %  Platelet Count - Automated : 268 K/uL  Mean Cell Volume : 81.9 fl  Mean Cell Hemoglobin : 26.4 pg  Mean Cell Hemoglobin Concentration : 32.2 g/dL  Auto Neutrophil # : 3.82 K/uL  Auto Lymphocyte # : 1.63 K/uL  Auto Monocyte # : 0.54 K/uL  Auto Eosinophil # : 0.18 K/uL  Auto Basophil # : 0.07 K/uL  Auto Neutrophil % : 61.1 %  Auto Lymphocyte % : 26.1 %  Auto Monocyte % : 8.6 %  Auto Eosinophil % : 2.9 %  Auto Basophil % : 1.1 %      12-16    142  |  109[H]  |  23[H]  ----------------------------<  167[H]  3.8   |  27  |  1.07    Ca    8.7      16 Dec 2024 06:20  Phos  3.5     12-16  Mg     2.0     12-16    TPro  5.8[L]  /  Alb  2.8[L]  /  TBili  0.7  /  DBili  x   /  AST  14  /  ALT  19  /  AlkPhos  99  12-16    CAPILLARY BLOOD GLUCOSE      POCT Blood Glucose.: 139 mg/dL (17 Dec 2024 07:37)  POCT Blood Glucose.: 287 mg/dL (16 Dec 2024 21:52)  POCT Blood Glucose.: 143 mg/dL (16 Dec 2024 16:41)  POCT Blood Glucose.: 195 mg/dL (16 Dec 2024 11:39)        LIVER FUNCTIONS - ( 16 Dec 2024 06:20 )  Alb: 2.8 g/dL / Pro: 5.8 g/dL / ALK PHOS: 99 U/L / ALT: 19 U/L DA / AST: 14 U/L / GGT: x           Creatinine Trend: 1.07<--, 1.08<--, 1.02<--, 1.18<--, 1.43<--, 1.23<--  I&O's Summary    16 Dec 2024 07:01  -  17 Dec 2024 07:00  --------------------------------------------------------  IN: 450 mL / OUT: 0 mL / NET: 450 mL            .Blood BLOOD  12-15 @ 05:15   No growth at 24 hours  --  --      .Blood BLOOD  12-15 @ 05:00   No growth at 24 hours  --  --      .Blood BLOOD  11-26 @ 15:35   No growth at 5 days  --  --      .Blood BLOOD  11-26 @ 15:10   No growth at 5 days  --  --          MEDICATIONS:    MEDICATIONS  (STANDING):  aspirin enteric coated 81 milliGRAM(s) Oral daily  atorvastatin 80 milliGRAM(s) Oral at bedtime  clopidogrel Tablet 75 milliGRAM(s) Oral daily  digoxin     Tablet 125 MICROGram(s) Oral daily  fluticasone propionate/ salmeterol 100-50 MICROgram(s) Diskus 1 Dose(s) Inhalation two times a day  insulin lispro (ADMELOG) corrective regimen sliding scale   SubCutaneous three times a day before meals  insulin lispro (ADMELOG) corrective regimen sliding scale   SubCutaneous at bedtime  melatonin 3 milliGRAM(s) Oral at bedtime  multivitamin 1 Tablet(s) Oral daily  tamsulosin 0.4 milliGRAM(s) Oral at bedtime  valsartan 40 milliGRAM(s) Oral daily      MEDICATIONS  (PRN):  acetaminophen     Tablet .. 650 milliGRAM(s) Oral every 6 hours PRN Temp greater or equal to 38C (100.4F), Mild Pain (1 - 3)      REVIEW OF SYSTEMS:                           ALL ROS DONE [ X   ]    CONSTITUTIONAL:  LETHARGIC [   ], FEVER [   ], UNRESPONSIVE [   ]  CVS:  CP  [   ], SOB, [   ], PALPITATIONS [   ], DIZZYNESS [   ]  RS: COUGH [   ], SPUTUM [   ]  GI: ABDOMINAL PAIN [   ], NAUSEA [   ], VOMITINGS [   ], DIARRHEA [   ], CONSTIPATION [   ]  :  DYSURIA [   ], NOCTURIA [   ], INCREASED FREQUENCY [   ], DRIBLING [   ],  SKELETAL: PAINFUL JOINTS [   ], SWOLLEN JOINTS [   ], NECK ACHE [   ], LOW BACK ACHE [   ],  SKIN : ULCERS [   ], RASH [   ], ITCHING [   ]  CNS: HEAD ACHE [   ], DOUBLE VISION [   ], BLURRED VISION [   ], AMS / CONFUSION [   ], SEIZURES [   ], WEAKNESS [   ],TINGLING / NUMBNESS [   ]      PHYSICAL EXAMINATION:  GENERAL APPEARANCE: NO DISTRESS  HEENT:  NO PALLOR, NO  JVD,  NO   NODES, NECK SUPPLE  CVS: S1 +, S2 +,   RS: AEEB,  OCCASIONAL  RALES +,   RONCHI +  ABD: SOFT, NT, NO, BS +  EXT: NO PE    SKIN: WARM,   SKELETAL:  ROM ACCEPTABLE  CNS:  AAO X 3        RADIOLOGY :  RADIOLOGY AND READINGS REVIEWED        ASSESSMENT :     Shortness of breath    HTN (hypertension)    Hearing decreased    CVA (cerebral vascular accident)    Hyperlipemia    Kidney stones    Coronary artery disease    CHF (congestive heart failure)    Type 2 diabetes mellitus    Confusion    S/P medial meniscal repair    H/O lithotripsy    S/P tonsillectomy    Bilateral inguinal hernia        PLAN:  HPI:  74y M with PMHx of CVA, MI, HTN, T2DM, HLD, CAD s/p cardiac stents, HFrEF (EF 20-25% 10/2024), hx of malignant melanoma/wide excision, with recent elective bioprosthetic aortic valve replacement and ascending aortic aneurysm repair with transverse hemiarch and CABG x2 (4/8/24) with post op course complicated by cardiogenic shock requiring inotropic support with IV dobutamine and milrinone, IABP and also on amiodarone for atrial fibrillation prophylaxis presenting with difficulty breathing for the past 4 days preceded by cough for the past 4 to 5 weeks. Patient was recently admitted to [facility] 10/5-10/8 for pneumonia. Patient reports completion of full course of antibiotics prescribed on discharge. Patient states that he developed cough around a month ago that has been persistent and now causing abdominal muscle pain. Patient developed shortness of breath 4 days ago that got progressively worse prompting him to come to the ED today. Since treatment in ED, patient reports improvement of dyspnea but cough is still present but less frequent. Endorses constipation with no BM for last 3 days. Denies fever, chills, headache, dizziness, chest pain, palpitations, nausea, vomiting, diarrhea, and dysuria.   (26 Nov 2024 18:29)    # CASE D/W PATIENT AT BEDSIDE. CASE REVIEWED AT LENGTH, ALL QUESTIONS ANSWERED. DISCUSSED THAT PROGNOSIS IS GUARDED  - ALSO DISCUSSED CASE AT LENGTH WITH WIFE HANNAH @ [phone]     # SYNCOPE  - TELEMETRY  - NOTED CT HEAD  - ORTHOSTATIC VITALS - NEGATIVE  - F/U ECHO   - RECOMMENDATION FOR POSSIBLE ICD IMPLANT  - EP CONSULT  - CARDIOLOGY CONSULT    - PLANNED FOR TRANSFER TO [facility] FOR ICD PLACEMENT    # HX OF HFrEF [20-25%, 10/24], HX OF RECENT ELECTIVE BIOPROSTHETIC AORTIC VALVE REPLACEMENT, ASCENDING AORTIC ANEURYSM REPAIR W/ TRANSVERSE HEMIARCH AND CABG X 2  [4/2024] - C/B POST-OP CARDIOGENIC SHOCK REQUIRING IONOTROPIC SUPPORT, IABP   # HX OF CAD S/P STENT  - ON VALSARTAN  - DIGOXIN  - PRN FUROSEMIDE  - DID NOT TOLERATE BB, ENTRESTO    # RECENT ADMISSION FOR  ACUTE HYPOXIC RESPIRATORY FAILURE MULTIFACTORIAL - LIKELY S/T PNA + DECOMPENSATED HF  - NOTED CT CHEST  - ID CONSULT  - PULMONOLOGY CONSULT    # DM  # HBA1C - 10.2  - LANTUS  - SSI + FS    # BPH  - FLOMAX    # HX OF CVA  # HX OF MI  # HTN  # HLD  # HX OF MALIGNANT MELANOMA/WIDE EXCISION  # GI PPX    Patient is a 74y old  Male who presents with a chief complaint of Syncope (16 Dec 2024 17:06)    PATIENT IS SEEN AND EXAMINED IN MEDICAL FLOOR.      ALLERGIES:  No Known Allergies      VITALS:    Vital Signs Last 24 Hrs  T(C): 36.8 (17 Dec 2024 00:01), Max: 36.8 (16 Dec 2024 16:32)  T(F): 98.2 (17 Dec 2024 00:01), Max: 98.2 (16 Dec 2024 16:32)  HR: 90 (17 Dec 2024 07:50) (81 - 95)  BP: 127/82 (17 Dec 2024 07:50) (100/64 - 134/97)  BP(mean): --  RR: 18 (17 Dec 2024 07:50) (18 - 19)  SpO2: 98% (17 Dec 2024 07:50) (95% - 98%)    Parameters below as of 17 Dec 2024 07:50  Patient On (Oxygen Delivery Method): room air        LABS:    CBC Full  -  ( 16 Dec 2024 06:20 )  WBC Count : 6.25 K/uL  RBC Count : 4.59 M/uL  Hemoglobin : 12.1 g/dL  Hematocrit : 37.6 %  Platelet Count - Automated : 268 K/uL  Mean Cell Volume : 81.9 fl  Mean Cell Hemoglobin : 26.4 pg  Mean Cell Hemoglobin Concentration : 32.2 g/dL  Auto Neutrophil # : 3.82 K/uL  Auto Lymphocyte # : 1.63 K/uL  Auto Monocyte # : 0.54 K/uL  Auto Eosinophil # : 0.18 K/uL  Auto Basophil # : 0.07 K/uL  Auto Neutrophil % : 61.1 %  Auto Lymphocyte % : 26.1 %  Auto Monocyte % : 8.6 %  Auto Eosinophil % : 2.9 %  Auto Basophil % : 1.1 %      12-16    142  |  109[H]  |  23[H]  ----------------------------<  167[H]  3.8   |  27  |  1.07    Ca    8.7      16 Dec 2024 06:20  Phos  3.5     12-16  Mg     2.0     12-16    TPro  5.8[L]  /  Alb  2.8[L]  /  TBili  0.7  /  DBili  x   /  AST  14  /  ALT  19  /  AlkPhos  99  12-16    CAPILLARY BLOOD GLUCOSE      POCT Blood Glucose.: 139 mg/dL (17 Dec 2024 07:37)  POCT Blood Glucose.: 287 mg/dL (16 Dec 2024 21:52)  POCT Blood Glucose.: 143 mg/dL (16 Dec 2024 16:41)  POCT Blood Glucose.: 195 mg/dL (16 Dec 2024 11:39)        LIVER FUNCTIONS - ( 16 Dec 2024 06:20 )  Alb: 2.8 g/dL / Pro: 5.8 g/dL / ALK PHOS: 99 U/L / ALT: 19 U/L DA / AST: 14 U/L / GGT: x           Creatinine Trend: 1.07<--, 1.08<--, 1.02<--, 1.18<--, 1.43<--, 1.23<--  I&O's Summary    16 Dec 2024 07:01  -  17 Dec 2024 07:00  --------------------------------------------------------  IN: 450 mL / OUT: 0 mL / NET: 450 mL            .Blood BLOOD  12-15 @ 05:15   No growth at 24 hours  --  --      .Blood BLOOD  12-15 @ 05:00   No growth at 24 hours  --  --      .Blood BLOOD  11-26 @ 15:35   No growth at 5 days  --  --      .Blood BLOOD  11-26 @ 15:10   No growth at 5 days  --  --          MEDICATIONS:    MEDICATIONS  (STANDING):  aspirin enteric coated 81 milliGRAM(s) Oral daily  atorvastatin 80 milliGRAM(s) Oral at bedtime  clopidogrel Tablet 75 milliGRAM(s) Oral daily  digoxin     Tablet 125 MICROGram(s) Oral daily  fluticasone propionate/ salmeterol 100-50 MICROgram(s) Diskus 1 Dose(s) Inhalation two times a day  insulin lispro (ADMELOG) corrective regimen sliding scale   SubCutaneous three times a day before meals  insulin lispro (ADMELOG) corrective regimen sliding scale   SubCutaneous at bedtime  melatonin 3 milliGRAM(s) Oral at bedtime  multivitamin 1 Tablet(s) Oral daily  tamsulosin 0.4 milliGRAM(s) Oral at bedtime  valsartan 40 milliGRAM(s) Oral daily      MEDICATIONS  (PRN):  acetaminophen     Tablet .. 650 milliGRAM(s) Oral every 6 hours PRN Temp greater or equal to 38C (100.4F), Mild Pain (1 - 3)      REVIEW OF SYSTEMS:                           ALL ROS DONE [ X   ]    CONSTITUTIONAL:  LETHARGIC [   ], FEVER [   ], UNRESPONSIVE [   ]  CVS:  CP  [   ], SOB, [   ], PALPITATIONS [   ], DIZZYNESS [   ]  RS: COUGH [   ], SPUTUM [   ]  GI: ABDOMINAL PAIN [   ], NAUSEA [   ], VOMITINGS [   ], DIARRHEA [   ], CONSTIPATION [   ]  :  DYSURIA [   ], NOCTURIA [   ], INCREASED FREQUENCY [   ], DRIBLING [   ],  SKELETAL: PAINFUL JOINTS [   ], SWOLLEN JOINTS [   ], NECK ACHE [   ], LOW BACK ACHE [   ],  SKIN : ULCERS [   ], RASH [   ], ITCHING [   ]  CNS: HEAD ACHE [   ], DOUBLE VISION [   ], BLURRED VISION [   ], AMS / CONFUSION [   ], SEIZURES [   ], WEAKNESS [   ],TINGLING / NUMBNESS [   ]      PHYSICAL EXAMINATION:  GENERAL APPEARANCE: NO DISTRESS  HEENT:  NO PALLOR, NO  JVD,  NO   NODES, NECK SUPPLE  CVS: S1 +, S2 +,   RS: AEEB,  OCCASIONAL  RALES +,   RONCHI +  ABD: SOFT, NT, NO, BS +  EXT: NO PE    SKIN: WARM,   SKELETAL:  ROM ACCEPTABLE  CNS:  AAO X 3        RADIOLOGY :  RADIOLOGY AND READINGS REVIEWED        ASSESSMENT :     Shortness of breath    HTN (hypertension)    Hearing decreased    CVA (cerebral vascular accident)    Hyperlipemia    Kidney stones    Coronary artery disease    CHF (congestive heart failure)    Type 2 diabetes mellitus    Confusion    S/P medial meniscal repair    H/O lithotripsy    S/P tonsillectomy    Bilateral inguinal hernia        PLAN:  HPI:  74y M with PMHx of CVA, MI, HTN, T2DM, HLD, CAD s/p cardiac stents, HFrEF (EF 20-25% 10/2024), hx of malignant melanoma/wide excision, with recent elective bioprosthetic aortic valve replacement and ascending aortic aneurysm repair with transverse hemiarch and CABG x2 (4/8/24) with post op course complicated by cardiogenic shock requiring inotropic support with IV dobutamine and milrinone, IABP and also on amiodarone for atrial fibrillation prophylaxis presenting with difficulty breathing for the past 4 days preceded by cough for the past 4 to 5 weeks. Patient was recently admitted to [facility] 10/5-10/8 for pneumonia. Patient reports completion of full course of antibiotics prescribed on discharge. Patient states that he developed cough around a month ago that has been persistent and now causing abdominal muscle pain. Patient developed shortness of breath 4 days ago that got progressively worse prompting him to come to the ED today. Since treatment in ED, patient reports improvement of dyspnea but cough is still present but less frequent. Endorses constipation with no BM for last 3 days. Denies fever, chills, headache, dizziness, chest pain, palpitations, nausea, vomiting, diarrhea, and dysuria.   (26 Nov 2024 18:29)    # [12/16] CASE D/W PATIENT AT BEDSIDE. CASE REVIEWED AT LENGTH, ALL QUESTIONS ANSWERED. DISCUSSED THAT PROGNOSIS IS GUARDED  - ALSO DISCUSSED CASE AT LENGTH WITH WIFE HANNAH @ [phone]     # SYNCOPE  - TELEMETRY  - NOTED CT HEAD  - ORTHOSTATIC VITALS - NEGATIVE  - F/U ECHO   - RECOMMENDATION FOR POSSIBLE ICD IMPLANT  - EP CONSULT  - CARDIOLOGY CONSULT    - PLANNED FOR TRANSFER TO [facility] FOR ICD PLACEMENT    # HX OF HFrEF [20-25%, 10/24], HX OF RECENT ELECTIVE BIOPROSTHETIC AORTIC VALVE REPLACEMENT, ASCENDING AORTIC ANEURYSM REPAIR W/ TRANSVERSE HEMIARCH AND CABG X 2  [4/2024] - C/B POST-OP CARDIOGENIC SHOCK REQUIRING IONOTROPIC SUPPORT, IABP   # HX OF CAD S/P STENT  - ON VALSARTAN  - DIGOXIN  - PRN FUROSEMIDE  - TRIAL OF BB  - DID NOT TOLERATE ENTRESTO    # RECENT ADMISSION FOR  ACUTE HYPOXIC RESPIRATORY FAILURE MULTIFACTORIAL - LIKELY S/T PNA + DECOMPENSATED HF  - NOTED CT CHEST  - ID CONSULT  - PULMONOLOGY CONSULT    # DM  # HBA1C - 10.2  - LANTUS  - SSI + FS    # BPH  - FLOMAX    # HX OF CVA  # HX OF MI  # HTN  # HLD  # HX OF MALIGNANT MELANOMA/WIDE EXCISION  # GI PPX

## 2024-12-17 NOTE — PROGRESS NOTE ADULT - PROBLEM SELECTOR PLAN 4
hx of HTN now on valsartan 40mg qd  - previously on Entresto, Lasix, and metoprolol recently discontinued due to hypotension on recent hospital admission  -c/w home valsartan with holding parameters  -monitor BP  -adjust antihypertensive meds as appropriate
hx of HTN now on valsartan 40mg qd  - previously on Entresto, Lasix, and metoprolol recently discontinued due to hypotension on recent hospital admission  -c/w home valsartan with holding parameters  -monitor BP  -adjust antihypertensive meds as appropriate

## 2024-12-17 NOTE — DISCHARGE NOTE PROVIDER - NSDCMRMEDTOKEN_GEN_ALL_CORE_FT
aspirin 81 mg oral delayed release tablet: 1 tab(s) orally once a day  atorvastatin 80 mg oral tablet: 1 tab(s) orally once a day (at bedtime)  clopidogrel 75 mg oral tablet: 1 tab(s) orally once a day  clopidogrel 75 mg oral tablet: 1 tab(s) orally once a day  digoxin 125 mcg (0.125 mg) oral tablet: 1 tab(s) orally once a day  Flomax 0.4 mg oral capsule: 1 cap(s) orally once a day (at bedtime)  melatonin 3 mg oral tablet: 2 tab(s) orally once a day (at bedtime)  Multiple Vitamins oral tablet: 1 tab(s) orally once a day  valsartan 40 mg oral tablet: 1 tab(s) orally once a day

## 2024-12-17 NOTE — PROGRESS NOTE ADULT - PROBLEM SELECTOR PLAN 2
HFrEF (EF 20-25%) with recent elective bioprosthetic aortic valve replacement and ascending aortic aneurysm repair with transverse hemiarch and CABG x2 (4/8/24) with post op course complicated by cardiogenic shock requiring inotropic support with IV dobutamine and milrinone as well as IABP  -CT chest: 1. Hydrostatic interstitial pulmonary edema/CHF. 2.  Multifocal bilateral pulmonary fibrosis and pulmonary scarring. 3. Fairly extensive multifocal bilateral pneumonia 4. Small volume bilateral pleural effusions.  -BNP 77076 (previously 88957 on 11/26 and  8193 on 10/8)  -Troponin 64.7-->66.9  -previously on metoprolol, entresto, farxiga, lasix for GDMT and amiodarone for Afib ppx-->switched to valsartan and digoxin last admission  -c/w valsartan with parameters and digoxin  -Start Toprol 12.5 qd  -s/p lasix 40mg IV in ED-->hold off on further diuresis for now (not short of breath, trace basilar rales, no edema)  -daily weights, strict I&Os  -Cardio/EP Consulted: Dr. Ward/Dr. Burroughs
HFrEF (EF 20-25%) with recent elective bioprosthetic aortic valve replacement and ascending aortic aneurysm repair with transverse hemiarch and CABG x2 (4/8/24) with post op course complicated by cardiogenic shock requiring inotropic support with IV dobutamine and milrinone as well as IABP  -CT chest: 1. Hydrostatic interstitial pulmonary edema/CHF. 2.  Multifocal bilateral pulmonary fibrosis and pulmonary scarring. 3. Fairly extensive multifocal bilateral pneumonia 4. Small volume bilateral pleural effusions.  -BNP 12455 (previously 45900 on 11/26 and  8193 on 10/8)  -Troponin 64.7-->66.9  -previously on metoprolol, entresto, farxiga, lasix for GDMT and amiodarone for Afib ppx-->switched to valsartan and digoxin last admission  -c/w valsartan with parameters and digoxin  -s/p lasix 40mg IV in ED-->hold off on further diuresis for now (not short of breath, trace basilar rales, no edema)  -daily weights, strict I&Os  -Cardio/EP Consulted: Dr. Ward/Dr. Burroughs

## 2024-12-17 NOTE — DISCHARGE NOTE NURSING/CASE MANAGEMENT/SOCIAL WORK - NSDCPEFALRISK_GEN_ALL_CORE
For information on Fall & Injury Prevention, visit: [email or web address] OR  [email or web address] OR  https://www.cdc.gov/steadi/patient.html

## 2024-12-17 NOTE — DISCHARGE NOTE PROVIDER - HOSPITAL COURSE
74-year-old male with PMHx of CVA, MI, HTN, T2DM, HLD, CAD s/p cardiac stents, HFrEF (EF 20-25% 10/2024), hx of malignant melanoma/wide excision, with recent elective bioprosthetic aortic valve replacement and ascending aortic aneurysm repair with transverse hemiarch and CABG x2 (4/8/24) with post op course complicated by cardiogenic shock requiring inotropic support with IV dobutamine and milrinone as well as IABP, previously on amiodarone for atrial fibrillation prophylaxis (switched to digoxin on recent admission 11/26-12/5 for acute on chronic CHF with sepsis 2/2 pna)  presented to the ED after episode of dizziness followed by syncope and collapse. Admitted to telemetry for syncope, specifically for electrocardiographic monitoring of unstable arrhythmia in the s/o heart failure with severely reduced ejection fraction.     Patient was seen and evaluated by EP/Cardiology (Dr. Burroughs/Dr. Ward): Recs ICD implant > for transfer to [facility]

## 2024-12-17 NOTE — CONSULT NOTE ADULT - SUBJECTIVE AND OBJECTIVE BOX
HPI:  74y M with PMHx of CVA, MI, HTN, T2DM, HLD, CAD s/p cardiac stents, HFrEF (EF 20-25% 10/2024), hx of malignant melanoma/wide excision, with recent elective bioprosthetic aortic valve replacement and ascending aortic aneurysm repair with transverse hemiarch and CABG x2 (4/8/24) with post op course complicated by cardiogenic shock requiring inotropic support with IV dobutamine and milrinone as well as IABP, previously on amiodarone for atrial fibrillation prophylaxis  presenting after episode of dizziness followed by syncope and collapse. Patient recently admitted to [facility] from 11/26 to 12/5 for acute on chronic CHF and sepsis due to pneumonia. Patient states that today his strength and breathing had finally improved enough for him to return to some of his normal activities/hobbies. Patient states that he was excited to be able to go out to his garage and work on building a cabinet. Reports that he likely overextended himself by standing for too long and lifting sheet wood while working on this project for over 2 hours. Patient states that he bent over to pick something up of the floor and when he stood back up straight he felt very dizzy and the next thing he knew he woke up on the floor. Patient says that prior to loss of consciousness he felt light headed and like he was spinning. Denies any chest pain, palpitations, or shortness of breath prior to syncopal episode. When patient regained consciousness he realized he had struck his head on the concrete floor of the garage and somehow cut his hand during collapse. Patient was unable to lift himself up off the floor under his own strength and had to call for help and family called EMS. Patient currently complaining of generalized weakness and expresses disappointment that he is back in the hospital after finally feeling better. Denies fever, chills, headache, chest pain, palpitations cough,, shortness of breath, abdominal pain, nausea, vomiting, diarrhea, constipation, and dysuria. Reports he has an upcoming telehealth appointment with cardiologist to discuss possible AICD placement.   (14 Dec 2024 23:48)              PAST MEDICAL & SURGICAL HISTORY:  HTN (hypertension)      Hearing decreased      CVA (cerebral vascular accident)  12/22/2016      Hyperlipemia      Kidney stones      Coronary artery disease  MI 12/22/2016      CHF (congestive heart failure)  1/2017 stents x3      Type 2 diabetes mellitus  2016      Confusion  from stroke      S/P medial meniscal repair  and ligament surgery      H/O lithotripsy      S/P tonsillectomy      Bilateral inguinal hernia          No Known Allergies      Meds:  acetaminophen     Tablet .. 650 milliGRAM(s) Oral every 6 hours PRN  aspirin enteric coated 81 milliGRAM(s) Oral daily  atorvastatin 80 milliGRAM(s) Oral at bedtime  clopidogrel Tablet 75 milliGRAM(s) Oral daily  digoxin     Tablet 125 MICROGram(s) Oral daily  fluticasone propionate/ salmeterol 100-50 MICROgram(s) Diskus 1 Dose(s) Inhalation two times a day  insulin lispro (ADMELOG) corrective regimen sliding scale   SubCutaneous three times a day before meals  insulin lispro (ADMELOG) corrective regimen sliding scale   SubCutaneous at bedtime  melatonin 3 milliGRAM(s) Oral at bedtime  metoprolol succinate ER 12.5 milliGRAM(s) Oral daily  multivitamin 1 Tablet(s) Oral daily  tamsulosin 0.4 milliGRAM(s) Oral at bedtime  valsartan 40 milliGRAM(s) Oral daily      SOCIAL HISTORY:  Smoker:  YES / NO        PACK YEARS:                         WHEN QUIT?  ETOH use:  YES / NO               FREQUENCY / QUANTITY:  [employer] Drug use:  YES / NO  Occupation:  Assisted device use (Cane / Walker):  Live with:    FAMILY HISTORY:  Family history of diabetes mellitus    Family history of stroke        VITALS:  Vital Signs Last 24 Hrs  T(C): 36.4 (17 Dec 2024 15:54), Max: 36.8 (17 Dec 2024 00:01)  T(F): 97.5 (17 Dec 2024 15:54), Max: 98.2 (17 Dec 2024 00:01)  HR: 88 (17 Dec 2024 15:54) (81 - 93)  BP: 107/68 (17 Dec 2024 15:54) (100/64 - 127/82)  BP(mean): --  RR: 18 (17 Dec 2024 15:54) (18 - 19)  SpO2: 98% (17 Dec 2024 15:54) (95% - 98%)    Parameters below as of 17 Dec 2024 15:54  Patient On (Oxygen Delivery Method): room air        LABS/DIAGNOSTIC TESTS:                          12.1   6.25  )-----------( 268      ( 16 Dec 2024 06:20 )             37.6     WBC Count: 6.25 K/uL (12-16 @ 06:20)  WBC Count: 7.57 K/uL (12-15 @ 05:25)      12-16    142  |  109[H]  |  23[H]  ----------------------------<  167[H]  3.8   |  27  |  1.07    Ca    8.7      16 Dec 2024 06:20  Phos  3.5     12-16  Mg     2.0     12-16    TPro  5.8[L]  /  Alb  2.8[L]  /  TBili  0.7  /  DBili  x   /  AST  14  /  ALT  19  /  AlkPhos  99  12-16      Urinalysis (12.15.24 @ 08:37)   pH Urine: 5.0  Glucose Qualitative, Urine: Negative mg/dL  Blood, Urine: Negative  Color: Yellow  Urine Appearance: Clear  Bilirubin: Negative  Ketone - Urine: Negative mg/dL  Specific Gravity: 1.008  Protein, Urine: Negative mg/dL  Urobilinogen: 0.2 mg/dL  Nitrite: Negative  Leukocyte Esterase Concentration: Negative      LIVER FUNCTIONS - ( 16 Dec 2024 06:20 )  Alb: 2.8 g/dL / Pro: 5.8 g/dL / ALK PHOS: 99 U/L / ALT: 19 U/L DA / AST: 14 U/L / GGT: x                 LACTATE:    ABG -     CULTURES:   .Blood BLOOD  12-15 @ 05:15   No growth at 48 Hours  --  --      .Blood BLOOD  12-15 @ 05:00   No growth at 48 Hours  --  --              RADIOLOGY:< from: CT Chest No Cont (12.14.24 @ 21:55) >  ACC: [id number] EXAM:  CT CHEST   ORDERED BY: RONN HARMON     PROCEDURE DATE:  12/14/2024          INTERPRETATION:  Clinical indication: SOB    TECHNIQUE:  Imaging protocol: Diagnostic computed tomography of the chest without   contrast.  3D rendering (Not supervised by radiologist): MIP and/or 3D reconstructed  images were created by the technologist.    COMPARISON:  CT CHEST 11/27/2024 9:33 AM    FINDINGS:  Lungs: Smooth interlobular septal thickening compatible with hydrostatic  interstitialpulmonary edema/CHF. Multifocal bilateral pulmonary fibrosis   and  pulmonary scarring. Fairly extensive multifocal bilateral pneumonia.  Pleural spaces: Small volume bilateral pleural effusions.  Heart: Prosthetic aortic valve. Postsurgical/post intervention changes of   the  ascending aorta. Aorta is normal in caliber. Cardiomegaly.  Esophagus: Esophagus is unremarkable.  Lymph nodes: Mildly prominent mediastinal and bilateral hilar lymph nodes   are  likely reactive.      Kidneys: Left renal cyst.  Bones/joints: Unremarkable. No acute fracture.  Soft tissues: Stable 1.8 cm right chest wall cutaneous lesion.    IMPRESSION:  1.   Hydrostatic interstitial pulmonary edema/CHF.  2.   Multifocal bilateral pulmonary fibrosis and pulmonary scarring.  3.   Fairly extensive multifocal bilateral pneumonia.  4.   Small volume bilateral pleural effusions.    --- End of Report ---            MARIO VUONG MD; Attending Radiologist  This document has been electronically signed. Dec 14 2024 11:20PM    < end of copied text >        ROS  [  ] UNABLE TO ELICIT               HPI:  74y M with PMHx of CVA, MI, HTN, T2DM, HLD, CAD s/p cardiac stents, HFrEF (EF 20-25% 10/2024), hx of malignant melanoma/wide excision, with recent elective bioprosthetic aortic valve replacement and ascending aortic aneurysm repair with transverse hemiarch and CABG x2 (4/8/24) with post op course complicated by cardiogenic shock requiring inotropic support with IV dobutamine and milrinone as well as IABP, previously on amiodarone for atrial fibrillation prophylaxis  presenting after episode of dizziness followed by syncope and collapse. Patient recently admitted to [facility] from 11/26 to 12/5 for acute on chronic CHF and sepsis due to pneumonia. Patient states that today his strength and breathing had finally improved enough for him to return to some of his normal activities/hobbies. Patient states that he was excited to be able to go out to his garage and work on building a cabinet. Reports that he likely overextended himself by standing for too long and lifting sheet wood while working on this project for over 2 hours. Patient states that he bent over to pick something up of the floor and when he stood back up straight he felt very dizzy and the next thing he knew he woke up on the floor. Patient says that prior to loss of consciousness he felt light headed and like he was spinning. Denies any chest pain, palpitations, or shortness of breath prior to syncopal episode. When patient regained consciousness he realized he had struck his head on the concrete floor of the garage and somehow cut his hand during collapse. Patient was unable to lift himself up off the floor under his own strength and had to call for help and family called EMS. Patient currently complaining of generalized weakness and expresses disappointment that he is back in the hospital after finally feeling better. Denies fever, chills, headache, chest pain, palpitations cough,, shortness of breath, abdominal pain, nausea, vomiting, diarrhea, constipation, and dysuria. Reports he has an upcoming telehealth appointment with cardiologist to discuss possible AICD placement.   (14 Dec 2024 23:48)        History as above, asked to see this patient whom I know from his recent admission when he came in for a multifocal Pneumonia and was treated for it a few weeks ago , he presents know after a syncopal episode , he is a very pleasant man and has no coughing or SOB and is not even on any oxygen , he has no fevers , chills , no chest pain , no nausea , vomiting or diarrhea. He was found to have a EF of 25% only is scheduled to be transferred to [facility] for an AICD placement. He had a CT chest on this admission and shows residual infiltrates in both lungs hence asked to see him to see if he needs treatment or not and for ID clearance for his procedure.        PAST MEDICAL & SURGICAL HISTORY:  HTN (hypertension)      Hearing decreased      CVA (cerebral vascular accident)  12/22/2016      Hyperlipemia      Kidney stones      Coronary artery disease  MI 12/22/2016      CHF (congestive heart failure)  1/2017 stents x3      Type 2 diabetes mellitus  2016      Confusion  from stroke      S/P medial meniscal repair  and ligament surgery      H/O lithotripsy      S/P tonsillectomy      Bilateral inguinal hernia          No Known Allergies      Meds:  acetaminophen     Tablet .. 650 milliGRAM(s) Oral every 6 hours PRN  aspirin enteric coated 81 milliGRAM(s) Oral daily  atorvastatin 80 milliGRAM(s) Oral at bedtime  clopidogrel Tablet 75 milliGRAM(s) Oral daily  digoxin     Tablet 125 MICROGram(s) Oral daily  fluticasone propionate/ salmeterol 100-50 MICROgram(s) Diskus 1 Dose(s) Inhalation two times a day  insulin lispro (ADMELOG) corrective regimen sliding scale   SubCutaneous three times a day before meals  insulin lispro (ADMELOG) corrective regimen sliding scale   SubCutaneous at bedtime  melatonin 3 milliGRAM(s) Oral at bedtime  metoprolol succinate ER 12.5 milliGRAM(s) Oral daily  multivitamin 1 Tablet(s) Oral daily  tamsulosin 0.4 milliGRAM(s) Oral at bedtime  valsartan 40 milliGRAM(s) Oral daily      SOCIAL HISTORY:  Smoker:  no  ETOH use:  no      FAMILY HISTORY:  Family history of diabetes mellitus    Family history of stroke        VITALS:  Vital Signs Last 24 Hrs  T(C): 36.4 (17 Dec 2024 15:54), Max: 36.8 (17 Dec 2024 00:01)  T(F): 97.5 (17 Dec 2024 15:54), Max: 98.2 (17 Dec 2024 00:01)  HR: 88 (17 Dec 2024 15:54) (81 - 93)  BP: 107/68 (17 Dec 2024 15:54) (100/64 - 127/82)  BP(mean): --  RR: 18 (17 Dec 2024 15:54) (18 - 19)  SpO2: 98% (17 Dec 2024 15:54) (95% - 98%)    Parameters below as of 17 Dec 2024 15:54  Patient On (Oxygen Delivery Method): room air        LABS/DIAGNOSTIC TESTS:                          12.1   6.25  )-----------( 268      ( 16 Dec 2024 06:20 )             37.6     WBC Count: 6.25 K/uL (12-16 @ 06:20)  WBC Count: 7.57 K/uL (12-15 @ 05:25)      12-16    142  |  109[H]  |  23[H]  ----------------------------<  167[H]  3.8   |  27  |  1.07    Ca    8.7      16 Dec 2024 06:20  Phos  3.5     12-16  Mg     2.0     12-16    TPro  5.8[L]  /  Alb  2.8[L]  /  TBili  0.7  /  DBili  x   /  AST  14  /  ALT  19  /  AlkPhos  99  12-16      Urinalysis (12.15.24 @ 08:37)   pH Urine: 5.0  Glucose Qualitative, Urine: Negative mg/dL  Blood, Urine: Negative  Color: Yellow  Urine Appearance: Clear  Bilirubin: Negative  Ketone - Urine: Negative mg/dL  Specific Gravity: 1.008  Protein, Urine: Negative mg/dL  Urobilinogen: 0.2 mg/dL  Nitrite: Negative  Leukocyte Esterase Concentration: Negative      LIVER FUNCTIONS - ( 16 Dec 2024 06:20 )  Alb: 2.8 g/dL / Pro: 5.8 g/dL / ALK PHOS: 99 U/L / ALT: 19 U/L DA / AST: 14 U/L / GGT: x                 LACTATE:    ABG -     CULTURES:   .Blood BLOOD  12-15 @ 05:15   No growth at 48 Hours  --  --      .Blood BLOOD  12-15 @ 05:00   No growth at 48 Hours  --  --              RADIOLOGY:< from: CT Chest No Cont (12.14.24 @ 21:55) >  ACC: [id number] EXAM:  CT CHEST   ORDERED BY: RONN HARMON     PROCEDURE DATE:  12/14/2024          INTERPRETATION:  Clinical indication: SOB    TECHNIQUE:  Imaging protocol: Diagnostic computed tomography of the chest without   contrast.  3D rendering (Not supervised by radiologist): MIP and/or 3D reconstructed  images were created by the technologist.    COMPARISON:  CT CHEST 11/27/2024 9:33 AM    FINDINGS:  Lungs: Smooth interlobular septal thickening compatible with hydrostatic  interstitialpulmonary edema/CHF. Multifocal bilateral pulmonary fibrosis   and  pulmonary scarring. Fairly extensive multifocal bilateral pneumonia.  Pleural spaces: Small volume bilateral pleural effusions.  Heart: Prosthetic aortic valve. Postsurgical/post intervention changes of   the  ascending aorta. Aorta is normal in caliber. Cardiomegaly.  Esophagus: Esophagus is unremarkable.  Lymph nodes: Mildly prominent mediastinal and bilateral hilar lymph nodes   are  likely reactive.      Kidneys: Left renal cyst.  Bones/joints: Unremarkable. No acute fracture.  Soft tissues: Stable 1.8 cm right chest wall cutaneous lesion.    IMPRESSION:  1.   Hydrostatic interstitial pulmonary edema/CHF.  2.   Multifocal bilateral pulmonary fibrosis and pulmonary scarring.  3.   Fairly extensive multifocal bilateral pneumonia.  4.   Small volume bilateral pleural effusions.    --- End of Report ---            MARIO VUONG MD; Attending Radiologist  This document has been electronically signed. Dec 14 2024 11:20PM    < end of copied text >        ROS  [  ] UNABLE TO ELICIT

## 2024-12-17 NOTE — CONSULT NOTE ADULT - ASSESSMENT
Agree with above assessment and plan as outlined above.    - known severe ICM now presenting with Syncope  - EP eval for primary prevention ICD eval    Rogelio Ward MD, [facility]  BEEPER [phone]  
Pneumonia - is old treated Pneumonia , and current infiltrates are a Radiological lag in the films.  Infiltrates on CT chest.    Plan - No Antibiotics needed at this time  Patient is cleared from an ID point of view for an AICD placement.  reconsult prn.

## 2024-12-17 NOTE — PROGRESS NOTE ADULT - SUBJECTIVE AND OBJECTIVE BOX
C A R D I O L O G Y  **********************************     DATE OF SERVICE: 12-17-24    Patient denies chest pain or shortness of breath.   Review of symptoms otherwise negative.    acetaminophen     Tablet .. 650 milliGRAM(s) Oral every 6 hours PRN  aspirin enteric coated 81 milliGRAM(s) Oral daily  atorvastatin 80 milliGRAM(s) Oral at bedtime  clopidogrel Tablet 75 milliGRAM(s) Oral daily  digoxin     Tablet 125 MICROGram(s) Oral daily  fluticasone propionate/ salmeterol 100-50 MICROgram(s) Diskus 1 Dose(s) Inhalation two times a day  insulin lispro (ADMELOG) corrective regimen sliding scale   SubCutaneous three times a day before meals  insulin lispro (ADMELOG) corrective regimen sliding scale   SubCutaneous at bedtime  melatonin 3 milliGRAM(s) Oral at bedtime  multivitamin 1 Tablet(s) Oral daily  tamsulosin 0.4 milliGRAM(s) Oral at bedtime  valsartan 40 milliGRAM(s) Oral daily                            12.1   6.25  )-----------( 268      ( 16 Dec 2024 06:20 )             37.6       Hemoglobin: 12.1 g/dL (12-16 @ 06:20)  Hemoglobin: 13.4 g/dL (12-15 @ 05:25)  Hemoglobin: 13.0 g/dL (12-14 @ 18:40)      12-16    142  |  109[H]  |  23[H]  ----------------------------<  167[H]  3.8   |  27  |  1.07    Ca    8.7      16 Dec 2024 06:20  Phos  3.5     12-16  Mg     2.0     12-16    TPro  5.8[L]  /  Alb  2.8[L]  /  TBili  0.7  /  DBili  x   /  AST  14  /  ALT  19  /  AlkPhos  99  12-16    Creatinine Trend: 1.07<--, 1.08<--, 1.02<--, 1.18<--, 1.43<--, 1.23<--    COAGS:           T(C): 36.8 (12-17-24 @ 11:48), Max: 36.8 (12-16-24 @ 16:32)  HR: 88 (12-17-24 @ 11:48) (81 - 95)  BP: 126/88 (12-17-24 @ 11:48) (100/64 - 134/97)  RR: 18 (12-17-24 @ 11:48) (18 - 19)  SpO2: 98% (12-17-24 @ 11:48) (95% - 98%)  Wt(kg): --    I&O's Summary    16 Dec 2024 07:01  -  17 Dec 2024 07:00  --------------------------------------------------------  IN: 450 mL / OUT: 0 mL / NET: 450 mL          HEENT:  (-)icterus (-)pallor  CV: N S1 S2 1/6 GAEL (+)2 Pulses B/l  Resp:  Clear to ausculatation B/L, normal effort  GI: (+) BS Soft, NT, ND  Lymph:  (-)Edema, (-)obvious lymphadenopathy  Skin: Warm to touch, Normal turgor  Psych: Appropriate mood and affect      TELEMETRY: 	  Sinus         ASSESSMENT/PLAN: 	74y  Male PMHx of CVA, MI, HTN, T2DM, HLD, CAD s/p cardiac stents, HFrEF (EF 20-25% 10/2024), hx of malignant melanoma/wide excision, with recent elective bioprosthetic aortic valve replacement and ascending aortic aneurysm repair with transverse hemiarch and CABG x2 (4/8/24). now with Syncope     # Cardiomyopathy  - Well compensated from a CHF prospective  - tolerating low dose valsartan  - start Toprol XL 12.5 mg PO daily  - EP eval appreciated awaiting tx to [facility] for ICD        Rogelio Ward MD, [facility]  BEEPER [phone]

## 2024-12-17 NOTE — PROGRESS NOTE ADULT - PROBLEM SELECTOR PLAN 1
hx of HFrEF presenting after episode of syncope and collapse with prodrome of dizziness   -patient reports episode was after bending over and standing up in s/o likely overexertion and possible dehydration (still taking lasix ?every other day and not drinking much water), likely orthostatic however r/o unstable arrhythmia given severe HFrEF and recent changes to rate and rhythm control meds.   -CTH/C-spine: negative for acute pathology  --EKG shows sinus tach with 1st degree AVB, old LBBB  -TTE 10/8:  EF 20-25%  -F/U repeat TTE   -check orthostatic bp  -s/p lasix 40mg IV in ED-->hold off on further diuresis for now (not short of breath, trace basilar rales, no edema)  -PT consult: Recs out/pt PT  -EP/Cardiology consulted (Dr. Burroughs/Dr. Ward): Recs ICD implant > for transfer to [facility]  -Fall precautions, ambulate with assistance
hx of HFrEF presenting after episode of syncope and collapse with prodrome of dizziness   -patient reports episode was after bending over and standing up in s/o likely overexertion and possible dehydration (still taking lasix ?every other day and not drinking much water), likely orthostatic however r/o unstable arrhythmia given severe HFrEF and recent changes to rate and rhythm control meds.   -CTH/C-spine: negative for acute pathology  --EKG shows sinus tach with 1st degree AVB, old LBBB  -TTE 10/8:  EF 20-25%  -F/U repeat TTE   -check orthostatic bp  -s/p lasix 40mg IV in ED-->hold off on further diuresis for now (not short of breath, trace basilar rales, no edema)  -PT consult: Recs out/pt PT  -EP/Cardiology consulted (Dr. Burroughs/Dr. Ward): Recs ICD implant > for transfer to [facility]  -Fall precautions, ambulate with assistance

## 2024-12-17 NOTE — PROGRESS NOTE ADULT - PROVIDER SPECIALTY LIST ADULT
Internal Medicine
Pulmonology
Cardiology
Cardiology
Internal Medicine

## 2024-12-17 NOTE — PROGRESS NOTE ADULT - ASSESSMENT
74y M with PMHx of CVA, MI, HTN, T2DM, HLD, CAD s/p cardiac stents, HFrEF (EF 20-25% 10/2024), hx of malignant melanoma/wide excision, with recent elective bioprosthetic aortic valve replacement and ascending aortic aneurysm repair with transverse hemiarch and CABG x2 (4/8/24) with post op course complicated by cardiogenic shock requiring inotropic support with IV dobutamine and milrinone as well as IABP, previously on amiodarone for atrial fibrillation prophylaxis (switched to digoxin on recent admission 11/26-12/5 for acute on chronic CHF with sepsis 2/2 pna)  presenting after episode of dizziness followed by syncope and collapse. Admitted to telemetry for syncope, specifically for electrocardiographic monitoring of unstable arrhythmia in the s/o heart failure with severely reduced ejection fraction. 
74y M with PMHx of CVA, MI, HTN, T2DM, HLD, CAD s/p cardiac stents, HFrEF (EF 20-25% 10/2024), hx of malignant melanoma/wide excision, with recent elective bioprosthetic aortic valve replacement and ascending aortic aneurysm repair with transverse hemiarch and CABG x2 (4/8/24) with post op course complicated by cardiogenic shock requiring inotropic support with IV dobutamine and milrinone as well as IABP, previously on amiodarone for atrial fibrillation prophylaxis (switched to digoxin on recent admission 11/26-12/5 for acute on chronic CHF with sepsis 2/2 pna)  presenting after episode of dizziness followed by syncope and collapse. Admitted to telemetry for syncope, specifically for electrocardiographic monitoring of unstable arrhythmia in the s/o heart failure with severely reduced ejection fraction.

## 2024-12-17 NOTE — DISCHARGE NOTE NURSING/CASE MANAGEMENT/SOCIAL WORK - PATIENT PORTAL LINK FT
You can access the FollowMyHealth Patient Portal offered by [facility] by registering at the following website: [email or web address]. By joining [employer]’s FollowMyHealth portal, you will also be able to view your health information using other applications (apps) compatible with our system.

## 2024-12-17 NOTE — PROGRESS NOTE ADULT - SUBJECTIVE AND OBJECTIVE BOX
NP Note discussed with  primary attending    Patient is a 74y old  Male who presents with a chief complaint of Syncope (16 Dec 2024 17:06)      INTERVAL HPI/OVERNIGHT EVENTS: no new complaints    MEDICATIONS  (STANDING):  aspirin enteric coated 81 milliGRAM(s) Oral daily  atorvastatin 80 milliGRAM(s) Oral at bedtime  clopidogrel Tablet 75 milliGRAM(s) Oral daily  digoxin     Tablet 125 MICROGram(s) Oral daily  fluticasone propionate/ salmeterol 100-50 MICROgram(s) Diskus 1 Dose(s) Inhalation two times a day  insulin lispro (ADMELOG) corrective regimen sliding scale   SubCutaneous three times a day before meals  insulin lispro (ADMELOG) corrective regimen sliding scale   SubCutaneous at bedtime  melatonin 3 milliGRAM(s) Oral at bedtime  metoprolol succinate ER 12.5 milliGRAM(s) Oral daily  multivitamin 1 Tablet(s) Oral daily  tamsulosin 0.4 milliGRAM(s) Oral at bedtime  valsartan 40 milliGRAM(s) Oral daily    MEDICATIONS  (PRN):  acetaminophen     Tablet .. 650 milliGRAM(s) Oral every 6 hours PRN Temp greater or equal to 38C (100.4F), Mild Pain (1 - 3)      __________________________________________________  REVIEW OF SYSTEMS:    CONSTITUTIONAL: No fever,   EYES: no acute visual disturbances  NECK: No pain or stiffness  RESPIRATORY: No cough; No shortness of breath  CARDIOVASCULAR: No chest pain, no palpitations  GASTROINTESTINAL: No pain. No nausea or vomiting; No diarrhea   NEUROLOGICAL: No headache or numbness, no tremors  MUSCULOSKELETAL: No joint pain, no muscle pain  GENITOURINARY: no dysuria, no frequency, no hesitancy  PSYCHIATRY: no depression , no anxiety  ALL OTHER  ROS negative        Vital Signs Last 24 Hrs  T(C): 36.4 (17 Dec 2024 15:54), Max: 36.8 (16 Dec 2024 19:08)  T(F): 97.5 (17 Dec 2024 15:54), Max: 98.2 (16 Dec 2024 19:08)  HR: 88 (17 Dec 2024 15:54) (81 - 93)  BP: 107/68 (17 Dec 2024 15:54) (100/64 - 128/86)  BP(mean): --  RR: 18 (17 Dec 2024 15:54) (18 - 19)  SpO2: 98% (17 Dec 2024 15:54) (95% - 98%)    Parameters below as of 17 Dec 2024 15:54  Patient On (Oxygen Delivery Method): room air        ________________________________________________  PHYSICAL EXAM:  GENERAL: NAD  HEENT: Normocephalic;  conjunctivae and sclerae clear; moist mucous membranes;   NECK : supple  CHEST/LUNG: Clear to ausculitation bilaterally with good air entry   HEART: S1 S2  regular; no murmurs, gallops or rubs  ABDOMEN: Soft, Nontender, Nondistended; Bowel sounds present  EXTREMITIES: no cyanosis; no edema; no calf tenderness  SKIN: warm and dry; no rash  NERVOUS SYSTEM:  Awake and alert; Oriented  to place, person and time ; no new deficits    _________________________________________________  LABS:                        12.1   6.25  )-----------( 268      ( 16 Dec 2024 06:20 )             37.6     12-16    142  |  109[H]  |  23[H]  ----------------------------<  167[H]  3.8   |  27  |  1.07    Ca    8.7      16 Dec 2024 06:20  Phos  3.5     12-16  Mg     2.0     12-16    TPro  5.8[L]  /  Alb  2.8[L]  /  TBili  0.7  /  DBili  x   /  AST  14  /  ALT  19  /  AlkPhos  99  12-16      Urinalysis Basic - ( 16 Dec 2024 06:20 )    Color: x / Appearance: x / SG: x / pH: x  Gluc: 167 mg/dL / Ketone: x  / Bili: x / Urobili: x   Blood: x / Protein: x / Nitrite: x   Leuk Esterase: x / RBC: x / WBC x   Sq Epi: x / Non Sq Epi: x / Bacteria: x      CAPILLARY BLOOD GLUCOSE      POCT Blood Glucose.: 129 mg/dL (17 Dec 2024 12:04)  POCT Blood Glucose.: 139 mg/dL (17 Dec 2024 07:37)  POCT Blood Glucose.: 287 mg/dL (16 Dec 2024 21:52)        RADIOLOGY & ADDITIONAL TESTS:  < from: CT Chest No Cont (12.14.24 @ 21:55) >  IMPRESSION:  1.   Hydrostatic interstitial pulmonary edema/CHF.  2.   Multifocal bilateral pulmonary fibrosis and pulmonary scarring.  3.   Fairly extensive multifocal bilateral pneumonia.  4.   Small volume bilateral pleural effusions.    --- End of Report ---      < end of copied text >    Imaging Personally Reviewed:  YES    Consultant(s) Notes Reviewed:   YES    Care Discussed with Consultants :     Plan of care was discussed with patient and /or primary care giver; all questions and concerns were addressed and care was aligned with patient's wishes.

## 2024-12-17 NOTE — DISCHARGE NOTE PROVIDER - NSDCFUSCHEDAPPT_GEN_ALL_CORE_FT
Cat Plascencia  [facility]ician [facility]  PSYCHIATRY [address] [facility]   Scheduled Appointment: 12/24/2024    Mable Lockhart  [facility]ician [facility]  PULMMED [address] [facility]  Scheduled Appointment: 01/14/2025    Alexandra Marie  [facility]  CARDIOLOGY [address] [facility]  Scheduled Appointment: 01/31/2025

## 2024-12-17 NOTE — PROGRESS NOTE ADULT - PROBLEM SELECTOR PLAN 8
Pt for transfer to [facility] for ICD implant as per EP recs
Pt for transfer to [facility] for ICD implant as per EP recs

## 2024-12-17 NOTE — CONSULT NOTE ADULT - CONSULT REASON
ID clearance to get a AICD placed as he has infiltrates on CT chest from a pneumonia on his last admission
abnormal EKG
syncope
ILD / Syncope

## 2024-12-18 ENCOUNTER — NON-APPOINTMENT (OUTPATIENT)
Age: 74
End: 2024-12-18

## 2024-12-18 ENCOUNTER — INPATIENT (INPATIENT)
Facility: HOSPITAL | Age: 74
LOS: 0 days | Discharge: HOME | DRG: 312 | End: 2024-12-19
Attending: STUDENT IN AN ORGANIZED HEALTH CARE EDUCATION/TRAINING PROGRAM | Admitting: STUDENT IN AN ORGANIZED HEALTH CARE EDUCATION/TRAINING PROGRAM
Payer: MEDICARE

## 2024-12-18 ENCOUNTER — APPOINTMENT (OUTPATIENT)
Dept: CARDIOLOGY | Facility: CLINIC | Age: 74
End: 2024-12-18

## 2024-12-18 VITALS
WEIGHT: 134.92 LBS | OXYGEN SATURATION: 96 % | RESPIRATION RATE: 20 BRPM | HEART RATE: 87 BPM | SYSTOLIC BLOOD PRESSURE: 118 MMHG | HEIGHT: 67 IN | TEMPERATURE: 97 F | DIASTOLIC BLOOD PRESSURE: 78 MMHG

## 2024-12-18 DIAGNOSIS — K40.20 BILATERAL INGUINAL HERNIA, WITHOUT OBSTRUCTION OR GANGRENE, NOT SPECIFIED AS RECURRENT: Chronic | ICD-10-CM

## 2024-12-18 DIAGNOSIS — Z95.3 PRESENCE OF XENOGENIC HEART VALVE: Chronic | ICD-10-CM

## 2024-12-18 DIAGNOSIS — Z90.89 ACQUIRED ABSENCE OF OTHER ORGANS: Chronic | ICD-10-CM

## 2024-12-18 DIAGNOSIS — Z95.1 PRESENCE OF AORTOCORONARY BYPASS GRAFT: Chronic | ICD-10-CM

## 2024-12-18 DIAGNOSIS — Z98.890 OTHER SPECIFIED POSTPROCEDURAL STATES: Chronic | ICD-10-CM

## 2024-12-18 DIAGNOSIS — Z98.89 OTHER SPECIFIED POSTPROCEDURAL STATES: Chronic | ICD-10-CM

## 2024-12-18 DIAGNOSIS — R55 SYNCOPE AND COLLAPSE: ICD-10-CM

## 2024-12-18 LAB
ALBUMIN SERPL ELPH-MCNC: 3.6 G/DL — SIGNIFICANT CHANGE UP (ref 3.3–5)
ALP SERPL-CCNC: 108 U/L — SIGNIFICANT CHANGE UP (ref 40–120)
ALT FLD-CCNC: 15 U/L — SIGNIFICANT CHANGE UP (ref 10–45)
ANION GAP SERPL CALC-SCNC: 12 MMOL/L — SIGNIFICANT CHANGE UP (ref 5–17)
APTT BLD: 31 SEC — SIGNIFICANT CHANGE UP (ref 24.5–35.6)
AST SERPL-CCNC: 15 U/L — SIGNIFICANT CHANGE UP (ref 10–40)
BASOPHILS # BLD AUTO: 0.04 K/UL — SIGNIFICANT CHANGE UP (ref 0–0.2)
BASOPHILS NFR BLD AUTO: 0.6 % — SIGNIFICANT CHANGE UP (ref 0–2)
BILIRUB SERPL-MCNC: 0.7 MG/DL — SIGNIFICANT CHANGE UP (ref 0.2–1.2)
BLD GP AB SCN SERPL QL: NEGATIVE — SIGNIFICANT CHANGE UP
BUN SERPL-MCNC: 19 MG/DL — SIGNIFICANT CHANGE UP (ref 7–23)
CALCIUM SERPL-MCNC: 9.5 MG/DL — SIGNIFICANT CHANGE UP (ref 8.4–10.5)
CHLORIDE SERPL-SCNC: 106 MMOL/L — SIGNIFICANT CHANGE UP (ref 96–108)
CO2 SERPL-SCNC: 24 MMOL/L — SIGNIFICANT CHANGE UP (ref 22–31)
CREAT SERPL-MCNC: 1.06 MG/DL — SIGNIFICANT CHANGE UP (ref 0.5–1.3)
DIGOXIN SERPL-MCNC: 1.2 NG/ML — SIGNIFICANT CHANGE UP (ref 0.8–2)
EGFR: 73 ML/MIN/1.73M2 — SIGNIFICANT CHANGE UP
EOSINOPHIL # BLD AUTO: 0.15 K/UL — SIGNIFICANT CHANGE UP (ref 0–0.5)
EOSINOPHIL NFR BLD AUTO: 2.4 % — SIGNIFICANT CHANGE UP (ref 0–6)
GAS PNL BLDV: SIGNIFICANT CHANGE UP
GLUCOSE BLDC GLUCOMTR-MCNC: 124 MG/DL — HIGH (ref 70–99)
GLUCOSE BLDC GLUCOMTR-MCNC: 132 MG/DL — HIGH (ref 70–99)
GLUCOSE BLDC GLUCOMTR-MCNC: 145 MG/DL — HIGH (ref 70–99)
GLUCOSE BLDC GLUCOMTR-MCNC: 154 MG/DL — HIGH (ref 70–99)
GLUCOSE SERPL-MCNC: 149 MG/DL — HIGH (ref 70–99)
HCT VFR BLD CALC: 40.8 % — SIGNIFICANT CHANGE UP (ref 39–50)
HGB BLD-MCNC: 12.8 G/DL — LOW (ref 13–17)
IMM GRANULOCYTES NFR BLD AUTO: 0.5 % — SIGNIFICANT CHANGE UP (ref 0–0.9)
INR BLD: 1.13 RATIO — SIGNIFICANT CHANGE UP (ref 0.85–1.16)
LYMPHOCYTES # BLD AUTO: 1.64 K/UL — SIGNIFICANT CHANGE UP (ref 1–3.3)
LYMPHOCYTES # BLD AUTO: 26.5 % — SIGNIFICANT CHANGE UP (ref 13–44)
MAGNESIUM SERPL-MCNC: 2.1 MG/DL — SIGNIFICANT CHANGE UP (ref 1.6–2.6)
MCHC RBC-ENTMCNC: 25.7 PG — LOW (ref 27–34)
MCHC RBC-ENTMCNC: 31.4 G/DL — LOW (ref 32–36)
MCV RBC AUTO: 81.8 FL — SIGNIFICANT CHANGE UP (ref 80–100)
MONOCYTES # BLD AUTO: 0.51 K/UL — SIGNIFICANT CHANGE UP (ref 0–0.9)
MONOCYTES NFR BLD AUTO: 8.3 % — SIGNIFICANT CHANGE UP (ref 2–14)
NEUTROPHILS # BLD AUTO: 3.81 K/UL — SIGNIFICANT CHANGE UP (ref 1.8–7.4)
NEUTROPHILS NFR BLD AUTO: 61.7 % — SIGNIFICANT CHANGE UP (ref 43–77)
NRBC # BLD: 0 /100 WBCS — SIGNIFICANT CHANGE UP (ref 0–0)
PHOSPHATE SERPL-MCNC: 3.3 MG/DL — SIGNIFICANT CHANGE UP (ref 2.5–4.5)
PLATELET # BLD AUTO: 280 K/UL — SIGNIFICANT CHANGE UP (ref 150–400)
POTASSIUM SERPL-MCNC: 4.3 MMOL/L — SIGNIFICANT CHANGE UP (ref 3.5–5.3)
POTASSIUM SERPL-SCNC: 4.3 MMOL/L — SIGNIFICANT CHANGE UP (ref 3.5–5.3)
PROT SERPL-MCNC: 6.4 G/DL — SIGNIFICANT CHANGE UP (ref 6–8.3)
PROTHROM AB SERPL-ACNC: 13 SEC — SIGNIFICANT CHANGE UP (ref 9.9–13.4)
RBC # BLD: 4.99 M/UL — SIGNIFICANT CHANGE UP (ref 4.2–5.8)
RBC # FLD: 15.8 % — HIGH (ref 10.3–14.5)
RH IG SCN BLD-IMP: NEGATIVE — SIGNIFICANT CHANGE UP
SODIUM SERPL-SCNC: 142 MMOL/L — SIGNIFICANT CHANGE UP (ref 135–145)
WBC # BLD: 6.18 K/UL — SIGNIFICANT CHANGE UP (ref 3.8–10.5)
WBC # FLD AUTO: 6.18 K/UL — SIGNIFICANT CHANGE UP (ref 3.8–10.5)

## 2024-12-18 PROCEDURE — 99223 1ST HOSP IP/OBS HIGH 75: CPT

## 2024-12-18 PROCEDURE — 99223 1ST HOSP IP/OBS HIGH 75: CPT | Mod: GC,AI

## 2024-12-18 PROCEDURE — 93010 ELECTROCARDIOGRAM REPORT: CPT

## 2024-12-18 RX ORDER — 0.9 % SODIUM CHLORIDE 0.9 %
1000 INTRAVENOUS SOLUTION INTRAVENOUS
Refills: 0 | Status: DISCONTINUED | OUTPATIENT
Start: 2024-12-18 | End: 2024-12-19

## 2024-12-18 RX ORDER — TAMSULOSIN HYDROCHLORIDE 0.4 MG/1
0.4 CAPSULE ORAL AT BEDTIME
Refills: 0 | Status: DISCONTINUED | OUTPATIENT
Start: 2024-12-18 | End: 2024-12-19

## 2024-12-18 RX ORDER — POLYETHYLENE GLYCOL 3350 17 G/17G
17 POWDER, FOR SOLUTION ORAL DAILY
Refills: 0 | Status: DISCONTINUED | OUTPATIENT
Start: 2024-12-18 | End: 2024-12-19

## 2024-12-18 RX ORDER — SENNOSIDES 8.6 MG
2 TABLET ORAL AT BEDTIME
Refills: 0 | Status: DISCONTINUED | OUTPATIENT
Start: 2024-12-18 | End: 2024-12-19

## 2024-12-18 RX ORDER — FUROSEMIDE 40 MG/1
1 TABLET ORAL
Refills: 0 | DISCHARGE

## 2024-12-18 RX ORDER — INFLUENZA VIRUS VACCINE 15; 15; 15; 15 UG/.5ML; UG/.5ML; UG/.5ML; UG/.5ML
0.5 SUSPENSION INTRAMUSCULAR ONCE
Refills: 0 | Status: DISCONTINUED | OUTPATIENT
Start: 2024-12-18 | End: 2024-12-19

## 2024-12-18 RX ORDER — SACUBITRIL AND VALSARTAN 24; 26 MG/1; MG/1
1 TABLET, FILM COATED ORAL
Refills: 0 | DISCHARGE

## 2024-12-18 RX ORDER — GLUCAGON INJECTION, SOLUTION 0.5 MG/.1ML
1 INJECTION, SOLUTION SUBCUTANEOUS ONCE
Refills: 0 | Status: DISCONTINUED | OUTPATIENT
Start: 2024-12-18 | End: 2024-12-19

## 2024-12-18 RX ORDER — CYANOCOBALAMIN/FOLIC AC/VIT B6 1-2.2-25MG
1 TABLET ORAL DAILY
Refills: 0 | Status: DISCONTINUED | OUTPATIENT
Start: 2024-12-18 | End: 2024-12-19

## 2024-12-18 RX ORDER — CLOPIDOGREL 75 MG/1
1 TABLET, FILM COATED ORAL
Refills: 0 | DISCHARGE

## 2024-12-18 RX ORDER — ZOLPIDEM TARTRATE 10 MG/1
1 TABLET ORAL
Refills: 0 | DISCHARGE

## 2024-12-18 RX ORDER — DAPAGLIFLOZIN 10 MG/1
1 TABLET, FILM COATED ORAL
Refills: 0 | DISCHARGE

## 2024-12-18 RX ORDER — AMIODARONE HYDROCHLORIDE 200 MG/1
1 TABLET ORAL
Refills: 0 | DISCHARGE

## 2024-12-18 RX ORDER — TAMSULOSIN HYDROCHLORIDE 0.4 MG/1
1 CAPSULE ORAL
Refills: 0 | DISCHARGE

## 2024-12-18 RX ADMIN — TAMSULOSIN HYDROCHLORIDE 0.4 MILLIGRAM(S): 0.4 CAPSULE ORAL at 22:07

## 2024-12-18 RX ADMIN — Medication 80 MILLIGRAM(S): at 22:07

## 2024-12-18 RX ADMIN — Medication 81 MILLIGRAM(S): at 17:17

## 2024-12-18 NOTE — CONSULT NOTE ADULT - SUBJECTIVE AND OBJECTIVE BOX
73 y/o male with PMHx of CAD s/p PCI,     74y M with PMHx of CVA, MI, HTN, T2DM, HLD, CAD s/p cardiac stents, HFrEF (EF 20-25% 10/2024), hx of malignant melanoma/wide excision, with recent elective bioprosthetic aortic valve replacement and ascending aortic aneurysm repair with transverse hemiarch and CABG x2 (4/8/24) with post op course complicated by cardiogenic shock requiring inotropic support with IV dobutamine and milrinone as well as IABP, previously on amiodarone for atrial fibrillation prophylaxis  presenting after episode of dizziness followed by syncope and collapse. Patient recently admitted to [facility] from 11/26 to 12/5 for acute on chronic CHF and sepsis due to pneumonia. Patient states that today his strength and breathing had finally improved enough for him to return to some of his normal activities/hobbies. Patient states that he was excited to be able to go out to his garage and work on building a cabinet. Reports that he likely overextended himself by standing for too long and lifting sheet wood while working on this project for over 2 hours. Patient states that he bent over to pick something up of the floor and when he stood back up straight he felt very dizzy and the next thing he knew he woke up on the floor. Patient says that prior to loss of consciousness he felt light headed and like he was spinning. Denies any chest pain, palpitations, or shortness of breath prior to syncopal episode. When patient regained consciousness he realized he had struck his head on the concrete floor of the garage and somehow cut his hand during collapse. Patient was unable to lift himself up off the floor under his own strength and had to call for help and family called EMS. Patient currently complaining of generalized weakness and expresses disappointment that he is back in the hospital after finally feeling better. Denies fever, chills, headache, chest pain, palpitations cough,, shortness of breath, abdominal pain, nausea, vomiting, diarrhea, constipation, and dysuria. Reports he has an upcoming telehealth appointment with cardiologist to discuss possible AICD placement.   (14 Dec 2024 23:48)    Mr Corey is a very pleasant 74yoM here with fainting (and subsequent forearm laceration) while working in his garage doing some woodworking.  He was doing physical labor for 2 hours, slowly, but without angina palpitations or lightheadedness.  After bending to  some materials, he stood up, felt lightheaded and collapsed.  No angina afterwards.  Was not witnessed, so unclear how long he had been down on the floor.  His PMH is notable for CAD/MI/multivessel PCI ~7yrs ago.  He underwent sternotomy for CABG x2 + BioAVR/Bentall/Jose-Arch repair with Dr Ortiz in 4/2024 with apparently a complicated post op course.    In the interim, he's been hospitalized with pneumonia, was treated, and was relieved to be home to get back to a 'normal lifestyle'.  On prior hospitalization in 11-12/2024, did not tolerate beta blockers (hypotensive, acute kidney injury)  He sees Dr Della Joyce at [facility].  Does not believe he's seen an EP physician yet, and was planning a phonecall with her this week to discuss ICD implant.  At this time, complains of feeling cold in the ED, but otherwise no angina, palpitations, nausea, sweating or lightheadedness. A 10 pt ROS is otherwise negative.        PAST MEDICAL & SURGICAL HISTORY:  HTN (hypertension)  Hearing decreased  CVA (cerebral vascular accident)  12/22/2016  Hyperlipemia  Kidney stones  Coronary artery disease  MI 12/22/2016  CHF (congestive heart failure)  1/2017 stents x3  Type 2 diabetes mellitus  2016  Confusion  from stroke  S/P medial meniscal repair  and ligament surgery  H/O lithotripsy  S/P tonsillectomy  Bilateral inguinal hernia      FAMILY HISTORY: relevant information in HPI    SOCIAL HISTORY:  relevant information in HPI    MEDICATIONS:  influenza  Vaccine (HIGH DOSE) 0.5 milliLiter(s) IntraMuscular once    -------------------------------------------------------------------------------------------  PHYSICAL EXAM:  T(C): 36.2 (12-18-24 @ 06:03), Max: 36.8 (12-17-24 @ 11:48)  HR: 87 (12-18-24 @ 06:03) (83 - 91)  BP: 118/78 (12-18-24 @ 06:03) (107/68 - 126/88)  RR: 20 (12-18-24 @ 06:03) (18 - 20)  SpO2: 96% (12-18-24 @ 06:03) (95% - 99%)    GENERAL: no acute distress  NECK: JVP is   CHEST/LUNG: clear to auscultation bilaterally, unlabored breathing  HEART: regular rate and rhythm, no murmurs or gallops.  ABDOMEN: soft, non-tender, non-distended, bowel sounds present  EXTREMITIES:  warm, no LE edema, 2+ DP pulses    -------------------------------------------------------------------------------------------  RELEVANT LABS:    WBC 6  Hgb 12  Plt 268    K 3.8  Mag 2.0  Cr 1.07  -------------------------------------------------------------------------------------------  RELEVANT IMAGING:    ECG:     Echo:    1. Left ventricular cavity is normal in size. Left ventricular wall thickness is normal. Left ventricular systolic function is severely decreased with an ejection fraction of 28 % by Cash's method of disks. Regional wall motion abnormalities present.The septum,apex,,inferior and infero-lateral walls are akinetic.   2. There is increased LV mass and eccentric hypertrophy.   3. There is severe (grade 3) left ventricular diastolic dysfunction.   4. Normal right ventricular cavity size, with normal wall thickness, and normal right ventricular systolic function. Tricuspid annular plane systolic excursion (TAPSE) is 1.7 cm (normal >=1.7 cm).   5. Mild to moderate mitral regurgitation.   6. Normal left and right atrial size.   7. Pulmonary artery systolic pressure could not be estimated.   8. Mild pulmonic regurgitation.   9. No pericardial effusion seen.  10. No prior echocardiogram is available for comparison.    [place] (4/8/24):  Post-Bypass:  - S/p AVR, Ascending aortic aneurysm repair, CABG x 2  - IABP in place, appropirately positioned with tip 2cm distal to L SCA  - Normal sinus rhythm  - On dobutamine @ 5 mcg/kg/min, moderate LV dysfunction, normal RV function  - Akinetic inferior wall, all other segments hypokinetic  - Bioprosthetic AV well seated with normal leaflet movements, no PVL  - Unchanged valvulopathies otherwise: mild MR, trace TR, trace PI  - Aorta intact s/p repair.    Cath:    3/2024:  LM   Left main artery: Angiography shows no disease.      LAD   Distal left anterior descending: There is a 100 % stenosis. First  diagonal: There is a 90 % stenosis.    CX   First obtuse marginal: There is a 100 % stenosis.      RCA   Distal right coronary artery: There is a 70 % stenosis.      -------------------------------------------------------------------------------------------                 75 y/o male with PMHx of HTN, HLD, DM, CVA (2016), CAD s/p PCI, chronic systolic heart failure (EF ~25%), hx of malignant melanoma s/p excision, recent ascending aortic aneurysm and transverse hemiarch repair + bioprosthetic aortic valve replacement + CABG x 2 (SVG to diagonal, SVG to PDA) on 4/8/24 who presented to [facility] ED for syncope. Now transferred to [facility] for ICD implantation evaluation.    Admitted to [facility] from 11/26 - 12/5 for acute on chronic CHF and sepsis due to pneumonia, after which he was discharged home. On 12/14/24, he was eager to resume physical activity, so went to his garage to build a wooden cabinet. He worked for ~2 hours. At one point, he bent over to pick something up from the ground, and when he stood back up, he began to experience lightheadedness. Soon thereafter, he lost consciousness and fell to the ground hitting his head on the concrete. He denies experiencing any chest pain, shortness of breath, or  palpitations prior to the episode. When he woke up, he was having difficulty getting up. He called for his wife and granddaughter, who called EMS and brought him to [facility] ED. He was evaluated by an electrophysiologist (Dr. Booker Burroughs) and recommended to receive an ICD. Per chart review, patient has intermittent episodes of NSVT during a prior hospital admission.    He was transferred to [facility] since he follows with Dr. Joyce for cardiology care.    PAST MEDICAL & SURGICAL HISTORY:  HTN (hypertension)  Hearing decreased  CVA (cerebral vascular accident)  12/22/2016  Hyperlipemia  Kidney stones  Coronary artery disease  MI 12/22/2016  CHF (congestive heart failure)  1/2017 stents x3  Type 2 diabetes mellitus  2016  Confusion  from stroke  S/P medial meniscal repair  and ligament surgery  H/O lithotripsy  S/P tonsillectomy  Bilateral inguinal hernia      FAMILY HISTORY: relevant information in HPI    SOCIAL HISTORY:  relevant information in HPI    MEDICATIONS:  influenza  Vaccine (HIGH DOSE) 0.5 milliLiter(s) IntraMuscular once    -------------------------------------------------------------------------------------------  PHYSICAL EXAM:  T(C): 36.2 (12-18-24 @ 06:03), Max: 36.8 (12-17-24 @ 11:48)  HR: 87 (12-18-24 @ 06:03) (83 - 91)  BP: 118/78 (12-18-24 @ 06:03) (107/68 - 126/88)  RR: 20 (12-18-24 @ 06:03) (18 - 20)  SpO2: 96% (12-18-24 @ 06:03) (95% - 99%)    GENERAL: no acute distress  NECK: JVP is NOT elevated  CHEST/LUNG: clear to auscultation bilaterally, unlabored breathing  HEART: regular rate and rhythm, no murmurs  ABDOMEN: soft, non-tender, non-distended  EXTREMITIES:  warm, no LE edema    -------------------------------------------------------------------------------------------  RELEVANT LABS:    WBC 6  Hgb 12  Plt 268    K 3.8  Mag 2.0  Cr 1.07  -------------------------------------------------------------------------------------------  RELEVANT IMAGING:    ECG: sinus rhythm with 1st degree AV block. LBBB QRS ~140 ms    Echo:    1. Left ventricular cavity is normal in size. Left ventricular wall thickness is normal. Left ventricular systolic function is severely decreased with an ejection fraction of 28 % by Cash's method of disks. Regional wall motion abnormalities present.The septum,apex,,inferior and infero-lateral walls are akinetic.   2. There is increased LV mass and eccentric hypertrophy.   3. There is severe (grade 3) left ventricular diastolic dysfunction.   4. Normal right ventricular cavity size, with normal wall thickness, and normal right ventricular systolic function. Tricuspid annular plane systolic excursion (TAPSE) is 1.7 cm (normal >=1.7 cm).   5. Mild to moderate mitral regurgitation.   6. Normal left and right atrial size.   7. Pulmonary artery systolic pressure could not be estimated.   8. Mild pulmonic regurgitation.   9. No pericardial effusion seen.  10. No prior echocardiogram is available for comparison.    [place] (4/8/24):  Post-Bypass:  - S/p AVR, Ascending aortic aneurysm repair, CABG x 2  - IABP in place, appropirately positioned with tip 2cm distal to L SCA  - Normal sinus rhythm  - On dobutamine @ 5 mcg/kg/min, moderate LV dysfunction, normal RV function  - Akinetic inferior wall, all other segments hypokinetic  - Bioprosthetic AV well seated with normal leaflet movements, no PVL  - Unchanged valvulopathies otherwise: mild MR, trace TR, trace PI  - Aorta intact s/p repair.    Cath:    3/2024:  LM   Left main artery: Angiography shows no disease.      LAD   Distal left anterior descending: There is a 100 % stenosis. First  diagonal: There is a 90 % stenosis.    CX   First obtuse marginal: There is a 100 % stenosis.      RCA   Distal right coronary artery: There is a 70 % stenosis.      -------------------------------------------------------------------------------------------                 73 y/o male with PMHx of HTN, HLD, DM, CVA (2016), CAD s/p PCI (2017), chronic systolic heart failure (EF ~25%), hx of malignant melanoma s/p excision, recent ascending aortic aneurysm and transverse hemiarch repair + bioprosthetic aortic valve replacement + CABG x 2 (SVG to diagonal, SVG to PDA) on 4/8/24 who presented to [facility] ED for syncope. Now transferred to [facility] for ICD implantation evaluation.    [facility] from 11/26 - 12/5 for acute on chronic CHF and sepsis due to pneumonia, after which he was discharged home. On 12/14/24, he was eager to resume physical activity, so went to his garage to build a wooden cabinet. He worked for ~2 hours. At one point, he bent over to pick something up from the ground, and when he stood back up, he began to experience lightheadedness. Soon thereafter, he lost consciousness and fell to the ground hitting his head on the concrete. He denies experiencing any chest pain, shortness of breath, or  palpitations prior to the episode. When he woke up, he was having difficulty getting up. He called for his wife and granddaughter, who called EMS and brought him to [facility] ED. He was evaluated by an electrophysiologist (Dr. Booker Burroughs) and recommended to receive an ICD. Per chart review, patient has intermittent episodes of NSVT during a prior hospital admission.    He was transferred to [facility] since he follows with Dr. Joyce for cardiology care.    PAST MEDICAL & SURGICAL HISTORY:  HTN (hypertension)  Hearing decreased  CVA (cerebral vascular accident)  12/22/2016  Hyperlipemia  Kidney stones  Coronary artery disease  MI 12/22/2016  CHF (congestive heart failure)  1/2017 stents x3  Type 2 diabetes mellitus  2016  Confusion  from stroke  S/P medial meniscal repair  and ligament surgery  H/O lithotripsy  S/P tonsillectomy  Bilateral inguinal hernia    FAMILY HISTORY: relevant information in HPI    SOCIAL HISTORY:  relevant information in HPI    MEDICATIONS:  influenza  Vaccine (HIGH DOSE) 0.5 milliLiter(s) IntraMuscular once    -------------------------------------------------------------------------------------------  PHYSICAL EXAM:  T(C): 36.2 (12-18-24 @ 06:03), Max: 36.8 (12-17-24 @ 11:48)  HR: 87 (12-18-24 @ 06:03) (83 - 91)  BP: 118/78 (12-18-24 @ 06:03) (107/68 - 126/88)  RR: 20 (12-18-24 @ 06:03) (18 - 20)  SpO2: 96% (12-18-24 @ 06:03) (95% - 99%)    GENERAL: no acute distress  NECK: JVP is NOT elevated  CHEST/LUNG: clear to auscultation bilaterally, unlabored breathing  HEART: regular rate and rhythm, no murmurs  ABDOMEN: soft, non-tender, non-distended  EXTREMITIES:  warm, no LE edema    -------------------------------------------------------------------------------------------  RELEVANT LABS:  WBC 6  Hgb 12  Plt 268    K 3.8  Mag 2.0  Cr 1.07    -------------------------------------------------------------------------------------------  RELEVANT IMAGING:    ECG: sinus rhythm with 1st degree AV block. LBBB QRS ~140 ms    Echo:    1. Left ventricular cavity is normal in size. Left ventricular wall thickness is normal. Left ventricular systolic function is severely decreased with an ejection fraction of 28 % by Cash's method of disks. Regional wall motion abnormalities present.The septum,apex,,inferior and infero-lateral walls are akinetic.   2. There is increased LV mass and eccentric hypertrophy.   3. There is severe (grade 3) left ventricular diastolic dysfunction.   4. Normal right ventricular cavity size, with normal wall thickness, and normal right ventricular systolic function. Tricuspid annular plane systolic excursion (TAPSE) is 1.7 cm (normal >=1.7 cm).   5. Mild to moderate mitral regurgitation.   6. Normal left and right atrial size.   7. Pulmonary artery systolic pressure could not be estimated.   8. Mild pulmonic regurgitation.   9. No pericardial effusion seen.  10. No prior echocardiogram is available for comparison.    [place] (4/8/24):  Post-Bypass:  - S/p AVR, Ascending aortic aneurysm repair, CABG x 2  - IABP in place, appropirately positioned with tip 2cm distal to L SCA  - Normal sinus rhythm  - On dobutamine @ 5 mcg/kg/min, moderate LV dysfunction, normal RV function  - Akinetic inferior wall, all other segments hypokinetic  - Bioprosthetic AV well seated with normal leaflet movements, no PVL  - Unchanged valvulopathies otherwise: mild MR, trace TR, trace PI  - Aorta intact s/p repair.    Cath:    3/2024:  LM   Left main artery: Angiography shows no disease.      LAD   Distal left anterior descending: There is a 100 % stenosis. First  diagonal: There is a 90 % stenosis.    CX   First obtuse marginal: There is a 100 % stenosis.      RCA   Distal right coronary artery: There is a 70 % stenosis.      -------------------------------------------------------------------------------------------                 75 y/o male with PMHx of HTN, HLD, DM, CVA (2016), CAD s/p PCI (2017), chronic systolic heart failure (EF ~25%), hx of malignant melanoma s/p excision, recent ascending aortic aneurysm and transverse hemiarch repair + bioprosthetic aortic valve replacement + CABG x 2 (SVG to diagonal, SVG to PDA) on 4/8/24 who presented to [facility] ED for syncope. Now transferred to [facility] for ICD implantation evaluation.    Admitted to [facility] from 11/26 - 12/5 for acute on chronic CHF and sepsis due to pneumonia, after which he was discharged home. On 12/14/24, he was eager to resume physical activity, so went to his garage to build a wooden cabinet. He worked for ~2 hours. At one point, he bent over to pick something up from the ground, and when he stood back up, he began to experience lightheadedness. Soon thereafter, he lost consciousness and fell to the ground hitting his head on the concrete. He denies experiencing any chest pain, shortness of breath, or  palpitations prior to the episode. When he woke up, he was having difficulty getting up. He called for his wife and granddaughter, who called EMS and brought him to [facility] ED. He was evaluated by an electrophysiologist (Dr. Booker Burroughs) and recommended to receive an ICD. Per chart review, patient has intermittent episodes of NSVT during a prior hospital admission.    He was transferred to [facility] since he follows with Dr. Joyce for cardiology care.    PAST MEDICAL & SURGICAL HISTORY:  HTN (hypertension)  Hearing decreased  CVA (cerebral vascular accident)  12/22/2016  Hyperlipemia  Kidney stones  Coronary artery disease  MI 12/22/2016  CHF (congestive heart failure)  1/2017 stents x3  Type 2 diabetes mellitus  2016  Confusion  from stroke  S/P medial meniscal repair  and ligament surgery  H/O lithotripsy  S/P tonsillectomy  Bilateral inguinal hernia    FAMILY HISTORY: relevant information in HPI    SOCIAL HISTORY:  relevant information in HPI    MEDICATIONS:  influenza  Vaccine (HIGH DOSE) 0.5 milliLiter(s) IntraMuscular once    -------------------------------------------------------------------------------------------  PHYSICAL EXAM:  T(C): 36.2 (12-18-24 @ 06:03), Max: 36.8 (12-17-24 @ 11:48)  HR: 87 (12-18-24 @ 06:03) (83 - 91)  BP: 118/78 (12-18-24 @ 06:03) (107/68 - 126/88)  RR: 20 (12-18-24 @ 06:03) (18 - 20)  SpO2: 96% (12-18-24 @ 06:03) (95% - 99%)    GENERAL: no acute distress  NECK: JVP is NOT elevated  CHEST/LUNG: occasional crackles, unlabored breathing  HEART: regular rate and rhythm, no murmurs  ABDOMEN: soft, non-tender, non-distended  EXTREMITIES:  warm, no LE edema    -------------------------------------------------------------------------------------------  RELEVANT LABS:  WBC 6  Hgb 12  Plt 268    K 3.8  Mag 2.0  Cr 1.07    -------------------------------------------------------------------------------------------  RELEVANT IMAGING:    ECG: sinus rhythm with 1st degree AV block. non-specific intraventricular conduction delay, QRS ~132 ms    Echo:    1. Left ventricular cavity is normal in size. Left ventricular wall thickness is normal. Left ventricular systolic function is severely decreased with an ejection fraction of 28 % by Cash's method of disks. Regional wall motion abnormalities present.The septum,apex,,inferior and infero-lateral walls are akinetic.   2. There is increased LV mass and eccentric hypertrophy.   3. There is severe (grade 3) left ventricular diastolic dysfunction.   4. Normal right ventricular cavity size, with normal wall thickness, and normal right ventricular systolic function. Tricuspid annular plane systolic excursion (TAPSE) is 1.7 cm (normal >=1.7 cm).   5. Mild to moderate mitral regurgitation.   6. Normal left and right atrial size.   7. Pulmonary artery systolic pressure could not be estimated.   8. Mild pulmonic regurgitation.   9. No pericardial effusion seen.  10. No prior echocardiogram is available for comparison.    [place] (4/8/24):  Post-Bypass:  - S/p AVR, Ascending aortic aneurysm repair, CABG x 2  - IABP in place, appropirately positioned with tip 2cm distal to L SCA  - Normal sinus rhythm  - On dobutamine @ 5 mcg/kg/min, moderate LV dysfunction, normal RV function  - Akinetic inferior wall, all other segments hypokinetic  - Bioprosthetic AV well seated with normal leaflet movements, no PVL  - Unchanged valvulopathies otherwise: mild MR, trace TR, trace PI  - Aorta intact s/p repair.    Cath:    3/2024:  LM   Left main artery: Angiography shows no disease.      LAD   Distal left anterior descending: There is a 100 % stenosis. First  diagonal: There is a 90 % stenosis.    CX   First obtuse marginal: There is a 100 % stenosis.      RCA   Distal right coronary artery: There is a 70 % stenosis.      -------------------------------------------------------------------------------------------

## 2024-12-18 NOTE — CONSULT NOTE ADULT - ASSESSMENT
73 y/o male with PMHx of HTN, HLD, DM, CVA (2016), CAD s/p PCI (2017), chronic systolic heart failure (EF ~25%), hx of malignant melanoma s/p excision, recent ascending aortic aneurysm and transverse hemiarch repair + bioprosthetic aortic valve replacement + CABG x 2 (SVG to diagonal, SVG to PDA) on 4/8/24 who presented to [facility] ED for syncope. Now transferred to [facility] for ICD implant evaluation.    - Syncope  - Chronic conditions: HTN, HLD, DM, CAD s/p PCI and CABG, CVA, HFrEF, aortic aneurysm s/p repair + AVR    Based on patient's story, the most likely etiology seems orthostatic or vasovagal. However, he has risk factors for ventricular arrhthymias. Of note, per chart review, he had evidence of NSVT on telemetry during prior hospital admissions.    Regardless, he meets criteria for a primary prevention ICD given reduced EF after a myocardial infarction despite being on GDMT.     Per infectious disease, he is cleared for an ICD implant despite having a recent pneumonia.    RECOMMENDATIONS:  - keep NPO for a possible ICD implant today (consent in chart)              Hernandez Diggs (PGY 4)  Cardiology Fellow      Of note, these recommendations are preliminary. Case to be discussed with attending. Please see attending attestation for final recommendations.

## 2024-12-18 NOTE — H&P ADULT - HISTORY OF PRESENT ILLNESS
Mr. Doroteo Corey is a 74y man with PMHx of CVA, MI, HTN, T2DM, HLD, CAD s/p cardiac stents, HFrEF (EF 20-25% 10/2024), hx of malignant melanoma/wide excision, with recent elective bioprosthetic aortic valve replacement and ascending aortic aneurysm repair with transverse hemiarch and CABG x2 (4/8/24) with post op course complicated by cardiogenic shock requiring inotropic support with IV dobutamine and milrinone as well as IABP, previously on amiodarone for atrial fibrillation prophylaxis (switched to digoxin on recent admission 11/26-12/5 for acute on chronic CHF with sepsis 2/2 pna) who presented to [facility] after episode of dizziness followed by syncope and collapse on 12/14 and is now transferred to [facility] for ICD implant. Patient states that he was working on building a cabinet for 2.5 hours and was feeling tired. He bent down to pick something up and stood back up at which point he felt dizzy. He lost consciousness and fell and woke up realizing he had hit his head on the concrete. He has lacerations on his L shin and L hand. He was groaning and was groggy for about a minute. He says that he had a similar incident in July/August where he was bent down and stood back up and lost consciousness but he did not go to the hospital at that time. Today he is feeling well without chest pain, palpitations, shortness of breath, and abdominal pain. He has no fevers or chills.    At [facility], he was admitted to telemetry for syncope for electrocardiographic monitoring of unstable arrhythmia in the s/o heart failure with severely reduced ejection fraction. Patient was seen and evaluated by EP/Cardiology (Dr. Burroughs/Dr. Ward) who recommended ICD implant and was transferred to [facility]. Patient follows with [facility] cardiologist Dr. Ramon.   Mr. Doroteo Corey is a 74y man with PMHx of CVA, MI, HTN, T2DM, HLD, CAD s/p cardiac stents, HFrEF (EF 20-25% 10/2024), hx of malignant melanoma/wide excision, with recent elective bioprosthetic aortic valve replacement and ascending aortic aneurysm repair with transverse hemiarch and CABG x2 (4/8/24) who presented to [facility] after episode of dizziness followed by syncope and collapse on 12/14 and is now transferred to [facility] for ICD implant. Patient states that he was working on building a cabinet for 2.5 hours and was feeling tired. He bent down to pick something up and stood back up at which point he felt dizzy. He lost consciousness and fell and woke up realizing he had hit his head on the concrete. He has lacerations on his L shin and L hand. He was groaning and was groggy for about a minute. He says that he had a similar incident in July/August where he was bent down and stood back up and lost consciousness but he did not go to the hospital at that time. Today he is feeling well without chest pain, palpitations, shortness of breath, and abdominal pain. He has no fevers or chills.    At [facility], he was admitted to telemetry for syncope for electrocardiographic monitoring of unstable arrhythmia in the s/o heart failure with severely reduced ejection fraction. Patient was seen and evaluated by EP/Cardiology (Dr. Burroughs/Dr. Ward) who recommended ICD implant and was transferred to [facility]. Patient follows with [facility] cardiologist Dr. Ramon.    His prior bioprosthetic aortic valve replacement and ascending aortic aneurysm repair with transverse hemiarch and CABG x2 (4/8/24) was complicated post op by cardiogenic shock requiring inotropic support with IV dobutamine and milrinone as well as IABP. He had previously been on amiodarone for atrial fibrillation prophylaxis (switched to digoxin on recent admission 11/26-12/5 for acute on chronic CHF with sepsis 2/2 pna) Mr. Doroteo Corey is a 74y man with PMHx of CVA, MI, HTN, T2DM, HLD, CAD s/p cardiac stents, HFrEF (EF 20-25% 10/2024), hx of malignant melanoma/wide excision, with recent elective bioprosthetic aortic valve replacement and ascending aortic aneurysm repair with transverse hemiarch and CABG x2 (4/8/24) who presented to [facility] after episode of dizziness followed by syncope and collapse on 12/14 and is now transferred to [facility] for ICD implant. Patient states that he was working on building a cabinet for 2.5 hours and was feeling tired. He bent down to pick something up and stood back up at which point he felt dizzy. He lost consciousness and fell and woke up realizing he had hit his head on the concrete. He has lacerations on his L shin and L hand. He was groaning and was groggy for about a minute. He says that he had a similar incident in July/August where he was bent down and stood back up and lost consciousness but he did not go to the hospital at that time. Today he is feeling well without chest pain, palpitations, shortness of breath, and abdominal pain. He has no fevers or chills. He has noticed that he is losing his voice slightly but does not have any throat pain and cough.    At [facility], he was admitted to telemetry for syncope for electrocardiographic monitoring of unstable arrhythmia in the s/o heart failure with severely reduced ejection fraction. Patient was seen and evaluated by EP/Cardiology (Dr. Burroughs/Dr. Ward) who recommended ICD implant and was transferred to [facility]. Patient follows with [facility] cardiologist Dr. Ramon.    His prior bioprosthetic aortic valve replacement and ascending aortic aneurysm repair with transverse hemiarch and CABG x2 (4/8/24) was complicated post op by cardiogenic shock requiring inotropic support with IV dobutamine and milrinone as well as IABP. He had previously been on amiodarone for atrial fibrillation prophylaxis (switched to digoxin on recent admission 11/26-12/5 for acute on chronic CHF with sepsis 2/2 pna) Mr. Doroteo Corey is a 74y man with PMHx of CVA, MI, HTN, T2DM, HLD, CAD s/p cardiac stents, HFrEF (EF 20-25% 10/2024), hx of malignant melanoma/wide excision, with recent elective bioprosthetic aortic valve replacement and ascending aortic aneurysm repair with transverse hemiarch and CABG x2 (4/8/24) who presented to [place] after episode of dizziness followed by syncope and collapse on 12/14 and is now transferred to [facility] for ICD implant. Patient states that he was working on building a cabinet for 2.5 hours and was feeling tired. He bent down to pick something up and stood back up at which point he felt dizzy. He lost consciousness and fell and woke up realizing he had hit his head on the concrete. He has lacerations on his L shin and L hand. He was groaning and was groggy for about a minute. He says that he had a similar incident in July/August where he was bent down and stood back up and lost consciousness but he did not go to the hospital at that time. Today he is feeling well without chest pain, palpitations, shortness of breath, and abdominal pain. He has no fevers or chills. He has noticed that he is losing his voice slightly but does not have any throat pain and cough.    At [facility], he was admitted to telemetry for syncope for electrocardiographic monitoring of unstable arrhythmia in the s/o heart failure with severely reduced ejection fraction. Patient was seen and evaluated by EP/Cardiology (Dr. Burroughs/Dr. Ward) who recommended ICD implant and was transferred to [facility]. Patient follows with [facility] cardiologist Dr. Ramon.    His prior bioprosthetic aortic valve replacement and ascending aortic aneurysm repair with transverse hemiarch and CABG x2 (4/8/24) was complicated post op by cardiogenic shock requiring inotropic support with IV dobutamine and milrinone as well as IABP. He had previously been on amiodarone for atrial fibrillation prophylaxis (switched to digoxin on recent admission 11/26-12/5 for acute on chronic CHF with sepsis 2/2 pna) Mr. Doroteo Corey is a 74y man with PMHx of CVA, MI, HTN, T2DM, HLD, CAD s/p cardiac stents, HFrEF (EF 20-25% 10/2024), hx of malignant melanoma/wide excision, with recent elective bioprosthetic aortic valve replacement and ascending aortic aneurysm repair with transverse hemiarch and CABG x2 (4/8/24) was transferred to [facility] from [place] for ICD placement. Patient originally presented to [place] after an episode of dizziness followed by syncope and collapse on 12/14. At that time, patient states that he was working on building a cabinet for 2.5 hours and was feeling tired. He bent down to pick something up and stood back up at which point he felt dizzy. He lost consciousness and fell and woke up realizing he had hit his head on the concrete. He has lacerations on his L shin and L hand. He was groaning and was groggy for about a minute. He says that he had a similar incident in July/August where he was bent down and stood back up and lost consciousness but he did not go to the hospital at that time. Today he is feeling well without chest pain, palpitations, shortness of breath, and abdominal pain. He has no fevers or chills.     At [place], he was admitted to telemetry for syncope for electrocardiographic monitoring of unstable arrhythmia in the s/o heart failure with severely reduced ejection fraction. Patient was seen and evaluated by EP/Cardiology (Dr. Burroughs/Dr. Ward) who recommended ICD implant and was transferred to [facility]. Patient follows with [facility] cardiologist Dr. Joyce.    His prior bioprosthetic aortic valve replacement and ascending aortic aneurysm repair with transverse hemiarch and CABG x2 (4/8/24) was complicated post op by cardiogenic shock requiring inotropic support with IV dobutamine and milrinone as well as IABP. He had previously been on amiodarone for atrial fibrillation prophylaxis (switched to digoxin on recent admission 11/26-12/5 for acute on chronic CHF with sepsis 2/2 pna).    He notes his surrogate decision maker would be his wife, Nciole Corey.

## 2024-12-18 NOTE — H&P ADULT - PROBLEM SELECTOR PLAN 1
hx of HFrEF presenting after episode of syncope and collapse with prodrome of dizziness   patient reports episode was after bending over and standing up in s/o likely overexertion and possible dehydration, likely orthostatic however r/o unstable arrhythmia given severe HFrEF and recent changes to rate and rhythm control meds.   CTH/C-spine: negative for acute pathology  EKG 1st degree AVB, old LBBB  TTE 12/17:  Left ventricular systolic function is severely decreased with an ejection fraction of 28 % by Cash's method of disks. Regional wall motion abnormalities present. The septum, apex, inferior and infero-lateral walls are akinetic. LV eccentric hypertrophy. Severe (grade 3) LV diastolic dysfunction.    Plan:  Possible ICD implant 12/18  Fall precautions, ambulate with assistance hx of HFrEF presenting after episode of syncope and collapse with prodrome of dizziness   patient reports episode was after bending over and standing up in s/o likely overexertion and possible dehydration, likely orthostatic however r/o unstable arrhythmia given severe HFrEF and recent changes to rate and rhythm control meds  CTH/C-spine: negative for acute pathology  EKG 1st degree AVB, old LBBB  TTE 12/17:  Left ventricular systolic function is severely decreased with an ejection fraction of 28 % by Cash's method of disks. Regional wall motion abnormalities present. The septum, apex, inferior and infero-lateral walls are akinetic. LV eccentric hypertrophy. Severe (grade 3) LV diastolic dysfunction.    Plan:  - ICD implant pending EP  - Fall precautions, ambulate with assistance

## 2024-12-18 NOTE — H&P ADULT - PROBLEM SELECTOR PLAN 4
hx of HTN now on valsartan 40mg qd  previously on Entresto, Lasix, and metoprolol recently discontinued due to hypotension on recent hospital admission    Plan:  -c/w home valsartan with holding parameters  -monitor BP  -adjust antihypertensive meds as appropriate

## 2024-12-18 NOTE — H&P ADULT - NSHPPHYSICALEXAM_GEN_ALL_CORE
GENERAL: NAD, lying in bed comfortably  HEAD:  Atraumatic, normocephalic  EYES: EOMI, PERRLA, conjunctiva and sclera clear  NECK: Supple, trachea midline, no JVD  HEART: Regular rate and rhythm, no murmurs, rubs, or gallops  LUNGS: Unlabored respirations.  Clear to auscultation bilaterally, no crackles, wheezing, or rhonchi  ABDOMEN: Soft, nontender, nondistended, +BS  EXTREMITIES: 2+ peripheral pulses bilaterally. No clubbing, cyanosis, or edema  NERVOUS SYSTEM:  A&Ox3, moving all extremities, no focal deficits   SKIN: No rashes. bandaged wounds on L shin and L hand. GENERAL: NAD, lying in bed comfortably  HEAD:  Atraumatic, normocephalic  EYES: EOMI, PERRLA, conjunctiva and sclera clear  NECK: Supple, trachea midline  HEART: Regular rate and rhythm, no murmurs, rubs, or gallops  LUNGS: Unlabored respirations.  Clear to auscultation bilaterally, no crackles, wheezing, or rhonchi  ABDOMEN: Soft, nontender, nondistended, +BS  EXTREMITIES: 2+ peripheral pulses bilaterally. No clubbing, cyanosis, or edema  NERVOUS SYSTEM:  A&Ox3, moving all extremities, no focal deficits   SKIN: No rashes. bandaged wounds on L shin and L hand.

## 2024-12-18 NOTE — H&P ADULT - PROBLEM SELECTOR PLAN 2
HFrEF (EF 20-25%) with recent elective bioprosthetic aortic valve replacement and ascending aortic aneurysm repair with transverse hemiarch and CABG x2 (4/8/24) with post op course complicated by cardiogenic shock requiring inotropic support with IV dobutamine and milrinone as well as IABP  CT chest 12/14: 1. Hydrostatic interstitial pulmonary edema/CHF. 2.  Multifocal bilateral pulmonary fibrosis and pulmonary scarring. 3. Fairly extensive multifocal bilateral pneumonia 4. Small volume bilateral pleural effusions.  BNP 07798 (previously 06870 on 11/26 and  8193 on 10/8)  Troponin 64.7-->66.9  previously on metoprolol, entresto, farxiga, lasix for GDMT and amiodarone for Afib ppx-->switched to valsartan and digoxin last admission    Plan:  -c/w valsartan with parameters and digoxin  -Toprol 12.5 qd  -daily weights, strict I&Os  -possible ICD implant 12/18 HFrEF with EF 28% on 12/17   CT chest 12/14: Hydrostatic interstitial pulmonary edema/CHF. Multifocal bilateral pulmonary fibrosis and pulmonary scarring. Fairly extensive multifocal bilateral pneumonia. Small volume bilateral pleural effusions.  BNP 44695 (previously 36417 on 11/26 and  8193 on 10/8)  Troponin 64.7-->66.9  previously on metoprolol, valsartan, farxiga, lasix for GDMT and amiodarone for Afib ppx-->switched to valsartan and digoxin last admission    Plan:  - hold metoprolol, farxiga per EP note 12/15  - hold digoxin  - daily weights, strict I&Os  - ICD implant 12/18 per EP

## 2024-12-18 NOTE — H&P ADULT - PROBLEM SELECTOR PLAN 3
h/o DM on metformin 1000mg BID  previously on linagliptin and faxiga    hold oral dm meds  last A1c 10.2 on 11/27  Plan:  -low sliding scale  -Adjust insulin as indicated  -FS ACHS.

## 2024-12-18 NOTE — H&P ADULT - NSICDXPASTSURGICALHX_GEN_ALL_CORE_FT
PAST SURGICAL HISTORY:  Bilateral inguinal hernia     H/O lithotripsy     S/P aortic aneurysm repair     S/P aortic valve replacement with bioprosthetic valve     S/P CABG x 2     S/P medial meniscal repair and ligament surgery    S/P tonsillectomy

## 2024-12-18 NOTE — H&P ADULT - NSHPSOCIALHISTORY_GEN_ALL_CORE
, lives at home with wife and 4 kids, retired. 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

## 2024-12-18 NOTE — H&P ADULT - PROBLEM SELECTOR PLAN 7
DVT: Lovenox, held for possible procedure  Diet: NPO for procedure DVT: eval for AC per EP since patient has PMHx of pAF and biprosthetic valve replacement  Diet: NPO for procedure

## 2024-12-18 NOTE — H&P ADULT - ASSESSMENT
Mr. Doroteo Corey is a 74y man with PMHx of MI, HTN, T2DM, HLD, CAD s/p cardiac stents, HFrEF (EF 20-25% 10/2024), with recent elective bioprosthetic aortic valve replacement and ascending aortic aneurysm repair with transverse hemiarch and CABG x2 (4/8/24) with post op course complicated by cardiogenic shock requiring inotropic support with IV dobutamine and milrinone as well as IABP, transferred to [facility] for ICD implant after presenting to [facility] after syncope after an episode of dizziness. Mr. Doroteo Corey is a 74y man with PMHx of MI, HTN, T2DM, HLD, CAD s/p cardiac stents, HFrEF (EF 20-25% 10/2024), with recent elective bioprosthetic aortic valve replacement and ascending aortic aneurysm repair with transverse hemiarch and CABG x2 (4/8/24) who was transferred to [facility] for ICD implant after presenting to [facility] after syncope after an episode of dizziness. Mr. Doroteo Corey is a 74y man with PMHx of MI, HTN, T2DM, HLD, CAD s/p cardiac stents, HFrEF (EF 20-25% 10/2024), with recent elective bioprosthetic aortic valve replacement and ascending aortic aneurysm repair with transverse hemiarch and CABG x2 (4/8/24) who was transferred to [facility] for ICD implant after presenting to [place] after syncope after an episode of dizziness. Mr. Doroteo Corey is a 74y man with PMHx of MI, HTN, T2DM, HLD, CAD s/p cardiac stents, HFrEF (EF 20-25% 10/2024), with recent elective bioprosthetic aortic valve replacement and ascending aortic aneurysm repair with transverse hemiarch and CABG x2 (4/8/24) who was transferred to [facility] for ICD implant after presenting to [place] following a syncopal episode.

## 2024-12-18 NOTE — H&P ADULT - SOCIAL HISTORY: SUBSTANCE USE
none past history crack cocaine and marijuana remote history crack cocaine and marijuana remote history crack cocaine and marijuana 20 years ago

## 2024-12-18 NOTE — H&P ADULT - NSHPLABSRESULTS_GEN_ALL_CORE
LABS:                          12.8   6.18  )-----------( 280      ( 18 Dec 2024 08:52 )             40.8           PT/INR - ( 18 Dec 2024 08:52 )   PT: 13.0 sec;   INR: 1.13 ratio         PTT - ( 18 Dec 2024 08:52 )  PTT:31.0 sec

## 2024-12-18 NOTE — H&P ADULT - PROBLEM SELECTOR PLAN 8
Pt for transfer to [facility] for ICD implant as per EP recs pending ICD implant, clinical course Dispo: pending clinical course

## 2024-12-18 NOTE — H&P ADULT - ATTENDING COMMENTS
74y man with PMHx of CVA, MI, HTN, T2DM, HLD, CAD s/p cardiac stents, HFrEF (EF 20-25% 10/2024), hx of malignant melanoma/wide excision, with recent elective bioprosthetic aortic valve replacement and ascending aortic aneurysm repair with transverse hemiarch and CABG x2 (4/8/24) was transferred to [facility] from [place] for ICD placement.    #Syncope  -monitor on telemetry  -transferred here for AICD placement, tentatively planned for 12/19, NPO after MN  -follow up EP recs    #Chronic Combined systolic and diastolic Heart Failure  -EF 28% with severe LV diastolic dysfunction   -Cards to see, Dr. Joyce primary, follow up recs   -was on low dose valsartan, digoxin, and toprol 12.5mg at [facility], hold pending Cards/EP recs    #CAD  -c/w DAPT and high intensity statin    #BPH  -c/w flomax    #T2DM  -A1c 10.2  -only on metformin at home, will add back SGLT2i on discharge  -c/w ISS     Pt requesting tetanus shot prior to discharge   rest as above, d/w HS 4

## 2024-12-19 ENCOUNTER — TRANSCRIPTION ENCOUNTER (OUTPATIENT)
Age: 74
End: 2024-12-19

## 2024-12-19 ENCOUNTER — APPOINTMENT (OUTPATIENT)
Dept: CARDIOLOGY | Facility: CLINIC | Age: 74
End: 2024-12-19

## 2024-12-19 VITALS
DIASTOLIC BLOOD PRESSURE: 75 MMHG | RESPIRATION RATE: 18 BRPM | SYSTOLIC BLOOD PRESSURE: 120 MMHG | HEART RATE: 89 BPM | TEMPERATURE: 98 F | OXYGEN SATURATION: 98 %

## 2024-12-19 LAB
ALBUMIN SERPL ELPH-MCNC: 3.4 G/DL — SIGNIFICANT CHANGE UP (ref 3.3–5)
ALP SERPL-CCNC: 105 U/L — SIGNIFICANT CHANGE UP (ref 40–120)
ALT FLD-CCNC: 14 U/L — SIGNIFICANT CHANGE UP (ref 10–45)
ANION GAP SERPL CALC-SCNC: 13 MMOL/L — SIGNIFICANT CHANGE UP (ref 5–17)
APTT BLD: 30.5 SEC — SIGNIFICANT CHANGE UP (ref 24.5–35.6)
AST SERPL-CCNC: 14 U/L — SIGNIFICANT CHANGE UP (ref 10–40)
BASOPHILS # BLD AUTO: 0.05 K/UL — SIGNIFICANT CHANGE UP (ref 0–0.2)
BASOPHILS NFR BLD AUTO: 0.7 % — SIGNIFICANT CHANGE UP (ref 0–2)
BILIRUB SERPL-MCNC: 0.6 MG/DL — SIGNIFICANT CHANGE UP (ref 0.2–1.2)
BLD GP AB SCN SERPL QL: NEGATIVE — SIGNIFICANT CHANGE UP
BUN SERPL-MCNC: 19 MG/DL — SIGNIFICANT CHANGE UP (ref 7–23)
CALCIUM SERPL-MCNC: 9.3 MG/DL — SIGNIFICANT CHANGE UP (ref 8.4–10.5)
CHLORIDE SERPL-SCNC: 107 MMOL/L — SIGNIFICANT CHANGE UP (ref 96–108)
CO2 SERPL-SCNC: 21 MMOL/L — LOW (ref 22–31)
CREAT SERPL-MCNC: 1.19 MG/DL — SIGNIFICANT CHANGE UP (ref 0.5–1.3)
EGFR: 63 ML/MIN/1.73M2 — SIGNIFICANT CHANGE UP
EOSINOPHIL # BLD AUTO: 0.16 K/UL — SIGNIFICANT CHANGE UP (ref 0–0.5)
EOSINOPHIL NFR BLD AUTO: 2.4 % — SIGNIFICANT CHANGE UP (ref 0–6)
GLUCOSE BLDC GLUCOMTR-MCNC: 109 MG/DL — HIGH (ref 70–99)
GLUCOSE BLDC GLUCOMTR-MCNC: 121 MG/DL — HIGH (ref 70–99)
GLUCOSE BLDC GLUCOMTR-MCNC: 138 MG/DL — HIGH (ref 70–99)
GLUCOSE SERPL-MCNC: 135 MG/DL — HIGH (ref 70–99)
HCT VFR BLD CALC: 40.5 % — SIGNIFICANT CHANGE UP (ref 39–50)
HGB BLD-MCNC: 12.9 G/DL — LOW (ref 13–17)
IMM GRANULOCYTES NFR BLD AUTO: 0.3 % — SIGNIFICANT CHANGE UP (ref 0–0.9)
INR BLD: 1.12 RATIO — SIGNIFICANT CHANGE UP (ref 0.85–1.16)
LYMPHOCYTES # BLD AUTO: 1.91 K/UL — SIGNIFICANT CHANGE UP (ref 1–3.3)
LYMPHOCYTES # BLD AUTO: 28.6 % — SIGNIFICANT CHANGE UP (ref 13–44)
MAGNESIUM SERPL-MCNC: 2 MG/DL — SIGNIFICANT CHANGE UP (ref 1.6–2.6)
MCHC RBC-ENTMCNC: 26.2 PG — LOW (ref 27–34)
MCHC RBC-ENTMCNC: 31.9 G/DL — LOW (ref 32–36)
MCV RBC AUTO: 82.3 FL — SIGNIFICANT CHANGE UP (ref 80–100)
MONOCYTES # BLD AUTO: 0.61 K/UL — SIGNIFICANT CHANGE UP (ref 0–0.9)
MONOCYTES NFR BLD AUTO: 9.1 % — SIGNIFICANT CHANGE UP (ref 2–14)
NEUTROPHILS # BLD AUTO: 3.92 K/UL — SIGNIFICANT CHANGE UP (ref 1.8–7.4)
NEUTROPHILS NFR BLD AUTO: 58.9 % — SIGNIFICANT CHANGE UP (ref 43–77)
NRBC # BLD: 0 /100 WBCS — SIGNIFICANT CHANGE UP (ref 0–0)
PHOSPHATE SERPL-MCNC: 3 MG/DL — SIGNIFICANT CHANGE UP (ref 2.5–4.5)
PLATELET # BLD AUTO: 272 K/UL — SIGNIFICANT CHANGE UP (ref 150–400)
POTASSIUM SERPL-MCNC: 4.6 MMOL/L — SIGNIFICANT CHANGE UP (ref 3.5–5.3)
POTASSIUM SERPL-SCNC: 4.6 MMOL/L — SIGNIFICANT CHANGE UP (ref 3.5–5.3)
PROT SERPL-MCNC: 6.4 G/DL — SIGNIFICANT CHANGE UP (ref 6–8.3)
PROTHROM AB SERPL-ACNC: 12.7 SEC — SIGNIFICANT CHANGE UP (ref 9.9–13.4)
RBC # BLD: 4.92 M/UL — SIGNIFICANT CHANGE UP (ref 4.2–5.8)
RBC # FLD: 15.7 % — HIGH (ref 10.3–14.5)
RH IG SCN BLD-IMP: NEGATIVE — SIGNIFICANT CHANGE UP
SODIUM SERPL-SCNC: 141 MMOL/L — SIGNIFICANT CHANGE UP (ref 135–145)
WBC # BLD: 6.67 K/UL — SIGNIFICANT CHANGE UP (ref 3.8–10.5)
WBC # FLD AUTO: 6.67 K/UL — SIGNIFICANT CHANGE UP (ref 3.8–10.5)

## 2024-12-19 PROCEDURE — 86901 BLOOD TYPING SEROLOGIC RH(D): CPT

## 2024-12-19 PROCEDURE — 82803 BLOOD GASES ANY COMBINATION: CPT

## 2024-12-19 PROCEDURE — 80162 ASSAY OF DIGOXIN TOTAL: CPT

## 2024-12-19 PROCEDURE — 90714 TD VACC NO PRESV 7 YRS+ IM: CPT

## 2024-12-19 PROCEDURE — 83605 ASSAY OF LACTIC ACID: CPT

## 2024-12-19 PROCEDURE — 84132 ASSAY OF SERUM POTASSIUM: CPT

## 2024-12-19 PROCEDURE — 86900 BLOOD TYPING SEROLOGIC ABO: CPT

## 2024-12-19 PROCEDURE — 99239 HOSP IP/OBS DSCHRG MGMT >30: CPT

## 2024-12-19 PROCEDURE — 85730 THROMBOPLASTIN TIME PARTIAL: CPT

## 2024-12-19 PROCEDURE — 84100 ASSAY OF PHOSPHORUS: CPT

## 2024-12-19 PROCEDURE — 85025 COMPLETE CBC W/AUTO DIFF WBC: CPT

## 2024-12-19 PROCEDURE — 36415 COLL VENOUS BLD VENIPUNCTURE: CPT

## 2024-12-19 PROCEDURE — 82330 ASSAY OF CALCIUM: CPT

## 2024-12-19 PROCEDURE — 85014 HEMATOCRIT: CPT

## 2024-12-19 PROCEDURE — 83735 ASSAY OF MAGNESIUM: CPT

## 2024-12-19 PROCEDURE — 84295 ASSAY OF SERUM SODIUM: CPT

## 2024-12-19 PROCEDURE — 99233 SBSQ HOSP IP/OBS HIGH 50: CPT

## 2024-12-19 PROCEDURE — 82947 ASSAY GLUCOSE BLOOD QUANT: CPT

## 2024-12-19 PROCEDURE — 80053 COMPREHEN METABOLIC PANEL: CPT

## 2024-12-19 PROCEDURE — 82962 GLUCOSE BLOOD TEST: CPT

## 2024-12-19 PROCEDURE — 93005 ELECTROCARDIOGRAM TRACING: CPT

## 2024-12-19 PROCEDURE — 85018 HEMOGLOBIN: CPT

## 2024-12-19 PROCEDURE — 85610 PROTHROMBIN TIME: CPT

## 2024-12-19 PROCEDURE — 86850 RBC ANTIBODY SCREEN: CPT

## 2024-12-19 PROCEDURE — 82435 ASSAY OF BLOOD CHLORIDE: CPT

## 2024-12-19 RX ORDER — POLYETHYLENE GLYCOL 3350 17 G/17G
17 POWDER, FOR SOLUTION ORAL
Qty: 0 | Refills: 0 | DISCHARGE
Start: 2024-12-19

## 2024-12-19 RX ORDER — TETANUS, DIPHTHERIA TOX,ADULT 5-2/0.5ML
0.5 SYRINGE (ML) INTRAMUSCULAR ONCE
Refills: 0 | Status: COMPLETED | OUTPATIENT
Start: 2024-12-19 | End: 2024-12-19

## 2024-12-19 RX ORDER — VALSARTAN 320 MG/1
1 TABLET ORAL
Qty: 30 | Refills: 0
Start: 2024-12-19 | End: 2025-01-17

## 2024-12-19 RX ORDER — DAPAGLIFLOZIN 10 MG/1
1 TABLET, FILM COATED ORAL
Qty: 30 | Refills: 0
Start: 2024-12-19 | End: 2025-01-17

## 2024-12-19 RX ORDER — LORAZEPAM 2 MG/1
1 TABLET ORAL
Qty: 0 | Refills: 0 | DISCHARGE

## 2024-12-19 RX ORDER — DIGOXIN 250 MCG
1 TABLET ORAL
Qty: 30 | Refills: 0
Start: 2024-12-19 | End: 2025-01-17

## 2024-12-19 RX ORDER — CHLORHEXIDINE GLUCONATE 1.2 MG/ML
1 RINSE ORAL DAILY
Refills: 0 | Status: DISCONTINUED | OUTPATIENT
Start: 2024-12-19 | End: 2024-12-19

## 2024-12-19 RX ORDER — VALSARTAN 320 MG/1
1 TABLET ORAL
Refills: 0 | DISCHARGE

## 2024-12-19 RX ADMIN — CHLORHEXIDINE GLUCONATE 1 APPLICATION(S): 1.2 RINSE ORAL at 11:39

## 2024-12-19 RX ADMIN — Medication 81 MILLIGRAM(S): at 16:37

## 2024-12-19 RX ADMIN — Medication 0.5 MILLILITER(S): at 16:38

## 2024-12-19 NOTE — DISCHARGE NOTE PROVIDER - CARE PROVIDERS DIRECT ADDRESSES
[email or web address]

## 2024-12-19 NOTE — DISCHARGE NOTE PROVIDER - NSDCFUADDAPPT_GEN_ALL_CORE_FT
APPTS ARE READY TO BE MADE: [ ] YES    Best Family or Patient Contact (if needed):    Additional Information about above appointments (if needed):    1: Cardiology with Dr. Joyce  2:   3:     Other comments or requests:    APPTS ARE READY TO BE MADE: [X] YES    Best Family or Patient Contact (if needed):    Additional Information about above appointments (if needed):    1: Cardiology with Dr. Joyce  2: HOME HF  3:     Other comments or requests:    APPTS ARE READY TO BE MADE: [X] YES    Best Family or Patient Contact (if needed):    Additional Information about above appointments (if needed):    1: Cardiology with Dr. Joyce  2: HOME HF  3:     Other comments or requests:       Prior to outreaching the patient, it was visible that the patient has secured a follow up appointment which was not scheduled by our team. Patient is scheduled for EP on 1/2 at [address] [place], [place] [id number] APPTS ARE READY TO BE MADE: [X] YES    Best Family or Patient Contact (if needed):    Additional Information about above appointments (if needed):    1: Cardiology with Dr. Joyce  2: HOME HF  3:     Other comments or requests:       Prior to outreaching the patient, it was visible that the patient has secured a follow up appointment which was not scheduled by our team. Patient is scheduled for EP on 1/2 at [address] [place] [id number]    A [facility] office was contacted to secure an appointment, however the office will follow up with the patient/caregiver directly. Dr Joyce + HF

## 2024-12-19 NOTE — DISCHARGE NOTE NURSING/CASE MANAGEMENT/SOCIAL WORK - NSDCPEFALRISK_GEN_ALL_CORE
For information on Fall & Injury Prevention, visit: [email or web address] OR  [email or web address] OR  https://www.cdc.gov/steadi/patient.html

## 2024-12-19 NOTE — PROGRESS NOTE ADULT - ATTENDING COMMENTS
74y man with PMHx of CVA, MI, HTN, T2DM, HLD, CAD s/p cardiac stents, HFrEF (EF 20-25% 10/2024), hx of malignant melanoma/wide excision, with recent elective bioprosthetic aortic valve replacement and ascending aortic aneurysm repair with transverse hemiarch and CABG x2 (4/8/24) was transferred to [facility] from [place] for ICD placement.    #Syncope  -monitor on telemetry  -transferred here for AICD placement, pt now deferring as he is DNR/DNI, can follow up as outpatient     #Chronic Combined systolic and diastolic Heart Failure  -EF 28% with severe LV diastolic dysfunction   -c/w low dose valsartan and dig. Hold toprol-d/w Cardiology    #CAD  -c/w DAPT and high intensity statin    #BPH  -c/w flomax    #T2DM  -A1c 10.2  -only on metformin at home, will add back SGLT2i on discharge  -c/w ISS     Pt requesting tetanus shot prior to discharge   rest as above, d/w HS 4  and Cardiology. Total time discharge planning 55 minutes

## 2024-12-19 NOTE — PROGRESS NOTE ADULT - PROBLEM SELECTOR PLAN 1
hx of HFrEF presenting after episode of syncope and collapse with prodrome of dizziness   patient reports episode was after bending over and standing up in s/o likely overexertion and possible dehydration, likely orthostatic however r/o unstable arrhythmia given severe HFrEF and recent changes to rate and rhythm control meds  CTH/C-spine: negative for acute pathology  EKG 1st degree AVB, old LBBB  TTE 12/17:  Left ventricular systolic function is severely decreased with an ejection fraction of 28 % by Cash's method of disks. Regional wall motion abnormalities present. The septum, apex, inferior and infero-lateral walls are akinetic. LV eccentric hypertrophy. Severe (grade 3) LV diastolic dysfunction.    Plan:  - ICD implant pending EP  - Fall precautions, ambulate with assistance

## 2024-12-19 NOTE — DISCHARGE NOTE PROVIDER - NSDCFUSCHEDAPPT_GEN_ALL_CORE_FT
Cat Plascencia  [facility]  PSYCHIATRY [address] [facility]   Scheduled Appointment: 12/24/2024    [facility]  ELECTROPH [address]  Scheduled Appointment: 01/02/2025    Mable Lockhart  [facility]  PULMMED [address] [facility]  Scheduled Appointment: 01/14/2025    Alexandra Marie  [facility]  CARDIOLOGY [address]  Scheduled Appointment: 01/31/2025

## 2024-12-19 NOTE — DISCHARGE NOTE NURSING/CASE MANAGEMENT/SOCIAL WORK - PATIENT PORTAL LINK FT
Notify Provider for medication You can access the FollowMyHealth Patient Portal offered by [facility] by registering at the following website: [email or web address]. By joining [employer]’s FollowMyHealth portal, you will also be able to view your health information using other applications (apps) compatible with our system.

## 2024-12-19 NOTE — DISCHARGE NOTE PROVIDER - HOSPITAL COURSE
HPI:  Mr. Doroteo Corey is a 74y man with PMHx of CVA, MI, HTN, T2DM, HLD, CAD s/p cardiac stents, HFrEF (EF 20-25% 10/2024), hx of malignant melanoma/wide excision, with recent elective bioprosthetic aortic valve replacement and ascending aortic aneurysm repair with transverse hemiarch and CABG x2 (4/8/24) was transferred to [facility] from [place] for ICD placement. Patient originally presented to [place] after an episode of dizziness followed by syncope and collapse on 12/14. At that time, patient states that he was working on building a cabinet for 2.5 hours and was feeling tired. He bent down to pick something up and stood back up at which point he felt dizzy. He lost consciousness and fell and woke up realizing he had hit his head on the concrete. He has lacerations on his L shin and L hand. He was groaning and was groggy for about a minute. He says that he had a similar incident in July/August where he was bent down and stood back up and lost consciousness but he did not go to the hospital at that time. Today he is feeling well without chest pain, palpitations, shortness of breath, and abdominal pain. He has no fevers or chills.     At [place], he was admitted to telemetry for syncope for electrocardiographic monitoring of unstable arrhythmia in the s/o heart failure with severely reduced ejection fraction. Patient was seen and evaluated by EP/Cardiology (Dr. Burroughs/Dr. Ward) who recommended ICD implant and was transferred to [facility]. Patient follows with [facility] cardiologist Dr. Joyce.    His prior bioprosthetic aortic valve replacement and ascending aortic aneurysm repair with transverse hemiarch and CABG x2 (4/8/24) was complicated post op by cardiogenic shock requiring inotropic support with IV dobutamine and milrinone as well as IABP. He had previously been on amiodarone for atrial fibrillation prophylaxis (switched to digoxin on recent admission 11/26-12/5 for acute on chronic CHF with sepsis 2/2 pna).    He notes his surrogate decision maker would be his wife, Nicole Corey.  (18 Dec 2024 08:52)    Hospital Course:  Patient decided against inpatient placement of ICD and planning to follow up outpatient.     Important Medication Changes and Reason:  - ****    Active or Pending Issues Requiring Follow-up:  - outpatient follow-up for ICD placement  - further GDMT medication titration with cardiology    Advanced Directives:   [ ] Full code  [X] DNR  [ ] Hospice    Discharge Diagnoses:  HFrEF         HPI:  Mr. Doroteo Corey is a 74y man with PMHx of CVA, MI, HTN, T2DM, HLD, CAD s/p cardiac stents, HFrEF (EF 20-25% 10/2024), hx of malignant melanoma/wide excision, with recent elective bioprosthetic aortic valve replacement and ascending aortic aneurysm repair with transverse hemiarch and CABG x2 (4/8/24) was transferred to [facility] from [place] for ICD placement. Patient originally presented to [place] after an episode of dizziness followed by syncope and collapse on 12/14. At that time, patient states that he was working on building a cabinet for 2.5 hours and was feeling tired. He bent down to pick something up and stood back up at which point he felt dizzy. He lost consciousness and fell and woke up realizing he had hit his head on the concrete. He has lacerations on his L shin and L hand. He was groaning and was groggy for about a minute. He says that he had a similar incident in July/August where he was bent down and stood back up and lost consciousness but he did not go to the hospital at that time. Today he is feeling well without chest pain, palpitations, shortness of breath, and abdominal pain. He has no fevers or chills.     At [place], he was admitted to telemetry for syncope for electrocardiographic monitoring of unstable arrhythmia in the s/o heart failure with severely reduced ejection fraction. Patient was seen and evaluated by EP/Cardiology (Dr. Burroughs/Dr. Ward) who recommended ICD implant and was transferred to [facility]. Patient follows with [facility] cardiologist Dr. Joyce.    His prior bioprosthetic aortic valve replacement and ascending aortic aneurysm repair with transverse hemiarch and CABG x2 (4/8/24) was complicated post op by cardiogenic shock requiring inotropic support with IV dobutamine and milrinone as well as IABP. He had previously been on amiodarone for atrial fibrillation prophylaxis (switched to digoxin on recent admission 11/26-12/5 for acute on chronic CHF with sepsis 2/2 pna).    He notes his surrogate decision maker would be his wife, Nicole Corey.  (18 Dec 2024 08:52)    Hospital Course:  Patient decided against inpatient placement of ICD given DNR/DNI status and planning to follow up outpatient.     Important Medication Changes and Reason:  - ****    Active or Pending Issues Requiring Follow-up:  - outpatient follow-up for ICD placement  - further GDMT medication titration with cardiology    Advanced Directives:   [ ] Full code  [X] DNR  [ ] Hospice    Discharge Diagnoses:  HFrEF  syncope         HPI:  Mr. Doroteo Corey is a 74y man with PMHx of CVA, MI, HTN, T2DM, HLD, CAD s/p cardiac stents, HFrEF (EF 20-25% 10/2024), hx of malignant melanoma/wide excision, with recent elective bioprosthetic aortic valve replacement and ascending aortic aneurysm repair with transverse hemiarch and CABG x2 (4/8/24) was transferred to [facility] from [place] for ICD placement. Patient originally presented to [place] after an episode of dizziness followed by syncope and collapse on 12/14. At that time, patient states that he was working on building a cabinet for 2.5 hours and was feeling tired. He bent down to pick something up and stood back up at which point he felt dizzy. He lost consciousness and fell and woke up realizing he had hit his head on the concrete. He has lacerations on his L shin and L hand. He was groaning and was groggy for about a minute. He says that he had a similar incident in July/August where he was bent down and stood back up and lost consciousness but he did not go to the hospital at that time. Today he is feeling well without chest pain, palpitations, shortness of breath, and abdominal pain. He has no fevers or chills.     At [place], he was admitted to telemetry for syncope for electrocardiographic monitoring of unstable arrhythmia in the s/o heart failure with severely reduced ejection fraction. Patient was seen and evaluated by EP/Cardiology (Dr. Burroughs/Dr. Ward) who recommended ICD implant and was transferred to [facility]. Patient follows with [facility] cardiologist Dr. Joyce.    His prior bioprosthetic aortic valve replacement and ascending aortic aneurysm repair with transverse hemiarch and CABG x2 (4/8/24) was complicated post op by cardiogenic shock requiring inotropic support with IV dobutamine and milrinone as well as IABP. He had previously been on amiodarone for atrial fibrillation prophylaxis (switched to digoxin on recent admission 11/26-12/5 for acute on chronic CHF with sepsis 2/2 pna).    He notes his surrogate decision maker would be his wife, Nicole Corey.  (18 Dec 2024 08:52)    Hospital Course:  Patient decided against inpatient placement of ICD given DNR/DNI status and planning to follow up outpatient. Patient had some medication adjustments to GDMT. Entrestro stopped in favor of valsartan. Amiodarone stopped in favor of Digoxin. patient seen by EP and Dr. Joyce who were in agreement with outpatient follow up. Patient given new script for cardiac rehab    Important Medication Changes and Reason:  - Valsartan 40mg. Stop entresto  - Digoxin 124mcg. Stop Amiodarone    Active or Pending Issues Requiring Follow-up:  - outpatient follow-up for ICD placement  - further GDMT medication titration with cardiology    Advanced Directives:   [ ] Full code  [X] DNR  [ ] Hospice    Discharge Diagnoses:  HFrEF  syncope

## 2024-12-19 NOTE — PROGRESS NOTE ADULT - SUBJECTIVE AND OBJECTIVE BOX
INCOMPLETE    INTERVAL HPI/OVERNIGHT EVENTS:  Pt seen and examined at bedside. No acute overnight events or complaints.    VITAL SIGNS:  T(F): 97.5 (12-19-24 @ 04:36)  HR: 90 (12-19-24 @ 04:36)  BP: 119/70 (12-19-24 @ 04:36)  RR: 18 (12-19-24 @ 04:36)  SpO2: 95% (12-19-24 @ 04:36)  Wt(kg): --    PHYSICAL EXAM:    Constitutional: WDWN, NAD  HEENT: PERRL, EOMI, sclera non-icteric, neck supple, trachea midline, no masses, no JVD, MMM, good dentition  Respiratory: CTA b/l, good air entry b/l, no wheezing, no rhonchi, no rales, without accessory muscle use and no intercostal retractions  Cardiovascular: RRR, normal S1S2, no M/R/G  Gastrointestinal: soft, NTND, no masses palpable, BS normal  Extremities: Warm, well perfused, pulses equal bilateral upper and lower extremities, no edema, no clubbing. Capillary refill <2 sec  Neurological: AAOx3, CN Grossly intact  Skin: Normal temperature, warm, dry    MEDICATIONS  (STANDING):  aspirin enteric coated 81 milliGRAM(s) Oral daily  atorvastatin 80 milliGRAM(s) Oral at bedtime  chlorhexidine 2% Cloths 1 Application(s) Topical daily  dextrose 5%. 1000 milliLiter(s) (100 mL/Hr) IV Continuous <Continuous>  dextrose 5%. 1000 milliLiter(s) (50 mL/Hr) IV Continuous <Continuous>  dextrose 50% Injectable 25 Gram(s) IV Push once  dextrose 50% Injectable 12.5 Gram(s) IV Push once  dextrose 50% Injectable 25 Gram(s) IV Push once  glucagon  Injectable 1 milliGRAM(s) IntraMuscular once  influenza  Vaccine (HIGH DOSE) 0.5 milliLiter(s) IntraMuscular once  insulin lispro (ADMELOG) corrective regimen sliding scale   SubCutaneous three times a day before meals  insulin lispro (ADMELOG) corrective regimen sliding scale   SubCutaneous at bedtime  multivitamin 1 Tablet(s) Oral daily  polyethylene glycol 3350 17 Gram(s) Oral daily  senna 2 Tablet(s) Oral at bedtime  tamsulosin 0.4 milliGRAM(s) Oral at bedtime    MEDICATIONS  (PRN):  dextrose Oral Gel 15 Gram(s) Oral once PRN Blood Glucose LESS THAN 70 milliGRAM(s)/deciliter      Allergies    No Known Allergies    Intolerances        LABS:                        12.9   6.67  )-----------( 272      ( 19 Dec 2024 04:35 )             40.5     12-19    141  |  107  |  19  ----------------------------<  135[H]  4.6   |  21[L]  |  1.19    Ca    9.3      19 Dec 2024 04:35  Phos  3.0     12-19  Mg     2.0     12-19    TPro  6.4  /  Alb  3.4  /  TBili  0.6  /  DBili  x   /  AST  14  /  ALT  14  /  AlkPhos  105  12-19    PT/INR - ( 19 Dec 2024 04:35 )   PT: 12.7 sec;   INR: 1.12 ratio         PTT - ( 19 Dec 2024 04:35 )  PTT:30.5 sec  Urinalysis Basic - ( 19 Dec 2024 04:35 )    Color: x / Appearance: x / SG: x / pH: x  Gluc: 135 mg/dL / Ketone: x  / Bili: x / Urobili: x   Blood: x / Protein: x / Nitrite: x   Leuk Esterase: x / RBC: x / WBC x   Sq Epi: x / Non Sq Epi: x / Bacteria: x        RADIOLOGY & ADDITIONAL TESTS:  Reviewed   INTERVAL HPI/OVERNIGHT EVENTS:  Pt seen and examined at bedside. No acute overnight events or complaints. He is feeling well but has been having some shortness of breath when walking around the unit. He does not have any chest pain or dizziness. He is anxious about his possible procedure and says he is having second thoughts about having an ICD.    VITAL SIGNS:  T(F): 97.5 (12-19-24 @ 04:36)  HR: 90 (12-19-24 @ 04:36)  BP: 119/70 (12-19-24 @ 04:36)  RR: 18 (12-19-24 @ 04:36)  SpO2: 95% (12-19-24 @ 04:36)  Wt(kg): --    PHYSICAL EXAM:    Constitutional: WDWN, NAD  HEENT: EOMI, sclera non-icteric  Respiratory: CTA b/l, good air entry b/l, no wheezing, no rhonchi, no rales, without accessory muscle use and no intercostal retractions  Cardiovascular: RRR, normal S1S2, no M/R/G  Gastrointestinal: soft, NTND, no masses palpable, BS normal  Extremities: Warm, well perfused, pulses equal bilateral upper and lower extremities, no edema, no clubbing  Neurological: AAOx3, CN Grossly intact  Skin: Normal temperature, warm, dry    MEDICATIONS  (STANDING):  aspirin enteric coated 81 milliGRAM(s) Oral daily  atorvastatin 80 milliGRAM(s) Oral at bedtime  chlorhexidine 2% Cloths 1 Application(s) Topical daily  dextrose 5%. 1000 milliLiter(s) (100 mL/Hr) IV Continuous <Continuous>  dextrose 5%. 1000 milliLiter(s) (50 mL/Hr) IV Continuous <Continuous>  dextrose 50% Injectable 25 Gram(s) IV Push once  dextrose 50% Injectable 12.5 Gram(s) IV Push once  dextrose 50% Injectable 25 Gram(s) IV Push once  glucagon  Injectable 1 milliGRAM(s) IntraMuscular once  influenza  Vaccine (HIGH DOSE) 0.5 milliLiter(s) IntraMuscular once  insulin lispro (ADMELOG) corrective regimen sliding scale   SubCutaneous three times a day before meals  insulin lispro (ADMELOG) corrective regimen sliding scale   SubCutaneous at bedtime  multivitamin 1 Tablet(s) Oral daily  polyethylene glycol 3350 17 Gram(s) Oral daily  senna 2 Tablet(s) Oral at bedtime  tamsulosin 0.4 milliGRAM(s) Oral at bedtime    MEDICATIONS  (PRN):  dextrose Oral Gel 15 Gram(s) Oral once PRN Blood Glucose LESS THAN 70 milliGRAM(s)/deciliter      Allergies    No Known Allergies    Intolerances        LABS:                        12.9   6.67  )-----------( 272      ( 19 Dec 2024 04:35 )             40.5     12-19    141  |  107  |  19  ----------------------------<  135[H]  4.6   |  21[L]  |  1.19    Ca    9.3      19 Dec 2024 04:35  Phos  3.0     12-19  Mg     2.0     12-19    TPro  6.4  /  Alb  3.4  /  TBili  0.6  /  DBili  x   /  AST  14  /  ALT  14  /  AlkPhos  105  12-19    PT/INR - ( 19 Dec 2024 04:35 )   PT: 12.7 sec;   INR: 1.12 ratio         PTT - ( 19 Dec 2024 04:35 )  PTT:30.5 sec  Urinalysis Basic - ( 19 Dec 2024 04:35 )    Color: x / Appearance: x / SG: x / pH: x  Gluc: 135 mg/dL / Ketone: x  / Bili: x / Urobili: x   Blood: x / Protein: x / Nitrite: x   Leuk Esterase: x / RBC: x / WBC x   Sq Epi: x / Non Sq Epi: x / Bacteria: x        RADIOLOGY & ADDITIONAL TESTS:  Reviewed   INTERVAL HPI/OVERNIGHT EVENTS:  Pt seen and examined at bedside. No acute overnight events or complaints. He is feeling well but has been having some shortness of breath when walking around the unit. He does not have any chest pain or dizziness. He is anxious about his possible procedure and says he is having second thoughts about having an ICD.    VITAL SIGNS:  T(F): 97.5 (12-19-24 @ 04:36)  HR: 90 (12-19-24 @ 04:36)  BP: 119/70 (12-19-24 @ 04:36)  RR: 18 (12-19-24 @ 04:36)  SpO2: 95% (12-19-24 @ 04:36)  Wt(kg): --    PHYSICAL EXAM:  Constitutional: WDWN, NAD  HEENT: EOMI, sclera non-icteric  Respiratory: CTA b/l, good air entry b/l, no wheezing, no rhonchi, no rales, without accessory muscle use and no intercostal retractions  Cardiovascular: RRR, normal S1S2, no M/R/G  Gastrointestinal: soft, NTND, no masses palpable, BS normal  Extremities: Warm, well perfused, pulses equal bilateral upper and lower extremities, no edema, no clubbing  Neurological: AAOx3, CN Grossly intact  Skin: Normal temperature, warm, dry    MEDICATIONS  (STANDING):  aspirin enteric coated 81 milliGRAM(s) Oral daily  atorvastatin 80 milliGRAM(s) Oral at bedtime  chlorhexidine 2% Cloths 1 Application(s) Topical daily  dextrose 5%. 1000 milliLiter(s) (100 mL/Hr) IV Continuous <Continuous>  dextrose 5%. 1000 milliLiter(s) (50 mL/Hr) IV Continuous <Continuous>  dextrose 50% Injectable 25 Gram(s) IV Push once  dextrose 50% Injectable 12.5 Gram(s) IV Push once  dextrose 50% Injectable 25 Gram(s) IV Push once  glucagon  Injectable 1 milliGRAM(s) IntraMuscular once  influenza  Vaccine (HIGH DOSE) 0.5 milliLiter(s) IntraMuscular once  insulin lispro (ADMELOG) corrective regimen sliding scale   SubCutaneous three times a day before meals  insulin lispro (ADMELOG) corrective regimen sliding scale   SubCutaneous at bedtime  multivitamin 1 Tablet(s) Oral daily  polyethylene glycol 3350 17 Gram(s) Oral daily  senna 2 Tablet(s) Oral at bedtime  tamsulosin 0.4 milliGRAM(s) Oral at bedtime    MEDICATIONS  (PRN):  dextrose Oral Gel 15 Gram(s) Oral once PRN Blood Glucose LESS THAN 70 milliGRAM(s)/deciliter      Allergies    No Known Allergies    Intolerances        LABS:                        12.9   6.67  )-----------( 272      ( 19 Dec 2024 04:35 )             40.5     12-19    141  |  107  |  19  ----------------------------<  135[H]  4.6   |  21[L]  |  1.19    Ca    9.3      19 Dec 2024 04:35  Phos  3.0     12-19  Mg     2.0     12-19    TPro  6.4  /  Alb  3.4  /  TBili  0.6  /  DBili  x   /  AST  14  /  ALT  14  /  AlkPhos  105  12-19    PT/INR - ( 19 Dec 2024 04:35 )   PT: 12.7 sec;   INR: 1.12 ratio         PTT - ( 19 Dec 2024 04:35 )  PTT:30.5 sec  Urinalysis Basic - ( 19 Dec 2024 04:35 )    Color: x / Appearance: x / SG: x / pH: x  Gluc: 135 mg/dL / Ketone: x  / Bili: x / Urobili: x   Blood: x / Protein: x / Nitrite: x   Leuk Esterase: x / RBC: x / WBC x   Sq Epi: x / Non Sq Epi: x / Bacteria: x        RADIOLOGY & ADDITIONAL TESTS:  Reviewed   INTERVAL HPI/OVERNIGHT EVENTS:  Pt seen and examined at bedside. No acute overnight events or complaints. He is feeling well but has been having some shortness of breath when walking around the unit. He does not have any chest pain or dizziness. He is anxious about his possible procedure and says he is having second thoughts about having an ICD. He reports he has not had a bowel movement in the last 3 days.    VITAL SIGNS:  T(F): 97.5 (12-19-24 @ 04:36)  HR: 90 (12-19-24 @ 04:36)  BP: 119/70 (12-19-24 @ 04:36)  RR: 18 (12-19-24 @ 04:36)  SpO2: 95% (12-19-24 @ 04:36)  Wt(kg): --    PHYSICAL EXAM:  Constitutional: WDWN, NAD  HEENT: EOMI, sclera non-icteric  Respiratory: CTA b/l, good air entry b/l, no wheezing, no rhonchi, no rales, without accessory muscle use and no intercostal retractions  Cardiovascular: RRR, normal S1S2, no M/R/G  Gastrointestinal: soft, NTND, no masses palpable, BS normal  Extremities: Warm, well perfused, pulses equal bilateral upper and lower extremities, no edema, no clubbing  Neurological: AAOx3, CN Grossly intact  Skin: Normal temperature, warm, dry    MEDICATIONS  (STANDING):  aspirin enteric coated 81 milliGRAM(s) Oral daily  atorvastatin 80 milliGRAM(s) Oral at bedtime  chlorhexidine 2% Cloths 1 Application(s) Topical daily  dextrose 5%. 1000 milliLiter(s) (100 mL/Hr) IV Continuous <Continuous>  dextrose 5%. 1000 milliLiter(s) (50 mL/Hr) IV Continuous <Continuous>  dextrose 50% Injectable 25 Gram(s) IV Push once  dextrose 50% Injectable 12.5 Gram(s) IV Push once  dextrose 50% Injectable 25 Gram(s) IV Push once  glucagon  Injectable 1 milliGRAM(s) IntraMuscular once  influenza  Vaccine (HIGH DOSE) 0.5 milliLiter(s) IntraMuscular once  insulin lispro (ADMELOG) corrective regimen sliding scale   SubCutaneous three times a day before meals  insulin lispro (ADMELOG) corrective regimen sliding scale   SubCutaneous at bedtime  multivitamin 1 Tablet(s) Oral daily  polyethylene glycol 3350 17 Gram(s) Oral daily  senna 2 Tablet(s) Oral at bedtime  tamsulosin 0.4 milliGRAM(s) Oral at bedtime    MEDICATIONS  (PRN):  dextrose Oral Gel 15 Gram(s) Oral once PRN Blood Glucose LESS THAN 70 milliGRAM(s)/deciliter      Allergies    No Known Allergies    Intolerances        LABS:                        12.9   6.67  )-----------( 272      ( 19 Dec 2024 04:35 )             40.5     12-19    141  |  107  |  19  ----------------------------<  135[H]  4.6   |  21[L]  |  1.19    Ca    9.3      19 Dec 2024 04:35  Phos  3.0     12-19  Mg     2.0     12-19    TPro  6.4  /  Alb  3.4  /  TBili  0.6  /  DBili  x   /  AST  14  /  ALT  14  /  AlkPhos  105  12-19    PT/INR - ( 19 Dec 2024 04:35 )   PT: 12.7 sec;   INR: 1.12 ratio         PTT - ( 19 Dec 2024 04:35 )  PTT:30.5 sec  Urinalysis Basic - ( 19 Dec 2024 04:35 )    Color: x / Appearance: x / SG: x / pH: x  Gluc: 135 mg/dL / Ketone: x  / Bili: x / Urobili: x   Blood: x / Protein: x / Nitrite: x   Leuk Esterase: x / RBC: x / WBC x   Sq Epi: x / Non Sq Epi: x / Bacteria: x        RADIOLOGY & ADDITIONAL TESTS:  Reviewed

## 2024-12-19 NOTE — PROGRESS NOTE ADULT - PROBLEM SELECTOR PLAN 7
DVT: eval for AC per EP since patient has PMHx of pAF and biprosthetic valve replacement  Diet: NPO for procedure

## 2024-12-19 NOTE — DISCHARGE NOTE NURSING/CASE MANAGEMENT/SOCIAL WORK - FINANCIAL ASSISTANCE
[facility] provides services at a reduced cost to those who are determined to be eligible through [facility]’s financial assistance program. Information regarding [facility]’s financial assistance program can be found by going to [email or web address] or by calling [phone].

## 2024-12-19 NOTE — PROGRESS NOTE ADULT - ASSESSMENT
73 y/o male with PMHx of HTN, HLD, DM, CVA (2016), CAD s/p PCI (2017), chronic systolic heart failure (EF ~25%), hx of malignant melanoma s/p excision, recent ascending aortic aneurysm and transverse hemiarch repair + bioprosthetic aortic valve replacement + CABG x 2 (SVG to diagonal, SVG to PDA) on 4/8/24 who presented to [facility] ED for syncope. Now transferred to [facility] for ICD implant evaluation.    - Syncope  - Chronic conditions: HTN, HLD, DM, CAD s/p PCI and CABG, CVA, HFrEF, aortic aneurysm s/p repair + AVR    Based on patient's story, the most likely etiology seems orthostatic or vasovagal. However, he has risk factors for ventricular arrhthymias. Of note, per chart review, he had evidence of NSVT on telemetry during prior hospital admissions.    Regardless, he meets criteria for a primary prevention ICD given reduced EF after a myocardial infarction despite being on GDMT.    Per infectious disease, he is cleared for an ICD implant despite having a recent pneumonia.    RECOMMENDATIONS:  - keep NPO for a single chamber [employer] ICD implant today (consent in chart)       Hernandez Diggs (PGY 4)  Cardiology Fellow      Of note, these recommendations are preliminary. Case to be discussed with attending. Please see attending attestation for final recommendations.        73 y/o male with PMHx of HTN, HLD, DM, CVA (2016), CAD s/p PCI (2017), chronic systolic heart failure (EF ~25%), hx of malignant melanoma s/p excision, recent ascending aortic aneurysm and transverse hemiarch repair + bioprosthetic aortic valve replacement + CABG x 2 (SVG to diagonal, SVG to PDA) on 4/8/24 who presented to [facility] ED for syncope. Now transferred to [facility] for ICD implant evaluation.    - Syncope  - Chronic conditions: HTN, HLD, DM, CAD s/p PCI and CABG, CVA, HFrEF, aortic aneurysm s/p repair + AVR    Based on patient's story, the most likely etiology seems orthostatic or vasovagal. However, he has risk factors for ventricular arrhthymias. Over the last ~48 hours, patient remained in sinus rhythm with occasional PVCs. No evidence of sustained tachyarrhythmias    Regardless, he meets criteria for a primary prevention ICD given reduced EF after a myocardial infarction despite being on GDMT.  Per infectious disease, he is cleared for an ICD implant.    I discussed the risks/benefits of a primary prevention ICD. At this time, patient is on the fence about getting this device. He re-iterated to me that he does NOT want to be resuscitated or intubated if his heart stops. He prefers to experience a natural and peaceful death. He signed a MOLST form during a recent hospital admission expressing his wishes. Given his ambivalence regarding this device, I informed him that this conversation can be continued in the outpatient setting.      RECOMMENDATIONS:  - outpatient follow-up with primary cardiologist: Dr. Joyce  - resume GDMT as tolerated      Hernandez Diggs (PGY 4)  Cardiology Fellow      Of note, these recommendations are preliminary. Case to be discussed with attending. Please see attending attestation for final recommendations.

## 2024-12-19 NOTE — DISCHARGE NOTE PROVIDER - DATE OF DISCHARGE SERVICE:
Patient: Servando Mercado  : 1942    Encounter Date: 2023    PROGRESS NOTE    Subjective  Chief complaint: Servando Mercado is a 81 y.o. male who is a long term care patient being seen and evaluated for follow up.     HPI:  HPI with abnormal lab will need medication adjustment due to increased creatinine level  Objective  Vital signs: 133/78, 97.8, 89, 98%    Physical Exam  Constitutional:       General: He is not in acute distress.  Eyes:      Extraocular Movements: Extraocular movements intact.   Pulmonary:      Effort: Pulmonary effort is normal.   Musculoskeletal:      Cervical back: Neck supple.   Neurological:      Mental Status: He is alert.   Psychiatric:         Mood and Affect: Mood normal.         Behavior: Behavior is cooperative.         Assessment/Plan  Problem List Items Addressed This Visit       RIOS (acute kidney injury) (CMS/[facility]) - Primary     DC allopurinol DC lisinopril monitor lab          Medications, treatments, and labs reviewed  Continue medications and treatments as listed in PCC    Scribe Attestation  IAnastasiya Scribe   attest that this documentation has been prepared under the direction and in the presence of JOANNA Louis.    Provider Attestation - Scribe documentation  All medical record entries made by the Scribe were at my direction and personally dictated by me. I have reviewed the chart and agree that the record accurately reflects my personal performance of the history, physical exam, discussion and plan.    JOANNA Louis          Electronically Signed By: JOANNA Louis   23 10:44 AM  
19-Dec-2024

## 2024-12-19 NOTE — DISCHARGE NOTE PROVIDER - PROVIDER TOKENS
PROVIDER:[TOKEN:[[id number]],ESTABLISHEDPATIENT:[T]],PROVIDER:[TOKEN:[[id number]],ESTABLISHEDPATIENT:[T]]

## 2024-12-19 NOTE — DISCHARGE NOTE PROVIDER - NSFOLLOWUPCLINICS_GEN_ALL_ED_FT
[facility] Cardiology  Cardiology  [place]   Phone:   Fax:     [facility] - HF  Cardiology  [address] [facility], [address]  [place], [place]   Phone:   Fax:

## 2024-12-19 NOTE — DISCHARGE NOTE NURSING/CASE MANAGEMENT/SOCIAL WORK - NSDCFUADDAPPT_GEN_ALL_CORE_FT
APPTS ARE READY TO BE MADE: [X] YES    Best Family or Patient Contact (if needed):    Additional Information about above appointments (if needed):    1: Cardiology with Dr. Joyce  2: HOME HF  3:     Other comments or requests:

## 2024-12-19 NOTE — DISCHARGE NOTE PROVIDER - CARE PROVIDER_API CALL
Demetrice Joyce  Cardiovascular Disease  [address] [facility], [address] [place] [id number]  Phone: [phone]  Fax: [phone]  Established Patient  Follow Up Time:     Elvin Joshi  Cardiac Electrophysiology  [address] [place] [address]  Phone: [phone]  Fax: [phone]  Established Patient  Follow Up Time:

## 2024-12-19 NOTE — DISCHARGE NOTE PROVIDER - NSDCCPTREATMENT_GEN_ALL_CORE_FT
PRINCIPAL PROCEDURE  Procedure: Complete transthoracic echocardiography (TTE)  Findings and Treatment: CONCLUSIONS:      1. Left ventricular cavity is normal in size. Left ventricular wall thickness is normal. Left ventricular systolic function is severely decreased with an ejection fraction of 28 % by Cash's method of disks. Regional wall motion abnormalities present.The septum,apex,,inferior and infero-lateral walls are akinetic.   2. There is increased LV mass and eccentric hypertrophy.   3. There is severe (grade 3) left ventricular diastolic dysfunction.   4. Normal right ventricular cavity size, with normal wall thickness, and normal right ventricular systolic function. Tricuspid annular plane systolic excursion (TAPSE) is 1.7 cm (normal >=1.7 cm).   5. Mild to moderate mitral regurgitation.   6. Normal left and right atrial size.   7. Pulmonary artery systolic pressure could not be estimated.   8. Mild pulmonic regurgitation.   9. No pericardial effusionseen.  10. No prior echocardiogram is available for comparison.

## 2024-12-19 NOTE — DISCHARGE NOTE PROVIDER - NSDCMRMEDTOKEN_GEN_ALL_CORE_FT
aspirin 81 mg oral tablet: orally once a day  atorvastatin 80 mg oral tablet: 1 tab(s) orally once a day (at bedtime)  clopidogrel 75 mg oral tablet: 1 tab(s) orally once a day  digoxin 125 mcg (0.125 mg) oral tablet: 1 tab(s) orally once a day  Flomax 0.4 mg oral capsule: 1 cap(s) orally once a day  LORazepam 0.5 mg oral tablet: 1 tab(s) orally as needed for  anxiety  metFORMIN 500 mg oral tablet: 1 tab(s) orally 2 times a day  Multiple Vitamins oral tablet: 1 tab(s) orally once a day  valsartan 40 mg oral tablet: 1 tab(s) orally once a day   aspirin 81 mg oral tablet: orally once a day  atorvastatin 80 mg oral tablet: 1 tab(s) orally once a day (at bedtime)  Cardiac Rehabilitation: Cardiac Rehab for Ischemic Cardiomyopathy (I25.5)  clopidogrel 75 mg oral tablet: 1 tab(s) orally once a day  digoxin 125 mcg (0.125 mg) oral tablet: 1 tab(s) orally once a day  Farxiga 10 mg oral tablet: 1 tab(s) orally once a day  Flomax 0.4 mg oral capsule: 1 cap(s) orally once a day  LORazepam 0.5 mg oral tablet: 1 tab(s) orally once a day (at bedtime) as needed for  anxiety Take only as needed  metFORMIN 500 mg oral tablet: 1 tab(s) orally 2 times a day  Multiple Vitamins oral tablet: 1 tab(s) orally once a day  polyethylene glycol 3350 oral powder for reconstitution: 17 gram(s) orally once a day as needed for  constipation  valsartan 40 mg oral tablet: 1 tab(s) orally once a day

## 2024-12-19 NOTE — PROGRESS NOTE ADULT - PROBLEM SELECTOR PLAN 2
HFrEF with EF 28% on 12/17   CT chest 12/14: Hydrostatic interstitial pulmonary edema/CHF. Multifocal bilateral pulmonary fibrosis and pulmonary scarring. Fairly extensive multifocal bilateral pneumonia. Small volume bilateral pleural effusions.  BNP 67615 (previously 42100 on 11/26 and  8193 on 10/8)  Troponin 64.7-->66.9  previously on metoprolol, valsartan, farxiga, lasix for GDMT and amiodarone for Afib ppx-->switched to valsartan and digoxin last admission    Plan:  - hold metoprolol, farxiga per EP note 12/15  - hold digoxin  - daily weights, strict I&Os  - ICD implant 12/18 per EP HFrEF with EF 28% on 12/17   CT chest 12/14: Hydrostatic interstitial pulmonary edema/CHF. Multifocal bilateral pulmonary fibrosis and pulmonary scarring. Fairly extensive multifocal bilateral pneumonia. Small volume bilateral pleural effusions.  BNP 38899 (previously 91075 on 11/26 and  8193 on 10/8)  Troponin 64.7-->66.9  previously on metoprolol, valsartan, farxiga, lasix for GDMT and amiodarone for Afib ppx-->switched to valsartan and digoxin last admission at [facility]    Plan:  - hold metoprolol, farxiga per EP note 12/15  - hold digoxin  - daily weights, strict I&Os  - ICD implant 12/18 per EP

## 2024-12-19 NOTE — DISCHARGE NOTE PROVIDER - NSDCCPCAREPLAN_GEN_ALL_CORE_FT
PRINCIPAL DISCHARGE DIAGNOSIS  Diagnosis: HFrEF (heart failure with reduced ejection fraction)  Assessment and Plan of Treatment: You were transferred to [facility] for evaluation of an ICD placement. You have heart failure and are at high risk for ventricular arrythmias and having an ICD implanted will help prevent these arrythmias. Please follow up with your caridologist for further discussion about possible ICD placement and also to optimize your medications for your heart failure.     PRINCIPAL DISCHARGE DIAGNOSIS  Diagnosis: HFrEF (heart failure with reduced ejection fraction)  Assessment and Plan of Treatment: You were transferred to [facility] for evaluation of an ICD placement. You have heart failure and are at high risk for abnormal heart rhythms. You had some abnormal rhythms at [facility] and were started on a medication called digoxin. Digoxin is a medication that you will need to take daily and monitor your levels closely with your cardiologist and Primary care doctor. Please follow up for repeat labs to check your blood levels of digoxin within 1 week. You should stop taking amiodarone which you were previously taking.   It is very important for your to take your medications as prescribed. You will need to monitor your blood pressures at home. If you feel palpitations, dizziness/light headedness you should seek urgent medical care. If you develop significant shortness of breath, increased swelling, please seek urgent medical care.   Please follow up with Dr. Joyce for further discussion about possible ICD placement and also to optimize your medications for your heart failure.  Please be sure to go to cardiac rehabilitation. You had rehab set up for 12/18, which you missed. You were given a new prescription but please call the cardiac rehab center to start rehabilitation within the next week.

## 2024-12-19 NOTE — PROGRESS NOTE ADULT - ASSESSMENT
Mr. Doroteo Corey is a 74y man with PMHx of MI, HTN, T2DM, HLD, CAD s/p cardiac stents, HFrEF (EF 20-25% 10/2024), with recent elective bioprosthetic aortic valve replacement and ascending aortic aneurysm repair with transverse hemiarch and CABG x2 (4/8/24) who was transferred to [facility] for ICD implant after presenting to [facility] following a syncopal episode.

## 2024-12-19 NOTE — PROGRESS NOTE ADULT - SUBJECTIVE AND OBJECTIVE BOX
Tele: sinus rhythm with occasional PVCs    -------------------------------------------------------------------------------------------  PHYSICAL EXAM:  GENERAL: no acute distress  NECK: JVP is NOT elevated  CHEST/LUNG: occasional crackles, unlabored breathing  HEART: regular rate and rhythm, no murmurs  EXTREMITIES:  warm, no LE edema    -------------------------------------------------------------------------------------------  RELEVANT LABS:  WBC 6 -> 6  Hgb 12 -> 12.9  Plt 268 ->  272    K 3.8 -> 4.6  Mag 2.0 -> 2.0  Cr 1.07 -> 1.19    -------------------------------------------------------------------------------------------  RELEVANT IMAGING:    ECG: sinus rhythm with 1st degree AV block. non-specific intraventricular conduction delay, QRS ~132 ms    Echo:    1. Left ventricular cavity is normal in size. Left ventricular wall thickness is normal. Left ventricular systolic function is severely decreased with an ejection fraction of 28 % by Cash's method of disks. Regional wall motion abnormalities present.The septum,apex,,inferior and infero-lateral walls are akinetic.   2. There is increased LV mass and eccentric hypertrophy.   3. There is severe (grade 3) left ventricular diastolic dysfunction.   4. Normal right ventricular cavity size, with normal wall thickness, and normal right ventricular systolic function. Tricuspid annular plane systolic excursion (TAPSE) is 1.7 cm (normal >=1.7 cm).   5. Mild to moderate mitral regurgitation.   6. Normal left and right atrial size.   7. Pulmonary artery systolic pressure could not be estimated.   8. Mild pulmonic regurgitation.   9. No pericardial effusion seen.  10. No prior echocardiogram is available for comparison.    [place] (4/8/24):  Post-Bypass:  - S/p AVR, Ascending aortic aneurysm repair, CABG x 2  - IABP in place, appropirately positioned with tip 2cm distal to L SCA  - Normal sinus rhythm  - On dobutamine @ 5 mcg/kg/min, moderate LV dysfunction, normal RV function  - Akinetic inferior wall, all other segments hypokinetic  - Bioprosthetic AV well seated with normal leaflet movements, no PVL  - Unchanged valvulopathies otherwise: mild MR, trace TR, trace PI  - Aorta intact s/p repair.    Cath:    3/2024:  LM   Left main artery: Angiography shows no disease.      LAD   Distal left anterior descending: There is a 100 % stenosis. First  diagonal: There is a 90 % stenosis.    CX   First obtuse marginal: There is a 100 % stenosis.      RCA   Distal right coronary artery: There is a 70 % stenosis.      -------------------------------------------------------------------------------------------

## 2024-12-19 NOTE — PROGRESS NOTE ADULT - PROBLEM SELECTOR PLAN 4
hx of HTN now on valsartan 40mg qd  previously on Entresto, Lasix, and metoprolol recently discontinued due to hypotension on recent hospital admission    Plan:  -c/w home valsartan with holding parameters  -monitor BP  -adjust antihypertensive meds as appropriate hx of HTN now on valsartan 40mg qd  previously on Entresto, Lasix, and metoprolol recently discontinued due to hypotension on recent hospital admission    Plan:  -c/w valsartan with holding parameters  -monitor BP  -adjust antihypertensive meds as appropriate

## 2024-12-20 ENCOUNTER — NON-APPOINTMENT (OUTPATIENT)
Age: 74
End: 2024-12-20

## 2024-12-20 LAB
CULTURE RESULTS: SIGNIFICANT CHANGE UP
CULTURE RESULTS: SIGNIFICANT CHANGE UP
SPECIMEN SOURCE: SIGNIFICANT CHANGE UP
SPECIMEN SOURCE: SIGNIFICANT CHANGE UP

## 2024-12-23 ENCOUNTER — NON-APPOINTMENT (OUTPATIENT)
Age: 74
End: 2024-12-23

## 2024-12-23 ENCOUNTER — APPOINTMENT (OUTPATIENT)
Dept: CARDIOLOGY | Facility: CLINIC | Age: 74
End: 2024-12-23

## 2024-12-24 ENCOUNTER — APPOINTMENT (OUTPATIENT)
Dept: PSYCHIATRY | Facility: CLINIC | Age: 74
End: 2024-12-24
Payer: MEDICARE

## 2024-12-24 DIAGNOSIS — F33.1 MAJOR DEPRESSIVE DISORDER, RECURRENT, MODERATE: ICD-10-CM

## 2024-12-24 DIAGNOSIS — F33.9 MAJOR DEPRESSIVE DISORDER, RECURRENT, UNSPECIFIED: ICD-10-CM

## 2024-12-24 PROBLEM — F41.1 GENERALIZED ANXIETY DISORDER: Status: ACTIVE | Noted: 2024-12-24

## 2024-12-24 PROCEDURE — 99205 OFFICE O/P NEW HI 60 MIN: CPT

## 2024-12-24 RX ORDER — ESCITALOPRAM OXALATE 5 MG/1
5 TABLET ORAL
Qty: 30 | Refills: 0 | Status: ACTIVE | COMMUNITY
Start: 2024-12-24 | End: 1900-01-01

## 2024-12-26 ENCOUNTER — APPOINTMENT (OUTPATIENT)
Dept: CARDIOLOGY | Facility: CLINIC | Age: 74
End: 2024-12-26

## 2024-12-30 ENCOUNTER — APPOINTMENT (OUTPATIENT)
Dept: CARDIOLOGY | Facility: CLINIC | Age: 74
End: 2024-12-30

## 2025-01-01 ENCOUNTER — APPOINTMENT (OUTPATIENT)
Dept: CARDIOLOGY | Facility: CLINIC | Age: 75
End: 2025-01-01

## 2025-01-01 ENCOUNTER — INPATIENT (INPATIENT)
Facility: HOSPITAL | Age: 75
LOS: 3 days | Discharge: HOSPICE | DRG: 641 | End: 2025-01-31
Attending: INTERNAL MEDICINE | Admitting: INTERNAL MEDICINE
Payer: MEDICARE

## 2025-01-01 VITALS
OXYGEN SATURATION: 93 % | DIASTOLIC BLOOD PRESSURE: 64 MMHG | SYSTOLIC BLOOD PRESSURE: 100 MMHG | RESPIRATION RATE: 20 BRPM | WEIGHT: 148.15 LBS | HEART RATE: 78 BPM | TEMPERATURE: 97 F

## 2025-01-01 VITALS
OXYGEN SATURATION: 97 % | WEIGHT: 130.07 LBS | HEART RATE: 96 BPM | RESPIRATION RATE: 16 BRPM | DIASTOLIC BLOOD PRESSURE: 75 MMHG | TEMPERATURE: 98 F | SYSTOLIC BLOOD PRESSURE: 111 MMHG | HEIGHT: 67 IN

## 2025-01-01 DIAGNOSIS — I10 ESSENTIAL (PRIMARY) HYPERTENSION: ICD-10-CM

## 2025-01-01 DIAGNOSIS — E11.9 TYPE 2 DIABETES MELLITUS WITHOUT COMPLICATIONS: ICD-10-CM

## 2025-01-01 DIAGNOSIS — Z90.89 ACQUIRED ABSENCE OF OTHER ORGANS: Chronic | ICD-10-CM

## 2025-01-01 DIAGNOSIS — K40.20 BILATERAL INGUINAL HERNIA, WITHOUT OBSTRUCTION OR GANGRENE, NOT SPECIFIED AS RECURRENT: Chronic | ICD-10-CM

## 2025-01-01 DIAGNOSIS — I50.9 HEART FAILURE, UNSPECIFIED: ICD-10-CM

## 2025-01-01 DIAGNOSIS — Z29.9 ENCOUNTER FOR PROPHYLACTIC MEASURES, UNSPECIFIED: ICD-10-CM

## 2025-01-01 DIAGNOSIS — R62.7 ADULT FAILURE TO THRIVE: ICD-10-CM

## 2025-01-01 DIAGNOSIS — Z75.8 OTHER PROBLEMS RELATED TO MEDICAL FACILITIES AND OTHER HEALTH CARE: ICD-10-CM

## 2025-01-01 DIAGNOSIS — I25.2 OLD MYOCARDIAL INFARCTION: ICD-10-CM

## 2025-01-01 DIAGNOSIS — R74.01 ELEVATION OF LEVELS OF LIVER TRANSAMINASE LEVELS: ICD-10-CM

## 2025-01-01 DIAGNOSIS — I25.10 ATHEROSCLEROTIC HEART DISEASE OF NATIVE CORONARY ARTERY WITHOUT ANGINA PECTORIS: ICD-10-CM

## 2025-01-01 DIAGNOSIS — N17.9 ACUTE KIDNEY FAILURE, UNSPECIFIED: ICD-10-CM

## 2025-01-01 DIAGNOSIS — N40.0 BENIGN PROSTATIC HYPERPLASIA WITHOUT LOWER URINARY TRACT SYMPTOMS: ICD-10-CM

## 2025-01-01 DIAGNOSIS — Z95.1 PRESENCE OF AORTOCORONARY BYPASS GRAFT: Chronic | ICD-10-CM

## 2025-01-01 DIAGNOSIS — Z86.73 PERSONAL HISTORY OF TRANSIENT ISCHEMIC ATTACK (TIA), AND CEREBRAL INFARCTION WITHOUT RESIDUAL DEFICITS: ICD-10-CM

## 2025-01-01 DIAGNOSIS — E78.5 HYPERLIPIDEMIA, UNSPECIFIED: ICD-10-CM

## 2025-01-01 DIAGNOSIS — Z95.3 PRESENCE OF XENOGENIC HEART VALVE: Chronic | ICD-10-CM

## 2025-01-01 DIAGNOSIS — Z98.89 OTHER SPECIFIED POSTPROCEDURAL STATES: Chronic | ICD-10-CM

## 2025-01-01 DIAGNOSIS — E43 UNSPECIFIED SEVERE PROTEIN-CALORIE MALNUTRITION: ICD-10-CM

## 2025-01-01 DIAGNOSIS — Z98.890 OTHER SPECIFIED POSTPROCEDURAL STATES: Chronic | ICD-10-CM

## 2025-01-01 DIAGNOSIS — R91.8 OTHER NONSPECIFIC ABNORMAL FINDING OF LUNG FIELD: ICD-10-CM

## 2025-01-01 LAB
ALBUMIN SERPL ELPH-MCNC: 3 G/DL — LOW (ref 3.5–5)
ALBUMIN SERPL ELPH-MCNC: 3.2 G/DL — LOW (ref 3.5–5)
ALP SERPL-CCNC: 273 U/L — HIGH (ref 40–120)
ALP SERPL-CCNC: 296 U/L — HIGH (ref 40–120)
ALT FLD-CCNC: 269 U/L DA — HIGH (ref 10–60)
ALT FLD-CCNC: 445 U/L DA — HIGH (ref 10–60)
ANION GAP SERPL CALC-SCNC: 12 MMOL/L — SIGNIFICANT CHANGE UP (ref 5–17)
ANION GAP SERPL CALC-SCNC: 13 MMOL/L — SIGNIFICANT CHANGE UP (ref 5–17)
ANION GAP SERPL CALC-SCNC: 17 MMOL/L — SIGNIFICANT CHANGE UP (ref 5–17)
APPEARANCE UR: CLEAR — SIGNIFICANT CHANGE UP
AST SERPL-CCNC: 202 U/L — HIGH (ref 10–40)
AST SERPL-CCNC: 344 U/L — HIGH (ref 10–40)
BACTERIA # UR AUTO: ABNORMAL /HPF
BASOPHILS # BLD AUTO: 0.01 K/UL — SIGNIFICANT CHANGE UP (ref 0–0.2)
BASOPHILS # BLD AUTO: 0.02 K/UL — SIGNIFICANT CHANGE UP (ref 0–0.2)
BASOPHILS NFR BLD AUTO: 0.1 % — SIGNIFICANT CHANGE UP (ref 0–2)
BASOPHILS NFR BLD AUTO: 0.2 % — SIGNIFICANT CHANGE UP (ref 0–2)
BILIRUB SERPL-MCNC: 2 MG/DL — HIGH (ref 0.2–1.2)
BILIRUB SERPL-MCNC: 2.2 MG/DL — HIGH (ref 0.2–1.2)
BILIRUB UR-MCNC: ABNORMAL
BUN SERPL-MCNC: 49 MG/DL — HIGH (ref 7–18)
BUN SERPL-MCNC: 51 MG/DL — HIGH (ref 7–18)
BUN SERPL-MCNC: 55 MG/DL — HIGH (ref 7–18)
CALCIUM SERPL-MCNC: 8.8 MG/DL — SIGNIFICANT CHANGE UP (ref 8.4–10.5)
CALCIUM SERPL-MCNC: 9 MG/DL — SIGNIFICANT CHANGE UP (ref 8.4–10.5)
CALCIUM SERPL-MCNC: 9.1 MG/DL — SIGNIFICANT CHANGE UP (ref 8.4–10.5)
CHLORIDE SERPL-SCNC: 104 MMOL/L — SIGNIFICANT CHANGE UP (ref 96–108)
CHLORIDE SERPL-SCNC: 104 MMOL/L — SIGNIFICANT CHANGE UP (ref 96–108)
CHLORIDE SERPL-SCNC: 106 MMOL/L — SIGNIFICANT CHANGE UP (ref 96–108)
CK SERPL-CCNC: 159 U/L — SIGNIFICANT CHANGE UP (ref 35–232)
CO2 SERPL-SCNC: 16 MMOL/L — LOW (ref 22–31)
CO2 SERPL-SCNC: 18 MMOL/L — LOW (ref 22–31)
CO2 SERPL-SCNC: 20 MMOL/L — LOW (ref 22–31)
COLOR SPEC: SIGNIFICANT CHANGE UP
CREAT ?TM UR-MCNC: 219 MG/DL — SIGNIFICANT CHANGE UP
CREAT SERPL-MCNC: 2.11 MG/DL — HIGH (ref 0.5–1.3)
CREAT SERPL-MCNC: 2.13 MG/DL — HIGH (ref 0.5–1.3)
CREAT SERPL-MCNC: 2.25 MG/DL — HIGH (ref 0.5–1.3)
DIFF PNL FLD: NEGATIVE — SIGNIFICANT CHANGE UP
EGFR: 29 ML/MIN/1.73M2 — LOW
EGFR: 31 ML/MIN/1.73M2 — LOW
EGFR: 32 ML/MIN/1.73M2 — LOW
EOSINOPHIL # BLD AUTO: 0 K/UL — SIGNIFICANT CHANGE UP (ref 0–0.5)
EOSINOPHIL # BLD AUTO: 0.01 K/UL — SIGNIFICANT CHANGE UP (ref 0–0.5)
EOSINOPHIL NFR BLD AUTO: 0 % — SIGNIFICANT CHANGE UP (ref 0–6)
EOSINOPHIL NFR BLD AUTO: 0.1 % — SIGNIFICANT CHANGE UP (ref 0–6)
EPI CELLS # UR: PRESENT
FLUAV AG NPH QL: SIGNIFICANT CHANGE UP
FLUBV AG NPH QL: SIGNIFICANT CHANGE UP
GLUCOSE BLDC GLUCOMTR-MCNC: 141 MG/DL — HIGH (ref 70–99)
GLUCOSE BLDC GLUCOMTR-MCNC: 145 MG/DL — HIGH (ref 70–99)
GLUCOSE BLDC GLUCOMTR-MCNC: 165 MG/DL — HIGH (ref 70–99)
GLUCOSE BLDC GLUCOMTR-MCNC: 165 MG/DL — HIGH (ref 70–99)
GLUCOSE BLDC GLUCOMTR-MCNC: 168 MG/DL — HIGH (ref 70–99)
GLUCOSE BLDC GLUCOMTR-MCNC: 173 MG/DL — HIGH (ref 70–99)
GLUCOSE BLDC GLUCOMTR-MCNC: 176 MG/DL — HIGH (ref 70–99)
GLUCOSE BLDC GLUCOMTR-MCNC: 178 MG/DL — HIGH (ref 70–99)
GLUCOSE BLDC GLUCOMTR-MCNC: 185 MG/DL — HIGH (ref 70–99)
GLUCOSE BLDC GLUCOMTR-MCNC: 219 MG/DL — HIGH (ref 70–99)
GLUCOSE BLDC GLUCOMTR-MCNC: 226 MG/DL — HIGH (ref 70–99)
GLUCOSE SERPL-MCNC: 254 MG/DL — HIGH (ref 70–99)
GLUCOSE SERPL-MCNC: 254 MG/DL — HIGH (ref 70–99)
GLUCOSE SERPL-MCNC: 268 MG/DL — HIGH (ref 70–99)
GLUCOSE UR QL: 500 MG/DL
HCT VFR BLD CALC: 43.7 % — SIGNIFICANT CHANGE UP (ref 39–50)
HCT VFR BLD CALC: 45.2 % — SIGNIFICANT CHANGE UP (ref 39–50)
HGB BLD-MCNC: 14.2 G/DL — SIGNIFICANT CHANGE UP (ref 13–17)
HGB BLD-MCNC: 14.8 G/DL — SIGNIFICANT CHANGE UP (ref 13–17)
IMM GRANULOCYTES NFR BLD AUTO: 0.4 % — SIGNIFICANT CHANGE UP (ref 0–0.9)
IMM GRANULOCYTES NFR BLD AUTO: 0.5 % — SIGNIFICANT CHANGE UP (ref 0–0.9)
KETONES UR-MCNC: ABNORMAL MG/DL
LEUKOCYTE ESTERASE UR-ACNC: NEGATIVE — SIGNIFICANT CHANGE UP
LIDOCAIN IGE QN: 13 U/L — SIGNIFICANT CHANGE UP (ref 13–75)
LYMPHOCYTES # BLD AUTO: 1.8 K/UL — SIGNIFICANT CHANGE UP (ref 1–3.3)
LYMPHOCYTES # BLD AUTO: 1.97 K/UL — SIGNIFICANT CHANGE UP (ref 1–3.3)
LYMPHOCYTES # BLD AUTO: 17 % — SIGNIFICANT CHANGE UP (ref 13–44)
LYMPHOCYTES # BLD AUTO: 20.8 % — SIGNIFICANT CHANGE UP (ref 13–44)
MAGNESIUM SERPL-MCNC: 2.5 MG/DL — SIGNIFICANT CHANGE UP (ref 1.6–2.6)
MCHC RBC-ENTMCNC: 26.4 PG — LOW (ref 27–34)
MCHC RBC-ENTMCNC: 26.4 PG — LOW (ref 27–34)
MCHC RBC-ENTMCNC: 32.5 G/DL — SIGNIFICANT CHANGE UP (ref 32–36)
MCHC RBC-ENTMCNC: 32.7 G/DL — SIGNIFICANT CHANGE UP (ref 32–36)
MCV RBC AUTO: 80.6 FL — SIGNIFICANT CHANGE UP (ref 80–100)
MCV RBC AUTO: 81.4 FL — SIGNIFICANT CHANGE UP (ref 80–100)
MONOCYTES # BLD AUTO: 0.9 K/UL — SIGNIFICANT CHANGE UP (ref 0–0.9)
MONOCYTES # BLD AUTO: 0.98 K/UL — HIGH (ref 0–0.9)
MONOCYTES NFR BLD AUTO: 9.3 % — SIGNIFICANT CHANGE UP (ref 2–14)
MONOCYTES NFR BLD AUTO: 9.5 % — SIGNIFICANT CHANGE UP (ref 2–14)
NEUTROPHILS # BLD AUTO: 6.51 K/UL — SIGNIFICANT CHANGE UP (ref 1.8–7.4)
NEUTROPHILS # BLD AUTO: 7.74 K/UL — HIGH (ref 1.8–7.4)
NEUTROPHILS NFR BLD AUTO: 69 % — SIGNIFICANT CHANGE UP (ref 43–77)
NEUTROPHILS NFR BLD AUTO: 73.1 % — SIGNIFICANT CHANGE UP (ref 43–77)
NITRITE UR-MCNC: NEGATIVE — SIGNIFICANT CHANGE UP
NRBC # BLD: 0 /100 WBCS — SIGNIFICANT CHANGE UP (ref 0–0)
NRBC # BLD: 0 /100 WBCS — SIGNIFICANT CHANGE UP (ref 0–0)
NRBC BLD-RTO: 0 /100 WBCS — SIGNIFICANT CHANGE UP (ref 0–0)
NRBC BLD-RTO: 0 /100 WBCS — SIGNIFICANT CHANGE UP (ref 0–0)
NT-PROBNP SERPL-SCNC: HIGH PG/ML (ref 0–450)
PH UR: 5 — SIGNIFICANT CHANGE UP (ref 5–8)
PHOSPHATE SERPL-MCNC: 4.8 MG/DL — HIGH (ref 2.5–4.5)
PLATELET # BLD AUTO: 231 K/UL — SIGNIFICANT CHANGE UP (ref 150–400)
PLATELET # BLD AUTO: 247 K/UL — SIGNIFICANT CHANGE UP (ref 150–400)
POTASSIUM SERPL-MCNC: 5.4 MMOL/L — HIGH (ref 3.5–5.3)
POTASSIUM SERPL-MCNC: 5.4 MMOL/L — HIGH (ref 3.5–5.3)
POTASSIUM SERPL-MCNC: 6.1 MMOL/L — HIGH (ref 3.5–5.3)
POTASSIUM SERPL-SCNC: 5.4 MMOL/L — HIGH (ref 3.5–5.3)
POTASSIUM SERPL-SCNC: 5.4 MMOL/L — HIGH (ref 3.5–5.3)
POTASSIUM SERPL-SCNC: 6.1 MMOL/L — HIGH (ref 3.5–5.3)
PREALB SERPL-MCNC: 14 MG/DL — LOW (ref 20–40)
PROCALCITONIN SERPL-MCNC: 0.32 NG/ML — HIGH (ref 0.02–0.1)
PROT SERPL-MCNC: 5.6 G/DL — LOW (ref 6–8.3)
PROT SERPL-MCNC: 5.9 G/DL — LOW (ref 6–8.3)
PROT UR-MCNC: 100 MG/DL
RBC # BLD: 5.37 M/UL — SIGNIFICANT CHANGE UP (ref 4.2–5.8)
RBC # BLD: 5.61 M/UL — SIGNIFICANT CHANGE UP (ref 4.2–5.8)
RBC # FLD: 17.1 % — HIGH (ref 10.3–14.5)
RBC # FLD: 48.6 % — HIGH (ref 10.3–14.5)
RBC CASTS # UR COMP ASSIST: 0 /HPF — SIGNIFICANT CHANGE UP (ref 0–4)
RSV RNA NPH QL NAA+NON-PROBE: SIGNIFICANT CHANGE UP
SARS-COV-2 RNA SPEC QL NAA+PROBE: SIGNIFICANT CHANGE UP
SODIUM SERPL-SCNC: 136 MMOL/L — SIGNIFICANT CHANGE UP (ref 135–145)
SODIUM SERPL-SCNC: 137 MMOL/L — SIGNIFICANT CHANGE UP (ref 135–145)
SODIUM SERPL-SCNC: 137 MMOL/L — SIGNIFICANT CHANGE UP (ref 135–145)
SODIUM UR-SCNC: <5 MMOL/L — SIGNIFICANT CHANGE UP
SP GR SPEC: 1.02 — SIGNIFICANT CHANGE UP (ref 1–1.03)
TROPONIN I, HIGH SENSITIVITY RESULT: 63.8 NG/L — SIGNIFICANT CHANGE UP
UROBILINOGEN FLD QL: 1 MG/DL — SIGNIFICANT CHANGE UP (ref 0.2–1)
UUN UR-MCNC: 905 MG/DL — SIGNIFICANT CHANGE UP
WBC # BLD: 10.58 K/UL — HIGH (ref 3.8–10.5)
WBC # BLD: 9.45 K/UL — SIGNIFICANT CHANGE UP (ref 3.8–10.5)
WBC # FLD AUTO: 10.58 K/UL — HIGH (ref 3.8–10.5)
WBC # FLD AUTO: 9.45 K/UL — SIGNIFICANT CHANGE UP (ref 3.8–10.5)
WBC UR QL: 0 /HPF — SIGNIFICANT CHANGE UP (ref 0–5)

## 2025-01-01 PROCEDURE — 72125 CT NECK SPINE W/O DYE: CPT | Mod: MC

## 2025-01-01 PROCEDURE — 71250 CT THORAX DX C-: CPT | Mod: 26

## 2025-01-01 PROCEDURE — 80048 BASIC METABOLIC PNL TOTAL CA: CPT

## 2025-01-01 PROCEDURE — 85025 COMPLETE CBC W/AUTO DIFF WBC: CPT

## 2025-01-01 PROCEDURE — 84484 ASSAY OF TROPONIN QUANT: CPT

## 2025-01-01 PROCEDURE — 82550 ASSAY OF CK (CPK): CPT

## 2025-01-01 PROCEDURE — 83735 ASSAY OF MAGNESIUM: CPT

## 2025-01-01 PROCEDURE — 81001 URINALYSIS AUTO W/SCOPE: CPT

## 2025-01-01 PROCEDURE — 84145 PROCALCITONIN (PCT): CPT

## 2025-01-01 PROCEDURE — 72125 CT NECK SPINE W/O DYE: CPT | Mod: 26

## 2025-01-01 PROCEDURE — 94640 AIRWAY INHALATION TREATMENT: CPT

## 2025-01-01 PROCEDURE — 83880 ASSAY OF NATRIURETIC PEPTIDE: CPT

## 2025-01-01 PROCEDURE — 96374 THER/PROPH/DIAG INJ IV PUSH: CPT

## 2025-01-01 PROCEDURE — 99221 1ST HOSP IP/OBS SF/LOW 40: CPT | Mod: FS

## 2025-01-01 PROCEDURE — 82962 GLUCOSE BLOOD TEST: CPT

## 2025-01-01 PROCEDURE — 36415 COLL VENOUS BLD VENIPUNCTURE: CPT

## 2025-01-01 PROCEDURE — 70450 CT HEAD/BRAIN W/O DYE: CPT | Mod: 26

## 2025-01-01 PROCEDURE — 76705 ECHO EXAM OF ABDOMEN: CPT | Mod: 26

## 2025-01-01 PROCEDURE — 76705 ECHO EXAM OF ABDOMEN: CPT

## 2025-01-01 PROCEDURE — 84300 ASSAY OF URINE SODIUM: CPT

## 2025-01-01 PROCEDURE — 71045 X-RAY EXAM CHEST 1 VIEW: CPT | Mod: 26

## 2025-01-01 PROCEDURE — 84134 ASSAY OF PREALBUMIN: CPT

## 2025-01-01 PROCEDURE — 99285 EMERGENCY DEPT VISIT HI MDM: CPT | Mod: 25

## 2025-01-01 PROCEDURE — 99497 ADVNCD CARE PLAN 30 MIN: CPT

## 2025-01-01 PROCEDURE — 80053 COMPREHEN METABOLIC PANEL: CPT

## 2025-01-01 PROCEDURE — 84100 ASSAY OF PHOSPHORUS: CPT

## 2025-01-01 PROCEDURE — 87637 SARSCOV2&INF A&B&RSV AMP PRB: CPT

## 2025-01-01 PROCEDURE — 83690 ASSAY OF LIPASE: CPT

## 2025-01-01 PROCEDURE — 71045 X-RAY EXAM CHEST 1 VIEW: CPT

## 2025-01-01 PROCEDURE — 99285 EMERGENCY DEPT VISIT HI MDM: CPT

## 2025-01-01 PROCEDURE — 71250 CT THORAX DX C-: CPT | Mod: MC

## 2025-01-01 PROCEDURE — 84540 ASSAY OF URINE/UREA-N: CPT

## 2025-01-01 PROCEDURE — 70450 CT HEAD/BRAIN W/O DYE: CPT | Mod: MC

## 2025-01-01 PROCEDURE — 82570 ASSAY OF URINE CREATININE: CPT

## 2025-01-01 RX ORDER — ACETAMINOPHEN 160 MG/5ML
650 SUSPENSION ORAL EVERY 6 HOURS
Refills: 0 | Status: DISCONTINUED | OUTPATIENT
Start: 2025-01-01 | End: 2025-01-01

## 2025-01-01 RX ORDER — DM/PSEUDOEPHED/ACETAMINOPHEN 10-30-250
50 CAPSULE ORAL ONCE
Refills: 0 | Status: COMPLETED | OUTPATIENT
Start: 2025-01-01 | End: 2025-01-01

## 2025-01-01 RX ORDER — INSULIN LISPRO 100/ML
VIAL (ML) SUBCUTANEOUS AT BEDTIME
Refills: 0 | Status: DISCONTINUED | OUTPATIENT
Start: 2025-01-01 | End: 2025-01-01

## 2025-01-01 RX ORDER — INSULIN LISPRO 100/ML
VIAL (ML) SUBCUTANEOUS
Refills: 0 | Status: DISCONTINUED | OUTPATIENT
Start: 2025-01-01 | End: 2025-01-01

## 2025-01-01 RX ORDER — ONDANSETRON 4 MG/1
4 TABLET, ORALLY DISINTEGRATING ORAL EVERY 6 HOURS
Refills: 0 | Status: DISCONTINUED | OUTPATIENT
Start: 2025-01-01 | End: 2025-01-01

## 2025-01-01 RX ORDER — SODIUM CHLORIDE 9 G/ML
1000 INJECTION, SOLUTION INTRAVENOUS ONCE
Refills: 0 | Status: COMPLETED | OUTPATIENT
Start: 2025-01-01 | End: 2025-01-01

## 2025-01-01 RX ORDER — SODIUM ZIRCONIUM CYCLOSILICATE 5 G/5G
5 POWDER, FOR SUSPENSION ORAL ONCE
Refills: 0 | Status: COMPLETED | OUTPATIENT
Start: 2025-01-01 | End: 2025-01-01

## 2025-01-01 RX ORDER — QUETIAPINE FUMARATE 300 MG/1
12.5 TABLET ORAL DAILY
Refills: 0 | Status: DISCONTINUED | OUTPATIENT
Start: 2025-01-01 | End: 2025-01-01

## 2025-01-01 RX ORDER — POLYETHYLENE GLYCOL 3350 17 G/17G
17 POWDER, FOR SOLUTION ORAL DAILY
Refills: 0 | Status: DISCONTINUED | OUTPATIENT
Start: 2025-01-01 | End: 2025-01-01

## 2025-01-01 RX ORDER — PANTOPRAZOLE 20 MG/1
40 TABLET, DELAYED RELEASE ORAL
Refills: 0 | Status: DISCONTINUED | OUTPATIENT
Start: 2025-01-01 | End: 2025-01-01

## 2025-01-01 RX ORDER — ONDANSETRON 4 MG/1
4 TABLET, ORALLY DISINTEGRATING ORAL ONCE
Refills: 0 | Status: COMPLETED | OUTPATIENT
Start: 2025-01-01 | End: 2025-01-01

## 2025-01-01 RX ORDER — TAMSULOSIN HYDROCHLORIDE 0.4 MG/1
0.4 CAPSULE ORAL AT BEDTIME
Refills: 0 | Status: DISCONTINUED | OUTPATIENT
Start: 2025-01-01 | End: 2025-01-01

## 2025-01-01 RX ORDER — HYDROMORPHONE HYDROCHLORIDE 4 MG/ML
0.5 INJECTION, SOLUTION INTRAMUSCULAR; INTRAVENOUS; SUBCUTANEOUS ONCE
Refills: 0 | Status: DISCONTINUED | OUTPATIENT
Start: 2025-01-01 | End: 2025-01-01

## 2025-01-01 RX ORDER — ALBUTEROL 90 MCG
10 AEROSOL REFILL (GRAM) INHALATION ONCE
Refills: 0 | Status: COMPLETED | OUTPATIENT
Start: 2025-01-01 | End: 2025-01-01

## 2025-01-01 RX ORDER — ATORVASTATIN CALCIUM 80 MG/1
80 TABLET, FILM COATED ORAL AT BEDTIME
Refills: 0 | Status: DISCONTINUED | OUTPATIENT
Start: 2025-01-01 | End: 2025-01-01

## 2025-01-01 RX ORDER — HEPARIN SODIUM,PORCINE 10000/ML
5000 VIAL (ML) INJECTION EVERY 12 HOURS
Refills: 0 | Status: DISCONTINUED | OUTPATIENT
Start: 2025-01-01 | End: 2025-01-01

## 2025-01-01 RX ORDER — BISACODYL 5 MG
10 TABLET, DELAYED RELEASE (ENTERIC COATED) ORAL DAILY
Refills: 0 | Status: DISCONTINUED | OUTPATIENT
Start: 2025-01-01 | End: 2025-01-01

## 2025-01-01 RX ORDER — HYDROMORPHONE HYDROCHLORIDE 4 MG/ML
0.5 INJECTION, SOLUTION INTRAMUSCULAR; INTRAVENOUS; SUBCUTANEOUS
Refills: 0 | Status: DISCONTINUED | OUTPATIENT
Start: 2025-01-01 | End: 2025-01-01

## 2025-01-01 RX ORDER — ASPIRIN 81 MG/1
81 TABLET, COATED ORAL DAILY
Refills: 0 | Status: DISCONTINUED | OUTPATIENT
Start: 2025-01-01 | End: 2025-01-01

## 2025-01-01 RX ADMIN — ATORVASTATIN CALCIUM 80 MILLIGRAM(S): 80 TABLET, FILM COATED ORAL at 22:13

## 2025-01-01 RX ADMIN — ATORVASTATIN CALCIUM 80 MILLIGRAM(S): 80 TABLET, FILM COATED ORAL at 22:22

## 2025-01-01 RX ADMIN — Medication 5000 UNIT(S): at 06:16

## 2025-01-01 RX ADMIN — ASPIRIN 81 MILLIGRAM(S): 81 TABLET, COATED ORAL at 11:46

## 2025-01-01 RX ADMIN — HYDROMORPHONE HYDROCHLORIDE 0.5 MILLIGRAM(S): 4 INJECTION, SOLUTION INTRAMUSCULAR; INTRAVENOUS; SUBCUTANEOUS at 16:08

## 2025-01-01 RX ADMIN — HYDROMORPHONE HYDROCHLORIDE 0.5 MILLIGRAM(S): 4 INJECTION, SOLUTION INTRAMUSCULAR; INTRAVENOUS; SUBCUTANEOUS at 18:22

## 2025-01-01 RX ADMIN — Medication 75 MILLIGRAM(S): at 11:46

## 2025-01-01 RX ADMIN — TAMSULOSIN HYDROCHLORIDE 0.4 MILLIGRAM(S): 0.4 CAPSULE ORAL at 22:20

## 2025-01-01 RX ADMIN — ACETAMINOPHEN 650 MILLIGRAM(S): 160 SUSPENSION ORAL at 12:30

## 2025-01-01 RX ADMIN — ONDANSETRON 4 MILLIGRAM(S): 4 TABLET, ORALLY DISINTEGRATING ORAL at 22:42

## 2025-01-01 RX ADMIN — TAMSULOSIN HYDROCHLORIDE 0.4 MILLIGRAM(S): 0.4 CAPSULE ORAL at 22:13

## 2025-01-01 RX ADMIN — QUETIAPINE FUMARATE 12.5 MILLIGRAM(S): 300 TABLET ORAL at 13:00

## 2025-01-01 RX ADMIN — HYDROMORPHONE HYDROCHLORIDE 0.5 MILLIGRAM(S): 4 INJECTION, SOLUTION INTRAMUSCULAR; INTRAVENOUS; SUBCUTANEOUS at 03:31

## 2025-01-01 RX ADMIN — ONDANSETRON 4 MILLIGRAM(S): 4 TABLET, ORALLY DISINTEGRATING ORAL at 19:16

## 2025-01-01 RX ADMIN — SODIUM ZIRCONIUM CYCLOSILICATE 5 GRAM(S): 5 POWDER, FOR SUSPENSION ORAL at 11:46

## 2025-01-01 RX ADMIN — TAMSULOSIN HYDROCHLORIDE 0.4 MILLIGRAM(S): 0.4 CAPSULE ORAL at 22:22

## 2025-01-01 RX ADMIN — ACETAMINOPHEN 650 MILLIGRAM(S): 160 SUSPENSION ORAL at 11:46

## 2025-01-01 RX ADMIN — HYDROMORPHONE HYDROCHLORIDE 0.5 MILLIGRAM(S): 4 INJECTION, SOLUTION INTRAMUSCULAR; INTRAVENOUS; SUBCUTANEOUS at 09:46

## 2025-01-01 RX ADMIN — Medication 40 MILLIGRAM(S): at 12:03

## 2025-01-01 RX ADMIN — ATORVASTATIN CALCIUM 80 MILLIGRAM(S): 80 TABLET, FILM COATED ORAL at 22:21

## 2025-01-01 RX ADMIN — HYDROMORPHONE HYDROCHLORIDE 0.5 MILLIGRAM(S): 4 INJECTION, SOLUTION INTRAMUSCULAR; INTRAVENOUS; SUBCUTANEOUS at 03:01

## 2025-01-01 RX ADMIN — Medication 75 MILLIGRAM(S): at 12:09

## 2025-01-01 RX ADMIN — Medication 2: at 08:37

## 2025-01-01 RX ADMIN — ONDANSETRON 4 MILLIGRAM(S): 4 TABLET, ORALLY DISINTEGRATING ORAL at 12:05

## 2025-01-01 RX ADMIN — ASPIRIN 81 MILLIGRAM(S): 81 TABLET, COATED ORAL at 12:09

## 2025-01-01 RX ADMIN — Medication 5 UNIT(S): at 14:37

## 2025-01-01 RX ADMIN — SODIUM CHLORIDE 1000 MILLILITER(S): 9 INJECTION, SOLUTION INTRAVENOUS at 13:03

## 2025-01-01 RX ADMIN — Medication 10 MILLIGRAM(S): at 14:36

## 2025-01-01 RX ADMIN — SODIUM ZIRCONIUM CYCLOSILICATE 5 GRAM(S): 5 POWDER, FOR SUSPENSION ORAL at 16:56

## 2025-01-01 RX ADMIN — Medication 0.5 MILLIGRAM(S): at 15:53

## 2025-01-01 RX ADMIN — Medication 0.5 MILLIGRAM(S): at 23:39

## 2025-01-01 RX ADMIN — HYDROMORPHONE HYDROCHLORIDE 0.5 MILLIGRAM(S): 4 INJECTION, SOLUTION INTRAMUSCULAR; INTRAVENOUS; SUBCUTANEOUS at 15:21

## 2025-01-01 RX ADMIN — Medication 50 MILLILITER(S): at 14:36

## 2025-01-01 RX ADMIN — Medication 1: at 08:17

## 2025-01-01 RX ADMIN — QUETIAPINE FUMARATE 12.5 MILLIGRAM(S): 300 TABLET ORAL at 12:08

## 2025-01-02 ENCOUNTER — APPOINTMENT (OUTPATIENT)
Dept: INTERNAL MEDICINE | Facility: CLINIC | Age: 75
End: 2025-01-02
Payer: MEDICARE

## 2025-01-02 ENCOUNTER — APPOINTMENT (OUTPATIENT)
Dept: ELECTROPHYSIOLOGY | Facility: CLINIC | Age: 75
End: 2025-01-02

## 2025-01-02 ENCOUNTER — APPOINTMENT (OUTPATIENT)
Dept: CARDIOLOGY | Facility: CLINIC | Age: 75
End: 2025-01-02

## 2025-01-02 VITALS
HEIGHT: 67 IN | DIASTOLIC BLOOD PRESSURE: 92 MMHG | TEMPERATURE: 98.6 F | HEART RATE: 105 BPM | SYSTOLIC BLOOD PRESSURE: 132 MMHG | WEIGHT: 142 LBS | OXYGEN SATURATION: 98 % | RESPIRATION RATE: 16 BRPM | BODY MASS INDEX: 22.29 KG/M2

## 2025-01-02 DIAGNOSIS — K29.00 ACUTE GASTRITIS W/OUT BLEEDING: ICD-10-CM

## 2025-01-02 DIAGNOSIS — Z78.9 OTHER SPECIFIED HEALTH STATUS: ICD-10-CM

## 2025-01-02 DIAGNOSIS — R11.2 NAUSEA WITH VOMITING, UNSPECIFIED: ICD-10-CM

## 2025-01-02 DIAGNOSIS — E11.9 TYPE 2 DIABETES MELLITUS W/OUT COMPLICATIONS: ICD-10-CM

## 2025-01-02 DIAGNOSIS — F32.2 MAJOR DEPRESSIVE DISORDER, SINGLE EPISODE, SEVERE W/OUT PSYCHOTIC FEATURES: ICD-10-CM

## 2025-01-02 DIAGNOSIS — R55 SYNCOPE AND COLLAPSE: ICD-10-CM

## 2025-01-02 DIAGNOSIS — I50.42 CHRONIC COMBINED SYSTOLIC (CONGESTIVE) AND DIASTOLIC (CONGESTIVE) HEART FAILURE: ICD-10-CM

## 2025-01-02 DIAGNOSIS — F41.1 GENERALIZED ANXIETY DISORDER: ICD-10-CM

## 2025-01-02 PROBLEM — G47.00 INSOMNIA: Status: ACTIVE | Noted: 2025-01-02

## 2025-01-02 PROCEDURE — 99495 TRANSJ CARE MGMT MOD F2F 14D: CPT

## 2025-01-02 RX ORDER — FAMOTIDINE 40 MG/1
40 TABLET, FILM COATED ORAL
Qty: 30 | Refills: 0 | Status: ACTIVE | COMMUNITY
Start: 2025-01-02 | End: 1900-01-01

## 2025-01-02 RX ORDER — ONDANSETRON 8 MG/1
8 TABLET, ORALLY DISINTEGRATING ORAL
Qty: 30 | Refills: 0 | Status: ACTIVE | COMMUNITY
Start: 2025-01-02 | End: 1900-01-01

## 2025-01-06 ENCOUNTER — APPOINTMENT (OUTPATIENT)
Dept: CARDIOLOGY | Facility: CLINIC | Age: 75
End: 2025-01-06

## 2025-01-08 ENCOUNTER — APPOINTMENT (OUTPATIENT)
Dept: CARDIOLOGY | Facility: CLINIC | Age: 75
End: 2025-01-08
Payer: MEDICARE

## 2025-01-08 ENCOUNTER — APPOINTMENT (OUTPATIENT)
Dept: CARDIOLOGY | Facility: CLINIC | Age: 75
End: 2025-01-08

## 2025-01-08 ENCOUNTER — NON-APPOINTMENT (OUTPATIENT)
Age: 75
End: 2025-01-08

## 2025-01-08 VITALS
SYSTOLIC BLOOD PRESSURE: 109 MMHG | OXYGEN SATURATION: 97 % | BODY MASS INDEX: 22.24 KG/M2 | WEIGHT: 142 LBS | DIASTOLIC BLOOD PRESSURE: 80 MMHG | HEART RATE: 99 BPM

## 2025-01-08 DIAGNOSIS — Z95.1 PRESENCE OF AORTOCORONARY BYPASS GRAFT: ICD-10-CM

## 2025-01-08 DIAGNOSIS — G47.00 INSOMNIA, UNSPECIFIED: ICD-10-CM

## 2025-01-08 DIAGNOSIS — E78.5 HYPERLIPIDEMIA, UNSPECIFIED: ICD-10-CM

## 2025-01-08 DIAGNOSIS — R00.2 PALPITATIONS: ICD-10-CM

## 2025-01-08 DIAGNOSIS — Z00.00 ENCOUNTER FOR GENERAL ADULT MEDICAL EXAMINATION W/OUT ABNORMAL FINDINGS: ICD-10-CM

## 2025-01-08 PROCEDURE — G2211 COMPLEX E/M VISIT ADD ON: CPT

## 2025-01-08 PROCEDURE — 99214 OFFICE O/P EST MOD 30 MIN: CPT

## 2025-01-08 PROCEDURE — 93000 ELECTROCARDIOGRAM COMPLETE: CPT

## 2025-01-08 RX ORDER — ADHESIVE TAPE 3"X 2.3 YD
5 TAPE, NON-MEDICATED TOPICAL
Qty: 30 | Refills: 0 | Status: DISCONTINUED | COMMUNITY
Start: 2025-01-02 | End: 2025-01-08

## 2025-01-08 RX ORDER — DAPAGLIFLOZIN 10 MG/1
10 TABLET, FILM COATED ORAL
Qty: 90 | Refills: 3 | Status: ACTIVE | COMMUNITY
Start: 2025-01-08

## 2025-01-08 NOTE — CONSULT NOTE ADULT - ATTENDING SUPERVISION STATEMENT
Called patient, spoke with Izabela PALM  Patient currently admitted at [facility] on 1/6/25 for vomiting and diarrhea, possible discharge tomorrow   Scheduling-EGD 1/9/25- needs to be canceled, per VOF she cancel procedure   Patient positive for Norovirus, VOF started also with symptoms   VOF to call back to reschedule procedure   Nursing staff- record needs to be requested once patient discharge   Postponing for f/u      Fellow

## 2025-01-09 ENCOUNTER — APPOINTMENT (OUTPATIENT)
Dept: CARDIOLOGY | Facility: CLINIC | Age: 75
End: 2025-01-09

## 2025-01-13 ENCOUNTER — APPOINTMENT (OUTPATIENT)
Dept: CARDIOLOGY | Facility: CLINIC | Age: 75
End: 2025-01-13

## 2025-01-15 ENCOUNTER — APPOINTMENT (OUTPATIENT)
Dept: CARDIOLOGY | Facility: CLINIC | Age: 75
End: 2025-01-15

## 2025-01-16 ENCOUNTER — APPOINTMENT (OUTPATIENT)
Dept: CARDIOLOGY | Facility: CLINIC | Age: 75
End: 2025-01-16

## 2025-01-22 ENCOUNTER — APPOINTMENT (OUTPATIENT)
Dept: CARDIOLOGY | Facility: CLINIC | Age: 75
End: 2025-01-22

## 2025-01-22 ENCOUNTER — APPOINTMENT (OUTPATIENT)
Dept: PSYCHIATRY | Facility: CLINIC | Age: 75
End: 2025-01-22
Payer: MEDICARE

## 2025-01-22 DIAGNOSIS — F33.9 MAJOR DEPRESSIVE DISORDER, RECURRENT, UNSPECIFIED: ICD-10-CM

## 2025-01-22 DIAGNOSIS — G47.00 INSOMNIA, UNSPECIFIED: ICD-10-CM

## 2025-01-22 DIAGNOSIS — F41.1 GENERALIZED ANXIETY DISORDER: ICD-10-CM

## 2025-01-22 PROCEDURE — G2211 COMPLEX E/M VISIT ADD ON: CPT

## 2025-01-22 PROCEDURE — 99214 OFFICE O/P EST MOD 30 MIN: CPT

## 2025-01-22 RX ORDER — TRAZODONE HYDROCHLORIDE 50 MG/1
50 TABLET ORAL
Qty: 30 | Refills: 1 | Status: ACTIVE | COMMUNITY
Start: 2025-01-22 | End: 1900-01-01

## 2025-01-23 ENCOUNTER — APPOINTMENT (OUTPATIENT)
Dept: CARDIOLOGY | Facility: CLINIC | Age: 75
End: 2025-01-23

## 2025-01-27 ENCOUNTER — APPOINTMENT (OUTPATIENT)
Dept: CARDIOLOGY | Facility: CLINIC | Age: 75
End: 2025-01-27

## 2025-01-27 NOTE — H&P ADULT - PROBLEM SELECTOR PLAN 2
p/w  SCr 2.13 on admission  Baseline SCr - 1.19 on 12/19/24  - likely prerenal 2/2 dehydration, poor oral intake  - f/u urine Lytes,   - f/u BMP  - Avoid Nephrotoxic agent.

## 2025-01-27 NOTE — H&P ADULT - NSVTERISKASSESS_GEN_ALL_CORE FT
Care Management Follow Up     Length of Stay (days): 6     Expected Discharge Date: 08/22/2024     Anticipated Discharge Plan:   transitional care      Transportation: TBD     PT Recommendations: Transitional Care Facility  OT Recommendations:  Transitional Care Facility     Barriers to Discharge: medical stability, placement     Prior Living Situation: apartment with other (see comments), friend(s) (roommate)     Patient/Spokesperson Updated: No      Additional Information:  SW reviewed chart. Pt seeking TCU placement. Family is hoping for pt to discharge to [facility] in [place]. [facility] called and spoke with [facility] (ph: [phone] f: [phone]), there. [facility] reports referral only partially came through and requests it is resent. [facility] confirms they do have TCU beds available. SW resent referral via communications tab.     3:45 PM  Received call back from [facility], facility does not accept pt's insurance. Deja reports there is a nursing home near by called [facility] (ph: [phone]).  Will need to follow up with pt/family to determine if they are interested in referral there.     JACQUI Miramontes    Medical Assessment Completed on: 27-Jan-2025 21:36

## 2025-01-27 NOTE — PATIENT PROFILE ADULT - NSPROHMSYMPCOND_GEN_A_NUR
06/07/24 1443   Post-Acute Status   Post-Acute Authorization Home Health   Home Health Status Referrals Sent   Coverage Medicare A and B   Discharge Delays None known at this time   Discharge Plan   Discharge Plan A Home Health   Discharge Plan B Home Health     Met with patient to review discharge recommendation of home health and patient is agreeable to plan    Patient/family provided list of facilities in-network with patient's payor plan. Providers that are owned, operated, or affiliated with [facility] are included on the list.     Notified that referral sent to below listed facilities from in-network list based on proximity to home/family support:   [facility]  2.  3.  4.  5. (can send more than 5)    Patient/family instructed to identify preference.    Preferred Facility: (if more than 1, listed in order of descending preference)  [facility]    If an additional preferred facility not listed above is identified, additional referral to be sent. If above facilities unable to accept, will send additional referrals to in-network providers.   Discharge Plan A and Plan B have been determined by review of patient's clinical status, future medical and therapeutic needs, and coverage/benefits for post-acute care in coordination with multidisciplinary team members.      Megan Bruce, CAROLYNCM  Case Management  [phone]       diabetes

## 2025-01-27 NOTE — ED PROVIDER NOTE - CLINICAL SUMMARY MEDICAL DECISION MAKING FREE TEXT BOX
74y man with PMHx of CVA, MI, HTN, T2DM, HLD, CAD s/p cardiac stents, HFrEF (EF 20-25% 10/2024), hx of malignant melanoma/wide excision, with recent elective bioprosthetic aortic valve replacement and ascending aortic aneurysm repair with transverse hemiarch and CABG x2 (4/8/24) presents to ed c/o anorexia, weakness, nausea, no desire to eat x 3-4 days. pt also not taking any meds bc pt doesn't want to. normally barely eats or drink anything but now nothing. no fever.    depression vs FTT vs lytes imbalance vs anemia - labs, meds, xr, admit- no active si

## 2025-01-27 NOTE — H&P ADULT - HISTORY OF PRESENT ILLNESS
Patient is a 74M PMHx of CVA, MI, HTN, T2DM, HLD, CAD s/p cardiac stents, HFrEF (EF 20-25% 10/2024), hx of malignant melanoma/wide excision, with recent elective bioprosthetic aortic valve replacement and ascending aortic aneurysm repair with transverse hemiarch and CABG x2 (4/8/24) here for 1 month of decrease oral intake. For the last 2-3 days he has no food intake and had minimal liquid intake. Reports he did not take his medications for the past 4-5 days.  Patient is a 74M PMHx of CVA, MI, HTN, T2DM, HLD, CAD s/p cardiac stents, HFrEF (EF 20-25% 10/2024), hx of malignant melanoma/wide excision, with recent elective bioprosthetic aortic valve replacement and ascending aortic aneurysm repair with transverse hemiarch and CABG x2 (4/8/24) here for 1 month of decrease oral intake. For the last 2-3 days he has no food intake and had minimal liquid intake. Reports he did not take his medications for the past 4-5 days. Reports chills, generalized weakness and SOB. Pt wife noticed pt is more "leaner" than usual when received a back massage from his wife. Denies sick contacts, cough, congestion, sore throat, rhinorrhea, abdominal pain, dysuria, hematuria, chest pain, fever. Of note, pt declined ICD placement.     in ED: 98.3F, HR: 96, 111/75, 97% on RA  s/p 1L LR bolus, hyperkalemia cocktail x1,

## 2025-01-27 NOTE — PATIENT PROFILE ADULT - FALL HARM RISK - RISK INTERVENTIONS

## 2025-01-27 NOTE — H&P ADULT - PROBLEM SELECTOR PLAN 1
p/w 1 month of decrease oral intake, with the past 2-3 days of no food intake and minimal liquid intake, and has not taken medications in the past 4-5 days  - CK: 159 (wnl)  s/p 1L LR bolus in ED  - f/u CT head and cervical spine non con  - encourage oral intake, start pureed diet  - consulted nutrition  - consulted PT  - fall precaution, ambulate with assistance p/w 1 month of decrease oral intake, with the past 2-3 days of no food intake and minimal liquid intake, and has not taken medications in the past 4-5 days  - CK: 159 (wnl)  s/p 1L LR bolus in ED  - encourage oral intake, start pureed diet  - consulted nutrition  - consulted PT  - fall precaution, ambulate with assistance

## 2025-01-27 NOTE — H&P ADULT - PROBLEM SELECTOR PLAN 4
CXR: Interval significant improvement of left mid and lower lung opacity. A right lower lung opacity demonstrates interval progression. Cannot exclude small pleural effusions.   - ?opacity of right lower lung (?PNA vs fluid overload)  - did not meet SIRS/sepsis criteria   - f/u procalcitonin  - f/u CT chest non con

## 2025-01-27 NOTE — H&P ADULT - NSHPPHYSICALEXAM_GEN_ALL_CORE
T(C): 36.9 (01-27-25 @ 21:30), Max: 36.9 (01-27-25 @ 21:30)  HR: 98 (01-27-25 @ 21:30) (96 - 98)  BP: 100/77 (01-27-25 @ 21:30) (100/77 - 111/75)  RR: 18 (01-27-25 @ 21:30) (16 - 18)  SpO2: 96% (01-27-25 @ 21:30) (96% - 97%)    GENERAL: NAD, elderly  HEAD:  Atraumatic, Normocephalic  EYES:  conjunctiva and sclera clear  NECK: Supple, No JVD, Normal thyroid  CHEST/LUNG: Clear to auscultation. No rales, rhonchi, wheezing, or rubs  HEART: Regular rate and rhythm; No murmurs, rubs, or gallops  ABDOMEN: Soft, Nontender, Nondistended; Bowel sounds present  NERVOUS SYSTEM:  Alert & Oriented X3,  EXTREMITIES:  2+ Peripheral Pulses, No clubbing, cyanosis, or edema  SKIN: warm dry

## 2025-01-27 NOTE — CHART NOTE - NSCHARTNOTEFT_GEN_A_CORE
EVENT:     SUBJECTIVE:    OBJECTIVE:  Vital Signs Last 24 Hrs  T(C): 36.5 (27 Jan 2025 22:33), Max: 36.9 (27 Jan 2025 21:30)  T(F): 97.7 (27 Jan 2025 22:33), Max: 98.4 (27 Jan 2025 21:30)  HR: 99 (27 Jan 2025 22:33) (96 - 99)  BP: 107/81 (27 Jan 2025 22:33) (100/77 - 111/75)  BP(mean): --  RR: 19 (27 Jan 2025 22:33) (16 - 19)  SpO2: 96% (27 Jan 2025 22:33) (96% - 97%)    Parameters below as of 27 Jan 2025 22:33  Patient On (Oxygen Delivery Method): room air        PHYSICAL EXAM:  Neuro: Awake and alert, oriented to person, place, and time  Cardiovascular: + S1, S2, no murmurs, rubs, or bruits  Respiratory: clear to auscultation bilaterally with good air entry   GI: Abdomen soft, non-tender, bowel sounds present   : Non distended;   Skin: warm and dry; no rash      LABS:                        14.2   9.45  )-----------( 247      ( 27 Jan 2025 13:00 )             43.7     01-27    136  |  104  |  49[H]  ----------------------------<  268[H]  6.1[H]   |  20[L]  |  2.13[H]    Ca    9.1      27 Jan 2025 13:00    TPro  5.9[L]  /  Alb  3.2[L]  /  TBili  2.0[H]  /  DBili  x   /  AST  202[H]  /  ALT  269[H]  /  AlkPhos  296[H]  01-27        EKG:   IMAGING:    ASSESSMENT:    PLAN:     FOLLOW UP / RESULT: EVENT: Notified by RN, pt is c/o anxiety.     HPI:  74M PMHx of CVA, MI, HTN, T2DM, HLD, CAD s/p cardiac stents, HFrEF (EF 20-25% 10/2024), hx of malignant melanoma/wide excision, with recent elective bioprosthetic aortic valve replacement and ascending aortic aneurysm repair with transverse hemiarch and CABG x2 (4/8/24) here for 1 month of decrease oral intake. Here for FTT, CLAUDIA, ?opacities seen on CXR, transaminitis. Hospital course was complicated by inpatient fall.     SUBJECTIVE: Pt seen and evaluated at bedside, A&Ox3-appears anxious--states he takes medication at home but is unable to recall the name.     OBJECTIVE:  Vital Signs Last 24 Hrs  T(C): 36.5 (27 Jan 2025 22:33), Max: 36.9 (27 Jan 2025 21:30)  T(F): 97.7 (27 Jan 2025 22:33), Max: 98.4 (27 Jan 2025 21:30)  HR: 99 (27 Jan 2025 22:33) (96 - 99)  BP: 107/81 (27 Jan 2025 22:33) (100/77 - 111/75)  BP(mean): --  RR: 19 (27 Jan 2025 22:33) (16 - 19)  SpO2: 96% (27 Jan 2025 22:33) (96% - 97%)    Parameters below as of 27 Jan 2025 22:33  Patient On (Oxygen Delivery Method): room air      PHYSICAL EXAM:  Neuro: Awake and alert, oriented to person, place, and time  Cardiovascular: + S1, S2, no murmurs, rubs, or bruits  Respiratory: clear to auscultation bilaterally with good air entry   GI: Abdomen soft, non-tender, bowel sounds present   : Non distended;   Skin: warm and dry; no rash      LABS:                        14.2   9.45  )-----------( 247      ( 27 Jan 2025 13:00 )             43.7     01-27    136  |  104  |  49[H]  ----------------------------<  268[H]  6.1[H]   |  20[L]  |  2.13[H]    Ca    9.1      27 Jan 2025 13:00    TPro  5.9[L]  /  Alb  3.2[L]  /  TBili  2.0[H]  /  DBili  x   /  AST  202[H]  /  ALT  269[H]  /  AlkPhos  296[H]  01-27        EKG:   IMAGING:    ASSESSMENT: Anxiety    PLAN:     -Outpatient med list reviewed, pt on Lorazepam 0.5 mg TID PRN at bedtime  -Lorazepam 0.5 mg PO x 1 dose now  -Continue current/supportive measures    FOLLOW UP / RESULT:    -Monitor effectiveness of medication

## 2025-01-27 NOTE — ED PROVIDER NOTE - CARDIAC, MLM
Normal rate, regular rhythm.  Heart sounds S1, S2.  No murmurs, rubs or gallops. 1+ edema lower extremity

## 2025-01-27 NOTE — H&P ADULT - CONVERSATION DETAILS
Patient deferred decisions to his wife Nicole Corey. Explained the risks and benefits of CPR and intubation, after further discussion, family decided on FULL CODE.

## 2025-01-27 NOTE — ED PROVIDER NOTE - CONSTITUTIONAL, MLM
awake, alert, oriented to person, place, time/situation and in no apparent distress. pale normal... 10 or more drinks

## 2025-01-27 NOTE — H&P ADULT - ASSESSMENT
Patient is a 74F PMHx of CVA, MI, HTN, T2DM, HLD, CAD s/p cardiac stents, HFrEF (EF 20-25% 10/2024), hx of malignant melanoma/wide excision, with recent elective bioprosthetic aortic valve replacement and ascending aortic aneurysm repair with transverse hemiarch and CABG x2 (4/8/24) here for 1 month of decrease oral intake. Here for FTT, CLAUDIA, ?opacities seen on CXR, transaminitis Patient is a 74M PMHx of CVA, MI, HTN, T2DM, HLD, CAD s/p cardiac stents, HFrEF (EF 20-25% 10/2024), hx of malignant melanoma/wide excision, with recent elective bioprosthetic aortic valve replacement and ascending aortic aneurysm repair with transverse hemiarch and CABG x2 (4/8/24) here for 1 month of decrease oral intake. Here for FTT, CLAUDIA, ?opacities seen on CXR, transaminitis

## 2025-01-27 NOTE — CHART NOTE - NSCHARTNOTEFT_GEN_A_CORE
Informed by RN pt had an unwitnessed fall in the bathroom. Ordered CT head and CT cervical spine, which showed No acute intracranial hemorrhage, brain edema, or mass effect. No displaced calvarial fracture. Increased mild compression deformity of the superior endplate of T1 with new minimal sclerosis subjacent to the endplate. Acute fracture is suspected. No retropulsed bone. Consulted ortho. Informed by RN pt had an unwitnessed fall in the bathroom. Ordered CT head and CT cervical spine, which showed No acute intracranial hemorrhage, brain edema, or mass effect. No displaced calvarial fracture. Increased mild compression deformity of the superior endplate of T1 with new minimal sclerosis subjacent to the endplate. Acute fracture is suspected. No retropulsed bone. Consulted ortho. Primary team f/u ortho reccs Informed by RN pt had an unwitnessed fall in the bathroom. Pt is on fall precaution. Ordered CT head and CT cervical spine, which showed No acute intracranial hemorrhage, brain edema, or mass effect. No displaced calvarial fracture. Increased mild compression deformity of the superior endplate of T1 with new minimal sclerosis subjacent to the endplate. Acute fracture is suspected. No retropulsed bone. Consulted ortho. Primary team f/u ortho reccs Informed by RN pt had an unwitnessed fall in the bathroom. Pt is on fall precaution and prior to the unwitnessed fall in the bathroom, discussed with patient and his wife that pt is a fall risk and on fall precaution. Ordered CT head and CT cervical spine, which showed No acute intracranial hemorrhage, brain edema, or mass effect. No displaced calvarial fracture. Increased mild compression deformity of the superior endplate of T1 with new minimal sclerosis subjacent to the endplate. Acute fracture is suspected. No retropulsed bone. Consulted ortho. Primary team f/u ortho reccs

## 2025-01-27 NOTE — H&P ADULT - PROBLEM SELECTOR PLAN 5
Hx of CHF on valsartan 40mg qd, digoxin 124 mcg,   - HFrEF with EF 28% on 12/17  - pt declined ICD as per chart review  - not in CHF exacerbation  - hold home valsartan due to CLAUDIA Hx of CHF on valsartan 40mg qd, Farxiga 10mg qd, (unsure why pt on digoxin 124 mcg)  - HFrEF with EF 28% on 12/17  - pt declined ICD as per chart review  - not in CHF exacerbation  - hold home valsartan due to CLAUDIA  - hold home farxiga for now  - f/u BNP

## 2025-01-27 NOTE — ED PROVIDER NOTE - OBJECTIVE STATEMENT
74y man with PMHx of CVA, MI, HTN, T2DM, HLD, CAD s/p cardiac stents, HFrEF (EF 20-25% 10/2024), hx of malignant melanoma/wide excision, with recent elective bioprosthetic aortic valve replacement and ascending aortic aneurysm repair with transverse hemiarch and CABG x2 (4/8/24) presents to ed c/o anorexia, weakness, nausea, no desire to eat x 3-4 days. pt also not taking any meds bc pt doesn't want to. normally barely eats or drink anything but now nothing. no fever.

## 2025-01-27 NOTE — ED ADULT NURSE NOTE - NSFALLRISKINTERV_ED_ALL_ED

## 2025-01-27 NOTE — H&P ADULT - PROBLEM SELECTOR PLAN 3
On admission  ,  [id number]  T-Bili: 2  - f/u RUQ US On admission  ,  [id number]  T-Bili: 2  - f/u RUQ US  - monitor LFTs

## 2025-01-27 NOTE — H&P ADULT - NSHPREVIEWOFSYSTEMS_GEN_ALL_CORE
REVIEW OF SYSTEMS:  CONSTITUTIONAL: +weakness, chills, decrease oral intake, no fevers  HEENT: Denies headache, dizziness, visual changes, vertigo, throat pain   RESPIRATORY: Denies cough, wheezing, hemoptysis; +shortness of breath  CARDIOVASCULAR: denies chest pain or palpitations  GASTROINTESTINAL: denies abdominal  pain, nausea, vomiting, diarrhea, constipation. Denies melena and hematochezia.  GENITOURINARY: Denies dysuria, frequency or hematuria  NEUROLOGICAL: Denies numbness or weakness  SKIN: Denies itching, rashes

## 2025-01-28 NOTE — PROGRESS NOTE ADULT - PROBLEM SELECTOR PLAN 10
H/o CVA on Atorvastatin & ASA  - Per wife at bedside pt stated that pt stopped taking medications at home b/c he does not want to take these medications anymore  - Pt and wife agreeable to comfort measures

## 2025-01-28 NOTE — PROGRESS NOTE ADULT - PROBLEM SELECTOR PLAN 6
H/o CAD s/p cardiac stents and CABG x2 on ASA and Plavix   - Per wife at bedside pt stated that pt stopped taking medications at home b/c he does not want to take these medications anymore  - Pt and wife agreeable to comfort measures

## 2025-01-28 NOTE — CONSULT NOTE ADULT - ASSESSMENT
74y Male with T1 Vertebral Compression Fracture 74y Male with T1 Vertebral Compression Fracture      Attending Note:  I reviewed the patient images and discussed with the PA.  Follow-up in clinic.

## 2025-01-28 NOTE — DIETITIAN INITIAL EVALUATION ADULT - OTHER INFO
Pt lives home with family PTA, very weak/drowsy, on pain management, lunch tray untouched, Unknown food allergies per chart; Per H&P, decreased intake x 1m, with no food intake for the last 2-3 days and had minimal liquid;  Reports he did not take his medications for the past 4-5 days. Reports chills, generalized weakness and SOB. Pt wife noticed pt is more "leaner" than usual Pt lives home with family PTA, very weak/drowsy, on pain management, lunch tray untouched, Unknown food allergies per chart; Per H&P, decreased intake x 1m, with no food intake for the last 2-3 days and had minimal liquid, generalized weakness, wt loss (details unclear); Pending  consult for Comfort Measure per team;  Pt lives home with family PTA, very weak/drowsy, on pain management, lunch tray untouched, Unknown food allergies per chart; Per H&P, decreased intake x 1m, with no food intake for the last 2-3 days and had minimal liquid, generalized weakness, wt loss (details unclear); h/o DM, previous A1C=10.2 11/27/24, on finger stick noted; Pending  consult for Comfort Measure per team Pt lives home with family PTA, very weak/drowsy, on pain management, lunch tray untouched, Unknown food allergies per chart; Per H&P, decreased intake x 1m, with no food intake for the last 2-3 days and had minimal liquid, generalized weakness, wt loss (Wt data in EMR: 167 lb 1/29/24; 142 lb 1/8/25; 130 lb 1/27/25, 151.8 lb?? 1/28/25, some fluctuation, but overall trending down x 1yr); h/o DM, previous A1C=10.2 11/27/24, on finger stick noted; Pending  consult for Comfort Measure per team Pt lives home with family PTA, very weak/drowsy, restless/moaning at times, on pain management, asleep, lunch tray untouched, Unknown food allergies per chart; Per H&P, decreased intake x 1m, with no food intake for the last 2-3 days and had minimal liquid, generalized weakness, wt loss (Wt data in [facility]: 167 lb 1/29/24-->142 lb 1/8/25; 130 lb 1/27/25, 151.8 lb?? 1/28/25, some fluctuation, but overall trending down x 1yr); h/o DM, previous A1C=10.2 11/27/24, on finger stick noted; Pending  consult for Comfort Measure per team

## 2025-01-28 NOTE — CONSULT NOTE ADULT - SUBJECTIVE AND OBJECTIVE BOX
[  ] STAT REQUEST              [ X ] ROUTINE REQUEST    Patient is a 74 year old male with transaminitis and failure to thrive. GI consulted to evaluate.        HPI:  Patient is a 74M PMHx of CVA, MI, HTN, T2DM, HLD, CAD s/p cardiac stents, HFrEF (EF 20-25% 10/2024), hx of malignant melanoma/wide excision, with recent elective bioprosthetic aortic valve replacement and ascending aortic aneurysm repair with transverse hemiarch and CABG x2 (24) here for 1 month of decrease oral intake. For the last 2-3 days he has no food intake and had minimal liquid intake. Reports he did not take his medications for the past 4-5 days. Reports chills, generalized weakness and SOB. Pt wife noticed pt is more "leaner" than usual when received a back massage from his wife. Denies sick contacts, cough, congestion, sore throat, rhinorrhea, abdominal pain, dysuria, hematuria, chest pain, fever. Of note, pt declined ICD placement.       PAIN MANAGEMENT:  Pain Scale:                 /10  Pain Location:      Prior Colonoscopy:    PAST MEDICAL HISTORY  HTN (hypertension)    Hearing decreased    CVA (cerebral vascular accident)    Hyperlipemia    Kidney stones    Coronary artery disease    CHF (congestive heart failure)    Type 2 diabetes mellitus    Confusion    History of myocardial infarction    Malignant melanoma        PAST SURGICAL HISTORY  S/P medial meniscal repair    H/O lithotripsy    No significant past surgical history    S/P tonsillectomy    Bilateral inguinal hernia    S/P CABG x 2    S/P aortic aneurysm repair    S/P aortic valve replacement with bioprosthetic valve        Allergies    No Known Allergies    Intolerances        HOME MEDICATIONS    MEDICATIONS  (STANDING):  aspirin  chewable 81 milliGRAM(s) Oral daily  atorvastatin 80 milliGRAM(s) Oral at bedtime  clopidogrel Tablet 75 milliGRAM(s) Oral daily  furosemide   Injectable 40 milliGRAM(s) IV Push once  heparin   Injectable 5000 Unit(s) SubCutaneous every 12 hours  insulin lispro (ADMELOG) corrective regimen sliding scale   SubCutaneous three times a day before meals  insulin lispro (ADMELOG) corrective regimen sliding scale   SubCutaneous at bedtime  tamsulosin 0.4 milliGRAM(s) Oral at bedtime    MEDICATIONS  (PRN):  polyethylene glycol 3350 17 Gram(s) Oral daily PRN for constipation      SOCIAL HISTORY  Advanced Directives:       [  ] Full Code       [  ] DNR  Marital Status:         [  ] M      [  ] S      [  ] D       [  ] W  Children:       [  ] Yes      [  ] No  Occupation:        [  ] Employed       [  ] Unemployed       [  ] Retired  Diet:       [  ] Regular       [  ] PEG feeding          [  ] NG tube feeding  Drug Use:           [  ] Patient denied          [  ] Yes  Alcohol:           [  ] No             [  ] Yes (socially)         [  ] Yes (chronic)  Tobacco:           [  ] Yes           [  ] No      FAMILY HISTORY  [  ] Heart Disease            [  ] Diabetes             [  ] HTN             [  ] Colon Cancer             [  ] Stomach Cancer              [  ] Pancreatic Cancer      VITAL SIGNS   Vital Signs Last 24 Hrs  T(C): 36.8 (25 @ 08:20), Max: 36.9 (25 @ 21:30)  T(F): 98.2 (25 @ 08:20), Max: 98.4 (25 @ 21:30)  HR: 104 (25 @ 08:20) (96 - 104)  BP: 106/77 (25 @ 08:20) (100/77 - 111/75)   RR: 19 (25 @ 08:20) (16 - 19)  SpO2: 95% (25 @ 08:20) (95% - 97%)  Daily Height in cm: 170.18 (2025 11:38)    Daily Weight in k.9 (2025 05:18)         CBC Full  -  ( 2025 06:40 )  WBC Count : 10.58 K/uL  RBC Count : 5.61 M/uL  Hemoglobin : 14.8 g/dL  Hematocrit : 45.2 %  Platelet Count - Automated : 231 K/uL  Mean Cell Volume : 80.6 fl  Mean Cell Hemoglobin : 26.4 pg  Mean Cell Hemoglobin Concentration : 32.7 g/dL  Auto Neutrophil # : 7.74 K/uL  Auto Lymphocyte # : 1.80 K/uL  Auto Monocyte # : 0.98 K/uL  Auto Eosinophil # : 0.00 K/uL  Auto Basophil # : 0.01 K/uL  Auto Neutrophil % : 73.1 %  Auto Lymphocyte % : 17.0 %  Auto Monocyte % : 9.3 %  Auto Eosinophil % : 0.0 %  Auto Basophil % : 0.1 %          137  |  104  |  55[H]  ----------------------------<  254[H]  5.4[H]   |  16[L]  |  2.25[H]    Ca    9.0      2025 06:40  Phos  4.8       Mg     2.5         TPro  5.6[L]  /  Alb  3.0[L]  /  TBili  2.2[H]  /  DBili  x   /  AST  344[H]  /  ALT  445[H]  /  AlkPhos  273[H]        Lipase: 13 U/L ( @ 13:00)      ALT/SGPT 445  Albumin, Serum 3.0  Alkaline Phosphatase 273  AST/SGOT 344  Bilirubin Direct --  Bilirubin Total 2.2  Indirect Bilirubin --  Hepatitis A Total --  Hepatitis B Core Antibody --  Hepatitis B Surface Antibody --  Hepatitis B Surface Antigen --  Hepatitis C Virus Interpretation --  Hepatitis C Virus Genotype --  ALT/SGPT 269  Albumin, Serum 3.2  Alkaline Phosphatase 296  AST/SGOT 202  Bilirubin Direct --  Bilirubin Total 2.0  Indirect Bilirubin --  Hepatitis A Total --  Hepatitis B Core Antibody --  Hepatitis B Surface Antibody --  Hepatitis B Surface Antigen --  Hepatitis C Virus Interpretation --  Hepatitis C Virus Genotype --              RADIOLOGY/IMAGING                           [  ] STAT REQUEST              [ X ] ROUTINE REQUEST    Patient is a 74 year old male with transaminitis and failure to thrive. GI consulted to evaluate.        HPI:  Patient is a 74 year old male with past medical history significant for CVA, MI, HTN, T2DM, Hyperlipidemia, CAD s/p cardiac stents, CHF(EF 20-25% 10/2024), hx of malignant melanoma/wide excision, with recent elective bioprosthetic aortic valve replacement and ascending aortic aneurysm repair with transverse hemiarch and CABG x2 (24) presented to the emergency room with one month history of progressively decreasing PO intake associated with failure to thrive.               On admission patient found to have transaminitis. No abdominal pain, nausea, vomiting, hematemesis, hematochezia, melena, fever, chills, chest pain, SOB, cough, hematuria, dysuria or diarrhea reported.         PAIN MANAGEMENT:  Pain Scale:                 0/10  Pain Location:      Prior Colonoscopy:  Unknown      PAST MEDICAL HISTORY    HTN (hypertension)    Hearing decreased    CVA (cerebral vascular accident)    Hyperlipemia    Kidney stones    Coronary artery disease    CHF (congestive heart failure)    Type 2 diabetes mellitus    Confusion    Myocardial infarction    Malignant melanoma        PAST SURGICAL HISTORY    Medial meniscal repair    Lithotripsy     Tonsillectomy    Bilateral inguinal hernia    CABG x 2    Aortic aneurysm repair    Aortic valve replacement with bioprosthetic valve        Allergies    No Known Allergies    Intolerances  None         MEDICATIONS  (STANDING):  aspirin  chewable 81 milliGRAM(s) Oral daily  atorvastatin 80 milliGRAM(s) Oral at bedtime  clopidogrel Tablet 75 milliGRAM(s) Oral daily  furosemide   Injectable 40 milliGRAM(s) IV Push once  heparin   Injectable 5000 Unit(s) SubCutaneous every 12 hours  insulin lispro (ADMELOG) corrective regimen sliding scale   SubCutaneous three times a day before meals  insulin lispro (ADMELOG) corrective regimen sliding scale   SubCutaneous at bedtime  tamsulosin 0.4 milliGRAM(s) Oral at bedtime    MEDICATIONS  (PRN):  polyethylene glycol 3350 17 Gram(s) Oral daily PRN for constipation      SOCIAL HISTORY  Advanced Directives:       [ X ] Full Code       [  ] DNR  Marital Status:         [ X ] M      [  ] S      [  ] D       [  ] W  Children:       [ X ] Yes      [  ] No  Occupation:        [  ] Employed       [ X ] Unemployed       [  ] Retired  Diet:       [ X ] Regular       [  ] PEG feeding          [  ] NG tube feeding  Drug Use:           [ X ] No          [  ] Yes  Alcohol:           [X  ] No             [  ] Yes (socially)         [  ] Yes (chronic)  Tobacco:           [  ] Yes           [ X ] No      FAMILY HISTORY  [ X ] Heart Disease            [ X ] Diabetes             [X  ] HTN             [  ] Colon Cancer             [  ] Stomach Cancer              [  ] Pancreatic Cancer      VITAL SIGNS   Vital Signs Last 24 Hrs  T(C): 36.8 (25 @ 08:20), Max: 36.9 (25 @ 21:30)  T(F): 98.2 (25 @ 08:20), Max: 98.4 (25 @ 21:30)  HR: 104 (25 @ 08:20) (96 - 104)  BP: 106/77 (25 @ 08:20) (100/77 - 111/75)   RR: 19 (25 @ 08:20) (16 - 19)  SpO2: 95% (25 @ 08:20) (95% - 97%)  Daily Height in cm: 170.18 (2025 11:38)    Daily Weight in k.9 (2025 05:18)         CBC Full  -  ( 2025 06:40 )  WBC Count : 10.58 K/uL  RBC Count : 5.61 M/uL  Hemoglobin : 14.8 g/dL  Hematocrit : 45.2 %  Platelet Count - Automated : 231 K/uL  Mean Cell Volume : 80.6 fl  Mean Cell Hemoglobin : 26.4 pg  Mean Cell Hemoglobin Concentration : 32.7 g/dL  Auto Neutrophil # : 7.74 K/uL  Auto Lymphocyte # : 1.80 K/uL  Auto Monocyte # : 0.98 K/uL  Auto Eosinophil # : 0.00 K/uL  Auto Basophil # : 0.01 K/uL  Auto Neutrophil % : 73.1 %  Auto Lymphocyte % : 17.0 %  Auto Monocyte % : 9.3 %  Auto Eosinophil % : 0.0 %  Auto Basophil % : 0.1 %          137  |  104  |  55[H]  ----------------------------<  254[H]  5.4[H]   |  16[L]  |  2.25[H]    Ca    9.0      2025 06:40  Phos  4.8       Mg     2.    TPro  5.6[L]  /  Alb  3.0[L]  /  TBili  2.2[H]  /  DBili  x   /  AST  344[H]  /  ALT  445[H]  /  AlkPhos  273[H]      Lipase: 13 U/L ( @ 13:00)    Urinalysis with Rflx Culture (25 @ 02:10)   Urine Appearance: Clear  Color: Dark Yellow  Specific Gravity: 1.024  pH Urine: 5.0  Protein, Urine: 100 mg/dL  Glucose Qualitative, Urine: 500 mg/dL  Ketone - Urine: Trace mg/dL  Blood, Urine: Negative  Bilirubin: Small  Urobilinogen: 1.0 mg/dL  Leukocyte Esterase Concentration: Negative  Nitrite: Negative    ECG     Ventricular Rate 90 BPM    Atrial Rate 90 BPM    P-R Interval 308 ms    QRS Duration 132 ms    Q-T Interval 366 ms    QTC Calculation(Bazett) 447 ms    P Axis 31 degrees    R Axis -39 degrees    T Axis 153 degrees    Diagnosis Line SINUS RHYTHM WITH 1ST DEGREE A-V BLOCK  LEFT AXIS DEVIATION  NON-SPECIFIC INTRA-VENTRICULAR CONDUCTION BLOCK  T WAVE ABNORMALITY, CONSIDER LATERAL ISCHEMIA  ABNORMAL ECG  WHEN COMPARED WITH ECG OF 05-OCT-2024 00:17,  SIGNIFICANT CHANGES HAVE OCCURRED  VENT. RATE HAS DECREASED        RADIOLOGY/IMAGING                    ACC: [id number] EXAM:  US ABDOMEN RT UPR QUADRANT   ORDERED BY: SÁNCHEZ NGUYEN     PROCEDURE DATE:  2025          INTERPRETATION:  CLINICAL INFORMATION: Transaminitis.    COMPARISON: 10/5/2024 CT.    TECHNIQUE: Sonography of the right upper quadrant.    FINDINGS:  Liver: Within normal limits.  Bile ducts: Normal caliber. Common bile duct measures 4 mm.  Gallbladder: No shadowing gallstones are noted.  Pancreas: Visualized portions are within normal limits.  Right kidney: 11.3 cm. No hydronephrosis. Cysts are noted measuring up to   2.5 cm  Ascites: Trace ascites  IVC: Visualized portions are within normal limits.  Other: Right pleural effusion.    IMPRESSION:  Right renal cyst.  Trace ascites.  Right pleural effusion.

## 2025-01-28 NOTE — DIETITIAN INITIAL EVALUATION ADULT - ADD RECOMMEND
Moderate Malnutrition; Least restricted diet due to medical conditions and poor oral intake  Moderate Malnutrition; Least other restricted diet due to medical conditions and poor oral intake  Severe Malnutrition; Least other restricted diet due to medical conditions and poor oral intake

## 2025-01-28 NOTE — PROGRESS NOTE ADULT - PROBLEM SELECTOR PLAN 5
H/o HFrEF with EF 28% on Valsartan, Farxiga & Digoxin  - Per wife at bedside pt stated that pt stopped taking medications at home b/c he does not want to take these medications anymore  - CT chest showed new mild anasarca and small volume upper abdominal ascites.  - Pt does not want IV placement, IVF or continual bloodwork   - Pt and wife agreeable to comfort measures

## 2025-01-28 NOTE — DIETITIAN NUTRITION RISK NOTIFICATION - ADDITIONAL COMMENTS/DIETITIAN RECOMMENDATIONS
Malnutrition Diagnosis Parameters: Failure to Thrive per MD, intake <75% needs x >1m, <50% needs x >5d, unintended wt loss 22% x 1y, debility

## 2025-01-28 NOTE — PROGRESS NOTE ADULT - PROBLEM SELECTOR PLAN 8
H/o HTN on Valsartan  - Per wife at bedside pt stated that pt stopped taking medications at home b/c he does not want to take these medications anymore  - Pt and wife agreeable to comfort measures

## 2025-01-28 NOTE — PROGRESS NOTE ADULT - PROBLEM SELECTOR PLAN 1
- Encourage oral intake  - Nutrition consult pending-f/u rec's   - Maintain fall precaution    - GOC completed   - PT consult cancelled per pt's wishes for comfort  - SW consult pending for possible home hospice

## 2025-01-28 NOTE — CONSULT NOTE ADULT - ASSESSMENT
1. Transaminitis (the etiology is not clear but can be due to:     - Congestive hepatopathy     - Drug induced less likely     - Sepsis less likely  2. No evidence of biliary obstruction  3. Failure to thrive  4. Poor po intake    Suggestions:    1. Follow up LFT's  2. Treat CHF  3. Diet as tolerated  4. Nutritional evaluation  5. Protonix 40mg daily  6. Avoid NSAID  7. Palliative evaluation  8. DVT prophylaxis

## 2025-01-28 NOTE — CONSULT NOTE ADULT - SUBJECTIVE AND OBJECTIVE BOX
Chief complain/HPI  74y M with PMHx of CVA, MI, HTN, T2DM, HLD, CAD s/p cardiac stents, HFrEF (EF 20-25% 10/2024), hx of malignant melanoma/wide excision, with recent elective bioprosthetic aortic valve replacement and ascending aortic aneurysm repair with transverse hemiarch and CABG x2 (4/8/24). now with Syncope     ECHO pending   Orthostatic VS  ECG with no acute ischemia ( unchanged from prior on sunrise)   CHF- received lasix this am  in ER holding,   cont BP control with ACE   DAPT for CABG  cont dig ,   further management post echo     CLAUDIA in 2024 due to CHF.   Admission creatinine 2.13 BUN 49 ,K 6.1       PAST MEDICAL & SURGICAL HISTORY:  HTN (hypertension)      Hearing decreased      CVA (cerebral vascular accident)  12/22/2016      Hyperlipemia      Kidney stones      Coronary artery disease  MI 12/22/2016      CHF (congestive heart failure)  1/2017 stents x3      Type 2 diabetes mellitus  2016      Confusion  from stroke      History of myocardial infarction      Malignant melanoma      S/P medial meniscal repair  and ligament surgery      H/O lithotripsy      S/P tonsillectomy      Bilateral inguinal hernia      S/P CABG x 2      S/P aortic aneurysm repair      S/P aortic valve replacement with bioprosthetic valve    Tobacco Screening:  · Core Measure Site	Yes  · Has the patient used tobacco in the past 30 days?	No     FAMILY HISTORY:  Family history of diabetes mellitus  Family history of stroke.      Home Medications Reviewed  digoxin 125 mcg (0.125 mg) oral tablet: Last Dose Taken:  , 1 tab(s) orally once a day  · 	Farxiga 10 mg oral tablet: Last Dose Taken:  , 1 tab(s) orally once a day  · 	valsartan 40 mg oral tablet: Last Dose Taken:  , 1 tab(s) orally once a day  · 	atorvastatin 80 mg oral tablet: Last Dose Taken:  , 1 tab(s) orally once a day (at bedtime)  · 	polyethylene glycol 3350 oral powder for reconstitution: Last Dose Taken:  , 17 gram(s) orally once a day as needed for  constipation  · 	Multiple Vitamins oral tablet: Last Dose Taken:  , 1 tab(s) orally once a day  · 	aspirin 81 mg oral tablet: Last Dose Taken:  , orally once a day  · 	LORazepam 0.5 mg oral tablet: Last Dose Taken:  , 1 tab(s) orally once a day (at bedtime) as needed for  anxiety Take only as needed  · 	clopidogrel 75 mg oral tablet: Last Dose Taken:  , 1 tab(s) orally once a day  · 	Flomax 0.4 mg oral capsule: Last Dose Taken:  , 1 cap(s) orally once a day  · 	metFORMIN 500 mg oral table  Hospital Medications:   MEDICATIONS  (STANDING):  aspirin  chewable 81 milliGRAM(s) Oral daily  atorvastatin 80 milliGRAM(s) Oral at bedtime  clopidogrel Tablet 75 milliGRAM(s) Oral daily  furosemide   Injectable 40 milliGRAM(s) IV Push once  heparin   Injectable 5000 Unit(s) SubCutaneous every 12 hours  insulin lispro (ADMELOG) corrective regimen sliding scale   SubCutaneous three times a day before meals  insulin lispro (ADMELOG) corrective regimen sliding scale   SubCutaneous at bedtime  tamsulosin 0.4 milliGRAM(s) Oral at bedtime    MEDICATIONS  (PRN):  polyethylene glycol 3350 17 Gram(s) Oral daily PRN for constipation      Allergies    No Known Allergies    Intolerances    eGFR: 29: The estimated glomerular filtration rate (eGFR) calculation is based on eGFR: 73: The estimated glomerular filtration rate (eGFR) calculation is based on                           14.8   10.58 )-----------( 231      ( 28 Jan 2025 06:40 )             45.2     01-28    137  |  104  |  55[H]  ----------------------------<  254[H]  5.4[H]   |  16[L]  |  2.25[H]    Ca    9.0      28 Jan 2025 06:40  Phos  4.8     01-28  Mg     2.5     01-28    TPro  5.6[L]  /  Alb  3.0[L]  /  TBili  2.2[H]  /  DBili  x   /  AST  344[H]  /  ALT  445[H]  /  AlkPhos  273[H]  01-28    Red Blood Cell - Urine: 0 /HPF  White Blood Cell - Urine: 0 /HPF  Bacteria: Few /HPF  Squamous Epithelial Cells: PresentUrinalysis with Rflx Culture (01.28.25 @ 02:10)   Urine Appearance: Clear  Color: Dark Yellow  Specific Gravity: 1.024  pH Urine: 5.0  Protein, Urine: 100 mg/dL  Glucose Qualitative, Urine: 500 mg/dL  Ketone - Urine: Trace mg/dL  Blood, Urine: Negative  Bilirubin: Small  Urobilinogen: 1.0 mg/dL  Leukocyte Esterase Concentration: Negative  Nit      Sodium, Random Urine: <5 mmol/L (01-28 @ 02:10)  Creatinine, Random Urine: 219 mg/dL (01-28 @ 02:10)        RADIOLOGY & ADDITIONAL STUDIES:  < from: US Abdomen Upper Quadrant Right (01.27.25 @ 21:03) >  FINDINGS:  Liver: Within normal limits.  Bile ducts: Normal caliber. Common bile duct measures 4 mm.  Gallbladder: No shadowing gallstones are noted.  Pancreas: Visualized portions are within normal limits.  Right kidney: 11.3 cm. No hydronephrosis. Cysts are noted measuring up to   2.5 cm  Ascites: Trace ascites  IVC: Visualized portions are within normal limits.  Other: Right pleural effusion.    < from: CT Chest No Cont (01.27.25 @ 21:15) >  IMPRESSION:  Increased, now moderate, bilateral pleural effusions. New mild anasarca   and small volume upper abdominal ascites.    Similar chronic interstitial changes/likely fibrosis. New mixed   groundglass and consolidative opacity right lung is unclear if related to   pulmonary edema or infection. Clinical correlation is recommended.      < end of copied text >      IMPRESSION:  Right renal cyst.  Trace ascites.  Right pleural effusion.    < end of copied text >    SOCIAL HISTORY: Denies ETOh,Smoking,     FAMILY HISTORY:  Family history of diabetes mellitus    Family history of stroke        REVIEW OF SYSTEMS:  CONSTITUTIONAL: No malaise, No fatigue, No fevers or chills, well developed, no diaphoresis  EYES/ENT: No visual changes;  No vertigo or throat pain   NECK: No pain or stiffness  RESPIRATORY: No cough, wheezing, hemoptysis; No shortness of breath  CARDIOVASCULAR: No chest pain or palpitations. No edema  GASTROINTESTINAL: No abdominal or epigastric pain. No nausea, vomiting, or hematemesis; No diarrhea or constipation. No melena or hematochezia.  GENITOURINARY: No dysuria, frequency, foamy urine, urinary urgency, incontinence or hematuria  NEUROLOGICAL: No numbness or weakness, No tremor  SKIN: No itching, burning, rashes, or lesions   VASCULAR: No claudication  Musculoskeletal: no arthralgia, no myalgia  All other review of systems is negative unless indicated above.    VITALS:  Vital Signs Last 24 Hrs  T(C): 36.3 (28 Jan 2025 05:18), Max: 36.9 (27 Jan 2025 21:30)  T(F): 97.3 (28 Jan 2025 05:18), Max: 98.4 (27 Jan 2025 21:30)  HR: 102 (28 Jan 2025 05:18) (96 - 102)  BP: 106/95 (28 Jan 2025 05:18) (100/77 - 111/75)  BP(mean): --  RR: 19 (28 Jan 2025 05:18) (16 - 19)  SpO2: 95% (28 Jan 2025 05:18) (95% - 97%)    Parameters below as of 28 Jan 2025 05:18  Patient On (Oxygen Delivery Method): room air        Height (cm): 170.2 (01-27 @ 11:38)  Weight (kg): 59 (01-27 @ 11:38)  BMI (kg/m2): 20.4 (01-27 @ 11:38)  BSA (m2): 1.68 (01-27 @ 11:38)    PHYSICAL EXAM:  Constitutional: NAD  HEENT: anicteric sclera, oropharynx clear, MMM  Neck: No JVD  Respiratory: good air entrance B/L, no wheezes, rales or rhonchi  Cardiovascular: S1, S2, RRR, no pericardial rub, no murmur  Gastrointestinal: BS+, soft, no tenderness, no distension, no bruit  Pelvis: bladder non-distended, no CVA tenderness  Extremities: No cyanosis or clubbing. No peripheral edema  Neurological: A/O x 3, no focal deficits                       Chief complain/HPI  74y M with PMHx of CVA, MI, HTN, T2DM, HLD, CAD s/p cardiac stents, HFrEF (EF 20-25% 10/2024), hx of malignant melanoma/wide excision, with recent elective bioprosthetic aortic valve replacement and ascending aortic aneurysm repair with transverse hemiarch and CABG x2 (4/8/24). now with Syncope     ECHO pending   Orthostatic VS  ECG with no acute ischemia ( unchanged from prior on sunrise)   CHF- received lasix this am  in ER holding,   cont BP control with ACE   DAPT for CABG  cont dig ,   further management post echo     CLAUDIA in 2024 due to CHF.   Admission creatinine 2.13 BUN 49 ,K 6.1       PAST MEDICAL & SURGICAL HISTORY:  HTN (hypertension)      Hearing decreased      CVA (cerebral vascular accident)  12/22/2016      Hyperlipemia      Kidney stones      Coronary artery disease  MI 12/22/2016      CHF (congestive heart failure)  1/2017 stents x3      Type 2 diabetes mellitus  2016      Confusion  from stroke      History of myocardial infarction      Malignant melanoma      S/P medial meniscal repair  and ligament surgery      H/O lithotripsy      S/P tonsillectomy      Bilateral inguinal hernia      S/P CABG x 2      S/P aortic aneurysm repair      S/P aortic valve replacement with bioprosthetic valve    Tobacco Screening:  · Core Measure Site	Yes  · Has the patient used tobacco in the past 30 days?	No     FAMILY HISTORY:  Family history of diabetes mellitus  Family history of stroke.      Home Medications Reviewed  digoxin 125 mcg (0.125 mg) oral tablet: Last Dose Taken:  , 1 tab(s) orally once a day  · 	Farxiga 10 mg oral tablet: Last Dose Taken:  , 1 tab(s) orally once a day  · 	valsartan 40 mg oral tablet: Last Dose Taken:  , 1 tab(s) orally once a day  · 	atorvastatin 80 mg oral tablet: Last Dose Taken:  , 1 tab(s) orally once a day (at bedtime)  · 	polyethylene glycol 3350 oral powder for reconstitution: Last Dose Taken:  , 17 gram(s) orally once a day as needed for  constipation  · 	Multiple Vitamins oral tablet: Last Dose Taken:  , 1 tab(s) orally once a day  · 	aspirin 81 mg oral tablet: Last Dose Taken:  , orally once a day  · 	LORazepam 0.5 mg oral tablet: Last Dose Taken:  , 1 tab(s) orally once a day (at bedtime) as needed for  anxiety Take only as needed  · 	clopidogrel 75 mg oral tablet: Last Dose Taken:  , 1 tab(s) orally once a day  · 	Flomax 0.4 mg oral capsule: Last Dose Taken:  , 1 cap(s) orally once a day  · 	metFORMIN 500 mg oral table  Hospital Medications:   MEDICATIONS  (STANDING):  aspirin  chewable 81 milliGRAM(s) Oral daily  atorvastatin 80 milliGRAM(s) Oral at bedtime  clopidogrel Tablet 75 milliGRAM(s) Oral daily  furosemide   Injectable 40 milliGRAM(s) IV Push once  heparin   Injectable 5000 Unit(s) SubCutaneous every 12 hours  insulin lispro (ADMELOG) corrective regimen sliding scale   SubCutaneous three times a day before meals  insulin lispro (ADMELOG) corrective regimen sliding scale   SubCutaneous at bedtime  tamsulosin 0.4 milliGRAM(s) Oral at bedtime    MEDICATIONS  (PRN):  polyethylene glycol 3350 17 Gram(s) Oral daily PRN for constipation      Allergies    No Known Allergies    Intolerances    eGFR: 29: The estimated glomerular filtration rate (eGFR) calculation is based on eGFR: 73: The estimated glomerular filtration rate (eGFR) calculation is based on                           14.8   10.58 )-----------( 231      ( 28 Jan 2025 06:40 )             45.2     01-28    137  |  104  |  55[H]  ----------------------------<  254[H]  5.4[H]   |  16[L]  |  2.25[H]    Ca    9.0      28 Jan 2025 06:40  Phos  4.8     01-28  Mg     2.5     01-28    TPro  5.6[L]  /  Alb  3.0[L]  /  TBili  2.2[H]  /  DBili  x   /  AST  344[H]  /  ALT  445[H]  /  AlkPhos  273[H]  01-28    Red Blood Cell - Urine: 0 /HPF  White Blood Cell - Urine: 0 /HPF  Bacteria: Few /HPF  Squamous Epithelial Cells: PresentUrinalysis with Rflx Culture (01.28.25 @ 02:10)   Urine Appearance: Clear  Color: Dark Yellow  Specific Gravity: 1.024  pH Urine: 5.0  Protein, Urine: 100 mg/dL  Glucose Qualitative, Urine: 500 mg/dL  Ketone - Urine: Trace mg/dL  Blood, Urine: Negative  Bilirubin: Small  Urobilinogen: 1.0 mg/dL  Leukocyte Esterase Concentration: Negative  Nit      Sodium, Random Urine: <5 mmol/L (01-28 @ 02:10)  Creatinine, Random Urine: 219 mg/dL (01-28 @ 02:10)        RADIOLOGY & ADDITIONAL STUDIES:  < from: US Abdomen Upper Quadrant Right (01.27.25 @ 21:03) >  FINDINGS:  Liver: Within normal limits.  Bile ducts: Normal caliber. Common bile duct measures 4 mm.  Gallbladder: No shadowing gallstones are noted.  Pancreas: Visualized portions are within normal limits.  Right kidney: 11.3 cm. No hydronephrosis. Cysts are noted measuring up to   2.5 cm  Ascites: Trace ascites  IVC: Visualized portions are within normal limits.  Other: Right pleural effusion.    < from: CT Chest No Cont (01.27.25 @ 21:15) >  IMPRESSION:  Increased, now moderate, bilateral pleural effusions. New mild anasarca   and small volume upper abdominal ascites.    Similar chronic interstitial changes/likely fibrosis. New mixed   groundglass and consolidative opacity right lung is unclear if related to   pulmonary edema or infection. Clinical correlation is recommended.      < end of copied text >      IMPRESSION:  Right renal cyst.  Trace ascites.  Right pleural effusion.    < end of copied text >    SOCIAL HISTORY: Denies ETOh,Smoking,     FAMILY HISTORY:  Family history of diabetes mellitus    Family history of stroke        REVIEW OF SYSTEMS:    VITALS:  Vital Signs Last 24 Hrs  T(C): 36.3 (28 Jan 2025 05:18), Max: 36.9 (27 Jan 2025 21:30)  T(F): 97.3 (28 Jan 2025 05:18), Max: 98.4 (27 Jan 2025 21:30)  HR: 102 (28 Jan 2025 05:18) (96 - 102)  BP: 106/95 (28 Jan 2025 05:18) (100/77 - 111/75)  BP(mean): --  RR: 19 (28 Jan 2025 05:18) (16 - 19)  SpO2: 95% (28 Jan 2025 05:18) (95% - 97%)    Parameters below as of 28 Jan 2025 05:18  Patient On (Oxygen Delivery Method): room air        Height (cm): 170.2 (01-27 @ 11:38)  Weight (kg): 59 (01-27 @ 11:38)  BMI (kg/m2): 20.4 (01-27 @ 11:38)  BSA (m2): 1.68 (01-27 @ 11:38)    PHYSICAL EXAM:  Constitutional: NAD  HEENT: anicteric sclera, oropharynx clear, MMM  Neck: No JVD  Respiratory: Cheyne crockett breathing air entrance B/L.  Cardiovascular: S1, S2, RRR, no pericardial rub, no murmur  Gastrointestinal: BS+, soft, no tenderness, no distension, no bruit  Pelvis: bladder non-distended, no CVA tenderness  Extremities: Edema bilateral plus 1 in tibial area.   Patient is obtunded.                        Chief complain/HPI  74y M with PMHx of CVA, MI, HTN, T2DM, HLD, CAD s/p cardiac stents, HFrEF (EF 20-25% 10/2024), hx of malignant melanoma/wide excision, with recent elective bioprosthetic aortic valve replacement and ascending aortic aneurysm repair with transverse hemiarch and CABG x2 (4/8/24). now with Syncope     ECHO pending   Orthostatic VS  ECG with no acute ischemia ( unchanged from prior on sunrise)   CHF- received lasix this am  in ER holding,   cont BP control with ACE   DAPT for CABG  cont dig ,   further management post echo     CLAUDIA in 2024 due to CHF.   Admission creatinine 2.13 BUN 49 ,K 6.1       PAST MEDICAL & SURGICAL HISTORY:  HTN (hypertension)      Hearing decreased      CVA (cerebral vascular accident)  12/22/2016      Hyperlipemia      Kidney stones      Coronary artery disease  MI 12/22/2016      CHF (congestive heart failure)  1/2017 stents x3      Type 2 diabetes mellitus  2016      Confusion  from stroke      History of myocardial infarction      Malignant melanoma      S/P medial meniscal repair  and ligament surgery      H/O lithotripsy      S/P tonsillectomy      Bilateral inguinal hernia      S/P CABG x 2      S/P aortic aneurysm repair      S/P aortic valve replacement with bioprosthetic valve    Tobacco Screening:  · Core Measure Site	Yes  · Has the patient used tobacco in the past 30 days?	No     FAMILY HISTORY:  Family history of diabetes mellitus  Family history of stroke.      Home Medications Reviewed  digoxin 125 mcg (0.125 mg) oral tablet: Last Dose Taken:  , 1 tab(s) orally once a day  · 	Farxiga 10 mg oral tablet: Last Dose Taken:  , 1 tab(s) orally once a day  · 	valsartan 40 mg oral tablet: Last Dose Taken:  , 1 tab(s) orally once a day  · 	atorvastatin 80 mg oral tablet: Last Dose Taken:  , 1 tab(s) orally once a day (at bedtime)  · 	polyethylene glycol 3350 oral powder for reconstitution: Last Dose Taken:  , 17 gram(s) orally once a day as needed for  constipation  · 	Multiple Vitamins oral tablet: Last Dose Taken:  , 1 tab(s) orally once a day  · 	aspirin 81 mg oral tablet: Last Dose Taken:  , orally once a day  · 	LORazepam 0.5 mg oral tablet: Last Dose Taken:  , 1 tab(s) orally once a day (at bedtime) as needed for  anxiety Take only as needed  · 	clopidogrel 75 mg oral tablet: Last Dose Taken:  , 1 tab(s) orally once a day  · 	Flomax 0.4 mg oral capsule: Last Dose Taken:  , 1 cap(s) orally once a day  · 	metFORMIN 500 mg oral table  Hospital Medications:   MEDICATIONS  (STANDING):  aspirin  chewable 81 milliGRAM(s) Oral daily  atorvastatin 80 milliGRAM(s) Oral at bedtime  clopidogrel Tablet 75 milliGRAM(s) Oral daily  furosemide   Injectable 40 milliGRAM(s) IV Push once  heparin   Injectable 5000 Unit(s) SubCutaneous every 12 hours  insulin lispro (ADMELOG) corrective regimen sliding scale   SubCutaneous three times a day before meals  insulin lispro (ADMELOG) corrective regimen sliding scale   SubCutaneous at bedtime  tamsulosin 0.4 milliGRAM(s) Oral at bedtime    MEDICATIONS  (PRN):  polyethylene glycol 3350 17 Gram(s) Oral daily PRN for constipation      Allergies    No Known Allergies    Intolerances    eGFR: 29: The estimated glomerular filtration rate (eGFR) calculation is based on eGFR: 73: The estimated glomerular filtration rate (eGFR) calculation is based on                           14.8   10.58 )-----------( 231      ( 28 Jan 2025 06:40 )             45.2     01-28    137  |  104  |  55[H]  ----------------------------<  254[H]  5.4[H]   |  16[L]  |  2.25[H]    Ca    9.0      28 Jan 2025 06:40  Phos  4.8     01-28  Mg     2.5     01-28    TPro  5.6[L]  /  Alb  3.0[L]  /  TBili  2.2[H]  /  DBili  x   /  AST  344[H]  /  ALT  445[H]  /  AlkPhos  273[H]  01-28    Red Blood Cell - Urine: 0 /HPF  White Blood Cell - Urine: 0 /HPF  Bacteria: Few /HPF  Squamous Epithelial Cells: PresentUrinalysis with Rflx Culture (01.28.25 @ 02:10)   Urine Appearance: Clear  Color: Dark Yellow  Specific Gravity: 1.024  pH Urine: 5.0  Protein, Urine: 100 mg/dL  Glucose Qualitative, Urine: 500 mg/dL  Ketone - Urine: Trace mg/dL  Blood, Urine: Negative  Bilirubin: Small  Urobilinogen: 1.0 mg/dL  Leukocyte Esterase Concentration: Negative  Nit      Sodium, Random Urine: <5 mmol/L (01-28 @ 02:10)  Creatinine, Random Urine: 219 mg/dL (01-28 @ 02:10)        RADIOLOGY & ADDITIONAL STUDIES:  < from: US Abdomen Upper Quadrant Right (01.27.25 @ 21:03) >  FINDINGS:  Liver: Within normal limits.  Bile ducts: Normal caliber. Common bile duct measures 4 mm.  Gallbladder: No shadowing gallstones are noted.  Pancreas: Visualized portions are within normal limits.  Right kidney: 11.3 cm. No hydronephrosis. Cysts are noted measuring up to   2.5 cm  Ascites: Trace ascites  IVC: Visualized portions are within normal limits.  Other: Right pleural effusion.    < from: CT Chest No Cont (01.27.25 @ 21:15) >  IMPRESSION:  Increased, now moderate, bilateral pleural effusions. New mild anasarca   and small volume upper abdominal ascites.    Similar chronic interstitial changes/likely fibrosis. New mixed   groundglass and consolidative opacity right lung is unclear if related to   pulmonary edema or infection. Clinical correlation is recommended.      < end of copied text >      IMPRESSION:  Right renal cyst.  Trace ascites.  Right pleural effusion.    < end of copied text >  Echo   1. Left ventricular cavity is normal in size. Left ventricular wall thickness is normal. Left ventricular systolic function is severely decreased with an ejection fraction of 28 % by Cash's method of disks. Regional wall motion abnormalities present.The septum,apex,,inferior and infero-lateral walls are akinetic.   2. There is increased LV mass and eccentric hypertrophy.   3. There is severe (grade 3) left ventricular diastolic dysfunction.   4. Normal right ventricular cavity size, with normal wall thickness, and normal right ventricular systolic function. Tricuspid annular plane systolic excursion (TAPSE) is 1.7 cm (normal >=1.7 cm).   5. Mild to moderate mitral regurgitation.   6. Normal left and right atrial size.   7. Pulmonary artery systolic pressure could not be estimated.   8. Mild pulmonic regurgitation.   9. No pericardial effusionseen.  10. No prior echocardiogram is available for comparison.    SOCIAL HISTORY: Denies ETOh,Smoking,     FAMILY HISTORY:  Family history of diabetes mellitus    Family history of stroke        REVIEW OF SYSTEMS:  Lethargic  VITALS:  Vital Signs Last 24 Hrs  T(C): 36.3 (28 Jan 2025 05:18), Max: 36.9 (27 Jan 2025 21:30)  T(F): 97.3 (28 Jan 2025 05:18), Max: 98.4 (27 Jan 2025 21:30)  HR: 102 (28 Jan 2025 05:18) (96 - 102)  BP: 106/95 (28 Jan 2025 05:18) (100/77 - 111/75)  BP(mean): --  RR: 19 (28 Jan 2025 05:18) (16 - 19)  SpO2: 95% (28 Jan 2025 05:18) (95% - 97%)    Parameters below as of 28 Jan 2025 05:18  Patient On (Oxygen Delivery Method): room air        Height (cm): 170.2 (01-27 @ 11:38)  Weight (kg): 59 (01-27 @ 11:38)  BMI (kg/m2): 20.4 (01-27 @ 11:38)  BSA (m2): 1.68 (01-27 @ 11:38)    PHYSICAL EXAM:  Constitutional: NAD  HEENT: anicteric sclera, oropharynx clear, MMM  Neck: No JVD  Respiratory: Cheyne crockett breathing air entrance B/L.  Cardiovascular: S1, S2, RRR, no pericardial rub, no murmur  Gastrointestinal: BS+, soft, no tenderness, no distension, no bruit  Pelvis: bladder non-distended, no CVA tenderness  Extremities: Edema bilateral plus 1 in tibial area.   Patient is obtunded.

## 2025-01-28 NOTE — PROGRESS NOTE ADULT - ASSESSMENT
74 year old, M, from home, w/  PMH of CVA, MI, HTN, T2DM, HLD, CAD s/p cardiac stents, HFrEF (EF 20-25% 10/2024), hx of malignant melanoma/wide excision, with recent elective bioprosthetic aortic valve replacement and ascending aortic aneurysm repair with transverse hemiarch and CABG x2 (4/8/24).  Presents for 1 month of decrease oral intake. Admitted for FTT, CLAUDIA, Transaminitis and Pleural effusions.

## 2025-01-28 NOTE — DIETITIAN NUTRITION RISK NOTIFICATION - TREATMENT: THE FOLLOWING DIET HAS BEEN RECOMMENDED
Diet, Pureed:   Consistent Carbohydrate {No Snacks}  Supplement Feeding Modality:  Oral  Glucerna Shake Cans or Servings Per Day:  1       Frequency:  Three Times a day (01-28-25 @ 14:20) [Pending Verification By Attending]

## 2025-01-28 NOTE — CONSULT NOTE ADULT - ASSESSMENT
CLAUDIA - Cardiorenal syndrome.  Severe Cardiomyopathy systolic and diastolic. Cheyne crockett breathing.   Continue diuretics . Monitor intake and output. Follow with Cardiology need for Inotropic meds.  Prognosis is poor.

## 2025-01-28 NOTE — DIETITIAN INITIAL EVALUATION ADULT - NSICDXPASTMEDICALHX_GEN_ALL_CORE_FT
PAST MEDICAL HISTORY:  CHF (congestive heart failure) 1/2017 stents x3    Confusion from stroke    Coronary artery disease MI 12/22/2016    CVA (cerebral vascular accident) 12/22/2016    Hearing decreased     History of myocardial infarction     HTN (hypertension)     Hyperlipemia     Kidney stones     Malignant melanoma     Type 2 diabetes mellitus 2016

## 2025-01-28 NOTE — PROGRESS NOTE ADULT - PROBLEM SELECTOR PLAN 2
- P/w  SCr 2.13 on admission.  Baseline SCr - 1.19 on 12/19/24  - Likely prerenal 2/2 dehydration, poor oral intake  - Pt does not want IV placement, IVF or continual bloodwork   - Continue to avoid Nephrotoxic drugs   - Nephro-Dr Phillips

## 2025-01-28 NOTE — DIETITIAN INITIAL EVALUATION ADULT - PERTINENT MEDS FT
MEDICATIONS  (STANDING):  aspirin  chewable 81 milliGRAM(s) Oral daily  atorvastatin 80 milliGRAM(s) Oral at bedtime  clopidogrel Tablet 75 milliGRAM(s) Oral daily  furosemide   Injectable 40 milliGRAM(s) IV Push once  heparin   Injectable 5000 Unit(s) SubCutaneous every 12 hours  insulin lispro (ADMELOG) corrective regimen sliding scale   SubCutaneous three times a day before meals  insulin lispro (ADMELOG) corrective regimen sliding scale   SubCutaneous at bedtime  tamsulosin 0.4 milliGRAM(s) Oral at bedtime    MEDICATIONS  (PRN):  acetaminophen     Tablet .. 650 milliGRAM(s) Oral every 6 hours PRN Temp greater or equal to 38C (100.4F), Mild Pain (1 - 3)  polyethylene glycol 3350 17 Gram(s) Oral daily PRN for constipation

## 2025-01-28 NOTE — DIETITIAN INITIAL EVALUATION ADULT - PROBLEM SELECTOR PLAN 5
Hx of CHF on valsartan 40mg qd, Farxiga 10mg qd, (unsure why pt on digoxin 124 mcg)  - HFrEF with EF 28% on 12/17  - pt declined ICD as per chart review  - not in CHF exacerbation  - hold home valsartan due to CLAUDIA  - hold home farxiga for now  - f/u BNP

## 2025-01-28 NOTE — CONSULT NOTE ADULT - PROVIDER SPECIALTY LIST ADULT
Gastroenterology Quality 226: Preventive Care And Screening: Tobacco Use: Screening And Cessation Intervention: Tobacco Screening not Performed Detail Level: Detailed Quality 130: Documentation Of Current Medications In The Medical Record: Current Medications Documented

## 2025-01-28 NOTE — CONSULT NOTE ADULT - SUBJECTIVE AND OBJECTIVE BOX
Pt Name: IRA ESN  MRN: [id number]      NEUROSURGERY SPINE CONSULT    Diagnosis: T1 Vertebral Compression Fracture     74yMale HPI:  Patient is a 74M PMHx of CVA, MI, HTN, T2DM, HLD, CAD s/p cardiac stents, HFrEF (EF 20-25% 10/2024), hx of malignant melanoma/wide excision, with recent elective bioprosthetic aortic valve replacement and ascending aortic aneurysm repair with transverse hemiarch and CABG x2 (4/8/24) here for 1 month of decrease oral intake. For the last 2-3 days he has no food intake and had minimal liquid intake. Reports he did not take his medications for the past 4-5 days. Reports chills, generalized weakness and SOB. Pt wife noticed pt is more "leaner" than usual when received a back massage from his wife. Denies sick contacts, cough, congestion, sore throat, rhinorrhea, abdominal pain, dysuria, hematuria, chest pain, fever. Of note, pt declined ICD placement.     in ED: 98.3F, HR: 96, 111/75, 97% on RA  s/p 1L LR bolus, hyperkalemia cocktail x1,      (27 Jan 2025 16:03)      75 y/o M, AOx2 during encounter, presented to  ED, with decreased oral intake for 1 month. Patient did not have family at bedside during this encounter. Patient states he feel 2 days ago in the bathroom but does not know if he was home or at an assisted living facility. Admits to headstrike, does not know if he lost consciousness. Currently states he is not having pain to any part of his body. Denies numbness/tingling to all 4 extremities. Denies frequent falls or history of spine surgery. Pt denies Fever, Chest pain, SOB, dyspnea, paresthesias, N/V/D, abdominal pain, syncope, or pain anywhere else.         [   denies  ] bladder/bowel incontinence or changes  [    denies ] saddle-like paresthesias  denies cp/sob/palpitations      Ambulation:     Unable to elicit prior ambulation status from patient        PAST MEDICAL & SURGICAL HISTORY:  HTN (hypertension)      Hearing decreased      CVA (cerebral vascular accident)  12/22/2016      Hyperlipemia      Kidney stones      Coronary artery disease  MI 12/22/2016      CHF (congestive heart failure)  1/2017 stents x3      Type 2 diabetes mellitus  2016      Confusion  from stroke      History of myocardial infarction      Malignant melanoma      S/P medial meniscal repair  and ligament surgery      H/O lithotripsy      S/P tonsillectomy      Bilateral inguinal hernia      S/P CABG x 2      S/P aortic aneurysm repair      S/P aortic valve replacement with bioprosthetic valve          Vital Signs Last 24 Hrs  T(C): 36.8 (28 Jan 2025 08:20), Max: 36.9 (27 Jan 2025 21:30)  T(F): 98.2 (28 Jan 2025 08:20), Max: 98.4 (27 Jan 2025 21:30)  HR: 104 (28 Jan 2025 08:20) (96 - 104)  BP: 106/77 (28 Jan 2025 08:20) (100/77 - 111/75)  BP(mean): --  RR: 19 (28 Jan 2025 08:20) (16 - 19)  SpO2: 95% (28 Jan 2025 08:20) (95% - 97%)    Parameters below as of 28 Jan 2025 08:20  Patient On (Oxygen Delivery Method): nasal cannula  O2 Flow (L/min): 2      Physical Exam:  General; Awake and alert, Oriented x2  Hips/Pelvis:   Able to SLR bilaterally. Negative log roll, heel strike. No pain on passive Int/Ext hip rotation.  Spine exam:  Neck: supple, NT, Full Painless baseline AROM  Back: NTTP along spinal column or paraspinal muscles   Extremities:          -Left Upper Extremity: Atraumatic with normal alignment NROM. No crepitus. No bony tenderness.      -Right Upper Extremity: Atraumatic with normal alignment NROM. No crepitus. No bony tenderness.      -Left Lower Extremity: Atraumatic with normal alignment NROM. No crepitus. No bony tenderness. No calf tenderness, Calf soft.     -Right Lower Extremity: Atraumatic with normal alignment NROM. No crepitus. No bony tenderness.  No calf tenderness, Calf soft.         >Sensation:  intact to light touch           >Motor exam:          >Bilateral Upper extremities     4/5 strength throughout all muscle groups          >Bilateral Lower ext.               4/5 strength throughout all muscles groups       Labs:                        14.8   10.58 )-----------( 231      ( 28 Jan 2025 06:40 )             45.2     01-28    137  |  104  |  55[H]  ----------------------------<  254[H]  5.4[H]   |  16[L]  |  2.25[H]    Ca    9.0      28 Jan 2025 06:40  Phos  4.8     01-28  Mg     2.5     01-28    TPro  5.6[L]  /  Alb  3.0[L]  /  TBili  2.2[H]  /  DBili  x   /  AST  344[H]  /  ALT  445[H]  /  AlkPhos  273[H]  01-28      Radiology:     < from: CT Cervical Spine No Cont (01.27.25 @ 18:59) >    ACC: [id number] EXAM:  CT BRAIN   ORDERED BY: CORNEL CRUZ     ACC: [id number] EXAM:  CT CERVICAL SPINE   ORDERED BY: CORNEL CRUZ     PROCEDURE DATE:  01/27/2025          INTERPRETATION:  Noncontrast CT of the brain and cervical spine    CLINICAL INDICATION: Status post fall    TECHNIQUE: Axial CT scanning of the brain and cervical spine were   obtained without the administration of intravenous contrast.  Images were   reformatted in the sagittal and coronal planes.    COMPARISON: CT brain and cervical spine 12/14/2024    FINDINGS:    CT brain:    No hydrocephalus, mass effect, midline shift, acute intracranial   hemorrhage, or brain edema.    Chronic right thalamic and right cerebellar hemisphere infarcts. Mild   white matter microvascular ischemic disease.    No displaced calvarial fracture. No appreciable scalp hematoma.    Visualized paranasal sinuses and mastoid air cells are clear.    CT cervical spine:    Increased mild compression deformity of the superior endplate of T1 with   newminimal sclerosis subjacent to the endplate. Acute fracture is   suspected. No retropulsed bone.    Remaining vertebral bodies demonstrate normal height. Vertebral alignment   is maintained.    No prevertebral edema.    Multilevel disc space narrowing.    Multilevel degenerative changes.    Partially visualized bilateral pleural effusions.    IMPRESSION:    CT brain:  No acute intracranial hemorrhage, brain edema, or mass effect.  No displaced calvarial fracture.    CT cervical spine:  Increased mild compression deformity of the superior endplate of T1 with   new minimal sclerosis subjacent to the endplate. Acute fracture is   suspected. No retropulsed bone.    --- End of Report ---            MALIA ALONSO MD; Attending Radiologist  This document has been electronically signed. Jan 27 2025  7:43PM    < end of copied text >        Impression:  Pt is a  74y Male with T1 Vertebral Compression Fracture    Plan:  - Recommendation: Conservative treatment    > No surgical intervention at this time as patient is not currently in pain and physical exam WNL.     >> imaging reviewed independently by myself and surgeon  - Pain management  - DVT ppx with venodynes  - Patient does not need brace as pain is well controlled and fracture is stable.   - Daily PT- WBAT of the lower extremities. Proper body mechanics.  - Case d/w Dr. Gaines   - Follow-up with Dr. Gaines after discharge at [phone].

## 2025-01-28 NOTE — PROGRESS NOTE ADULT - PROBLEM SELECTOR PLAN 3
- P/w   ,  [id number]  T-Bili: 2  - RUQ US showed right renal cyst, trace ascites, & right pleural effusion.  - Pt does not want IV placement, IVF or continual bloodwork   - Monitor as pt allows  - Pt and wife agreeable to comfort measures

## 2025-01-28 NOTE — PROGRESS NOTE ADULT - PROBLEM SELECTOR PLAN 7
H/o DM on Metformin & Farxiga  - Per wife at bedside pt stated that pt stopped taking medications at home b/c he does not want to take these medications anymore  - Monitor FS as pt allows   - Pt and wife agreeable to comfort measures

## 2025-01-28 NOTE — PROGRESS NOTE ADULT - PROBLEM SELECTOR PLAN 9
- Per wife at bedside pt stated that pt stopped taking medications at home b/c he does not want to take these medications anymore  - Pt and wife agreeable to comfort measures

## 2025-01-28 NOTE — DIETITIAN INITIAL EVALUATION ADULT - PERTINENT LABORATORY DATA
01-28    137  |  104  |  55[H]  ----------------------------<  254[H]  5.4[H]   |  16[L]  |  2.25[H]    Ca    9.0      28 Jan 2025 06:40  Phos  4.8     01-28  Mg     2.5     01-28    TPro  5.6[L]  /  Alb  3.0[L]  /  TBili  2.2[H]  /  DBili  x   /  AST  344[H]  /  ALT  445[H]  /  AlkPhos  273[H]  01-28  POCT Blood Glucose.: 165 mg/dL (01-28-25 @ 11:54)  A1C with Estimated Average Glucose Result: 10.2 % (11-27-24 @ 05:50)  A1C with Estimated Average Glucose Result: 9.3 % (10-06-24 @ 07:24)  A1C with Estimated Average Glucose Result: 9.8 % (04-07-24 @ 15:28)

## 2025-01-28 NOTE — DIETITIAN INITIAL EVALUATION ADULT - FEEDING ASSISTANCE
Nursing to continue feeding assistance and encouragement, aspiration precaution;  Nursing to continue feeding assistance and encouragement, aspiration precaution

## 2025-01-28 NOTE — DIETITIAN INITIAL EVALUATION ADULT - MALNUTRITION
Moderate Malnutrition in context of chronic illness as evidenced by Failure to Thrive per MD, decreased intake x 1m, intake<50% needs x 3-4d, wt loss (details ?), debility  Severe Malnutrition in context of chronic illness as evidenced by Failure to Thrive per MD, intake <75% needs x >1m, <50% needs x >5d, unintended wt loss 22% x 1y, debility

## 2025-01-28 NOTE — PROGRESS NOTE ADULT - SUBJECTIVE AND OBJECTIVE BOX
INTERVAL HPI/OVERNIGHT EVENTS:  Patient seen,event noticed  VITAL SIGNS:  T(F): 97.3 (25 @ 05:18)  HR: 102 (25 @ 05:18)  BP: 106/95 (25 @ 05:18)  RR: 19 (25 @ 05:18)  SpO2: 95% (25 @ 05:18)  Wt(kg): --    PHYSICAL EXAM:  awake  Constitutional:  Eyes:  ENMT:perrla  Neck:  Respiratory:clear  Cardiovascular:s1s2,m-none  Gastrointestinal:soft,bs pos  Extremities:  Vascular:  Neurological:no focal deficit  Musculoskeletal:    MEDICATIONS  (STANDING):  aspirin  chewable 81 milliGRAM(s) Oral daily  atorvastatin 80 milliGRAM(s) Oral at bedtime  clopidogrel Tablet 75 milliGRAM(s) Oral daily  furosemide   Injectable 40 milliGRAM(s) IV Push once  heparin   Injectable 5000 Unit(s) SubCutaneous every 12 hours  insulin lispro (ADMELOG) corrective regimen sliding scale   SubCutaneous three times a day before meals  insulin lispro (ADMELOG) corrective regimen sliding scale   SubCutaneous at bedtime  tamsulosin 0.4 milliGRAM(s) Oral at bedtime    MEDICATIONS  (PRN):  polyethylene glycol 3350 17 Gram(s) Oral daily PRN for constipation      Allergies    No Known Allergies    Intolerances        LABS:                        14.8   10.58 )-----------( 231      ( 2025 06:40 )             45.2         137  |  106  |  51[H]  ----------------------------<  254[H]  5.4[H]   |  18[L]  |  2.11[H]    Ca    8.8      2025 00:13    TPro  5.9[L]  /  Alb  3.2[L]  /  TBili  2.0[H]  /  DBili  x   /  AST  202[H]  /  ALT  269[H]  /  AlkPhos  296[H]        Urinalysis Basic - ( 2025 02:10 )    Color: Dark Yellow / Appearance: Clear / S.024 / pH: x  Gluc: x / Ketone: Trace mg/dL  / Bili: Small / Urobili: 1.0 mg/dL   Blood: x / Protein: 100 mg/dL / Nitrite: Negative   Leuk Esterase: Negative / RBC: 0 /HPF / WBC 0 /HPF   Sq Epi: x / Non Sq Epi: x / Bacteria: Few /HPF        RADIOLOGY & ADDITIONAL TESTS:      · Assessment	  Patient is a 74M PMHx of CVA, MI, HTN, T2DM, HLD, CAD s/p cardiac stents, HFrEF (EF 20-25% 10/2024), hx of malignant melanoma/wide excision, with recent elective bioprosthetic aortic valve replacement and ascending aortic aneurysm repair with transverse hemiarch and CABG x2 (24) here for 1 month of decrease oral intake. Here for FTT, CLAUDIA, ?opacities seen on CXR, transaminitis       Problem/Plan - 1:  ·  Problem: Adult failure to thrive.   - encourage oral intake, start pureed diet  - consulted nutrition  - consulted PT  - fall precaution, ambulate with assistance.  -Palliative care evaluation for GOC     Problem/Plan - 2:  ·  Problem: CLAUDIA (acute kidney injury).   ·  Plan: p/w  SCr 2.13 on admission  Baseline SCr - 1.19 on 24  - likely prerenal 2/2 dehydration, poor oral intake  - f/u urine Lytes,   - f/u BMP  - Avoid Nephrotoxic agent.  -renla f/up-Dr Phillips notified     Problem/Plan - 3:  ·  Problem: Transaminitis.   ·  Plan: On admission  ,  [id number]  T-Bili: 2  - f/u RUQ US  - monitor LFTs.     Problem/Plan - 4:  ·  Problem: Abnormality of lung on chest x-ray.   ·  Plan: CXR: Interval significant improvement of left mid and lower lung opacity. A right lower lung opacity demonstrates interval progression. Cannot exclude small pleural effusions.   - ?opacity of right lower lung (?PNA vs fluid overload)  - did not meet SIRS/sepsis criteria   - f/u procalcitonin  - f/u CT chest non con.     Problem/Plan - 5:  ·  Problem: CHF (congestive heart failure).   ·  Plan: Hx of CHF on valsartan 40mg qd, Farxiga 10mg qd, (unsure why pt on digoxin 124 mcg)  - HFrEF with EF 28% on   - pt declined ICD as per chart review  - not in CHF exacerbation  - hold home valsartan due to CLAUDIA  - hold home farxiga for now  - f/u BNP.     Problem/Plan - 6:  ·  Problem: CAD (coronary artery disease).   ·  Plan: hx of CAD s/p cardiac stents and CABG x2 on home ASA 81mg and plavix 75mg  - c/w home meds.     Problem/Plan - 7:  ·  Problem: DM (diabetes mellitus).   ·  Plan: hx of DM on metformin 500mg BID, farxiga 10mg qd  - hold oral meds  - ISS, FS.     Problem/Plan - 8:  ·  Problem: HTN (hypertension).   ·  Plan: hx of HTN on valsartan 40mg qd  - hold valsartan iso CLAUDIA  - monitor BP.     Problem/Plan - 9:  ·  Problem: BPH (benign prostatic hyperplasia).   ·  Plan: hx of BPH on home flomax0,4mg  c/w home meds.     Problem/Plan - 10:  ·  Problem: History of CVA (cerebrovascular accident).   ·  Plan; hx of CVA on atorvastatin 80mg, ASA 81mg  - c/w home meds.     Problem/Plan - 11:  ·  Problem: HLD (hyperlipidemia).   ·  Plan: hx of HLD on atorvastatin 80mg qd  - c/w home meds.     Problem/Plan - 12:  ·  Problem: Preventive measure.   ·  Plan: dvt ppx: heparin subq.    FOR FURHTER COVERAGE TILL  PLEASE CALL DR HOOD

## 2025-01-28 NOTE — DIETITIAN INITIAL EVALUATION ADULT - DIET TYPE
Add Glucerna Shake 1can timeme as medically feasible (660kcal, 30g protein)/pureed Add Glucerna Shake 1can tid as medically feasible (660kcal, 30g protein)/pureed/consistent carbohydrate renal with no snacks Add Glucerna Shake 1can tid as medically feasible (660kcal, 30g protein)./pureed/consistent carbohydrate renal with no snacks

## 2025-01-28 NOTE — PROGRESS NOTE ADULT - SUBJECTIVE AND OBJECTIVE BOX
NP Note discussed with  primary attending    Patient is a 74y old  Male who presents with a chief complaint of Failure to thrive in adult     (28 Jan 2025 12:05)      INTERVAL HPI/OVERNIGHT EVENTS: Pt seen w/ wife at bedside.  Pt reports he has to goto the bathroom to pee and poop.  Staff made aware.  Pt and wife requested to complete "DNR form".  Pt and wife elected DNR/DNI and comfort measures.  Wife and pt explained that pt has not eaten in 4d, just drinking.  Pt adamantly expressed that he does not want IV placement or IVF.  Pt and wife expressed that pt "does not want alot of pills."  Pt states that he's not going to take a lot of pills.  NP listened and verbalized understanding.        MEDICATIONS  (STANDING):  aspirin  chewable 81 milliGRAM(s) Oral daily  atorvastatin 80 milliGRAM(s) Oral at bedtime  clopidogrel Tablet 75 milliGRAM(s) Oral daily  furosemide   Injectable 40 milliGRAM(s) IV Push once  heparin   Injectable 5000 Unit(s) SubCutaneous every 12 hours  insulin lispro (ADMELOG) corrective regimen sliding scale   SubCutaneous three times a day before meals  insulin lispro (ADMELOG) corrective regimen sliding scale   SubCutaneous at bedtime  tamsulosin 0.4 milliGRAM(s) Oral at bedtime    MEDICATIONS  (PRN):  acetaminophen     Tablet .. 650 milliGRAM(s) Oral every 6 hours PRN Temp greater or equal to 38C (100.4F), Mild Pain (1 - 3)  polyethylene glycol 3350 17 Gram(s) Oral daily PRN for constipation      __________________________________________________  REVIEW OF SYSTEMS:    CONSTITUTIONAL: No fever  EYES: No acute visual disturbances  NECK: No pain or stiffness  RESPIRATORY: No cough; No shortness of breath  CARDIOVASCULAR: No chest pain, no palpitations  GASTROINTESTINAL: No pain. No nausea or vomiting.  No diarrhea   NEUROLOGICAL: No headache or numbness, no tremors  MUSCULOSKELETAL: No joint pain, no muscle pain  GENITOURINARY: No dysuria, no frequency, no hesitancy  PSYCHIATRY: No depression , no anxiety  ALL OTHER  ROS negative        Vital Signs Last 24 Hrs  T(C): 36.3 (28 Jan 2025 11:35), Max: 36.9 (27 Jan 2025 21:30)  T(F): 97.3 (28 Jan 2025 11:35), Max: 98.4 (27 Jan 2025 21:30)  HR: 99 (28 Jan 2025 11:35) (98 - 104)  BP: 113/89 (28 Jan 2025 11:35) (100/77 - 113/89)  BP(mean): --  RR: 19 (28 Jan 2025 11:35) (18 - 19)  SpO2: 95% (28 Jan 2025 11:35) (95% - 96%)    Parameters below as of 28 Jan 2025 11:35  Patient On (Oxygen Delivery Method): nasal cannula  O2 Flow (L/min): 2      ________________________________________________  PHYSICAL EXAM:  GENERAL: NAD  HEENT: Normocephalic;  conjunctivae and sclerae clear; moist mucous membranes   NECK : Supple  CHEST/LUNG: Clear to auscultation bilaterally with good air entry   HEART: S1 S2  regular; no murmurs, gallops or rubs  ABDOMEN: Soft, Nontender, Nondistended; Bowel sounds present x 4 quad  EXTREMITIES: No cyanosis; no edema; no calf tenderness  SKIN: Warm and dry; no rash  NERVOUS SYSTEM:  Awake and alert; Oriented to place, person and time; no new deficits    _________________________________________________  LABS:                        14.8   10.58 )-----------( 231      ( 28 Jan 2025 06:40 )             45.2     01-28    137  |  104  |  55[H]  ----------------------------<  254[H]  5.4[H]   |  16[L]  |  2.25[H]    Ca    9.0      28 Jan 2025 06:40  Phos  4.8     01-28  Mg     2.5     01-28    TPro  5.6[L]  /  Alb  3.0[L]  /  TBili  2.2[H]  /  DBili  x   /  AST  344[H]  /  ALT  445[H]  /  AlkPhos  273[H]  01-28      Urinalysis Basic - ( 28 Jan 2025 06:40 )    Color: x / Appearance: x / SG: x / pH: x  Gluc: 254 mg/dL / Ketone: x  / Bili: x / Urobili: x   Blood: x / Protein: x / Nitrite: x   Leuk Esterase: x / RBC: x / WBC x   Sq Epi: x / Non Sq Epi: x / Bacteria: x      CAPILLARY BLOOD GLUCOSE      POCT Blood Glucose.: 165 mg/dL (28 Jan 2025 11:54)  POCT Blood Glucose.: 226 mg/dL (28 Jan 2025 07:51)  POCT Blood Glucose.: 219 mg/dL (27 Jan 2025 22:40)        RADIOLOGY & ADDITIONAL TESTS:    Imaging Personally Reviewed:  YES/NO    Consultant(s) Notes Reviewed:   YES/ No    Care Discussed with Consultants :     Plan of care was discussed with patient and /or primary care giver; all questions and concerns were addressed and care was aligned with patient's wishes.

## 2025-01-28 NOTE — DIETITIAN INITIAL EVALUATION ADULT - NS FNS DIET ORDER
Diet, Pureed:   Consistent Carbohydrate {No Snacks}  DASH/TLC {Sodium & Cholesterol Restricted} (01-27-25 @ 18:21)

## 2025-01-28 NOTE — PROGRESS NOTE ADULT - PROBLEM SELECTOR PLAN 4
- CXR showed interval improvement of left lung opacity and progression of right lower   lung opacity.  - CT Chest showed increased, now moderate, bilateral pleural effusions.  Chronic interstitial changes/likely fibrosis. New mixed groundglass and consolidative opacity right lung is unclear if related to pulmonary edema or infection.   - Pt does not want IV placement, IVF or continual bloodwork   - Pt and wife agreeable to comfort measures

## 2025-01-29 ENCOUNTER — APPOINTMENT (OUTPATIENT)
Dept: CARDIOLOGY | Facility: CLINIC | Age: 75
End: 2025-01-29

## 2025-01-29 NOTE — PROGRESS NOTE ADULT - PROBLEM SELECTOR PLAN 1
- Encourage oral intake  - Nutrition consult pending-f/u rec's   - Maintain fall precaution    - GOC completed   - PT consult cancelled per pt's wishes for comfort  - SW consult pending for possible home hospice encourage oral intake - only drink sip of water   has not eating any solid food x 5 days   per [facility] - no IVF no PEG

## 2025-01-29 NOTE — PROGRESS NOTE ADULT - PROBLEM SELECTOR PLAN 8
H/o HTN on Valsartan  - Per wife at bedside pt stated that pt stopped taking medications at home b/c he does not want to take these medications anymore  - Pt and wife agreeable to comfort measures H/o CAD s/p cardiac stents and CABG x2 on ASA and Plavix   - Per wife at bedside pt stated that pt stopped taking medications at home b/c he does not want to take these medications anymore  - Pt and wife agreeable to comfort measures

## 2025-01-29 NOTE — PROGRESS NOTE ADULT - PROBLEM SELECTOR PLAN 12
- C/w Heparin as pt allows SW spoke to wife at bedside  meds started for comfort   likely dc home tomorrow with hospice care SW spoke to wife at bedside  meds started for comfort   DME ordered - likely deliver to home tomorrow evening and Hospice agent will visit pt on Friday morning so plan to dc pt home on Friday AM

## 2025-01-29 NOTE — PROGRESS NOTE ADULT - PROBLEM SELECTOR PLAN 10
H/o CVA on Atorvastatin & ASA  - Per wife at bedside pt stated that pt stopped taking medications at home b/c he does not want to take these medications anymore  - Pt and wife agreeable to comfort measures H/o HTN on Valsartan  - Per wife at bedside pt stated that pt stopped taking medications at home b/c he does not want to take these medications anymore  - Pt and wife agreeable to comfort measures

## 2025-01-29 NOTE — GOALS OF CARE CONVERSATION - ADVANCED CARE PLANNING - CONVERSATION DETAILS
Pt and wife participated in discussion.      Wife states pt has not eaten food in 4 days.  Pt agrees and states that he has no appetite and does not want to eat.  Wife states pt has only been drinking liquids.  Wife states that pt stopped taking his medications: Farxiga, Metformin, Plavix, Valsartan, and Digoxin. Pt agrees and states "I don't want a lot of pills."      NP informed of CT findings and recommended IV placement for IVF, antibiotic for opacities seen on CT, pt declines and adamantly states, "I don't want an IV."  Wife agrees with pt's wishes.  Wife and pt requests DNR/DNI/No IVF/No abx/No feeding tube and elects for comfort measures. Pt and wife participated in discussion.      Wife states pt has not eaten food in 4 days.  Pt agrees and states that he has no appetite and does not want to eat.  Wife states pt has only been drinking liquids.  Wife states that pt stopped taking his medications: Farxiga, Metformin, Plavix, Valsartan, and Digoxin. Pt agrees and states "I don't want a lot of pills."      NP informed of CT findings and recommended IV placement for IVF, antibiotic for opacities seen on CT, pt declines and adamantly states, "I don't want an IV."  Wife agrees with pt's wishes.  Wife and pt requests DNR/DNI/No IVF/No abx/No feeding tube and elects for comfort measures.    [facility] completed 1/28/25 [id number]

## 2025-01-29 NOTE — PROGRESS NOTE ADULT - ASSESSMENT
74 year old, M, from home, w/  PMH of CVA, MI, HTN, T2DM, HLD, CAD s/p cardiac stents, HFrEF (EF 20-25% 10/2024), hx of malignant melanoma/wide excision, with recent elective bioprosthetic aortic valve replacement and ascending aortic aneurysm repair with transverse hemiarch and CABG x2 (4/8/24).  Presents for 1 month of decrease oral intake. Admitted for FTT, CLAUDIA, Transaminitis and Pleural effusions. 74 year old, M, from home, w/  PMH of CVA, MI, HTN, T2DM, HLD, CAD s/p cardiac stents, HFrEF (EF 20-25% 10/2024), hx of malignant melanoma/wide excision, with recent elective bioprosthetic aortic valve replacement and ascending aortic aneurysm repair with transverse hemiarch and CABG x2 (4/8/24).  Presents for 1 month of decrease oral intake and no food x 4 days prior to admission. Admitted for severe protein malnutrition liekly due to worsening of dementia.  Transaminitis and Pleural effusions.  Pt is alert x person likely himself and his wife, he is restless, wants to go home, wife at the bedside, reviewed his wishes, she understands that patient wants to die peacefully and no more interventions or procedures.  SW and palliative care team consulted for possible home hospice.   Pt is DNR/ DNI no PEG no IVF, MEWS exemt, comfort mx only

## 2025-01-29 NOTE — PROGRESS NOTE ADULT - PROBLEM SELECTOR PLAN 3
- P/w   ,  [id number]  T-Bili: 2  - RUQ US showed right renal cyst, trace ascites, & right pleural effusion.  - Pt does not want IV placement, IVF or continual bloodwork   - Monitor as pt allows  - Pt and wife agreeable to comfort measures H/o CVA on Atorvastatin & ASA  - Per wife at bedside pt stated that pt stopped taking medications at home b/c he does not want to take these medications anymore  - Pt and wife agreeable to comfort measures

## 2025-01-29 NOTE — PROGRESS NOTE ADULT - PROBLEM SELECTOR PLAN 4
- CXR showed interval improvement of left lung opacity and progression of right lower   lung opacity.  - CT Chest showed increased, now moderate, bilateral pleural effusions.  Chronic interstitial changes/likely fibrosis. New mixed groundglass and consolidative opacity right lung is unclear if related to pulmonary edema or infection.   - Pt does not want IV placement, IVF or continual bloodwork   - Pt and wife agreeable to comfort measures - P/w  SCr 2.13 on admission.  Baseline SCr - 1.19 on 12/19/24  - Likely prerenal 2/2 dehydration, poor oral intake  - Pt does not want IV placement, IVF or continual bloodwork   - Continue to avoid Nephrotoxic drugs   - Nephro-Dr Phillips

## 2025-01-29 NOTE — PROGRESS NOTE ADULT - PROBLEM SELECTOR PLAN 7
H/o DM on Metformin & Farxiga  - Per wife at bedside pt stated that pt stopped taking medications at home b/c he does not want to take these medications anymore  - Monitor FS as pt allows   - Pt and wife agreeable to comfort measures H/o HFrEF with EF 28% on Valsartan, Farxiga & Digoxin  - Per wife at bedside pt stated that pt stopped taking medications at home b/c he does not want to take these medications anymore  - CT chest showed new mild anasarca and small volume upper abdominal ascites.  - Pt does not want IV placement, IVF or continual bloodwork   - Pt and wife agreeable to comfort measures

## 2025-01-29 NOTE — PROGRESS NOTE ADULT - SUBJECTIVE AND OBJECTIVE BOX
NP Note discussed with  Primary Attending    Patient is a 75y old  Male who presents with a chief complaint of failure to thrive (28 Jan 2025 12:29)      INTERVAL HPI/OVERNIGHT EVENTS: no new complaints    MEDICATIONS  (STANDING):  aspirin  chewable 81 milliGRAM(s) Oral daily  atorvastatin 80 milliGRAM(s) Oral at bedtime  clopidogrel Tablet 75 milliGRAM(s) Oral daily  heparin   Injectable 5000 Unit(s) SubCutaneous every 12 hours  insulin lispro (ADMELOG) corrective regimen sliding scale   SubCutaneous three times a day before meals  insulin lispro (ADMELOG) corrective regimen sliding scale   SubCutaneous at bedtime  pantoprazole    Tablet 40 milliGRAM(s) Oral before breakfast  QUEtiapine 12.5 milliGRAM(s) Oral daily  tamsulosin 0.4 milliGRAM(s) Oral at bedtime    MEDICATIONS  (PRN):  acetaminophen     Tablet .. 650 milliGRAM(s) Oral every 6 hours PRN Temp greater or equal to 38C (100.4F), Mild Pain (1 - 3)  ondansetron    Tablet 4 milliGRAM(s) Oral every 6 hours PRN Nausea and/or Vomiting  polyethylene glycol 3350 17 Gram(s) Oral daily PRN for constipation      __________________________________________________  REVIEW OF SYSTEMS:    CONSTITUTIONAL: No fever,   EYES: no acute visual disturbances  NECK: No pain or stiffness  RESPIRATORY: No cough; No shortness of breath  CARDIOVASCULAR: No chest pain, no palpitations  GASTROINTESTINAL: No pain. No nausea or vomiting; No diarrhea   NEUROLOGICAL: No headache or numbness, no tremors  MUSCULOSKELETAL: No joint pain, no muscle pain  GENITOURINARY: no dysuria, no frequency, no hesitancy  PSYCHIATRY: no depression , no anxiety  ALL OTHER  ROS negative        Vital Signs Last 24 Hrs  T(C): 36.1 (29 Jan 2025 12:05), Max: 36.8 (28 Jan 2025 16:16)  T(F): 97 (29 Jan 2025 12:05), Max: 98.2 (28 Jan 2025 16:16)  HR: 86 (29 Jan 2025 12:05) (86 - 96)  BP: 100/71 (29 Jan 2025 12:05) (100/71 - 116/78)  BP(mean): --  RR: 17 (29 Jan 2025 12:05) (17 - 20)  SpO2: 99% (29 Jan 2025 12:05) (93% - 99%)    Parameters below as of 29 Jan 2025 05:15  Patient On (Oxygen Delivery Method): room air        ________________________________________________  PHYSICAL EXAM:  GENERAL: NAD  HEENT: Normocephalic;  conjunctivae and sclerae clear; moist mucous membranes;   NECK : supple  CHEST/LUNG: Clear to auscultation bilaterally with good air entry   HEART: S1 S2  regular; no murmurs, gallops or rubs  ABDOMEN: Soft, Nontender, Nondistended; Bowel sounds present  EXTREMITIES: no cyanosis; no edema; no calf tenderness  SKIN: warm and dry; no rash  NERVOUS SYSTEM:  Awake and alert; Oriented  to place, person and time ; no new deficits    _________________________________________________  LABS:                        14.8   10.58 )-----------( 231      ( 28 Jan 2025 06:40 )             45.2     01-28    137  |  104  |  55[H]  ----------------------------<  254[H]  5.4[H]   |  16[L]  |  2.25[H]    Ca    9.0      28 Jan 2025 06:40  Phos  4.8     01-28  Mg     2.5     01-28    TPro  5.6[L]  /  Alb  3.0[L]  /  TBili  2.2[H]  /  DBili  x   /  AST  344[H]  /  ALT  445[H]  /  AlkPhos  273[H]  01-28      Urinalysis Basic - ( 28 Jan 2025 06:40 )    Color: x / Appearance: x / SG: x / pH: x  Gluc: 254 mg/dL / Ketone: x  / Bili: x / Urobili: x   Blood: x / Protein: x / Nitrite: x   Leuk Esterase: x / RBC: x / WBC x   Sq Epi: x / Non Sq Epi: x / Bacteria: x      CAPILLARY BLOOD GLUCOSE      POCT Blood Glucose.: 145 mg/dL (29 Jan 2025 12:00)  POCT Blood Glucose.: 185 mg/dL (29 Jan 2025 08:12)  POCT Blood Glucose.: 165 mg/dL (28 Jan 2025 21:14)  POCT Blood Glucose.: 176 mg/dL (28 Jan 2025 17:07)        RADIOLOGY & ADDITIONAL TESTS:    Imaging Personally Reviewed:  YES/NO    Consultant(s) Notes Reviewed:   YES/ No    Care Discussed with Consultants :     Plan of care was discussed with patient and /or primary care giver; all questions and concerns were addressed and care was aligned with patient's wishes.     NP Note discussed with  Primary Attending    Patient is a 75y old  Male who presents with a chief complaint of failure to thrive (28 Jan 2025 12:29)      INTERVAL HPI/OVERNIGHT EVENTS: no new complaints    MEDICATIONS  (STANDING):  aspirin  chewable 81 milliGRAM(s) Oral daily  atorvastatin 80 milliGRAM(s) Oral at bedtime  clopidogrel Tablet 75 milliGRAM(s) Oral daily  heparin   Injectable 5000 Unit(s) SubCutaneous every 12 hours  insulin lispro (ADMELOG) corrective regimen sliding scale   SubCutaneous three times a day before meals  insulin lispro (ADMELOG) corrective regimen sliding scale   SubCutaneous at bedtime  pantoprazole    Tablet 40 milliGRAM(s) Oral before breakfast  QUEtiapine 12.5 milliGRAM(s) Oral daily  tamsulosin 0.4 milliGRAM(s) Oral at bedtime    MEDICATIONS  (PRN):  acetaminophen     Tablet .. 650 milliGRAM(s) Oral every 6 hours PRN Temp greater or equal to 38C (100.4F), Mild Pain (1 - 3)  ondansetron    Tablet 4 milliGRAM(s) Oral every 6 hours PRN Nausea and/or Vomiting  polyethylene glycol 3350 17 Gram(s) Oral daily PRN for constipation      __________________________________________________  REVIEW OF SYSTEMS:  unable to obtain ROS due to dementia and agitation       Vital Signs Last 24 Hrs  T(C): 36.1 (29 Jan 2025 12:05), Max: 36.8 (28 Jan 2025 16:16)  T(F): 97 (29 Jan 2025 12:05), Max: 98.2 (28 Jan 2025 16:16)  HR: 86 (29 Jan 2025 12:05) (86 - 96)  BP: 100/71 (29 Jan 2025 12:05) (100/71 - 116/78)  BP(mean): --  RR: 17 (29 Jan 2025 12:05) (17 - 20)  SpO2: 99% (29 Jan 2025 12:05) (93% - 99%)    Parameters below as of 29 Jan 2025 05:15  Patient On (Oxygen Delivery Method): room air        ________________________________________________  PHYSICAL EXAM:  GENERAL: NAD,   HEENT: Normocephalic;  conjunctivae and sclerae clear; moist mucous membranes;   NECK : supple  CHEST/LUNG: Clear to auscultation bilaterally with good air entry   HEART: S1 S2  regular; no murmurs, gallops or rubs  ABDOMEN: Soft, Nontender, Nondistended; Bowel sounds present  EXTREMITIES: no cyanosis; no edema; no calf tenderness  SKIN: warm and dry; no rash  NERVOUS SYSTEM:  Awake and alert; Oriented  to person     _________________________________________________  LABS:                        14.8   10.58 )-----------( 231      ( 28 Jan 2025 06:40 )             45.2     01-28    137  |  104  |  55[H]  ----------------------------<  254[H]  5.4[H]   |  16[L]  |  2.25[H]    Ca    9.0      28 Jan 2025 06:40  Phos  4.8     01-28  Mg     2.5     01-28    TPro  5.6[L]  /  Alb  3.0[L]  /  TBili  2.2[H]  /  DBili  x   /  AST  344[H]  /  ALT  445[H]  /  AlkPhos  273[H]  01-28      Urinalysis Basic - ( 28 Jan 2025 06:40 )    Color: x / Appearance: x / SG: x / pH: x  Gluc: 254 mg/dL / Ketone: x  / Bili: x / Urobili: x   Blood: x / Protein: x / Nitrite: x   Leuk Esterase: x / RBC: x / WBC x   Sq Epi: x / Non Sq Epi: x / Bacteria: x      CAPILLARY BLOOD GLUCOSE      POCT Blood Glucose.: 145 mg/dL (29 Jan 2025 12:00)  POCT Blood Glucose.: 185 mg/dL (29 Jan 2025 08:12)  POCT Blood Glucose.: 165 mg/dL (28 Jan 2025 21:14)  POCT Blood Glucose.: 176 mg/dL (28 Jan 2025 17:07)        RADIOLOGY & ADDITIONAL TESTS:  < from: CT Chest No Cont (01.27.25 @ 21:15) >  ACC: [id number] EXAM:  CT CHEST   ORDERED BY: SÁNCHEZ NGUYEN     PROCEDURE DATE:  01/27/2025          INTERPRETATION:  CLINICAL INFORMATION: Shortness of breath. Evaluate   right.    COMPARISON: CT chest 12/14/2025    CONTRAST/COMPLICATIONS:  IV Contrast: NONE  Oral Contrast: NONE  .    PROCEDURE:  CT of the Chest was performed.  Sagittal and coronal reformats were performed.    FINDINGS:    LUNGS AND LARGE AIRWAYS: Patent central airways. Redemonstrated   peripheral reticular and groundglass opacities with associated traction   bronchiectasis in the lower lobes. New area of mixed consolidation and   groundglass opacity right lower lobe and periphery of the right upper   lobe. Decreased mixed airspace opacities in the left upper and lower   lobes.  PLEURA: Interval increase size of bilateral pleural effusions, now   moderate.  VESSELS: Stable postsurgical changes in the ascending thoracic aorta and   prosthetic aortic valve. No intramural hematoma. No interval change in   size of the thoracic aorta. Main pulmonary artery is normal in caliber.  HEART: Heart is again enlarged. No pericardial effusion.  MEDIASTINUM AND AVEL: No lymphadenopathy.  CHEST WALL AND LOWER NECK: Mild anasarca.  VISUALIZED UPPER ABDOMEN: New small volume ascites. Nonspecific   pericholecystic edema, likely due to third spacing. High attenuation   within the gallbladder fossa could be due to sludge and/or vicarious   excretion of recently administered intravenous contrast. Fatty   replacement of the pancreas, increased since prior exam.  BONES: Median sternotomy with intact sternotomy wires. Degenerative   changes.    IMPRESSION:  Increased, now moderate, bilateral pleural effusions. New mild anasarca   and small volume upper abdominal ascites.    Similar chronic interstitial changes/likely fibrosis. New mixed   groundglass and consolidative opacity right lung is unclear if related to   pulmonary edema or infection. Clinical correlation is recommended.        --- End of Report ---            DERREK MOORE MD; Attending Radiologist  This document has been electronically signed. Jan 28 2025 12:36AM    < end of copied text >  < from: US Abdomen Upper Quadrant Right (01.27.25 @ 21:03) >  ACC: [id number] EXAM:  US ABDOMEN RT UPR QUADRANT   ORDERED BY: SÁNCHEZ NGUYEN     PROCEDURE DATE:  01/27/2025          INTERPRETATION:  CLINICAL INFORMATION: Transaminitis.    COMPARISON: 10/5/2024 CT.    TECHNIQUE: Sonography of the right upper quadrant.    FINDINGS:  Liver: Within normal limits.  Bile ducts: Normal caliber. Common bile duct measures 4 mm.  Gallbladder: No shadowing gallstones are noted.  Pancreas: Visualized portions are within normal limits.  Right kidney: 11.3 cm. No hydronephrosis. Cysts are noted measuring up to   2.5 cm  Ascites: Trace ascites  IVC: Visualized portions are within normal limits.  Other: Right pleural effusion.    IMPRESSION:  Right renal cyst.  Trace ascites.  Right pleural effusion.        --- End of Report ---            ART FARRAR MD; Attending Radiologist  This document has been electronically signed. Jan 27 2025 10:13PM    < end of copied text >  < from: CT Head No Cont (01.27.25 @ 18:59) >    ACC: [id number] EXAM:  CT BRAIN   ORDERED BY: CORNEL CRUZ     ACC: [id number] EXAM:  CT CERVICAL SPINE   ORDERED BY: CORNEL CRUZ     PROCEDURE DATE:  01/27/2025          INTERPRETATION:  Noncontrast CT of the brain and cervical spine    CLINICAL INDICATION: Status post fall    TECHNIQUE: Axial CT scanning of the brain and cervical spine were   obtained without the administration of intravenous contrast.  Images were   reformatted in the sagittal and coronal planes.    COMPARISON: CT brain and cervical spine 12/14/2024    FINDINGS:    CT brain:    No hydrocephalus, mass effect, midline shift, acute intracranial   hemorrhage, or brain edema.    Chronic right thalamic and right cerebellar hemisphere infarcts. Mild   white matter microvascular ischemic disease.    No displaced calvarial fracture. No appreciable scalp hematoma.    Visualized paranasal sinuses and mastoid air cells are clear.    CT cervical spine:    Increased mild compression deformity of the superior endplate of T1 with   newminimal sclerosis subjacent to the endplate. Acute fracture is   suspected. No retropulsed bone.    Remaining vertebral bodies demonstrate normal height. Vertebral alignment   is maintained.    No prevertebral edema.    Multilevel disc space narrowing.    Multilevel degenerative changes.    Partially visualized bilateral pleural effusions.    IMPRESSION:    CT brain:  No acute intracranial hemorrhage, brain edema, or mass effect.  No displaced calvarial fracture.    CT cervical spine:  Increased mild compression deformity of the superior endplate of T1 with   new minimal sclerosis subjacent to the endplate. Acute fracture is   suspected. No retropulsed bone.    --- End of Report ---      < end of copied text >    Imaging Personally Reviewed:  YES/    Consultant(s) Notes Reviewed:   YES/    Care Discussed with Consultants : palliative     Plan of care was discussed with patient and /or primary care giver; all questions and concerns were addressed and care was aligned with patient's wishes.

## 2025-01-29 NOTE — PROGRESS NOTE ADULT - PROBLEM SELECTOR PLAN 2
- P/w  SCr 2.13 on admission.  Baseline SCr - 1.19 on 12/19/24  - Likely prerenal 2/2 dehydration, poor oral intake  - Pt does not want IV placement, IVF or continual bloodwork   - Continue to avoid Nephrotoxic drugs   - Nephro-Dr Phillips wife report forgetfulness   likely vascular dementia with hx of CVA   AOX 1 at this time  wants to dc home  GOC clear - DNR/DNI No IVF No ABx no PEG comfort measures only  MEWS exept   SW and palliative consulted - likely dc home with hospice care wife report forgetfulness   likely vascular dementia with hx of CVA   AOX 1 at this time  wants to dc home  GOC clear - DNR/DNI No IVF No ABx no PEG comfort measures only  MEWS exept   SW and palliative consulted - likely dc home with hospice care on Friday

## 2025-01-29 NOTE — PROGRESS NOTE ADULT - PROBLEM SELECTOR PLAN 9
- Per wife at bedside pt stated that pt stopped taking medications at home b/c he does not want to take these medications anymore  - Pt and wife agreeable to comfort measures H/o DM on Metformin & Farxiga  - Per wife at bedside pt stated that pt stopped taking medications at home b/c he does not want to take these medications anymore  - Monitor FS as pt allows   - Pt and wife agreeable to comfort measures

## 2025-01-29 NOTE — PROGRESS NOTE ADULT - PROBLEM SELECTOR PLAN 6
H/o CAD s/p cardiac stents and CABG x2 on ASA and Plavix   - Per wife at bedside pt stated that pt stopped taking medications at home b/c he does not want to take these medications anymore  - Pt and wife agreeable to comfort measures - CXR showed interval improvement of left lung opacity and progression of right lower   lung opacity.  - CT Chest showed increased, now moderate, bilateral pleural effusions.  Chronic interstitial changes/likely fibrosis. New mixed groundglass and consolidative opacity right lung is unclear if related to pulmonary edema or infection.   - Pt does not want IV placement, IVF or continual bloodwork   - Pt and wife agreeable to comfort measures

## 2025-01-29 NOTE — PROGRESS NOTE ADULT - PROBLEM SELECTOR PLAN 5
H/o HFrEF with EF 28% on Valsartan, Farxiga & Digoxin  - Per wife at bedside pt stated that pt stopped taking medications at home b/c he does not want to take these medications anymore  - CT chest showed new mild anasarca and small volume upper abdominal ascites.  - Pt does not want IV placement, IVF or continual bloodwork   - Pt and wife agreeable to comfort measures - P/w   ,  [id number]  T-Bili: 2  - RUQ US showed right renal cyst, trace ascites, & right pleural effusion.  - Pt does not want IV placement, IVF or continual bloodwork   - Monitor as pt allows  - Pt and wife agreeable to comfort measures

## 2025-01-29 NOTE — GOALS OF CARE CONVERSATION - ADVANCED CARE PLANNING - WHAT MATTERS MOST
No IV, and not "a lot" of oral medications.    Pt states "I don't want to be stuck."  Does not want continual lab work.

## 2025-01-30 ENCOUNTER — APPOINTMENT (OUTPATIENT)
Dept: CARDIOLOGY | Facility: CLINIC | Age: 75
End: 2025-01-30

## 2025-01-30 NOTE — PROGRESS NOTE ADULT - SUBJECTIVE AND OBJECTIVE BOX
NP Note discussed with  Primary Attending    Patient is a 75y old  Male who presents with a chief complaint of failure to thrive (29 Jan 2025 14:27)      INTERVAL HPI/OVERNIGHT EVENTS: no new complaints    MEDICATIONS  (STANDING):  aspirin  chewable 81 milliGRAM(s) Oral daily  atorvastatin 80 milliGRAM(s) Oral at bedtime  clopidogrel Tablet 75 milliGRAM(s) Oral daily  pantoprazole    Tablet 40 milliGRAM(s) Oral before breakfast  QUEtiapine 12.5 milliGRAM(s) Oral daily  tamsulosin 0.4 milliGRAM(s) Oral at bedtime    MEDICATIONS  (PRN):  acetaminophen     Tablet .. 650 milliGRAM(s) Oral every 6 hours PRN Temp greater or equal to 38C (100.4F), Mild Pain (1 - 3)  artificial  tears Solution 2 Drop(s) Both EYES every 1 hour PRN Dry Eyes  bisacodyl Suppository 10 milliGRAM(s) Rectal daily PRN Constipation  glycopyrrolate 1 milliGRAM(s) Oral every 6 hours PRN reduce salivary  HYDROmorphone  Injectable 0.5 milliGRAM(s) IV Push every 2 hours PRN Moderate pain (4-6), Severe pain (7-10), Respiratory rate greater than 22  LORazepam   Injectable 0.5 milliGRAM(s) IV Push every 4 hours PRN Anxiety  ondansetron    Tablet 4 milliGRAM(s) Oral every 6 hours PRN Nausea and/or Vomiting  polyethylene glycol 3350 17 Gram(s) Oral daily PRN for constipation      __________________________________________________  REVIEW OF SYSTEMS:    CONSTITUTIONAL: No fever,   EYES: no acute visual disturbances  NECK: No pain or stiffness  RESPIRATORY: No cough; No shortness of breath  CARDIOVASCULAR: No chest pain, no palpitations  GASTROINTESTINAL: No pain. No nausea or vomiting; No diarrhea   NEUROLOGICAL: No headache or numbness, no tremors  MUSCULOSKELETAL: No joint pain, no muscle pain  GENITOURINARY: no dysuria, no frequency, no hesitancy  PSYCHIATRY: no depression , no anxiety  ALL OTHER  ROS negative        Vital Signs Last 24 Hrs  T(C): 37.1 (30 Jan 2025 05:22), Max: 37.1 (30 Jan 2025 05:22)  T(F): 98.7 (30 Jan 2025 05:22), Max: 98.7 (30 Jan 2025 05:22)  HR: 106 (30 Jan 2025 05:22) (98 - 106)  BP: 116/73 (30 Jan 2025 05:22) (95/68 - 116/73)  BP(mean): --  RR: 20 (30 Jan 2025 05:22) (18 - 20)  SpO2: 98% (30 Jan 2025 05:22) (95% - 98%)    Parameters below as of 30 Jan 2025 05:22  Patient On (Oxygen Delivery Method): nasal cannula  O2 Flow (L/min): 2      ________________________________________________  PHYSICAL EXAM:  GENERAL: NAD  HEENT: Normocephalic;  conjunctivae and sclerae clear; moist mucous membranes;   NECK : supple  CHEST/LUNG: Clear to auscultation bilaterally with good air entry   HEART: S1 S2  regular; no murmurs, gallops or rubs  ABDOMEN: Soft, Nontender, Nondistended; Bowel sounds present  EXTREMITIES: no cyanosis; no edema; no calf tenderness  SKIN: warm and dry; no rash  NERVOUS SYSTEM:  Awake and alert; Oriented  to place, person and time ; no new deficits    _________________________________________________  LABS:              CAPILLARY BLOOD GLUCOSE      POCT Blood Glucose.: 178 mg/dL (30 Jan 2025 11:52)  POCT Blood Glucose.: 168 mg/dL (30 Jan 2025 08:18)  POCT Blood Glucose.: 141 mg/dL (29 Jan 2025 21:28)  POCT Blood Glucose.: 173 mg/dL (29 Jan 2025 16:49)        RADIOLOGY & ADDITIONAL TESTS:    Imaging  Reviewed:  YES/NO    Consultant(s) Notes Reviewed:   YES/ No      Plan of care was discussed with patient and /or primary care giver; all questions and concerns were addressed  NP Note discussed with  Primary Attending    Patient is a 75y old  Male who presents with a chief complaint of failure to thrive (29 Jan 2025 14:27)      INTERVAL HPI/OVERNIGHT EVENTS: no new complaints    MEDICATIONS  (STANDING):  aspirin  chewable 81 milliGRAM(s) Oral daily  atorvastatin 80 milliGRAM(s) Oral at bedtime  clopidogrel Tablet 75 milliGRAM(s) Oral daily  pantoprazole    Tablet 40 milliGRAM(s) Oral before breakfast  QUEtiapine 12.5 milliGRAM(s) Oral daily  tamsulosin 0.4 milliGRAM(s) Oral at bedtime    MEDICATIONS  (PRN):  acetaminophen     Tablet .. 650 milliGRAM(s) Oral every 6 hours PRN Temp greater or equal to 38C (100.4F), Mild Pain (1 - 3)  artificial  tears Solution 2 Drop(s) Both EYES every 1 hour PRN Dry Eyes  bisacodyl Suppository 10 milliGRAM(s) Rectal daily PRN Constipation  glycopyrrolate 1 milliGRAM(s) Oral every 6 hours PRN reduce salivary  HYDROmorphone  Injectable 0.5 milliGRAM(s) IV Push every 2 hours PRN Moderate pain (4-6), Severe pain (7-10), Respiratory rate greater than 22  LORazepam   Injectable 0.5 milliGRAM(s) IV Push every 4 hours PRN Anxiety  ondansetron    Tablet 4 milliGRAM(s) Oral every 6 hours PRN Nausea and/or Vomiting  polyethylene glycol 3350 17 Gram(s) Oral daily PRN for constipation      __________________________________________________  REVIEW OF SYSTEMS:    unable to assess, poor historian      Vital Signs Last 24 Hrs  T(C): 37.1 (30 Jan 2025 05:22), Max: 37.1 (30 Jan 2025 05:22)  T(F): 98.7 (30 Jan 2025 05:22), Max: 98.7 (30 Jan 2025 05:22)  HR: 106 (30 Jan 2025 05:22) (98 - 106)  BP: 116/73 (30 Jan 2025 05:22) (95/68 - 116/73)  BP(mean): --  RR: 20 (30 Jan 2025 05:22) (18 - 20)  SpO2: 98% (30 Jan 2025 05:22) (95% - 98%)    Parameters below as of 30 Jan 2025 05:22  Patient On (Oxygen Delivery Method): nasal cannula  O2 Flow (L/min): 2      ________________________________________________  PHYSICAL EXAM:  GENERAL: NAD  HEENT: Normocephalic;  conjunctivae and sclerae clear; moist mucous membranes;   NECK : supple  CHEST/LUNG: Clear to auscultation bilaterally with good air entry   HEART: S1 S2  regular; no murmurs, gallops or rubs  ABDOMEN: Soft, Nontender, Nondistended; Bowel sounds present  EXTREMITIES: no cyanosis; no edema; no calf tenderness  SKIN: warm and dry; no rash  NERVOUS SYSTEM: lethargic    _________________________________________________  LABS:              CAPILLARY BLOOD GLUCOSE      POCT Blood Glucose.: 178 mg/dL (30 Jan 2025 11:52)  POCT Blood Glucose.: 168 mg/dL (30 Jan 2025 08:18)  POCT Blood Glucose.: 141 mg/dL (29 Jan 2025 21:28)  POCT Blood Glucose.: 173 mg/dL (29 Jan 2025 16:49)        RADIOLOGY & ADDITIONAL TESTS:  no new imaged from 1/29/25  Imaging  Reviewed:  YES/NO    Consultant(s) Notes Reviewed:   YES/ No      Plan of care was discussed with patient and /or primary care giver; all questions and concerns were addressed

## 2025-01-30 NOTE — PROGRESS NOTE ADULT - ASSESSMENT
74 year old, M, from home, w/  PMH of CVA, MI, HTN, T2DM, HLD, CAD s/p cardiac stents, HFrEF (EF 20-25% 10/2024), hx of malignant melanoma/wide excision, with recent elective bioprosthetic aortic valve replacement and ascending aortic aneurysm repair with transverse hemiarch and CABG x2 (4/8/24).  Presents for 1 month of decrease oral intake and no food x 4 days prior to admission. Admitted for severe protein malnutrition liekly due to worsening of dementia.  Transaminitis and Pleural effusions.  Pt is alert x person likely himself and his wife, he is restless, wants to go home, wife at the bedside, reviewed his wishes, she understands that patient wants to die peacefully and no more interventions or procedures.  SW and palliative care team consulted for possible home hospice.   Pt is DNR/ DNI no PEG no IVF, MEWS exemt, comfort mx only

## 2025-01-30 NOTE — PROGRESS NOTE ADULT - PROBLEM SELECTOR PLAN 1
encourage oral intake - only drink sip of water   has not eating any solid food x 5 days   per [facility]OL[facility] - no IVF no PEG

## 2025-01-30 NOTE — PROGRESS NOTE ADULT - PROBLEM SELECTOR PLAN 2
wife report forgetfulness   likely vascular dementia with hx of CVA   AOX 1 at this time  wants to dc home  GOC clear - DNR/DNI No IVF No ABx no PEG comfort measures only  MEWS exept   SW and palliative consulted - likely dc home with hospice care on Friday

## 2025-01-30 NOTE — PROGRESS NOTE ADULT - PROBLEM SELECTOR PLAN 5
- P/w   ,  [id number]  T-Bili: 2  - RUQ US showed right renal cyst, trace ascites, & right pleural effusion.  - Pt does not want IV placement, IVF or continual bloodwork   - Monitor as pt allows  - Pt and wife agreeable to comfort measures

## 2025-01-30 NOTE — PHARMACOTHERAPY INTERVENTION NOTE - COMMENTS
Based on hospital guidelines, as patient is on triple anticoagulant therapy it is recommended to add Pantoprazole PO 40mg QD for stress ulcer prophylaxis.
Age friendly medications checked and evaluated, interventions performed, and recommendations provided if needed

## 2025-01-31 ENCOUNTER — TRANSCRIPTION ENCOUNTER (OUTPATIENT)
Age: 75
End: 2025-01-31

## 2025-01-31 NOTE — DISCHARGE NOTE PROVIDER - DETAILS OF MALNUTRITION DIAGNOSIS/DIAGNOSES
This patient has been assessed with a concern for Malnutrition and was treated during this hospitalization for the following Nutrition diagnosis/diagnoses:     -  01/28/2025: Severe protein-calorie malnutrition

## 2025-01-31 NOTE — DISCHARGE NOTE NURSING/CASE MANAGEMENT/SOCIAL WORK - FINANCIAL ASSISTANCE
[facility] provides services at a reduced cost to those who are determined to be eligible through [facility]’s financial assistance program. Information regarding [facility]’s financial assistance program can be found by going to [email or web address] or by calling [phone].

## 2025-01-31 NOTE — DISCHARGE NOTE NURSING/CASE MANAGEMENT/SOCIAL WORK - PATIENT PORTAL LINK FT
You can access the FollowMyHealth Patient Portal offered by [facility] by registering at the following website: [email or web address]. By joining [employer] [facility]’s FollowMyHealth portal, you will also be able to view your health information using other applications (apps) compatible with our system.

## 2025-01-31 NOTE — DISCHARGE NOTE PROVIDER - NSDCCPCAREPLAN_GEN_ALL_CORE_FT
PRINCIPAL DISCHARGE DIAGNOSIS  Diagnosis: Severe protein-calorie malnutrition  Assessment and Plan of Treatment: Please follow up with Home Hospice for further medical management.      SECONDARY DISCHARGE DIAGNOSES  Diagnosis: CHF (congestive heart failure)  Assessment and Plan of Treatment: Please follow up with Home Hospice for further medical management.    Diagnosis: Transaminitis  Assessment and Plan of Treatment: Please follow up with Home Hospice for further medical management.    Diagnosis: CLAUDIA (acute kidney injury)  Assessment and Plan of Treatment: Please follow up with Home Hospice for further medical management.    Diagnosis: Dementia  Assessment and Plan of Treatment: Please follow up with Home Hospice for further medical management.

## 2025-01-31 NOTE — PROGRESS NOTE ADULT - SUBJECTIVE AND OBJECTIVE BOX
IRA SEN  MR# [id number]  75yMale        Patient is a 75y old  Male who presents with a chief complaint of failure to thrive (31 Jan 2025 08:22)      INTERVAL HPI/OVERNIGHT EVENTS:  Patient seen and examined at bedside. No notations of chest pain, palpitation, SOB, orthopnea, nausea, vomiting or abdominal pain.    ALLERGIES  No Known Allergies      MEDICATIONS  acetaminophen     Tablet .. 650 milliGRAM(s) Oral every 6 hours PRN Temp greater or equal to 38C (100.4F), Mild Pain (1 - 3)  artificial  tears Solution 2 Drop(s) Both EYES every 1 hour PRN Dry Eyes  aspirin  chewable 81 milliGRAM(s) Oral daily  atorvastatin 80 milliGRAM(s) Oral at bedtime  bisacodyl Suppository 10 milliGRAM(s) Rectal daily PRN Constipation  clopidogrel Tablet 75 milliGRAM(s) Oral daily  glycopyrrolate 1 milliGRAM(s) Oral every 6 hours PRN reduce salivary  HYDROmorphone  Injectable 0.5 milliGRAM(s) IV Push every 2 hours PRN Moderate pain (4-6), Severe pain (7-10), Respiratory rate greater than 22  LORazepam   Injectable 0.5 milliGRAM(s) IV Push every 4 hours PRN Anxiety  ondansetron    Tablet 4 milliGRAM(s) Oral every 6 hours PRN Nausea and/or Vomiting  pantoprazole    Tablet 40 milliGRAM(s) Oral before breakfast  polyethylene glycol 3350 17 Gram(s) Oral daily PRN for constipation  QUEtiapine 12.5 milliGRAM(s) Oral daily  tamsulosin 0.4 milliGRAM(s) Oral at bedtime              REVIEW OF SYSTEMS:  CONSTITUTIONAL: No fever, weight loss, or fatigue  EYES: No eye pain, visual disturbances, or discharge  ENT:  No difficulty hearing, tinnitus, vertigo; No sinus or throat pain  NECK: No pain or stiffness  RESPIRATORY: No cough, wheezing, chills or hemoptysis; No Shortness of Breath  CARDIOVASCULAR: No chest pain, palpitations, passing out, dizziness, or leg swelling  GASTROINTESTINAL: No abdominal or epigastric pain. No nausea, vomiting, or hematemesis; No diarrhea or constipation. No melena or hematochezia.  GENITOURINARY: No dysuria, frequency, hematuria, or incontinence  NEUROLOGICAL: No headaches, memory loss, loss of strength, numbness, or tremors  SKIN: No itching, burning, rashes, or lesions   LYMPH Nodes: No enlarged glands  ENDOCRINE: No heat or cold intolerance; No hair loss  MUSCULOSKELETAL: No joint pain or swelling; No muscle, back, or extremity pain  PSYCHIATRIC: No depression, anxiety, mood swings, or difficulty sleeping  HEME/LYMPH: No easy bruising, or bleeding gums  ALLERGY AND IMMUNOLOGIC: No hives or eczema	    [ ] All others negative	  [ ] Unable to obtain      T(C): 36.2 (01-31-25 @ 06:10), Max: 36.7 (01-30-25 @ 14:32)  T(F): 97.2 (01-31-25 @ 06:10), Max: 98 (01-30-25 @ 14:32)  HR: 78 (01-31-25 @ 06:10) (64 - 104)  BP: 100/64 (01-31-25 @ 06:10) (98/69 - 117/56)  RR: 20 (01-31-25 @ 06:10) (19 - 22)  SpO2: 93% (01-31-25 @ 06:10) (93% - 96%)  Wt(kg): --    I&O's Summary        PHYSICAL EXAM:  A X O x  HEAD:  Atraumatic, Normocephalic  EYES: EOMI, PERRLA, conjunctiva and sclera clear  NECK: Supple, No JVD, Normal thyroid  Resp: CTAB, No crackles, wheezing,   CVS: Regular rate and rhythm; No discernable murmurs, rubs, or gallops  ABD: Soft, Nontender, Nondistended; Bowel sounds present  EXTREMITIES:  2+ Peripheral Pulses, No edema  LYMPH: No dicernable lymphadenopathy noted  GENERAL: NAD, well-groomed, well-developed      LABS:              CAPILLARY BLOOD GLUCOSE          Troponins:  ProBNP:  Lipid Profile:   HgA1c:  TSH:           RADIOLOGY & ADDITIONAL TESTS:    Imaging Personally Reviewed:  [ ] YES  [ ] NO      Consultant(s) Notes Reviewed:  [x ] YES  [ ] NO    Care Discussed with Consultants/Other Providers [ x] YES  [ ] NO          PAST MEDICAL & SURGICAL HISTORY:  HTN (hypertension)      Hearing decreased      CVA (cerebral vascular accident)  12/22/2016      Hyperlipemia      Kidney stones      Coronary artery disease  MI 12/22/2016      CHF (congestive heart failure)  1/2017 stents x3      Type 2 diabetes mellitus  2016      Confusion  from stroke      History of myocardial infarction      Malignant melanoma      S/P medial meniscal repair  and ligament surgery      H/O lithotripsy      S/P tonsillectomy      Bilateral inguinal hernia      S/P CABG x 2      S/P aortic aneurysm repair      S/P aortic valve replacement with bioprosthetic valve            Failure to thrive in adult    H/o or current diagnosis of HF- no contraindication to ACEI/ARBs    H/o or current diagnosis of HF- Contraindication to ACEI/ARBs    H/o or current diagnosis of HF- no contraindication to ACEI/ARBs    DNI    No pertinent family history in first degree relatives    Family history of diabetes mellitus    Family history of stroke    Handoff    MEWS Score    HTN (hypertension)    Hearing decreased    CVA (cerebral vascular accident)    Hyperlipemia    Kidney stones    Coronary artery disease    CHF (congestive heart failure)    Type 2 diabetes mellitus    Confusion    History of myocardial infarction    Malignant melanoma    CVA (cerebral vascular accident)    Adult failure to thrive    Severe protein-calorie malnutrition    Adult failure to thrive    CLAUDIA (acute kidney injury)    Transaminitis    Abnormality of lung on chest x-ray    CHF (congestive heart failure)    CAD (coronary artery disease)    DM (diabetes mellitus)    History of CVA (cerebrovascular accident)    History of myocardial infarction    HLD (hyperlipidemia)    Preventive measure    HTN (hypertension)    BPH (benign prostatic hyperplasia)    Prophylactic measure    Severe protein-calorie malnutrition    Discharge planning issues    Dementia    S/P medial meniscal repair    H/O lithotripsy    No significant past surgical history    S/P tonsillectomy    Bilateral inguinal hernia    S/P CABG x 2    S/P aortic aneurysm repair    S/P aortic valve replacement with bioprosthetic valve    No significant past surgical history    W- WEAKNESS    36    CLAUDIA (acute kidney injury)    Transaminitis    CHF (congestive heart failure)    Dementia    [email or web address]

## 2025-01-31 NOTE — DISCHARGE NOTE NURSING/CASE MANAGEMENT/SOCIAL WORK - NSDCPEFALRISK_GEN_ALL_CORE
For information on Fall & Injury Prevention, visit: [email or web address] OR  [email or web address] OR  https://www.cdc.gov/steadi/patient.html

## 2025-01-31 NOTE — PROGRESS NOTE ADULT - PROBLEM SELECTOR PLAN 5
- P/w   ,  [id number]  T-Bili: 2  - RUQ US showed right renal cyst, trace ascites, & right pleural effusion.  - Pt does not want IV placement, IVF or continual bloodwork   - Monitor as pt allows  - Pt and wife agreeable to comfort measures

## 2025-01-31 NOTE — DISCHARGE NOTE PROVIDER - HOSPITAL COURSE
74 year old, M, from home, w/  PMH of CVA, MI, HTN, T2DM, HLD, CAD s/p cardiac stents, HFrEF (EF 20-25% 10/2024), hx of malignant melanoma/wide excision, with recent elective bioprosthetic aortic valve replacement and ascending aortic aneurysm repair with transverse hemiarch and CABG x2 (4/8/24).  Presents for 1 month of decrease oral intake and no food x 4 days prior to admission. Admitted for severe protein malnutrition liekly due to worsening of dementia.  Transaminitis and Pleural effusions.  Pt is alert x person likely himself and his wife, he is restless, wants to go home, wife at the bedside, reviewed his wishes, she understands that patient wants to die peacefully and no more interventions or procedures.  SW and palliative care team consulted for possible home hospice.   Pt is DNR/ DNI no PEG no IVF, MEWS exempt.     Patient seen and examined at bedside, discussed with medical attending. Patient medically cleared for discharge to home hospice.

## 2025-01-31 NOTE — PROGRESS NOTE ADULT - ATTENDING COMMENTS
Pt and hcp adamantly requesting palliative care, as opposed to he medical route of treatment current pursued.
Pt and hcp adamantly requesting palliative care, as opposed to he medical route of treatment current pursued.    1/31/25 - Pt and HCP have decided instead of palliative /hospice care for RALF placement for the time being.
Pt and hcp adamantly requesting palliative care, as opposed to he medical route of treatment current pursued.

## 2025-01-31 NOTE — DISCHARGE NOTE NURSING/CASE MANAGEMENT/SOCIAL WORK - NSDCVIVACCINE_GEN_ALL_CORE_FT
Td (adult) preservative free; 19-Dec-2024 16:38; Nelsy Nichols (RN); [employer]; [id number] (Exp. Date: 16-Mar-2026); IntraMuscular; Deltoid Right.; 0.5 milliLiter(s); VIS (VIS Published: 19-Dec-2024, VIS Presented: 19-Dec-2024);

## 2025-01-31 NOTE — DISCHARGE NOTE PROVIDER - NSDCFUSCHEDAPPT_GEN_ALL_CORE_FT
Mable Lockhart  [facility]  PULMMED [address]  Scheduled Appointment: 02/18/2025    Chika Cabrera  [facility]  CARDIOLOGY [address]  Scheduled Appointment: 02/24/2025    Alexandra Marie  [facility]  CARDIOLOGY [address]  Scheduled Appointment: 02/28/2025    Cat Plascencia  [facility]  PSYCHIATRY [address] [facility]   Scheduled Appointment: 03/04/2025    Mendez Vidal  [facility]  CARDIOLOGY [address] [facility]   Scheduled Appointment: 04/23/2025    [facility]  CARDIOLOGY [address] [facility]   Scheduled Appointment: 04/23/2025    [facility]  CARDIOLOGY [address] [facility]   Scheduled Appointment: 04/24/2025    [facility]  CARDIOLOGY [address] [facility]   Scheduled Appointment: 04/28/2025    [facility]  CARDIOLOGY [address] [facility]   Scheduled Appointment: 04/30/2025     Mable Lockhart  [facility]  [address]ED [address]  Scheduled Appointment: 02/18/2025    Chika Cabrera  [facility]  CARDIOLOGY [address]  Scheduled Appointment: 02/24/2025    Alexandra Marie  [facility]  CARDIOLOGY [address]  Scheduled Appointment: 02/28/2025    Cat Plascencia  [facility]  PSYCHIATRY [address]   Scheduled Appointment: 03/04/2025    Mendez Vidal  [facility]  CARDIOLOGY [address] [facility]   Scheduled Appointment: 04/23/2025    [facility]  CARDIOLOGY [address] [facility]   Scheduled Appointment: 04/23/2025    [facility]  CARDIOLOGY [address] [facility]   Scheduled Appointment: 04/24/2025    [facility]  CARDIOLOGY [address] [facility]   Scheduled Appointment: 04/28/2025    [facility]  CARDIOLOGY [address] [facility]   Scheduled Appointment: 04/30/2025    Yany Velasquez  [facility]  ENDOCRIN [address]  Scheduled Appointment: 05/01/2025

## 2025-01-31 NOTE — PROGRESS NOTE ADULT - NUTRITIONAL ASSESSMENT
This patient has been assessed with a concern for Malnutrition and has been determined to have a diagnosis/diagnoses of Severe protein-calorie malnutrition.    This patient is being managed with:   Diet Pureed-  Consistent Carbohydrate {No Snacks}  Supplement Feeding Modality:  Oral  Glucerna Shake Cans or Servings Per Day:  1       Frequency:  Three Times a day  Entered: Jan 28 2025  2:20PM  
This patient has been assessed with a concern for Malnutrition and has been determined to have a diagnosis/diagnoses of Severe protein-calorie malnutrition.    This patient is being managed with:   Diet Pureed-  Consistent Carbohydrate {No Snacks}  Supplement Feeding Modality:  Oral  Glucerna Shake Cans or Servings Per Day:  1       Frequency:  Three Times a day  Entered: Jan 28 2025  2:20PM

## 2025-02-18 ENCOUNTER — APPOINTMENT (OUTPATIENT)
Dept: PULMONOLOGY | Facility: CLINIC | Age: 75
End: 2025-02-18

## 2025-02-19 ENCOUNTER — APPOINTMENT (OUTPATIENT)
Dept: CARDIOLOGY | Facility: CLINIC | Age: 75
End: 2025-02-19

## 2025-02-20 ENCOUNTER — APPOINTMENT (OUTPATIENT)
Dept: CARDIOLOGY | Facility: CLINIC | Age: 75
End: 2025-02-20

## 2025-02-24 ENCOUNTER — APPOINTMENT (OUTPATIENT)
Dept: CARDIOLOGY | Facility: CLINIC | Age: 75
End: 2025-02-24

## 2025-02-26 ENCOUNTER — APPOINTMENT (OUTPATIENT)
Dept: CARDIOLOGY | Facility: CLINIC | Age: 75
End: 2025-02-26

## 2025-02-27 ENCOUNTER — APPOINTMENT (OUTPATIENT)
Dept: CARDIOLOGY | Facility: CLINIC | Age: 75
End: 2025-02-27

## 2025-02-28 ENCOUNTER — APPOINTMENT (OUTPATIENT)
Dept: CARDIOLOGY | Facility: CLINIC | Age: 75
End: 2025-02-28

## 2025-03-03 ENCOUNTER — APPOINTMENT (OUTPATIENT)
Dept: CARDIOLOGY | Facility: CLINIC | Age: 75
End: 2025-03-03

## 2025-03-04 ENCOUNTER — APPOINTMENT (OUTPATIENT)
Dept: PSYCHIATRY | Facility: CLINIC | Age: 75
End: 2025-03-04

## 2025-03-05 ENCOUNTER — APPOINTMENT (OUTPATIENT)
Dept: CARDIOLOGY | Facility: CLINIC | Age: 75
End: 2025-03-05

## 2025-03-06 ENCOUNTER — APPOINTMENT (OUTPATIENT)
Dept: CARDIOLOGY | Facility: CLINIC | Age: 75
End: 2025-03-06

## 2025-03-10 ENCOUNTER — APPOINTMENT (OUTPATIENT)
Dept: CARDIOLOGY | Facility: CLINIC | Age: 75
End: 2025-03-10

## 2025-03-12 ENCOUNTER — APPOINTMENT (OUTPATIENT)
Dept: CARDIOLOGY | Facility: CLINIC | Age: 75
End: 2025-03-12

## 2025-03-13 ENCOUNTER — APPOINTMENT (OUTPATIENT)
Dept: CARDIOLOGY | Facility: CLINIC | Age: 75
End: 2025-03-13

## 2025-03-17 ENCOUNTER — APPOINTMENT (OUTPATIENT)
Dept: CARDIOLOGY | Facility: CLINIC | Age: 75
End: 2025-03-17

## 2025-04-23 ENCOUNTER — APPOINTMENT (OUTPATIENT)
Dept: CARDIOLOGY | Facility: CLINIC | Age: 75
End: 2025-04-23

## 2025-04-24 ENCOUNTER — APPOINTMENT (OUTPATIENT)
Dept: CARDIOLOGY | Facility: CLINIC | Age: 75
End: 2025-04-24

## 2025-04-28 ENCOUNTER — APPOINTMENT (OUTPATIENT)
Dept: CARDIOLOGY | Facility: CLINIC | Age: 75
End: 2025-04-28

## 2025-04-30 ENCOUNTER — APPOINTMENT (OUTPATIENT)
Dept: CARDIOLOGY | Facility: CLINIC | Age: 75
End: 2025-04-30

## 2025-05-01 ENCOUNTER — APPOINTMENT (OUTPATIENT)
Dept: CARDIOLOGY | Facility: CLINIC | Age: 75
End: 2025-05-01

## 2025-05-01 ENCOUNTER — APPOINTMENT (OUTPATIENT)
Dept: ENDOCRINOLOGY | Facility: CLINIC | Age: 75
End: 2025-05-01

## 2025-05-05 ENCOUNTER — APPOINTMENT (OUTPATIENT)
Dept: CARDIOLOGY | Facility: CLINIC | Age: 75
End: 2025-05-05

## 2025-05-07 ENCOUNTER — APPOINTMENT (OUTPATIENT)
Dept: CARDIOLOGY | Facility: CLINIC | Age: 75
End: 2025-05-07

## 2025-05-08 ENCOUNTER — APPOINTMENT (OUTPATIENT)
Dept: CARDIOLOGY | Facility: CLINIC | Age: 75
End: 2025-05-08

## 2025-05-12 ENCOUNTER — APPOINTMENT (OUTPATIENT)
Dept: CARDIOLOGY | Facility: CLINIC | Age: 75
End: 2025-05-12

## 2025-05-14 ENCOUNTER — APPOINTMENT (OUTPATIENT)
Dept: CARDIOLOGY | Facility: CLINIC | Age: 75
End: 2025-05-14

## 2025-05-15 ENCOUNTER — APPOINTMENT (OUTPATIENT)
Dept: CARDIOLOGY | Facility: CLINIC | Age: 75
End: 2025-05-15

## 2025-05-19 ENCOUNTER — APPOINTMENT (OUTPATIENT)
Dept: CARDIOLOGY | Facility: CLINIC | Age: 75
End: 2025-05-19

## 2025-05-21 ENCOUNTER — APPOINTMENT (OUTPATIENT)
Dept: CARDIOLOGY | Facility: CLINIC | Age: 75
End: 2025-05-21

## 2025-05-22 ENCOUNTER — APPOINTMENT (OUTPATIENT)
Dept: CARDIOLOGY | Facility: CLINIC | Age: 75
End: 2025-05-22

## 2025-05-28 ENCOUNTER — APPOINTMENT (OUTPATIENT)
Dept: CARDIOLOGY | Facility: CLINIC | Age: 75
End: 2025-05-28

## 2025-05-29 ENCOUNTER — APPOINTMENT (OUTPATIENT)
Dept: CARDIOLOGY | Facility: CLINIC | Age: 75
End: 2025-05-29

## 2025-06-02 ENCOUNTER — APPOINTMENT (OUTPATIENT)
Dept: CARDIOLOGY | Facility: CLINIC | Age: 75
End: 2025-06-02

## 2025-06-04 ENCOUNTER — APPOINTMENT (OUTPATIENT)
Dept: CARDIOLOGY | Facility: CLINIC | Age: 75
End: 2025-06-04

## 2025-06-05 ENCOUNTER — APPOINTMENT (OUTPATIENT)
Dept: CARDIOLOGY | Facility: CLINIC | Age: 75
End: 2025-06-05

## 2025-06-09 ENCOUNTER — APPOINTMENT (OUTPATIENT)
Dept: CARDIOLOGY | Facility: CLINIC | Age: 75
End: 2025-06-09

## 2025-06-11 ENCOUNTER — APPOINTMENT (OUTPATIENT)
Dept: CARDIOLOGY | Facility: CLINIC | Age: 75
End: 2025-06-11

## 2025-06-12 ENCOUNTER — APPOINTMENT (OUTPATIENT)
Dept: CARDIOLOGY | Facility: CLINIC | Age: 75
End: 2025-06-12

## 2025-06-16 ENCOUNTER — APPOINTMENT (OUTPATIENT)
Dept: CARDIOLOGY | Facility: CLINIC | Age: 75
End: 2025-06-16

## 2025-06-18 ENCOUNTER — APPOINTMENT (OUTPATIENT)
Dept: CARDIOLOGY | Facility: CLINIC | Age: 75
End: 2025-06-18

## 2025-06-19 ENCOUNTER — APPOINTMENT (OUTPATIENT)
Dept: CARDIOLOGY | Facility: CLINIC | Age: 75
End: 2025-06-19

## 2025-06-23 ENCOUNTER — APPOINTMENT (OUTPATIENT)
Dept: CARDIOLOGY | Facility: CLINIC | Age: 75
End: 2025-06-23

## 2025-06-25 ENCOUNTER — APPOINTMENT (OUTPATIENT)
Dept: CARDIOLOGY | Facility: CLINIC | Age: 75
End: 2025-06-25

## 2025-06-26 ENCOUNTER — APPOINTMENT (OUTPATIENT)
Dept: CARDIOLOGY | Facility: CLINIC | Age: 75
End: 2025-06-26

## 2025-06-30 ENCOUNTER — APPOINTMENT (OUTPATIENT)
Dept: CARDIOLOGY | Facility: CLINIC | Age: 75
End: 2025-06-30

## 2025-07-02 ENCOUNTER — APPOINTMENT (OUTPATIENT)
Dept: CARDIOLOGY | Facility: CLINIC | Age: 75
End: 2025-07-02

## 2025-07-03 ENCOUNTER — APPOINTMENT (OUTPATIENT)
Dept: CARDIOLOGY | Facility: CLINIC | Age: 75
End: 2025-07-03

## 2025-07-07 ENCOUNTER — APPOINTMENT (OUTPATIENT)
Dept: CARDIOLOGY | Facility: CLINIC | Age: 75
End: 2025-07-07

## 2025-07-09 ENCOUNTER — APPOINTMENT (OUTPATIENT)
Dept: CARDIOLOGY | Facility: CLINIC | Age: 75
End: 2025-07-09

## 2025-07-10 ENCOUNTER — APPOINTMENT (OUTPATIENT)
Dept: CARDIOLOGY | Facility: CLINIC | Age: 75
End: 2025-07-10

## 2025-07-14 ENCOUNTER — APPOINTMENT (OUTPATIENT)
Dept: CARDIOLOGY | Facility: CLINIC | Age: 75
End: 2025-07-14

## 2025-07-16 ENCOUNTER — APPOINTMENT (OUTPATIENT)
Dept: CARDIOLOGY | Facility: CLINIC | Age: 75
End: 2025-07-16

## (undated) DEVICE — ELCTR BOVIE TIP BLADE MEGADYNE E-Z CLEAN 6.5" (LONG)

## (undated) DEVICE — LAP PAD 18 X 18"

## (undated) DEVICE — PAD NERVE PHRENIC NERVE

## (undated) DEVICE — SUT ETHIBOND EXCEL 3-0 30" V-5 PLDGT 5 GREEN 5 WHITE

## (undated) DEVICE — NDL COUNTER FOAM AND MAGNET 40-70

## (undated) DEVICE — NDL COUNTER FOAM AND ADHESIVE 40-70

## (undated) DEVICE — DRSG KLING 6"

## (undated) DEVICE — PACING CABLE (BROWN) A/V TEMP SCREW DOWN 12FT

## (undated) DEVICE — VESSEL LOOP MAXI-BLUE 0.120" X 16"

## (undated) DEVICE — SUT BLUNT SZ 5

## (undated) DEVICE — SUT ETHIBOND 2-0 4-30" RB-1 GREEN

## (undated) DEVICE — FILTER REINFUSION FOR SALVAGED BLOOD DISP

## (undated) DEVICE — SYR LUER LOK 30CC

## (undated) DEVICE — TOURNIQUET SET 12FR (1 RED, 1 BLUE, 1 SNARE) 7"

## (undated) DEVICE — SUT PROLENE 4-0 36" RB-1

## (undated) DEVICE — BLOWER MISTER AXIUS WITH IV SET

## (undated) DEVICE — TUBING ATS SUCTION LINE

## (undated) DEVICE — VESSEL LOOP MAXI-RED  0.120" X 16"

## (undated) DEVICE — ELCTR BOVIE PENCIL HANDPIECE ROCKER SWITCH 15FT

## (undated) DEVICE — PACK CARDIAC YELLOW

## (undated) DEVICE — SOL NORMOSOL-R PH7.4 1000ML

## (undated) DEVICE — FOLEY TRAY 16FR 5CC LF LUBRISIL ADVANCE TEMP CLOSED

## (undated) DEVICE — SUT PLEDGET PRE PUNCH 4.8 X 9.5 X 1.5 MM

## (undated) DEVICE — BLADE SCALPEL SAFETYLOCK #15

## (undated) DEVICE — SOL IRR BAG NS 0.9% 3000ML

## (undated) DEVICE — Device

## (undated) DEVICE — TUBING TRUWAVE PRESSURE MALE/FEMALE 12"

## (undated) DEVICE — SUT PROLENE 6-0 30" BV-1

## (undated) DEVICE — SUMP PERICARDIAL 20FR 1/4" ADULT

## (undated) DEVICE — SUT PROLENE 3-0 36" RB-1

## (undated) DEVICE — DRSG TEGADERM CHG 4X6-1/8"

## (undated) DEVICE — SOL IRR POUR NS 0.9% 1000ML

## (undated) DEVICE — DRAPE SLUSH MACHINE 48" X 48"

## (undated) DEVICE — SET PERF CARDIOPLEGIA 4 ARM STRL

## (undated) DEVICE — BLADE SCALPEL SAFETYLOCK #10

## (undated) DEVICE — DRAPE IOBAN 33" X 23"

## (undated) DEVICE — SUT SOFSILK 2 60" TIES

## (undated) DEVICE — SYR LUER LOK 50CC

## (undated) DEVICE — SUT PROLENE 5-0 30" RB-2

## (undated) DEVICE — SUT DOUBLE 6 WIRE STERNAL

## (undated) DEVICE — DRSG TEGADERM 6"X8"

## (undated) DEVICE — ELCTR BOVIE TIP BLADE MEGADYNE E-Z CLEAN 2.75" (X-LONG)

## (undated) DEVICE — AORTIC PUNCH 5MM STANDARD HANDLE

## (undated) DEVICE — SUT POLYSORB 0 36" GS-25 UNDYED

## (undated) DEVICE — DRSG OPSITE 2.5 X 2"

## (undated) DEVICE — SYR LUER LOK 20CC

## (undated) DEVICE — TUBING INSUFFLATION LAP FILTER 10FT

## (undated) DEVICE — PACING CABLE (BLUE) ATRIAL TEMP SCREW DOWN 12FT

## (undated) DEVICE — NDL HYPO SAFE 18G X 1.5" (PINK)

## (undated) DEVICE — SAW BLADE MICROAIRE STERNUM 1X34X9.4MM

## (undated) DEVICE — SUT PROLENE 3-0 36" SH

## (undated) DEVICE — SOL IRR POUR NS 0.9% 500ML

## (undated) DEVICE — TUBING SUCTION NON CONDUCTIVE 316X18" STERILE

## (undated) DEVICE — SUT PROLENE 7-0 24" BV175-6

## (undated) DEVICE — TUBING TUR 2 PRONG

## (undated) DEVICE — STRYKER INTERPULSE HANDPIECE W IRR SUCTION TUBE

## (undated) DEVICE — SUT BOOT STANDARD (ASSORTED) 5 PAIR

## (undated) DEVICE — SUT PROLENE 6-0 4-30" BV-1

## (undated) DEVICE — BLADE SCALPEL SAFETYLOCK #11

## (undated) DEVICE — SOL IRR POUR H2O 250ML

## (undated) DEVICE — DRAPE MAYO STAND 30"

## (undated) DEVICE — DRAIN CHANNEL 19FR ROUND FULL FLUTED

## (undated) DEVICE — DRSG PREVENA PEEL & PLACE KIT 20CM

## (undated) DEVICE — POSITIONER CARDIAC BUMP

## (undated) DEVICE — SUT PROLENE 6-0 4-30" C-1

## (undated) DEVICE — BULLDOG SPRING CLIP 6MM SOFT/SOFT

## (undated) DEVICE — BLOWER MISTER VIPER II

## (undated) DEVICE — CONNECTOR "Y" 1/4 X 1/4 X 1/4"

## (undated) DEVICE — CONNECTOR STRAIGHT 1/4 X 3/8"

## (undated) DEVICE — WARMING BLANKET DUO-THERM HYPER/HYPOTHERM ADULT

## (undated) DEVICE — GLV 7.5 PROTEXIS (WHITE)

## (undated) DEVICE — DRSG DERMABOND PRINEO 60CM

## (undated) DEVICE — TOURNIQUET SET TOURNIKWIK 12FR (4 TUBES, 1 SNARE) 7.5"

## (undated) DEVICE — SUT ETHIBOND 3-0 36" RB-1

## (undated) DEVICE — SUT PROLENE 7-0 30" BV-1

## (undated) DEVICE — CATH IV SAFE INSYTE 24G X 3/4" (YELLOW)

## (undated) DEVICE — SUT SURGICAL STEEL 6 30" BP-1

## (undated) DEVICE — CONNECTOR "Y" 1/2 X 1/2 X 3/8"

## (undated) DEVICE — CONNECTOR "Y" 1/2 X 3/8 X 3/8"

## (undated) DEVICE — SUT VICRYL 0 36" CTX UNDYED

## (undated) DEVICE — PACK W INSPIRE OXYGENATOR W HEMOCONCENTRATOR

## (undated) DEVICE — GLV 7.5 DERMAPRENE ULTRA

## (undated) DEVICE — TUBING SUCTION 20FT

## (undated) DEVICE — DRSG STOCKINETTE IMPERVIOUS XL

## (undated) DEVICE — SUT POLYSORB 2 30" GS-26

## (undated) DEVICE — STAPLER SKIN VISI-STAT 35 WIDE

## (undated) DEVICE — DRAIN CHANNEL 32FR ROUND HUBLESS FULL FLUTED

## (undated) DEVICE — PACK UNIVERSAL CARDIAC

## (undated) DEVICE — SUT POLYSORB 2-0 30" GS-21 UNDYED

## (undated) DEVICE — SAW BLADE STRYKER STERNUM 31MM X 6.27 X .79

## (undated) DEVICE — ADAPTER DLP MALE 1/4" X 2" (CLEAR) BARBED

## (undated) DEVICE — VENTING ADAPTER "Y" (RED/BLUE) 7.5"

## (undated) DEVICE — GLV 8 PROTEXIS (WHITE)

## (undated) DEVICE — NDL HYPO SAFE 20G X 1.5" (YELLOW)

## (undated) DEVICE — DRAPE TOWEL BLUE 17" X 24"

## (undated) DEVICE — CHEST DRAIN OASIS DRY SUCTION WATER SEAL

## (undated) DEVICE — GLV 6.5 PROTEXIS (WHITE)

## (undated) DEVICE — SPECIMEN CONTAINER 100ML

## (undated) DEVICE — ELCTR BOVIE TIP CLEANER SCRATCH PAD

## (undated) DEVICE — SUT PROLENE 4-0 36" SH

## (undated) DEVICE — SUT SOFSILK 0 30" TIES

## (undated) DEVICE — STOPCOCK 4-WAY W SWIVEL MALE LUER LOCK NON VENTED RED CAP

## (undated) DEVICE — PHRENIC NERVE PAD MEDIUM

## (undated) DEVICE — CONNECTOR CARDIAC 1:1 FOR HUBLESS DRAINS

## (undated) DEVICE — DRSG ACE BANDAGE 6"

## (undated) DEVICE — DRSG OPSITE 13.75 X 4"

## (undated) DEVICE — SUT PROLENE 7-0 4-24" BV-1

## (undated) DEVICE — CONNECTOR "Y" 3/8 X 1/4 X 3/8"

## (undated) DEVICE — SUT STAINLESS STEEL 5 18" SCC

## (undated) DEVICE — SUT TICRON 2-0 36" CV-316 DA

## (undated) DEVICE — SUT QUILL MONODERM 2-0 30CM 18MM

## (undated) DEVICE — VESSEL LOOP EXTRA MAXI-BLUE 0.200" X 22"

## (undated) DEVICE — SUT PROLENE 7-0 24" BV-1

## (undated) DEVICE — SUT SOFSILK 0 30" V-20

## (undated) DEVICE — POSITIONER FOAM EGG CRATE ULNAR 2PCS (PINK)

## (undated) DEVICE — SYNOVIS VASCULAR PROBE 1.5MM 15CM

## (undated) DEVICE — ELCTR CAUTERY 2" LOOP TIP 2200 DEG FAHRENHEIT

## (undated) DEVICE — MULTIPLE PERFUSION SET FEMALE 1 INLET LEG W 4 LEGS 15" (BLUE/RED)

## (undated) DEVICE — SUT MONOCRYL 4-0 18" PS-2

## (undated) DEVICE — SUCTION YANKAUER NO CONTROL VENT

## (undated) DEVICE — PREP DURAPREP 26CC

## (undated) DEVICE — SPONGE PEANUT AUTO COUNT

## (undated) DEVICE — GLV 7 PROTEXIS (WHITE)

## (undated) DEVICE — ELCTR BOVIE TIP BLADE MEGADYNE E-Z CLEAN 2.5" (SHORT)

## (undated) DEVICE — DRAIN RESERVOIR FOR JACKSON PRATT 100CC CARDINAL

## (undated) DEVICE — CONN EQL STRT 3/8X3/8IN STRL

## (undated) DEVICE — GOWN XXXL

## (undated) DEVICE — BEAVER BLADE MINI SHARP ALL ROUND (BLUE)

## (undated) DEVICE — CATH IV INTROCAN SAFETY 14G X 1.25" (ORANGE) FEP

## (undated) DEVICE — SYS VEIN HARVESTING VIRTUOSAPH PLUS W/ RADIAL

## (undated) DEVICE — AORTIC PUNCH 4.0MM STANDARD HANDLE

## (undated) DEVICE — SYR LUER LOK 1CC

## (undated) DEVICE — SUT SILK 2-0 18" SH (POP-OFF)

## (undated) DEVICE — SUCTION CATH ARGYLE WHISTLE TIP 14FR STRAIGHT PACKED

## (undated) DEVICE — DRAPE SLUSH / WARMER 44 X 66"

## (undated) DEVICE — SUT PROLENE 6-0 30" C-1

## (undated) DEVICE — BULLDOG SPRING CLIP LATIS/LATIS 6MM 1/2 FORCE (BLUE)

## (undated) DEVICE — SUT PLEDGET 9MM X 4MM X 1.5MM

## (undated) DEVICE — VISITEC 4X4

## (undated) DEVICE — CATH IV SAFE BC 20G X 1.16" (PINK)

## (undated) DEVICE — SUT ETHIBOND 2-0 36" SH

## (undated) DEVICE — TUBING KIT FAST START ATF 40

## (undated) DEVICE — SUCTION YANKAUER BULBOUS TIP NO VENT

## (undated) DEVICE — SENSOR MYOCARDIAL TEMP 15MM

## (undated) DEVICE — GOWN TRIMAX LG

## (undated) DEVICE — SUMP INTRACARDIAC 20FR 1/4" ADULT

## (undated) DEVICE — GLV 8.5 PROTEXIS (WHITE)

## (undated) DEVICE — DOPPLER PROBE 20MHZ DISP